# Patient Record
Sex: MALE | Race: WHITE | Employment: UNEMPLOYED | URBAN - NONMETROPOLITAN AREA
[De-identification: names, ages, dates, MRNs, and addresses within clinical notes are randomized per-mention and may not be internally consistent; named-entity substitution may affect disease eponyms.]

---

## 2017-02-03 ENCOUNTER — OFFICE VISIT (OUTPATIENT)
Dept: FAMILY MEDICINE CLINIC | Age: 42
End: 2017-02-03

## 2017-02-03 VITALS
RESPIRATION RATE: 16 BRPM | HEIGHT: 70 IN | SYSTOLIC BLOOD PRESSURE: 128 MMHG | WEIGHT: 257 LBS | BODY MASS INDEX: 36.79 KG/M2 | DIASTOLIC BLOOD PRESSURE: 74 MMHG | HEART RATE: 88 BPM

## 2017-02-03 DIAGNOSIS — F10.10 ALCOHOL ABUSE: ICD-10-CM

## 2017-02-03 DIAGNOSIS — I10 HTN (HYPERTENSION), BENIGN: ICD-10-CM

## 2017-02-03 DIAGNOSIS — E11.9 TYPE 2 DIABETES MELLITUS WITHOUT COMPLICATION, WITHOUT LONG-TERM CURRENT USE OF INSULIN (HCC): ICD-10-CM

## 2017-02-03 DIAGNOSIS — E78.5 HYPERLIPIDEMIA, UNSPECIFIED HYPERLIPIDEMIA TYPE: Primary | ICD-10-CM

## 2017-02-03 PROCEDURE — 99214 OFFICE O/P EST MOD 30 MIN: CPT | Performed by: NURSE PRACTITIONER

## 2017-02-03 RX ORDER — VENLAFAXINE HYDROCHLORIDE 150 MG/1
150 CAPSULE, EXTENDED RELEASE ORAL DAILY
Qty: 30 CAPSULE | Refills: 5 | Status: SHIPPED | OUTPATIENT
Start: 2017-02-03 | End: 2017-08-23 | Stop reason: SDUPTHER

## 2017-02-03 RX ORDER — LISINOPRIL AND HYDROCHLOROTHIAZIDE 25; 20 MG/1; MG/1
1 TABLET ORAL DAILY
Qty: 90 TABLET | Refills: 3 | Status: SHIPPED | OUTPATIENT
Start: 2017-02-03 | End: 2017-08-23 | Stop reason: SDUPTHER

## 2017-02-03 RX ORDER — DISULFIRAM 500 MG/1
TABLET ORAL
Qty: 30 TABLET | Refills: 1 | Status: SHIPPED | OUTPATIENT
Start: 2017-02-03 | End: 2017-08-23 | Stop reason: SDUPTHER

## 2017-02-03 RX ORDER — SIMVASTATIN 10 MG
10 TABLET ORAL EVERY EVENING
Qty: 90 TABLET | Refills: 3 | Status: SHIPPED | OUTPATIENT
Start: 2017-02-03 | End: 2017-08-23 | Stop reason: SDUPTHER

## 2017-02-03 ASSESSMENT — PATIENT HEALTH QUESTIONNAIRE - PHQ9
1. LITTLE INTEREST OR PLEASURE IN DOING THINGS: 0
SUM OF ALL RESPONSES TO PHQ9 QUESTIONS 1 & 2: 0
2. FEELING DOWN, DEPRESSED OR HOPELESS: 0
SUM OF ALL RESPONSES TO PHQ QUESTIONS 1-9: 0

## 2017-02-03 ASSESSMENT — ENCOUNTER SYMPTOMS
EYES NEGATIVE: 1
GASTROINTESTINAL NEGATIVE: 1
RESPIRATORY NEGATIVE: 1

## 2017-03-24 ENCOUNTER — OFFICE VISIT (OUTPATIENT)
Dept: FAMILY MEDICINE CLINIC | Age: 42
End: 2017-03-24

## 2017-03-24 VITALS
DIASTOLIC BLOOD PRESSURE: 84 MMHG | HEART RATE: 76 BPM | HEIGHT: 70 IN | SYSTOLIC BLOOD PRESSURE: 116 MMHG | WEIGHT: 248 LBS | BODY MASS INDEX: 35.5 KG/M2 | RESPIRATION RATE: 16 BRPM

## 2017-03-24 DIAGNOSIS — I10 ESSENTIAL HYPERTENSION: Primary | ICD-10-CM

## 2017-03-24 PROCEDURE — 99212 OFFICE O/P EST SF 10 MIN: CPT | Performed by: NURSE PRACTITIONER

## 2017-03-24 RX ORDER — PROPRANOLOL HYDROCHLORIDE 20 MG/1
20 TABLET ORAL 2 TIMES DAILY
Qty: 60 TABLET | Refills: 5 | Status: SHIPPED | OUTPATIENT
Start: 2017-03-24 | End: 2017-08-23 | Stop reason: SDUPTHER

## 2017-03-24 ASSESSMENT — ENCOUNTER SYMPTOMS
EYES NEGATIVE: 1
RESPIRATORY NEGATIVE: 1
GASTROINTESTINAL NEGATIVE: 1

## 2017-08-23 ENCOUNTER — OFFICE VISIT (OUTPATIENT)
Dept: FAMILY MEDICINE CLINIC | Age: 42
End: 2017-08-23
Payer: MEDICAID

## 2017-08-23 VITALS
RESPIRATION RATE: 17 BRPM | SYSTOLIC BLOOD PRESSURE: 124 MMHG | DIASTOLIC BLOOD PRESSURE: 64 MMHG | BODY MASS INDEX: 37.05 KG/M2 | WEIGHT: 258.8 LBS | HEIGHT: 70 IN | HEART RATE: 86 BPM

## 2017-08-23 DIAGNOSIS — F10.10 ALCOHOL ABUSE: ICD-10-CM

## 2017-08-23 DIAGNOSIS — E11.9 TYPE 2 DIABETES MELLITUS WITHOUT COMPLICATION, WITHOUT LONG-TERM CURRENT USE OF INSULIN (HCC): ICD-10-CM

## 2017-08-23 DIAGNOSIS — E11.9 TYPE 2 DIABETES MELLITUS WITHOUT COMPLICATION, UNSPECIFIED LONG TERM INSULIN USE STATUS: ICD-10-CM

## 2017-08-23 DIAGNOSIS — I10 ESSENTIAL HYPERTENSION: ICD-10-CM

## 2017-08-23 DIAGNOSIS — Z12.5 SCREENING FOR PROSTATE CANCER: Primary | ICD-10-CM

## 2017-08-23 PROCEDURE — 99214 OFFICE O/P EST MOD 30 MIN: CPT | Performed by: NURSE PRACTITIONER

## 2017-08-23 RX ORDER — DISULFIRAM 500 MG/1
TABLET ORAL
Qty: 30 TABLET | Refills: 5 | Status: SHIPPED | OUTPATIENT
Start: 2017-08-23 | End: 2017-10-26 | Stop reason: ALTCHOICE

## 2017-08-23 RX ORDER — SIMVASTATIN 10 MG
10 TABLET ORAL EVERY EVENING
Qty: 90 TABLET | Refills: 3 | Status: ON HOLD | OUTPATIENT
Start: 2017-08-23 | End: 2018-05-15 | Stop reason: HOSPADM

## 2017-08-23 RX ORDER — PROPRANOLOL HYDROCHLORIDE 20 MG/1
20 TABLET ORAL 2 TIMES DAILY
Qty: 60 TABLET | Refills: 5 | Status: ON HOLD | OUTPATIENT
Start: 2017-08-23 | End: 2018-05-10 | Stop reason: ALTCHOICE

## 2017-08-23 RX ORDER — VENLAFAXINE HYDROCHLORIDE 150 MG/1
150 CAPSULE, EXTENDED RELEASE ORAL DAILY
Qty: 30 CAPSULE | Refills: 5 | Status: SHIPPED | OUTPATIENT
Start: 2017-08-23 | End: 2018-05-06 | Stop reason: ALTCHOICE

## 2017-08-23 RX ORDER — LISINOPRIL AND HYDROCHLOROTHIAZIDE 25; 20 MG/1; MG/1
1 TABLET ORAL DAILY
Qty: 90 TABLET | Refills: 5 | Status: SHIPPED | OUTPATIENT
Start: 2017-08-23 | End: 2018-10-04 | Stop reason: SDUPTHER

## 2017-08-23 ASSESSMENT — ENCOUNTER SYMPTOMS
GASTROINTESTINAL NEGATIVE: 1
RESPIRATORY NEGATIVE: 1
EYES NEGATIVE: 1

## 2017-08-28 ENCOUNTER — HOSPITAL ENCOUNTER (OUTPATIENT)
Age: 42
Discharge: HOME | End: 2017-08-28
Payer: MEDICAID

## 2017-08-28 ENCOUNTER — TELEPHONE (OUTPATIENT)
Dept: FAMILY MEDICINE CLINIC | Age: 42
End: 2017-08-28

## 2017-08-28 DIAGNOSIS — E11.9 TYPE 2 DIABETES MELLITUS WITHOUT COMPLICATION, UNSPECIFIED LONG TERM INSULIN USE STATUS: ICD-10-CM

## 2017-08-28 DIAGNOSIS — E11.9 TYPE 2 DIABETES MELLITUS WITHOUT COMPLICATION, WITHOUT LONG-TERM CURRENT USE OF INSULIN (HCC): Primary | ICD-10-CM

## 2017-08-28 DIAGNOSIS — Z12.5 SCREENING FOR PROSTATE CANCER: ICD-10-CM

## 2017-08-28 LAB
ALBUMIN SERPL-MCNC: 4.2 G/DL (ref 3.5–5.1)
ALP BLD-CCNC: 94 U/L (ref 38–126)
ALT SERPL-CCNC: 79 U/L (ref 11–66)
ANION GAP SERPL CALCULATED.3IONS-SCNC: 11 MEQ/L (ref 8–16)
AST SERPL-CCNC: 38 U/L (ref 5–40)
AVERAGE GLUCOSE: 114 MG/DL (ref 70–126)
BASOPHILS # BLD: 0.3 %
BASOPHILS ABSOLUTE: 0 THOU/MM3 (ref 0–0.1)
BILIRUB SERPL-MCNC: 0.3 MG/DL (ref 0.3–1.2)
BUN BLDV-MCNC: 19 MG/DL (ref 7–22)
CALCIUM SERPL-MCNC: 9.3 MG/DL (ref 8.5–10.5)
CHLORIDE BLD-SCNC: 102 MEQ/L (ref 98–111)
CHOLESTEROL, TOTAL: 213 MG/DL (ref 100–199)
CO2: 22 MEQ/L (ref 23–33)
CREAT SERPL-MCNC: 0.7 MG/DL (ref 0.4–1.2)
CREATININE, URINE: 219.5 MG/DL
EOSINOPHIL # BLD: 2.1 %
EOSINOPHILS ABSOLUTE: 0.2 THOU/MM3 (ref 0–0.4)
GFR SERPL CREATININE-BSD FRML MDRD: > 90 ML/MIN/1.73M2
GLUCOSE BLD-MCNC: 171 MG/DL (ref 70–108)
HBA1C MFR BLD: 5.8 % (ref 4.4–6.4)
HCT VFR BLD CALC: 46.5 % (ref 42–52)
HDLC SERPL-MCNC: 37 MG/DL
HEMOGLOBIN: 16.2 GM/DL (ref 14–18)
LDL CHOLESTEROL CALCULATED: 116 MG/DL
LYMPHOCYTES # BLD: 17.4 %
LYMPHOCYTES ABSOLUTE: 1.3 THOU/MM3 (ref 1–4.8)
MCH RBC QN AUTO: 33.9 PG (ref 27–31)
MCHC RBC AUTO-ENTMCNC: 34.8 GM/DL (ref 33–37)
MCV RBC AUTO: 97.4 FL (ref 80–94)
MICROALBUMIN UR-MCNC: 37.94 MG/DL
MICROALBUMIN/CREAT UR-RTO: 173 MG/G (ref 0–30)
MONOCYTES # BLD: 7.4 %
MONOCYTES ABSOLUTE: 0.6 THOU/MM3 (ref 0.4–1.3)
NUCLEATED RED BLOOD CELLS: 0 /100 WBC
PDW BLD-RTO: 13.2 % (ref 11.5–14.5)
PLATELET # BLD: 222 THOU/MM3 (ref 130–400)
PMV BLD AUTO: 8.7 MCM (ref 7.4–10.4)
POTASSIUM SERPL-SCNC: 4.6 MEQ/L (ref 3.5–5.2)
PROSTATE SPECIFIC ANTIGEN: 0.25 NG/ML (ref 0–1)
RBC # BLD: 4.78 MILL/MM3 (ref 4.7–6.1)
RBC # BLD: NORMAL 10*6/UL
SEG NEUTROPHILS: 72.8 %
SEGMENTED NEUTROPHILS ABSOLUTE COUNT: 5.5 THOU/MM3 (ref 1.8–7.7)
SODIUM BLD-SCNC: 135 MEQ/L (ref 135–145)
TOTAL PROTEIN: 7.4 G/DL (ref 6.1–8)
TRIGL SERPL-MCNC: 302 MG/DL (ref 0–199)
WBC # BLD: 7.5 THOU/MM3 (ref 4.8–10.8)

## 2017-08-28 PROCEDURE — 36415 COLL VENOUS BLD VENIPUNCTURE: CPT

## 2017-08-28 PROCEDURE — 83036 HEMOGLOBIN GLYCOSYLATED A1C: CPT

## 2017-08-28 PROCEDURE — 80061 LIPID PANEL: CPT

## 2017-08-28 PROCEDURE — 85025 COMPLETE CBC W/AUTO DIFF WBC: CPT

## 2017-08-28 PROCEDURE — 80053 COMPREHEN METABOLIC PANEL: CPT

## 2017-08-28 PROCEDURE — 82043 UR ALBUMIN QUANTITATIVE: CPT

## 2017-08-28 PROCEDURE — G0103 PSA SCREENING: HCPCS

## 2017-08-28 RX ORDER — BLOOD-GLUCOSE METER
KIT MISCELLANEOUS
Qty: 1 KIT | Refills: 5 | Status: SHIPPED | OUTPATIENT
Start: 2017-08-28 | End: 2018-10-04 | Stop reason: SDUPTHER

## 2017-10-14 ENCOUNTER — HOSPITAL ENCOUNTER (EMERGENCY)
Age: 42
Discharge: HOME | End: 2017-10-14
Attending: FAMILY MEDICINE
Payer: MEDICAID

## 2017-10-14 ENCOUNTER — APPOINTMENT (OUTPATIENT)
Dept: GENERAL RADIOLOGY | Age: 42
End: 2017-10-14
Payer: MEDICAID

## 2017-10-14 ENCOUNTER — APPOINTMENT (OUTPATIENT)
Dept: CT IMAGING | Age: 42
End: 2017-10-14
Payer: MEDICAID

## 2017-10-14 VITALS
TEMPERATURE: 99.3 F | WEIGHT: 254.6 LBS | DIASTOLIC BLOOD PRESSURE: 74 MMHG | RESPIRATION RATE: 16 BRPM | SYSTOLIC BLOOD PRESSURE: 129 MMHG | OXYGEN SATURATION: 94 % | HEART RATE: 82 BPM | BODY MASS INDEX: 35.64 KG/M2 | HEIGHT: 71 IN

## 2017-10-14 DIAGNOSIS — R10.84 GENERALIZED ABDOMINAL PAIN: ICD-10-CM

## 2017-10-14 DIAGNOSIS — J18.9 PNEUMONITIS: Primary | ICD-10-CM

## 2017-10-14 LAB
ALBUMIN SERPL-MCNC: 2.9 G/DL (ref 3.5–5.1)
ALP BLD-CCNC: 254 U/L (ref 38–126)
ALT SERPL-CCNC: 164 U/L (ref 11–66)
AMORPHOUS: ABNORMAL
ANION GAP SERPL CALCULATED.3IONS-SCNC: 18 MEQ/L (ref 8–16)
ANISOCYTOSIS: NORMAL
AST SERPL-CCNC: 264 U/L (ref 5–40)
BACTERIA: ABNORMAL
BASOPHILS # BLD: 0.5 %
BILIRUB SERPL-MCNC: 7.6 MG/DL (ref 0.3–1.2)
BILIRUBIN URINE: ABNORMAL
BLOOD, URINE: NEGATIVE
BUN BLDV-MCNC: 12 MG/DL (ref 7–22)
CALCIUM SERPL-MCNC: 8.4 MG/DL (ref 8.5–10.5)
CASTS UA: ABNORMAL /LPF
CHARACTER, URINE: CLEAR
CHLORIDE BLD-SCNC: 92 MEQ/L (ref 98–111)
CO2: 20 MEQ/L (ref 23–33)
COLOR: ABNORMAL
CREAT SERPL-MCNC: 0.4 MG/DL (ref 0.4–1.2)
CRYSTALS, UA: ABNORMAL
EOSINOPHIL # BLD: 0.4 %
EPITHELIAL CELLS, UA: ABNORMAL /HPF
FLU A ANTIGEN: NEGATIVE
FLU B ANTIGEN: NEGATIVE
GFR SERPL CREATININE-BSD FRML MDRD: > 90 ML/MIN/1.73M2
GLUCOSE BLD-MCNC: 146 MG/DL (ref 70–108)
GLUCOSE, URINE: NEGATIVE MG/DL
HCT VFR BLD CALC: 46.7 % (ref 42–52)
HEMOGLOBIN: 15.5 GM/DL (ref 14–18)
ICTOTEST: POSITIVE
KETONES, URINE: NEGATIVE
LEUKOCYTE ESTERASE, URINE: NEGATIVE
LIPASE: 45.8 U/L (ref 5.6–51.3)
LYMPHOCYTES # BLD: 16 %
MACROCYTES: NORMAL
MCH RBC QN AUTO: 33.7 PG (ref 27–31)
MCHC RBC AUTO-ENTMCNC: 33.2 GM/DL (ref 33–37)
MCV RBC AUTO: 101.5 FL (ref 80–94)
MONOCYTES # BLD: 8.8 %
MUCUS: ABNORMAL
NITRITE, URINE: NEGATIVE
NUCLEATED RED BLOOD CELLS: 0 /100 WBC
PDW BLD-RTO: 12.1 % (ref 11.5–14.5)
PH UA: 7.5 (ref 5–9)
PLATELET # BLD: 206 THOU/MM3 (ref 130–400)
PMV BLD AUTO: 8.7 MCM (ref 7.4–10.4)
POTASSIUM SERPL-SCNC: 4 MEQ/L (ref 3.5–5.2)
PROTEIN UA: 30 MG/DL
RBC # BLD: 4.6 MILL/MM3 (ref 4.7–6.1)
RBC # BLD: NORMAL 10*6/UL
RBC UA: ABNORMAL /HPF
REFLEX TO URINE C & S: ABNORMAL
SEG NEUTROPHILS: 74.3 %
SODIUM BLD-SCNC: 130 MEQ/L (ref 135–145)
SPECIFIC GRAVITY UA: 1.02 (ref 1–1.03)
TOTAL PROTEIN: 6 G/DL (ref 6.1–8)
TROPONIN I: 0.06 NG/ML
UROBILINOGEN, URINE: 1 EU/DL (ref 0–1)
WBC # BLD: 7.3 THOU/MM3 (ref 4.8–10.8)
WBC UA: ABNORMAL /HPF

## 2017-10-14 PROCEDURE — 99284 EMERGENCY DEPT VISIT MOD MDM: CPT

## 2017-10-14 PROCEDURE — 83690 ASSAY OF LIPASE: CPT

## 2017-10-14 PROCEDURE — 80053 COMPREHEN METABOLIC PANEL: CPT

## 2017-10-14 PROCEDURE — 96375 TX/PRO/DX INJ NEW DRUG ADDON: CPT

## 2017-10-14 PROCEDURE — 81001 URINALYSIS AUTO W/SCOPE: CPT

## 2017-10-14 PROCEDURE — 74177 CT ABD & PELVIS W/CONTRAST: CPT

## 2017-10-14 PROCEDURE — 87804 INFLUENZA ASSAY W/OPTIC: CPT

## 2017-10-14 PROCEDURE — 6360000002 HC RX W HCPCS: Performed by: FAMILY MEDICINE

## 2017-10-14 PROCEDURE — 96374 THER/PROPH/DIAG INJ IV PUSH: CPT

## 2017-10-14 PROCEDURE — 36415 COLL VENOUS BLD VENIPUNCTURE: CPT

## 2017-10-14 PROCEDURE — 6370000000 HC RX 637 (ALT 250 FOR IP): Performed by: FAMILY MEDICINE

## 2017-10-14 PROCEDURE — 71020 XR CHEST STANDARD TWO VW: CPT

## 2017-10-14 PROCEDURE — 84484 ASSAY OF TROPONIN QUANT: CPT

## 2017-10-14 PROCEDURE — 85025 COMPLETE CBC W/AUTO DIFF WBC: CPT

## 2017-10-14 PROCEDURE — 6360000004 HC RX CONTRAST MEDICATION: Performed by: FAMILY MEDICINE

## 2017-10-14 RX ORDER — METHYLPREDNISOLONE SODIUM SUCCINATE 125 MG/2ML
125 INJECTION, POWDER, LYOPHILIZED, FOR SOLUTION INTRAMUSCULAR; INTRAVENOUS ONCE
Status: COMPLETED | OUTPATIENT
Start: 2017-10-14 | End: 2017-10-14

## 2017-10-14 RX ORDER — PREDNISONE 10 MG/1
40 TABLET ORAL DAILY
Qty: 20 TABLET | Refills: 0 | Status: SHIPPED | OUTPATIENT
Start: 2017-10-14 | End: 2017-10-19

## 2017-10-14 RX ORDER — MORPHINE SULFATE 4 MG/ML
4 INJECTION, SOLUTION INTRAMUSCULAR; INTRAVENOUS ONCE
Status: COMPLETED | OUTPATIENT
Start: 2017-10-14 | End: 2017-10-14

## 2017-10-14 RX ORDER — CLINDAMYCIN HYDROCHLORIDE 150 MG/1
150 CAPSULE ORAL 3 TIMES DAILY
Qty: 30 CAPSULE | Refills: 0 | Status: SHIPPED | OUTPATIENT
Start: 2017-10-14 | End: 2017-10-24

## 2017-10-14 RX ORDER — IPRATROPIUM BROMIDE AND ALBUTEROL SULFATE 2.5; .5 MG/3ML; MG/3ML
1 SOLUTION RESPIRATORY (INHALATION) ONCE
Status: COMPLETED | OUTPATIENT
Start: 2017-10-14 | End: 2017-10-14

## 2017-10-14 RX ORDER — AZITHROMYCIN 250 MG/1
TABLET, FILM COATED ORAL
Qty: 1 PACKET | Refills: 0 | Status: SHIPPED | OUTPATIENT
Start: 2017-10-14 | End: 2017-10-24

## 2017-10-14 RX ORDER — ALBUTEROL SULFATE 90 UG/1
2 AEROSOL, METERED RESPIRATORY (INHALATION) ONCE
Status: COMPLETED | OUTPATIENT
Start: 2017-10-14 | End: 2017-10-14

## 2017-10-14 RX ORDER — ONDANSETRON 2 MG/ML
4 INJECTION INTRAMUSCULAR; INTRAVENOUS ONCE
Status: COMPLETED | OUTPATIENT
Start: 2017-10-14 | End: 2017-10-14

## 2017-10-14 RX ADMIN — METHYLPREDNISOLONE SODIUM SUCCINATE 125 MG: 125 INJECTION, POWDER, FOR SOLUTION INTRAMUSCULAR; INTRAVENOUS at 10:11

## 2017-10-14 RX ADMIN — ONDANSETRON 4 MG: 2 INJECTION INTRAMUSCULAR; INTRAVENOUS at 10:11

## 2017-10-14 RX ADMIN — IPRATROPIUM BROMIDE AND ALBUTEROL SULFATE 1 AMPULE: .5; 3 SOLUTION RESPIRATORY (INHALATION) at 10:13

## 2017-10-14 RX ADMIN — MORPHINE SULFATE 4 MG: 4 INJECTION INTRAVENOUS at 10:12

## 2017-10-14 RX ADMIN — IOPAMIDOL 100 ML: 755 INJECTION, SOLUTION INTRAVENOUS at 12:09

## 2017-10-14 RX ADMIN — ALBUTEROL SULFATE 2 PUFF: 90 AEROSOL, METERED RESPIRATORY (INHALATION) at 13:34

## 2017-10-14 ASSESSMENT — ENCOUNTER SYMPTOMS
EYE REDNESS: 0
BACK PAIN: 0
COUGH: 1
SHORTNESS OF BREATH: 1
SORE THROAT: 0
EYE PAIN: 0
WHEEZING: 1
RHINORRHEA: 0
VOMITING: 1
EYE DISCHARGE: 0
ABDOMINAL PAIN: 1
NAUSEA: 1
DIARRHEA: 1

## 2017-10-14 ASSESSMENT — PAIN DESCRIPTION - FREQUENCY: FREQUENCY: INTERMITTENT

## 2017-10-14 ASSESSMENT — PAIN SCALES - GENERAL
PAINLEVEL_OUTOF10: 7
PAINLEVEL_OUTOF10: 0
PAINLEVEL_OUTOF10: 0
PAINLEVEL_OUTOF10: 7

## 2017-10-14 ASSESSMENT — PAIN DESCRIPTION - PAIN TYPE: TYPE: ACUTE PAIN

## 2017-10-14 ASSESSMENT — PAIN DESCRIPTION - LOCATION: LOCATION: ABDOMEN

## 2017-10-14 NOTE — ED PROVIDER NOTES
[facility]  eMERGENCY dEPARTMENT eNCOUnter          CHIEF COMPLAINT       Chief Complaint   Patient presents with    Abdominal Pain     \"bloated\" dark orange urine, \"I'm an alcoholic and been drinking alot\"       Nurses Notes reviewed and I agree except as noted in the HPI. HISTORY OF PRESENT ILLNESS    Tiff Velez is a 40 y.o. male who presents Chief complaint of abdominal pain that started yesterday. Also complains of bloating, vomiting diarrhea. Additionally complains of productive cough, wheezing. He states that he has been smoking more then usual.     REVIEW OF SYSTEMS     Review of Systems   Constitutional: Negative for chills, fatigue and fever. HENT: Negative for ear pain, rhinorrhea and sore throat. Eyes: Negative for pain, discharge, redness and visual disturbance. Respiratory: Positive for cough, shortness of breath and wheezing. Cardiovascular: Negative for chest pain, palpitations and leg swelling. Gastrointestinal: Positive for abdominal pain, diarrhea, nausea and vomiting. Genitourinary: Negative for difficulty urinating and dysuria. Musculoskeletal: Negative for arthralgias, back pain and myalgias. Skin: Negative for rash. Allergic/Immunologic: Negative for environmental allergies. Neurological: Negative for dizziness, seizures, syncope and headaches. Hematological: Negative for adenopathy. Psychiatric/Behavioral: Negative for suicidal ideas. The patient is not nervous/anxious. PAST MEDICAL HISTORY    has a past medical history of COPD (chronic obstructive pulmonary disease) ([facility] Depression; Diabetes mellitus ([facility] ETOH abuse; Hyperlipidemia; Hypertension; Liver disease; Seizures ([facility] [facility] and Thyroid disease. SURGICAL HISTORY      has no past surgical history on file.     CURRENT MEDICATIONS       Previous Medications    DISULFIRAM (ANTABUSE) 500 MG TABS    1 tab daily    FAMOTIDINE (PEPCID) 40 MG TABLET    Take 1 tablet by mouth 2 times daily as needed    GLUCOSE BLOOD VI TEST STRIPS (ASCENSIA AUTODISC VI;ONE TOUCH ULTRA TEST VI) STRIP    Test as needed. Dispense One Touch verio Test Strips. Dx: Type 2 diabetes (250.00)    GLUCOSE BLOOD VI TEST STRIPS (EXACTECH TEST) STRIP    Glucometer with test strips plus lancets. #100. Check BS daily. 250.00    GLUCOSE MONITORING KIT (FREESTYLE) MONITORING KIT    Glucometer with test strips and lancets. Check BS once daily  #100 strips and lancets    LISINOPRIL-HYDROCHLOROTHIAZIDE (PRINZIDE;ZESTORETIC) 20-25 MG PER TABLET    Take 1 tablet by mouth daily    METFORMIN (GLUCOPHAGE) 1000 MG TABLET    Take 1 tablet by mouth 2 times daily (with meals)    PROPRANOLOL (INDERAL) 20 MG TABLET    Take 1 tablet by mouth 2 times daily    SIMVASTATIN (ZOCOR) 10 MG TABLET    Take 1 tablet by mouth every evening    VENLAFAXINE (EFFEXOR XR) 150 MG EXTENDED RELEASE CAPSULE    Take 1 capsule by mouth daily       ALLERGIES     has No Known Allergies. FAMILY HISTORY     indicated that the status of his mother is unknown. He indicated that the status of his father is unknown. He indicated that the status of his brother is unknown.    family history includes Cancer in his father; Diabetes in his mother; High Blood Pressure in his brother and father. SOCIAL HISTORY      reports that he has been smoking Cigarettes. He has a 30.00 pack-year smoking history. He has never used smokeless tobacco. He reports that he drinks about 60.0 oz of alcohol per week . He reports that he does not use drugs. PHYSICAL EXAM     INITIAL VITALS:  height is 5' 11\" (1.803 m) and weight is 254 lb 9.6 oz (115.5 kg). His oral temperature is 99.3 °F (37.4 °C). His blood pressure is 114/68 and his pulse is 82. His respiration is 16 and oxygen saturation is 95%. Physical Exam   Constitutional: He is oriented to person, place, and time. He appears well-developed and well-nourished. HENT:   Head: Normocephalic and atraumatic.    Right Ear: External ear normal.   Left Ear: External ear normal.   Eyes: Conjunctivae are normal. Right eye exhibits no discharge. Left eye exhibits no discharge. No scleral icterus. Neck: Normal range of motion. No tracheal deviation present. Pulmonary/Chest: Effort normal. No stridor. No respiratory distress. He has wheezes. Musculoskeletal: Normal range of motion. Neurological: He is alert and oriented to person, place, and time. Coordination normal.   Skin: Skin is warm and dry. He is not diaphoretic. Psychiatric: He has a normal mood and affect. His behavior is normal.   Nursing note and vitals reviewed. DIFFERENTIAL DIAGNOSIS:   Bronchitis, pneumonia    DIAGNOSTIC RESULTS           RADIOLOGY: non-plain film images(s) such as CT, Ultrasound and MRI are read by the radiologist.  The patient had a Xr Chest Standard (2 Vw)    Result Date: 10/14/2017  PROCEDURE: XR CHEST STANDARD TWO VW CLINICAL INFORMATION: Cough TECHNIQUE: PA and lateral chest radiographs. COMPARISON: [facility] chest radiograph 7/9/2014 FINDINGS: Cardiomediastinal silhouette is within normal limits. Lungs are clear. Soft tissues and osseous structures are unremarkable. Normal chest radiographs. **This report has been created using voice recognition software. It may contain minor errors which are inherent in voice recognition technology. ** Final report electronically signed by Dr. Tanner Parrish on 10/14/2017 11:08 AM    Ct Abdomen Pelvis W Iv Contrast Additional Contrast? None    Result Date: 10/14/2017  PROCEDURE: CT ABDOMEN PELVIS W IV CONTRAST CLINICAL INFORMATION: Abdominal pain TECHNIQUE: CT of the abdomen and pelvis was performed following demonstration of 100 mL Isovue-370 intravenous contrast only. Axial images as well as coronal and sagittal reconstructions were obtained.  All CT scans at this facility use dose modulation, iterative reconstruction, and/or weight-based dosing when appropriate to reduce radiation dose to as low as MCH 33.7 (*)     All other components within normal limits   URINE RT REFLEX TO CULTURE - Abnormal; Notable for the following:     Bilirubin Urine MODERATE (*)     Protein, UA 30 (*)     Color, UA DARK YELLOW (*)     All other components within normal limits   COMPREHENSIVE METABOLIC PANEL - Abnormal; Notable for the following:     Glucose 146 (*)     Sodium 130 (*)     Chloride 92 (*)     CO2 20 (*)     Calcium 8.4 (*)     Alkaline Phosphatase 254 (*)     Total Protein 6.0 (*)     Alb 2.9 (*)     Total Bilirubin 7.6 (*)     All other components within normal limits   ICTOTEST, URINE - Abnormal; Notable for the following:     Ictotest POSITIVE (*)     All other components within normal limits   ANION GAP - Abnormal; Notable for the following: Anion Gap 18.0 (*)     All other components within normal limits   RAPID INFLUENZA A/B ANTIGENS   TROPONIN   LIPASE   DIFFERENTIAL   GLOMERULAR FILTRATION RATE, ESTIMATED       EMERGENCY DEPARTMENT COURSE:   Vitals:    Vitals:    10/14/17 0923 10/14/17 1032 10/14/17 1049   BP: (!) 142/84 109/87 114/68   Pulse: 101 82    Resp: 18 18 16   Temp: 99.3 °F (37.4 °C)     TempSrc: Oral     SpO2: 95% 95%    Weight: 254 lb 9.6 oz (115.5 kg)     Height: 5' 11\" (1.803 m)       Patient seen and evaluated for abdominal pain and coughing. CT suggest possible cholecystitis. However patient does not have any right upper quadrant or epigastric tenderness to palpation. He does not have a white count. Resting comfortably. I did discuss this case with general surgery on-call. They advise outpatient follow-up. Additional finding of pneumonitis. We will cover with azithromycin, clindamycin, prednisone and albuterol inhaler. We will also order out patient ultrasound of the gallbladder and the patient will follow up as an outpatient with general surgery.     CRITICAL CARE: There was a high probability of clinically significant/life threatening deterioration in this patient's condition which required my urgent intervention. Total critical care time was none minutes. This excludes any time for separately reportable procedures. CONSULTS:  Dr Lois Oliveira:  None    FINAL IMPRESSION      1. Pneumonitis    2.  Generalized abdominal pain          DISPOSITION/PLAN   Discharge    PATIENT REFERRED TO:  Nancy El MD  [address]  [facility]  [phone]    Schedule an appointment as soon as possible for a visit in 1 day        DISCHARGE MEDICATIONS:  New Prescriptions    AZITHROMYCIN (ZITHROMAX) 250 MG TABLET    Take 2 tablets today and one tablet for the next 4 days    CLINDAMYCIN (CLEOCIN) 150 MG CAPSULE    Take 1 capsule by mouth 3 times daily for 10 days    PREDNISONE (DELTASONE) 10 MG TABLET    Take 4 tablets by mouth daily for 5 days       (Please note that portions of this note were completed with a voice recognition program.  Efforts were made to edit the dictations but occasionally words are mis-transcribed.)    MD Akash Tellez MD  10/14/17 5798

## 2017-10-16 ENCOUNTER — HOSPITAL ENCOUNTER (OUTPATIENT)
Dept: ULTRASOUND IMAGING | Age: 42
Discharge: HOME | End: 2017-10-16
Payer: MEDICAID

## 2017-10-16 DIAGNOSIS — R10.84 GENERALIZED ABDOMINAL PAIN: ICD-10-CM

## 2017-10-16 PROCEDURE — 76705 ECHO EXAM OF ABDOMEN: CPT

## 2017-10-26 ENCOUNTER — OFFICE VISIT (OUTPATIENT)
Dept: SURGERY | Age: 42
End: 2017-10-26
Payer: MEDICAID

## 2017-10-26 VITALS
HEART RATE: 88 BPM | TEMPERATURE: 97 F | OXYGEN SATURATION: 95 % | SYSTOLIC BLOOD PRESSURE: 130 MMHG | WEIGHT: 258 LBS | HEIGHT: 71 IN | RESPIRATION RATE: 16 BRPM | DIASTOLIC BLOOD PRESSURE: 80 MMHG | BODY MASS INDEX: 36.12 KG/M2

## 2017-10-26 DIAGNOSIS — K81.0 ACUTE CHOLECYSTITIS: Primary | ICD-10-CM

## 2017-10-26 PROCEDURE — G8417 CALC BMI ABV UP PARAM F/U: HCPCS | Performed by: SURGERY

## 2017-10-26 PROCEDURE — G8484 FLU IMMUNIZE NO ADMIN: HCPCS | Performed by: SURGERY

## 2017-10-26 PROCEDURE — G8427 DOCREV CUR MEDS BY ELIG CLIN: HCPCS | Performed by: SURGERY

## 2017-10-26 PROCEDURE — 4004F PT TOBACCO SCREEN RCVD TLK: CPT | Performed by: SURGERY

## 2017-10-26 PROCEDURE — 99203 OFFICE O/P NEW LOW 30 MIN: CPT | Performed by: SURGERY

## 2017-10-26 RX ORDER — CLINDAMYCIN HYDROCHLORIDE 150 MG/1
150 CAPSULE ORAL 3 TIMES DAILY
COMMUNITY
End: 2018-01-09 | Stop reason: ALTCHOICE

## 2017-10-26 ASSESSMENT — ENCOUNTER SYMPTOMS
APNEA: 0
VOMITING: 0
SORE THROAT: 0
FACIAL SWELLING: 0
SHORTNESS OF BREATH: 0
EYE REDNESS: 0
VOICE CHANGE: 0
RECTAL PAIN: 0
DIARRHEA: 0
STRIDOR: 0
PHOTOPHOBIA: 0
SINUS PRESSURE: 0
COUGH: 0
WHEEZING: 0
EYE DISCHARGE: 0
ANAL BLEEDING: 0
BACK PAIN: 0
RHINORRHEA: 0
CHOKING: 0
EYE ITCHING: 0
COLOR CHANGE: 0
ABDOMINAL PAIN: 0
CONSTIPATION: 0
EYE PAIN: 0
CHEST TIGHTNESS: 0
TROUBLE SWALLOWING: 0
BLOOD IN STOOL: 0
ABDOMINAL DISTENTION: 0
NAUSEA: 0

## 2017-10-26 NOTE — PROGRESS NOTES
Musculoskeletal: Negative for arthralgias, back pain, gait problem, joint swelling, myalgias, neck pain and neck stiffness. Skin: Negative for color change, pallor, rash and wound. Neurological: Negative for dizziness, tremors, seizures, syncope, facial asymmetry, speech difficulty, weakness, light-headedness, numbness and headaches. Hematological: Negative for adenopathy. Does not bruise/bleed easily. Psychiatric/Behavioral: Negative for agitation, behavioral problems, confusion, decreased concentration, dysphoric mood, hallucinations, self-injury, sleep disturbance and suicidal ideas. The patient is not nervous/anxious and is not hyperactive. Blood pressure 130/80, pulse 88, temperature 97 °F (36.1 °C), temperature source Tympanic, resp. rate 16, height 5' 11\" (1.803 m), weight 258 lb (117 kg), SpO2 95 %. Body mass index is 35.98 kg/m². Past Medical History:   Diagnosis Date    [facility]) ([facility]     Depression     Diabetes mellitus ([facility]     ETOH abuse     Hyperlipidemia     Hypertension     Liver disease     Seizures ([facility]     Thyroid disease        Social History     Social History    Marital status: Single     Spouse name: N/A    Number of children: N/A    Years of education: N/A     Occupational History     [employer]     Social History Main Topics    Smoking status: Current Every Day Smoker     Packs/day: 0.25     Years: 15.00     Types: Cigarettes    Smokeless tobacco: Current User      Comment: vape    Alcohol use 60.0 oz/week     100 Cans of beer per week      Comment: 10-15    24 oz beer/day: QUIT 8/23/16    Drug use: No    Sexual activity: Not on file     Other Topics Concern    Not on file     Social History Narrative    No narrative on file       History reviewed. No pertinent surgical history.     No Known Allergies      Current Outpatient Prescriptions:     clindamycin (CLEOCIN) 150 MG capsule, Take 150 mg by mouth 3 times daily, Disp: , Rfl:     glucose monitoring kit (FREESTYLE) monitoring kit, Glucometer with test strips and lancets. Check BS once daily  #100 strips and lancets, Disp: 1 kit, Rfl: 5    glucose blood VI test strips (ASCENSIA AUTODISC VI;ONE TOUCH ULTRA TEST VI) strip, Test as needed. Dispense One Touch verio Test Strips. Dx: Type 2 diabetes (250.00), Disp: 60 strip, Rfl: 11    propranolol (INDERAL) 20 MG tablet, Take 1 tablet by mouth 2 times daily, Disp: 60 tablet, Rfl: 5    metFORMIN (GLUCOPHAGE) 1000 MG tablet, Take 1 tablet by mouth 2 times daily (with meals), Disp: 60 tablet, Rfl: 5    venlafaxine (EFFEXOR XR) 150 MG extended release capsule, Take 1 capsule by mouth daily, Disp: 30 capsule, Rfl: 5    simvastatin (ZOCOR) 10 MG tablet, Take 1 tablet by mouth every evening, Disp: 90 tablet, Rfl: 3    lisinopril-hydrochlorothiazide (PRINZIDE;ZESTORETIC) 20-25 MG per tablet, Take 1 tablet by mouth daily, Disp: 90 tablet, Rfl: 5    glucose blood VI test strips (EXACTECH TEST) strip, Glucometer with test strips plus lancets. #100. Check BS daily. 250.00, Disp: 100 each, Rfl: 3    Family History   Problem Relation Age of Onset    High Blood Pressure Father     Cancer Father     High Blood Pressure Brother     Diabetes Mother        Objective:   Physical Exam   Constitutional: He is oriented to person, place, and time. He appears well-developed and well-nourished. HENT:   Head: Normocephalic and atraumatic. Eyes: Conjunctivae and EOM are normal. Pupils are equal, round, and reactive to light. Left eye exhibits no discharge. No scleral icterus. Neck: No tracheal deviation present. No thyromegaly present. Cardiovascular: Normal rate, regular rhythm and normal heart sounds. Exam reveals no gallop and no friction rub. No murmur heard. Pulmonary/Chest: Effort normal and breath sounds normal. No respiratory distress. He has no wheezes. He has no rales. He exhibits no tenderness.

## 2018-01-09 ENCOUNTER — OFFICE VISIT (OUTPATIENT)
Dept: FAMILY MEDICINE CLINIC | Age: 43
End: 2018-01-09
Payer: MEDICAID

## 2018-01-09 VITALS
RESPIRATION RATE: 16 BRPM | BODY MASS INDEX: 34.89 KG/M2 | HEIGHT: 71 IN | DIASTOLIC BLOOD PRESSURE: 72 MMHG | HEART RATE: 76 BPM | SYSTOLIC BLOOD PRESSURE: 108 MMHG | WEIGHT: 249.2 LBS

## 2018-01-09 DIAGNOSIS — R71.8 POIKILOCYTOSIS: ICD-10-CM

## 2018-01-09 DIAGNOSIS — K70.30 ALCOHOLIC CIRRHOSIS OF LIVER WITHOUT ASCITES (HCC): Primary | ICD-10-CM

## 2018-01-09 PROCEDURE — G8427 DOCREV CUR MEDS BY ELIG CLIN: HCPCS | Performed by: NURSE PRACTITIONER

## 2018-01-09 PROCEDURE — G8417 CALC BMI ABV UP PARAM F/U: HCPCS | Performed by: NURSE PRACTITIONER

## 2018-01-09 PROCEDURE — 4004F PT TOBACCO SCREEN RCVD TLK: CPT | Performed by: NURSE PRACTITIONER

## 2018-01-09 PROCEDURE — G8484 FLU IMMUNIZE NO ADMIN: HCPCS | Performed by: NURSE PRACTITIONER

## 2018-01-09 PROCEDURE — 99214 OFFICE O/P EST MOD 30 MIN: CPT | Performed by: NURSE PRACTITIONER

## 2018-01-09 RX ORDER — HYDROXYZINE PAMOATE 25 MG/1
25-50 CAPSULE ORAL 3 TIMES DAILY PRN
Qty: 50 CAPSULE | Refills: 1 | Status: SHIPPED | OUTPATIENT
Start: 2018-01-09 | End: 2018-01-23

## 2018-01-09 ASSESSMENT — ENCOUNTER SYMPTOMS
RESPIRATORY NEGATIVE: 1
GASTROINTESTINAL NEGATIVE: 1
EYES NEGATIVE: 1

## 2018-01-09 NOTE — PROGRESS NOTES
Subjective:      Patient ID: Willie Saenz is a 40 y.o. male. HPI   Chief Complaint   Patient presents with    Check-Up     Patient's medications, allergies, past medical, surgical, social and family histories were reviewed and updated as appropriate. Patient Active Problem List   Diagnosis    Hyperlipemia    HTN (hypertension), benign    Type II or unspecified type diabetes mellitus without mention of complication, not stated as uncontrolled    Alcohol withdrawal ([facility]    Alcoholic hepatitis    COPD (chronic obstructive pulmonary disease) ([facility]    Passive-dependent personality disorder    Alcohol abuse    Type 2 diabetes mellitus without complication, without long-term current use of insulin ([facility]     No Known Allergies  Health Maintenance   Topic Date Due    Diabetic retinal exam  04/10/1986    HIV screen  04/10/1991    DTaP/Tdap/Td vaccine (1 - Tdap) 04/10/1995    Pneumococcal med risk (1 of 1 - PPSV23) 04/10/1995    Flu vaccine (1) 06/08/2018 (Originally 9/1/2017)    Diabetic foot exam  08/23/2018    A1C test (Diabetic or Prediabetic)  08/28/2018    Diabetic microalbuminuria test  08/28/2018    Lipid screen  08/28/2018    Potassium monitoring  10/14/2018    Creatinine monitoring  10/14/2018     Current Outpatient Prescriptions   Medication Sig Dispense Refill    hydrOXYzine (VISTARIL) 25 MG capsule Take 1-2 capsules by mouth 3 times daily as needed for Anxiety 50 capsule 1    glucose monitoring kit (FREESTYLE) monitoring kit Glucometer with test strips and lancets. Check BS once daily  #100 strips and lancets 1 kit 5    glucose blood VI test strips (ASCENSIA AUTODISC VI;ONE TOUCH ULTRA TEST VI) strip Test as needed. Dispense One Touch verio Test Strips.   Dx: Type 2 diabetes (250.00) 60 strip 11    propranolol (INDERAL) 20 MG tablet Take 1 tablet by mouth 2 times daily 60 tablet 5    venlafaxine (EFFEXOR XR) 150 MG extended release capsule Take 1 capsule by mouth daily 30 He has no cervical adenopathy. Neurological: He is alert and oriented to person, place, and time. He has normal reflexes. Skin: Skin is warm. Psychiatric: He has a normal mood and affect. Assessment:      1. Alcoholic cirrhosis of liver without ascites (HCC)  Hepatic Function Panel   2.  Poikilocytosis  NM BONE SCAN WHOLE BODY           Plan:      See orders  Long discussion regarding need to quit drinking  Will get bone scan  Encourage rehab

## 2018-01-10 ENCOUNTER — HOSPITAL ENCOUNTER (OUTPATIENT)
Age: 43
Discharge: HOME | End: 2018-01-10
Payer: MEDICAID

## 2018-01-10 DIAGNOSIS — K70.30 ALCOHOLIC CIRRHOSIS OF LIVER WITHOUT ASCITES (HCC): ICD-10-CM

## 2018-01-10 LAB
ALBUMIN SERPL-MCNC: 4 G/DL (ref 3.5–5.1)
ALP BLD-CCNC: 179 U/L (ref 38–126)
ALT SERPL-CCNC: 105 U/L (ref 11–66)
AST SERPL-CCNC: 78 U/L (ref 5–40)
BILIRUB SERPL-MCNC: 2 MG/DL (ref 0.3–1.2)
BILIRUBIN DIRECT: 0.9 MG/DL (ref 0–0.3)
TOTAL PROTEIN: 6.8 G/DL (ref 6.1–8)

## 2018-01-10 PROCEDURE — 80076 HEPATIC FUNCTION PANEL: CPT

## 2018-01-10 PROCEDURE — 36415 COLL VENOUS BLD VENIPUNCTURE: CPT

## 2018-01-17 ENCOUNTER — HOSPITAL ENCOUNTER (OUTPATIENT)
Dept: NUCLEAR MEDICINE | Age: 43
Discharge: HOME | End: 2018-01-17
Payer: MEDICAID

## 2018-01-17 DIAGNOSIS — R71.8 POIKILOCYTOSIS: ICD-10-CM

## 2018-01-17 PROCEDURE — A9503 TC99M MEDRONATE: HCPCS | Performed by: NURSE PRACTITIONER

## 2018-01-17 PROCEDURE — 78306 BONE IMAGING WHOLE BODY: CPT

## 2018-01-17 PROCEDURE — 3430000000 HC RX DIAGNOSTIC RADIOPHARMACEUTICAL: Performed by: NURSE PRACTITIONER

## 2018-01-17 RX ORDER — TC 99M MEDRONATE 20 MG/10ML
26.5 INJECTION, POWDER, LYOPHILIZED, FOR SOLUTION INTRAVENOUS
Status: COMPLETED | OUTPATIENT
Start: 2018-01-17 | End: 2018-01-17

## 2018-01-17 RX ADMIN — Medication 26.5 MILLICURIE: at 08:50

## 2018-05-06 ENCOUNTER — HOSPITAL ENCOUNTER (INPATIENT)
Age: 43
LOS: 9 days | Discharge: HOME | DRG: 280 | End: 2018-05-15
Attending: EMERGENCY MEDICINE | Admitting: HOSPITALIST
Payer: MEDICAID

## 2018-05-06 ENCOUNTER — APPOINTMENT (OUTPATIENT)
Dept: GENERAL RADIOLOGY | Age: 43
DRG: 280 | End: 2018-05-06
Payer: MEDICAID

## 2018-05-06 DIAGNOSIS — F10.10 ALCOHOL ABUSE: ICD-10-CM

## 2018-05-06 DIAGNOSIS — E86.0 DEHYDRATION: ICD-10-CM

## 2018-05-06 DIAGNOSIS — K72.00 ACUTE LIVER FAILURE WITHOUT HEPATIC COMA: ICD-10-CM

## 2018-05-06 DIAGNOSIS — E80.6 HYPERBILIRUBINEMIA: Primary | ICD-10-CM

## 2018-05-06 PROBLEM — K70.40 ALCOHOLIC LIVER FAILURE (HCC): Status: ACTIVE | Noted: 2018-05-06

## 2018-05-06 PROBLEM — R74.01 TRANSAMINITIS: Status: ACTIVE | Noted: 2018-05-06

## 2018-05-06 LAB
ALBUMIN SERPL-MCNC: 3.1 G/DL (ref 3.5–5.1)
ALP BLD-CCNC: 259 U/L (ref 38–126)
ALT SERPL-CCNC: 247 U/L (ref 11–66)
AMORPHOUS: ABNORMAL
ANION GAP SERPL CALCULATED.3IONS-SCNC: 14 MEQ/L (ref 8–16)
AST SERPL-CCNC: 278 U/L (ref 5–40)
BACTERIA: ABNORMAL
BASOPHILS # BLD: 0 %
BASOPHILS ABSOLUTE: 0 THOU/MM3 (ref 0–0.1)
BILIRUB SERPL-MCNC: 7.7 MG/DL (ref 0.3–1.2)
BILIRUBIN URINE: ABNORMAL
BLOOD, URINE: NEGATIVE
BUN BLDV-MCNC: 13 MG/DL (ref 7–22)
CALCIUM SERPL-MCNC: 7.9 MG/DL (ref 8.5–10.5)
CASTS UA: ABNORMAL /LPF
CHARACTER, URINE: CLEAR
CHLORIDE BLD-SCNC: 93 MEQ/L (ref 98–111)
CO2: 22 MEQ/L (ref 23–33)
COLOR: YELLOW
CREAT SERPL-MCNC: 0.5 MG/DL (ref 0.4–1.2)
CRYSTALS, UA: ABNORMAL
DIFFERENTIAL, MANUAL: NORMAL
EKG ATRIAL RATE: 120 BPM
EKG P AXIS: 52 DEGREES
EKG P-R INTERVAL: 116 MS
EKG Q-T INTERVAL: 322 MS
EKG QRS DURATION: 90 MS
EKG QTC CALCULATION (BAZETT): 455 MS
EKG R AXIS: 33 DEGREES
EKG T AXIS: 43 DEGREES
EKG VENTRICULAR RATE: 120 BPM
EOSINOPHIL # BLD: 0 %
EOSINOPHILS ABSOLUTE: 0 THOU/MM3 (ref 0–0.4)
EPITHELIAL CELLS, UA: ABNORMAL /HPF
GFR SERPL CREATININE-BSD FRML MDRD: > 90 ML/MIN/1.73M2
GLUCOSE BLD-MCNC: 173 MG/DL (ref 70–108)
GLUCOSE BLD-MCNC: 187 MG/DL (ref 70–108)
GLUCOSE, URINE: NEGATIVE MG/DL
HAV IGM SER IA-ACNC: NEGATIVE
HCT VFR BLD CALC: 44.2 % (ref 42–52)
HEMOGLOBIN: 15.3 GM/DL (ref 14–18)
HEPATITIS B CORE IGM ANTIBODY: NEGATIVE
HEPATITIS B SURFACE ANTIGEN: NEGATIVE
HEPATITIS C ANTIBODY: NEGATIVE
ICTOTEST: POSITIVE
KETONES, URINE: NEGATIVE
LACTATE: 2.6 MMOL/L (ref 0.9–1.7)
LEUKOCYTE ESTERASE, URINE: NEGATIVE
LIPASE: 63.5 U/L (ref 5.6–51.3)
LYMPHOCYTES # BLD: 18 %
LYMPHOCYTES ABSOLUTE: 1.4 THOU/MM3 (ref 1–4.8)
MCH RBC QN AUTO: 32.9 PG (ref 27–31)
MCHC RBC AUTO-ENTMCNC: 34.7 GM/DL (ref 33–37)
MCV RBC AUTO: 95 FL (ref 80–94)
MONOCYTES # BLD: 6 %
MONOCYTES ABSOLUTE: 0.5 THOU/MM3 (ref 0.4–1.3)
MUCUS: ABNORMAL
NITRITE, URINE: NEGATIVE
NUCLEATED RED BLOOD CELLS: 0 /100 WBC
PDW BLD-RTO: 14.4 % (ref 11.5–14.5)
PH UA: 7 (ref 5–9)
PLATELET # BLD: 124 THOU/MM3 (ref 130–400)
PLATELET ESTIMATE: ADEQUATE
PMV BLD AUTO: 9.1 FL (ref 7.4–10.4)
POTASSIUM SERPL-SCNC: 3.8 MEQ/L (ref 3.5–5.2)
PROTEIN UA: 30 MG/DL
RBC # BLD: 4.65 MILL/MM3 (ref 4.7–6.1)
RBC UA: ABNORMAL /HPF
REFLEX TO URINE C & S: ABNORMAL
SEG NEUTROPHILS: 76 %
SEGMENTED NEUTROPHILS ABSOLUTE COUNT: 5.9 THOU/MM3 (ref 1.8–7.7)
SODIUM BLD-SCNC: 129 MEQ/L (ref 135–145)
SPECIFIC GRAVITY UA: 1.01 (ref 1–1.03)
TOTAL PROTEIN: 6.1 G/DL (ref 6.1–8)
TROPONIN I: < 0.017 NG/ML (ref 0.02–0.05)
UROBILINOGEN, URINE: 1 EU/DL (ref 0–1)
WBC # BLD: 7.8 THOU/MM3 (ref 4.8–10.8)
WBC UA: ABNORMAL /HPF

## 2018-05-06 PROCEDURE — 96360 HYDRATION IV INFUSION INIT: CPT

## 2018-05-06 PROCEDURE — 2500000003 HC RX 250 WO HCPCS: Performed by: HOSPITALIST

## 2018-05-06 PROCEDURE — 82948 REAGENT STRIP/BLOOD GLUCOSE: CPT

## 2018-05-06 PROCEDURE — 6370000000 HC RX 637 (ALT 250 FOR IP): Performed by: HOSPITALIST

## 2018-05-06 PROCEDURE — 84484 ASSAY OF TROPONIN QUANT: CPT

## 2018-05-06 PROCEDURE — 81001 URINALYSIS AUTO W/SCOPE: CPT

## 2018-05-06 PROCEDURE — 80074 ACUTE HEPATITIS PANEL: CPT

## 2018-05-06 PROCEDURE — 71046 X-RAY EXAM CHEST 2 VIEWS: CPT

## 2018-05-06 PROCEDURE — 1200000000 HC SEMI PRIVATE

## 2018-05-06 PROCEDURE — 6360000002 HC RX W HCPCS: Performed by: HOSPITALIST

## 2018-05-06 PROCEDURE — 85025 COMPLETE CBC W/AUTO DIFF WBC: CPT

## 2018-05-06 PROCEDURE — 83605 ASSAY OF LACTIC ACID: CPT

## 2018-05-06 PROCEDURE — 99285 EMERGENCY DEPT VISIT HI MDM: CPT

## 2018-05-06 PROCEDURE — 99223 1ST HOSP IP/OBS HIGH 75: CPT | Performed by: HOSPITALIST

## 2018-05-06 PROCEDURE — 2580000003 HC RX 258: Performed by: HOSPITALIST

## 2018-05-06 PROCEDURE — 93005 ELECTROCARDIOGRAM TRACING: CPT | Performed by: EMERGENCY MEDICINE

## 2018-05-06 PROCEDURE — 80053 COMPREHEN METABOLIC PANEL: CPT

## 2018-05-06 PROCEDURE — 83690 ASSAY OF LIPASE: CPT

## 2018-05-06 PROCEDURE — 36415 COLL VENOUS BLD VENIPUNCTURE: CPT

## 2018-05-06 PROCEDURE — 2580000003 HC RX 258: Performed by: EMERGENCY MEDICINE

## 2018-05-06 RX ORDER — LISINOPRIL 20 MG/1
20 TABLET ORAL DAILY
Status: DISCONTINUED | OUTPATIENT
Start: 2018-05-06 | End: 2018-05-15 | Stop reason: HOSPADM

## 2018-05-06 RX ORDER — SODIUM CHLORIDE 9 MG/ML
INJECTION, SOLUTION INTRAVENOUS CONTINUOUS
Status: DISCONTINUED | OUTPATIENT
Start: 2018-05-06 | End: 2018-05-06 | Stop reason: SDUPTHER

## 2018-05-06 RX ORDER — 0.9 % SODIUM CHLORIDE 0.9 %
1000 INTRAVENOUS SOLUTION INTRAVENOUS ONCE
Status: COMPLETED | OUTPATIENT
Start: 2018-05-06 | End: 2018-05-06

## 2018-05-06 RX ORDER — LORAZEPAM 2 MG/ML
2 INJECTION INTRAMUSCULAR
Status: DISCONTINUED | OUTPATIENT
Start: 2018-05-06 | End: 2018-05-15 | Stop reason: HOSPADM

## 2018-05-06 RX ORDER — HYDROCHLOROTHIAZIDE 25 MG/1
25 TABLET ORAL DAILY
Status: DISCONTINUED | OUTPATIENT
Start: 2018-05-06 | End: 2018-05-15 | Stop reason: HOSPADM

## 2018-05-06 RX ORDER — DOCUSATE SODIUM 100 MG/1
100 CAPSULE, LIQUID FILLED ORAL 2 TIMES DAILY
Status: DISCONTINUED | OUTPATIENT
Start: 2018-05-06 | End: 2018-05-15 | Stop reason: HOSPADM

## 2018-05-06 RX ORDER — LORAZEPAM 2 MG/ML
1 INJECTION INTRAMUSCULAR
Status: DISCONTINUED | OUTPATIENT
Start: 2018-05-06 | End: 2018-05-15 | Stop reason: HOSPADM

## 2018-05-06 RX ORDER — SODIUM CHLORIDE 0.9 % (FLUSH) 0.9 %
10 SYRINGE (ML) INJECTION PRN
Status: DISCONTINUED | OUTPATIENT
Start: 2018-05-06 | End: 2018-05-06 | Stop reason: SDUPTHER

## 2018-05-06 RX ORDER — LORAZEPAM 2 MG/ML
3 INJECTION INTRAMUSCULAR
Status: DISCONTINUED | OUTPATIENT
Start: 2018-05-06 | End: 2018-05-15 | Stop reason: HOSPADM

## 2018-05-06 RX ORDER — SODIUM CHLORIDE 9 MG/ML
INJECTION, SOLUTION INTRAVENOUS CONTINUOUS
Status: DISCONTINUED | OUTPATIENT
Start: 2018-05-06 | End: 2018-05-06

## 2018-05-06 RX ORDER — SODIUM CHLORIDE 9 MG/ML
INJECTION, SOLUTION INTRAVENOUS CONTINUOUS
Status: DISCONTINUED | OUTPATIENT
Start: 2018-05-07 | End: 2018-05-08

## 2018-05-06 RX ORDER — LORAZEPAM 1 MG/1
1 TABLET ORAL
Status: DISCONTINUED | OUTPATIENT
Start: 2018-05-06 | End: 2018-05-15 | Stop reason: HOSPADM

## 2018-05-06 RX ORDER — SODIUM CHLORIDE 0.9 % (FLUSH) 0.9 %
10 SYRINGE (ML) INJECTION PRN
Status: DISCONTINUED | OUTPATIENT
Start: 2018-05-06 | End: 2018-05-15 | Stop reason: HOSPADM

## 2018-05-06 RX ORDER — NICOTINE 21 MG/24HR
1 PATCH, TRANSDERMAL 24 HOURS TRANSDERMAL DAILY
Status: DISCONTINUED | OUTPATIENT
Start: 2018-05-07 | End: 2018-05-08

## 2018-05-06 RX ORDER — PROPRANOLOL HYDROCHLORIDE 20 MG/1
20 TABLET ORAL 2 TIMES DAILY
Status: DISCONTINUED | OUTPATIENT
Start: 2018-05-06 | End: 2018-05-15 | Stop reason: HOSPADM

## 2018-05-06 RX ORDER — SODIUM CHLORIDE 0.9 % (FLUSH) 0.9 %
10 SYRINGE (ML) INJECTION EVERY 12 HOURS SCHEDULED
Status: DISCONTINUED | OUTPATIENT
Start: 2018-05-06 | End: 2018-05-06 | Stop reason: SDUPTHER

## 2018-05-06 RX ORDER — ONDANSETRON 2 MG/ML
4 INJECTION INTRAMUSCULAR; INTRAVENOUS EVERY 6 HOURS PRN
Status: DISCONTINUED | OUTPATIENT
Start: 2018-05-06 | End: 2018-05-08 | Stop reason: SDUPTHER

## 2018-05-06 RX ORDER — DIPHENHYDRAMINE HCL 25 MG
50 TABLET ORAL NIGHTLY PRN
COMMUNITY
End: 2018-06-20 | Stop reason: ALTCHOICE

## 2018-05-06 RX ORDER — LISINOPRIL AND HYDROCHLOROTHIAZIDE 25; 20 MG/1; MG/1
1 TABLET ORAL DAILY
Status: DISCONTINUED | OUTPATIENT
Start: 2018-05-06 | End: 2018-05-06 | Stop reason: SDUPTHER

## 2018-05-06 RX ORDER — DIPHENHYDRAMINE HCL 25 MG
25 TABLET ORAL NIGHTLY PRN
Status: DISCONTINUED | OUTPATIENT
Start: 2018-05-06 | End: 2018-05-15 | Stop reason: HOSPADM

## 2018-05-06 RX ORDER — LORAZEPAM 2 MG/ML
4 INJECTION INTRAMUSCULAR
Status: DISCONTINUED | OUTPATIENT
Start: 2018-05-06 | End: 2018-05-15 | Stop reason: HOSPADM

## 2018-05-06 RX ORDER — SODIUM CHLORIDE 0.9 % (FLUSH) 0.9 %
10 SYRINGE (ML) INJECTION EVERY 12 HOURS SCHEDULED
Status: DISCONTINUED | OUTPATIENT
Start: 2018-05-06 | End: 2018-05-15 | Stop reason: HOSPADM

## 2018-05-06 RX ORDER — LORAZEPAM 2 MG/1
4 TABLET ORAL
Status: DISCONTINUED | OUTPATIENT
Start: 2018-05-06 | End: 2018-05-15 | Stop reason: HOSPADM

## 2018-05-06 RX ORDER — LORAZEPAM 2 MG/1
2 TABLET ORAL
Status: DISCONTINUED | OUTPATIENT
Start: 2018-05-06 | End: 2018-05-15 | Stop reason: HOSPADM

## 2018-05-06 RX ORDER — HYDROXYZINE PAMOATE 25 MG/1
25 CAPSULE ORAL NIGHTLY PRN
COMMUNITY
End: 2018-11-03 | Stop reason: ALTCHOICE

## 2018-05-06 RX ADMIN — Medication 10 ML: at 21:49

## 2018-05-06 RX ADMIN — FOLIC ACID: 5 INJECTION, SOLUTION INTRAMUSCULAR; INTRAVENOUS; SUBCUTANEOUS at 21:49

## 2018-05-06 RX ADMIN — SODIUM CHLORIDE 1000 ML: 9 INJECTION, SOLUTION INTRAVENOUS at 15:24

## 2018-05-06 RX ADMIN — DIPHENHYDRAMINE HCL 25 MG: 25 TABLET ORAL at 22:09

## 2018-05-06 RX ADMIN — PROPRANOLOL HYDROCHLORIDE 20 MG: 20 TABLET ORAL at 21:48

## 2018-05-06 RX ADMIN — HYDROCHLOROTHIAZIDE 25 MG: 25 TABLET ORAL at 21:48

## 2018-05-06 RX ADMIN — ENOXAPARIN SODIUM 40 MG: 40 INJECTION SUBCUTANEOUS at 22:08

## 2018-05-06 RX ADMIN — LORAZEPAM 1 MG: 1 TABLET ORAL at 22:09

## 2018-05-06 RX ADMIN — LISINOPRIL 20 MG: 20 TABLET ORAL at 21:48

## 2018-05-06 ASSESSMENT — PAIN SCALES - GENERAL
PAINLEVEL_OUTOF10: 0
PAINLEVEL_OUTOF10: 0

## 2018-05-07 ENCOUNTER — APPOINTMENT (OUTPATIENT)
Dept: ULTRASOUND IMAGING | Age: 43
DRG: 280 | End: 2018-05-07
Payer: MEDICAID

## 2018-05-07 ENCOUNTER — APPOINTMENT (OUTPATIENT)
Dept: MRI IMAGING | Age: 43
DRG: 280 | End: 2018-05-07
Payer: MEDICAID

## 2018-05-07 LAB
ACETAMINOPHEN LEVEL: < 5 UG/ML (ref 0–20)
ADENOVIRUS F 40 41 PCR: NOT DETECTED
ALBUMIN SERPL-MCNC: 3.1 G/DL (ref 3.5–5.1)
ALP BLD-CCNC: 236 U/L (ref 38–126)
ALT SERPL-CCNC: 231 U/L (ref 11–66)
AMPHETAMINE+METHAMPHETAMINE URINE SCREEN: NEGATIVE
AMPHETAMINE+METHAMPHETAMINE URINE SCREEN: NEGATIVE
AMYLASE: 41 U/L (ref 20–104)
ANION GAP SERPL CALCULATED.3IONS-SCNC: 15 MEQ/L (ref 8–16)
AST SERPL-CCNC: 276 U/L (ref 5–40)
ASTROVIRUS PCR: NOT DETECTED
BARBITURATE QUANTITATIVE URINE: NEGATIVE
BARBITURATE QUANTITATIVE URINE: NEGATIVE
BENZODIAZEPINE QUANTITATIVE URINE: NEGATIVE
BENZODIAZEPINE QUANTITATIVE URINE: NEGATIVE
BILIRUB SERPL-MCNC: 10.9 MG/DL (ref 0.3–1.2)
BUN BLDV-MCNC: 13 MG/DL (ref 7–22)
CALCIUM SERPL-MCNC: 7.7 MG/DL (ref 8.5–10.5)
CAMPYLOBACTER PCR: NOT DETECTED
CANNABINOID QUANTITATIVE URINE: NEGATIVE
CANNABINOID QUANTITATIVE URINE: NEGATIVE
CHLORIDE BLD-SCNC: 95 MEQ/L (ref 98–111)
CLOSTRIDIUM DIFFICILE, PCR: NOT DETECTED
CO2: 20 MEQ/L (ref 23–33)
COCAINE METABOLITE QUANTITATIVE URINE: NEGATIVE
COCAINE METABOLITE QUANTITATIVE URINE: NEGATIVE
CREAT SERPL-MCNC: 0.4 MG/DL (ref 0.4–1.2)
CRYPTOSPORIDIUM PCR: NOT DETECTED
CYCLOSPORA CAYETANENSIS PCR: NOT DETECTED
E COLI 0157 PCR: NORMAL
E COLI ENTEROAGGREGATIVE PCR: NOT DETECTED
E COLI ENTEROPATHOGENIC PCR: NOT DETECTED
E COLI ENTEROTOXIGENIC PCR: NOT DETECTED
E COLI SHIGA LIKE TOXIN PCR: NOT DETECTED
E COLI SHIGELLA/ENTEROINVASIVE PCR: NOT DETECTED
E HISTOLYTICA GI FILM ARRAY: NOT DETECTED
FERRITIN: ABNORMAL NG/ML (ref 22–322)
GFR SERPL CREATININE-BSD FRML MDRD: > 90 ML/MIN/1.73M2
GIARDIA LAMBLIA PCR: NOT DETECTED
GLUCOSE BLD-MCNC: 107 MG/DL (ref 70–108)
GLUCOSE BLD-MCNC: 109 MG/DL (ref 70–108)
GLUCOSE BLD-MCNC: 115 MG/DL (ref 70–108)
GLUCOSE BLD-MCNC: 123 MG/DL (ref 70–108)
GLUCOSE BLD-MCNC: 88 MG/DL (ref 70–108)
HCT VFR BLD CALC: 41.7 % (ref 42–52)
HEMOGLOBIN: 14.4 GM/DL (ref 14–18)
IGA: 348 MG/DL (ref 70–400)
INR BLD: 1.13 (ref 0.85–1.13)
IRON: 145 UG/DL (ref 65–195)
LIPASE: 55.8 U/L (ref 5.6–51.3)
MAGNESIUM: 1.6 MG/DL (ref 1.6–2.4)
MCH RBC QN AUTO: 32.7 PG (ref 27–31)
MCHC RBC AUTO-ENTMCNC: 34.6 GM/DL (ref 33–37)
MCV RBC AUTO: 94.4 FL (ref 80–94)
NOROVIRUS GI GII PCR: NOT DETECTED
OPIATES, URINE: NEGATIVE
OPIATES, URINE: NEGATIVE
ORIGINAL SAMPLE NUMBER: NORMAL
OXYCODONE: NEGATIVE
OXYCODONE: NEGATIVE
PDW BLD-RTO: 13.8 % (ref 11.5–14.5)
PHENCYCLIDINE QUANTITATIVE URINE: NEGATIVE
PHENCYCLIDINE QUANTITATIVE URINE: NEGATIVE
PLATELET # BLD: 127 THOU/MM3 (ref 130–400)
PLESIOMONAS SHIGELLOIDES PCR: NOT DETECTED
PMV BLD AUTO: 9.2 FL (ref 7.4–10.4)
POTASSIUM REFLEX MAGNESIUM: 3.8 MEQ/L (ref 3.5–5.2)
RBC # BLD: 4.42 MILL/MM3 (ref 4.7–6.1)
REFERENCE LOCATION: NORMAL
REFERENCE RANGE: NORMAL
ROTAVIRUS A PCR: NOT DETECTED
SALMONELLA PCR: NOT DETECTED
SAPOVIRUS PCR: NOT DETECTED
SODIUM BLD-SCNC: 130 MEQ/L (ref 135–145)
TEST RESULTS WITH UNITS: NORMAL
TEST(S) BEING PERFORMED: NORMAL
TOTAL PROTEIN: 5.7 G/DL (ref 6.1–8)
VIBRIO CHOLERAE PCR: NOT DETECTED
VIBRIO PCR: NOT DETECTED
WBC # BLD: 8.1 THOU/MM3 (ref 4.8–10.8)
YERSINIA ENTEROCOLITICA PCR: NOT DETECTED

## 2018-05-07 PROCEDURE — 6360000004 HC RX CONTRAST MEDICATION: Performed by: NURSE PRACTITIONER

## 2018-05-07 PROCEDURE — 82390 ASSAY OF CERULOPLASMIN: CPT

## 2018-05-07 PROCEDURE — 82150 ASSAY OF AMYLASE: CPT

## 2018-05-07 PROCEDURE — 76705 ECHO EXAM OF ABDOMEN: CPT

## 2018-05-07 PROCEDURE — 80053 COMPREHEN METABOLIC PANEL: CPT

## 2018-05-07 PROCEDURE — 87507 IADNA-DNA/RNA PROBE TQ 12-25: CPT

## 2018-05-07 PROCEDURE — 1200000000 HC SEMI PRIVATE

## 2018-05-07 PROCEDURE — 83540 ASSAY OF IRON: CPT

## 2018-05-07 PROCEDURE — 83690 ASSAY OF LIPASE: CPT

## 2018-05-07 PROCEDURE — 80307 DRUG TEST PRSMV CHEM ANLYZR: CPT

## 2018-05-07 PROCEDURE — 85027 COMPLETE CBC AUTOMATED: CPT

## 2018-05-07 PROCEDURE — 86038 ANTINUCLEAR ANTIBODIES: CPT

## 2018-05-07 PROCEDURE — G0480 DRUG TEST DEF 1-7 CLASSES: HCPCS

## 2018-05-07 PROCEDURE — 83735 ASSAY OF MAGNESIUM: CPT

## 2018-05-07 PROCEDURE — 85610 PROTHROMBIN TIME: CPT

## 2018-05-07 PROCEDURE — 82948 REAGENT STRIP/BLOOD GLUCOSE: CPT

## 2018-05-07 PROCEDURE — 36415 COLL VENOUS BLD VENIPUNCTURE: CPT

## 2018-05-07 PROCEDURE — 82728 ASSAY OF FERRITIN: CPT

## 2018-05-07 PROCEDURE — 82784 ASSAY IGA/IGD/IGG/IGM EACH: CPT

## 2018-05-07 PROCEDURE — 6370000000 HC RX 637 (ALT 250 FOR IP): Performed by: INTERNAL MEDICINE

## 2018-05-07 PROCEDURE — 6360000002 HC RX W HCPCS: Performed by: HOSPITALIST

## 2018-05-07 PROCEDURE — 99232 SBSQ HOSP IP/OBS MODERATE 35: CPT | Performed by: INTERNAL MEDICINE

## 2018-05-07 PROCEDURE — A9579 GAD-BASE MR CONTRAST NOS,1ML: HCPCS | Performed by: NURSE PRACTITIONER

## 2018-05-07 PROCEDURE — 82103 ALPHA-1-ANTITRYPSIN TOTAL: CPT

## 2018-05-07 PROCEDURE — 2580000003 HC RX 258: Performed by: HOSPITALIST

## 2018-05-07 PROCEDURE — 6370000000 HC RX 637 (ALT 250 FOR IP): Performed by: HOSPITALIST

## 2018-05-07 PROCEDURE — 74183 MRI ABD W/O CNTR FLWD CNTR: CPT

## 2018-05-07 PROCEDURE — 83516 IMMUNOASSAY NONANTIBODY: CPT

## 2018-05-07 RX ORDER — FOLIC ACID 1 MG/1
1 TABLET ORAL DAILY
Status: DISCONTINUED | OUTPATIENT
Start: 2018-05-07 | End: 2018-05-15 | Stop reason: HOSPADM

## 2018-05-07 RX ORDER — THIAMINE MONONITRATE (VIT B1) 100 MG
100 TABLET ORAL DAILY
Status: DISCONTINUED | OUTPATIENT
Start: 2018-05-07 | End: 2018-05-08

## 2018-05-07 RX ADMIN — DIPHENHYDRAMINE HCL 25 MG: 25 TABLET ORAL at 22:43

## 2018-05-07 RX ADMIN — SODIUM CHLORIDE: 9 INJECTION, SOLUTION INTRAVENOUS at 08:40

## 2018-05-07 RX ADMIN — METFORMIN HYDROCHLORIDE 1000 MG: 500 TABLET ORAL at 08:47

## 2018-05-07 RX ADMIN — Medication 100 MG: at 11:30

## 2018-05-07 RX ADMIN — HYDROCHLOROTHIAZIDE 25 MG: 25 TABLET ORAL at 08:47

## 2018-05-07 RX ADMIN — ENOXAPARIN SODIUM 40 MG: 40 INJECTION SUBCUTANEOUS at 22:43

## 2018-05-07 RX ADMIN — SODIUM CHLORIDE: 9 INJECTION, SOLUTION INTRAVENOUS at 18:40

## 2018-05-07 RX ADMIN — PROPRANOLOL HYDROCHLORIDE 20 MG: 20 TABLET ORAL at 08:47

## 2018-05-07 RX ADMIN — LISINOPRIL 20 MG: 20 TABLET ORAL at 08:47

## 2018-05-07 RX ADMIN — Medication 10 ML: at 22:17

## 2018-05-07 RX ADMIN — LORAZEPAM 2 MG: 2 TABLET ORAL at 22:43

## 2018-05-07 RX ADMIN — FOLIC ACID 1 MG: 1 TABLET ORAL at 11:30

## 2018-05-07 RX ADMIN — PROPRANOLOL HYDROCHLORIDE 20 MG: 20 TABLET ORAL at 22:17

## 2018-05-07 RX ADMIN — LORAZEPAM 2 MG: 2 TABLET ORAL at 18:06

## 2018-05-07 RX ADMIN — GADOTERIDOL 20 ML: 279.3 INJECTION, SOLUTION INTRAVENOUS at 21:10

## 2018-05-07 ASSESSMENT — PAIN SCALES - GENERAL
PAINLEVEL_OUTOF10: 0

## 2018-05-08 ENCOUNTER — APPOINTMENT (OUTPATIENT)
Dept: GENERAL RADIOLOGY | Age: 43
DRG: 280 | End: 2018-05-08
Payer: MEDICAID

## 2018-05-08 LAB
ALBUMIN SERPL-MCNC: 3 G/DL (ref 3.5–5.1)
ALLEN TEST: POSITIVE
ALP BLD-CCNC: 238 U/L (ref 38–126)
ALPHA-1 ANTITRYPSIN: 155 MG/DL (ref 90–200)
ALT SERPL-CCNC: 208 U/L (ref 11–66)
ANION GAP SERPL CALCULATED.3IONS-SCNC: 17 MEQ/L (ref 8–16)
AST SERPL-CCNC: 250 U/L (ref 5–40)
BASE EXCESS (CALCULATED): -4.3 MMOL/L (ref -2.5–2.5)
BILIRUB SERPL-MCNC: 16.6 MG/DL (ref 0.3–1.2)
BILIRUBIN DIRECT: > 10 MG/DL (ref 0–0.3)
BUN BLDV-MCNC: 12 MG/DL (ref 7–22)
CALCIUM SERPL-MCNC: 8.3 MG/DL (ref 8.5–10.5)
CERULOPLASMIN: 26 MG/DL (ref 17–54)
CHLORIDE BLD-SCNC: 99 MEQ/L (ref 98–111)
CHOLESTEROL, TOTAL: 401 MG/DL (ref 100–199)
CO2: 18 MEQ/L (ref 23–33)
COLLECTED BY:: ABNORMAL
CREAT SERPL-MCNC: < 0.2 MG/DL (ref 0.4–1.2)
DEVICE: ABNORMAL
GFR SERPL CREATININE-BSD FRML MDRD: > 90 ML/MIN/1.73M2
GLUCOSE BLD-MCNC: 122 MG/DL (ref 70–108)
GLUCOSE BLD-MCNC: 163 MG/DL (ref 70–108)
GLUCOSE, WHOLE BLOOD: 168 MG/DL (ref 70–108)
HCO3: 22 MMOL/L (ref 23–28)
HCT VFR BLD CALC: 40 % (ref 42–52)
HDLC SERPL-MCNC: 9 MG/DL
HEMOGLOBIN: 13.9 GM/DL (ref 14–18)
IFIO2: 4
INR BLD: 1.08 (ref 0.85–1.13)
LDL CHOLESTEROL CALCULATED: ABNORMAL MG/DL
LIPASE: 32.4 U/L (ref 5.6–51.3)
MAGNESIUM: 1.6 MG/DL (ref 1.6–2.4)
MCH RBC QN AUTO: 33.2 PG (ref 27–31)
MCHC RBC AUTO-ENTMCNC: 34.8 GM/DL (ref 33–37)
MCV RBC AUTO: 95.4 FL (ref 80–94)
MRSA SCREEN RT-PCR: NEGATIVE
O2 SATURATION: 97 %
ORIGINAL SAMPLE NUMBER: NORMAL
PCO2: 44 MMHG (ref 35–45)
PDW BLD-RTO: 13.7 % (ref 11.5–14.5)
PH BLOOD GAS: 7.31 (ref 7.35–7.45)
PLATELET # BLD: 123 THOU/MM3 (ref 130–400)
PMV BLD AUTO: 10.1 FL (ref 7.4–10.4)
PO2: 103 MMHG (ref 71–104)
POTASSIUM SERPL-SCNC: 3.7 MEQ/L (ref 3.5–5.2)
RBC # BLD: 4.2 MILL/MM3 (ref 4.7–6.1)
REFERENCE LOCATION: NORMAL
REFERENCE RANGE: NORMAL
SODIUM BLD-SCNC: 134 MEQ/L (ref 135–145)
SOURCE, BLOOD GAS: ABNORMAL
TEST RESULTS WITH UNITS: NORMAL
TEST(S) BEING PERFORMED: NORMAL
TOTAL PROTEIN: 5.9 G/DL (ref 6.1–8)
TRIGL SERPL-MCNC: 793 MG/DL (ref 0–199)
WBC # BLD: 6.9 THOU/MM3 (ref 4.8–10.8)

## 2018-05-08 PROCEDURE — 99291 CRITICAL CARE FIRST HOUR: CPT | Performed by: INTERNAL MEDICINE

## 2018-05-08 PROCEDURE — 2580000003 HC RX 258: Performed by: INTERNAL MEDICINE

## 2018-05-08 PROCEDURE — 75984 XRAY CONTROL CATHETER CHANGE: CPT

## 2018-05-08 PROCEDURE — 82947 ASSAY GLUCOSE BLOOD QUANT: CPT

## 2018-05-08 PROCEDURE — 6360000002 HC RX W HCPCS: Performed by: INTERNAL MEDICINE

## 2018-05-08 PROCEDURE — 36600 WITHDRAWAL OF ARTERIAL BLOOD: CPT

## 2018-05-08 PROCEDURE — 87641 MR-STAPH DNA AMP PROBE: CPT

## 2018-05-08 PROCEDURE — 82248 BILIRUBIN DIRECT: CPT

## 2018-05-08 PROCEDURE — 2000000000 HC ICU R&B

## 2018-05-08 PROCEDURE — 2500000003 HC RX 250 WO HCPCS: Performed by: INTERNAL MEDICINE

## 2018-05-08 PROCEDURE — 99233 SBSQ HOSP IP/OBS HIGH 50: CPT | Performed by: INTERNAL MEDICINE

## 2018-05-08 PROCEDURE — 36415 COLL VENOUS BLD VENIPUNCTURE: CPT

## 2018-05-08 PROCEDURE — C9113 INJ PANTOPRAZOLE SODIUM, VIA: HCPCS | Performed by: NURSE PRACTITIONER

## 2018-05-08 PROCEDURE — 82803 BLOOD GASES ANY COMBINATION: CPT

## 2018-05-08 PROCEDURE — 83735 ASSAY OF MAGNESIUM: CPT

## 2018-05-08 PROCEDURE — 6360000002 HC RX W HCPCS: Performed by: HOSPITALIST

## 2018-05-08 PROCEDURE — 6370000000 HC RX 637 (ALT 250 FOR IP): Performed by: HOSPITALIST

## 2018-05-08 PROCEDURE — 83690 ASSAY OF LIPASE: CPT

## 2018-05-08 PROCEDURE — 6370000000 HC RX 637 (ALT 250 FOR IP): Performed by: INTERNAL MEDICINE

## 2018-05-08 PROCEDURE — 87081 CULTURE SCREEN ONLY: CPT

## 2018-05-08 PROCEDURE — 80053 COMPREHEN METABOLIC PANEL: CPT

## 2018-05-08 PROCEDURE — 6360000002 HC RX W HCPCS: Performed by: NURSE PRACTITIONER

## 2018-05-08 PROCEDURE — 2720000010 HC SURG SUPPLY STERILE

## 2018-05-08 PROCEDURE — 80061 LIPID PANEL: CPT

## 2018-05-08 PROCEDURE — 85027 COMPLETE CBC AUTOMATED: CPT

## 2018-05-08 PROCEDURE — 85610 PROTHROMBIN TIME: CPT

## 2018-05-08 PROCEDURE — 82948 REAGENT STRIP/BLOOD GLUCOSE: CPT

## 2018-05-08 RX ORDER — METOCLOPRAMIDE HYDROCHLORIDE 5 MG/ML
10 INJECTION INTRAMUSCULAR; INTRAVENOUS EVERY 6 HOURS
Status: COMPLETED | OUTPATIENT
Start: 2018-05-08 | End: 2018-05-08

## 2018-05-08 RX ORDER — LORAZEPAM 2 MG/ML
4 INJECTION INTRAMUSCULAR ONCE
Status: COMPLETED | OUTPATIENT
Start: 2018-05-08 | End: 2018-05-08

## 2018-05-08 RX ORDER — DEXMEDETOMIDINE HYDROCHLORIDE 4 UG/ML
0.2 INJECTION, SOLUTION INTRAVENOUS CONTINUOUS
Status: DISCONTINUED | OUTPATIENT
Start: 2018-05-08 | End: 2018-05-08 | Stop reason: SDUPTHER

## 2018-05-08 RX ORDER — LORAZEPAM 2 MG/ML
2 INJECTION INTRAMUSCULAR ONCE
Status: COMPLETED | OUTPATIENT
Start: 2018-05-08 | End: 2018-05-08

## 2018-05-08 RX ORDER — PANTOPRAZOLE SODIUM 40 MG/1
40 TABLET, DELAYED RELEASE ORAL
Status: DISCONTINUED | OUTPATIENT
Start: 2018-05-08 | End: 2018-05-08

## 2018-05-08 RX ORDER — PANTOPRAZOLE SODIUM 40 MG/10ML
40 INJECTION, POWDER, LYOPHILIZED, FOR SOLUTION INTRAVENOUS DAILY
Status: DISCONTINUED | OUTPATIENT
Start: 2018-05-08 | End: 2018-05-12 | Stop reason: ALTCHOICE

## 2018-05-08 RX ORDER — LORAZEPAM 2 MG/ML
2 INJECTION INTRAMUSCULAR ONCE
Status: DISCONTINUED | OUTPATIENT
Start: 2018-05-08 | End: 2018-05-08 | Stop reason: SDUPTHER

## 2018-05-08 RX ORDER — ZIPRASIDONE MESYLATE 20 MG/ML
10 INJECTION, POWDER, LYOPHILIZED, FOR SOLUTION INTRAMUSCULAR ONCE
Status: DISCONTINUED | OUTPATIENT
Start: 2018-05-08 | End: 2018-05-08 | Stop reason: DRUGHIGH

## 2018-05-08 RX ORDER — SODIUM CHLORIDE 0.9 % (FLUSH) 0.9 %
10 SYRINGE (ML) INJECTION PRN
Status: DISCONTINUED | OUTPATIENT
Start: 2018-05-08 | End: 2018-05-15 | Stop reason: HOSPADM

## 2018-05-08 RX ORDER — ZIPRASIDONE MESYLATE 20 MG/ML
10 INJECTION, POWDER, LYOPHILIZED, FOR SOLUTION INTRAMUSCULAR ONCE
Status: COMPLETED | OUTPATIENT
Start: 2018-05-08 | End: 2018-05-08

## 2018-05-08 RX ORDER — SODIUM CHLORIDE 0.9 % (FLUSH) 0.9 %
10 SYRINGE (ML) INJECTION EVERY 12 HOURS SCHEDULED
Status: DISCONTINUED | OUTPATIENT
Start: 2018-05-08 | End: 2018-05-15 | Stop reason: HOSPADM

## 2018-05-08 RX ORDER — ZIPRASIDONE MESYLATE 20 MG/ML
20 INJECTION, POWDER, LYOPHILIZED, FOR SOLUTION INTRAMUSCULAR ONCE
Status: COMPLETED | OUTPATIENT
Start: 2018-05-08 | End: 2018-05-08

## 2018-05-08 RX ORDER — POTASSIUM CHLORIDE 7.45 MG/ML
10 INJECTION INTRAVENOUS PRN
Status: DISCONTINUED | OUTPATIENT
Start: 2018-05-08 | End: 2018-05-15 | Stop reason: HOSPADM

## 2018-05-08 RX ORDER — CHLORDIAZEPOXIDE HYDROCHLORIDE 25 MG/1
25 CAPSULE, GELATIN COATED ORAL 4 TIMES DAILY
Status: DISCONTINUED | OUTPATIENT
Start: 2018-05-08 | End: 2018-05-09

## 2018-05-08 RX ORDER — ONDANSETRON 2 MG/ML
4 INJECTION INTRAMUSCULAR; INTRAVENOUS EVERY 6 HOURS PRN
Status: DISCONTINUED | OUTPATIENT
Start: 2018-05-08 | End: 2018-05-15 | Stop reason: HOSPADM

## 2018-05-08 RX ORDER — POLYETHYLENE GLYCOL 3350, SODIUM CHLORIDE, SODIUM BICARBONATE, POTASSIUM CHLORIDE 420; 11.2; 5.72; 1.48 G/4L; G/4L; G/4L; G/4L
4000 POWDER, FOR SOLUTION ORAL ONCE
Status: COMPLETED | OUTPATIENT
Start: 2018-05-08 | End: 2018-05-08

## 2018-05-08 RX ADMIN — Medication 10 ML: at 17:55

## 2018-05-08 RX ADMIN — CHLORDIAZEPOXIDE HYDROCHLORIDE 25 MG: 25 CAPSULE ORAL at 20:43

## 2018-05-08 RX ADMIN — POLYETHYLENE GLYCOL 3350, SODIUM CHLORIDE, SODIUM BICARBONATE AND POTASSIUM CHLORIDE 4000 ML: KIT ORAL at 18:07

## 2018-05-08 RX ADMIN — PANTOPRAZOLE SODIUM 40 MG: 40 INJECTION, POWDER, FOR SOLUTION INTRAVENOUS at 13:55

## 2018-05-08 RX ADMIN — LORAZEPAM 2 MG: 2 INJECTION INTRAMUSCULAR; INTRAVENOUS at 08:00

## 2018-05-08 RX ADMIN — LORAZEPAM 1 MG: 2 INJECTION INTRAMUSCULAR; INTRAVENOUS at 19:59

## 2018-05-08 RX ADMIN — WATER 1.2 ML: 1 INJECTION INTRAMUSCULAR; INTRAVENOUS; SUBCUTANEOUS at 08:27

## 2018-05-08 RX ADMIN — CHLORDIAZEPOXIDE HYDROCHLORIDE 25 MG: 25 CAPSULE ORAL at 18:05

## 2018-05-08 RX ADMIN — LORAZEPAM 4 MG: 2 INJECTION INTRAMUSCULAR; INTRAVENOUS at 05:11

## 2018-05-08 RX ADMIN — METOCLOPRAMIDE 10 MG: 5 INJECTION, SOLUTION INTRAMUSCULAR; INTRAVENOUS at 23:14

## 2018-05-08 RX ADMIN — LORAZEPAM 4 MG: 2 INJECTION INTRAMUSCULAR; INTRAVENOUS at 05:58

## 2018-05-08 RX ADMIN — LORAZEPAM 2 MG: 2 INJECTION INTRAMUSCULAR; INTRAVENOUS at 07:45

## 2018-05-08 RX ADMIN — MICONAZOLE NITRATE: 2 POWDER TOPICAL at 12:10

## 2018-05-08 RX ADMIN — LORAZEPAM 4 MG: 2 INJECTION INTRAMUSCULAR; INTRAVENOUS at 08:03

## 2018-05-08 RX ADMIN — CHLORDIAZEPOXIDE HYDROCHLORIDE 25 MG: 25 CAPSULE ORAL at 12:12

## 2018-05-08 RX ADMIN — WATER 1.2 ML: 1 INJECTION INTRAMUSCULAR; INTRAVENOUS; SUBCUTANEOUS at 08:15

## 2018-05-08 RX ADMIN — DEXMEDETOMIDINE HYDROCHLORIDE 116 MCG: 100 INJECTION, SOLUTION INTRAVENOUS at 08:25

## 2018-05-08 RX ADMIN — LORAZEPAM 4 MG: 2 TABLET ORAL at 03:31

## 2018-05-08 RX ADMIN — METOCLOPRAMIDE 10 MG: 5 INJECTION, SOLUTION INTRAMUSCULAR; INTRAVENOUS at 17:54

## 2018-05-08 RX ADMIN — MICONAZOLE NITRATE: 2 POWDER TOPICAL at 20:44

## 2018-05-08 RX ADMIN — ZIPRASIDONE MESYLATE 10 MG: 20 INJECTION, POWDER, LYOPHILIZED, FOR SOLUTION INTRAMUSCULAR at 08:26

## 2018-05-08 RX ADMIN — FOLIC ACID: 5 INJECTION, SOLUTION INTRAMUSCULAR; INTRAVENOUS; SUBCUTANEOUS at 11:55

## 2018-05-08 RX ADMIN — LORAZEPAM 2 MG: 2 INJECTION INTRAMUSCULAR; INTRAVENOUS at 23:13

## 2018-05-08 RX ADMIN — LISINOPRIL 20 MG: 20 TABLET ORAL at 15:42

## 2018-05-08 RX ADMIN — ZIPRASIDONE MESYLATE 20 MG: 20 INJECTION, POWDER, LYOPHILIZED, FOR SOLUTION INTRAMUSCULAR at 08:03

## 2018-05-08 RX ADMIN — ZIPRASIDONE MESYLATE 10 MG: 20 INJECTION, POWDER, LYOPHILIZED, FOR SOLUTION INTRAMUSCULAR at 07:26

## 2018-05-08 RX ADMIN — DEXMEDETOMIDINE HYDROCHLORIDE 0.2 MCG/KG/HR: 100 INJECTION, SOLUTION INTRAVENOUS at 08:39

## 2018-05-08 RX ADMIN — LORAZEPAM 2 MG: 2 INJECTION INTRAMUSCULAR; INTRAVENOUS at 21:11

## 2018-05-08 RX ADMIN — PROPRANOLOL HYDROCHLORIDE 20 MG: 20 TABLET ORAL at 20:44

## 2018-05-08 RX ADMIN — LORAZEPAM 4 MG: 2 INJECTION INTRAMUSCULAR; INTRAVENOUS at 08:10

## 2018-05-08 RX ADMIN — WATER 1.2 ML: 1 INJECTION INTRAMUSCULAR; INTRAVENOUS; SUBCUTANEOUS at 07:26

## 2018-05-08 RX ADMIN — ENOXAPARIN SODIUM 40 MG: 40 INJECTION SUBCUTANEOUS at 20:43

## 2018-05-08 ASSESSMENT — PAIN SCALES - WONG BAKER
WONGBAKER_NUMERICALRESPONSE: 0

## 2018-05-08 ASSESSMENT — PAIN SCALES - GENERAL
PAINLEVEL_OUTOF10: 0

## 2018-05-09 ENCOUNTER — APPOINTMENT (OUTPATIENT)
Dept: GENERAL RADIOLOGY | Age: 43
DRG: 280 | End: 2018-05-09
Payer: MEDICAID

## 2018-05-09 LAB
ALBUMIN SERPL-MCNC: 2.5 G/DL (ref 3.5–5.1)
ALP BLD-CCNC: 149 U/L (ref 38–126)
ALT SERPL-CCNC: 154 U/L (ref 11–66)
AMMONIA: 50 UMOL/L (ref 11–60)
ANA SCREEN: NORMAL
ANION GAP SERPL CALCULATED.3IONS-SCNC: 13 MEQ/L (ref 8–16)
AST SERPL-CCNC: 177 U/L (ref 5–40)
BILIRUB SERPL-MCNC: 11.4 MG/DL (ref 0.3–1.2)
BILIRUBIN DIRECT: 9 MG/DL (ref 0–0.3)
BUN BLDV-MCNC: 10 MG/DL (ref 7–22)
CALCIUM SERPL-MCNC: 7.8 MG/DL (ref 8.5–10.5)
CHLORIDE BLD-SCNC: 105 MEQ/L (ref 98–111)
CO2: 20 MEQ/L (ref 23–33)
CREAT SERPL-MCNC: 0.4 MG/DL (ref 0.4–1.2)
F-ACTIN AB IGG: 7 UNITS (ref 0–19)
GFR SERPL CREATININE-BSD FRML MDRD: > 90 ML/MIN/1.73M2
GLUCOSE BLD-MCNC: 103 MG/DL (ref 70–108)
HCT VFR BLD CALC: 34.5 % (ref 42–52)
HEMOGLOBIN: 11.9 GM/DL (ref 14–18)
INR BLD: 1.04 (ref 0.85–1.13)
MAGNESIUM: 1.7 MG/DL (ref 1.6–2.4)
MCH RBC QN AUTO: 32.8 PG (ref 27–31)
MCHC RBC AUTO-ENTMCNC: 34.5 GM/DL (ref 33–37)
MCV RBC AUTO: 95.3 FL (ref 80–94)
MITOCHONDRIAL ANTIBODY: 1.5 UNITS (ref 0–20)
PDW BLD-RTO: 14.1 % (ref 11.5–14.5)
PLATELET # BLD: 105 THOU/MM3 (ref 130–400)
PMV BLD AUTO: 9.2 FL (ref 7.4–10.4)
POTASSIUM SERPL-SCNC: 3.5 MEQ/L (ref 3.5–5.2)
RBC # BLD: 3.62 MILL/MM3 (ref 4.7–6.1)
SODIUM BLD-SCNC: 138 MEQ/L (ref 135–145)
TOTAL PROTEIN: 4.9 G/DL (ref 6.1–8)
WBC # BLD: 5.2 THOU/MM3 (ref 4.8–10.8)

## 2018-05-09 PROCEDURE — 82140 ASSAY OF AMMONIA: CPT

## 2018-05-09 PROCEDURE — 2580000003 HC RX 258: Performed by: INTERNAL MEDICINE

## 2018-05-09 PROCEDURE — 83735 ASSAY OF MAGNESIUM: CPT

## 2018-05-09 PROCEDURE — 2500000003 HC RX 250 WO HCPCS: Performed by: INTERNAL MEDICINE

## 2018-05-09 PROCEDURE — 6370000000 HC RX 637 (ALT 250 FOR IP): Performed by: INTERNAL MEDICINE

## 2018-05-09 PROCEDURE — 75984 XRAY CONTROL CATHETER CHANGE: CPT

## 2018-05-09 PROCEDURE — 80053 COMPREHEN METABOLIC PANEL: CPT

## 2018-05-09 PROCEDURE — 82248 BILIRUBIN DIRECT: CPT

## 2018-05-09 PROCEDURE — 6360000002 HC RX W HCPCS: Performed by: HOSPITALIST

## 2018-05-09 PROCEDURE — 6360000002 HC RX W HCPCS: Performed by: NURSE PRACTITIONER

## 2018-05-09 PROCEDURE — 36415 COLL VENOUS BLD VENIPUNCTURE: CPT

## 2018-05-09 PROCEDURE — 85027 COMPLETE CBC AUTOMATED: CPT

## 2018-05-09 PROCEDURE — 99291 CRITICAL CARE FIRST HOUR: CPT | Performed by: INTERNAL MEDICINE

## 2018-05-09 PROCEDURE — 6370000000 HC RX 637 (ALT 250 FOR IP): Performed by: HOSPITALIST

## 2018-05-09 PROCEDURE — 2000000000 HC ICU R&B

## 2018-05-09 PROCEDURE — C9113 INJ PANTOPRAZOLE SODIUM, VIA: HCPCS | Performed by: NURSE PRACTITIONER

## 2018-05-09 PROCEDURE — 6370000000 HC RX 637 (ALT 250 FOR IP): Performed by: NURSE PRACTITIONER

## 2018-05-09 PROCEDURE — 85610 PROTHROMBIN TIME: CPT

## 2018-05-09 RX ORDER — CHLORDIAZEPOXIDE HYDROCHLORIDE 25 MG/1
50 CAPSULE, GELATIN COATED ORAL EVERY 8 HOURS
Status: DISCONTINUED | OUTPATIENT
Start: 2018-05-09 | End: 2018-05-15 | Stop reason: HOSPADM

## 2018-05-09 RX ORDER — CHLORDIAZEPOXIDE HYDROCHLORIDE 25 MG/1
50 CAPSULE, GELATIN COATED ORAL EVERY 6 HOURS SCHEDULED
Status: DISCONTINUED | OUTPATIENT
Start: 2018-05-09 | End: 2018-05-09

## 2018-05-09 RX ORDER — THIAMINE HYDROCHLORIDE 100 MG/ML
100 INJECTION, SOLUTION INTRAMUSCULAR; INTRAVENOUS DAILY
Status: DISCONTINUED | OUTPATIENT
Start: 2018-05-09 | End: 2018-05-09

## 2018-05-09 RX ORDER — PEDIATRIC MULTIPLE VITAMIN LIQ
5 LIQUID ORAL DAILY
Status: DISCONTINUED | OUTPATIENT
Start: 2018-05-09 | End: 2018-05-09

## 2018-05-09 RX ORDER — LACTULOSE 10 G/15ML
30 SOLUTION ORAL 3 TIMES DAILY
Status: DISCONTINUED | OUTPATIENT
Start: 2018-05-09 | End: 2018-05-15 | Stop reason: HOSPADM

## 2018-05-09 RX ORDER — NICOTINE 21 MG/24HR
1 PATCH, TRANSDERMAL 24 HOURS TRANSDERMAL NIGHTLY
Status: DISCONTINUED | OUTPATIENT
Start: 2018-05-10 | End: 2018-05-15 | Stop reason: HOSPADM

## 2018-05-09 RX ORDER — POTASSIUM CHLORIDE 20MEQ/15ML
40 LIQUID (ML) ORAL ONCE
Status: COMPLETED | OUTPATIENT
Start: 2018-05-09 | End: 2018-05-09

## 2018-05-09 RX ADMIN — PROPRANOLOL HYDROCHLORIDE 20 MG: 20 TABLET ORAL at 20:30

## 2018-05-09 RX ADMIN — DEXMEDETOMIDINE HYDROCHLORIDE 0.4 MCG/KG/HR: 100 INJECTION, SOLUTION INTRAVENOUS at 16:34

## 2018-05-09 RX ADMIN — DEXMEDETOMIDINE HYDROCHLORIDE 0.2 MCG/KG/HR: 100 INJECTION, SOLUTION INTRAVENOUS at 02:01

## 2018-05-09 RX ADMIN — LACTULOSE 30 G: 20 SOLUTION ORAL at 20:30

## 2018-05-09 RX ADMIN — MICONAZOLE NITRATE: 2 POWDER TOPICAL at 20:30

## 2018-05-09 RX ADMIN — PANTOPRAZOLE SODIUM 40 MG: 40 INJECTION, POWDER, FOR SOLUTION INTRAVENOUS at 13:02

## 2018-05-09 RX ADMIN — LACTULOSE 30 G: 20 SOLUTION ORAL at 16:38

## 2018-05-09 RX ADMIN — HYDROCHLOROTHIAZIDE 25 MG: 25 TABLET ORAL at 08:38

## 2018-05-09 RX ADMIN — LORAZEPAM 4 MG: 2 INJECTION INTRAMUSCULAR; INTRAVENOUS at 22:07

## 2018-05-09 RX ADMIN — LORAZEPAM 4 MG: 2 INJECTION INTRAMUSCULAR; INTRAVENOUS at 18:51

## 2018-05-09 RX ADMIN — CHLORDIAZEPOXIDE HYDROCHLORIDE 50 MG: 25 CAPSULE ORAL at 08:33

## 2018-05-09 RX ADMIN — LISINOPRIL 20 MG: 20 TABLET ORAL at 13:08

## 2018-05-09 RX ADMIN — LORAZEPAM 4 MG: 2 INJECTION INTRAMUSCULAR; INTRAVENOUS at 23:45

## 2018-05-09 RX ADMIN — DIPHENHYDRAMINE HCL 25 MG: 25 TABLET ORAL at 00:44

## 2018-05-09 RX ADMIN — LORAZEPAM 4 MG: 2 INJECTION INTRAMUSCULAR; INTRAVENOUS at 01:20

## 2018-05-09 RX ADMIN — FOLIC ACID 1 MG: 1 TABLET ORAL at 08:38

## 2018-05-09 RX ADMIN — LORAZEPAM 4 MG: 2 INJECTION INTRAMUSCULAR; INTRAVENOUS at 00:19

## 2018-05-09 RX ADMIN — MICONAZOLE NITRATE: 2 POWDER TOPICAL at 08:41

## 2018-05-09 RX ADMIN — Medication 10 ML: at 13:07

## 2018-05-09 RX ADMIN — DEXMEDETOMIDINE HYDROCHLORIDE 0.3 MCG/KG/HR: 100 INJECTION, SOLUTION INTRAVENOUS at 04:37

## 2018-05-09 RX ADMIN — CHLORDIAZEPOXIDE HYDROCHLORIDE 50 MG: 25 CAPSULE ORAL at 23:37

## 2018-05-09 RX ADMIN — Medication 30 ML: at 09:21

## 2018-05-09 RX ADMIN — DIPHENHYDRAMINE HCL 25 MG: 25 TABLET ORAL at 20:30

## 2018-05-09 RX ADMIN — CHLORDIAZEPOXIDE HYDROCHLORIDE 50 MG: 25 CAPSULE ORAL at 16:44

## 2018-05-09 RX ADMIN — POTASSIUM CHLORIDE 40 MEQ: 40 SOLUTION ORAL at 09:25

## 2018-05-09 RX ADMIN — PROPRANOLOL HYDROCHLORIDE 20 MG: 20 TABLET ORAL at 11:40

## 2018-05-09 RX ADMIN — ENOXAPARIN SODIUM 40 MG: 40 INJECTION SUBCUTANEOUS at 20:29

## 2018-05-09 ASSESSMENT — PAIN SCALES - GENERAL
PAINLEVEL_OUTOF10: 0

## 2018-05-10 LAB
ALBUMIN SERPL-MCNC: 2.6 G/DL (ref 3.5–5.1)
ALP BLD-CCNC: 144 U/L (ref 38–126)
ALT SERPL-CCNC: 144 U/L (ref 11–66)
AMMONIA: 39 UMOL/L (ref 11–60)
AST SERPL-CCNC: 152 U/L (ref 5–40)
BILIRUB SERPL-MCNC: 11.4 MG/DL (ref 0.3–1.2)
BILIRUBIN DIRECT: 9 MG/DL (ref 0–0.3)
CREAT SERPL-MCNC: 0.3 MG/DL (ref 0.4–1.2)
GFR SERPL CREATININE-BSD FRML MDRD: > 90 ML/MIN/1.73M2
HCT VFR BLD CALC: 36.1 % (ref 42–52)
HEMOGLOBIN: 12.6 GM/DL (ref 14–18)
INR BLD: 1.02 (ref 0.85–1.13)
MCH RBC QN AUTO: 33.1 PG (ref 27–31)
MCHC RBC AUTO-ENTMCNC: 35 GM/DL (ref 33–37)
MCV RBC AUTO: 94.7 FL (ref 80–94)
MRSA SCREEN: NORMAL
PDW BLD-RTO: 15.1 % (ref 11.5–14.5)
PLATELET # BLD: 122 THOU/MM3 (ref 130–400)
PMV BLD AUTO: 8.5 FL (ref 7.4–10.4)
POTASSIUM SERPL-SCNC: 3.2 MEQ/L (ref 3.5–5.2)
RBC # BLD: 3.81 MILL/MM3 (ref 4.7–6.1)
TOTAL PROTEIN: 5.3 G/DL (ref 6.1–8)
VRE CULTURE: NORMAL
WBC # BLD: 6.5 THOU/MM3 (ref 4.8–10.8)

## 2018-05-10 PROCEDURE — 6370000000 HC RX 637 (ALT 250 FOR IP): Performed by: NURSE PRACTITIONER

## 2018-05-10 PROCEDURE — 99291 CRITICAL CARE FIRST HOUR: CPT | Performed by: INTERNAL MEDICINE

## 2018-05-10 PROCEDURE — 80076 HEPATIC FUNCTION PANEL: CPT

## 2018-05-10 PROCEDURE — 6360000002 HC RX W HCPCS: Performed by: HOSPITALIST

## 2018-05-10 PROCEDURE — 2580000003 HC RX 258: Performed by: INTERNAL MEDICINE

## 2018-05-10 PROCEDURE — 82140 ASSAY OF AMMONIA: CPT

## 2018-05-10 PROCEDURE — 2000000000 HC ICU R&B

## 2018-05-10 PROCEDURE — 6370000000 HC RX 637 (ALT 250 FOR IP): Performed by: INTERNAL MEDICINE

## 2018-05-10 PROCEDURE — 6370000000 HC RX 637 (ALT 250 FOR IP): Performed by: HOSPITALIST

## 2018-05-10 PROCEDURE — 82565 ASSAY OF CREATININE: CPT

## 2018-05-10 PROCEDURE — C9113 INJ PANTOPRAZOLE SODIUM, VIA: HCPCS | Performed by: NURSE PRACTITIONER

## 2018-05-10 PROCEDURE — 2500000003 HC RX 250 WO HCPCS: Performed by: INTERNAL MEDICINE

## 2018-05-10 PROCEDURE — 6360000002 HC RX W HCPCS: Performed by: NURSE PRACTITIONER

## 2018-05-10 PROCEDURE — 85027 COMPLETE CBC AUTOMATED: CPT

## 2018-05-10 PROCEDURE — 36415 COLL VENOUS BLD VENIPUNCTURE: CPT

## 2018-05-10 PROCEDURE — 84132 ASSAY OF SERUM POTASSIUM: CPT

## 2018-05-10 PROCEDURE — 85610 PROTHROMBIN TIME: CPT

## 2018-05-10 PROCEDURE — 6370000000 HC RX 637 (ALT 250 FOR IP)

## 2018-05-10 RX ORDER — VENLAFAXINE 50 MG/1
50 TABLET ORAL 3 TIMES DAILY
COMMUNITY
End: 2018-10-04 | Stop reason: SDUPTHER

## 2018-05-10 RX ORDER — THIAMINE MONONITRATE (VIT B1) 100 MG
100 TABLET ORAL DAILY
COMMUNITY
End: 2018-06-20

## 2018-05-10 RX ORDER — DISULFIRAM 500 MG/1
500 TABLET ORAL DAILY
Status: ON HOLD | COMMUNITY
End: 2018-12-14 | Stop reason: ALTCHOICE

## 2018-05-10 RX ORDER — FOLIC ACID 1 MG/1
1 TABLET ORAL DAILY
COMMUNITY
End: 2018-06-20

## 2018-05-10 RX ORDER — M-VIT,TX,IRON,MINS/CALC/FOLIC 27MG-0.4MG
1 TABLET ORAL DAILY
COMMUNITY
End: 2018-11-13 | Stop reason: ALTCHOICE

## 2018-05-10 RX ORDER — VENLAFAXINE 50 MG/1
50 TABLET ORAL 3 TIMES DAILY
Status: DISCONTINUED | OUTPATIENT
Start: 2018-05-10 | End: 2018-05-15 | Stop reason: HOSPADM

## 2018-05-10 RX ORDER — THIAMINE MONONITRATE (VIT B1) 100 MG
200 TABLET ORAL 3 TIMES DAILY
Status: DISCONTINUED | OUTPATIENT
Start: 2018-05-10 | End: 2018-05-15 | Stop reason: HOSPADM

## 2018-05-10 RX ORDER — OLANZAPINE 5 MG/1
5 TABLET, ORALLY DISINTEGRATING ORAL NIGHTLY
Status: DISCONTINUED | OUTPATIENT
Start: 2018-05-10 | End: 2018-05-15 | Stop reason: HOSPADM

## 2018-05-10 RX ORDER — POTASSIUM CHLORIDE 750 MG/1
40 TABLET, FILM COATED, EXTENDED RELEASE ORAL ONCE
Status: DISCONTINUED | OUTPATIENT
Start: 2018-05-10 | End: 2018-05-10 | Stop reason: SDUPTHER

## 2018-05-10 RX ORDER — POTASSIUM CHLORIDE 20MEQ/15ML
40 LIQUID (ML) ORAL ONCE
Status: COMPLETED | OUTPATIENT
Start: 2018-05-10 | End: 2018-05-10

## 2018-05-10 RX ADMIN — CHLORDIAZEPOXIDE HYDROCHLORIDE 50 MG: 25 CAPSULE ORAL at 08:00

## 2018-05-10 RX ADMIN — DEXMEDETOMIDINE HYDROCHLORIDE 0.2 MCG/KG/HR: 100 INJECTION, SOLUTION INTRAVENOUS at 01:14

## 2018-05-10 RX ADMIN — Medication 10 ML: at 08:24

## 2018-05-10 RX ADMIN — PANTOPRAZOLE SODIUM 40 MG: 40 INJECTION, POWDER, FOR SOLUTION INTRAVENOUS at 08:24

## 2018-05-10 RX ADMIN — LACTULOSE 30 G: 20 SOLUTION ORAL at 21:09

## 2018-05-10 RX ADMIN — VENLAFAXINE 50 MG: 50 TABLET ORAL at 21:09

## 2018-05-10 RX ADMIN — LORAZEPAM 4 MG: 2 INJECTION INTRAMUSCULAR; INTRAVENOUS at 03:02

## 2018-05-10 RX ADMIN — DOCUSATE SODIUM 100 MG: 100 CAPSULE ORAL at 21:09

## 2018-05-10 RX ADMIN — PROPRANOLOL HYDROCHLORIDE 20 MG: 20 TABLET ORAL at 08:23

## 2018-05-10 RX ADMIN — LISINOPRIL 20 MG: 20 TABLET ORAL at 08:24

## 2018-05-10 RX ADMIN — VENLAFAXINE 50 MG: 50 TABLET ORAL at 17:02

## 2018-05-10 RX ADMIN — HYDROCHLOROTHIAZIDE 25 MG: 25 TABLET ORAL at 08:24

## 2018-05-10 RX ADMIN — MICONAZOLE NITRATE: 2 POWDER TOPICAL at 21:15

## 2018-05-10 RX ADMIN — Medication 30 ML: at 08:31

## 2018-05-10 RX ADMIN — LACTULOSE 30 G: 20 SOLUTION ORAL at 08:21

## 2018-05-10 RX ADMIN — MICONAZOLE NITRATE: 2 POWDER TOPICAL at 08:25

## 2018-05-10 RX ADMIN — Medication 200 MG: at 21:09

## 2018-05-10 RX ADMIN — POTASSIUM CHLORIDE 40 MEQ: 40 SOLUTION ORAL at 13:54

## 2018-05-10 RX ADMIN — DEXMEDETOMIDINE HYDROCHLORIDE 0.2 MCG/KG/HR: 100 INJECTION, SOLUTION INTRAVENOUS at 20:00

## 2018-05-10 RX ADMIN — Medication 200 MG: at 17:02

## 2018-05-10 RX ADMIN — FOLIC ACID 1 MG: 1 TABLET ORAL at 08:24

## 2018-05-10 RX ADMIN — OLANZAPINE 5 MG: 5 TABLET, ORALLY DISINTEGRATING ORAL at 21:09

## 2018-05-10 RX ADMIN — ENOXAPARIN SODIUM 40 MG: 40 INJECTION SUBCUTANEOUS at 21:09

## 2018-05-10 RX ADMIN — CHLORDIAZEPOXIDE HYDROCHLORIDE 50 MG: 25 CAPSULE ORAL at 15:58

## 2018-05-10 RX ADMIN — LACTULOSE 30 G: 20 SOLUTION ORAL at 13:54

## 2018-05-10 RX ADMIN — PROPRANOLOL HYDROCHLORIDE 20 MG: 20 TABLET ORAL at 21:09

## 2018-05-11 LAB
FOLATE: > 20 NG/ML (ref 4.8–24.2)
INR BLD: 1.04 (ref 0.85–1.13)
MAGNESIUM: 1.7 MG/DL (ref 1.6–2.4)
POTASSIUM SERPL-SCNC: 3.3 MEQ/L (ref 3.5–5.2)
VITAMIN B-12: > 2000 PG/ML (ref 211–911)

## 2018-05-11 PROCEDURE — 6370000000 HC RX 637 (ALT 250 FOR IP): Performed by: HOSPITALIST

## 2018-05-11 PROCEDURE — 99233 SBSQ HOSP IP/OBS HIGH 50: CPT | Performed by: INTERNAL MEDICINE

## 2018-05-11 PROCEDURE — 6370000000 HC RX 637 (ALT 250 FOR IP): Performed by: INTERNAL MEDICINE

## 2018-05-11 PROCEDURE — C9113 INJ PANTOPRAZOLE SODIUM, VIA: HCPCS | Performed by: NURSE PRACTITIONER

## 2018-05-11 PROCEDURE — 2580000003 HC RX 258: Performed by: HOSPITALIST

## 2018-05-11 PROCEDURE — 1200000000 HC SEMI PRIVATE

## 2018-05-11 PROCEDURE — 6360000002 HC RX W HCPCS: Performed by: HOSPITALIST

## 2018-05-11 PROCEDURE — 2580000003 HC RX 258: Performed by: INTERNAL MEDICINE

## 2018-05-11 PROCEDURE — 6370000000 HC RX 637 (ALT 250 FOR IP): Performed by: NURSE PRACTITIONER

## 2018-05-11 PROCEDURE — 36415 COLL VENOUS BLD VENIPUNCTURE: CPT

## 2018-05-11 PROCEDURE — 85610 PROTHROMBIN TIME: CPT

## 2018-05-11 PROCEDURE — 82746 ASSAY OF FOLIC ACID SERUM: CPT

## 2018-05-11 PROCEDURE — 6360000002 HC RX W HCPCS: Performed by: NURSE PRACTITIONER

## 2018-05-11 PROCEDURE — 82607 VITAMIN B-12: CPT

## 2018-05-11 PROCEDURE — 84132 ASSAY OF SERUM POTASSIUM: CPT

## 2018-05-11 PROCEDURE — 83735 ASSAY OF MAGNESIUM: CPT

## 2018-05-11 RX ORDER — M-VIT,TX,IRON,MINS/CALC/FOLIC 27MG-0.4MG
1 TABLET ORAL DAILY
Status: DISCONTINUED | OUTPATIENT
Start: 2018-05-11 | End: 2018-05-15 | Stop reason: HOSPADM

## 2018-05-11 RX ORDER — POTASSIUM CHLORIDE 20 MEQ/1
40 TABLET, EXTENDED RELEASE ORAL ONCE
Status: COMPLETED | OUTPATIENT
Start: 2018-05-11 | End: 2018-05-11

## 2018-05-11 RX ADMIN — VENLAFAXINE 50 MG: 50 TABLET ORAL at 13:29

## 2018-05-11 RX ADMIN — PROPRANOLOL HYDROCHLORIDE 20 MG: 20 TABLET ORAL at 07:39

## 2018-05-11 RX ADMIN — PROPRANOLOL HYDROCHLORIDE 20 MG: 20 TABLET ORAL at 22:16

## 2018-05-11 RX ADMIN — Medication 10 ML: at 07:39

## 2018-05-11 RX ADMIN — POTASSIUM CHLORIDE 40 MEQ: 20 TABLET, EXTENDED RELEASE ORAL at 11:24

## 2018-05-11 RX ADMIN — Medication 10 ML: at 20:57

## 2018-05-11 RX ADMIN — Medication 200 MG: at 22:16

## 2018-05-11 RX ADMIN — HYDROCHLOROTHIAZIDE 25 MG: 25 TABLET ORAL at 07:39

## 2018-05-11 RX ADMIN — MICONAZOLE NITRATE: 2 POWDER TOPICAL at 07:40

## 2018-05-11 RX ADMIN — CHLORDIAZEPOXIDE HYDROCHLORIDE 50 MG: 25 CAPSULE ORAL at 20:20

## 2018-05-11 RX ADMIN — LACTULOSE 30 G: 20 SOLUTION ORAL at 07:46

## 2018-05-11 RX ADMIN — VENLAFAXINE 50 MG: 50 TABLET ORAL at 07:39

## 2018-05-11 RX ADMIN — Medication 200 MG: at 07:39

## 2018-05-11 RX ADMIN — LACTULOSE 30 G: 20 SOLUTION ORAL at 13:29

## 2018-05-11 RX ADMIN — FOLIC ACID 1 MG: 1 TABLET ORAL at 07:39

## 2018-05-11 RX ADMIN — ENOXAPARIN SODIUM 40 MG: 40 INJECTION SUBCUTANEOUS at 20:20

## 2018-05-11 RX ADMIN — LACTULOSE 30 G: 20 SOLUTION ORAL at 20:20

## 2018-05-11 RX ADMIN — LISINOPRIL 20 MG: 20 TABLET ORAL at 07:39

## 2018-05-11 RX ADMIN — MULTIPLE VITAMINS W/ MINERALS TAB 1 TABLET: TAB at 11:22

## 2018-05-11 RX ADMIN — Medication 10 ML: at 07:40

## 2018-05-11 RX ADMIN — OLANZAPINE 5 MG: 5 TABLET, ORALLY DISINTEGRATING ORAL at 22:16

## 2018-05-11 RX ADMIN — Medication 200 MG: at 13:29

## 2018-05-11 RX ADMIN — DOCUSATE SODIUM 100 MG: 100 CAPSULE ORAL at 07:46

## 2018-05-11 RX ADMIN — VENLAFAXINE 50 MG: 50 TABLET ORAL at 22:16

## 2018-05-11 RX ADMIN — CHLORDIAZEPOXIDE HYDROCHLORIDE 50 MG: 25 CAPSULE ORAL at 03:04

## 2018-05-11 RX ADMIN — Medication 10 ML: at 20:20

## 2018-05-11 RX ADMIN — MICONAZOLE NITRATE: 2 POWDER TOPICAL at 20:20

## 2018-05-11 RX ADMIN — CHLORDIAZEPOXIDE HYDROCHLORIDE 50 MG: 25 CAPSULE ORAL at 11:21

## 2018-05-11 RX ADMIN — PANTOPRAZOLE SODIUM 40 MG: 40 INJECTION, POWDER, FOR SOLUTION INTRAVENOUS at 07:39

## 2018-05-11 ASSESSMENT — PAIN SCALES - GENERAL
PAINLEVEL_OUTOF10: 0

## 2018-05-11 ASSESSMENT — LIFESTYLE VARIABLES: HISTORY_ALCOHOL_USE: YES

## 2018-05-12 ENCOUNTER — APPOINTMENT (OUTPATIENT)
Dept: CT IMAGING | Age: 43
DRG: 280 | End: 2018-05-12
Payer: MEDICAID

## 2018-05-12 LAB
ALBUMIN SERPL-MCNC: 3 G/DL (ref 3.5–5.1)
ALP BLD-CCNC: 169 U/L (ref 38–126)
ALT SERPL-CCNC: 156 U/L (ref 11–66)
AMMONIA: 37 UMOL/L (ref 11–60)
ANION GAP SERPL CALCULATED.3IONS-SCNC: 12 MEQ/L (ref 8–16)
AST SERPL-CCNC: 141 U/L (ref 5–40)
BILIRUB SERPL-MCNC: 7.6 MG/DL (ref 0.3–1.2)
BILIRUBIN DIRECT: 5.6 MG/DL (ref 0–0.3)
BUN BLDV-MCNC: 9 MG/DL (ref 7–22)
CALCIUM SERPL-MCNC: 8.7 MG/DL (ref 8.5–10.5)
CHLORIDE BLD-SCNC: 99 MEQ/L (ref 98–111)
CO2: 24 MEQ/L (ref 23–33)
CREAT SERPL-MCNC: 0.4 MG/DL (ref 0.4–1.2)
GFR SERPL CREATININE-BSD FRML MDRD: > 90 ML/MIN/1.73M2
GLUCOSE BLD-MCNC: 112 MG/DL (ref 70–108)
HCT VFR BLD CALC: 38.4 % (ref 42–52)
HEMOGLOBIN: 13.2 GM/DL (ref 14–18)
MAGNESIUM: 1.7 MG/DL (ref 1.6–2.4)
MCH RBC QN AUTO: 33 PG (ref 27–31)
MCHC RBC AUTO-ENTMCNC: 34.2 GM/DL (ref 33–37)
MCV RBC AUTO: 96.3 FL (ref 80–94)
PDW BLD-RTO: 14.9 % (ref 11.5–14.5)
PLATELET # BLD: 190 THOU/MM3 (ref 130–400)
PMV BLD AUTO: 8.6 FL (ref 7.4–10.4)
POTASSIUM SERPL-SCNC: 3.3 MEQ/L (ref 3.5–5.2)
RBC # BLD: 3.99 MILL/MM3 (ref 4.7–6.1)
SODIUM BLD-SCNC: 135 MEQ/L (ref 135–145)
TOTAL PROTEIN: 6.2 G/DL (ref 6.1–8)
WBC # BLD: 5.2 THOU/MM3 (ref 4.8–10.8)

## 2018-05-12 PROCEDURE — 6370000000 HC RX 637 (ALT 250 FOR IP): Performed by: INTERNAL MEDICINE

## 2018-05-12 PROCEDURE — 36415 COLL VENOUS BLD VENIPUNCTURE: CPT

## 2018-05-12 PROCEDURE — 6370000000 HC RX 637 (ALT 250 FOR IP): Performed by: NURSE PRACTITIONER

## 2018-05-12 PROCEDURE — 2580000003 HC RX 258: Performed by: INTERNAL MEDICINE

## 2018-05-12 PROCEDURE — 83735 ASSAY OF MAGNESIUM: CPT

## 2018-05-12 PROCEDURE — 80053 COMPREHEN METABOLIC PANEL: CPT

## 2018-05-12 PROCEDURE — 6360000002 HC RX W HCPCS: Performed by: NURSE PRACTITIONER

## 2018-05-12 PROCEDURE — C9113 INJ PANTOPRAZOLE SODIUM, VIA: HCPCS | Performed by: NURSE PRACTITIONER

## 2018-05-12 PROCEDURE — 82140 ASSAY OF AMMONIA: CPT

## 2018-05-12 PROCEDURE — 2580000003 HC RX 258: Performed by: HOSPITALIST

## 2018-05-12 PROCEDURE — 85027 COMPLETE CBC AUTOMATED: CPT

## 2018-05-12 PROCEDURE — 99222 1ST HOSP IP/OBS MODERATE 55: CPT | Performed by: OPHTHALMOLOGY

## 2018-05-12 PROCEDURE — 1200000000 HC SEMI PRIVATE

## 2018-05-12 PROCEDURE — 6370000000 HC RX 637 (ALT 250 FOR IP): Performed by: OPHTHALMOLOGY

## 2018-05-12 PROCEDURE — 70450 CT HEAD/BRAIN W/O DYE: CPT

## 2018-05-12 PROCEDURE — 6370000000 HC RX 637 (ALT 250 FOR IP): Performed by: HOSPITALIST

## 2018-05-12 PROCEDURE — 6360000002 HC RX W HCPCS: Performed by: HOSPITALIST

## 2018-05-12 PROCEDURE — 82248 BILIRUBIN DIRECT: CPT

## 2018-05-12 RX ORDER — POTASSIUM CHLORIDE 750 MG/1
40 TABLET, FILM COATED, EXTENDED RELEASE ORAL ONCE
Status: COMPLETED | OUTPATIENT
Start: 2018-05-12 | End: 2018-05-12

## 2018-05-12 RX ORDER — PANTOPRAZOLE SODIUM 40 MG/1
40 TABLET, DELAYED RELEASE ORAL
Status: DISCONTINUED | OUTPATIENT
Start: 2018-05-13 | End: 2018-05-15 | Stop reason: HOSPADM

## 2018-05-12 RX ADMIN — MICONAZOLE NITRATE: 2 POWDER TOPICAL at 07:31

## 2018-05-12 RX ADMIN — CHLORDIAZEPOXIDE HYDROCHLORIDE 50 MG: 25 CAPSULE ORAL at 12:47

## 2018-05-12 RX ADMIN — LACTULOSE 30 G: 20 SOLUTION ORAL at 20:49

## 2018-05-12 RX ADMIN — CHLORDIAZEPOXIDE HYDROCHLORIDE 50 MG: 25 CAPSULE ORAL at 03:07

## 2018-05-12 RX ADMIN — ENOXAPARIN SODIUM 40 MG: 40 INJECTION SUBCUTANEOUS at 20:49

## 2018-05-12 RX ADMIN — CHLORDIAZEPOXIDE HYDROCHLORIDE 50 MG: 25 CAPSULE ORAL at 20:49

## 2018-05-12 RX ADMIN — Medication 200 MG: at 07:29

## 2018-05-12 RX ADMIN — FOLIC ACID 1 MG: 1 TABLET ORAL at 07:29

## 2018-05-12 RX ADMIN — OLANZAPINE 5 MG: 5 TABLET, ORALLY DISINTEGRATING ORAL at 20:49

## 2018-05-12 RX ADMIN — DIPHENHYDRAMINE HCL 25 MG: 25 TABLET ORAL at 20:49

## 2018-05-12 RX ADMIN — VENLAFAXINE 50 MG: 50 TABLET ORAL at 20:49

## 2018-05-12 RX ADMIN — LISINOPRIL 20 MG: 20 TABLET ORAL at 07:29

## 2018-05-12 RX ADMIN — POTASSIUM CHLORIDE 40 MEQ: 750 TABLET, FILM COATED, EXTENDED RELEASE ORAL at 15:06

## 2018-05-12 RX ADMIN — VENLAFAXINE 50 MG: 50 TABLET ORAL at 15:06

## 2018-05-12 RX ADMIN — VENLAFAXINE 50 MG: 50 TABLET ORAL at 07:29

## 2018-05-12 RX ADMIN — LACTULOSE 30 G: 20 SOLUTION ORAL at 15:07

## 2018-05-12 RX ADMIN — Medication 10 ML: at 20:57

## 2018-05-12 RX ADMIN — DOCUSATE SODIUM 100 MG: 100 CAPSULE ORAL at 07:29

## 2018-05-12 RX ADMIN — PANTOPRAZOLE SODIUM 40 MG: 40 INJECTION, POWDER, FOR SOLUTION INTRAVENOUS at 07:29

## 2018-05-12 RX ADMIN — LORAZEPAM 2 MG: 2 TABLET ORAL at 23:16

## 2018-05-12 RX ADMIN — Medication 10 ML: at 07:34

## 2018-05-12 RX ADMIN — LORAZEPAM 1 MG: 1 TABLET ORAL at 20:49

## 2018-05-12 RX ADMIN — Medication 200 MG: at 20:49

## 2018-05-12 RX ADMIN — PROPRANOLOL HYDROCHLORIDE 20 MG: 20 TABLET ORAL at 07:29

## 2018-05-12 RX ADMIN — PROPRANOLOL HYDROCHLORIDE 20 MG: 20 TABLET ORAL at 20:49

## 2018-05-12 RX ADMIN — LACTULOSE 30 G: 20 SOLUTION ORAL at 07:29

## 2018-05-12 RX ADMIN — HYDROCHLOROTHIAZIDE 25 MG: 25 TABLET ORAL at 07:29

## 2018-05-12 RX ADMIN — MICONAZOLE NITRATE: 2 POWDER TOPICAL at 20:48

## 2018-05-12 RX ADMIN — MULTIPLE VITAMINS W/ MINERALS TAB 1 TABLET: TAB at 07:29

## 2018-05-12 RX ADMIN — Medication 200 MG: at 15:06

## 2018-05-12 ASSESSMENT — PAIN DESCRIPTION - LOCATION: LOCATION: PENIS

## 2018-05-12 ASSESSMENT — PAIN SCALES - GENERAL
PAINLEVEL_OUTOF10: 0
PAINLEVEL_OUTOF10: 5

## 2018-05-12 ASSESSMENT — PAIN DESCRIPTION - FREQUENCY: FREQUENCY: INTERMITTENT

## 2018-05-13 PROBLEM — R41.0 DELIRIOUS: Status: ACTIVE | Noted: 2018-05-13

## 2018-05-13 PROBLEM — R10.11 RUQ PAIN: Status: ACTIVE | Noted: 2018-05-13

## 2018-05-13 LAB
ALBUMIN SERPL-MCNC: 2.9 G/DL (ref 3.5–5.1)
ALP BLD-CCNC: 183 U/L (ref 38–126)
ALT SERPL-CCNC: 177 U/L (ref 11–66)
ANION GAP SERPL CALCULATED.3IONS-SCNC: 14 MEQ/L (ref 8–16)
AST SERPL-CCNC: 149 U/L (ref 5–40)
BILIRUB SERPL-MCNC: 6 MG/DL (ref 0.3–1.2)
BUN BLDV-MCNC: 7 MG/DL (ref 7–22)
CALCIUM SERPL-MCNC: 8.7 MG/DL (ref 8.5–10.5)
CHLORIDE BLD-SCNC: 100 MEQ/L (ref 98–111)
CO2: 23 MEQ/L (ref 23–33)
CREAT SERPL-MCNC: 0.5 MG/DL (ref 0.4–1.2)
GFR SERPL CREATININE-BSD FRML MDRD: > 90 ML/MIN/1.73M2
GLUCOSE BLD-MCNC: 120 MG/DL (ref 70–108)
POTASSIUM SERPL-SCNC: 3.2 MEQ/L (ref 3.5–5.2)
SODIUM BLD-SCNC: 137 MEQ/L (ref 135–145)
TOTAL PROTEIN: 6.2 G/DL (ref 6.1–8)
TSH SERPL DL<=0.05 MIU/L-ACNC: 3.52 UIU/ML (ref 0.4–4.2)

## 2018-05-13 PROCEDURE — 6370000000 HC RX 637 (ALT 250 FOR IP): Performed by: INTERNAL MEDICINE

## 2018-05-13 PROCEDURE — 2580000003 HC RX 258: Performed by: HOSPITALIST

## 2018-05-13 PROCEDURE — 6370000000 HC RX 637 (ALT 250 FOR IP): Performed by: NURSE PRACTITIONER

## 2018-05-13 PROCEDURE — 84443 ASSAY THYROID STIM HORMONE: CPT

## 2018-05-13 PROCEDURE — 6370000000 HC RX 637 (ALT 250 FOR IP): Performed by: HOSPITALIST

## 2018-05-13 PROCEDURE — 1200000000 HC SEMI PRIVATE

## 2018-05-13 PROCEDURE — 2580000003 HC RX 258: Performed by: INTERNAL MEDICINE

## 2018-05-13 PROCEDURE — 99232 SBSQ HOSP IP/OBS MODERATE 35: CPT | Performed by: OPHTHALMOLOGY

## 2018-05-13 PROCEDURE — 80053 COMPREHEN METABOLIC PANEL: CPT

## 2018-05-13 PROCEDURE — 36415 COLL VENOUS BLD VENIPUNCTURE: CPT

## 2018-05-13 PROCEDURE — 6360000002 HC RX W HCPCS: Performed by: HOSPITALIST

## 2018-05-13 RX ORDER — POTASSIUM CHLORIDE 750 MG/1
40 TABLET, FILM COATED, EXTENDED RELEASE ORAL ONCE
Status: COMPLETED | OUTPATIENT
Start: 2018-05-13 | End: 2018-05-13

## 2018-05-13 RX ADMIN — CHLORDIAZEPOXIDE HYDROCHLORIDE 50 MG: 25 CAPSULE ORAL at 22:45

## 2018-05-13 RX ADMIN — LORAZEPAM 2 MG: 2 TABLET ORAL at 06:32

## 2018-05-13 RX ADMIN — RIFAXIMIN 550 MG: 550 TABLET ORAL at 22:41

## 2018-05-13 RX ADMIN — RIFAXIMIN 550 MG: 550 TABLET ORAL at 14:22

## 2018-05-13 RX ADMIN — Medication 200 MG: at 07:38

## 2018-05-13 RX ADMIN — MICONAZOLE NITRATE: 2 POWDER TOPICAL at 22:42

## 2018-05-13 RX ADMIN — VENLAFAXINE 50 MG: 50 TABLET ORAL at 14:22

## 2018-05-13 RX ADMIN — PANTOPRAZOLE SODIUM 40 MG: 40 TABLET, DELAYED RELEASE ORAL at 06:32

## 2018-05-13 RX ADMIN — CHLORDIAZEPOXIDE HYDROCHLORIDE 50 MG: 25 CAPSULE ORAL at 06:32

## 2018-05-13 RX ADMIN — VENLAFAXINE 50 MG: 50 TABLET ORAL at 07:39

## 2018-05-13 RX ADMIN — POTASSIUM CHLORIDE 40 MEQ: 750 TABLET, FILM COATED, EXTENDED RELEASE ORAL at 10:28

## 2018-05-13 RX ADMIN — CHLORDIAZEPOXIDE HYDROCHLORIDE 50 MG: 25 CAPSULE ORAL at 14:22

## 2018-05-13 RX ADMIN — Medication 10 ML: at 07:38

## 2018-05-13 RX ADMIN — MICONAZOLE NITRATE: 2 POWDER TOPICAL at 07:39

## 2018-05-13 RX ADMIN — LACTULOSE 30 G: 20 SOLUTION ORAL at 14:22

## 2018-05-13 RX ADMIN — VENLAFAXINE 50 MG: 50 TABLET ORAL at 22:41

## 2018-05-13 RX ADMIN — OLANZAPINE 5 MG: 5 TABLET, ORALLY DISINTEGRATING ORAL at 22:41

## 2018-05-13 RX ADMIN — MULTIPLE VITAMINS W/ MINERALS TAB 1 TABLET: TAB at 07:38

## 2018-05-13 RX ADMIN — Medication 10 ML: at 22:47

## 2018-05-13 RX ADMIN — LORAZEPAM 1 MG: 1 TABLET ORAL at 14:30

## 2018-05-13 RX ADMIN — Medication 10 ML: at 22:41

## 2018-05-13 RX ADMIN — LACTULOSE 30 G: 20 SOLUTION ORAL at 22:53

## 2018-05-13 RX ADMIN — Medication 200 MG: at 14:22

## 2018-05-13 RX ADMIN — FOLIC ACID 1 MG: 1 TABLET ORAL at 07:38

## 2018-05-13 RX ADMIN — Medication 200 MG: at 22:41

## 2018-05-13 RX ADMIN — ENOXAPARIN SODIUM 40 MG: 40 INJECTION SUBCUTANEOUS at 22:46

## 2018-05-13 RX ADMIN — LACTULOSE 30 G: 20 SOLUTION ORAL at 07:38

## 2018-05-14 ENCOUNTER — APPOINTMENT (OUTPATIENT)
Dept: NUCLEAR MEDICINE | Age: 43
DRG: 280 | End: 2018-05-14
Payer: MEDICAID

## 2018-05-14 PROBLEM — K70.10 ALCOHOLIC HEPATITIS WITHOUT ASCITES: Status: ACTIVE | Noted: 2018-05-14

## 2018-05-14 PROBLEM — K70.10 ALCOHOLIC STEATOHEPATITIS: Status: ACTIVE | Noted: 2018-05-14

## 2018-05-14 PROBLEM — E83.39 HYPOPHOSPHATEMIA: Status: ACTIVE | Noted: 2018-05-14

## 2018-05-14 LAB
ALBUMIN SERPL-MCNC: 3.4 G/DL (ref 3.5–5.1)
ALP BLD-CCNC: 207 U/L (ref 38–126)
ALT SERPL-CCNC: 209 U/L (ref 11–66)
AMMONIA: 33 UMOL/L (ref 11–60)
ANION GAP SERPL CALCULATED.3IONS-SCNC: 14 MEQ/L (ref 8–16)
ANISOCYTOSIS: ABNORMAL
APTT: 37.9 SECONDS (ref 22–38)
AST SERPL-CCNC: 165 U/L (ref 5–40)
BASOPHILS # BLD: 0 %
BASOPHILS ABSOLUTE: 0 THOU/MM3 (ref 0–0.1)
BILIRUB SERPL-MCNC: 5.7 MG/DL (ref 0.3–1.2)
BILIRUBIN DIRECT: 3.7 MG/DL (ref 0–0.3)
BUN BLDV-MCNC: 11 MG/DL (ref 7–22)
CALCIUM SERPL-MCNC: 9 MG/DL (ref 8.5–10.5)
CHLORIDE BLD-SCNC: 99 MEQ/L (ref 98–111)
CO2: 24 MEQ/L (ref 23–33)
CREAT SERPL-MCNC: 0.6 MG/DL (ref 0.4–1.2)
DIFFERENTIAL, MANUAL: NORMAL
EOSINOPHIL # BLD: 0 %
EOSINOPHILS ABSOLUTE: 0 THOU/MM3 (ref 0–0.4)
GFR SERPL CREATININE-BSD FRML MDRD: > 90 ML/MIN/1.73M2
GLUCOSE BLD-MCNC: 139 MG/DL (ref 70–108)
HCT VFR BLD CALC: 39.4 % (ref 42–52)
HEMOGLOBIN: 13.7 GM/DL (ref 14–18)
INR BLD: 0.97 (ref 0.85–1.13)
LYMPHOCYTES # BLD: 29 %
LYMPHOCYTES ABSOLUTE: 1.3 THOU/MM3 (ref 1–4.8)
MAGNESIUM: 1.9 MG/DL (ref 1.6–2.4)
MCH RBC QN AUTO: 33.6 PG (ref 27–31)
MCHC RBC AUTO-ENTMCNC: 34.8 GM/DL (ref 33–37)
MCV RBC AUTO: 96.7 FL (ref 80–94)
MONOCYTES # BLD: 5 %
MONOCYTES ABSOLUTE: 0.2 THOU/MM3 (ref 0.4–1.3)
NUCLEATED RED BLOOD CELLS: 0 /100 WBC
PDW BLD-RTO: 17 % (ref 11.5–14.5)
PHOSPHORUS: 2.3 MG/DL (ref 2.4–4.7)
PLATELET # BLD: 258 THOU/MM3 (ref 130–400)
PLATELET ESTIMATE: ADEQUATE
PMV BLD AUTO: 8.4 FL (ref 7.4–10.4)
POTASSIUM SERPL-SCNC: 4.3 MEQ/L (ref 3.5–5.2)
RBC # BLD: 4.07 MILL/MM3 (ref 4.7–6.1)
SEG NEUTROPHILS: 66 %
SEGMENTED NEUTROPHILS ABSOLUTE COUNT: 3 THOU/MM3 (ref 1.8–7.7)
SODIUM BLD-SCNC: 137 MEQ/L (ref 135–145)
TOTAL PROTEIN: 7.1 G/DL (ref 6.1–8)
WBC # BLD: 4.5 THOU/MM3 (ref 4.8–10.8)

## 2018-05-14 PROCEDURE — 84155 ASSAY OF PROTEIN SERUM: CPT

## 2018-05-14 PROCEDURE — 85025 COMPLETE CBC W/AUTO DIFF WBC: CPT

## 2018-05-14 PROCEDURE — 36415 COLL VENOUS BLD VENIPUNCTURE: CPT

## 2018-05-14 PROCEDURE — 6370000000 HC RX 637 (ALT 250 FOR IP): Performed by: INTERNAL MEDICINE

## 2018-05-14 PROCEDURE — 78227 HEPATOBIL SYST IMAGE W/DRUG: CPT

## 2018-05-14 PROCEDURE — 85730 THROMBOPLASTIN TIME PARTIAL: CPT

## 2018-05-14 PROCEDURE — 84075 ASSAY ALKALINE PHOSPHATASE: CPT

## 2018-05-14 PROCEDURE — 97161 PT EVAL LOW COMPLEX 20 MIN: CPT

## 2018-05-14 PROCEDURE — 80069 RENAL FUNCTION PANEL: CPT

## 2018-05-14 PROCEDURE — G8979 MOBILITY GOAL STATUS: HCPCS

## 2018-05-14 PROCEDURE — 83735 ASSAY OF MAGNESIUM: CPT

## 2018-05-14 PROCEDURE — 2580000003 HC RX 258: Performed by: INTERNAL MEDICINE

## 2018-05-14 PROCEDURE — 82140 ASSAY OF AMMONIA: CPT

## 2018-05-14 PROCEDURE — 6360000004 HC RX CONTRAST MEDICATION: Performed by: NURSE PRACTITIONER

## 2018-05-14 PROCEDURE — 2580000003 HC RX 258: Performed by: NURSE PRACTITIONER

## 2018-05-14 PROCEDURE — G8978 MOBILITY CURRENT STATUS: HCPCS

## 2018-05-14 PROCEDURE — 3430000000 HC RX DIAGNOSTIC RADIOPHARMACEUTICAL: Performed by: NURSE PRACTITIONER

## 2018-05-14 PROCEDURE — G8989 SELF CARE D/C STATUS: HCPCS

## 2018-05-14 PROCEDURE — 1200000000 HC SEMI PRIVATE

## 2018-05-14 PROCEDURE — G8987 SELF CARE CURRENT STATUS: HCPCS

## 2018-05-14 PROCEDURE — G8988 SELF CARE GOAL STATUS: HCPCS

## 2018-05-14 PROCEDURE — 84450 TRANSFERASE (AST) (SGOT): CPT

## 2018-05-14 PROCEDURE — 6370000000 HC RX 637 (ALT 250 FOR IP): Performed by: NURSE PRACTITIONER

## 2018-05-14 PROCEDURE — 82247 BILIRUBIN TOTAL: CPT

## 2018-05-14 PROCEDURE — 6370000000 HC RX 637 (ALT 250 FOR IP): Performed by: HOSPITALIST

## 2018-05-14 PROCEDURE — 97530 THERAPEUTIC ACTIVITIES: CPT

## 2018-05-14 PROCEDURE — 82248 BILIRUBIN DIRECT: CPT

## 2018-05-14 PROCEDURE — A9537 TC99M MEBROFENIN: HCPCS | Performed by: NURSE PRACTITIONER

## 2018-05-14 PROCEDURE — 97166 OT EVAL MOD COMPLEX 45 MIN: CPT

## 2018-05-14 PROCEDURE — 85610 PROTHROMBIN TIME: CPT

## 2018-05-14 PROCEDURE — G8980 MOBILITY D/C STATUS: HCPCS

## 2018-05-14 PROCEDURE — 6360000002 HC RX W HCPCS: Performed by: HOSPITALIST

## 2018-05-14 PROCEDURE — 99232 SBSQ HOSP IP/OBS MODERATE 35: CPT | Performed by: INTERNAL MEDICINE

## 2018-05-14 PROCEDURE — 84460 ALANINE AMINO (ALT) (SGPT): CPT

## 2018-05-14 RX ADMIN — RIFAXIMIN 550 MG: 550 TABLET ORAL at 10:39

## 2018-05-14 RX ADMIN — CHLORDIAZEPOXIDE HYDROCHLORIDE 50 MG: 25 CAPSULE ORAL at 22:45

## 2018-05-14 RX ADMIN — MICONAZOLE NITRATE: 2 POWDER TOPICAL at 10:39

## 2018-05-14 RX ADMIN — Medication 10 ML: at 20:31

## 2018-05-14 RX ADMIN — MULTIPLE VITAMINS W/ MINERALS TAB 1 TABLET: TAB at 10:39

## 2018-05-14 RX ADMIN — MEBROFENIN 9.8 MILLICURIE: 45 INJECTION, POWDER, LYOPHILIZED, FOR SOLUTION INTRAVENOUS at 07:00

## 2018-05-14 RX ADMIN — PROPRANOLOL HYDROCHLORIDE 20 MG: 20 TABLET ORAL at 20:30

## 2018-05-14 RX ADMIN — HYDROCHLOROTHIAZIDE 25 MG: 25 TABLET ORAL at 10:39

## 2018-05-14 RX ADMIN — Medication 200 MG: at 20:44

## 2018-05-14 RX ADMIN — VENLAFAXINE 50 MG: 50 TABLET ORAL at 20:41

## 2018-05-14 RX ADMIN — LISINOPRIL 20 MG: 20 TABLET ORAL at 10:39

## 2018-05-14 RX ADMIN — Medication 200 MG: at 16:22

## 2018-05-14 RX ADMIN — RIFAXIMIN 550 MG: 550 TABLET ORAL at 20:30

## 2018-05-14 RX ADMIN — CHLORDIAZEPOXIDE HYDROCHLORIDE 50 MG: 25 CAPSULE ORAL at 10:56

## 2018-05-14 RX ADMIN — Medication 200 MG: at 10:38

## 2018-05-14 RX ADMIN — OLANZAPINE 5 MG: 5 TABLET, ORALLY DISINTEGRATING ORAL at 22:45

## 2018-05-14 RX ADMIN — ENOXAPARIN SODIUM 40 MG: 40 INJECTION SUBCUTANEOUS at 20:30

## 2018-05-14 RX ADMIN — SODIUM CHLORIDE 2.2 MCG: 9 INJECTION, SOLUTION INTRAVENOUS at 08:34

## 2018-05-14 RX ADMIN — PANTOPRAZOLE SODIUM 40 MG: 40 TABLET, DELAYED RELEASE ORAL at 10:56

## 2018-05-14 RX ADMIN — CHLORDIAZEPOXIDE HYDROCHLORIDE 50 MG: 25 CAPSULE ORAL at 16:22

## 2018-05-14 RX ADMIN — FOLIC ACID 1 MG: 1 TABLET ORAL at 10:39

## 2018-05-14 RX ADMIN — PROPRANOLOL HYDROCHLORIDE 20 MG: 20 TABLET ORAL at 10:39

## 2018-05-14 RX ADMIN — VENLAFAXINE 50 MG: 50 TABLET ORAL at 10:38

## 2018-05-14 RX ADMIN — LACTULOSE 30 G: 20 SOLUTION ORAL at 10:56

## 2018-05-14 RX ADMIN — VENLAFAXINE 50 MG: 50 TABLET ORAL at 16:22

## 2018-05-14 RX ADMIN — MICONAZOLE NITRATE: 2 POWDER TOPICAL at 20:40

## 2018-05-14 ASSESSMENT — PAIN SCALES - GENERAL: PAINLEVEL_OUTOF10: 0

## 2018-05-14 ASSESSMENT — PATIENT HEALTH QUESTIONNAIRE - PHQ9: SUM OF ALL RESPONSES TO PHQ QUESTIONS 1-9: 7

## 2018-05-14 ASSESSMENT — LIFESTYLE VARIABLES: HISTORY_ALCOHOL_USE: YES

## 2018-05-15 VITALS
DIASTOLIC BLOOD PRESSURE: 61 MMHG | WEIGHT: 242 LBS | OXYGEN SATURATION: 94 % | BODY MASS INDEX: 33.88 KG/M2 | RESPIRATION RATE: 18 BRPM | TEMPERATURE: 97.5 F | HEIGHT: 71 IN | SYSTOLIC BLOOD PRESSURE: 112 MMHG | HEART RATE: 77 BPM

## 2018-05-15 PROCEDURE — 6370000000 HC RX 637 (ALT 250 FOR IP): Performed by: NURSE PRACTITIONER

## 2018-05-15 PROCEDURE — 2580000003 HC RX 258: Performed by: HOSPITALIST

## 2018-05-15 PROCEDURE — 2580000003 HC RX 258: Performed by: INTERNAL MEDICINE

## 2018-05-15 PROCEDURE — 6370000000 HC RX 637 (ALT 250 FOR IP): Performed by: HOSPITALIST

## 2018-05-15 PROCEDURE — 6370000000 HC RX 637 (ALT 250 FOR IP): Performed by: INTERNAL MEDICINE

## 2018-05-15 PROCEDURE — 99239 HOSP IP/OBS DSCHRG MGMT >30: CPT | Performed by: INTERNAL MEDICINE

## 2018-05-15 RX ORDER — OLANZAPINE 5 MG/1
5 TABLET ORAL NIGHTLY
Qty: 30 TABLET | Refills: 3 | Status: SHIPPED | OUTPATIENT
Start: 2018-05-15 | End: 2018-10-04 | Stop reason: SDUPTHER

## 2018-05-15 RX ORDER — NICOTINE 21 MG/24HR
1 PATCH, TRANSDERMAL 24 HOURS TRANSDERMAL NIGHTLY
Qty: 30 PATCH | Refills: 3 | Status: SHIPPED | OUTPATIENT
Start: 2018-05-15 | End: 2018-06-20

## 2018-05-15 RX ORDER — OLANZAPINE 5 MG/1
5 TABLET, ORALLY DISINTEGRATING ORAL NIGHTLY
Qty: 30 TABLET | Refills: 3 | Status: SHIPPED | OUTPATIENT
Start: 2018-05-15 | End: 2018-05-15 | Stop reason: HOSPADM

## 2018-05-15 RX ORDER — CHLORDIAZEPOXIDE HYDROCHLORIDE 10 MG/1
10 CAPSULE, GELATIN COATED ORAL 3 TIMES DAILY PRN
Qty: 10 CAPSULE | Refills: 0 | Status: SHIPPED | OUTPATIENT
Start: 2018-05-15 | End: 2018-05-22

## 2018-05-15 RX ORDER — NICOTINE 21 MG/24HR
1 PATCH, TRANSDERMAL 24 HOURS TRANSDERMAL NIGHTLY
Qty: 30 PATCH | Refills: 3 | Status: SHIPPED | OUTPATIENT
Start: 2018-05-15 | End: 2018-05-15

## 2018-05-15 RX ADMIN — RIFAXIMIN 550 MG: 550 TABLET ORAL at 08:36

## 2018-05-15 RX ADMIN — DOCUSATE SODIUM 100 MG: 100 CAPSULE ORAL at 08:36

## 2018-05-15 RX ADMIN — VENLAFAXINE 50 MG: 50 TABLET ORAL at 15:01

## 2018-05-15 RX ADMIN — Medication 10 ML: at 08:38

## 2018-05-15 RX ADMIN — MICONAZOLE NITRATE: 2 POWDER TOPICAL at 08:38

## 2018-05-15 RX ADMIN — PANTOPRAZOLE SODIUM 40 MG: 40 TABLET, DELAYED RELEASE ORAL at 05:53

## 2018-05-15 RX ADMIN — Medication 10 ML: at 08:37

## 2018-05-15 RX ADMIN — VENLAFAXINE 50 MG: 50 TABLET ORAL at 08:36

## 2018-05-15 RX ADMIN — LISINOPRIL 20 MG: 20 TABLET ORAL at 08:36

## 2018-05-15 RX ADMIN — Medication 200 MG: at 13:36

## 2018-05-15 RX ADMIN — HYDROCHLOROTHIAZIDE 25 MG: 25 TABLET ORAL at 08:36

## 2018-05-15 RX ADMIN — PROPRANOLOL HYDROCHLORIDE 20 MG: 20 TABLET ORAL at 08:36

## 2018-05-15 RX ADMIN — FOLIC ACID 1 MG: 1 TABLET ORAL at 08:36

## 2018-05-15 RX ADMIN — MULTIPLE VITAMINS W/ MINERALS TAB 1 TABLET: TAB at 08:36

## 2018-05-15 RX ADMIN — Medication 200 MG: at 08:36

## 2018-05-15 RX ADMIN — CHLORDIAZEPOXIDE HYDROCHLORIDE 50 MG: 25 CAPSULE ORAL at 05:53

## 2018-05-15 RX ADMIN — CHLORDIAZEPOXIDE HYDROCHLORIDE 50 MG: 25 CAPSULE ORAL at 13:36

## 2018-05-16 ENCOUNTER — CARE COORDINATION (OUTPATIENT)
Dept: CASE MANAGEMENT | Age: 43
End: 2018-05-16

## 2018-05-16 DIAGNOSIS — K70.10 ALCOHOLIC HEPATITIS WITHOUT ASCITES: Primary | ICD-10-CM

## 2018-05-16 PROCEDURE — 1111F DSCHRG MED/CURRENT MED MERGE: CPT | Performed by: NURSE PRACTITIONER

## 2018-05-16 RX ORDER — ALBUTEROL SULFATE 90 UG/1
2 AEROSOL, METERED RESPIRATORY (INHALATION) EVERY 6 HOURS PRN
COMMUNITY
End: 2018-10-04 | Stop reason: SDUPTHER

## 2018-05-16 RX ORDER — PROPRANOLOL HYDROCHLORIDE 20 MG/1
20 TABLET ORAL 2 TIMES DAILY
COMMUNITY
End: 2018-05-22

## 2018-05-21 ENCOUNTER — CARE COORDINATION (OUTPATIENT)
Dept: CASE MANAGEMENT | Age: 43
End: 2018-05-21

## 2018-05-22 ENCOUNTER — TELEPHONE (OUTPATIENT)
Dept: FAMILY MEDICINE CLINIC | Age: 43
End: 2018-05-22

## 2018-05-22 ENCOUNTER — OFFICE VISIT (OUTPATIENT)
Dept: FAMILY MEDICINE CLINIC | Age: 43
End: 2018-05-22
Payer: MEDICAID

## 2018-05-22 VITALS
HEART RATE: 102 BPM | DIASTOLIC BLOOD PRESSURE: 78 MMHG | OXYGEN SATURATION: 98 % | BODY MASS INDEX: 35.53 KG/M2 | RESPIRATION RATE: 18 BRPM | WEIGHT: 253.8 LBS | TEMPERATURE: 96.6 F | HEIGHT: 71 IN | SYSTOLIC BLOOD PRESSURE: 132 MMHG

## 2018-05-22 DIAGNOSIS — K70.40 ALCOHOLIC LIVER FAILURE (HCC): ICD-10-CM

## 2018-05-22 DIAGNOSIS — J41.0 SIMPLE CHRONIC BRONCHITIS (HCC): Chronic | ICD-10-CM

## 2018-05-22 DIAGNOSIS — F10.931 ALCOHOL WITHDRAWAL SYNDROME, WITH DELIRIUM (HCC): ICD-10-CM

## 2018-05-22 DIAGNOSIS — F60.7: Chronic | ICD-10-CM

## 2018-05-22 DIAGNOSIS — E11.9 TYPE 2 DIABETES MELLITUS WITHOUT COMPLICATION, WITHOUT LONG-TERM CURRENT USE OF INSULIN (HCC): ICD-10-CM

## 2018-05-22 DIAGNOSIS — E80.6 HYPERBILIRUBINEMIA: ICD-10-CM

## 2018-05-22 DIAGNOSIS — K70.30 ALCOHOLIC CIRRHOSIS OF LIVER WITHOUT ASCITES (HCC): ICD-10-CM

## 2018-05-22 DIAGNOSIS — K70.10 ALCOHOLIC HEPATITIS WITHOUT ASCITES: Primary | Chronic | ICD-10-CM

## 2018-05-22 LAB
ALBUMIN SERPL-MCNC: 4 G/DL (ref 3.5–5.1)
ALP BLD-CCNC: 282 U/L (ref 38–126)
ALT SERPL-CCNC: 169 U/L (ref 11–66)
AST SERPL-CCNC: 104 U/L (ref 5–40)
BILIRUB SERPL-MCNC: 2.8 MG/DL (ref 0.3–1.2)
BILIRUBIN DIRECT: 1.5 MG/DL (ref 0–0.3)
TOTAL PROTEIN: 7.4 G/DL (ref 6.1–8)

## 2018-05-22 PROCEDURE — 99496 TRANSJ CARE MGMT HIGH F2F 7D: CPT | Performed by: NURSE PRACTITIONER

## 2018-05-22 PROCEDURE — 1111F DSCHRG MED/CURRENT MED MERGE: CPT | Performed by: NURSE PRACTITIONER

## 2018-05-22 PROCEDURE — 36415 COLL VENOUS BLD VENIPUNCTURE: CPT | Performed by: NURSE PRACTITIONER

## 2018-05-22 RX ORDER — PROPRANOLOL HYDROCHLORIDE 20 MG/1
20 TABLET ORAL 2 TIMES DAILY
Qty: 90 TABLET | Refills: 3 | Status: SHIPPED | OUTPATIENT
Start: 2018-05-22 | End: 2018-10-04 | Stop reason: SDUPTHER

## 2018-05-22 ASSESSMENT — PATIENT HEALTH QUESTIONNAIRE - PHQ9
SUM OF ALL RESPONSES TO PHQ9 QUESTIONS 1 & 2: 0
SUM OF ALL RESPONSES TO PHQ QUESTIONS 1-9: 0
2. FEELING DOWN, DEPRESSED OR HOPELESS: 0
1. LITTLE INTEREST OR PLEASURE IN DOING THINGS: 0

## 2018-05-22 ASSESSMENT — ENCOUNTER SYMPTOMS
RESPIRATORY NEGATIVE: 1
GASTROINTESTINAL NEGATIVE: 1
EYES NEGATIVE: 1

## 2018-05-24 ENCOUNTER — CARE COORDINATION (OUTPATIENT)
Dept: CASE MANAGEMENT | Age: 43
End: 2018-05-24

## 2018-05-29 ENCOUNTER — TELEPHONE (OUTPATIENT)
Dept: FAMILY MEDICINE CLINIC | Age: 43
End: 2018-05-29

## 2018-05-31 ENCOUNTER — CARE COORDINATION (OUTPATIENT)
Dept: CASE MANAGEMENT | Age: 43
End: 2018-05-31

## 2018-06-01 ENCOUNTER — CARE COORDINATION (OUTPATIENT)
Dept: CASE MANAGEMENT | Age: 43
End: 2018-06-01

## 2018-06-20 ENCOUNTER — OFFICE VISIT (OUTPATIENT)
Dept: FAMILY MEDICINE CLINIC | Age: 43
End: 2018-06-20
Payer: MEDICAID

## 2018-06-20 VITALS
BODY MASS INDEX: 35.19 KG/M2 | RESPIRATION RATE: 16 BRPM | SYSTOLIC BLOOD PRESSURE: 134 MMHG | DIASTOLIC BLOOD PRESSURE: 86 MMHG | WEIGHT: 251.4 LBS | TEMPERATURE: 97.8 F | HEART RATE: 88 BPM | HEIGHT: 71 IN

## 2018-06-20 DIAGNOSIS — K70.10 ALCOHOLIC HEPATITIS WITHOUT ASCITES: Chronic | ICD-10-CM

## 2018-06-20 DIAGNOSIS — F31.9 BIPOLAR 1 DISORDER (HCC): ICD-10-CM

## 2018-06-20 DIAGNOSIS — I10 HTN (HYPERTENSION), BENIGN: ICD-10-CM

## 2018-06-20 DIAGNOSIS — G31.2 ALCOHOLIC ENCEPHALOPATHY (HCC): Primary | ICD-10-CM

## 2018-06-20 DIAGNOSIS — E11.9 TYPE 2 DIABETES MELLITUS WITHOUT COMPLICATION, WITHOUT LONG-TERM CURRENT USE OF INSULIN (HCC): ICD-10-CM

## 2018-06-20 PROCEDURE — 99213 OFFICE O/P EST LOW 20 MIN: CPT | Performed by: NURSE PRACTITIONER

## 2018-06-20 PROCEDURE — 2022F DILAT RTA XM EVC RTNOPTHY: CPT | Performed by: NURSE PRACTITIONER

## 2018-06-20 PROCEDURE — G8427 DOCREV CUR MEDS BY ELIG CLIN: HCPCS | Performed by: NURSE PRACTITIONER

## 2018-06-20 PROCEDURE — 4004F PT TOBACCO SCREEN RCVD TLK: CPT | Performed by: NURSE PRACTITIONER

## 2018-06-20 PROCEDURE — G8417 CALC BMI ABV UP PARAM F/U: HCPCS | Performed by: NURSE PRACTITIONER

## 2018-06-20 PROCEDURE — 3046F HEMOGLOBIN A1C LEVEL >9.0%: CPT | Performed by: NURSE PRACTITIONER

## 2018-06-20 RX ORDER — LACTULOSE 10 G/15ML
20 SOLUTION ORAL 2 TIMES DAILY
Status: ON HOLD | COMMUNITY
End: 2018-11-03

## 2018-06-20 ASSESSMENT — ENCOUNTER SYMPTOMS
EYES NEGATIVE: 1
RESPIRATORY NEGATIVE: 1
GASTROINTESTINAL NEGATIVE: 1

## 2018-06-29 ENCOUNTER — OFFICE VISIT (OUTPATIENT)
Dept: FAMILY MEDICINE CLINIC | Age: 43
End: 2018-06-29
Payer: MEDICAID

## 2018-06-29 VITALS
SYSTOLIC BLOOD PRESSURE: 128 MMHG | HEART RATE: 84 BPM | TEMPERATURE: 98.5 F | BODY MASS INDEX: 35.15 KG/M2 | WEIGHT: 252 LBS | RESPIRATION RATE: 8 BRPM | DIASTOLIC BLOOD PRESSURE: 88 MMHG

## 2018-06-29 DIAGNOSIS — J20.9 ACUTE BRONCHITIS, UNSPECIFIED ORGANISM: Primary | ICD-10-CM

## 2018-06-29 PROCEDURE — G8427 DOCREV CUR MEDS BY ELIG CLIN: HCPCS | Performed by: NURSE PRACTITIONER

## 2018-06-29 PROCEDURE — 4004F PT TOBACCO SCREEN RCVD TLK: CPT | Performed by: NURSE PRACTITIONER

## 2018-06-29 PROCEDURE — 99213 OFFICE O/P EST LOW 20 MIN: CPT | Performed by: NURSE PRACTITIONER

## 2018-06-29 PROCEDURE — G8417 CALC BMI ABV UP PARAM F/U: HCPCS | Performed by: NURSE PRACTITIONER

## 2018-06-29 RX ORDER — BENZONATATE 200 MG/1
200 CAPSULE ORAL 3 TIMES DAILY PRN
Qty: 30 CAPSULE | Refills: 0 | Status: SHIPPED | OUTPATIENT
Start: 2018-06-29 | End: 2018-07-06

## 2018-06-29 RX ORDER — AMOXICILLIN 500 MG/1
500 CAPSULE ORAL 3 TIMES DAILY
Qty: 30 CAPSULE | Refills: 0 | Status: SHIPPED | OUTPATIENT
Start: 2018-06-29 | End: 2018-07-09

## 2018-09-17 ENCOUNTER — HOSPITAL ENCOUNTER (OUTPATIENT)
Age: 43
Discharge: HOME | End: 2018-09-17
Payer: MEDICAID

## 2018-09-17 LAB
ALBUMIN SERPL-MCNC: 4.4 G/DL (ref 3.5–5.1)
ALP BLD-CCNC: 91 U/L (ref 38–126)
ALT SERPL-CCNC: 90 U/L (ref 11–66)
ANION GAP SERPL CALCULATED.3IONS-SCNC: 14 MEQ/L (ref 8–16)
AST SERPL-CCNC: 47 U/L (ref 5–40)
BILIRUB SERPL-MCNC: 0.5 MG/DL (ref 0.3–1.2)
BILIRUBIN DIRECT: < 0.2 MG/DL (ref 0–0.3)
BUN BLDV-MCNC: 11 MG/DL (ref 7–22)
CALCIUM SERPL-MCNC: 10 MG/DL (ref 8.5–10.5)
CHLORIDE BLD-SCNC: 103 MEQ/L (ref 98–111)
CO2: 23 MEQ/L (ref 23–33)
CREAT SERPL-MCNC: 0.7 MG/DL (ref 0.4–1.2)
ERYTHROCYTE [DISTWIDTH] IN BLOOD BY AUTOMATED COUNT: 12.4 % (ref 11.5–14.5)
ERYTHROCYTE [DISTWIDTH] IN BLOOD BY AUTOMATED COUNT: 40.6 FL (ref 35–45)
GFR SERPL CREATININE-BSD FRML MDRD: > 90 ML/MIN/1.73M2
GLUCOSE BLD-MCNC: 144 MG/DL (ref 70–108)
HCT VFR BLD CALC: 48.8 % (ref 42–52)
HEMOGLOBIN: 17.3 GM/DL (ref 14–18)
INR BLD: 1.12 (ref 0.85–1.13)
MCH RBC QN AUTO: 32.3 PG (ref 26–33)
MCHC RBC AUTO-ENTMCNC: 35.5 GM/DL (ref 32.2–35.5)
MCV RBC AUTO: 91.2 FL (ref 80–94)
PLATELET # BLD: 226 THOU/MM3 (ref 130–400)
PMV BLD AUTO: 10.2 FL (ref 9.4–12.4)
POTASSIUM SERPL-SCNC: 4.3 MEQ/L (ref 3.5–5.2)
RBC # BLD: 5.35 MILL/MM3 (ref 4.7–6.1)
SODIUM BLD-SCNC: 140 MEQ/L (ref 135–145)
TOTAL PROTEIN: 7.7 G/DL (ref 6.1–8)
WBC # BLD: 6.5 THOU/MM3 (ref 4.8–10.8)

## 2018-09-17 PROCEDURE — 82248 BILIRUBIN DIRECT: CPT

## 2018-09-17 PROCEDURE — 80053 COMPREHEN METABOLIC PANEL: CPT

## 2018-09-17 PROCEDURE — 85610 PROTHROMBIN TIME: CPT

## 2018-09-17 PROCEDURE — 85027 COMPLETE CBC AUTOMATED: CPT

## 2018-09-17 PROCEDURE — 36415 COLL VENOUS BLD VENIPUNCTURE: CPT

## 2018-09-18 ENCOUNTER — HOSPITAL ENCOUNTER (OUTPATIENT)
Age: 43
Discharge: HOME | End: 2018-09-18
Payer: MEDICAID

## 2018-09-18 LAB
FECAL LEUKOCYTES: NORMAL
HEMOCCULT STL QL: POSITIVE
ROTAVIRUS ANTIGEN: NEGATIVE

## 2018-09-18 PROCEDURE — 83520 IMMUNOASSAY QUANT NOS NONAB: CPT

## 2018-09-18 PROCEDURE — 87493 C DIFF AMPLIFIED PROBE: CPT

## 2018-09-18 PROCEDURE — 87329 GIARDIA AG IA: CPT

## 2018-09-18 PROCEDURE — 87328 CRYPTOSPORIDIUM AG IA: CPT

## 2018-09-18 PROCEDURE — 82272 OCCULT BLD FECES 1-3 TESTS: CPT

## 2018-09-18 PROCEDURE — 87177 OVA AND PARASITES SMEARS: CPT

## 2018-09-18 PROCEDURE — 89055 LEUKOCYTE ASSESSMENT FECAL: CPT

## 2018-09-18 PROCEDURE — 87427 SHIGA-LIKE TOXIN AG IA: CPT

## 2018-09-18 PROCEDURE — 83993 ASSAY FOR CALPROTECTIN FECAL: CPT

## 2018-09-18 PROCEDURE — 87899 AGENT NOS ASSAY W/OPTIC: CPT

## 2018-09-18 PROCEDURE — 87045 FECES CULTURE AEROBIC BACT: CPT

## 2018-09-18 PROCEDURE — 87425 ROTAVIRUS AG IA: CPT

## 2018-09-19 LAB
CLOSTRIDIUM DIFFICILE, PCR: POSITIVE
GIARDIA ANTIGEN STOOL: NEGATIVE

## 2018-09-20 LAB
CULTURE, STOOL: NORMAL
OVA AND PARASITES: NORMAL

## 2018-09-21 LAB
CALPROTECTIN: < 16 UG/G
CAMPYLOBACTER CULTURE: NEGATIVE
CRYPTOCOCCUS AG SOURCE: NEGATIVE

## 2018-09-22 LAB — PANCREATIC ELASTASE, FECAL: > 500 UG/G

## 2018-09-27 ENCOUNTER — HOSPITAL ENCOUNTER (OUTPATIENT)
Age: 43
Discharge: HOME | End: 2018-09-27
Payer: MEDICAID

## 2018-09-27 ENCOUNTER — HOSPITAL ENCOUNTER (OUTPATIENT)
Dept: GENERAL RADIOLOGY | Age: 43
Discharge: HOME | End: 2018-09-27
Payer: MEDICAID

## 2018-09-27 DIAGNOSIS — R10.32 ABDOMINAL PAIN, LEFT LOWER QUADRANT: ICD-10-CM

## 2018-09-27 DIAGNOSIS — R19.7 DIARRHEA OF PRESUMED INFECTIOUS ORIGIN: ICD-10-CM

## 2018-09-27 LAB
ALBUMIN SERPL-MCNC: 4.2 G/DL (ref 3.5–5.1)
ALP BLD-CCNC: 88 U/L (ref 38–126)
ALT SERPL-CCNC: 65 U/L (ref 11–66)
AMMONIA: 50 UMOL/L (ref 11–60)
AST SERPL-CCNC: 40 U/L (ref 5–40)
BILIRUB SERPL-MCNC: 0.6 MG/DL (ref 0.3–1.2)
BILIRUBIN DIRECT: < 0.2 MG/DL (ref 0–0.3)
INR BLD: 1.02 (ref 0.85–1.13)
TOTAL PROTEIN: 7.2 G/DL (ref 6.1–8)

## 2018-09-27 PROCEDURE — 80076 HEPATIC FUNCTION PANEL: CPT

## 2018-09-27 PROCEDURE — 36415 COLL VENOUS BLD VENIPUNCTURE: CPT

## 2018-09-27 PROCEDURE — 85610 PROTHROMBIN TIME: CPT

## 2018-09-27 PROCEDURE — 82140 ASSAY OF AMMONIA: CPT

## 2018-09-27 PROCEDURE — 74018 RADEX ABDOMEN 1 VIEW: CPT

## 2018-10-04 ENCOUNTER — OFFICE VISIT (OUTPATIENT)
Dept: FAMILY MEDICINE CLINIC | Age: 43
End: 2018-10-04
Payer: MEDICAID

## 2018-10-04 VITALS
HEART RATE: 89 BPM | RESPIRATION RATE: 18 BRPM | SYSTOLIC BLOOD PRESSURE: 130 MMHG | WEIGHT: 254.4 LBS | BODY MASS INDEX: 35.61 KG/M2 | DIASTOLIC BLOOD PRESSURE: 80 MMHG | TEMPERATURE: 96.9 F | HEIGHT: 71 IN | OXYGEN SATURATION: 97 %

## 2018-10-04 DIAGNOSIS — J41.0 SIMPLE CHRONIC BRONCHITIS (HCC): Primary | Chronic | ICD-10-CM

## 2018-10-04 DIAGNOSIS — E11.9 TYPE 2 DIABETES MELLITUS WITHOUT COMPLICATION, WITHOUT LONG-TERM CURRENT USE OF INSULIN (HCC): ICD-10-CM

## 2018-10-04 DIAGNOSIS — E78.5 HYPERLIPIDEMIA, UNSPECIFIED HYPERLIPIDEMIA TYPE: ICD-10-CM

## 2018-10-04 DIAGNOSIS — F31.9 BIPOLAR 1 DISORDER (HCC): ICD-10-CM

## 2018-10-04 LAB
AVERAGE GLUCOSE: 138 MG/DL (ref 70–126)
CHOLESTEROL, TOTAL: 275 MG/DL (ref 100–199)
HBA1C MFR BLD: 6.6 % (ref 4.4–6.4)
HDLC SERPL-MCNC: 36 MG/DL
LDL CHOLESTEROL CALCULATED: ABNORMAL MG/DL
MICROALB/CREAT RATIO POC: ABNORMAL MG/G
MICROALBUMIN/CREAT UR-RTO: 80 MG/L
POC CREATININE: 300 MG/DL
TRIGL SERPL-MCNC: 511 MG/DL (ref 0–199)

## 2018-10-04 PROCEDURE — G8417 CALC BMI ABV UP PARAM F/U: HCPCS | Performed by: NURSE PRACTITIONER

## 2018-10-04 PROCEDURE — 4004F PT TOBACCO SCREEN RCVD TLK: CPT | Performed by: NURSE PRACTITIONER

## 2018-10-04 PROCEDURE — 99214 OFFICE O/P EST MOD 30 MIN: CPT | Performed by: NURSE PRACTITIONER

## 2018-10-04 PROCEDURE — 3023F SPIROM DOC REV: CPT | Performed by: NURSE PRACTITIONER

## 2018-10-04 PROCEDURE — G8926 SPIRO NO PERF OR DOC: HCPCS | Performed by: NURSE PRACTITIONER

## 2018-10-04 PROCEDURE — G8427 DOCREV CUR MEDS BY ELIG CLIN: HCPCS | Performed by: NURSE PRACTITIONER

## 2018-10-04 PROCEDURE — 3046F HEMOGLOBIN A1C LEVEL >9.0%: CPT | Performed by: NURSE PRACTITIONER

## 2018-10-04 PROCEDURE — 2022F DILAT RTA XM EVC RTNOPTHY: CPT | Performed by: NURSE PRACTITIONER

## 2018-10-04 PROCEDURE — G8484 FLU IMMUNIZE NO ADMIN: HCPCS | Performed by: NURSE PRACTITIONER

## 2018-10-04 RX ORDER — BLOOD-GLUCOSE METER
KIT MISCELLANEOUS
Qty: 1 KIT | Refills: 3 | Status: SHIPPED | OUTPATIENT
Start: 2018-10-04 | End: 2018-11-13 | Stop reason: SDUPTHER

## 2018-10-04 RX ORDER — LISINOPRIL AND HYDROCHLOROTHIAZIDE 25; 20 MG/1; MG/1
1 TABLET ORAL DAILY
Qty: 90 TABLET | Refills: 5 | Status: ON HOLD | OUTPATIENT
Start: 2018-10-04 | End: 2019-05-02 | Stop reason: SDUPTHER

## 2018-10-04 RX ORDER — OLANZAPINE 5 MG/1
5 TABLET ORAL NIGHTLY
Qty: 30 TABLET | Refills: 3 | Status: SHIPPED | OUTPATIENT
Start: 2018-10-04 | End: 2019-03-05

## 2018-10-04 RX ORDER — PROPRANOLOL HYDROCHLORIDE 20 MG/1
20 TABLET ORAL 2 TIMES DAILY
Qty: 90 TABLET | Refills: 3 | Status: ON HOLD | OUTPATIENT
Start: 2018-10-04 | End: 2018-11-29 | Stop reason: HOSPADM

## 2018-10-04 RX ORDER — ALBUTEROL SULFATE 90 UG/1
2 AEROSOL, METERED RESPIRATORY (INHALATION) EVERY 6 HOURS PRN
Qty: 1 INHALER | Refills: 5 | Status: ON HOLD | OUTPATIENT
Start: 2018-10-04 | End: 2018-12-14

## 2018-10-04 RX ORDER — VENLAFAXINE 50 MG/1
50 TABLET ORAL 3 TIMES DAILY
Qty: 90 TABLET | Refills: 5 | Status: SHIPPED | OUTPATIENT
Start: 2018-10-04 | End: 2019-01-16

## 2018-10-04 RX ORDER — BLOOD-GLUCOSE METER
KIT MISCELLANEOUS
Qty: 1 KIT | Refills: 5 | Status: SHIPPED | OUTPATIENT
Start: 2018-10-04

## 2018-10-04 ASSESSMENT — ENCOUNTER SYMPTOMS
RESPIRATORY NEGATIVE: 1
EYES NEGATIVE: 1
GASTROINTESTINAL NEGATIVE: 1

## 2018-10-04 NOTE — PROGRESS NOTES
Venipuncture obtained for left arm. Patient tolerated well with no concerns at this time.
Take 1 tablet by mouth nightly 30 tablet 3    metFORMIN (GLUCOPHAGE) 1000 MG tablet Take 1 tablet by mouth 2 times daily (with meals) 60 tablet 5    lisinopril-hydrochlorothiazide (PRINZIDE;ZESTORETIC) 20-25 MG per tablet Take 1 tablet by mouth daily 90 tablet 5    glucose monitoring kit (FREESTYLE) monitoring kit Glucometer with test strips and lancets. Check BS once daily  #100 strips and lancets 1 kit 5    blood glucose test strips (ASCENSIA AUTODISC VI;ONE TOUCH ULTRA TEST VI) strip Test as needed. Dispense One Touch verio Test Strips. Dx: Type 2 diabetes (250.00) 60 strip 11    venlafaxine (EFFEXOR) 50 MG tablet Take 1 tablet by mouth 3 times daily 90 tablet 5    albuterol sulfate  (90 Base) MCG/ACT inhaler Inhale 2 puffs into the lungs every 6 hours as needed for Wheezing 1 Inhaler 5    glucose monitoring kit (FREESTYLE) monitoring kit Glucometer with test strips and lancets, check bs daily. #100 1 kit 3    lactulose (CHRONULAC) 10 GM/15ML solution Take 20 g by mouth 2 times daily      Cholecalciferol (VITAMIN D3) 5000 units TABS Take 5,000 Units by mouth once a week      Disulfiram 500 MG TABS Take 500 mg by mouth daily      Multiple Vitamins-Minerals (THERAPEUTIC MULTIVITAMIN-MINERALS) tablet Take 1 tablet by mouth daily      hydrOXYzine (VISTARIL) 25 MG capsule Take 25 mg by mouth nightly as needed for Itching      glucose blood VI test strips (EXACTECH TEST) strip Glucometer with test strips plus lancets. #100. Check BS daily. 250.00 100 each 3     No current facility-administered medications for this visit.       No Known Allergies  Health Maintenance   Topic Date Due    Diabetic retinal exam  04/10/1986    HIV screen  04/10/1991    DTaP/Tdap/Td vaccine (1 - Tdap) 04/10/1995    Pneumococcal med risk (1 of 1 - PPSV23) 04/10/1995    Diabetic foot exam  08/23/2018    A1C test (Diabetic or Prediabetic)  08/28/2018    Flu vaccine (1) 10/04/2019 (Originally 9/1/2018)    Lipid

## 2018-10-08 RX ORDER — ICOSAPENT ETHYL 1000 MG/1
2 CAPSULE ORAL 2 TIMES DAILY
Qty: 120 CAPSULE | Refills: 3 | Status: SHIPPED | OUTPATIENT
Start: 2018-10-08 | End: 2018-10-09

## 2018-10-09 ENCOUNTER — TELEPHONE (OUTPATIENT)
Dept: FAMILY MEDICINE CLINIC | Age: 43
End: 2018-10-09

## 2018-10-09 RX ORDER — OMEGA-3-ACID ETHYL ESTERS 1 G/1
2 CAPSULE, LIQUID FILLED ORAL 2 TIMES DAILY
Qty: 120 CAPSULE | Refills: 11 | Status: SHIPPED | OUTPATIENT
Start: 2018-10-09 | End: 2018-11-13

## 2018-10-10 ENCOUNTER — TELEPHONE (OUTPATIENT)
Dept: FAMILY MEDICINE CLINIC | Age: 43
End: 2018-10-10

## 2018-10-10 NOTE — TELEPHONE ENCOUNTER
The reason they state are not accurate  His trigs are above 500 and has been educated on diet and exercise regimen

## 2018-11-03 ENCOUNTER — HOSPITAL ENCOUNTER (INPATIENT)
Age: 43
LOS: 3 days | Discharge: HOME | DRG: 282 | End: 2018-11-06
Attending: EMERGENCY MEDICINE | Admitting: INTERNAL MEDICINE
Payer: MEDICAID

## 2018-11-03 ENCOUNTER — APPOINTMENT (OUTPATIENT)
Dept: GENERAL RADIOLOGY | Age: 43
DRG: 282 | End: 2018-11-03
Payer: MEDICAID

## 2018-11-03 DIAGNOSIS — F10.929 ACUTE ALCOHOLIC INTOXICATION WITH COMPLICATION (HCC): ICD-10-CM

## 2018-11-03 DIAGNOSIS — K29.20 ACUTE ALCOHOLIC GASTRITIS WITHOUT HEMORRHAGE: Primary | ICD-10-CM

## 2018-11-03 DIAGNOSIS — K70.10 ALCOHOLIC HEPATITIS WITHOUT ASCITES: ICD-10-CM

## 2018-11-03 DIAGNOSIS — F10.20 ALCOHOLISM (HCC): ICD-10-CM

## 2018-11-03 LAB
ALBUMIN SERPL-MCNC: 4 G/DL (ref 3.5–5.1)
ALP BLD-CCNC: 195 U/L (ref 38–126)
ALT SERPL-CCNC: 140 U/L (ref 11–66)
ANALYZED BY:: NORMAL
ANALYZED BY:: NORMAL
ANION GAP SERPL CALCULATED.3IONS-SCNC: 20 MEQ/L (ref 8–16)
AST SERPL-CCNC: 171 U/L (ref 5–40)
AVERAGE GLUCOSE: 123 MG/DL (ref 70–126)
BILIRUB SERPL-MCNC: 1.5 MG/DL (ref 0.3–1.2)
BUN BLDV-MCNC: 17 MG/DL (ref 7–22)
CALCIUM SERPL-MCNC: 8.9 MG/DL (ref 8.5–10.5)
CHLORIDE BLD-SCNC: 96 MEQ/L (ref 98–111)
CO2: 20 MEQ/L (ref 23–33)
CREAT SERPL-MCNC: 0.6 MG/DL (ref 0.4–1.2)
DATE OF COLLECTION: NORMAL
DATE OF COLLECTION: NORMAL
DIFFERENTIAL, MANUAL: NORMAL
DRAWN BY: NORMAL
DRAWN BY: NORMAL
EKG ATRIAL RATE: 117 BPM
EKG P AXIS: 19 DEGREES
EKG P-R INTERVAL: 94 MS
EKG Q-T INTERVAL: 332 MS
EKG QRS DURATION: 88 MS
EKG QTC CALCULATION (BAZETT): 463 MS
EKG R AXIS: 18 DEGREES
EKG T AXIS: 40 DEGREES
EKG VENTRICULAR RATE: 117 BPM
ETHYL ALCOHOL: 0.36 % (GM/DL)
ETHYL ALCOHOL: 0.36 % (GM/DL)
GFR SERPL CREATININE-BSD FRML MDRD: > 90 ML/MIN/1.73M2
GLUCOSE BLD-MCNC: 143 MG/DL (ref 70–108)
GLUCOSE BLD-MCNC: 154 MG/DL (ref 74–106)
GLUCOSE BLD-MCNC: 91 MG/DL (ref 70–108)
HBA1C MFR BLD: 6.1 % (ref 4.4–6.4)
HCT VFR BLD CALC: 47.8 % (ref 42–52)
HEMOGLOBIN: 16.5 GM/DL (ref 14–18)
LIPASE: 74.4 U/L (ref 5.6–51.3)
LYMPHOCYTES # BLD: 34 % (ref 15–47)
Lab: 1447
Lab: 1447
Lab: NORMAL
Lab: NORMAL
MAGNESIUM: 2 MG/DL (ref 1.6–2.4)
MCH RBC QN AUTO: 33.8 PG (ref 27–31)
MCHC RBC AUTO-ENTMCNC: 34.5 GM/DL (ref 33–37)
MCV RBC AUTO: 97.8 FL (ref 80–94)
MONOCYTES: 12 % (ref 0–12)
PDW BLD-RTO: 14.6 % (ref 11.5–14.5)
PLATELET # BLD: 238 THOU/MM3 (ref 130–400)
PLATELET ESTIMATE: ABNORMAL
PMV BLD AUTO: 6.5 FL (ref 7.4–10.4)
POTASSIUM SERPL-SCNC: 4 MEQ/L (ref 3.5–5.2)
RBC # BLD: 4.89 MILL/MM3 (ref 4.7–6.1)
SEGS: 54 % (ref 43–75)
SODIUM BLD-SCNC: 136 MEQ/L (ref 135–145)
TOTAL PROTEIN: 7.8 G/DL (ref 6.1–8)
WBC # BLD: 8.4 THOU/MM3 (ref 4.8–10.8)

## 2018-11-03 PROCEDURE — 94760 N-INVAS EAR/PLS OXIMETRY 1: CPT

## 2018-11-03 PROCEDURE — C9113 INJ PANTOPRAZOLE SODIUM, VIA: HCPCS | Performed by: EMERGENCY MEDICINE

## 2018-11-03 PROCEDURE — 82948 REAGENT STRIP/BLOOD GLUCOSE: CPT

## 2018-11-03 PROCEDURE — 99285 EMERGENCY DEPT VISIT HI MDM: CPT

## 2018-11-03 PROCEDURE — 85025 COMPLETE CBC W/AUTO DIFF WBC: CPT

## 2018-11-03 PROCEDURE — 80053 COMPREHEN METABOLIC PANEL: CPT

## 2018-11-03 PROCEDURE — 2709999900 HC NON-CHARGEABLE SUPPLY

## 2018-11-03 PROCEDURE — 36415 COLL VENOUS BLD VENIPUNCTURE: CPT

## 2018-11-03 PROCEDURE — 96375 TX/PRO/DX INJ NEW DRUG ADDON: CPT

## 2018-11-03 PROCEDURE — 93005 ELECTROCARDIOGRAM TRACING: CPT | Performed by: EMERGENCY MEDICINE

## 2018-11-03 PROCEDURE — 6370000000 HC RX 637 (ALT 250 FOR IP): Performed by: INTERNAL MEDICINE

## 2018-11-03 PROCEDURE — 2580000003 HC RX 258: Performed by: INTERNAL MEDICINE

## 2018-11-03 PROCEDURE — 83036 HEMOGLOBIN GLYCOSYLATED A1C: CPT

## 2018-11-03 PROCEDURE — 71046 X-RAY EXAM CHEST 2 VIEWS: CPT

## 2018-11-03 PROCEDURE — G0480 DRUG TEST DEF 1-7 CLASSES: HCPCS

## 2018-11-03 PROCEDURE — 83735 ASSAY OF MAGNESIUM: CPT

## 2018-11-03 PROCEDURE — 6360000002 HC RX W HCPCS: Performed by: EMERGENCY MEDICINE

## 2018-11-03 PROCEDURE — 96374 THER/PROPH/DIAG INJ IV PUSH: CPT

## 2018-11-03 PROCEDURE — 83690 ASSAY OF LIPASE: CPT

## 2018-11-03 PROCEDURE — 2580000003 HC RX 258: Performed by: EMERGENCY MEDICINE

## 2018-11-03 PROCEDURE — 99222 1ST HOSP IP/OBS MODERATE 55: CPT | Performed by: INTERNAL MEDICINE

## 2018-11-03 PROCEDURE — 1200000003 HC TELEMETRY R&B

## 2018-11-03 RX ORDER — THIAMINE MONONITRATE (VIT B1) 100 MG
100 TABLET ORAL DAILY
Status: DISCONTINUED | OUTPATIENT
Start: 2018-11-03 | End: 2018-11-06 | Stop reason: HOSPADM

## 2018-11-03 RX ORDER — SODIUM CHLORIDE 9 MG/ML
INJECTION, SOLUTION INTRAVENOUS CONTINUOUS
Status: DISCONTINUED | OUTPATIENT
Start: 2018-11-03 | End: 2018-11-06 | Stop reason: HOSPADM

## 2018-11-03 RX ORDER — LORAZEPAM 2 MG/1
2 TABLET ORAL
Status: DISCONTINUED | OUTPATIENT
Start: 2018-11-03 | End: 2018-11-05

## 2018-11-03 RX ORDER — ONDANSETRON 2 MG/ML
4 INJECTION INTRAMUSCULAR; INTRAVENOUS EVERY 6 HOURS PRN
Status: DISCONTINUED | OUTPATIENT
Start: 2018-11-03 | End: 2018-11-06 | Stop reason: HOSPADM

## 2018-11-03 RX ORDER — LISINOPRIL 20 MG/1
20 TABLET ORAL DAILY
Status: DISCONTINUED | OUTPATIENT
Start: 2018-11-04 | End: 2018-11-06 | Stop reason: HOSPADM

## 2018-11-03 RX ORDER — LORAZEPAM 2 MG/ML
2 INJECTION INTRAMUSCULAR
Status: DISCONTINUED | OUTPATIENT
Start: 2018-11-03 | End: 2018-11-05

## 2018-11-03 RX ORDER — NICOTINE POLACRILEX 4 MG
15 LOZENGE BUCCAL PRN
Status: DISCONTINUED | OUTPATIENT
Start: 2018-11-03 | End: 2018-11-06 | Stop reason: HOSPADM

## 2018-11-03 RX ORDER — 0.9 % SODIUM CHLORIDE 0.9 %
1000 INTRAVENOUS SOLUTION INTRAVENOUS ONCE
Status: COMPLETED | OUTPATIENT
Start: 2018-11-03 | End: 2018-11-03

## 2018-11-03 RX ORDER — DEXTROSE MONOHYDRATE 25 G/50ML
12.5 INJECTION, SOLUTION INTRAVENOUS PRN
Status: DISCONTINUED | OUTPATIENT
Start: 2018-11-03 | End: 2018-11-06 | Stop reason: HOSPADM

## 2018-11-03 RX ORDER — ONDANSETRON 2 MG/ML
4 INJECTION INTRAMUSCULAR; INTRAVENOUS ONCE
Status: COMPLETED | OUTPATIENT
Start: 2018-11-03 | End: 2018-11-03

## 2018-11-03 RX ORDER — SODIUM CHLORIDE 0.9 % (FLUSH) 0.9 %
10 SYRINGE (ML) INJECTION PRN
Status: DISCONTINUED | OUTPATIENT
Start: 2018-11-03 | End: 2018-11-06 | Stop reason: HOSPADM

## 2018-11-03 RX ORDER — PANTOPRAZOLE SODIUM 40 MG/10ML
40 INJECTION, POWDER, LYOPHILIZED, FOR SOLUTION INTRAVENOUS DAILY
Status: DISCONTINUED | OUTPATIENT
Start: 2018-11-03 | End: 2018-11-06 | Stop reason: HOSPADM

## 2018-11-03 RX ORDER — POTASSIUM CHLORIDE 20MEQ/15ML
40 LIQUID (ML) ORAL PRN
Status: DISCONTINUED | OUTPATIENT
Start: 2018-11-03 | End: 2018-11-06 | Stop reason: HOSPADM

## 2018-11-03 RX ORDER — LORAZEPAM 2 MG/ML
4 INJECTION INTRAMUSCULAR
Status: DISCONTINUED | OUTPATIENT
Start: 2018-11-03 | End: 2018-11-05

## 2018-11-03 RX ORDER — THIAMINE HYDROCHLORIDE 100 MG/ML
100 INJECTION, SOLUTION INTRAMUSCULAR; INTRAVENOUS ONCE
Status: COMPLETED | OUTPATIENT
Start: 2018-11-03 | End: 2018-11-03

## 2018-11-03 RX ORDER — LORAZEPAM 2 MG/ML
1 INJECTION INTRAMUSCULAR ONCE
Status: COMPLETED | OUTPATIENT
Start: 2018-11-03 | End: 2018-11-03

## 2018-11-03 RX ORDER — HYDROCHLOROTHIAZIDE 25 MG/1
25 TABLET ORAL DAILY
Status: DISCONTINUED | OUTPATIENT
Start: 2018-11-04 | End: 2018-11-06 | Stop reason: HOSPADM

## 2018-11-03 RX ORDER — POTASSIUM CHLORIDE 7.45 MG/ML
10 INJECTION INTRAVENOUS PRN
Status: DISCONTINUED | OUTPATIENT
Start: 2018-11-03 | End: 2018-11-06 | Stop reason: HOSPADM

## 2018-11-03 RX ORDER — LORAZEPAM 1 MG/1
1 TABLET ORAL
Status: DISCONTINUED | OUTPATIENT
Start: 2018-11-03 | End: 2018-11-05

## 2018-11-03 RX ORDER — LORAZEPAM 2 MG/1
4 TABLET ORAL
Status: DISCONTINUED | OUTPATIENT
Start: 2018-11-03 | End: 2018-11-05

## 2018-11-03 RX ORDER — LISINOPRIL AND HYDROCHLOROTHIAZIDE 25; 20 MG/1; MG/1
1 TABLET ORAL DAILY
Status: DISCONTINUED | OUTPATIENT
Start: 2018-11-03 | End: 2018-11-03 | Stop reason: SDUPTHER

## 2018-11-03 RX ORDER — DEXTROSE MONOHYDRATE 50 MG/ML
100 INJECTION, SOLUTION INTRAVENOUS PRN
Status: DISCONTINUED | OUTPATIENT
Start: 2018-11-03 | End: 2018-11-06 | Stop reason: HOSPADM

## 2018-11-03 RX ORDER — SODIUM CHLORIDE 0.9 % (FLUSH) 0.9 %
10 SYRINGE (ML) INJECTION EVERY 12 HOURS SCHEDULED
Status: DISCONTINUED | OUTPATIENT
Start: 2018-11-03 | End: 2018-11-03 | Stop reason: SDUPTHER

## 2018-11-03 RX ORDER — SODIUM CHLORIDE 0.9 % (FLUSH) 0.9 %
10 SYRINGE (ML) INJECTION EVERY 12 HOURS SCHEDULED
Status: DISCONTINUED | OUTPATIENT
Start: 2018-11-03 | End: 2018-11-06 | Stop reason: HOSPADM

## 2018-11-03 RX ORDER — LORAZEPAM 2 MG/ML
3 INJECTION INTRAMUSCULAR
Status: DISCONTINUED | OUTPATIENT
Start: 2018-11-03 | End: 2018-11-05

## 2018-11-03 RX ORDER — POTASSIUM CHLORIDE 20 MEQ/1
40 TABLET, EXTENDED RELEASE ORAL PRN
Status: DISCONTINUED | OUTPATIENT
Start: 2018-11-03 | End: 2018-11-06 | Stop reason: HOSPADM

## 2018-11-03 RX ORDER — NICOTINE 21 MG/24HR
1 PATCH, TRANSDERMAL 24 HOURS TRANSDERMAL DAILY
Status: DISCONTINUED | OUTPATIENT
Start: 2018-11-03 | End: 2018-11-06 | Stop reason: HOSPADM

## 2018-11-03 RX ORDER — LORAZEPAM 2 MG/ML
1 INJECTION INTRAMUSCULAR
Status: DISCONTINUED | OUTPATIENT
Start: 2018-11-03 | End: 2018-11-05

## 2018-11-03 RX ORDER — SODIUM CHLORIDE 0.9 % (FLUSH) 0.9 %
10 SYRINGE (ML) INJECTION PRN
Status: DISCONTINUED | OUTPATIENT
Start: 2018-11-03 | End: 2018-11-03 | Stop reason: SDUPTHER

## 2018-11-03 RX ORDER — MULTIVITAMIN WITH FOLIC ACID 400 MCG
1 TABLET ORAL DAILY
Status: DISCONTINUED | OUTPATIENT
Start: 2018-11-03 | End: 2018-11-06 | Stop reason: HOSPADM

## 2018-11-03 RX ORDER — FOLIC ACID 1 MG/1
1 TABLET ORAL DAILY
Status: DISCONTINUED | OUTPATIENT
Start: 2018-11-03 | End: 2018-11-06 | Stop reason: HOSPADM

## 2018-11-03 RX ADMIN — ONDANSETRON 4 MG: 2 INJECTION INTRAMUSCULAR; INTRAVENOUS at 14:49

## 2018-11-03 RX ADMIN — PANTOPRAZOLE SODIUM 40 MG: 40 INJECTION, POWDER, FOR SOLUTION INTRAVENOUS at 14:50

## 2018-11-03 RX ADMIN — SODIUM CHLORIDE: 9 INJECTION, SOLUTION INTRAVENOUS at 23:17

## 2018-11-03 RX ADMIN — SODIUM CHLORIDE 1000 ML: 9 INJECTION, SOLUTION INTRAVENOUS at 14:48

## 2018-11-03 RX ADMIN — Medication 100 MG: at 23:17

## 2018-11-03 RX ADMIN — LORAZEPAM 1 MG: 2 INJECTION INTRAMUSCULAR; INTRAVENOUS at 16:54

## 2018-11-03 RX ADMIN — THIAMINE HYDROCHLORIDE 100 MG: 100 INJECTION, SOLUTION INTRAMUSCULAR; INTRAVENOUS at 16:16

## 2018-11-03 RX ADMIN — FOLIC ACID 1 MG: 1 TABLET ORAL at 23:17

## 2018-11-03 RX ADMIN — THERA TABS 1 TABLET: TAB at 23:17

## 2018-11-03 ASSESSMENT — ENCOUNTER SYMPTOMS
COUGH: 0
WHEEZING: 0
SORE THROAT: 0
ABDOMINAL PAIN: 1
NAUSEA: 1
SHORTNESS OF BREATH: 0
VOMITING: 1

## 2018-11-03 ASSESSMENT — PAIN SCALES - GENERAL
PAINLEVEL_OUTOF10: 10
PAINLEVEL_OUTOF10: 5
PAINLEVEL_OUTOF10: 0

## 2018-11-03 ASSESSMENT — PAIN DESCRIPTION - PAIN TYPE
TYPE: ACUTE PAIN
TYPE: ACUTE PAIN

## 2018-11-03 ASSESSMENT — PAIN DESCRIPTION - LOCATION
LOCATION: HEAD
LOCATION: ABDOMEN

## 2018-11-03 ASSESSMENT — PAIN DESCRIPTION - DESCRIPTORS: DESCRIPTORS: HEADACHE

## 2018-11-03 NOTE — ED PROVIDER NOTES
file.    CURRENT MEDICATIONS    Previous Medications    ALBUTEROL SULFATE  (90 BASE) MCG/ACT INHALER    Inhale 2 puffs into the lungs every 6 hours as needed for Wheezing    BLOOD GLUCOSE TEST STRIPS (ASCENSIA AUTODISC VI;ONE TOUCH ULTRA TEST VI) STRIP    Test as needed. Dispense One Touch verio Test Strips. Dx: Type 2 diabetes (250.00)    CHOLECALCIFEROL (VITAMIN D3) 5000 UNITS TABS    Take 5,000 Units by mouth once a week    DISULFIRAM 500 MG TABS    Take 500 mg by mouth daily    GLUCOSE BLOOD VI TEST STRIPS (EXACTECH TEST) STRIP    Glucometer with test strips plus lancets. #100. Check BS daily. 250.00    GLUCOSE MONITORING KIT (FREESTYLE) MONITORING KIT    Glucometer with test strips and lancets. Check BS once daily  #100 strips and lancets    GLUCOSE MONITORING KIT (FREESTYLE) MONITORING KIT    Glucometer with test strips and lancets, check bs daily. #100    LACTULOSE (CHRONULAC) 10 GM/15ML SOLUTION    Take 20 g by mouth 2 times daily    LISINOPRIL-HYDROCHLOROTHIAZIDE (PRINZIDE;ZESTORETIC) 20-25 MG PER TABLET    Take 1 tablet by mouth daily    METFORMIN (GLUCOPHAGE) 1000 MG TABLET    Take 1 tablet by mouth 2 times daily (with meals)    MULTIPLE VITAMINS-MINERALS (THERAPEUTIC MULTIVITAMIN-MINERALS) TABLET    Take 1 tablet by mouth daily    OLANZAPINE (ZYPREXA) 5 MG TABLET    Take 1 tablet by mouth nightly    OMEGA-3 ACID ETHYL ESTERS (LOVAZA) 1 G CAPSULE    Take 2 capsules by mouth 2 times daily    PROPRANOLOL (INDERAL) 20 MG TABLET    Take 1 tablet by mouth 2 times daily    VENLAFAXINE (EFFEXOR) 50 MG TABLET    Take 1 tablet by mouth 3 times daily       ALLERGIES    has No Known Allergies. FAMILY HISTORY    indicated that his mother is alive. He indicated that his father is . He indicated that his brother is alive. family history includes Cancer in his father; Diabetes in his mother; Heart Disease in his mother; High Blood Pressure in his brother and father.     SOCIAL HISTORY

## 2018-11-03 NOTE — H&P
drinks about 60.0 oz of alcohol per week . Family History:          Family History   Problem Relation Age of Onset    High Blood Pressure Father     Cancer Father         Lung Cancer    High Blood Pressure Brother     Diabetes Mother     Heart Disease Mother         Stent Placement       Diet: npo       REVIEW OF SYSTEMS:   Pertinent positives as noted in the HPI. All other systems reviewed and negative. PHYSICAL EXAM:    BP (!) 167/95   Pulse 118   Temp 99.4 °F (37.4 °C) (Oral)   Resp 16   Ht 5' 11\" (1.803 m)   Wt 252 lb (114.3 kg)   SpO2 96%   BMI 35.15 kg/m²     General appearance:  No apparent distress, appears stated age and cooperative. HEENT:  Normal cephalic, atraumatic without obvious deformity. Pupils equal, round, and reactive to light. Extra ocular muscles intact. Conjunctivae/corneas clear. Neck: Supple, with full range of motion. no jugular venous distention. Trachea midline. no carotid bruits  Respiratory:  Normal respiratory effort. Clear to auscultation, bilaterally without Rales/Wheezes/Rhonchi. Breath sounds equal bilaterally  Cardiovascular:  Regular rate and rhythm with normal S1/S2 without murmurs, rubs or gallops. PMI non displaced  Abdomen: Soft, non-tender, non-distended with normal bowel sounds. No guarding, rebound. Musculoskeletal:  No clubbing, cyanosis or edema bilaterally. Full range of motion without deformity. Skin: Skin color, texture, turgor normal.  No rashes or lesions, or suspicious lesions. Neurologic:  Neurovascularly intact without any focal sensory/motor deficits.  Cranial nerves: II-XII intact, grossly non-focal.  Psychiatric:  Alert and oriented, thought content appropriate, normal insight  Capillary Refill: Brisk,< 2 seconds   Peripheral Pulses: +2 palpable, equal bilaterally upper and lower extremities  Lymphatics: no lymphadenopathy      Labs:     Recent Labs      11/03/18   1447   WBC  8.4   HGB  16.5   HCT  47.8   PLT  238     Recent Labs

## 2018-11-03 NOTE — ED NOTES
Dr Pretty mSith talking with Dr Gustavo Lee and admission process started.      Stefanie Fernandez RN  11/03/18 0879

## 2018-11-04 LAB
ALBUMIN SERPL-MCNC: 3.5 G/DL (ref 3.5–5.1)
ALP BLD-CCNC: 158 U/L (ref 38–126)
ALT SERPL-CCNC: 102 U/L (ref 11–66)
AMMONIA: NORMAL UMOL/L (ref 11–60)
AMPHETAMINE+METHAMPHETAMINE URINE SCREEN: NEGATIVE
ANION GAP SERPL CALCULATED.3IONS-SCNC: 15 MEQ/L (ref 8–16)
AST SERPL-CCNC: 113 U/L (ref 5–40)
AVERAGE GLUCOSE: 126 MG/DL (ref 70–126)
BARBITURATE QUANTITATIVE URINE: NEGATIVE
BASOPHILS # BLD: 0.6 %
BASOPHILS ABSOLUTE: 0 THOU/MM3 (ref 0–0.1)
BENZODIAZEPINE QUANTITATIVE URINE: NEGATIVE
BILIRUB SERPL-MCNC: 1.8 MG/DL (ref 0.3–1.2)
BUN BLDV-MCNC: 14 MG/DL (ref 7–22)
CALCIUM IONIZED: 1.02 MMOL/L (ref 1.12–1.32)
CALCIUM IONIZED: 1.1 MMOL/L (ref 1.12–1.32)
CALCIUM SERPL-MCNC: 7.9 MG/DL (ref 8.5–10.5)
CANNABINOID QUANTITATIVE URINE: NEGATIVE
CHLORIDE BLD-SCNC: 97 MEQ/L (ref 98–111)
CO2: 20 MEQ/L (ref 23–33)
COCAINE METABOLITE QUANTITATIVE URINE: NEGATIVE
CREAT SERPL-MCNC: 0.6 MG/DL (ref 0.4–1.2)
EOSINOPHIL # BLD: 0 %
EOSINOPHILS ABSOLUTE: 0 THOU/MM3 (ref 0–0.4)
ERYTHROCYTE [DISTWIDTH] IN BLOOD BY AUTOMATED COUNT: 14.4 % (ref 11.5–14.5)
ERYTHROCYTE [DISTWIDTH] IN BLOOD BY AUTOMATED COUNT: 48.4 FL (ref 35–45)
GFR SERPL CREATININE-BSD FRML MDRD: > 90 ML/MIN/1.73M2
GLUCOSE BLD-MCNC: 101 MG/DL (ref 70–108)
GLUCOSE BLD-MCNC: 101 MG/DL (ref 70–108)
GLUCOSE BLD-MCNC: 122 MG/DL (ref 70–108)
GLUCOSE BLD-MCNC: 84 MG/DL (ref 70–108)
GLUCOSE BLD-MCNC: 88 MG/DL (ref 70–108)
GLUCOSE BLD-MCNC: 92 MG/DL (ref 70–108)
GLUCOSE BLD-MCNC: 92 MG/DL (ref 70–108)
HBA1C MFR BLD: 6.2 % (ref 4.4–6.4)
HCT VFR BLD CALC: 40.2 % (ref 42–52)
HEMOGLOBIN: 14.4 GM/DL (ref 14–18)
IMMATURE GRANS (ABS): 0.01 THOU/MM3 (ref 0–0.07)
IMMATURE GRANULOCYTES: 0.2 %
LIPASE: 60.1 U/L (ref 5.6–51.3)
LYMPHOCYTES # BLD: 27.9 %
LYMPHOCYTES ABSOLUTE: 1.4 THOU/MM3 (ref 1–4.8)
MCH RBC QN AUTO: 33.3 PG (ref 26–33)
MCHC RBC AUTO-ENTMCNC: 35.8 GM/DL (ref 32.2–35.5)
MCV RBC AUTO: 93.1 FL (ref 80–94)
MONOCYTES # BLD: 8.2 %
MONOCYTES ABSOLUTE: 0.4 THOU/MM3 (ref 0.4–1.3)
NUCLEATED RED BLOOD CELLS: 0 /100 WBC
OPIATES, URINE: NEGATIVE
ORIGINAL SAMPLE NUMBER: NORMAL
OXYCODONE: NEGATIVE
PHENCYCLIDINE QUANTITATIVE URINE: NEGATIVE
PHOSPHORUS: 3 MG/DL (ref 2.4–4.7)
PLATELET # BLD: 161 THOU/MM3 (ref 130–400)
PMV BLD AUTO: 9 FL (ref 9.4–12.4)
POTASSIUM REFLEX MAGNESIUM: 4 MEQ/L (ref 3.5–5.2)
RBC # BLD: 4.32 MILL/MM3 (ref 4.7–6.1)
REFERENCE LOCATION: NORMAL
REFERENCE RANGE: NORMAL
SEG NEUTROPHILS: 63.1 %
SEGMENTED NEUTROPHILS ABSOLUTE COUNT: 3.2 THOU/MM3 (ref 1.8–7.7)
SODIUM BLD-SCNC: 132 MEQ/L (ref 135–145)
TEST RESULTS WITH UNITS: NORMAL
TEST(S) BEING PERFORMED: NORMAL
TOTAL PROTEIN: 6.5 G/DL (ref 6.1–8)
WBC # BLD: 5.1 THOU/MM3 (ref 4.8–10.8)

## 2018-11-04 PROCEDURE — 2580000003 HC RX 258

## 2018-11-04 PROCEDURE — 6370000000 HC RX 637 (ALT 250 FOR IP): Performed by: HOSPITALIST

## 2018-11-04 PROCEDURE — 6360000002 HC RX W HCPCS: Performed by: HOSPITALIST

## 2018-11-04 PROCEDURE — 83036 HEMOGLOBIN GLYCOSYLATED A1C: CPT

## 2018-11-04 PROCEDURE — 1200000003 HC TELEMETRY R&B

## 2018-11-04 PROCEDURE — 36415 COLL VENOUS BLD VENIPUNCTURE: CPT

## 2018-11-04 PROCEDURE — 84100 ASSAY OF PHOSPHORUS: CPT

## 2018-11-04 PROCEDURE — 2580000003 HC RX 258: Performed by: INTERNAL MEDICINE

## 2018-11-04 PROCEDURE — 99232 SBSQ HOSP IP/OBS MODERATE 35: CPT | Performed by: HOSPITALIST

## 2018-11-04 PROCEDURE — 93010 ELECTROCARDIOGRAM REPORT: CPT | Performed by: INTERNAL MEDICINE

## 2018-11-04 PROCEDURE — 2500000003 HC RX 250 WO HCPCS: Performed by: INTERNAL MEDICINE

## 2018-11-04 PROCEDURE — S0028 INJECTION, FAMOTIDINE, 20 MG: HCPCS | Performed by: INTERNAL MEDICINE

## 2018-11-04 PROCEDURE — 85025 COMPLETE CBC W/AUTO DIFF WBC: CPT

## 2018-11-04 PROCEDURE — 2580000003 HC RX 258: Performed by: HOSPITALIST

## 2018-11-04 PROCEDURE — 80053 COMPREHEN METABOLIC PANEL: CPT

## 2018-11-04 PROCEDURE — 2709999900 HC NON-CHARGEABLE SUPPLY

## 2018-11-04 PROCEDURE — 6360000002 HC RX W HCPCS

## 2018-11-04 PROCEDURE — 82948 REAGENT STRIP/BLOOD GLUCOSE: CPT

## 2018-11-04 PROCEDURE — C9113 INJ PANTOPRAZOLE SODIUM, VIA: HCPCS | Performed by: EMERGENCY MEDICINE

## 2018-11-04 PROCEDURE — 6370000000 HC RX 637 (ALT 250 FOR IP): Performed by: INTERNAL MEDICINE

## 2018-11-04 PROCEDURE — 82140 ASSAY OF AMMONIA: CPT

## 2018-11-04 PROCEDURE — 83690 ASSAY OF LIPASE: CPT

## 2018-11-04 PROCEDURE — 80307 DRUG TEST PRSMV CHEM ANLYZR: CPT

## 2018-11-04 PROCEDURE — 6360000002 HC RX W HCPCS: Performed by: EMERGENCY MEDICINE

## 2018-11-04 PROCEDURE — 82330 ASSAY OF CALCIUM: CPT

## 2018-11-04 RX ORDER — CHLORDIAZEPOXIDE HYDROCHLORIDE 25 MG/1
25 CAPSULE, GELATIN COATED ORAL 3 TIMES DAILY
Status: COMPLETED | OUTPATIENT
Start: 2018-11-04 | End: 2018-11-05

## 2018-11-04 RX ADMIN — LISINOPRIL 20 MG: 20 TABLET ORAL at 11:48

## 2018-11-04 RX ADMIN — SODIUM CHLORIDE: 9 INJECTION, SOLUTION INTRAVENOUS at 21:33

## 2018-11-04 RX ADMIN — HYDROCHLOROTHIAZIDE 25 MG: 25 TABLET ORAL at 11:47

## 2018-11-04 RX ADMIN — CHLORDIAZEPOXIDE HYDROCHLORIDE 25 MG: 25 CAPSULE ORAL at 21:33

## 2018-11-04 RX ADMIN — CHLORDIAZEPOXIDE HYDROCHLORIDE 25 MG: 25 CAPSULE ORAL at 15:43

## 2018-11-04 RX ADMIN — CALCIUM GLUCONATE 2 G: 98 INJECTION, SOLUTION INTRAVENOUS at 10:25

## 2018-11-04 RX ADMIN — PANTOPRAZOLE SODIUM 40 MG: 40 INJECTION, POWDER, FOR SOLUTION INTRAVENOUS at 11:48

## 2018-11-04 RX ADMIN — SODIUM CHLORIDE: 9 INJECTION, SOLUTION INTRAVENOUS at 05:20

## 2018-11-04 RX ADMIN — FAMOTIDINE 20 MG: 10 INJECTION, SOLUTION INTRAVENOUS at 00:01

## 2018-11-04 RX ADMIN — THERA TABS 1 TABLET: TAB at 11:48

## 2018-11-04 RX ADMIN — CHLORDIAZEPOXIDE HYDROCHLORIDE 25 MG: 25 CAPSULE ORAL at 10:25

## 2018-11-04 RX ADMIN — SODIUM CHLORIDE: 9 INJECTION, SOLUTION INTRAVENOUS at 13:22

## 2018-11-04 RX ADMIN — Medication 100 MG: at 11:48

## 2018-11-04 RX ADMIN — FOLIC ACID 1 MG: 1 TABLET ORAL at 11:48

## 2018-11-04 RX ADMIN — FAMOTIDINE 20 MG: 10 INJECTION, SOLUTION INTRAVENOUS at 21:33

## 2018-11-04 RX ADMIN — CALCIUM GLUCONATE 2 G: 98 INJECTION, SOLUTION INTRAVENOUS at 23:05

## 2018-11-04 RX ADMIN — FAMOTIDINE 20 MG: 10 INJECTION, SOLUTION INTRAVENOUS at 10:25

## 2018-11-04 ASSESSMENT — PAIN SCALES - GENERAL
PAINLEVEL_OUTOF10: 0
PAINLEVEL_OUTOF10: 5
PAINLEVEL_OUTOF10: 5

## 2018-11-04 ASSESSMENT — PAIN DESCRIPTION - PAIN TYPE
TYPE: ACUTE PAIN
TYPE: ACUTE PAIN

## 2018-11-04 ASSESSMENT — PAIN DESCRIPTION - ONSET: ONSET: ON-GOING

## 2018-11-04 ASSESSMENT — PAIN DESCRIPTION - LOCATION
LOCATION: HEAD
LOCATION: BACK

## 2018-11-04 ASSESSMENT — PAIN DESCRIPTION - FREQUENCY: FREQUENCY: CONTINUOUS

## 2018-11-04 ASSESSMENT — PAIN DESCRIPTION - DESCRIPTORS: DESCRIPTORS: HEADACHE

## 2018-11-04 NOTE — PROGRESS NOTES
Progress Note    Patient:  Lesley Rosado    Unit/Bed:8B-29/029-A  YOB: 1975  MRN: [id number]   Acct: [de-identified]   Admit date: 11/3/2018      Principal Problem:    Alcoholic gastritis without bleeding  Active Problems:    Hyperlipemia    HTN (hypertension), benign    Alcoholic hepatitis    Type 2 diabetes mellitus without complication, without long-term current use of insulin (HCC)    Hyperbilirubinemia    Alcoholic hepatitis without ascites  Resolved Problems:    * No resolved hospital problems. *      Assessment and Plan:  1. Acute alcoholic hepatitis:  LFTs trending downwards, continue with supportive care. Ammonia level pending  2. Acute alcoholic pancreatitis:  IV fluids, okay to start clear liquid diet, check lipase in AM  3. ETOH abuse:  Relapsed 2 weeks ago, TRUPTIWA,  Start on Librium TID  4. DM 2: last a1c 6.1  5. Thrombocytopenia:  Alcohol related, hold Lovenox  6. VT prophylaxis:  SCD      Patient Seen, Chart, Consults notes, Labs, Radiology studies reviewed. Subjective: Day 1 of stay with acute alcoholic gastritis/pancreatitis  Patient seen and examined at bedside, states his abdominal pain is better. Complains of back pain and tremors. No acute overnight events    All other ROS negative except noted in HPI    Past, Family, Social History unchanged from admission.     Diet:  DIET CLEAR LIQUID;    Medications:  Scheduled Meds:   calcium replacement protocol   Other RX Placeholder    calcium gluconate IVPB  2 g Intravenous Once    chlordiazePOXIDE  25 mg Oral TID    pantoprazole  40 mg Intravenous Daily    sodium chloride flush  10 mL Intravenous 2 times per day    enoxaparin  40 mg Subcutaneous Daily    thiamine  100 mg Oral Daily    folic acid  1 mg Oral Daily    multivitamin  1 tablet Oral Daily    famotidine (PEPCID) injection  20 mg Intravenous BID    insulin lispro  0-6 Units Subcutaneous Q4H    nicotine  1 patch Transdermal Daily    05/08/2018    HCO3 22 05/08/2018    O2SAT 97 05/08/2018           Radiology reports as per the Radiologist  Radiology: Xr Chest Standard (2 Vw)    Result Date: 11/3/2018  PROCEDURE: XR CHEST (2 VW) CLINICAL INFORMATION: Vomiting, alcohol abuse TECHNIQUE: PA and lateral chest radiographs. COMPARISON: PA and lateral chest radiographs 5/6/2018 FINDINGS: Cardiomediastinal silhouette is within normal limits. Lungs are clear. Degenerative changes in the thoracic spine are stable. Soft tissues are unremarkable. No acute intrathoracic process. **This report has been created using voice recognition software. It may contain minor errors which are inherent in voice recognition technology. ** Final report electronically signed by Dr. Solis Ndiaye on 11/3/2018 4:29 PM        Physical Exam:  Vitals: /77   Pulse 97   Temp 98.6 °F (37 °C) (Oral)   Resp 18   Ht 5' 11\" (1.803 m)   Wt 252 lb 6.4 oz (114.5 kg)   SpO2 94%   BMI 35.20 kg/m²   24 hour intake/output:  Intake/Output Summary (Last 24 hours) at 11/04/18 0908  Last data filed at 11/04/18 0518   Gross per 24 hour   Intake           1480.8 ml   Output             1550 ml   Net            -69.2 ml     Last 3 weights: Wt Readings from Last 3 Encounters:   11/04/18 252 lb 6.4 oz (114.5 kg)   10/04/18 254 lb 6.4 oz (115.4 kg)   06/29/18 252 lb (114.3 kg)       General appearance - alert, awake appears to be in no acute distress.   tremor  Chest - Bilateral air entry, no wheezes, crackles or rhonchi  Cardiovascular - S1S2 RRR, no murmurs or gallops  Abdomen - Soft non tender non distended, normoactive bowel sounds   Extremities: No peripheral edema    DVT prophylaxis: [] Lovenox                                 [x] SCDs                                 [] SQ Heparin                                 [] Encourage ambulation           [] Already on Anticoagulation               Electronically signed by Trinh Boggs MD on 11/4/2018 at 9:08 AM    Rounding

## 2018-11-04 NOTE — PLAN OF CARE
Problem: Pain:  Goal: Pain level will decrease  Pain level will decrease   Outcome: Ongoing  Patient has intermittent back pain that he believes is from laying in the bed so much that he isn't used to  Goal: Control of acute pain  Control of acute pain   Outcome: Completed Date Met: 11/04/18  Patient has intermittent back pain that he believes is from laying in the bed so much that he isn't used to  Goal: Control of chronic pain  Control of chronic pain   Outcome: Completed Date Met: 11/04/18  Patient has intermittent back pain that he believes is from laying in the bed so much that he isn't used to    Problem: Discharge Planning:  Goal: Discharged to appropriate level of care  Discharged to appropriate level of care   Outcome: Ongoing  Plans to discharge home with family support    Problem: Fluid Volume - Deficit:  Goal: Absence of fluid volume deficit signs and symptoms  Absence of fluid volume deficit signs and symptoms   Outcome: Ongoing  Encourage oral intake within diet.  IV fluids per orders    Problem: Nutrition Deficit:  Goal: Ability to achieve adequate nutritional intake will improve  Ability to achieve adequate nutritional intake will improve   Outcome: Ongoing

## 2018-11-05 LAB
ALBUMIN SERPL-MCNC: 3.1 G/DL (ref 3.5–5.1)
ALP BLD-CCNC: 135 U/L (ref 38–126)
ALT SERPL-CCNC: 106 U/L (ref 11–66)
ANION GAP SERPL CALCULATED.3IONS-SCNC: 12 MEQ/L (ref 8–16)
AST SERPL-CCNC: 142 U/L (ref 5–40)
BILIRUB SERPL-MCNC: 2.8 MG/DL (ref 0.3–1.2)
BUN BLDV-MCNC: 8 MG/DL (ref 7–22)
CALCIUM IONIZED: 1.06 MMOL/L (ref 1.12–1.32)
CALCIUM SERPL-MCNC: 8.6 MG/DL (ref 8.5–10.5)
CHLORIDE BLD-SCNC: 100 MEQ/L (ref 98–111)
CO2: 22 MEQ/L (ref 23–33)
CREAT SERPL-MCNC: 0.5 MG/DL (ref 0.4–1.2)
GFR SERPL CREATININE-BSD FRML MDRD: > 90 ML/MIN/1.73M2
GLUCOSE BLD-MCNC: 112 MG/DL (ref 70–108)
GLUCOSE BLD-MCNC: 120 MG/DL (ref 70–108)
GLUCOSE BLD-MCNC: 122 MG/DL (ref 70–108)
GLUCOSE BLD-MCNC: 125 MG/DL (ref 70–108)
GLUCOSE BLD-MCNC: 134 MG/DL (ref 70–108)
GLUCOSE BLD-MCNC: 97 MG/DL (ref 70–108)
LIPASE: 83.5 U/L (ref 5.6–51.3)
POTASSIUM REFLEX MAGNESIUM: 3.7 MEQ/L (ref 3.5–5.2)
SODIUM BLD-SCNC: 134 MEQ/L (ref 135–145)
TOTAL PROTEIN: 6.3 G/DL (ref 6.1–8)

## 2018-11-05 PROCEDURE — 1200000003 HC TELEMETRY R&B

## 2018-11-05 PROCEDURE — 36415 COLL VENOUS BLD VENIPUNCTURE: CPT

## 2018-11-05 PROCEDURE — 2580000003 HC RX 258: Performed by: INTERNAL MEDICINE

## 2018-11-05 PROCEDURE — 6360000002 HC RX W HCPCS: Performed by: EMERGENCY MEDICINE

## 2018-11-05 PROCEDURE — 82330 ASSAY OF CALCIUM: CPT

## 2018-11-05 PROCEDURE — 6370000000 HC RX 637 (ALT 250 FOR IP): Performed by: HOSPITALIST

## 2018-11-05 PROCEDURE — 83690 ASSAY OF LIPASE: CPT

## 2018-11-05 PROCEDURE — 6370000000 HC RX 637 (ALT 250 FOR IP): Performed by: INTERNAL MEDICINE

## 2018-11-05 PROCEDURE — C9113 INJ PANTOPRAZOLE SODIUM, VIA: HCPCS | Performed by: EMERGENCY MEDICINE

## 2018-11-05 PROCEDURE — 80053 COMPREHEN METABOLIC PANEL: CPT

## 2018-11-05 PROCEDURE — 82948 REAGENT STRIP/BLOOD GLUCOSE: CPT

## 2018-11-05 PROCEDURE — 99232 SBSQ HOSP IP/OBS MODERATE 35: CPT | Performed by: HOSPITALIST

## 2018-11-05 PROCEDURE — 2580000003 HC RX 258: Performed by: HOSPITALIST

## 2018-11-05 RX ORDER — CHLORDIAZEPOXIDE HYDROCHLORIDE 25 MG/1
25 CAPSULE, GELATIN COATED ORAL 2 TIMES DAILY
Status: DISCONTINUED | OUTPATIENT
Start: 2018-11-05 | End: 2018-11-06 | Stop reason: HOSPADM

## 2018-11-05 RX ORDER — CHLORDIAZEPOXIDE HYDROCHLORIDE 25 MG/1
25 CAPSULE, GELATIN COATED ORAL 2 TIMES DAILY PRN
Status: DISCONTINUED | OUTPATIENT
Start: 2018-11-05 | End: 2018-11-06 | Stop reason: HOSPADM

## 2018-11-05 RX ORDER — FAMOTIDINE 20 MG/1
20 TABLET, FILM COATED ORAL 2 TIMES DAILY
Status: DISCONTINUED | OUTPATIENT
Start: 2018-11-05 | End: 2018-11-06 | Stop reason: HOSPADM

## 2018-11-05 RX ADMIN — CHLORDIAZEPOXIDE HYDROCHLORIDE 25 MG: 25 CAPSULE ORAL at 21:15

## 2018-11-05 RX ADMIN — FOLIC ACID 1 MG: 1 TABLET ORAL at 08:47

## 2018-11-05 RX ADMIN — SODIUM CHLORIDE: 9 INJECTION, SOLUTION INTRAVENOUS at 21:09

## 2018-11-05 RX ADMIN — Medication 10 ML: at 08:49

## 2018-11-05 RX ADMIN — Medication 100 MG: at 08:47

## 2018-11-05 RX ADMIN — CHLORDIAZEPOXIDE HYDROCHLORIDE 25 MG: 25 CAPSULE ORAL at 11:32

## 2018-11-05 RX ADMIN — SODIUM CHLORIDE: 9 INJECTION, SOLUTION INTRAVENOUS at 14:12

## 2018-11-05 RX ADMIN — FAMOTIDINE 20 MG: 20 TABLET ORAL at 21:56

## 2018-11-05 RX ADMIN — THERA TABS 1 TABLET: TAB at 08:47

## 2018-11-05 RX ADMIN — SODIUM CHLORIDE: 9 INJECTION, SOLUTION INTRAVENOUS at 05:37

## 2018-11-05 RX ADMIN — PANTOPRAZOLE SODIUM 40 MG: 40 INJECTION, POWDER, FOR SOLUTION INTRAVENOUS at 08:48

## 2018-11-05 RX ADMIN — HYDROCHLOROTHIAZIDE 25 MG: 25 TABLET ORAL at 08:47

## 2018-11-05 ASSESSMENT — PAIN SCALES - GENERAL
PAINLEVEL_OUTOF10: 0
PAINLEVEL_OUTOF10: 0

## 2018-11-05 NOTE — CARE COORDINATION
11/5/18, 12:56 PM      James Cerda       Admitted from: ER 11/3/2018/ [facility] day: 2   Location: -29/029-A Reason for admit: Alcoholic gastritis without bleeding [K29.20] Status: Inpt  Admit order signed?: yes  PMH:  has a past medical history of COPD (chronic obstructive pulmonary disease) ([facility] Depression; Diabetes mellitus ([facility] ETOH abuse; Hyperlipidemia; Hypertension; Liver disease; Seizures ([facility] and Thyroid disease. Procedure: No  Pertinent abnormal Imaging:No  Medications:  Scheduled Meds:   chlordiazePOXIDE  25 mg Oral BID    calcium replacement protocol   Other RX Placeholder    pantoprazole  40 mg Intravenous Daily    sodium chloride flush  10 mL Intravenous 2 times per day    thiamine  100 mg Oral Daily    folic acid  1 mg Oral Daily    multivitamin  1 tablet Oral Daily    famotidine (PEPCID) injection  20 mg Intravenous BID    insulin lispro  0-6 Units Subcutaneous Q4H    nicotine  1 patch Transdermal Daily    lisinopril  20 mg Oral Daily    Or    hydrochlorothiazide  25 mg Oral Daily     Continuous Infusions:   sodium chloride 175 mL/hr at 11/05/18 1136    dextrose        Pertinent Info/Orders/Treatment Plan:  VS. I&O. Telemetry. IVF and Pepcid. Monitor and treat blood glucose. Follow labs. Diet: DIET CLEAR LIQUID;   Smoking status:  reports that he has been smoking E-Cigarettes. He has a 27.00 pack-year smoking history.  He has quit using smokeless tobacco.   PCP: NILAM Parson CNP  Readmission: No  Readmission Risk Score: 17%    Discharge Planning  Current Residence:  Private Residence  Living Arrangements:  Parent   Support Systems:  Parent  Current Services PTA:     Potential Assistance Needed:  N/A  Potential Assistance Purchasing Medications:  No  Does patient want to participate in local refill/ meds to beds program?  No  Type of Home Care Services:  None  Patient expects to be discharged to:  home  Expected Discharge date:  11/05/18  Follow Up

## 2018-11-06 ENCOUNTER — TELEPHONE (OUTPATIENT)
Dept: FAMILY MEDICINE CLINIC | Age: 43
End: 2018-11-06

## 2018-11-06 VITALS
SYSTOLIC BLOOD PRESSURE: 135 MMHG | DIASTOLIC BLOOD PRESSURE: 81 MMHG | TEMPERATURE: 98 F | HEIGHT: 71 IN | BODY MASS INDEX: 35.36 KG/M2 | HEART RATE: 89 BPM | WEIGHT: 252.6 LBS | OXYGEN SATURATION: 96 % | RESPIRATION RATE: 16 BRPM

## 2018-11-06 LAB
ALBUMIN SERPL-MCNC: 3.2 G/DL (ref 3.5–5.1)
ALP BLD-CCNC: 120 U/L (ref 38–126)
ALT SERPL-CCNC: 118 U/L (ref 11–66)
ANION GAP SERPL CALCULATED.3IONS-SCNC: 12 MEQ/L (ref 8–16)
AST SERPL-CCNC: 133 U/L (ref 5–40)
BILIRUB SERPL-MCNC: 1.9 MG/DL (ref 0.3–1.2)
BUN BLDV-MCNC: 5 MG/DL (ref 7–22)
CALCIUM SERPL-MCNC: 8.4 MG/DL (ref 8.5–10.5)
CHLORIDE BLD-SCNC: 105 MEQ/L (ref 98–111)
CO2: 23 MEQ/L (ref 23–33)
CREAT SERPL-MCNC: 0.6 MG/DL (ref 0.4–1.2)
GFR SERPL CREATININE-BSD FRML MDRD: > 90 ML/MIN/1.73M2
GLUCOSE BLD-MCNC: 128 MG/DL (ref 70–108)
GLUCOSE BLD-MCNC: 130 MG/DL (ref 70–108)
LIPASE: 76.4 U/L (ref 5.6–51.3)
POTASSIUM REFLEX MAGNESIUM: 3.7 MEQ/L (ref 3.5–5.2)
SODIUM BLD-SCNC: 140 MEQ/L (ref 135–145)
TOTAL PROTEIN: 5.9 G/DL (ref 6.1–8)

## 2018-11-06 PROCEDURE — 6370000000 HC RX 637 (ALT 250 FOR IP): Performed by: INTERNAL MEDICINE

## 2018-11-06 PROCEDURE — 82948 REAGENT STRIP/BLOOD GLUCOSE: CPT

## 2018-11-06 PROCEDURE — C9113 INJ PANTOPRAZOLE SODIUM, VIA: HCPCS | Performed by: EMERGENCY MEDICINE

## 2018-11-06 PROCEDURE — 80053 COMPREHEN METABOLIC PANEL: CPT

## 2018-11-06 PROCEDURE — 2580000003 HC RX 258: Performed by: HOSPITALIST

## 2018-11-06 PROCEDURE — 83690 ASSAY OF LIPASE: CPT

## 2018-11-06 PROCEDURE — 2580000003 HC RX 258: Performed by: INTERNAL MEDICINE

## 2018-11-06 PROCEDURE — 6360000002 HC RX W HCPCS: Performed by: EMERGENCY MEDICINE

## 2018-11-06 PROCEDURE — 6370000000 HC RX 637 (ALT 250 FOR IP): Performed by: HOSPITALIST

## 2018-11-06 PROCEDURE — 99239 HOSP IP/OBS DSCHRG MGMT >30: CPT | Performed by: HOSPITALIST

## 2018-11-06 PROCEDURE — 36415 COLL VENOUS BLD VENIPUNCTURE: CPT

## 2018-11-06 PROCEDURE — 2709999900 HC NON-CHARGEABLE SUPPLY

## 2018-11-06 RX ORDER — CHLORDIAZEPOXIDE HYDROCHLORIDE 25 MG/1
CAPSULE, GELATIN COATED ORAL
Qty: 3 CAPSULE | Refills: 3 | Status: SHIPPED | OUTPATIENT
Start: 2018-11-06 | End: 2018-11-07

## 2018-11-06 RX ADMIN — Medication 10 ML: at 09:00

## 2018-11-06 RX ADMIN — Medication 100 MG: at 08:56

## 2018-11-06 RX ADMIN — SODIUM CHLORIDE: 9 INJECTION, SOLUTION INTRAVENOUS at 09:07

## 2018-11-06 RX ADMIN — CHLORDIAZEPOXIDE HYDROCHLORIDE 25 MG: 25 CAPSULE ORAL at 08:59

## 2018-11-06 RX ADMIN — HYDROCHLOROTHIAZIDE 25 MG: 25 TABLET ORAL at 08:57

## 2018-11-06 RX ADMIN — THERA TABS 1 TABLET: TAB at 08:57

## 2018-11-06 RX ADMIN — PANTOPRAZOLE SODIUM 40 MG: 40 INJECTION, POWDER, FOR SOLUTION INTRAVENOUS at 08:57

## 2018-11-06 RX ADMIN — FAMOTIDINE 20 MG: 20 TABLET ORAL at 08:59

## 2018-11-06 RX ADMIN — FOLIC ACID 1 MG: 1 TABLET ORAL at 08:57

## 2018-11-06 RX ADMIN — SODIUM CHLORIDE: 9 INJECTION, SOLUTION INTRAVENOUS at 03:18

## 2018-11-06 ASSESSMENT — PAIN SCALES - GENERAL: PAINLEVEL_OUTOF10: 0

## 2018-11-06 NOTE — DISCHARGE SUMMARY
Discharge Summary    Patient:  Matthew Crandall  YOB: 1975    MRN: [id number]   Acct: [de-identified]    Primary Care Physician: NILAM Vasquez CNP    Admit date:  11/3/2018    Discharge date:   11/6/2018       Discharge Diagnoses:   Alcoholic gastritis without bleeding  Principal Problem:    Alcoholic gastritis without bleeding  Active Problems:    Hyperlipemia    HTN (hypertension), benign    Alcoholic hepatitis    Type 2 diabetes mellitus without complication, without long-term current use of insulin (HCC)    Hyperbilirubinemia    Alcoholic hepatitis without ascites  Resolved Problems:    * No resolved hospital problems. *      Hospital Course:  44year old male with history of ETOH abuse admitted with acute alcoholic hepatitis and pancreatitis. Patient had been sober for almost 3 months and went on a 2 week ETOH binge. He was treated with IV fluids, NPO and Librium. Patients pain improved and tolerated diet advancements. He is on a quick Librium taper to help with ETOH withdrawal symptoms. I met with patient and his mother and counseled him extensively on the hazards of ETOH abuse and the effect on his liver. Patient has a plan to follow up with AA. He has been to inpatient rehab programs in the past.    Consultants:  Patient Care Team:  NILAM Vasquez CNP as PCP - General (Nurse Practitioner)    Discharge Medications:     Medication List      START taking these medications    chlordiazePOXIDE 25 MG capsule  Commonly known as:  LIBRIUM  1 tab bid for one day then 1 tab daily for one day. CONTINUE taking these medications    albuterol sulfate  (90 Base) MCG/ACT inhaler  Inhale 2 puffs into the lungs every 6 hours as needed for Wheezing     * blood glucose test strips strip  Commonly known as:  EXACTECH TEST  Glucometer with test strips plus lancets. #100. Check BS daily.  250.00     * blood glucose test strips strip  Commonly known as:  Jyotsna Castaneda metabolic panel   Result Value Ref Range    Glucose 154 (H) 74 - 106 mg/dl   Comprehensive Metabolic Panel   Result Value Ref Range    Glucose 143 (H) 70 - 108 mg/dL    CREATININE 0.6 0.4 - 1.2 mg/dL    BUN 17 7 - 22 mg/dL    Sodium 136 135 - 145 meq/L    Potassium 4.0 3.5 - 5.2 meq/L    Chloride 96 (L) 98 - 111 meq/L    CO2 20 (L) 23 - 33 meq/L    Calcium 8.9 8.5 - 10.5 mg/dL     (H) 5 - 40 U/L    Alkaline Phosphatase 195 (H) 38 - 126 U/L    Total Protein 7.8 6.1 - 8.0 g/dL    Alb 4.0 3.5 - 5.1 g/dL    Total Bilirubin 1.5 (H) 0.3 - 1.2 mg/dL     (H) 11 - 66 U/L   Lipase   Result Value Ref Range    Lipase 74.4 (H) 5.6 - 51.3 U/L   Magnesium   Result Value Ref Range    Magnesium 2.0 1.6 - 2.4 mg/dL   Manual Differential   Result Value Ref Range    Differential, manual see below    Ethanol   Result Value Ref Range    Ethyl Alcohol 0.36 0.00 % (gm/dl)    Methodology XPAND     ANALYZED BY: [email or web address]     Drawn By [email or web address]     Time Collected 1447     Date Of Collection 11/03/18    Anion Gap   Result Value Ref Range    Anion Gap 20.0 (H) 8.0 - 16.0 meq/L   Glomerular Filtration Rate, Estimated   Result Value Ref Range    Est, Glom Filt Rate >90 ml/min/1.73m2   Hemoglobin A1c   Result Value Ref Range    Hemoglobin A1C 6.1 4.4 - 6.4 %    AVERAGE GLUCOSE 123 70 - 126 mg/dL   Comprehensive Metabolic Panel w/ Reflex to MG   Result Value Ref Range    Glucose 88 70 - 108 mg/dL    CREATININE 0.6 0.4 - 1.2 mg/dL    BUN 14 7 - 22 mg/dL    Sodium 132 (L) 135 - 145 meq/L    Potassium reflex Magnesium 4.0 3.5 - 5.2 meq/L    Chloride 97 (L) 98 - 111 meq/L    CO2 20 (L) 23 - 33 meq/L    Calcium 7.9 (L) 8.5 - 10.5 mg/dL     (H) 5 - 40 U/L    Alkaline Phosphatase 158 (H) 38 - 126 U/L    Total Protein 6.5 6.1 - 8.0 g/dL    Alb 3.5 3.5 - 5.1 g/dL    Total Bilirubin 1.8 (H) 0.3 - 1.2 mg/dL     (H) 11 - 66 U/L   Calcium, Ionized   Result Value Ref Range    Calcium, Ion 1.02 (L) 1.12 - 1.32 mmol/L   CBC auto differential

## 2018-11-13 ENCOUNTER — OFFICE VISIT (OUTPATIENT)
Dept: FAMILY MEDICINE CLINIC | Age: 43
End: 2018-11-13
Payer: MEDICAID

## 2018-11-13 VITALS
HEART RATE: 72 BPM | TEMPERATURE: 97.6 F | SYSTOLIC BLOOD PRESSURE: 126 MMHG | HEIGHT: 71 IN | DIASTOLIC BLOOD PRESSURE: 76 MMHG | RESPIRATION RATE: 16 BRPM | BODY MASS INDEX: 35.28 KG/M2 | WEIGHT: 252 LBS

## 2018-11-13 DIAGNOSIS — F31.9 BIPOLAR 1 DISORDER (HCC): Primary | ICD-10-CM

## 2018-11-13 DIAGNOSIS — E11.9 TYPE 2 DIABETES MELLITUS WITHOUT COMPLICATION, WITHOUT LONG-TERM CURRENT USE OF INSULIN (HCC): ICD-10-CM

## 2018-11-13 DIAGNOSIS — K29.20 ALCOHOLIC GASTRITIS WITHOUT BLEEDING, UNSPECIFIED CHRONICITY: ICD-10-CM

## 2018-11-13 DIAGNOSIS — K70.10 ALCOHOLIC HEPATITIS WITHOUT ASCITES: Chronic | ICD-10-CM

## 2018-11-13 DIAGNOSIS — F10.931 ALCOHOL WITHDRAWAL SYNDROME, WITH DELIRIUM (HCC): ICD-10-CM

## 2018-11-13 DIAGNOSIS — K70.40 ALCOHOLIC LIVER FAILURE (HCC): ICD-10-CM

## 2018-11-13 LAB
ALBUMIN SERPL-MCNC: 4.1 G/DL (ref 3.5–5.1)
ALP BLD-CCNC: 123 U/L (ref 38–126)
ALT SERPL-CCNC: 88 U/L (ref 11–66)
ANION GAP SERPL CALCULATED.3IONS-SCNC: 10 MEQ/L (ref 8–16)
AST SERPL-CCNC: 45 U/L (ref 5–40)
BASOPHILS # BLD: 1.1 %
BASOPHILS ABSOLUTE: 0.1 THOU/MM3 (ref 0–0.1)
BILIRUB SERPL-MCNC: 0.6 MG/DL (ref 0.3–1.2)
BILIRUBIN DIRECT: < 0.2 MG/DL (ref 0–0.3)
BUN BLDV-MCNC: 16 MG/DL (ref 7–22)
CALCIUM SERPL-MCNC: 9.5 MG/DL (ref 8.5–10.5)
CHLORIDE BLD-SCNC: 99 MEQ/L (ref 98–111)
CO2: 25 MEQ/L (ref 23–33)
CREAT SERPL-MCNC: 0.8 MG/DL (ref 0.4–1.2)
EOSINOPHIL # BLD: 0 %
EOSINOPHILS ABSOLUTE: 0 THOU/MM3 (ref 0–0.4)
ERYTHROCYTE [DISTWIDTH] IN BLOOD BY AUTOMATED COUNT: 13.9 % (ref 11.5–14.5)
ERYTHROCYTE [DISTWIDTH] IN BLOOD BY AUTOMATED COUNT: 52.8 FL (ref 35–45)
GFR SERPL CREATININE-BSD FRML MDRD: > 90 ML/MIN/1.73M2
GLUCOSE BLD-MCNC: 217 MG/DL (ref 70–108)
HCT VFR BLD CALC: 47.4 % (ref 42–52)
HEMOGLOBIN: 15.7 GM/DL (ref 14–18)
IMMATURE GRANS (ABS): 0.01 THOU/MM3 (ref 0–0.07)
IMMATURE GRANULOCYTES: 0.2 %
INR BLD: 1.56 (ref 0.85–1.13)
LYMPHOCYTES # BLD: 23.5 %
LYMPHOCYTES ABSOLUTE: 1.1 THOU/MM3 (ref 1–4.8)
MCH RBC QN AUTO: 33.8 PG (ref 26–33)
MCHC RBC AUTO-ENTMCNC: 33.1 GM/DL (ref 32.2–35.5)
MCV RBC AUTO: 102.2 FL (ref 80–94)
MONOCYTES # BLD: 12.2 %
MONOCYTES ABSOLUTE: 0.6 THOU/MM3 (ref 0.4–1.3)
NUCLEATED RED BLOOD CELLS: 0 /100 WBC
PLATELET # BLD: 249 THOU/MM3 (ref 130–400)
PMV BLD AUTO: 10.2 FL (ref 9.4–12.4)
POTASSIUM SERPL-SCNC: 4 MEQ/L (ref 3.5–5.2)
RBC # BLD: 4.64 MILL/MM3 (ref 4.7–6.1)
SEG NEUTROPHILS: 63 %
SEGMENTED NEUTROPHILS ABSOLUTE COUNT: 2.9 THOU/MM3 (ref 1.8–7.7)
SODIUM BLD-SCNC: 134 MEQ/L (ref 135–145)
TOTAL PROTEIN: 7.4 G/DL (ref 6.1–8)
WBC # BLD: 4.6 THOU/MM3 (ref 4.8–10.8)

## 2018-11-13 PROCEDURE — 1111F DSCHRG MED/CURRENT MED MERGE: CPT | Performed by: NURSE PRACTITIONER

## 2018-11-13 PROCEDURE — 99495 TRANSJ CARE MGMT MOD F2F 14D: CPT | Performed by: NURSE PRACTITIONER

## 2018-11-13 PROCEDURE — 36415 COLL VENOUS BLD VENIPUNCTURE: CPT | Performed by: NURSE PRACTITIONER

## 2018-11-13 RX ORDER — CHLORAL HYDRATE 500 MG
CAPSULE ORAL
Status: ON HOLD | COMMUNITY
End: 2018-11-29 | Stop reason: HOSPADM

## 2018-11-13 ASSESSMENT — ENCOUNTER SYMPTOMS
GASTROINTESTINAL NEGATIVE: 1
RESPIRATORY NEGATIVE: 1
EYES NEGATIVE: 1

## 2018-11-15 LAB — AFP-TUMOR MARKER: 6.2 UG/L

## 2018-11-25 ENCOUNTER — HOSPITAL ENCOUNTER (INPATIENT)
Age: 43
LOS: 3 days | Discharge: HOME | End: 2018-11-29
Attending: FAMILY MEDICINE | Admitting: HOSPITALIST
Payer: MEDICAID

## 2018-11-25 DIAGNOSIS — F10.939 ALCOHOL WITHDRAWAL SYNDROME WITH COMPLICATION (HCC): ICD-10-CM

## 2018-11-25 DIAGNOSIS — F10.20 ALCOHOL DEPENDENCE WITH PHYSIOLOGICAL DEPENDENCE, CONTINUOUS (HCC): Primary | ICD-10-CM

## 2018-11-25 LAB
ALBUMIN SERPL-MCNC: 3.8 G/DL (ref 3.5–5.1)
ALP BLD-CCNC: 193 U/L (ref 38–126)
ALT SERPL-CCNC: 191 U/L (ref 11–66)
AMMONIA: NORMAL UMOL/L (ref 11–60)
AMPHETAMINE+METHAMPHETAMINE URINE SCREEN: NEGATIVE
ANION GAP SERPL CALCULATED.3IONS-SCNC: 16 MEQ/L (ref 8–16)
AST SERPL-CCNC: 182 U/L (ref 5–40)
BACTERIA: ABNORMAL /HPF
BARBITURATE QUANTITATIVE URINE: NEGATIVE
BASOPHILS # BLD: 0.7 %
BASOPHILS ABSOLUTE: 0.1 THOU/MM3 (ref 0–0.1)
BENZODIAZEPINE QUANTITATIVE URINE: NEGATIVE
BILIRUB SERPL-MCNC: 2 MG/DL (ref 0.3–1.2)
BILIRUBIN URINE: NEGATIVE
BLOOD, URINE: ABNORMAL
BUN BLDV-MCNC: 14 MG/DL (ref 7–22)
CALCIUM SERPL-MCNC: 8.6 MG/DL (ref 8.5–10.5)
CANNABINOID QUANTITATIVE URINE: NEGATIVE
CASTS 2: ABNORMAL /LPF
CASTS UA: ABNORMAL /LPF
CHARACTER, URINE: CLEAR
CHLORIDE BLD-SCNC: 96 MEQ/L (ref 98–111)
CO2: 20 MEQ/L (ref 23–33)
COCAINE METABOLITE QUANTITATIVE URINE: NEGATIVE
COLOR: YELLOW
CREAT SERPL-MCNC: 0.4 MG/DL (ref 0.4–1.2)
CRYSTALS, UA: ABNORMAL
EKG ATRIAL RATE: 116 BPM
EKG P AXIS: 51 DEGREES
EKG P-R INTERVAL: 118 MS
EKG Q-T INTERVAL: 330 MS
EKG QRS DURATION: 90 MS
EKG QTC CALCULATION (BAZETT): 458 MS
EKG R AXIS: 39 DEGREES
EKG T AXIS: 58 DEGREES
EKG VENTRICULAR RATE: 116 BPM
EOSINOPHIL # BLD: 0 %
EOSINOPHILS ABSOLUTE: 0 THOU/MM3 (ref 0–0.4)
EPITHELIAL CELLS, UA: ABNORMAL /HPF
ERYTHROCYTE [DISTWIDTH] IN BLOOD BY AUTOMATED COUNT: 13.6 % (ref 11.5–14.5)
ERYTHROCYTE [DISTWIDTH] IN BLOOD BY AUTOMATED COUNT: 46.4 FL (ref 35–45)
ETHYL ALCOHOL, SERUM: 0.35 %
FOLATE: 19.4 NG/ML (ref 4.8–24.2)
GFR SERPL CREATININE-BSD FRML MDRD: > 90 ML/MIN/1.73M2
GLUCOSE BLD-MCNC: 185 MG/DL (ref 70–108)
GLUCOSE URINE: NEGATIVE MG/DL
HCT VFR BLD CALC: 49.9 % (ref 42–52)
HEMOGLOBIN: 18.1 GM/DL (ref 14–18)
IMMATURE GRANS (ABS): 0.02 THOU/MM3 (ref 0–0.07)
IMMATURE GRANULOCYTES: 0.2 %
KETONES, URINE: NEGATIVE
LACTIC ACID: 2 MMOL/L (ref 0.5–2.2)
LEUKOCYTE ESTERASE, URINE: NEGATIVE
LIPASE: 53.2 U/L (ref 5.6–51.3)
LYMPHOCYTES # BLD: 25 %
LYMPHOCYTES ABSOLUTE: 2.6 THOU/MM3 (ref 1–4.8)
MCH RBC QN AUTO: 33.6 PG (ref 26–33)
MCHC RBC AUTO-ENTMCNC: 36.3 GM/DL (ref 32.2–35.5)
MCV RBC AUTO: 92.8 FL (ref 80–94)
MISCELLANEOUS 2: ABNORMAL
MONOCYTES # BLD: 4.9 %
MONOCYTES ABSOLUTE: 0.5 THOU/MM3 (ref 0.4–1.3)
NITRITE, URINE: NEGATIVE
NUCLEATED RED BLOOD CELLS: 0 /100 WBC
OPIATES, URINE: NEGATIVE
ORIGINAL SAMPLE NUMBER: NORMAL
OSMOLALITY CALCULATION: 269.8 MOSMOL/KG (ref 275–300)
OXYCODONE: NEGATIVE
PH UA: 5.5
PHENCYCLIDINE QUANTITATIVE URINE: NEGATIVE
PLATELET # BLD: 285 THOU/MM3 (ref 130–400)
PMV BLD AUTO: 9.1 FL (ref 9.4–12.4)
POTASSIUM REFLEX MAGNESIUM: 4 MEQ/L (ref 3.5–5.2)
PROTEIN UA: 100
RBC # BLD: 5.38 MILL/MM3 (ref 4.7–6.1)
RBC URINE: ABNORMAL /HPF
REFERENCE LOCATION: NORMAL
REFERENCE RANGE: NORMAL
RENAL EPITHELIAL, UA: ABNORMAL
SEG NEUTROPHILS: 69.2 %
SEGMENTED NEUTROPHILS ABSOLUTE COUNT: 7.1 THOU/MM3 (ref 1.8–7.7)
SODIUM BLD-SCNC: 132 MEQ/L (ref 135–145)
SPECIFIC GRAVITY, URINE: 1.01 (ref 1–1.03)
TEST RESULTS WITH UNITS: NORMAL
TEST(S) BEING PERFORMED: NORMAL
TOTAL PROTEIN: 7 G/DL (ref 6.1–8)
TROPONIN T: < 0.01 NG/ML
TSH SERPL DL<=0.05 MIU/L-ACNC: 1.72 UIU/ML (ref 0.4–4.2)
UROBILINOGEN, URINE: 0.2 EU/DL
VITAMIN B-12: 1770 PG/ML (ref 211–911)
WBC # BLD: 10.3 THOU/MM3 (ref 4.8–10.8)
WBC UA: ABNORMAL /HPF
YEAST: ABNORMAL

## 2018-11-25 PROCEDURE — 99285 EMERGENCY DEPT VISIT HI MDM: CPT

## 2018-11-25 PROCEDURE — 81001 URINALYSIS AUTO W/SCOPE: CPT

## 2018-11-25 PROCEDURE — 80307 DRUG TEST PRSMV CHEM ANLYZR: CPT

## 2018-11-25 PROCEDURE — 80053 COMPREHEN METABOLIC PANEL: CPT

## 2018-11-25 PROCEDURE — 6360000002 HC RX W HCPCS: Performed by: EMERGENCY MEDICINE

## 2018-11-25 PROCEDURE — 96374 THER/PROPH/DIAG INJ IV PUSH: CPT

## 2018-11-25 PROCEDURE — 84484 ASSAY OF TROPONIN QUANT: CPT

## 2018-11-25 PROCEDURE — 82140 ASSAY OF AMMONIA: CPT

## 2018-11-25 PROCEDURE — 82746 ASSAY OF FOLIC ACID SERUM: CPT

## 2018-11-25 PROCEDURE — 2580000003 HC RX 258: Performed by: FAMILY MEDICINE

## 2018-11-25 PROCEDURE — 36415 COLL VENOUS BLD VENIPUNCTURE: CPT

## 2018-11-25 PROCEDURE — 84443 ASSAY THYROID STIM HORMONE: CPT

## 2018-11-25 PROCEDURE — 83605 ASSAY OF LACTIC ACID: CPT

## 2018-11-25 PROCEDURE — 93005 ELECTROCARDIOGRAM TRACING: CPT | Performed by: FAMILY MEDICINE

## 2018-11-25 PROCEDURE — 85025 COMPLETE CBC W/AUTO DIFF WBC: CPT

## 2018-11-25 PROCEDURE — 82607 VITAMIN B-12: CPT

## 2018-11-25 PROCEDURE — G0480 DRUG TEST DEF 1-7 CLASSES: HCPCS

## 2018-11-25 PROCEDURE — 83690 ASSAY OF LIPASE: CPT

## 2018-11-25 PROCEDURE — 2500000003 HC RX 250 WO HCPCS: Performed by: FAMILY MEDICINE

## 2018-11-25 PROCEDURE — 6360000002 HC RX W HCPCS: Performed by: FAMILY MEDICINE

## 2018-11-25 RX ORDER — LORAZEPAM 2 MG/ML
1 INJECTION INTRAMUSCULAR ONCE
Status: COMPLETED | OUTPATIENT
Start: 2018-11-25 | End: 2018-11-25

## 2018-11-25 RX ADMIN — LORAZEPAM 1 MG: 2 INJECTION INTRAMUSCULAR; INTRAVENOUS at 22:14

## 2018-11-25 RX ADMIN — FOLIC ACID: 5 INJECTION, SOLUTION INTRAMUSCULAR; INTRAVENOUS; SUBCUTANEOUS at 20:27

## 2018-11-26 PROBLEM — F10.939 ALCOHOL WITHDRAWAL SYNDROME WITH COMPLICATION (HCC): Status: ACTIVE | Noted: 2018-11-26

## 2018-11-26 LAB
BARBITURATE QUANTITATIVE URINE: NEGATIVE
ETHYL ALCOHOL, SERUM: 0.24 %
GLUCOSE BLD-MCNC: 100 MG/DL (ref 70–108)
GLUCOSE BLD-MCNC: 93 MG/DL (ref 70–108)
GLUCOSE BLD-MCNC: 96 MG/DL (ref 70–108)
MAGNESIUM: 1.8 MG/DL (ref 1.6–2.4)

## 2018-11-26 PROCEDURE — 6370000000 HC RX 637 (ALT 250 FOR IP): Performed by: HOSPITALIST

## 2018-11-26 PROCEDURE — 6360000002 HC RX W HCPCS: Performed by: INTERNAL MEDICINE

## 2018-11-26 PROCEDURE — 6360000002 HC RX W HCPCS: Performed by: HOSPITALIST

## 2018-11-26 PROCEDURE — 2580000003 HC RX 258: Performed by: INTERNAL MEDICINE

## 2018-11-26 PROCEDURE — 99233 SBSQ HOSP IP/OBS HIGH 50: CPT | Performed by: INTERNAL MEDICINE

## 2018-11-26 PROCEDURE — 6370000000 HC RX 637 (ALT 250 FOR IP): Performed by: INTERNAL MEDICINE

## 2018-11-26 PROCEDURE — G0480 DRUG TEST DEF 1-7 CLASSES: HCPCS

## 2018-11-26 PROCEDURE — 94640 AIRWAY INHALATION TREATMENT: CPT

## 2018-11-26 PROCEDURE — 80307 DRUG TEST PRSMV CHEM ANLYZR: CPT

## 2018-11-26 PROCEDURE — 93010 ELECTROCARDIOGRAM REPORT: CPT | Performed by: INTERNAL MEDICINE

## 2018-11-26 PROCEDURE — 83735 ASSAY OF MAGNESIUM: CPT

## 2018-11-26 PROCEDURE — 2580000003 HC RX 258: Performed by: HOSPITALIST

## 2018-11-26 PROCEDURE — 99223 1ST HOSP IP/OBS HIGH 75: CPT | Performed by: HOSPITALIST

## 2018-11-26 PROCEDURE — 2709999900 HC NON-CHARGEABLE SUPPLY

## 2018-11-26 PROCEDURE — 2140000000 HC CCU INTERMEDIATE R&B

## 2018-11-26 PROCEDURE — 36415 COLL VENOUS BLD VENIPUNCTURE: CPT

## 2018-11-26 PROCEDURE — 82948 REAGENT STRIP/BLOOD GLUCOSE: CPT

## 2018-11-26 RX ORDER — LORAZEPAM 2 MG/ML
3 INJECTION INTRAMUSCULAR
Status: DISCONTINUED | OUTPATIENT
Start: 2018-11-26 | End: 2018-11-29 | Stop reason: HOSPADM

## 2018-11-26 RX ORDER — ONDANSETRON 2 MG/ML
4 INJECTION INTRAMUSCULAR; INTRAVENOUS EVERY 6 HOURS PRN
Status: DISCONTINUED | OUTPATIENT
Start: 2018-11-26 | End: 2018-11-29 | Stop reason: HOSPADM

## 2018-11-26 RX ORDER — LORAZEPAM 2 MG/ML
1 INJECTION INTRAMUSCULAR
Status: DISCONTINUED | OUTPATIENT
Start: 2018-11-26 | End: 2018-11-29 | Stop reason: HOSPADM

## 2018-11-26 RX ORDER — PHYTONADIONE 10 MG/ML
10 INJECTION, EMULSION INTRAMUSCULAR; INTRAVENOUS; SUBCUTANEOUS ONCE
Status: DISCONTINUED | OUTPATIENT
Start: 2018-11-26 | End: 2018-11-26

## 2018-11-26 RX ORDER — ALBUTEROL SULFATE 90 UG/1
2 AEROSOL, METERED RESPIRATORY (INHALATION) EVERY 6 HOURS PRN
Status: DISCONTINUED | OUTPATIENT
Start: 2018-11-26 | End: 2018-11-29 | Stop reason: HOSPADM

## 2018-11-26 RX ORDER — LORAZEPAM 2 MG/1
4 TABLET ORAL
Status: DISCONTINUED | OUTPATIENT
Start: 2018-11-26 | End: 2018-11-29 | Stop reason: HOSPADM

## 2018-11-26 RX ORDER — LORAZEPAM 2 MG/ML
4 INJECTION INTRAMUSCULAR
Status: DISCONTINUED | OUTPATIENT
Start: 2018-11-26 | End: 2018-11-29 | Stop reason: HOSPADM

## 2018-11-26 RX ORDER — PROPRANOLOL HYDROCHLORIDE 20 MG/1
20 TABLET ORAL 2 TIMES DAILY
Status: DISCONTINUED | OUTPATIENT
Start: 2018-11-26 | End: 2018-11-26

## 2018-11-26 RX ORDER — LORAZEPAM 2 MG/ML
2 INJECTION INTRAMUSCULAR
Status: DISCONTINUED | OUTPATIENT
Start: 2018-11-26 | End: 2018-11-29 | Stop reason: HOSPADM

## 2018-11-26 RX ORDER — SODIUM CHLORIDE 0.9 % (FLUSH) 0.9 %
10 SYRINGE (ML) INJECTION PRN
Status: DISCONTINUED | OUTPATIENT
Start: 2018-11-26 | End: 2018-11-29 | Stop reason: HOSPADM

## 2018-11-26 RX ORDER — LORAZEPAM 1 MG/1
1 TABLET ORAL
Status: DISCONTINUED | OUTPATIENT
Start: 2018-11-26 | End: 2018-11-29 | Stop reason: HOSPADM

## 2018-11-26 RX ORDER — ONDANSETRON 2 MG/ML
4 INJECTION INTRAMUSCULAR; INTRAVENOUS EVERY 6 HOURS PRN
Status: CANCELLED | OUTPATIENT
Start: 2018-11-26

## 2018-11-26 RX ORDER — FOLIC ACID 1 MG/1
1 TABLET ORAL DAILY
Status: DISCONTINUED | OUTPATIENT
Start: 2018-11-26 | End: 2018-11-29 | Stop reason: HOSPADM

## 2018-11-26 RX ORDER — SODIUM CHLORIDE 0.9 % (FLUSH) 0.9 %
10 SYRINGE (ML) INJECTION EVERY 12 HOURS SCHEDULED
Status: DISCONTINUED | OUTPATIENT
Start: 2018-11-26 | End: 2018-11-29 | Stop reason: HOSPADM

## 2018-11-26 RX ORDER — OLANZAPINE 5 MG/1
5 TABLET ORAL NIGHTLY
Status: DISCONTINUED | OUTPATIENT
Start: 2018-11-26 | End: 2018-11-29 | Stop reason: HOSPADM

## 2018-11-26 RX ORDER — PANTOPRAZOLE SODIUM 40 MG/1
40 TABLET, DELAYED RELEASE ORAL
Status: DISCONTINUED | OUTPATIENT
Start: 2018-11-26 | End: 2018-11-29 | Stop reason: HOSPADM

## 2018-11-26 RX ORDER — SODIUM CHLORIDE 9 MG/ML
INJECTION, SOLUTION INTRAVENOUS CONTINUOUS
Status: DISCONTINUED | OUTPATIENT
Start: 2018-11-26 | End: 2018-11-27

## 2018-11-26 RX ORDER — NICOTINE 21 MG/24HR
1 PATCH, TRANSDERMAL 24 HOURS TRANSDERMAL DAILY
Status: DISCONTINUED | OUTPATIENT
Start: 2018-11-26 | End: 2018-11-29 | Stop reason: HOSPADM

## 2018-11-26 RX ORDER — THIAMINE MONONITRATE (VIT B1) 100 MG
100 TABLET ORAL DAILY
Status: DISCONTINUED | OUTPATIENT
Start: 2018-11-26 | End: 2018-11-28

## 2018-11-26 RX ORDER — LORAZEPAM 2 MG/1
2 TABLET ORAL
Status: DISCONTINUED | OUTPATIENT
Start: 2018-11-26 | End: 2018-11-29 | Stop reason: HOSPADM

## 2018-11-26 RX ORDER — VENLAFAXINE 50 MG/1
50 TABLET ORAL 3 TIMES DAILY
Status: DISCONTINUED | OUTPATIENT
Start: 2018-11-26 | End: 2018-11-29 | Stop reason: HOSPADM

## 2018-11-26 RX ORDER — LISINOPRIL 20 MG/1
20 TABLET ORAL DAILY
Status: DISCONTINUED | OUTPATIENT
Start: 2018-11-26 | End: 2018-11-29 | Stop reason: HOSPADM

## 2018-11-26 RX ADMIN — LORAZEPAM 2 MG: 2 TABLET ORAL at 17:39

## 2018-11-26 RX ADMIN — Medication 100 MG: at 08:51

## 2018-11-26 RX ADMIN — LORAZEPAM 2 MG: 2 TABLET ORAL at 02:05

## 2018-11-26 RX ADMIN — Medication 2 PUFF: at 16:15

## 2018-11-26 RX ADMIN — METOPROLOL TARTRATE 25 MG: 25 TABLET ORAL at 21:24

## 2018-11-26 RX ADMIN — SODIUM CHLORIDE: 9 INJECTION, SOLUTION INTRAVENOUS at 22:04

## 2018-11-26 RX ADMIN — VENLAFAXINE 50 MG: 50 TABLET ORAL at 15:19

## 2018-11-26 RX ADMIN — LISINOPRIL 20 MG: 20 TABLET ORAL at 11:52

## 2018-11-26 RX ADMIN — VENLAFAXINE 50 MG: 50 TABLET ORAL at 21:24

## 2018-11-26 RX ADMIN — PANTOPRAZOLE SODIUM 40 MG: 40 TABLET, DELAYED RELEASE ORAL at 08:51

## 2018-11-26 RX ADMIN — LORAZEPAM 1 MG: 1 TABLET ORAL at 03:05

## 2018-11-26 RX ADMIN — FOLIC ACID 1 MG: 1 TABLET ORAL at 08:51

## 2018-11-26 RX ADMIN — METOPROLOL TARTRATE 25 MG: 25 TABLET ORAL at 08:51

## 2018-11-26 RX ADMIN — LORAZEPAM 1 MG: 2 INJECTION INTRAMUSCULAR; INTRAVENOUS at 21:33

## 2018-11-26 RX ADMIN — OLANZAPINE 5 MG: 5 TABLET, FILM COATED ORAL at 21:24

## 2018-11-26 RX ADMIN — ENOXAPARIN SODIUM 40 MG: 40 INJECTION SUBCUTANEOUS at 08:51

## 2018-11-26 RX ADMIN — LORAZEPAM 1 MG: 2 INJECTION INTRAMUSCULAR; INTRAVENOUS at 10:11

## 2018-11-26 RX ADMIN — Medication 10 ML: at 21:29

## 2018-11-26 RX ADMIN — VENLAFAXINE 50 MG: 50 TABLET ORAL at 08:52

## 2018-11-26 RX ADMIN — SODIUM CHLORIDE: 9 INJECTION, SOLUTION INTRAVENOUS at 02:14

## 2018-11-26 RX ADMIN — PHYTONADIONE 10 MG: 10 INJECTION, EMULSION INTRAMUSCULAR; INTRAVENOUS; SUBCUTANEOUS at 08:53

## 2018-11-26 RX ADMIN — LORAZEPAM 1 MG: 1 TABLET ORAL at 15:18

## 2018-11-26 RX ADMIN — ONDANSETRON 4 MG: 2 INJECTION INTRAMUSCULAR; INTRAVENOUS at 10:02

## 2018-11-26 ASSESSMENT — PAIN SCALES - GENERAL
PAINLEVEL_OUTOF10: 0

## 2018-11-26 ASSESSMENT — ENCOUNTER SYMPTOMS
NAUSEA: 0
TROUBLE SWALLOWING: 0
SHORTNESS OF BREATH: 0
CHEST TIGHTNESS: 0
VOMITING: 0
VOICE CHANGE: 0
ABDOMINAL PAIN: 0

## 2018-11-26 ASSESSMENT — PATIENT HEALTH QUESTIONNAIRE - PHQ9: SUM OF ALL RESPONSES TO PHQ QUESTIONS 1-9: 9

## 2018-11-26 ASSESSMENT — LIFESTYLE VARIABLES: HISTORY_ALCOHOL_USE: YES

## 2018-11-26 NOTE — PLAN OF CARE
Problem: Falls - Risk of:  Goal: Will remain free from falls  Will remain free from falls   Outcome: Met This Shift  Patient up to the bathroom with standby assist.  Gait steady, states he has slight dizziness when up. Goal: Absence of physical injury  Absence of physical injury   Outcome: Met This Shift      Problem: Risk for Impaired Skin Integrity  Goal: Tissue integrity - skin and mucous membranes  Structural intactness and normal physiological function of skin and  mucous membranes. Outcome: Met This Shift  Patient without any open areas. Comments: Patient participating in plan of care and goal setting.

## 2018-11-26 NOTE — ED PROVIDER NOTES
Osmolality   Result Value Ref Range    Osmolality Calc 269.8 (L) 275.0 - 300 mOsmol/kg   Anion Gap   Result Value Ref Range    Anion Gap 16.0 8.0 - 16.0 meq/L   Reference lab sample   Result Value Ref Range    REFERENCE LOCATION see below     TEST(S) BEING PERFORMED AMMONIA     TEST RESULTS WITH UNITS 58 umol/L     Reference Range 11-35 umol/L     ORIGINAL SAMPLE NUMBER [id number]    EKG 12 Lead   Result Value Ref Range    Ventricular Rate 116 BPM    Atrial Rate 116 BPM    P-R Interval 118 ms    QRS Duration 90 ms    Q-T Interval 330 ms    QTc Calculation (Bazett) 458 ms    P Axis 51 degrees    R Axis 39 degrees    T Axis 58 degrees     Orders Placed This Encounter   Medications    sodium chloride 0.9 % 5,853 mL with folic acid 1 mg, adult multi-vitamin with vitamin k 10 mL, thiamine 100 mg    LORazepam (ATIVAN) injection 1 mg       MEDICAL DECISION MAKING:  This patient is a 44 y.o. Male with acute alcohol intoxication and being observed for withdrawal.  He was scheduled to have a repeat alcohol level drawn early this morning around 6 AM.  The patient continued to have increased tachycardia hypertension and agitation and required medication. She was scale went from 11-15 and I recommended consideration for admission. I contacted the hospitalist service and agree with assessment management and admission plans. FINAL IMPRESSION:  1. Alcohol dependence with physiological dependence, continuous ([address]    2.  Alcohol withdrawal syndrome with complication [facility])              Grazyna Ornelas MD  11/26/18 3783

## 2018-11-26 NOTE — ED PROVIDER NOTES
[address] [facility]       Chief Complaint   Patient presents with    Alcohol Problem       Nurses Notes reviewed and I agree except as noted in the HPI. HISTORY OF PRESENT ILLNESS    Radha Mora a 44 y.o. male who presents  To the ER requesting alcohol detoxification . He was admitted here three weeks ago and discharged. He relapsed immediately . He has been to several detox units before . He does estimate to be three of four times now and he relapses every time . His family is by the bed side and they state that he gets out , feels better , gets a job and within two or three paychecks he relapses again . Any time he gets money , he relapses again. Today he is here requesting detox . He has liver cirrhosis and alcoholic gastritis and in the past he has had high ammonia level but he has no other alcohol related complicaltions as far as he can tell. He has no other issues today . He has chronic low back pain but that is not an issue today. He has no /GI/NICOLAS/NEURO/CHEST/DERM symptoms at this time. Clinically he looks well in no acute distress . The last drink was 45 min prior to coming to the ER. He denies hallucinations//delutions/suicidial or homicidal ideations. He has no evidence to suggest wernikes encephalopathy at this time. REVIEW OF SYSTEMS     Review of Systems   Constitutional: Negative for chills and fever. HENT: Negative. Negative for congestion, trouble swallowing and voice change. Respiratory: Negative for chest tightness and shortness of breath. Cardiovascular: Negative. Negative for chest pain and leg swelling. Gastrointestinal: Negative for abdominal pain, nausea and vomiting. Musculoskeletal: Negative for neck pain and neck stiffness. Skin: Negative. Negative for rash and wound. All other systems reviewed and are negative.       PASTMEDICAL HISTORY   [unfilled]    SURGICAL HISTORY      has a past [facility] history that [facility] [place]  [phone]            DISCHARGE MEDICATIONS:  Current Discharge Medication List          (Please note that portions of this note were completed with avoice recognition program.  Efforts were made to edit the dictations but occasionally words are mis-transcribed.)    MD Symone Hannah MD  11/25/18 2011       Symone Girard MD  11/26/18 6983

## 2018-11-26 NOTE — ED NOTES
Pt calm, no longer diaphoretic.   Pt states the ativan did help     Blaire Donato, RN  11/25/18 9516

## 2018-11-26 NOTE — H&P
98.6 °F (37 °C) (Oral)   Resp 18   Ht 5' 11\" (1.803 m)   Wt 250 lb (113.4 kg)   SpO2 93%   BMI 34.87 kg/m²     General appearance:  Obese, no apparent distress, appears stated age and cooperative. HEENT:  Face flushed. Normal cephalic, atraumatic without obvious deformity. Pupils equal, round, and reactive to light. Extra ocular muscles intact. Conjunctivae/corneas clear. Dry oral mucosa  Neck: Supple, with full range of motion. No jugular venous distention. Trachea midline. Respiratory:  Normal respiratory effort. Clear to auscultation, bilaterally without Rales/Wheezes/Rhonchi. Cardiovascular:  Regular rate and rhythm with normal S1/S2 without murmurs, rubs or gallops. Abdomen: Soft, non-tender, non-distended with normal bowel sounds. Musculoskeletal:  No clubbing, cyanosis or edema bilaterally. Full range of motion without deformity. Skin: Skin color, texture, turgor normal.  No rashes or lesions. Neurologic:  Neurovascularly intact without any focal sensory/motor deficits. Cranial nerves: II-XII intact, grossly non-focal.  Psychiatric:  Alert and oriented, thought content appropriate, normal insight  Capillary Refill: Brisk,< 3 seconds   Peripheral Pulses: +2 palpable, equal bilaterally       Labs:     Recent Labs      11/25/18 1946   WBC  10.3   HGB  18.1*   HCT  49.9   PLT  285     Recent Labs      11/25/18 1946   NA  132*   K  4.0   CL  96*   CO2  20*   BUN  14   CREATININE  0.4   CALCIUM  8.6     Recent Labs      11/25/18 1946   AST  182*   ALT  191*   BILITOT  2.0*   ALKPHOS  193*     No results for input(s): INR in the last 72 hours. No results for input(s): [facility] in the last 72 hours.     Urinalysis:      Lab Results   Component Value Date    NITRU NEGATIVE 11/25/2018    WBCUA NONE SEEN 11/25/2018    BACTERIA NONE 11/25/2018    RBCUA NONE SEEN 11/25/2018    BLOODU SMALL 11/25/2018    SPECGRAV 1.010 05/06/2018    GLUCOSEU NEGATIVE 11/25/2018       Intake & Output:  No

## 2018-11-27 LAB
ALBUMIN SERPL-MCNC: 3.3 G/DL (ref 3.5–5.1)
ALP BLD-CCNC: 149 U/L (ref 38–126)
ALT SERPL-CCNC: 150 U/L (ref 11–66)
ANION GAP SERPL CALCULATED.3IONS-SCNC: 15 MEQ/L (ref 8–16)
AST SERPL-CCNC: 160 U/L (ref 5–40)
BASOPHILS # BLD: 0.8 %
BASOPHILS ABSOLUTE: 0 THOU/MM3 (ref 0–0.1)
BILIRUB SERPL-MCNC: 4.5 MG/DL (ref 0.3–1.2)
BUN BLDV-MCNC: 13 MG/DL (ref 7–22)
CALCIUM SERPL-MCNC: 8.3 MG/DL (ref 8.5–10.5)
CHLORIDE BLD-SCNC: 97 MEQ/L (ref 98–111)
CO2: 20 MEQ/L (ref 23–33)
CREAT SERPL-MCNC: 0.5 MG/DL (ref 0.4–1.2)
EOSINOPHIL # BLD: 0 %
EOSINOPHILS ABSOLUTE: 0 THOU/MM3 (ref 0–0.4)
ERYTHROCYTE [DISTWIDTH] IN BLOOD BY AUTOMATED COUNT: 13.6 % (ref 11.5–14.5)
ERYTHROCYTE [DISTWIDTH] IN BLOOD BY AUTOMATED COUNT: 49.1 FL (ref 35–45)
GFR SERPL CREATININE-BSD FRML MDRD: > 90 ML/MIN/1.73M2
GLUCOSE BLD-MCNC: 103 MG/DL (ref 70–108)
GLUCOSE BLD-MCNC: 110 MG/DL (ref 70–108)
GLUCOSE BLD-MCNC: 115 MG/DL (ref 70–108)
HCT VFR BLD CALC: 45 % (ref 42–52)
HEMOGLOBIN: 15.5 GM/DL (ref 14–18)
IMMATURE GRANS (ABS): 0.01 THOU/MM3 (ref 0–0.07)
IMMATURE GRANULOCYTES: 0.2 %
INR BLD: 0.98 (ref 0.85–1.13)
LYMPHOCYTES # BLD: 28.3 %
LYMPHOCYTES ABSOLUTE: 1.3 THOU/MM3 (ref 1–4.8)
MCH RBC QN AUTO: 33.4 PG (ref 26–33)
MCHC RBC AUTO-ENTMCNC: 34.4 GM/DL (ref 32.2–35.5)
MCV RBC AUTO: 97 FL (ref 80–94)
MONOCYTES # BLD: 9.3 %
MONOCYTES ABSOLUTE: 0.4 THOU/MM3 (ref 0.4–1.3)
NUCLEATED RED BLOOD CELLS: 0 /100 WBC
PLATELET # BLD: 170 THOU/MM3 (ref 130–400)
PMV BLD AUTO: 9.4 FL (ref 9.4–12.4)
POTASSIUM SERPL-SCNC: 3.7 MEQ/L (ref 3.5–5.2)
RBC # BLD: 4.64 MILL/MM3 (ref 4.7–6.1)
SEG NEUTROPHILS: 61.4 %
SEGMENTED NEUTROPHILS ABSOLUTE COUNT: 2.9 THOU/MM3 (ref 1.8–7.7)
SODIUM BLD-SCNC: 132 MEQ/L (ref 135–145)
TOTAL PROTEIN: 5.9 G/DL (ref 6.1–8)
WBC # BLD: 4.7 THOU/MM3 (ref 4.8–10.8)

## 2018-11-27 PROCEDURE — 2580000003 HC RX 258: Performed by: HOSPITALIST

## 2018-11-27 PROCEDURE — 6370000000 HC RX 637 (ALT 250 FOR IP): Performed by: INTERNAL MEDICINE

## 2018-11-27 PROCEDURE — 99232 SBSQ HOSP IP/OBS MODERATE 35: CPT | Performed by: INTERNAL MEDICINE

## 2018-11-27 PROCEDURE — 85610 PROTHROMBIN TIME: CPT

## 2018-11-27 PROCEDURE — 6360000002 HC RX W HCPCS: Performed by: HOSPITALIST

## 2018-11-27 PROCEDURE — 36415 COLL VENOUS BLD VENIPUNCTURE: CPT

## 2018-11-27 PROCEDURE — 82948 REAGENT STRIP/BLOOD GLUCOSE: CPT

## 2018-11-27 PROCEDURE — 85025 COMPLETE CBC W/AUTO DIFF WBC: CPT

## 2018-11-27 PROCEDURE — 6370000000 HC RX 637 (ALT 250 FOR IP): Performed by: HOSPITALIST

## 2018-11-27 PROCEDURE — 2140000000 HC CCU INTERMEDIATE R&B

## 2018-11-27 PROCEDURE — 80053 COMPREHEN METABOLIC PANEL: CPT

## 2018-11-27 RX ADMIN — Medication 100 MG: at 08:33

## 2018-11-27 RX ADMIN — PANTOPRAZOLE SODIUM 40 MG: 40 TABLET, DELAYED RELEASE ORAL at 05:15

## 2018-11-27 RX ADMIN — Medication 10 ML: at 21:51

## 2018-11-27 RX ADMIN — OLANZAPINE 5 MG: 5 TABLET, FILM COATED ORAL at 21:51

## 2018-11-27 RX ADMIN — ENOXAPARIN SODIUM 40 MG: 40 INJECTION SUBCUTANEOUS at 08:33

## 2018-11-27 RX ADMIN — VENLAFAXINE 50 MG: 50 TABLET ORAL at 15:43

## 2018-11-27 RX ADMIN — VENLAFAXINE 50 MG: 50 TABLET ORAL at 21:51

## 2018-11-27 RX ADMIN — FOLIC ACID 1 MG: 1 TABLET ORAL at 08:33

## 2018-11-27 RX ADMIN — METOPROLOL TARTRATE 25 MG: 25 TABLET ORAL at 21:51

## 2018-11-27 RX ADMIN — VENLAFAXINE 50 MG: 50 TABLET ORAL at 08:33

## 2018-11-27 RX ADMIN — LISINOPRIL 20 MG: 20 TABLET ORAL at 08:33

## 2018-11-27 RX ADMIN — METOPROLOL TARTRATE 25 MG: 25 TABLET ORAL at 08:33

## 2018-11-27 ASSESSMENT — PAIN SCALES - GENERAL
PAINLEVEL_OUTOF10: 0
PAINLEVEL_OUTOF10: 0

## 2018-11-27 NOTE — PROGRESS NOTES
SpO2 94%   BMI 34.21 kg/m²     General appearance: No apparent distress, appears stated age and cooperative. HEENT: Pupils equal, round, and reactive to light. Conjunctivae/corneas clear. Neck: Supple, with full range of motion. No jugular venous distention. Trachea midline. Respiratory:  Wheezes bilat  Cardiovascular:  tachycardic. Abdomen: Soft, protuberant, mild ruq pain  Musculoskeletal: passive and active ROM x 4 extremities. Skin: Skin color, texture, turgor normal.  No rashes or lesions. Neurologic:  Neurovascularly intact without any focal sensory/motor deficits. Cranial nerves: II-XII intact, grossly non-focal.  Psychiatric: Alert and oriented, thought content appropriate, normal insight  Capillary Refill: Brisk,< 3 seconds   Peripheral Pulses: +2 palpable, equal bilaterally       Labs:   Recent Labs      11/25/18 1946 11/27/18   0343   WBC  10.3  4.7*   HGB  18.1*  15.5   HCT  49.9  45.0   PLT  285  170     Recent Labs      11/25/18 1946 11/27/18   0343   NA  132*  132*   K  4.0  3.7   CL  96*  97*   CO2  20*  20*   BUN  14  13   CREATININE  0.4  0.5   CALCIUM  8.6  8.3*     Recent Labs      11/25/18 1946 11/27/18   0343   AST  182*  160*   ALT  191*  150*   BILITOT  2.0*  4.5*   ALKPHOS  193*  149*     Recent Labs      11/27/18   0343   INR  0.98     No results for input(s): Iftikhar Mae in the last 72 hours.     Microbiology:      Urinalysis:      Lab Results   Component Value Date    NITRU NEGATIVE 11/25/2018    WBCUA NONE SEEN 11/25/2018    BACTERIA NONE 11/25/2018    RBCUA NONE SEEN 11/25/2018    BLOODU SMALL 11/25/2018    SPECGRAV 1.010 05/06/2018    GLUCOSEU NEGATIVE 11/25/2018       Radiology:  No orders to display       DVT prophylaxis: [] Lovenox                                 [x] SCDs                                 [] SQ Heparin                                 [] Encourage ambulation           [] Already on Anticoagulation     Code Status: Full Code        Tele:   [x]

## 2018-11-28 LAB
GLUCOSE BLD-MCNC: 97 MG/DL (ref 70–108)
MAGNESIUM: 1.9 MG/DL (ref 1.6–2.4)
POTASSIUM SERPL-SCNC: 4.2 MEQ/L (ref 3.5–5.2)

## 2018-11-28 PROCEDURE — 6370000000 HC RX 637 (ALT 250 FOR IP): Performed by: HOSPITALIST

## 2018-11-28 PROCEDURE — 6370000000 HC RX 637 (ALT 250 FOR IP): Performed by: INTERNAL MEDICINE

## 2018-11-28 PROCEDURE — 83735 ASSAY OF MAGNESIUM: CPT

## 2018-11-28 PROCEDURE — 6360000002 HC RX W HCPCS: Performed by: HOSPITALIST

## 2018-11-28 PROCEDURE — 2580000003 HC RX 258: Performed by: HOSPITALIST

## 2018-11-28 PROCEDURE — 84132 ASSAY OF SERUM POTASSIUM: CPT

## 2018-11-28 PROCEDURE — 36415 COLL VENOUS BLD VENIPUNCTURE: CPT

## 2018-11-28 PROCEDURE — 1200000000 HC SEMI PRIVATE

## 2018-11-28 PROCEDURE — 82948 REAGENT STRIP/BLOOD GLUCOSE: CPT

## 2018-11-28 PROCEDURE — 99232 SBSQ HOSP IP/OBS MODERATE 35: CPT | Performed by: INTERNAL MEDICINE

## 2018-11-28 RX ORDER — LANOLIN ALCOHOL/MO/W.PET/CERES
3 CREAM (GRAM) TOPICAL NIGHTLY PRN
Status: DISCONTINUED | OUTPATIENT
Start: 2018-11-29 | End: 2018-11-28

## 2018-11-28 RX ORDER — KETOROLAC TROMETHAMINE 30 MG/ML
30 INJECTION, SOLUTION INTRAMUSCULAR; INTRAVENOUS ONCE
Status: DISCONTINUED | OUTPATIENT
Start: 2018-11-28 | End: 2018-11-28

## 2018-11-28 RX ORDER — THIAMINE MONONITRATE (VIT B1) 100 MG
200 TABLET ORAL DAILY
Status: DISCONTINUED | OUTPATIENT
Start: 2018-11-28 | End: 2018-11-29 | Stop reason: HOSPADM

## 2018-11-28 RX ADMIN — FOLIC ACID 1 MG: 1 TABLET ORAL at 09:59

## 2018-11-28 RX ADMIN — METOPROLOL TARTRATE 25 MG: 25 TABLET ORAL at 21:16

## 2018-11-28 RX ADMIN — METOPROLOL TARTRATE 25 MG: 25 TABLET ORAL at 09:59

## 2018-11-28 RX ADMIN — LORAZEPAM 2 MG: 2 TABLET ORAL at 11:51

## 2018-11-28 RX ADMIN — Medication 10 ML: at 21:16

## 2018-11-28 RX ADMIN — LORAZEPAM 2 MG: 2 TABLET ORAL at 17:11

## 2018-11-28 RX ADMIN — LISINOPRIL 20 MG: 20 TABLET ORAL at 09:59

## 2018-11-28 RX ADMIN — Medication 200 MG: at 10:06

## 2018-11-28 RX ADMIN — PANTOPRAZOLE SODIUM 40 MG: 40 TABLET, DELAYED RELEASE ORAL at 06:00

## 2018-11-28 RX ADMIN — VENLAFAXINE 50 MG: 50 TABLET ORAL at 09:59

## 2018-11-28 RX ADMIN — LORAZEPAM 1 MG: 1 TABLET ORAL at 13:40

## 2018-11-28 RX ADMIN — Medication 10 ML: at 10:06

## 2018-11-28 RX ADMIN — VENLAFAXINE 50 MG: 50 TABLET ORAL at 15:16

## 2018-11-28 RX ADMIN — LORAZEPAM 3 MG: 2 TABLET ORAL at 21:20

## 2018-11-28 RX ADMIN — VENLAFAXINE 50 MG: 50 TABLET ORAL at 21:15

## 2018-11-28 RX ADMIN — OLANZAPINE 5 MG: 5 TABLET, FILM COATED ORAL at 21:16

## 2018-11-28 RX ADMIN — ENOXAPARIN SODIUM 40 MG: 40 INJECTION SUBCUTANEOUS at 10:06

## 2018-11-28 ASSESSMENT — PAIN SCALES - GENERAL
PAINLEVEL_OUTOF10: 0

## 2018-11-28 NOTE — PROGRESS NOTES
Pulse 102   Temp 98.1 °F (36.7 °C) (Oral)   Resp 18   Ht 5' 11\" (1.803 m)   Wt 245 lb 4.8 oz (111.3 kg)   SpO2 93%   BMI 34.21 kg/m²     General appearance: No apparent distress, appears stated age and cooperative. HEENT: Pupils equal, round, and reactive to light. Conjunctivae/corneas clear. Neck: Supple, with full range of motion. No jugular venous distention. Trachea midline. Respiratory:  Wheezes bilat  Cardiovascular:  tachycardic. Abdomen: Soft, protuberant, mild ruq pain  Musculoskeletal: passive and active ROM x 4 extremities. Skin: Skin color, texture, turgor normal.  No rashes or lesions. Neurologic:  Neurovascularly intact without any focal sensory/motor deficits. Cranial nerves: II-XII intact, grossly non-focal.  Psychiatric: Alert and oriented, thought content appropriate, normal insight  Capillary Refill: Brisk,< 3 seconds   Peripheral Pulses: +2 palpable, equal bilaterally       Labs:   Recent Labs      11/25/18 1946 11/27/18   0343   WBC  10.3  4.7*   HGB  18.1*  15.5   HCT  49.9  45.0   PLT  285  170     Recent Labs      11/25/18 1946 11/27/18   0343   NA  132*  132*   K  4.0  3.7   CL  96*  97*   CO2  20*  20*   BUN  14  13   CREATININE  0.4  0.5   CALCIUM  8.6  8.3*     Recent Labs      11/25/18 1946 11/27/18   0343   AST  182*  160*   ALT  191*  150*   BILITOT  2.0*  4.5*   ALKPHOS  193*  149*     Recent Labs      11/27/18   0343   INR  0.98     No results for input(s): Simeon Kelly in the last 72 hours.     Microbiology:      Urinalysis:      Lab Results   Component Value Date    NITRU NEGATIVE 11/25/2018    WBCUA NONE SEEN 11/25/2018    BACTERIA NONE 11/25/2018    RBCUA NONE SEEN 11/25/2018    BLOODU SMALL 11/25/2018    SPECGRAV 1.010 05/06/2018    GLUCOSEU NEGATIVE 11/25/2018       Radiology:  No orders to display       DVT prophylaxis: [] Lovenox                                 [x] SCDs                                 [] SQ Heparin [] Encourage ambulation           [] Already on Anticoagulation     Code Status: Full Code        Tele:   [x] yes             [] no    Active Hospital Problems    Diagnosis Date Noted    Alcohol withdrawal syndrome with complication ([facility] [T46.967] 11/26/2018    Alcohol dependence with physiological dependence, continuous ([facility] [K79.98]     Alcoholic steatohepatitis [Q71.43] 05/14/2018    RUQ pain [R10.11] 05/13/2018    Type 2 diabetes mellitus without complication, without long-term current use of insulin ([facility] [E11.9] 08/28/2017    Alcohol abuse [F10.10] 07/09/2015    COPD (chronic obstructive pulmonary disease) ([facility] [J44.9] 03/19/2014    HTN (hypertension), benign [I10] 11/04/2011       Electronically signed by Jet Collins MD on 11/28/2018 at 6:28 AM

## 2018-11-29 VITALS
HEIGHT: 71 IN | SYSTOLIC BLOOD PRESSURE: 118 MMHG | OXYGEN SATURATION: 94 % | RESPIRATION RATE: 16 BRPM | WEIGHT: 241.3 LBS | BODY MASS INDEX: 33.78 KG/M2 | TEMPERATURE: 96.5 F | HEART RATE: 98 BPM | DIASTOLIC BLOOD PRESSURE: 55 MMHG

## 2018-11-29 LAB — GLUCOSE BLD-MCNC: 92 MG/DL (ref 70–108)

## 2018-11-29 PROCEDURE — 82948 REAGENT STRIP/BLOOD GLUCOSE: CPT

## 2018-11-29 PROCEDURE — 6370000000 HC RX 637 (ALT 250 FOR IP): Performed by: HOSPITALIST

## 2018-11-29 PROCEDURE — 6370000000 HC RX 637 (ALT 250 FOR IP): Performed by: INTERNAL MEDICINE

## 2018-11-29 PROCEDURE — 99238 HOSP IP/OBS DSCHRG MGMT 30/<: CPT | Performed by: INTERNAL MEDICINE

## 2018-11-29 PROCEDURE — 6360000002 HC RX W HCPCS: Performed by: HOSPITALIST

## 2018-11-29 RX ORDER — LANOLIN ALCOHOL/MO/W.PET/CERES
200 CREAM (GRAM) TOPICAL DAILY
Qty: 30 TABLET | Refills: 3 | COMMUNITY
Start: 2018-11-29 | End: 2019-12-30

## 2018-11-29 RX ORDER — FOLIC ACID 1 MG/1
1 TABLET ORAL DAILY
Qty: 30 TABLET | Refills: 3 | Status: SHIPPED | OUTPATIENT
Start: 2018-11-29 | End: 2019-08-08 | Stop reason: SDUPTHER

## 2018-11-29 RX ORDER — NICOTINE 21 MG/24HR
1 PATCH, TRANSDERMAL 24 HOURS TRANSDERMAL DAILY
Qty: 30 PATCH | Refills: 3 | COMMUNITY
Start: 2018-11-29 | End: 2019-03-02 | Stop reason: ALTCHOICE

## 2018-11-29 RX ADMIN — VENLAFAXINE 50 MG: 50 TABLET ORAL at 09:36

## 2018-11-29 RX ADMIN — PANTOPRAZOLE SODIUM 40 MG: 40 TABLET, DELAYED RELEASE ORAL at 05:00

## 2018-11-29 RX ADMIN — FOLIC ACID 1 MG: 1 TABLET ORAL at 09:36

## 2018-11-29 RX ADMIN — Medication 200 MG: at 09:36

## 2018-11-29 RX ADMIN — METOPROLOL TARTRATE 25 MG: 25 TABLET ORAL at 09:36

## 2018-11-29 RX ADMIN — ENOXAPARIN SODIUM 40 MG: 40 INJECTION SUBCUTANEOUS at 09:35

## 2018-11-29 RX ADMIN — LORAZEPAM 3 MG: 2 TABLET ORAL at 00:01

## 2018-11-29 RX ADMIN — LISINOPRIL 20 MG: 20 TABLET ORAL at 09:36

## 2018-11-29 ASSESSMENT — PAIN SCALES - GENERAL
PAINLEVEL_OUTOF10: 0
PAINLEVEL_OUTOF10: 0

## 2018-11-29 NOTE — DISCHARGE SUMMARY
TempSrc: Oral Oral Oral Oral   SpO2: 94% 95% 92% 94%   Weight:  241 lb 4.8 oz (109.5 kg)     Height:         Weight: Weight: 241 lb 4.8 oz (109.5 kg)     24 hour intake/output:  Intake/Output Summary (Last 24 hours) at 11/29/18 1225  Last data filed at 11/29/18 0447   Gross per 24 hour   Intake             2250 ml   Output                1 ml   Net             2249 ml         General appearance:  No apparent distress, appears stated age and cooperative. HEENT:  Normal cephalic, atraumatic without obvious deformity. Pupils equal, round, and reactive to light. Extra ocular muscles intact. Conjunctivae/corneas clear. Neck: Supple, with full range of motion. No jugular venous distention. Trachea midline. Respiratory:  Occasional wheezes  Cardiovascular:  tachycardic  Abdomen: Soft, non-tender, non-distended with normal bowel sounds. Musculoskeletal:  No clubbing, cyanosis or edema bilaterally. Full range of motion without deformity. Skin: Skin color, texture, turgor normal.  No rashes or lesions. Neurologic:  Neurovascularly intact without any focal sensory/motor deficits. Cranial nerves: II-XII intact, grossly non-focal.  Psychiatric:  Alert and oriented, thought content appropriate, normal insight  Capillary Refill: Brisk,< 3 seconds   Peripheral Pulses: +2 palpable, equal bilaterally       Labs:  For convenience and continuity at follow-up the following most recent labs are provided:      CBC:    Lab Results   Component Value Date    WBC 4.7 11/27/2018    HGB 15.5 11/27/2018    HCT 45.0 11/27/2018     11/27/2018       Renal:  Lab Results   Component Value Date     11/27/2018    K 4.2 11/28/2018    K 4.0 11/25/2018    CL 97 11/27/2018    CO2 20 11/27/2018    BUN 13 11/27/2018    CREATININE 0.5 11/27/2018    CALCIUM 8.3 11/27/2018    PHOS 3.0 11/04/2018         Significant Diagnostic Studies    Radiology:   No orders to display          Consults:     IP CONSULT TO SOCIAL WORK  IP CONSULT TO

## 2018-11-29 NOTE — CARE COORDINATION
11/26/18, 10:39 AM    DISCHARGE BARRIERS  SW consulted to help find a rehab for alcoholic. Addictions services has been consulted to help with this. Assessment for SW deferred at this time for that reason.
11/29/18, 8:35 AM    Spoke with Beatriz Maxwell; states he plans to return home with his mother, denies needs, and declines HH. Discharge plan discussed by  and . Discharge plan reviewed with patient/ family. Patient/ family verbalize understanding of discharge plan and are in agreement with plan. Understanding was demonstrated using the teach back method.
participate in local refill/ meds to beds program?  No  Type of Home Care Services:  None  Patient expects to be discharged to:  home with mother  Expected Discharge date:  11/28/18  Follow Up Appointment: Best Day/ Time: Monday AM    Discharge Plan: Pt from home with his mother. Pt denies discharge needs from case management. We will follow.  Evaluation: Addiction Services to see.

## 2018-11-29 NOTE — PROGRESS NOTES
CLINICAL PHARMACY NOTE: MEDS TO [address] Select Patient?: No  Total # of Prescriptions Filled: 2   The following medications were delivered to the patient:  · Folic acid 1mg  · Metoprolol tartrate 25mg  Total # of Interventions Completed: 2  Time Spent (min): 30    Additional Documentation:

## 2018-11-30 ENCOUNTER — TELEPHONE (OUTPATIENT)
Dept: FAMILY MEDICINE CLINIC | Age: 43
End: 2018-11-30

## 2018-12-06 ENCOUNTER — OFFICE VISIT (OUTPATIENT)
Dept: FAMILY MEDICINE CLINIC | Age: 43
End: 2018-12-06
Payer: MEDICAID

## 2018-12-06 VITALS
HEIGHT: 71 IN | DIASTOLIC BLOOD PRESSURE: 68 MMHG | HEART RATE: 112 BPM | WEIGHT: 241.4 LBS | SYSTOLIC BLOOD PRESSURE: 134 MMHG | BODY MASS INDEX: 33.8 KG/M2 | OXYGEN SATURATION: 97 % | RESPIRATION RATE: 14 BRPM

## 2018-12-06 DIAGNOSIS — F31.9 BIPOLAR 1 DISORDER (HCC): ICD-10-CM

## 2018-12-06 DIAGNOSIS — K70.40 ALCOHOLIC LIVER FAILURE (HCC): Primary | ICD-10-CM

## 2018-12-06 PROCEDURE — 99214 OFFICE O/P EST MOD 30 MIN: CPT | Performed by: NURSE PRACTITIONER

## 2018-12-06 PROCEDURE — 1111F DSCHRG MED/CURRENT MED MERGE: CPT | Performed by: NURSE PRACTITIONER

## 2018-12-06 RX ORDER — CHLORDIAZEPOXIDE HYDROCHLORIDE 25 MG/1
25 CAPSULE, GELATIN COATED ORAL 3 TIMES DAILY PRN
Qty: 18 CAPSULE | Refills: 2 | Status: SHIPPED | OUTPATIENT
Start: 2018-12-06 | End: 2018-12-12

## 2018-12-06 NOTE — PROGRESS NOTES
nightly 30 tablet 3    lisinopril-hydrochlorothiazide (PRINZIDE;ZESTORETIC) 20-25 MG per tablet Take 1 tablet by mouth daily 90 tablet 5    glucose monitoring kit (FREESTYLE) monitoring kit Glucometer with test strips and lancets. Check BS once daily  #100 strips and lancets 1 kit 5    blood glucose test strips (ASCENSIA AUTODISC VI;ONE TOUCH ULTRA TEST VI) strip Test as needed. Dispense One Touch verio Test Strips. Dx: Type 2 diabetes (250.00) 60 strip 11    venlafaxine (EFFEXOR) 50 MG tablet Take 1 tablet by mouth 3 times daily 90 tablet 5    albuterol sulfate  (90 Base) MCG/ACT inhaler Inhale 2 puffs into the lungs every 6 hours as needed for Wheezing 1 Inhaler 5    Cholecalciferol (VITAMIN D3) 5000 units TABS Take 5,000 Units by mouth once a week      Disulfiram 500 MG TABS Take 500 mg by mouth daily      glucose blood VI test strips (EXACTECH TEST) strip Glucometer with test strips plus lancets. #100. Check BS daily. 250.00 100 each 3        Medications patient taking as of now reconciled against medications ordered at time of hospital discharge: Yes    Chief Complaint   Patient presents with   [address] from [facility] 11-29-18, Alcohol detox        History of Present illness - Follow up of Hospital diagnosis(es):    Diagnosis Orders   1. Alcoholic liver failure (HCC)  Hepatic Function Panel    chlordiazePOXIDE (LIBRIUM) 25 MG capsule    Hepatic Function Panel    OH DISCHARGE MEDS RECONCILED W/ CURRENT OUTPATIENT MED LIST   2. Bipolar 1 disorder ([facility] [address]  OH DISCHARGE MEDS RECONCILED W/ CURRENT OUTPATIENT MED LIST         Inpatient course: Discharge summary reviewed- see chart. Interval history/Current status: pt doing poorly  Reports drank 24 beers yesterday. Looking at going back to rehab, not attending AA    A comprehensive review of systems was negative except for what was noted in the HPI.     Vitals:    12/06/18 1257   BP: 134/68   Site: Left Upper Arm   Position: Sitting

## 2018-12-10 ENCOUNTER — HOSPITAL ENCOUNTER (EMERGENCY)
Age: 43
Discharge: HOME | End: 2018-12-10
Attending: FAMILY MEDICINE
Payer: MEDICAID

## 2018-12-10 VITALS
RESPIRATION RATE: 18 BRPM | TEMPERATURE: 98.6 F | DIASTOLIC BLOOD PRESSURE: 70 MMHG | HEART RATE: 110 BPM | SYSTOLIC BLOOD PRESSURE: 144 MMHG | OXYGEN SATURATION: 97 %

## 2018-12-10 DIAGNOSIS — F10.920 ACUTE ALCOHOLIC INTOXICATION WITHOUT COMPLICATION (HCC): ICD-10-CM

## 2018-12-10 DIAGNOSIS — F10.20 ALCOHOLISM (HCC): ICD-10-CM

## 2018-12-10 DIAGNOSIS — K70.30 ALCOHOLIC CIRRHOSIS OF LIVER WITHOUT ASCITES (HCC): Primary | ICD-10-CM

## 2018-12-10 LAB
ALBUMIN SERPL-MCNC: 3.1 GM/DL (ref 3.4–5)
ALP BLD-CCNC: 243 U/L (ref 46–116)
ALT SERPL-CCNC: 455 U/L (ref 11–66)
ALT SERPL-CCNC: 554 U/L (ref 14–63)
AMORPHOUS: ABNORMAL
AMPHETAMINE+METHAMPHETAMIE: NEGATIVE
AMYLASE: 51 U/L (ref 25–115)
ANALYZED BY:: NORMAL
ANION GAP: 20 MEQ/L (ref 8–16)
AST SERPL-CCNC: 503 U/L (ref 15–37)
AST SERPL-CCNC: 530 U/L (ref 5–40)
BACTERIA: ABNORMAL
BARBITURATES: NEGATIVE
BENZODIAZEPINES: NEGATIVE
BILIRUB SERPL-MCNC: 3.1 MG/DL (ref 0.2–1)
BILIRUBIN DIRECT: 1.4 MG/DL (ref 0–0.2)
BILIRUBIN URINE: NEGATIVE
BLOOD, URINE: NEGATIVE
BUN BLDV-MCNC: 10 MG/DL (ref 7–18)
CANNABINOIDS: NEGATIVE
CASTS UA: ABNORMAL /LPF
CHARACTER, URINE: CLEAR
CHLORIDE BLD-SCNC: 97 MEQ/L (ref 98–107)
CO2: 17 MEQ/L (ref 21–32)
COCAINE METABOLITE: NEGATIVE
COLOR: YELLOW
CREAT SERPL-MCNC: 0.8 MG/DL (ref 0.6–1.3)
CRYSTALS, UA: ABNORMAL
DATE OF COLLECTION: NORMAL
DRAWN BY: NORMAL
EOSINOPHILS RELATIVE PERCENT: 4 % (ref 0–4)
EPITHELIAL CELLS, UA: ABNORMAL /HPF
ETHYL ALCOHOL: 0.34 % (GM/DL)
GFR, ESTIMATED: > 90 ML/MIN/1.73M2
GLUCOSE BLD-MCNC: 237 MG/DL (ref 74–106)
GLUCOSE, URINE: 100 MG/DL
HCT VFR BLD CALC: 53.4 % (ref 42–52)
HEMOGLOBIN: 18.3 GM/DL (ref 14–18)
KETONES, URINE: NEGATIVE
LEUKOCYTE ESTERASE, URINE: NEGATIVE
LIPASE: 346 U/L (ref 73–393)
LYMPHOCYTES # BLD: 28 % (ref 15–47)
Lab: 1510
Lab: NORMAL
MCH RBC QN AUTO: 34.6 PG (ref 27–31)
MCHC RBC AUTO-ENTMCNC: 34.3 GM/DL (ref 33–37)
MCV RBC AUTO: 100.8 FL (ref 80–94)
MONOCYTES: 6 % (ref 0–12)
MUCUS: ABNORMAL
NITRITE, URINE: NEGATIVE
OPIATES: NEGATIVE
PDW BLD-RTO: 13.7 % (ref 11.5–14.5)
PH UA: 6 (ref 5–9)
PHENCYCLIDINE: NEGATIVE
PLATELET # BLD: 318 THOU/MM3 (ref 130–400)
PLATELET ESTIMATE: ABNORMAL
PMV BLD AUTO: 6.8 FL (ref 7.4–10.4)
POC CALCIUM: 8.4 MG/DL (ref 8.5–10.1)
POTASSIUM SERPL-SCNC: 4.3 MEQ/L (ref 3.5–5.1)
PROTEIN UA: 30 MG/DL
RBC # BLD: 5.3 MILL/MM3 (ref 4.7–6.1)
RBC UA: ABNORMAL /HPF
REFLEX TO URINE C & S: ABNORMAL
SCAN OF BLOOD SMEAR: NORMAL
SEGS: 62 % (ref 43–75)
SODIUM BLD-SCNC: 134 MEQ/L (ref 136–145)
SPECIFIC GRAVITY UA: < 1.005 (ref 1–1.03)
TOTAL PROTEIN: 7.4 GM/DL (ref 6.4–8.2)
UROBILINOGEN, URINE: 0.2 EU/DL (ref 0–1)
WBC # BLD: 8.4 THOU/MM3 (ref 4.8–10.8)
WBC UA: ABNORMAL /HPF

## 2018-12-10 PROCEDURE — 85025 COMPLETE CBC W/AUTO DIFF WBC: CPT

## 2018-12-10 PROCEDURE — 2709999900 HC NON-CHARGEABLE SUPPLY

## 2018-12-10 PROCEDURE — 82040 ASSAY OF SERUM ALBUMIN: CPT

## 2018-12-10 PROCEDURE — 2580000003 HC RX 258: Performed by: FAMILY MEDICINE

## 2018-12-10 PROCEDURE — 83690 ASSAY OF LIPASE: CPT

## 2018-12-10 PROCEDURE — G0480 DRUG TEST DEF 1-7 CLASSES: HCPCS

## 2018-12-10 PROCEDURE — 82150 ASSAY OF AMYLASE: CPT

## 2018-12-10 PROCEDURE — 82247 BILIRUBIN TOTAL: CPT

## 2018-12-10 PROCEDURE — 96374 THER/PROPH/DIAG INJ IV PUSH: CPT

## 2018-12-10 PROCEDURE — 82248 BILIRUBIN DIRECT: CPT

## 2018-12-10 PROCEDURE — 84460 ALANINE AMINO (ALT) (SGPT): CPT

## 2018-12-10 PROCEDURE — 81001 URINALYSIS AUTO W/SCOPE: CPT

## 2018-12-10 PROCEDURE — 80048 BASIC METABOLIC PNL TOTAL CA: CPT

## 2018-12-10 PROCEDURE — 84155 ASSAY OF PROTEIN SERUM: CPT

## 2018-12-10 PROCEDURE — 84450 TRANSFERASE (AST) (SGOT): CPT

## 2018-12-10 PROCEDURE — 99284 EMERGENCY DEPT VISIT MOD MDM: CPT

## 2018-12-10 PROCEDURE — 36415 COLL VENOUS BLD VENIPUNCTURE: CPT

## 2018-12-10 PROCEDURE — 80305 DRUG TEST PRSMV DIR OPT OBS: CPT

## 2018-12-10 PROCEDURE — 6360000002 HC RX W HCPCS: Performed by: FAMILY MEDICINE

## 2018-12-10 PROCEDURE — 84075 ASSAY ALKALINE PHOSPHATASE: CPT

## 2018-12-10 RX ORDER — 0.9 % SODIUM CHLORIDE 0.9 %
1000 INTRAVENOUS SOLUTION INTRAVENOUS ONCE
Status: COMPLETED | OUTPATIENT
Start: 2018-12-10 | End: 2018-12-10

## 2018-12-10 RX ORDER — THIAMINE HYDROCHLORIDE 100 MG/ML
100 INJECTION, SOLUTION INTRAMUSCULAR; INTRAVENOUS ONCE
Status: DISCONTINUED | OUTPATIENT
Start: 2018-12-10 | End: 2018-12-10

## 2018-12-10 RX ORDER — THIAMINE HYDROCHLORIDE 100 MG/ML
100 INJECTION, SOLUTION INTRAMUSCULAR; INTRAVENOUS ONCE
Status: COMPLETED | OUTPATIENT
Start: 2018-12-10 | End: 2018-12-10

## 2018-12-10 RX ADMIN — THIAMINE HYDROCHLORIDE 100 MG: 100 INJECTION, SOLUTION INTRAMUSCULAR; INTRAVENOUS at 15:15

## 2018-12-10 RX ADMIN — SODIUM CHLORIDE 1000 ML: 9 INJECTION, SOLUTION INTRAVENOUS at 15:15

## 2018-12-10 NOTE — ED NOTES
Resting quietly, denies complaints. Family at bedside.      [facility], [place], RN  12/10/18 [id number]

## 2018-12-10 NOTE — ED NOTES
Voided a large amount of urine. Watching television at this time. Denies complaintas.      Miguelangel Brannon RN  12/10/18 4359

## 2018-12-11 NOTE — ED PROVIDER NOTES
3    lisinopril-hydrochlorothiazide (PRINZIDE;ZESTORETIC) 20-25 MG per tablet Take 1 tablet by mouth daily 90 tablet 5    glucose monitoring kit (FREESTYLE) monitoring kit Glucometer with test strips and lancets. Check BS once daily  #100 strips and lancets 1 kit 5    blood glucose test strips (ASCENSIA AUTODISC VI;ONE TOUCH ULTRA TEST VI) strip Test as needed. Dispense One Touch verio Test Strips. Dx: Type 2 diabetes (250.00) 60 strip 11    albuterol sulfate  (90 Base) MCG/ACT inhaler Inhale 2 puffs into the lungs every 6 hours as needed for Wheezing 1 Inhaler 5    Cholecalciferol (VITAMIN D3) 5000 units TABS Take 5,000 Units by mouth once a week      Disulfiram 500 MG TABS Take 500 mg by mouth daily      glucose blood VI test strips (EXACTECH TEST) strip Glucometer with test strips plus lancets. #100. Check BS daily.  250.00 100 each 3       ALLERGIES    No Known Allergies    FAMILY HISTORY    Family History   Problem Relation Age of Onset    High Blood Pressure Father     Cancer Father         Lung Cancer    Alcohol Abuse Father     High Blood Pressure Brother     Diabetes Mother     Heart Disease Mother         Stent Placement    Arthritis Mother     Depression Mother     High Blood Pressure Mother     High Cholesterol Mother     Alcohol Abuse Paternal Uncle        SOCIAL HISTORY    Social History     Social History    Marital status: Single     Spouse name: N/A    Number of children: N/A    Years of education: N/A     Occupational History     [employer]     Social History Main Topics    Smoking status: Current Every Day Smoker     Packs/day: 2.00     Years: 27.00     Types: E-Cigarettes    Smokeless tobacco: Former User      Comment: vape    Alcohol use Yes      Comment: been drinking 10days straight atleast, last beer was 30mins pta    Drug use: No    Sexual activity: Not Asked     Other Topics Concern    None     Social History Narrative    None PHYSICAL EXAM    VITAL SIGNS: BP (!) 144/70   Pulse 110   Temp 98.6 °F (37 °C) (Oral)   Resp 18   SpO2 97%   Constitutional:  Well developed, well nourished, no acute distress, non-toxic appearance   Eyes:   conjunctiva normal   HENT:  Normal ears normal nose normal  Respiratory:  Clear auscultation  Cardiovascular:  Normal rate, normal rhythm, no murmurs, no gallops, no rubs   ABD  soft nontender nondistended no rebound or guarding  Musculoskeletal:  No edema   Integument:  Well hydrated, no rash     RADIOLOGY/PROCEDURES    No results found. ED COURSE & MEDICAL DECISION MAKING    Pertinent Labs & Imaging studies reviewed. (See chart for details)  his liver panel is up. Alcohol 0.34 which is much more than 6 beers he stated he does drink electrolytes. He is somewhat anemic. Had an extensive conversation with the patient and his mother about his will to stop drinking and he never voiced a desire to stop drinking. Mother said that he had a Y a few weeks ago in [place] that's pending court case is spoke to  again [employer] [facility] 1 the phone number to place an mother E the second one at [facility] in [place]. Nahid patient and/or mother needs to call and get an appointment for detox.   And and possible inpatient treatment for his alcoholism when he does so desires  This case with the patient and his mother about his continued drinking will cause increased cirrhosis and liver failure and they voiced understanding  FINAL IMPRESSION    Acute alcohol intoxication  Cirrhosis  Alcoholism        Plan  The numbers to mother in [facility] to detox and alcohol rehab and given he is to stop drinking continues medicine follow-up one to 2 days return here sooner for any type of problems change pain or concern     Melina Lacey MD  12/10/18 2653

## 2018-12-14 ENCOUNTER — HOSPITAL ENCOUNTER (INPATIENT)
Age: 43
LOS: 2 days | Discharge: HOME | DRG: 280 | End: 2018-12-16
Attending: INTERNAL MEDICINE | Admitting: INTERNAL MEDICINE
Payer: MEDICAID

## 2018-12-14 ENCOUNTER — APPOINTMENT (OUTPATIENT)
Dept: ULTRASOUND IMAGING | Age: 43
DRG: 280 | End: 2018-12-14
Payer: MEDICAID

## 2018-12-14 ENCOUNTER — APPOINTMENT (OUTPATIENT)
Dept: CT IMAGING | Age: 43
DRG: 280 | End: 2018-12-14
Payer: MEDICAID

## 2018-12-14 DIAGNOSIS — E87.1 HYPONATREMIA: Primary | ICD-10-CM

## 2018-12-14 DIAGNOSIS — K70.10 ACUTE ALCOHOLIC HEPATITIS: ICD-10-CM

## 2018-12-14 DIAGNOSIS — R11.2 NON-INTRACTABLE VOMITING WITH NAUSEA, UNSPECIFIED VOMITING TYPE: ICD-10-CM

## 2018-12-14 PROBLEM — K74.60 DECOMPENSATED LIVER DISEASE: Status: ACTIVE | Noted: 2018-12-14

## 2018-12-14 PROBLEM — K74.69 DECOMPENSATED LIVER DISEASE (HCC): Status: ACTIVE | Noted: 2018-12-14

## 2018-12-14 PROBLEM — K70.30 ALCOHOLIC CIRRHOSIS (HCC): Status: ACTIVE | Noted: 2018-12-14

## 2018-12-14 PROBLEM — K72.90 DECOMPENSATED LIVER DISEASE: Status: ACTIVE | Noted: 2018-12-14

## 2018-12-14 LAB
ALBUMIN SERPL-MCNC: 3.4 G/DL (ref 3.5–5.1)
ALP BLD-CCNC: 199 U/L (ref 38–126)
ALT SERPL-CCNC: 370 U/L (ref 11–66)
AMMONIA: NORMAL UMOL/L (ref 11–60)
AMPHETAMINE+METHAMPHETAMINE URINE SCREEN: NEGATIVE
AMYLASE: 67 U/L (ref 20–104)
ANION GAP SERPL CALCULATED.3IONS-SCNC: 16 MEQ/L (ref 8–16)
AST SERPL-CCNC: 372 U/L (ref 5–40)
BACTERIA: ABNORMAL /HPF
BARBITURATE QUANTITATIVE URINE: NEGATIVE
BASOPHILS # BLD: 0.4 %
BASOPHILS ABSOLUTE: 0 THOU/MM3 (ref 0–0.1)
BENZODIAZEPINE QUANTITATIVE URINE: POSITIVE
BILIRUB SERPL-MCNC: 9.1 MG/DL (ref 0.3–1.2)
BILIRUBIN URINE: ABNORMAL
BLOOD, URINE: NEGATIVE
BUN BLDV-MCNC: 8 MG/DL (ref 7–22)
CALCIUM SERPL-MCNC: 8.6 MG/DL (ref 8.5–10.5)
CANNABINOID QUANTITATIVE URINE: NEGATIVE
CASTS 2: ABNORMAL /LPF
CASTS UA: ABNORMAL /LPF
CHARACTER, URINE: ABNORMAL
CHLORIDE BLD-SCNC: 91 MEQ/L (ref 98–111)
CO2: 22 MEQ/L (ref 23–33)
COCAINE METABOLITE QUANTITATIVE URINE: NEGATIVE
COLOR: ABNORMAL
CREAT SERPL-MCNC: 0.4 MG/DL (ref 0.4–1.2)
CRYSTALS, UA: ABNORMAL
EOSINOPHIL # BLD: 0 %
EOSINOPHILS ABSOLUTE: 0 THOU/MM3 (ref 0–0.4)
EPITHELIAL CELLS, UA: ABNORMAL /HPF
ERYTHROCYTE [DISTWIDTH] IN BLOOD BY AUTOMATED COUNT: 14.1 % (ref 11.5–14.5)
ERYTHROCYTE [DISTWIDTH] IN BLOOD BY AUTOMATED COUNT: 49.1 FL (ref 35–45)
ETHYL ALCOHOL, SERUM: < 0.01 %
GFR SERPL CREATININE-BSD FRML MDRD: > 90 ML/MIN/1.73M2
GLUCOSE BLD-MCNC: 100 MG/DL (ref 70–108)
GLUCOSE BLD-MCNC: 118 MG/DL (ref 70–108)
GLUCOSE BLD-MCNC: 86 MG/DL (ref 70–108)
GLUCOSE URINE: NEGATIVE MG/DL
HCT VFR BLD CALC: 44.6 % (ref 42–52)
HEMOGLOBIN: 16.1 GM/DL (ref 14–18)
ICTOTEST: POSITIVE
IMMATURE GRANS (ABS): 0.02 THOU/MM3 (ref 0–0.07)
IMMATURE GRANULOCYTES: 0.4 %
INR BLD: 1.04 (ref 0.85–1.13)
KETONES, URINE: 15
LACTIC ACID: 0.9 MMOL/L (ref 0.5–2.2)
LEUKOCYTE ESTERASE, URINE: ABNORMAL
LIPASE: 48.7 U/L (ref 5.6–51.3)
LYMPHOCYTES # BLD: 12 %
LYMPHOCYTES ABSOLUTE: 0.6 THOU/MM3 (ref 1–4.8)
MCH RBC QN AUTO: 34.2 PG (ref 26–33)
MCHC RBC AUTO-ENTMCNC: 36.1 GM/DL (ref 32.2–35.5)
MCV RBC AUTO: 94.7 FL (ref 80–94)
MISCELLANEOUS 2: ABNORMAL
MONOCYTES # BLD: 5.7 %
MONOCYTES ABSOLUTE: 0.3 THOU/MM3 (ref 0.4–1.3)
NITRITE, URINE: NEGATIVE
NUCLEATED RED BLOOD CELLS: 0 /100 WBC
OPIATES, URINE: NEGATIVE
ORIGINAL SAMPLE NUMBER: NORMAL
OSMOLALITY CALCULATION: 258.4 MOSMOL/KG (ref 275–300)
OXYCODONE: NEGATIVE
PH UA: 6
PHENCYCLIDINE QUANTITATIVE URINE: NEGATIVE
PLATELET # BLD: 123 THOU/MM3 (ref 130–400)
PMV BLD AUTO: 10.2 FL (ref 9.4–12.4)
POTASSIUM SERPL-SCNC: 3.9 MEQ/L (ref 3.5–5.2)
PROTEIN UA: 30
RBC # BLD: 4.71 MILL/MM3 (ref 4.7–6.1)
RBC URINE: ABNORMAL /HPF
REFERENCE LOCATION: NORMAL
REFERENCE RANGE: NORMAL
RENAL EPITHELIAL, UA: ABNORMAL
SEG NEUTROPHILS: 81.5 %
SEGMENTED NEUTROPHILS ABSOLUTE COUNT: 4.3 THOU/MM3 (ref 1.8–7.7)
SODIUM BLD-SCNC: 129 MEQ/L (ref 135–145)
SPECIFIC GRAVITY, URINE: 1.01 (ref 1–1.03)
TEST RESULTS WITH UNITS: 91
TEST(S) BEING PERFORMED: NORMAL
TOTAL PROTEIN: 6.2 G/DL (ref 6.1–8)
UROBILINOGEN, URINE: 1 EU/DL
WBC # BLD: 5.3 THOU/MM3 (ref 4.8–10.8)
WBC UA: ABNORMAL /HPF
YEAST: ABNORMAL

## 2018-12-14 PROCEDURE — 83690 ASSAY OF LIPASE: CPT

## 2018-12-14 PROCEDURE — 85610 PROTHROMBIN TIME: CPT

## 2018-12-14 PROCEDURE — 2580000003 HC RX 258: Performed by: INTERNAL MEDICINE

## 2018-12-14 PROCEDURE — 2580000003 HC RX 258: Performed by: PHYSICIAN ASSISTANT

## 2018-12-14 PROCEDURE — 2709999900 HC NON-CHARGEABLE SUPPLY

## 2018-12-14 PROCEDURE — 99223 1ST HOSP IP/OBS HIGH 75: CPT | Performed by: INTERNAL MEDICINE

## 2018-12-14 PROCEDURE — 85025 COMPLETE CBC W/AUTO DIFF WBC: CPT

## 2018-12-14 PROCEDURE — 82140 ASSAY OF AMMONIA: CPT

## 2018-12-14 PROCEDURE — 99285 EMERGENCY DEPT VISIT HI MDM: CPT

## 2018-12-14 PROCEDURE — 81001 URINALYSIS AUTO W/SCOPE: CPT

## 2018-12-14 PROCEDURE — 94760 N-INVAS EAR/PLS OXIMETRY 1: CPT

## 2018-12-14 PROCEDURE — 70450 CT HEAD/BRAIN W/O DYE: CPT

## 2018-12-14 PROCEDURE — 80307 DRUG TEST PRSMV CHEM ANLYZR: CPT

## 2018-12-14 PROCEDURE — 96375 TX/PRO/DX INJ NEW DRUG ADDON: CPT

## 2018-12-14 PROCEDURE — 6360000002 HC RX W HCPCS: Performed by: INTERNAL MEDICINE

## 2018-12-14 PROCEDURE — 82948 REAGENT STRIP/BLOOD GLUCOSE: CPT

## 2018-12-14 PROCEDURE — 96374 THER/PROPH/DIAG INJ IV PUSH: CPT

## 2018-12-14 PROCEDURE — S0028 INJECTION, FAMOTIDINE, 20 MG: HCPCS | Performed by: PHYSICIAN ASSISTANT

## 2018-12-14 PROCEDURE — 6360000002 HC RX W HCPCS: Performed by: PHYSICIAN ASSISTANT

## 2018-12-14 PROCEDURE — 80053 COMPREHEN METABOLIC PANEL: CPT

## 2018-12-14 PROCEDURE — 6370000000 HC RX 637 (ALT 250 FOR IP): Performed by: INTERNAL MEDICINE

## 2018-12-14 PROCEDURE — 2500000003 HC RX 250 WO HCPCS: Performed by: PHYSICIAN ASSISTANT

## 2018-12-14 PROCEDURE — 36415 COLL VENOUS BLD VENIPUNCTURE: CPT

## 2018-12-14 PROCEDURE — 82150 ASSAY OF AMYLASE: CPT

## 2018-12-14 PROCEDURE — G0480 DRUG TEST DEF 1-7 CLASSES: HCPCS

## 2018-12-14 PROCEDURE — 1200000003 HC TELEMETRY R&B

## 2018-12-14 PROCEDURE — 83605 ASSAY OF LACTIC ACID: CPT

## 2018-12-14 PROCEDURE — 76705 ECHO EXAM OF ABDOMEN: CPT

## 2018-12-14 RX ORDER — NICOTINE 21 MG/24HR
1 PATCH, TRANSDERMAL 24 HOURS TRANSDERMAL DAILY
Status: DISCONTINUED | OUTPATIENT
Start: 2018-12-14 | End: 2018-12-14 | Stop reason: SDUPTHER

## 2018-12-14 RX ORDER — DOCUSATE SODIUM 100 MG/1
100 CAPSULE, LIQUID FILLED ORAL 2 TIMES DAILY
Status: DISCONTINUED | OUTPATIENT
Start: 2018-12-14 | End: 2018-12-16 | Stop reason: HOSPADM

## 2018-12-14 RX ORDER — LORAZEPAM 1 MG/1
1 TABLET ORAL
Status: DISCONTINUED | OUTPATIENT
Start: 2018-12-14 | End: 2018-12-16 | Stop reason: HOSPADM

## 2018-12-14 RX ORDER — SODIUM CHLORIDE 0.9 % (FLUSH) 0.9 %
10 SYRINGE (ML) INJECTION PRN
Status: DISCONTINUED | OUTPATIENT
Start: 2018-12-14 | End: 2018-12-14 | Stop reason: SDUPTHER

## 2018-12-14 RX ORDER — SODIUM CHLORIDE 9 MG/ML
INJECTION, SOLUTION INTRAVENOUS CONTINUOUS
Status: DISCONTINUED | OUTPATIENT
Start: 2018-12-14 | End: 2018-12-16 | Stop reason: HOSPADM

## 2018-12-14 RX ORDER — LORAZEPAM 2 MG/ML
4 INJECTION INTRAMUSCULAR
Status: DISCONTINUED | OUTPATIENT
Start: 2018-12-14 | End: 2018-12-16 | Stop reason: HOSPADM

## 2018-12-14 RX ORDER — VENLAFAXINE 50 MG/1
50 TABLET ORAL 3 TIMES DAILY
Status: DISCONTINUED | OUTPATIENT
Start: 2018-12-14 | End: 2018-12-16 | Stop reason: HOSPADM

## 2018-12-14 RX ORDER — LORAZEPAM 2 MG/ML
2 INJECTION INTRAMUSCULAR
Status: DISCONTINUED | OUTPATIENT
Start: 2018-12-14 | End: 2018-12-16 | Stop reason: HOSPADM

## 2018-12-14 RX ORDER — LORAZEPAM 2 MG/ML
3 INJECTION INTRAMUSCULAR
Status: DISCONTINUED | OUTPATIENT
Start: 2018-12-14 | End: 2018-12-16 | Stop reason: HOSPADM

## 2018-12-14 RX ORDER — PROPRANOLOL HYDROCHLORIDE 20 MG/1
20 TABLET ORAL 2 TIMES DAILY
Status: ON HOLD | COMMUNITY
End: 2018-12-14 | Stop reason: ALTCHOICE

## 2018-12-14 RX ORDER — 0.9 % SODIUM CHLORIDE 0.9 %
1000 INTRAVENOUS SOLUTION INTRAVENOUS ONCE
Status: COMPLETED | OUTPATIENT
Start: 2018-12-14 | End: 2018-12-14

## 2018-12-14 RX ORDER — SODIUM CHLORIDE 0.9 % (FLUSH) 0.9 %
10 SYRINGE (ML) INJECTION EVERY 12 HOURS SCHEDULED
Status: DISCONTINUED | OUTPATIENT
Start: 2018-12-14 | End: 2018-12-14 | Stop reason: SDUPTHER

## 2018-12-14 RX ORDER — LORAZEPAM 1 MG/1
3 TABLET ORAL
Status: DISCONTINUED | OUTPATIENT
Start: 2018-12-14 | End: 2018-12-16 | Stop reason: HOSPADM

## 2018-12-14 RX ORDER — FOLIC ACID 1 MG/1
1 TABLET ORAL DAILY
Status: DISCONTINUED | OUTPATIENT
Start: 2018-12-18 | End: 2018-12-16 | Stop reason: HOSPADM

## 2018-12-14 RX ORDER — SODIUM CHLORIDE 0.9 % (FLUSH) 0.9 %
10 SYRINGE (ML) INJECTION EVERY 12 HOURS SCHEDULED
Status: DISCONTINUED | OUTPATIENT
Start: 2018-12-14 | End: 2018-12-16 | Stop reason: HOSPADM

## 2018-12-14 RX ORDER — SODIUM CHLORIDE 0.9 % (FLUSH) 0.9 %
10 SYRINGE (ML) INJECTION PRN
Status: DISCONTINUED | OUTPATIENT
Start: 2018-12-14 | End: 2018-12-16 | Stop reason: HOSPADM

## 2018-12-14 RX ORDER — NICOTINE 21 MG/24HR
1 PATCH, TRANSDERMAL 24 HOURS TRANSDERMAL DAILY
Status: DISCONTINUED | OUTPATIENT
Start: 2018-12-14 | End: 2018-12-16 | Stop reason: HOSPADM

## 2018-12-14 RX ORDER — ALBUTEROL SULFATE 90 UG/1
2 AEROSOL, METERED RESPIRATORY (INHALATION) EVERY 6 HOURS PRN
COMMUNITY
End: 2019-08-24 | Stop reason: SDUPTHER

## 2018-12-14 RX ORDER — THIAMINE MONONITRATE (VIT B1) 100 MG
200 TABLET ORAL DAILY
Status: DISCONTINUED | OUTPATIENT
Start: 2018-12-18 | End: 2018-12-16 | Stop reason: HOSPADM

## 2018-12-14 RX ORDER — OLANZAPINE 5 MG/1
5 TABLET ORAL NIGHTLY
Status: DISCONTINUED | OUTPATIENT
Start: 2018-12-14 | End: 2018-12-16 | Stop reason: HOSPADM

## 2018-12-14 RX ORDER — LORAZEPAM 1 MG/1
4 TABLET ORAL
Status: DISCONTINUED | OUTPATIENT
Start: 2018-12-14 | End: 2018-12-16 | Stop reason: HOSPADM

## 2018-12-14 RX ORDER — LORAZEPAM 2 MG/ML
1 INJECTION INTRAMUSCULAR
Status: DISCONTINUED | OUTPATIENT
Start: 2018-12-14 | End: 2018-12-16 | Stop reason: HOSPADM

## 2018-12-14 RX ORDER — ALBUTEROL SULFATE 90 UG/1
2 AEROSOL, METERED RESPIRATORY (INHALATION) EVERY 6 HOURS PRN
Status: DISCONTINUED | OUTPATIENT
Start: 2018-12-14 | End: 2018-12-16 | Stop reason: HOSPADM

## 2018-12-14 RX ORDER — ONDANSETRON 2 MG/ML
4 INJECTION INTRAMUSCULAR; INTRAVENOUS ONCE
Status: COMPLETED | OUTPATIENT
Start: 2018-12-14 | End: 2018-12-14

## 2018-12-14 RX ORDER — LORAZEPAM 1 MG/1
2 TABLET ORAL
Status: DISCONTINUED | OUTPATIENT
Start: 2018-12-14 | End: 2018-12-16 | Stop reason: HOSPADM

## 2018-12-14 RX ORDER — ONDANSETRON 2 MG/ML
4 INJECTION INTRAMUSCULAR; INTRAVENOUS EVERY 6 HOURS PRN
Status: DISCONTINUED | OUTPATIENT
Start: 2018-12-14 | End: 2018-12-16 | Stop reason: HOSPADM

## 2018-12-14 RX ADMIN — VENLAFAXINE 50 MG: 50 TABLET ORAL at 18:12

## 2018-12-14 RX ADMIN — SODIUM CHLORIDE 1000 ML: 9 INJECTION, SOLUTION INTRAVENOUS at 10:25

## 2018-12-14 RX ADMIN — FOLIC ACID: 5 INJECTION, SOLUTION INTRAMUSCULAR; INTRAVENOUS; SUBCUTANEOUS at 12:55

## 2018-12-14 RX ADMIN — LORAZEPAM 2 MG: 1 TABLET ORAL at 15:03

## 2018-12-14 RX ADMIN — FAMOTIDINE 20 MG: 10 INJECTION, SOLUTION INTRAVENOUS at 10:25

## 2018-12-14 RX ADMIN — ONDANSETRON 4 MG: 2 INJECTION INTRAMUSCULAR; INTRAVENOUS at 10:25

## 2018-12-14 RX ADMIN — METOPROLOL TARTRATE 25 MG: 25 TABLET, FILM COATED ORAL at 21:20

## 2018-12-14 RX ADMIN — OLANZAPINE 5 MG: 5 TABLET, FILM COATED ORAL at 21:21

## 2018-12-14 RX ADMIN — ENOXAPARIN SODIUM 40 MG: 40 INJECTION SUBCUTANEOUS at 18:16

## 2018-12-14 RX ADMIN — SODIUM CHLORIDE: 9 INJECTION, SOLUTION INTRAVENOUS at 21:24

## 2018-12-14 RX ADMIN — PROCHLORPERAZINE EDISYLATE 10 MG: 5 INJECTION INTRAMUSCULAR; INTRAVENOUS at 12:55

## 2018-12-14 RX ADMIN — LORAZEPAM 1 MG: 1 TABLET ORAL at 20:03

## 2018-12-14 RX ADMIN — VENLAFAXINE 50 MG: 50 TABLET ORAL at 21:21

## 2018-12-14 ASSESSMENT — ENCOUNTER SYMPTOMS
BACK PAIN: 0
COUGH: 0
NAUSEA: 1
RHINORRHEA: 0
SORE THROAT: 0
EYE REDNESS: 0
SHORTNESS OF BREATH: 0
VOMITING: 1
DIARRHEA: 0
WHEEZING: 0
COLOR CHANGE: 1
ABDOMINAL PAIN: 0
EYE DISCHARGE: 0

## 2018-12-14 ASSESSMENT — PAIN SCALES - GENERAL
PAINLEVEL_OUTOF10: 0
PAINLEVEL_OUTOF10: 6
PAINLEVEL_OUTOF10: 0

## 2018-12-14 ASSESSMENT — PAIN DESCRIPTION - PAIN TYPE: TYPE: ACUTE PAIN

## 2018-12-14 ASSESSMENT — PAIN DESCRIPTION - DESCRIPTORS: DESCRIPTORS: ACHING

## 2018-12-14 ASSESSMENT — PAIN DESCRIPTION - LOCATION: LOCATION: HEAD

## 2018-12-14 NOTE — H&P
Jet Collins MD   OLANZapine (ZYPREXA) 5 MG tablet Take 1 tablet by mouth nightly 10/4/18   NILAM Busby CNP   lisinopril-hydrochlorothiazide (PRINZIDE;ZESTORETIC) 20-25 MG per tablet Take 1 tablet by mouth daily 10/4/18   NILAM Busby CNP   glucose monitoring kit (FREESTYLE) monitoring kit Glucometer with test strips and lancets. Check BS once daily  #100 strips and lancets 10/4/18   NILAM Busby CNP   blood glucose test strips (ASCENSIA AUTODISC VI;ONE TOUCH ULTRA TEST VI) strip Test as needed. Dispense One Touch verio Test Strips. Dx: Type 2 diabetes (250.00) 10/4/18   NILAM Busby CNP   venlafaxine (EFFEXOR) 50 MG tablet Take 1 tablet by mouth 3 times daily 10/4/18   NILAM Busby CNP   albuterol sulfate  (90 Base) MCG/ACT inhaler Inhale 2 puffs into the lungs every 6 hours as needed for Wheezing 10/4/18   NILAM Busby CNP   Cholecalciferol (VITAMIN D3) 5000 units TABS Take 5,000 Units by mouth once a week    Historical Provider, MD   Disulfiram 500 MG TABS Take 500 mg by mouth daily    Historical Provider, MD   glucose blood VI test strips (EXACTECH TEST) strip Glucometer with test strips plus lancets. #100. Check BS daily. 250.00 4/7/14   NILAM Busby CNP       Allergies:  Patient has no known allergies. Social History:      The patient currently lives at home    TOBACCO:   reports that he has been smoking E-Cigarettes. He has a 54.00 pack-year smoking history. He has quit using smokeless tobacco.  ETOH:   reports that he drinks about 84.0 oz of alcohol per week .       Family History:          Family History   Problem Relation Age of Onset    High Blood Pressure Father     Cancer Father         Lung Cancer    Alcohol Abuse Father     High Blood Pressure Brother     Diabetes Mother     Heart Disease Mother         Stent Placement    Arthritis Mother     Depression Mother     High Blood Pressure Mother     High

## 2018-12-14 NOTE — ED PROVIDER NOTES
[address] [facility]       Chief Complaint   Patient presents with    Alcohol Problem    Nausea       Nurses Notes reviewed and I agree except as noted in the HPI. HISTORY OF PRESENT ILLNESS    Sadaf Christensen is a 44 y.o. male who has history of liver disease presents to the emergency department for the evaluation of an alcohol problem. Patient reports that he has abused alcohol for about five years intermittently. Patient was admitted twice in November due to this alcohol abuse. He states that he recently went on a 2 week \"landaverde\" where he drank 15-20 beers a day. Patient reports that he stopped drinking yesterday and last night began experiencing nausea, headache, and emesis. Patient states that he also feels anxious and that he \"cannot sit still\". He states that his urine is also very dark in color. He rates his pain at 6/10 in severity and describes it as a constant aching. Patient denies chest pain, abdominal pain, hematemesis, diarrhea, constipation, urinary symptoms, or fever. Patient reports that he fell two nights ago but denies hitting his head. He fell onto his buttocks. Patient denies hallucinations, seizures, or suicidal ideation. He states that he has a bed in [facility] but that he has not heard whether it is available for him yet. Patient states that he has been drinking an abundance of water the last day and a half. There are no further complaints at this time. REVIEW OF SYSTEMS     Review of Systems   Constitutional: Negative for appetite change, chills, fatigue and fever. HENT: Negative for congestion, ear pain, rhinorrhea and sore throat. Eyes: Negative for discharge, redness and visual disturbance. Respiratory: Negative for cough, shortness of breath and wheezing. Cardiovascular: Negative for chest pain, palpitations and leg swelling. Gastrointestinal: Positive for nausea and vomiting. Negative for abdominal pain and diarrhea. smoking E-Cigarettes. He has a 54.00 pack-year smoking history. He has quit using smokeless tobacco. He reports that he drinks about 84.0 oz of alcohol per week . He reports that he does not use drugs. PHYSICAL EXAM     INITIAL VITALS:  height is 5' 11\" (1.803 m) and weight is 240 lb 9.6 oz (109.1 kg). His oral temperature is 98.7 °F (37.1 °C). His blood pressure is 149/77 (abnormal) and his pulse is 97. His respiration is 16 and oxygen saturation is 98%. Physical Exam   Constitutional: He is oriented to person, place, and time. Vital signs are normal. He appears well-developed and well-nourished. He is cooperative. Non-toxic appearance. No distress. HENT:   Head: Normocephalic and atraumatic. Right Ear: Hearing, tympanic membrane and ear canal normal. No hemotympanum. Left Ear: Hearing, tympanic membrane and ear canal normal. No hemotympanum. Nose: Nose normal. No rhinorrhea. No epistaxis. Mouth/Throat: Uvula is midline, oropharynx is clear and moist and mucous membranes are normal. Mucous membranes are not dry. No posterior oropharyngeal edema. Eyes: Pupils are equal, round, and reactive to light. Conjunctivae, EOM and lids are normal. Scleral icterus is present. Neck: Normal range of motion. Neck supple. No spinous process tenderness and no muscular tenderness present. No tracheal deviation present. No Brudzinski's sign and no Kernig's sign noted. 6 point range of motion tested and noted full, with no rigidity or meningismus. Cardiovascular: Normal rate, regular rhythm and normal heart sounds. Pulmonary/Chest: Effort normal and breath sounds normal. No respiratory distress. He has no decreased breath sounds. He has no wheezes. Abdominal: Soft. He exhibits no distension. There is no tenderness. There is no rigidity and no guarding. Musculoskeletal: Normal range of motion. Extremities well perfused; Good strength appreciated in all muscle groups.    Lymphadenopathy:     He has no

## 2018-12-14 NOTE — ED TRIAGE NOTES
PT in bed. PT stated that he is an alcoholic. PT stated that he had been sober for about a week. PT stated that he went on a 2 week binge drinking. PT stated that he did not drink yesterday and started having nausea. Pt stated that he does have rehab set up. PT in no distress.

## 2018-12-15 LAB
ALBUMIN SERPL-MCNC: 3 G/DL (ref 3.5–5.1)
ALP BLD-CCNC: 169 U/L (ref 38–126)
ALT SERPL-CCNC: 260 U/L (ref 11–66)
ANION GAP SERPL CALCULATED.3IONS-SCNC: 15 MEQ/L (ref 8–16)
AST SERPL-CCNC: 213 U/L (ref 5–40)
BASOPHILS # BLD: 0.7 %
BASOPHILS ABSOLUTE: 0 THOU/MM3 (ref 0–0.1)
BILIRUB SERPL-MCNC: 5 MG/DL (ref 0.3–1.2)
BUN BLDV-MCNC: 7 MG/DL (ref 7–22)
CALCIUM SERPL-MCNC: 8.3 MG/DL (ref 8.5–10.5)
CHLORIDE BLD-SCNC: 102 MEQ/L (ref 98–111)
CO2: 21 MEQ/L (ref 23–33)
CREAT SERPL-MCNC: 0.6 MG/DL (ref 0.4–1.2)
EOSINOPHIL # BLD: 0 %
EOSINOPHILS ABSOLUTE: 0 THOU/MM3 (ref 0–0.4)
ERYTHROCYTE [DISTWIDTH] IN BLOOD BY AUTOMATED COUNT: 14.6 % (ref 11.5–14.5)
ERYTHROCYTE [DISTWIDTH] IN BLOOD BY AUTOMATED COUNT: 52.5 FL (ref 35–45)
GFR SERPL CREATININE-BSD FRML MDRD: > 90 ML/MIN/1.73M2
GLUCOSE BLD-MCNC: 103 MG/DL (ref 70–108)
GLUCOSE BLD-MCNC: 106 MG/DL (ref 70–108)
GLUCOSE BLD-MCNC: 121 MG/DL (ref 70–108)
GLUCOSE BLD-MCNC: 136 MG/DL (ref 70–108)
GLUCOSE BLD-MCNC: 99 MG/DL (ref 70–108)
HCT VFR BLD CALC: 42 % (ref 42–52)
HEMOGLOBIN: 14.6 GM/DL (ref 14–18)
IMMATURE GRANS (ABS): 0.01 THOU/MM3 (ref 0–0.07)
IMMATURE GRANULOCYTES: 0.3 %
LYMPHOCYTES # BLD: 30 %
LYMPHOCYTES ABSOLUTE: 0.9 THOU/MM3 (ref 1–4.8)
MAGNESIUM: 1.8 MG/DL (ref 1.6–2.4)
MCH RBC QN AUTO: 34.1 PG (ref 26–33)
MCHC RBC AUTO-ENTMCNC: 34.8 GM/DL (ref 32.2–35.5)
MCV RBC AUTO: 98.1 FL (ref 80–94)
MONOCYTES # BLD: 6.9 %
MONOCYTES ABSOLUTE: 0.2 THOU/MM3 (ref 0.4–1.3)
NUCLEATED RED BLOOD CELLS: 0 /100 WBC
PLATELET # BLD: 80 THOU/MM3 (ref 130–400)
PMV BLD AUTO: 10.3 FL (ref 9.4–12.4)
POTASSIUM REFLEX MAGNESIUM: 3.7 MEQ/L (ref 3.5–5.2)
RBC # BLD: 4.28 MILL/MM3 (ref 4.7–6.1)
SEG NEUTROPHILS: 62.1 %
SEGMENTED NEUTROPHILS ABSOLUTE COUNT: 1.9 THOU/MM3 (ref 1.8–7.7)
SODIUM BLD-SCNC: 138 MEQ/L (ref 135–145)
TOTAL PROTEIN: 5.8 G/DL (ref 6.1–8)
WBC # BLD: 3 THOU/MM3 (ref 4.8–10.8)

## 2018-12-15 PROCEDURE — 83735 ASSAY OF MAGNESIUM: CPT

## 2018-12-15 PROCEDURE — 1200000000 HC SEMI PRIVATE

## 2018-12-15 PROCEDURE — 80053 COMPREHEN METABOLIC PANEL: CPT

## 2018-12-15 PROCEDURE — 99232 SBSQ HOSP IP/OBS MODERATE 35: CPT | Performed by: INTERNAL MEDICINE

## 2018-12-15 PROCEDURE — 6360000002 HC RX W HCPCS: Performed by: INTERNAL MEDICINE

## 2018-12-15 PROCEDURE — 6370000000 HC RX 637 (ALT 250 FOR IP): Performed by: INTERNAL MEDICINE

## 2018-12-15 PROCEDURE — 36415 COLL VENOUS BLD VENIPUNCTURE: CPT

## 2018-12-15 PROCEDURE — 2500000003 HC RX 250 WO HCPCS: Performed by: INTERNAL MEDICINE

## 2018-12-15 PROCEDURE — 85025 COMPLETE CBC W/AUTO DIFF WBC: CPT

## 2018-12-15 PROCEDURE — 2709999900 HC NON-CHARGEABLE SUPPLY

## 2018-12-15 PROCEDURE — 82948 REAGENT STRIP/BLOOD GLUCOSE: CPT

## 2018-12-15 PROCEDURE — 2580000003 HC RX 258: Performed by: INTERNAL MEDICINE

## 2018-12-15 RX ADMIN — LORAZEPAM 1 MG: 1 TABLET ORAL at 09:47

## 2018-12-15 RX ADMIN — VENLAFAXINE 50 MG: 50 TABLET ORAL at 09:47

## 2018-12-15 RX ADMIN — ENOXAPARIN SODIUM 40 MG: 40 INJECTION SUBCUTANEOUS at 17:43

## 2018-12-15 RX ADMIN — LORAZEPAM 1 MG: 1 TABLET ORAL at 17:43

## 2018-12-15 RX ADMIN — VENLAFAXINE 50 MG: 50 TABLET ORAL at 13:44

## 2018-12-15 RX ADMIN — METOPROLOL TARTRATE 25 MG: 25 TABLET, FILM COATED ORAL at 22:04

## 2018-12-15 RX ADMIN — VENLAFAXINE 50 MG: 50 TABLET ORAL at 22:04

## 2018-12-15 RX ADMIN — SODIUM CHLORIDE: 9 INJECTION, SOLUTION INTRAVENOUS at 09:51

## 2018-12-15 RX ADMIN — FOLIC ACID: 5 INJECTION, SOLUTION INTRAMUSCULAR; INTRAVENOUS; SUBCUTANEOUS at 13:40

## 2018-12-15 RX ADMIN — METOPROLOL TARTRATE 25 MG: 25 TABLET, FILM COATED ORAL at 09:47

## 2018-12-15 RX ADMIN — OLANZAPINE 5 MG: 5 TABLET, FILM COATED ORAL at 22:04

## 2018-12-15 RX ADMIN — LORAZEPAM 1 MG: 1 TABLET ORAL at 06:15

## 2018-12-15 ASSESSMENT — PAIN SCALES - GENERAL
PAINLEVEL_OUTOF10: 0

## 2018-12-15 NOTE — CONSULTS
Topics Concern    None     Social History Narrative    None       FAMILY HISTORY    family history includes Alcohol Abuse in his father and paternal uncle; Arthritis in his mother; Cancer in his father; Depression in his mother; Diabetes in his mother; Heart Disease in his mother; High Blood Pressure in his brother, father, and mother; High Cholesterol in his mother. REVIEW OF SYSTEMS:    GENERAL:  No fever, chills or weight loss. EYES:  No  blurred vision, double vision, glaucoma, pain on exposure to light. CARDIOVASCULAR:  No chest pain or palpitations. RESPIRATORY:  No dyspnea or cough. GI:  See history. MUSCULOSKELETAL:  No new painful or swollen joints or myalgias. :   No dysuria or hematuria. SKIN:  No rashes + jaundice. NEUROLOGIC:   No headaches or  seizures,  numbness or tingling of arms, or legs. PSYCH:  No anxiety or depression. ENDOCRINE:   No polyuria or polydipsia. BLOOD:  No anemia, bleeding disorder, blood or blood product transfusion. PHYSICAL EXAMINATION:     height is 5' 11\" (1.803 m) and weight is 240 lb 9.6 oz (109.1 kg). His oral temperature is 98.1 °F (36.7 °C). His blood pressure is 122/56 (abnormal) and his pulse is 81. His respiration is 18 and oxygen saturation is 95%. GEN: Well nourished, well developed. LUNGS:  Clear to auscultation bilaterally. Chest rises equally on inspiration. CARDIOVASCULAR:  Regular rate and rhythm without murmurs, rubs or gallops. ABDOMEN:  Soft, nontender and nondistended with normal bowel sounds. HEAD:  Normal cephalic/atraumatic. Extraoccular motions intact bilaterally. +scleral icterus  NECK:  No lymphadenopathy or bruits. UPPER EXTREMITIES:  No cyanosis, clubbing, or edema. DERM:  No rash + jaundice. LOWER EXTREMITIES:  No cyanosis, clubbing, or edema. NEURO:  Alert and oriented x4. Patient moves all extremities and has gross sensation in all extremities.     REVIEW OF DIAGNOSTIC TESTING AND LABS:      RADIOLOGY:  12/14/18 Liver U/S   1. Heterogeneous liver. Patent and directional hepatic vessels. 2. Recanalized umbilical vein. Correlation with LFTs is advised. 3. Splenomegaly. 4. Probable small amount of gallbladder sludge. 12/14/18 CT of head without contrast   Normal CT appearance of the brain. LABS:  Serum alcohol < 0.01  Tox screen +barbituates  Negative for hepatitis B and C in May 2018    CBC:   Recent Labs      12/14/18   1019   WBC  5.3   HGB  16.1   PLT  123*     BMP:    Recent Labs      12/14/18   1019   NA  129*   K  3.9   CL  91*   CO2  22*   BUN  8   CREATININE  0.4   GLUCOSE  118*     Hepatic:   Recent Labs      12/14/18   1019   ALKPHOS  199*   ALT  370*   AST  372*   PROT  6.2   BILITOT  9.1*   LABALBU  3.4*     Amylase and Lipase:  Recent Labs      12/14/18   1019   LIPASE  48.7   AMYLASE  67     Lactic Acid:   Recent Labs      12/14/18   1019   LACTA  0.9     Calcium:  Recent Labs      12/14/18   1019   CALCIUM  8.6     Ionized Calcium:No results for input(s): IONCA in the last 72 hours. Magnesium:No results for input(s): MG in the last 72 hours. Phosphorus:No results for input(s): PHOS in the last 72 hours. Troponin: No results for input(s): CKTOTAL, CKMB, TROPONINI in the last 72 hours.   PT/INR:     Recent Labs      12/14/18   1251   INR  1.04           Prepared by ALISTAIR Maguire, MS   Patient seen and examined by Vianca Drake MD  Note reviewed, updated and signed by MD Vianca Rodrigues MD, [facility]

## 2018-12-16 ENCOUNTER — TELEPHONE (OUTPATIENT)
Dept: FAMILY MEDICINE CLINIC | Age: 43
End: 2018-12-16

## 2018-12-16 VITALS
DIASTOLIC BLOOD PRESSURE: 81 MMHG | RESPIRATION RATE: 18 BRPM | HEIGHT: 71 IN | HEART RATE: 84 BPM | TEMPERATURE: 98.1 F | OXYGEN SATURATION: 91 % | WEIGHT: 248 LBS | BODY MASS INDEX: 34.72 KG/M2 | SYSTOLIC BLOOD PRESSURE: 129 MMHG

## 2018-12-16 LAB
ANION GAP SERPL CALCULATED.3IONS-SCNC: 12 MEQ/L (ref 8–16)
BUN BLDV-MCNC: 5 MG/DL (ref 7–22)
CALCIUM SERPL-MCNC: 8.4 MG/DL (ref 8.5–10.5)
CHLORIDE BLD-SCNC: 105 MEQ/L (ref 98–111)
CO2: 21 MEQ/L (ref 23–33)
CREAT SERPL-MCNC: 0.7 MG/DL (ref 0.4–1.2)
ERYTHROCYTE [DISTWIDTH] IN BLOOD BY AUTOMATED COUNT: 14.7 % (ref 11.5–14.5)
ERYTHROCYTE [DISTWIDTH] IN BLOOD BY AUTOMATED COUNT: 55.3 FL (ref 35–45)
GFR SERPL CREATININE-BSD FRML MDRD: > 90 ML/MIN/1.73M2
GLUCOSE BLD-MCNC: 129 MG/DL (ref 70–108)
GLUCOSE BLD-MCNC: 129 MG/DL (ref 70–108)
HCT VFR BLD CALC: 39.9 % (ref 42–52)
HEMOGLOBIN: 13.4 GM/DL (ref 14–18)
MCH RBC QN AUTO: 34.1 PG (ref 26–33)
MCHC RBC AUTO-ENTMCNC: 33.6 GM/DL (ref 32.2–35.5)
MCV RBC AUTO: 101.5 FL (ref 80–94)
PLATELET # BLD: 72 THOU/MM3 (ref 130–400)
PMV BLD AUTO: 10.6 FL (ref 9.4–12.4)
POTASSIUM SERPL-SCNC: 3.5 MEQ/L (ref 3.5–5.2)
RBC # BLD: 3.93 MILL/MM3 (ref 4.7–6.1)
SODIUM BLD-SCNC: 138 MEQ/L (ref 135–145)
WBC # BLD: 3.2 THOU/MM3 (ref 4.8–10.8)

## 2018-12-16 PROCEDURE — 99239 HOSP IP/OBS DSCHRG MGMT >30: CPT | Performed by: INTERNAL MEDICINE

## 2018-12-16 PROCEDURE — 6370000000 HC RX 637 (ALT 250 FOR IP): Performed by: INTERNAL MEDICINE

## 2018-12-16 PROCEDURE — 85027 COMPLETE CBC AUTOMATED: CPT

## 2018-12-16 PROCEDURE — 36415 COLL VENOUS BLD VENIPUNCTURE: CPT

## 2018-12-16 PROCEDURE — 82948 REAGENT STRIP/BLOOD GLUCOSE: CPT

## 2018-12-16 PROCEDURE — 80048 BASIC METABOLIC PNL TOTAL CA: CPT

## 2018-12-16 RX ADMIN — VENLAFAXINE 50 MG: 50 TABLET ORAL at 08:24

## 2018-12-16 RX ADMIN — METOPROLOL TARTRATE 25 MG: 25 TABLET, FILM COATED ORAL at 08:24

## 2018-12-16 ASSESSMENT — PAIN SCALES - GENERAL: PAINLEVEL_OUTOF10: 0

## 2018-12-16 NOTE — PROGRESS NOTES
ascites    Alcoholic steatohepatitis    Hypophosphatemia    Bipolar 1 disorder (HCC)    Alcoholic gastritis without bleeding    Alcohol withdrawal syndrome with complication (HCC)    Alcohol dependence with physiological dependence, continuous (HCC)    Decompensated liver disease (HCC)    Alcoholic cirrhosis ([facility] [address]       PLAN       1. At this time, the discrimant function score was 11, so, would not start Steroids for alcohol hepatitis. 2.  Pt. Was discharged earlier today. Patient was NOT seen in rounds today. Dr. Gonzalez Gave was not contacted that patient was being discharged. REVIEW OF DIAGNOSTIC TESTING AND LABS:      Significant Diagnostic Studies:     RADIOLOGY:      12/14/18 Liver U/S   1. Heterogeneous liver. Patent and directional hepatic vessels. 2. Recanalized umbilical vein. Correlation with LFTs is advised. 3. Splenomegaly. 4. Probable small amount of gallbladder sludge.     12/14/18 CT of head without contrast   Normal CT appearance of the brain.     LABS:      CBC:   Recent Labs      12/14/18   1019  12/15/18   0649  12/16/18   0643   WBC  5.3  3.0*  3.2*   HGB  16.1  14.6  13.4*   PLT  123*  80*  72*       BMP:    Recent Labs      12/14/18   1019  12/15/18   0649  12/16/18   0643   NA  129*  138  138   K  3.9  3.7  3.5   CL  91*  102  105   CO2  22*  21*  21*   BUN  8  7  5*   CREATININE  0.4  0.6  0.7   GLUCOSE  118*  99  129*       Hepatic Function Panel:    Recent Labs      12/14/18   1019  12/15/18   0649   ALKPHOS  199*  169*   ALT  370*  260*   AST  372*  213*   PROT  6.2  5.8*   BILITOT  9.1*  5.0*   LABALBU  3.4*  3.0*     Amylase and Lipase:  Recent Labs      12/14/18   1019   LACTA  0.9   AMYLASE  67     INR:   Recent Labs      12/14/18   1251   INR  1.04     Lactic Acid:   Recent Labs      12/14/18   1019   LACTA  0.9     Calcium:  Recent Labs      12/16/18   0643   CALCIUM  8.4*     Magnesium:  Recent Labs      12/15/18   0649   MG  1.8     Phosphorus:No results for input(s):

## 2018-12-16 NOTE — DISCHARGE SUMMARY
Bilirubin 9.1 (H) 0.3 - 1.2 mg/dL     (H) 11 - 66 U/L   Lipase   Result Value Ref Range    Lipase 48.7 5.6 - 51.3 U/L   Lactic acid, plasma   Result Value Ref Range    Lactic Acid 0.9 0.5 - 2.2 mmol/L   Ammonia   Result Value Ref Range    Ammonia see below 11 - 60 umol/L   Ethanol   Result Value Ref Range    ETHYL ALCOHOL, SERUM < 0.01 0.00 %   Urine Drug Screen   Result Value Ref Range    AMPHETAMINE+METHAMPHETAMINE URINE SCREEN Negative NEGATIVE    Barbiturate Quant, Ur Negative NEGATIVE    Benzodiazepine Quant, Ur POSITIVE NEGATIVE    Cannabinoid Quant, Ur Negative NEGATIVE    Cocaine Metab Quant, Ur Negative NEGATIVE    Opiates, Urine Negative NEGATIVE    Oxycodone Negative NEGATIVE    PCP Quant, Ur Negative NEGATIVE   Amylase   Result Value Ref Range    Amylase 67 20 - 104 U/L   Urine with Reflexed Micro   Result Value Ref Range    Glucose, Ur NEGATIVE NEGATIVE mg/dl    Bilirubin Urine MODERATE (A) NEGATIVE    Ketones, Urine 15 (A) NEGATIVE    Specific Gravity, Urine 1.010 1.002 - 1.03    Blood, Urine NEGATIVE NEGATIVE    pH, UA 6.0 5.0 - 9.0    Protein, UA 30 (A) NEGATIVE    Urobilinogen, Urine 1.0 0.0 - 1.0 eu/dl    Nitrite, Urine NEGATIVE NEGATIVE    Leukocyte Esterase, Urine TRACE (A) NEGATIVE    Color, UA BJ (A) STRAW-YELL    Character, Urine SL CLOUDY CLEAR-SL C    RBC, UA 0-2 0-2/hpf /hpf    WBC, UA 2-4 0-4/hpf /hpf    Epi Cells 0-2 3-5/hpf /hpf    Bacteria, UA NONE FEW/NONE S /hpf    Casts UA NONE SEEN NONE SEEN /lpf    Crystals NONE SEEN NONE SEEN    Renal Epithelial, Urine NONE SEEN NONE SEEN    Yeast, UA NONE SEEN NONE SEEN    CASTS 2 NONE SEEN NONE SEEN /lpf    MISCELLANEOUS 2 NONE SEEN    Bile Acids, Total   Result Value Ref Range    Ictotest POSITIVE (A) NEGATIVE   Anion Gap   Result Value Ref Range    Anion Gap 16.0 8.0 - 16.0 meq/L   Osmolality   Result Value Ref Range    Osmolality Calc 258.4 (L) 275.0 - 300 mOsmol/kg   Glomerular Filtration Rate, Estimated   Result Value Ref Range Result Value Ref Range    WBC 3.2 (L) 4.8 - 10.8 thou/mm3    RBC 3.93 (L) 4.70 - 6.10 mill/mm3    Hemoglobin 13.4 (L) 14.0 - 18.0 gm/dl    Hematocrit 39.9 (L) 42.0 - 52.0 %    .5 (H) 80.0 - 94.0 fL    MCH 34.1 (H) 26.0 - 33.0 pg    MCHC 33.6 32.2 - 35.5 gm/dl    RDW-CV 14.7 (H) 11.5 - 14.5 %    RDW-SD 55.3 (H) 35.0 - 45.0 fL    Platelets 72 (L) 564 - 400 thou/mm3    MPV 10.6 9.4 - 12.4 fL   Basic Metabolic Panel   Result Value Ref Range    Sodium 138 135 - 145 meq/L    Potassium 3.5 3.5 - 5.2 meq/L    Chloride 105 98 - 111 meq/L    CO2 21 (L) 23 - 33 meq/L    Glucose 129 (H) 70 - 108 mg/dL    BUN 5 (L) 7 - 22 mg/dL    CREATININE 0.7 0.4 - 1.2 mg/dL    Calcium 8.4 (L) 8.5 - 10.5 mg/dL   Anion Gap   Result Value Ref Range    Anion Gap 12.0 8.0 - 16.0 meq/L   Glomerular Filtration Rate, Estimated   Result Value Ref Range    Est, Glom Filt Rate >90 ml/min/1.73m2   POCT Glucose   Result Value Ref Range    POC Glucose 86 70 - 108 mg/dl   POCT Glucose   Result Value Ref Range    POC Glucose 100 70 - 108 mg/dl   POCT Glucose   Result Value Ref Range    POC Glucose 106 70 - 108 mg/dl   POCT Glucose   Result Value Ref Range    POC Glucose 121 (H) 70 - 108 mg/dl   POCT Glucose   Result Value Ref Range    POC Glucose 103 70 - 108 mg/dl   POCT Glucose   Result Value Ref Range    POC Glucose 136 (H) 70 - 108 mg/dl   POCT Glucose   Result Value Ref Range    POC Glucose 129 (H) 70 - 108 mg/dl       Diet:  DIET GENERAL;     Activity:  Activity as tolerated (Patient may move about with assist as indicated or with supervision.)    Follow-up:  in 1 weeks with NILAM Monique CNP,       Disposition: home    Condition: Stable      Time Spent: 50 minutes    Electronically signed by Rayo Best MD on 12/16/2018 at 11:07 AM    Discharging Hospitalist

## 2018-12-17 ENCOUNTER — NURSE ONLY (OUTPATIENT)
Dept: FAMILY MEDICINE CLINIC | Age: 43
End: 2018-12-17
Payer: MEDICAID

## 2018-12-17 DIAGNOSIS — K70.40 ALCOHOLIC LIVER FAILURE (HCC): ICD-10-CM

## 2018-12-17 LAB
ALBUMIN SERPL-MCNC: 3.5 G/DL (ref 3.5–5.1)
ALP BLD-CCNC: 214 U/L (ref 38–126)
ALT SERPL-CCNC: 195 U/L (ref 11–66)
AST SERPL-CCNC: 121 U/L (ref 5–40)
BILIRUB SERPL-MCNC: 3.3 MG/DL (ref 0.3–1.2)
BILIRUBIN DIRECT: 2 MG/DL (ref 0–0.3)
TOTAL PROTEIN: 6.4 G/DL (ref 6.1–8)

## 2018-12-17 PROCEDURE — 36415 COLL VENOUS BLD VENIPUNCTURE: CPT | Performed by: NURSE PRACTITIONER

## 2018-12-17 NOTE — TELEPHONE ENCOUNTER
[facility] Transitions Initial Follow Up Call    Call within 2 business days of discharge: Yes     Patient: Clementina Keith Patient : 1975 MRN: [id number]    [unfilled]    RARS: Readmission Risk Score: 29     Spoke with: Pashto     Discharge department/facility: home    Non-face-to-face services provided:  Scheduled appointment with PCP-yes marija Leon  Obtained and reviewed discharge summary and/or continuity of care documents  Reviewed and followed up on pending diagnostic tests and treatments-yes  Education of patient/family/caregiver/guardian to support self-management-yes  Assessment and support for treatment adherence and medication management-yes   Is the patient in any pain- no     Have the medications prescribed at discharge been filled? No new meds  Does the patient understand what the medications are for?  Yes all currents meds  Has the patient experienced any new symptoms or have previous symptoms worsened? no  Is the patient experiencing any pain? no If yes, is the pain well controlled? n/a  Does the patient understand all the discharge instructions and how to care for self at home? yes  Does the patient have any questions/concerns?no        Follow Up  Future Appointments  Date Time Provider Amanda Jerome   2018 11:00 AM SCHEDULE, Delbert Bridges [facility]   2018 10:15 AM NILAM Patel CNP [facility]   2019 1:15 PM NILAM Patel CNP [facility]   2019 10:15 AM NILAM Patel CNP [facility] LPN

## 2018-12-19 ENCOUNTER — OFFICE VISIT (OUTPATIENT)
Dept: FAMILY MEDICINE CLINIC | Age: 43
End: 2018-12-19
Payer: MEDICAID

## 2018-12-19 VITALS
HEIGHT: 71 IN | WEIGHT: 247 LBS | SYSTOLIC BLOOD PRESSURE: 128 MMHG | DIASTOLIC BLOOD PRESSURE: 84 MMHG | RESPIRATION RATE: 16 BRPM | HEART RATE: 88 BPM | TEMPERATURE: 97.9 F | BODY MASS INDEX: 34.58 KG/M2

## 2018-12-19 DIAGNOSIS — K70.40 ALCOHOLIC LIVER FAILURE (HCC): ICD-10-CM

## 2018-12-19 DIAGNOSIS — E11.9 TYPE 2 DIABETES MELLITUS WITHOUT COMPLICATION, WITHOUT LONG-TERM CURRENT USE OF INSULIN (HCC): ICD-10-CM

## 2018-12-19 DIAGNOSIS — K74.69 DECOMPENSATED LIVER DISEASE (HCC): ICD-10-CM

## 2018-12-19 DIAGNOSIS — F10.20 ALCOHOL DEPENDENCE WITH PHYSIOLOGICAL DEPENDENCE, CONTINUOUS (HCC): ICD-10-CM

## 2018-12-19 DIAGNOSIS — F10.931 ALCOHOL WITHDRAWAL SYNDROME, WITH DELIRIUM (HCC): Primary | ICD-10-CM

## 2018-12-19 PROCEDURE — G8484 FLU IMMUNIZE NO ADMIN: HCPCS | Performed by: NURSE PRACTITIONER

## 2018-12-19 PROCEDURE — 4004F PT TOBACCO SCREEN RCVD TLK: CPT | Performed by: NURSE PRACTITIONER

## 2018-12-19 PROCEDURE — 3044F HG A1C LEVEL LT 7.0%: CPT | Performed by: NURSE PRACTITIONER

## 2018-12-19 PROCEDURE — 1111F DSCHRG MED/CURRENT MED MERGE: CPT | Performed by: NURSE PRACTITIONER

## 2018-12-19 PROCEDURE — 99214 OFFICE O/P EST MOD 30 MIN: CPT | Performed by: NURSE PRACTITIONER

## 2018-12-19 PROCEDURE — 2022F DILAT RTA XM EVC RTNOPTHY: CPT | Performed by: NURSE PRACTITIONER

## 2018-12-19 PROCEDURE — G8427 DOCREV CUR MEDS BY ELIG CLIN: HCPCS | Performed by: NURSE PRACTITIONER

## 2018-12-19 PROCEDURE — G8417 CALC BMI ABV UP PARAM F/U: HCPCS | Performed by: NURSE PRACTITIONER

## 2018-12-19 NOTE — PROGRESS NOTES
Post-Discharge Transitional Care Management Services or Hospital Follow Up      Ermias Marcus   YOB: 1975    Date of Office Visit:  12/19/2018  Date of Hospital Admission: 12/14/18  Date of Hospital Discharge: 12/16/18  Risk of hospital readmission (high >=14%.  Medium >=10%) :Readmission Risk Score: 29    Care management risk score Rising risk (score 2-5) and Complex Care (Scores >=6): 6     Non face to face  following discharge, date last encounter closed (first attempt may have been earlier): 12/17/2018 10:57 AM    Call initiated 2 business days of discharge: Yes    Patient Active Problem List   Diagnosis    Hyperlipemia    HTN (hypertension), benign    Type II or unspecified type diabetes mellitus without mention of complication, not stated as uncontrolled    Alcohol withdrawal ([address]    Alcoholic hepatitis    COPD (chronic obstructive pulmonary disease) ([address]    Passive-dependent personality disorder ([address]    Alcohol abuse    Type 2 diabetes mellitus without complication, without long-term current use of insulin (HCC)    Alcoholic liver failure (HCC)    Transaminitis    Hyperbilirubinemia    RUQ pain    Delirious    Alcoholic hepatitis without ascites    Alcoholic steatohepatitis    Hypophosphatemia    Bipolar 1 disorder (HCC)    Alcoholic gastritis without bleeding    Alcohol withdrawal syndrome with complication ([address]    Alcohol dependence with physiological dependence, continuous ([address]    Decompensated liver disease ([address]    Alcoholic cirrhosis ([address]       No Known Allergies    Medications listed as [facility] the [facility] Medication Instructions JOSE:    Printed on:12/19/18 0704   Medication Information                      albuterol sulfate  (90 Base) MCG/ACT inhaler  Inhale 2 puffs into the lungs every 6 hours as needed for Wheezing             blood glucose test strips (ASCENSIA AUTODISC VI;ONE TOUCH ULTRA TEST VI) strip  Test as needed. Dispense One Touch verio Test Strips. Dx: Type 2 diabetes (250.00)             ChlordiazePOXIDE HCl (LIBRIUM PO)  Take 25 mg by mouth 3 times daily              Cholecalciferol (VITAMIN D3) 5000 units TABS  Take 5,000 Units by mouth once a week             folic acid (FOLVITE) 1 MG tablet  Take 1 tablet by mouth daily             glucose blood VI test strips (EXACTECH TEST) strip  Glucometer with test strips plus lancets. #100. Check BS daily. 250.00             glucose monitoring kit (FREESTYLE) monitoring kit  Glucometer with test strips and lancets.   Check BS once daily  #100 strips and lancets             lisinopril-hydrochlorothiazide (PRINZIDE;ZESTORETIC) 20-25 MG per tablet  Take 1 tablet by mouth daily             metoprolol tartrate (LOPRESSOR) 25 MG tablet  Take 1 tablet by mouth 2 times daily             nicotine (NICODERM CQ) 21 MG/24HR  Place 1 patch onto the skin daily             OLANZapine (ZYPREXA) 5 MG tablet  Take 1 tablet by mouth nightly             venlafaxine (EFFEXOR) 50 MG tablet  Take 1 tablet by mouth 3 times daily             vitamin B-1 100 MG tablet  Take 2 tablets by mouth daily                   Medications marked \"taking\" at this time  Outpatient Prescriptions Marked as Taking for the 12/19/18 encounter (Office Visit) with Rayna Blocker, NILAM - CNP   Medication Sig Dispense Refill    ChlordiazePOXIDE HCl (LIBRIUM PO) Take 25 mg by mouth 3 times daily       albuterol sulfate  (90 Base) MCG/ACT inhaler Inhale 2 puffs into the lungs every 6 hours as needed for Wheezing      metoprolol tartrate (LOPRESSOR) 25 MG tablet Take 1 tablet by mouth 2 times daily 60 tablet 3    folic acid (FOLVITE) 1 MG tablet Take 1 tablet by mouth daily 30 tablet 3    vitamin B-1 100 MG tablet Take 2 tablets by mouth daily 30 tablet 3    OLANZapine (ZYPREXA) 5 MG tablet Take 1 tablet by mouth nightly 30 tablet 3    lisinopril-hydrochlorothiazide HPI.    Vitals:    12/19/18 1020   BP: 128/84   Site: Left Upper Arm   Position: Sitting   Cuff Size: Medium Adult   Pulse: 88   Resp: 16   Temp: 97.9 °F (36.6 °C)   TempSrc: Temporal   Weight: 247 lb (112 kg)   Height: 5' 10.98\" (1.803 m)     Body mass index is 34.47 kg/m². Wt Readings from Last 3 Encounters:   12/19/18 247 lb (112 kg)   12/16/18 248 lb (112.5 kg)   12/06/18 241 lb 6.5 oz (109.5 kg)     BP Readings from Last 3 Encounters:   12/19/18 128/84   12/16/18 129/81   12/10/18 (!) 144/70        Physical Exam:  General Appearance: in no acute distress  Skin: jaundiced  ENT: tympanic membrane, external ear and ear canal normal bilaterally, oropharynx clear and moist with normal mucous membranes  Neck: neck supple and non tender without mass, no thyromegaly or thyroid nodules, no cervical lymphadenopathy   Pulmonary/Chest: clear to auscultation bilaterally- no wheezes, rales or rhonchi, normal air movement, no respiratory distress  Cardiovascular: normal rate, normal S1 and S2, no gallops, intact distal pulses and no carotid bruits  Abdomen: hepatomegaly present  Extremities: no cyanosis and no clubbing    Assessment/Plan:  1. Alcohol withdrawal syndrome, with delirium ([address]  Advised to attend AA daily  - [place] DISCHARGE MEDS RECONCILED W/ CURRENT OUTPATIENT MED LIST    2. Type 2 diabetes mellitus without complication, without long-term current use of insulin (HCC)  Diet and exercise  - [place] DISCHARGE MEDS RECONCILED W/ CURRENT OUTPATIENT MED LIST    3. Alcoholic liver failure (HCC)  abstinence  - [place] DISCHARGE MEDS RECONCILED W/ CURRENT OUTPATIENT MED LIST    4. Alcohol dependence with physiological dependence, continuous ([address]  Attend AA pt is going to attend rehab  - [facility] MED LIST    5.  Decompensated liver disease ([facility] [address]  Monitoring reviewed with pt danger of drinking  - [place] DISCHARGE MEDS RECONCILED W/ CURRENT OUTPATIENT MED LIST        Medical Decision Making:

## 2019-01-16 ENCOUNTER — OFFICE VISIT (OUTPATIENT)
Dept: FAMILY MEDICINE CLINIC | Age: 44
End: 2019-01-16
Payer: MEDICAID

## 2019-01-16 VITALS
RESPIRATION RATE: 10 BRPM | SYSTOLIC BLOOD PRESSURE: 134 MMHG | WEIGHT: 247 LBS | BODY MASS INDEX: 34.47 KG/M2 | DIASTOLIC BLOOD PRESSURE: 88 MMHG | HEART RATE: 88 BPM

## 2019-01-16 DIAGNOSIS — K70.30 ALCOHOLIC CIRRHOSIS OF LIVER WITHOUT ASCITES (HCC): ICD-10-CM

## 2019-01-16 DIAGNOSIS — F10.931 ALCOHOL WITHDRAWAL SYNDROME, WITH DELIRIUM (HCC): ICD-10-CM

## 2019-01-16 DIAGNOSIS — F60.7: ICD-10-CM

## 2019-01-16 DIAGNOSIS — J41.0 SIMPLE CHRONIC BRONCHITIS (HCC): ICD-10-CM

## 2019-01-16 DIAGNOSIS — F10.20 ALCOHOL DEPENDENCE WITH PHYSIOLOGICAL DEPENDENCE, CONTINUOUS (HCC): ICD-10-CM

## 2019-01-16 DIAGNOSIS — K70.40 ALCOHOLIC LIVER FAILURE (HCC): ICD-10-CM

## 2019-01-16 DIAGNOSIS — E11.9 TYPE 2 DIABETES MELLITUS WITHOUT COMPLICATION, WITHOUT LONG-TERM CURRENT USE OF INSULIN (HCC): ICD-10-CM

## 2019-01-16 DIAGNOSIS — Z01.89 ROUTINE LAB DRAW: ICD-10-CM

## 2019-01-16 DIAGNOSIS — G31.2 ALCOHOLIC ENCEPHALOPATHY (HCC): ICD-10-CM

## 2019-01-16 DIAGNOSIS — F31.9 BIPOLAR 1 DISORDER (HCC): Primary | ICD-10-CM

## 2019-01-16 DIAGNOSIS — K74.69 DECOMPENSATED LIVER DISEASE (HCC): ICD-10-CM

## 2019-01-16 LAB
ALBUMIN SERPL-MCNC: 4.7 G/DL (ref 3.5–5.1)
ALP BLD-CCNC: 119 U/L (ref 38–126)
ALT SERPL-CCNC: 78 U/L (ref 11–66)
AST SERPL-CCNC: 44 U/L (ref 5–40)
BILIRUB SERPL-MCNC: 0.6 MG/DL (ref 0.3–1.2)
BILIRUBIN DIRECT: < 0.2 MG/DL (ref 0–0.3)
ERYTHROCYTE [DISTWIDTH] IN BLOOD BY AUTOMATED COUNT: 13.4 % (ref 11.5–14.5)
ERYTHROCYTE [DISTWIDTH] IN BLOOD BY AUTOMATED COUNT: 47.4 FL (ref 35–45)
HCT VFR BLD CALC: 50.7 % (ref 42–52)
HEMOGLOBIN: 17.5 GM/DL (ref 14–18)
INR BLD: 1.02 (ref 0.85–1.13)
MCH RBC QN AUTO: 33.3 PG (ref 26–33)
MCHC RBC AUTO-ENTMCNC: 34.5 GM/DL (ref 32.2–35.5)
MCV RBC AUTO: 96.4 FL (ref 80–94)
PLATELET # BLD: 203 THOU/MM3 (ref 130–400)
PMV BLD AUTO: 10.5 FL (ref 9.4–12.4)
RBC # BLD: 5.26 MILL/MM3 (ref 4.7–6.1)
TOTAL PROTEIN: 7.6 G/DL (ref 6.1–8)
WBC # BLD: 8.6 THOU/MM3 (ref 4.8–10.8)

## 2019-01-16 PROCEDURE — 2022F DILAT RTA XM EVC RTNOPTHY: CPT | Performed by: NURSE PRACTITIONER

## 2019-01-16 PROCEDURE — 3046F HEMOGLOBIN A1C LEVEL >9.0%: CPT | Performed by: NURSE PRACTITIONER

## 2019-01-16 PROCEDURE — 99214 OFFICE O/P EST MOD 30 MIN: CPT | Performed by: NURSE PRACTITIONER

## 2019-01-16 PROCEDURE — 3023F SPIROM DOC REV: CPT | Performed by: NURSE PRACTITIONER

## 2019-01-16 PROCEDURE — G8417 CALC BMI ABV UP PARAM F/U: HCPCS | Performed by: NURSE PRACTITIONER

## 2019-01-16 PROCEDURE — 36415 COLL VENOUS BLD VENIPUNCTURE: CPT | Performed by: NURSE PRACTITIONER

## 2019-01-16 PROCEDURE — 4004F PT TOBACCO SCREEN RCVD TLK: CPT | Performed by: NURSE PRACTITIONER

## 2019-01-16 PROCEDURE — G8484 FLU IMMUNIZE NO ADMIN: HCPCS | Performed by: NURSE PRACTITIONER

## 2019-01-16 PROCEDURE — G8926 SPIRO NO PERF OR DOC: HCPCS | Performed by: NURSE PRACTITIONER

## 2019-01-16 PROCEDURE — G8427 DOCREV CUR MEDS BY ELIG CLIN: HCPCS | Performed by: NURSE PRACTITIONER

## 2019-01-16 RX ORDER — MELATONIN 3 MG
0.3 LOZENGE ORAL NIGHTLY PRN
COMMUNITY
End: 2020-06-11 | Stop reason: ALTCHOICE

## 2019-01-16 RX ORDER — VENLAFAXINE HYDROCHLORIDE 150 MG/1
150 CAPSULE, EXTENDED RELEASE ORAL DAILY
Qty: 30 CAPSULE | Refills: 5 | Status: SHIPPED | OUTPATIENT
Start: 2019-01-16 | End: 2019-08-08 | Stop reason: SDUPTHER

## 2019-01-16 ASSESSMENT — ENCOUNTER SYMPTOMS
GASTROINTESTINAL NEGATIVE: 1
RESPIRATORY NEGATIVE: 1
EYES NEGATIVE: 1

## 2019-02-13 ENCOUNTER — HOSPITAL ENCOUNTER (INPATIENT)
Age: 44
LOS: 3 days | Discharge: HOME | End: 2019-02-16
Attending: EMERGENCY MEDICINE | Admitting: INTERNAL MEDICINE
Payer: MEDICAID

## 2019-02-13 ENCOUNTER — APPOINTMENT (OUTPATIENT)
Dept: GENERAL RADIOLOGY | Age: 44
End: 2019-02-13
Payer: MEDICAID

## 2019-02-13 DIAGNOSIS — K29.20 ACUTE ALCOHOLIC GASTRITIS WITHOUT HEMORRHAGE: ICD-10-CM

## 2019-02-13 DIAGNOSIS — F10.929 ACUTE ALCOHOLIC INTOXICATION WITH COMPLICATION (HCC): Primary | ICD-10-CM

## 2019-02-13 DIAGNOSIS — K70.10 ALCOHOLIC HEPATITIS, UNSPECIFIED WHETHER ASCITES PRESENT: ICD-10-CM

## 2019-02-13 DIAGNOSIS — F10.931 ALCOHOL WITHDRAWAL SYNDROME, WITH DELIRIUM (HCC): ICD-10-CM

## 2019-02-13 PROBLEM — F10.229 ALCOHOL INTOXICATION IN ALCOHOLISM WITH BLOOD LEVEL OVER 0.3 WITH COMPLICATION (HCC): Status: ACTIVE | Noted: 2019-02-13

## 2019-02-13 LAB
ALBUMIN SERPL-MCNC: 3.5 GM/DL (ref 3.4–5)
ALP BLD-CCNC: 189 U/L (ref 46–116)
ALT SERPL-CCNC: 351 U/L (ref 14–63)
AMORPHOUS: ABNORMAL
AMPHETAMINE+METHAMPHETAMIE: NEGATIVE
ANALYZED BY:: NORMAL
ANION GAP: 16 MEQ/L (ref 8–16)
AST SERPL-CCNC: 236 U/L (ref 15–37)
BACTERIA: ABNORMAL
BARBITURATES: NEGATIVE
BASOPHILS # BLD: 2.8 % (ref 0–3)
BENZODIAZEPINES: NEGATIVE
BILIRUB SERPL-MCNC: 3.9 MG/DL (ref 0.2–1)
BILIRUBIN URINE: NEGATIVE
BLOOD, URINE: ABNORMAL
BUN BLDV-MCNC: 16 MG/DL (ref 7–18)
CANNABINOIDS: NEGATIVE
CASTS UA: ABNORMAL /LPF
CHARACTER, URINE: CLEAR
CHLORIDE BLD-SCNC: 92 MEQ/L (ref 98–107)
CO2: 21 MEQ/L (ref 21–32)
COCAINE METABOLITE: NEGATIVE
COLOR: YELLOW
CREAT SERPL-MCNC: 0.8 MG/DL (ref 0.6–1.3)
CRYSTALS, UA: ABNORMAL
DATE OF COLLECTION: NORMAL
DRAWN BY: NORMAL
EKG ATRIAL RATE: 119 BPM
EKG P AXIS: 44 DEGREES
EKG P-R INTERVAL: 118 MS
EKG Q-T INTERVAL: 334 MS
EKG QRS DURATION: 92 MS
EKG QTC CALCULATION (BAZETT): 469 MS
EKG R AXIS: 27 DEGREES
EKG T AXIS: 16 DEGREES
EKG VENTRICULAR RATE: 119 BPM
EOSINOPHILS RELATIVE PERCENT: 1.1 % (ref 0–4)
EPITHELIAL CELLS, UA: ABNORMAL /HPF
ETHYL ALCOHOL: 0.37 % (GM/DL)
GFR, ESTIMATED: > 90 ML/MIN/1.73M2
GLUCOSE BLD-MCNC: 215 MG/DL (ref 70–108)
GLUCOSE BLD-MCNC: 220 MG/DL (ref 70–108)
GLUCOSE BLD-MCNC: 223 MG/DL (ref 74–106)
GLUCOSE, URINE: NEGATIVE MG/DL
HCT VFR BLD CALC: 55.7 % (ref 42–52)
HEMOGLOBIN: 18.9 GM/DL (ref 14–18)
INR BLD: 1.13 (ref 0.85–1.13)
KETONES, URINE: NEGATIVE
LEUKOCYTE ESTERASE, URINE: NEGATIVE
LIPASE: 266 U/L (ref 73–393)
LYMPHOCYTES # BLD: 20.1 % (ref 15–47)
Lab: 1212
Lab: NORMAL
MAGNESIUM: 1.9 MG/DL (ref 1.8–2.4)
MCH RBC QN AUTO: 35.1 PG (ref 27–31)
MCHC RBC AUTO-ENTMCNC: 34 GM/DL (ref 33–37)
MCV RBC AUTO: 103.3 FL (ref 80–94)
MONOCYTES: 11.5 % (ref 0–12)
MUCUS: ABNORMAL
NITRITE, URINE: NEGATIVE
OPIATES: NEGATIVE
PDW BLD-RTO: 12.6 % (ref 11.5–14.5)
PH UA: 6 (ref 5–9)
PHENCYCLIDINE: NEGATIVE
PLATELET # BLD: 278 THOU/MM3 (ref 130–400)
PMV BLD AUTO: 6.8 FL (ref 7.4–10.4)
POC CALCIUM: 8.5 MG/DL (ref 8.5–10.1)
POTASSIUM SERPL-SCNC: 3.8 MEQ/L (ref 3.5–5.1)
PROTEIN UA: 100 MG/DL
RBC # BLD: 5.39 MILL/MM3 (ref 4.7–6.1)
RBC UA: ABNORMAL /HPF
REFLEX TO URINE C & S: ABNORMAL
SEGS: 64.5 % (ref 43–75)
SODIUM BLD-SCNC: 129 MEQ/L (ref 136–145)
SPECIFIC GRAVITY UA: < 1.005 (ref 1–1.03)
TOTAL PROTEIN: 7.6 GM/DL (ref 6.4–8.2)
TROPONIN I: < 0.017 NG/ML (ref 0.02–0.05)
UROBILINOGEN, URINE: 0.2 EU/DL (ref 0–1)
WBC # BLD: 9.8 THOU/MM3 (ref 4.8–10.8)
WBC UA: ABNORMAL /HPF

## 2019-02-13 PROCEDURE — 1200000003 HC TELEMETRY R&B

## 2019-02-13 PROCEDURE — 6360000002 HC RX W HCPCS: Performed by: INTERNAL MEDICINE

## 2019-02-13 PROCEDURE — 2709999900 HC NON-CHARGEABLE SUPPLY

## 2019-02-13 PROCEDURE — 99223 1ST HOSP IP/OBS HIGH 75: CPT | Performed by: INTERNAL MEDICINE

## 2019-02-13 PROCEDURE — 81001 URINALYSIS AUTO W/SCOPE: CPT

## 2019-02-13 PROCEDURE — 96374 THER/PROPH/DIAG INJ IV PUSH: CPT

## 2019-02-13 PROCEDURE — G0480 DRUG TEST DEF 1-7 CLASSES: HCPCS

## 2019-02-13 PROCEDURE — 82948 REAGENT STRIP/BLOOD GLUCOSE: CPT

## 2019-02-13 PROCEDURE — 96375 TX/PRO/DX INJ NEW DRUG ADDON: CPT

## 2019-02-13 PROCEDURE — 83735 ASSAY OF MAGNESIUM: CPT

## 2019-02-13 PROCEDURE — 84484 ASSAY OF TROPONIN QUANT: CPT

## 2019-02-13 PROCEDURE — 80305 DRUG TEST PRSMV DIR OPT OBS: CPT

## 2019-02-13 PROCEDURE — 80053 COMPREHEN METABOLIC PANEL: CPT

## 2019-02-13 PROCEDURE — 2580000003 HC RX 258: Performed by: INTERNAL MEDICINE

## 2019-02-13 PROCEDURE — 83690 ASSAY OF LIPASE: CPT

## 2019-02-13 PROCEDURE — C9113 INJ PANTOPRAZOLE SODIUM, VIA: HCPCS | Performed by: EMERGENCY MEDICINE

## 2019-02-13 PROCEDURE — 85025 COMPLETE CBC W/AUTO DIFF WBC: CPT

## 2019-02-13 PROCEDURE — 99285 EMERGENCY DEPT VISIT HI MDM: CPT

## 2019-02-13 PROCEDURE — 36415 COLL VENOUS BLD VENIPUNCTURE: CPT

## 2019-02-13 PROCEDURE — 6370000000 HC RX 637 (ALT 250 FOR IP): Performed by: INTERNAL MEDICINE

## 2019-02-13 PROCEDURE — 85610 PROTHROMBIN TIME: CPT

## 2019-02-13 PROCEDURE — 71046 X-RAY EXAM CHEST 2 VIEWS: CPT

## 2019-02-13 PROCEDURE — 93005 ELECTROCARDIOGRAM TRACING: CPT | Performed by: EMERGENCY MEDICINE

## 2019-02-13 PROCEDURE — 6360000002 HC RX W HCPCS: Performed by: EMERGENCY MEDICINE

## 2019-02-13 PROCEDURE — 2580000003 HC RX 258: Performed by: EMERGENCY MEDICINE

## 2019-02-13 RX ORDER — ONDANSETRON 2 MG/ML
4 INJECTION INTRAMUSCULAR; INTRAVENOUS EVERY 6 HOURS PRN
Status: DISCONTINUED | OUTPATIENT
Start: 2019-02-13 | End: 2019-02-16 | Stop reason: HOSPADM

## 2019-02-13 RX ORDER — THIAMINE HYDROCHLORIDE 100 MG/ML
100 INJECTION, SOLUTION INTRAMUSCULAR; INTRAVENOUS ONCE
Status: COMPLETED | OUTPATIENT
Start: 2019-02-13 | End: 2019-02-13

## 2019-02-13 RX ORDER — LISINOPRIL 20 MG/1
20 TABLET ORAL DAILY
Status: DISCONTINUED | OUTPATIENT
Start: 2019-02-13 | End: 2019-02-16 | Stop reason: HOSPADM

## 2019-02-13 RX ORDER — LORAZEPAM 2 MG/ML
1 INJECTION INTRAMUSCULAR ONCE
Status: COMPLETED | OUTPATIENT
Start: 2019-02-13 | End: 2019-02-13

## 2019-02-13 RX ORDER — LISINOPRIL AND HYDROCHLOROTHIAZIDE 25; 20 MG/1; MG/1
1 TABLET ORAL DAILY
Status: DISCONTINUED | OUTPATIENT
Start: 2019-02-13 | End: 2019-02-13 | Stop reason: CLARIF

## 2019-02-13 RX ORDER — ONDANSETRON 2 MG/ML
4 INJECTION INTRAMUSCULAR; INTRAVENOUS ONCE
Status: COMPLETED | OUTPATIENT
Start: 2019-02-13 | End: 2019-02-13

## 2019-02-13 RX ORDER — LORAZEPAM 2 MG/ML
4 INJECTION INTRAMUSCULAR
Status: DISCONTINUED | OUTPATIENT
Start: 2019-02-13 | End: 2019-02-16 | Stop reason: HOSPADM

## 2019-02-13 RX ORDER — MELATONIN 3 MG
0.3 LOZENGE ORAL NIGHTLY PRN
Status: DISCONTINUED | OUTPATIENT
Start: 2019-02-13 | End: 2019-02-13

## 2019-02-13 RX ORDER — PANTOPRAZOLE SODIUM 40 MG/10ML
40 INJECTION, POWDER, LYOPHILIZED, FOR SOLUTION INTRAVENOUS DAILY
Status: DISCONTINUED | OUTPATIENT
Start: 2019-02-13 | End: 2019-02-13

## 2019-02-13 RX ORDER — POTASSIUM CHLORIDE 7.45 MG/ML
10 INJECTION INTRAVENOUS PRN
Status: DISCONTINUED | OUTPATIENT
Start: 2019-02-13 | End: 2019-02-16 | Stop reason: HOSPADM

## 2019-02-13 RX ORDER — SODIUM CHLORIDE 0.9 % (FLUSH) 0.9 %
10 SYRINGE (ML) INJECTION PRN
Status: DISCONTINUED | OUTPATIENT
Start: 2019-02-13 | End: 2019-02-16 | Stop reason: HOSPADM

## 2019-02-13 RX ORDER — LORAZEPAM 2 MG/1
4 TABLET ORAL
Status: DISCONTINUED | OUTPATIENT
Start: 2019-02-13 | End: 2019-02-16 | Stop reason: HOSPADM

## 2019-02-13 RX ORDER — HYDROCHLOROTHIAZIDE 25 MG/1
25 TABLET ORAL DAILY
Status: DISCONTINUED | OUTPATIENT
Start: 2019-02-13 | End: 2019-02-16 | Stop reason: HOSPADM

## 2019-02-13 RX ORDER — LORAZEPAM 2 MG/1
2 TABLET ORAL
Status: DISCONTINUED | OUTPATIENT
Start: 2019-02-13 | End: 2019-02-16 | Stop reason: HOSPADM

## 2019-02-13 RX ORDER — SODIUM CHLORIDE 9 MG/ML
INJECTION, SOLUTION INTRAVENOUS CONTINUOUS
Status: ACTIVE | OUTPATIENT
Start: 2019-02-13 | End: 2019-02-14

## 2019-02-13 RX ORDER — ALBUTEROL SULFATE 90 UG/1
2 AEROSOL, METERED RESPIRATORY (INHALATION) EVERY 6 HOURS PRN
Status: DISCONTINUED | OUTPATIENT
Start: 2019-02-13 | End: 2019-02-16 | Stop reason: HOSPADM

## 2019-02-13 RX ORDER — SODIUM CHLORIDE 0.9 % (FLUSH) 0.9 %
10 SYRINGE (ML) INJECTION EVERY 12 HOURS SCHEDULED
Status: DISCONTINUED | OUTPATIENT
Start: 2019-02-13 | End: 2019-02-16 | Stop reason: HOSPADM

## 2019-02-13 RX ORDER — FOLIC ACID 1 MG/1
1 TABLET ORAL DAILY
Status: DISCONTINUED | OUTPATIENT
Start: 2019-02-13 | End: 2019-02-16 | Stop reason: HOSPADM

## 2019-02-13 RX ORDER — NICOTINE 21 MG/24HR
1 PATCH, TRANSDERMAL 24 HOURS TRANSDERMAL EVERY 24 HOURS
Status: DISCONTINUED | OUTPATIENT
Start: 2019-02-13 | End: 2019-02-16 | Stop reason: HOSPADM

## 2019-02-13 RX ORDER — SODIUM CHLORIDE 9 MG/ML
INJECTION, SOLUTION INTRAVENOUS CONTINUOUS
Status: DISCONTINUED | OUTPATIENT
Start: 2019-02-13 | End: 2019-02-13

## 2019-02-13 RX ORDER — LORAZEPAM 1 MG/1
1 TABLET ORAL
Status: DISCONTINUED | OUTPATIENT
Start: 2019-02-13 | End: 2019-02-16 | Stop reason: HOSPADM

## 2019-02-13 RX ORDER — MULTIVITAMIN WITH FOLIC ACID 400 MCG
1 TABLET ORAL DAILY
Status: DISCONTINUED | OUTPATIENT
Start: 2019-02-13 | End: 2019-02-16 | Stop reason: HOSPADM

## 2019-02-13 RX ORDER — LORAZEPAM 2 MG/ML
3 INJECTION INTRAMUSCULAR
Status: DISCONTINUED | OUTPATIENT
Start: 2019-02-13 | End: 2019-02-16 | Stop reason: HOSPADM

## 2019-02-13 RX ORDER — CHLORDIAZEPOXIDE HYDROCHLORIDE 25 MG/1
25 CAPSULE, GELATIN COATED ORAL 3 TIMES DAILY
Status: DISCONTINUED | OUTPATIENT
Start: 2019-02-13 | End: 2019-02-15

## 2019-02-13 RX ORDER — VENLAFAXINE HYDROCHLORIDE 150 MG/1
150 CAPSULE, EXTENDED RELEASE ORAL DAILY
Status: DISCONTINUED | OUTPATIENT
Start: 2019-02-13 | End: 2019-02-16 | Stop reason: HOSPADM

## 2019-02-13 RX ORDER — LORAZEPAM 2 MG/ML
1 INJECTION INTRAMUSCULAR
Status: DISCONTINUED | OUTPATIENT
Start: 2019-02-13 | End: 2019-02-16 | Stop reason: HOSPADM

## 2019-02-13 RX ORDER — LORAZEPAM 2 MG/ML
2 INJECTION INTRAMUSCULAR
Status: DISCONTINUED | OUTPATIENT
Start: 2019-02-13 | End: 2019-02-16 | Stop reason: HOSPADM

## 2019-02-13 RX ORDER — THIAMINE MONONITRATE (VIT B1) 100 MG
100 TABLET ORAL DAILY
Status: DISCONTINUED | OUTPATIENT
Start: 2019-02-13 | End: 2019-02-16 | Stop reason: HOSPADM

## 2019-02-13 RX ORDER — OLANZAPINE 5 MG/1
5 TABLET ORAL NIGHTLY
Status: DISCONTINUED | OUTPATIENT
Start: 2019-02-13 | End: 2019-02-16 | Stop reason: HOSPADM

## 2019-02-13 RX ORDER — FAMOTIDINE 20 MG/1
20 TABLET, FILM COATED ORAL 2 TIMES DAILY
Status: DISCONTINUED | OUTPATIENT
Start: 2019-02-13 | End: 2019-02-16 | Stop reason: HOSPADM

## 2019-02-13 RX ADMIN — METOPROLOL TARTRATE 25 MG: 25 TABLET, FILM COATED ORAL at 19:45

## 2019-02-13 RX ADMIN — LISINOPRIL 20 MG: 20 TABLET ORAL at 17:29

## 2019-02-13 RX ADMIN — OLANZAPINE 5 MG: 5 TABLET, FILM COATED ORAL at 19:46

## 2019-02-13 RX ADMIN — THIAMINE HYDROCHLORIDE 100 MG: 100 INJECTION, SOLUTION INTRAMUSCULAR; INTRAVENOUS at 12:24

## 2019-02-13 RX ADMIN — ONDANSETRON 4 MG: 2 INJECTION INTRAMUSCULAR; INTRAVENOUS at 12:29

## 2019-02-13 RX ADMIN — Medication 100 MG: at 17:28

## 2019-02-13 RX ADMIN — CHLORDIAZEPOXIDE HYDROCHLORIDE 25 MG: 25 CAPSULE ORAL at 21:44

## 2019-02-13 RX ADMIN — LORAZEPAM 1 MG: 2 INJECTION, SOLUTION INTRAMUSCULAR; INTRAVENOUS at 14:17

## 2019-02-13 RX ADMIN — THERA TABS 1 TABLET: TAB at 17:28

## 2019-02-13 RX ADMIN — LORAZEPAM 3 MG: 2 INJECTION INTRAMUSCULAR; INTRAVENOUS at 19:46

## 2019-02-13 RX ADMIN — HYDROCHLOROTHIAZIDE 25 MG: 25 TABLET ORAL at 17:28

## 2019-02-13 RX ADMIN — FOLIC ACID 1 MG: 1 TABLET ORAL at 17:29

## 2019-02-13 RX ADMIN — VENLAFAXINE HYDROCHLORIDE 150 MG: 150 CAPSULE, EXTENDED RELEASE ORAL at 17:28

## 2019-02-13 RX ADMIN — CHLORDIAZEPOXIDE HYDROCHLORIDE 25 MG: 25 CAPSULE ORAL at 17:29

## 2019-02-13 RX ADMIN — PANTOPRAZOLE SODIUM 40 MG: 40 INJECTION, POWDER, FOR SOLUTION INTRAVENOUS at 13:18

## 2019-02-13 RX ADMIN — FAMOTIDINE 20 MG: 20 TABLET, FILM COATED ORAL at 19:45

## 2019-02-13 RX ADMIN — SODIUM CHLORIDE: 9 INJECTION, SOLUTION INTRAVENOUS at 16:16

## 2019-02-13 RX ADMIN — SODIUM CHLORIDE: 9 INJECTION, SOLUTION INTRAVENOUS at 12:20

## 2019-02-13 RX ADMIN — ENOXAPARIN SODIUM 40 MG: 40 INJECTION SUBCUTANEOUS at 17:29

## 2019-02-13 ASSESSMENT — ENCOUNTER SYMPTOMS
VOMITING: 1
WHEEZING: 0
COUGH: 0
HEARTBURN: 0
EYES NEGATIVE: 1
SHORTNESS OF BREATH: 0
ABDOMINAL PAIN: 1
DIARRHEA: 0
NAUSEA: 1
CONSTIPATION: 0

## 2019-02-13 ASSESSMENT — PAIN SCALES - GENERAL
PAINLEVEL_OUTOF10: 0
PAINLEVEL_OUTOF10: 0

## 2019-02-14 LAB
ALBUMIN SERPL-MCNC: 3.4 G/DL (ref 3.5–5.1)
ALP BLD-CCNC: 144 U/L (ref 38–126)
ALT SERPL-CCNC: 213 U/L (ref 11–66)
ANION GAP SERPL CALCULATED.3IONS-SCNC: 17 MEQ/L (ref 8–16)
AST SERPL-CCNC: 182 U/L (ref 5–40)
BILIRUB SERPL-MCNC: 3 MG/DL (ref 0.3–1.2)
BUN BLDV-MCNC: 16 MG/DL (ref 7–22)
CALCIUM SERPL-MCNC: 8 MG/DL (ref 8.5–10.5)
CHLORIDE BLD-SCNC: 95 MEQ/L (ref 98–111)
CO2: 19 MEQ/L (ref 23–33)
CREAT SERPL-MCNC: 0.5 MG/DL (ref 0.4–1.2)
GFR SERPL CREATININE-BSD FRML MDRD: > 90 ML/MIN/1.73M2
GLUCOSE BLD-MCNC: 144 MG/DL (ref 70–108)
MAGNESIUM: 1.8 MG/DL (ref 1.6–2.4)
POTASSIUM SERPL-SCNC: 4.3 MEQ/L (ref 3.5–5.2)
SODIUM BLD-SCNC: 131 MEQ/L (ref 135–145)
TOTAL PROTEIN: 6.2 G/DL (ref 6.1–8)

## 2019-02-14 PROCEDURE — 83735 ASSAY OF MAGNESIUM: CPT

## 2019-02-14 PROCEDURE — 99232 SBSQ HOSP IP/OBS MODERATE 35: CPT | Performed by: INTERNAL MEDICINE

## 2019-02-14 PROCEDURE — 36415 COLL VENOUS BLD VENIPUNCTURE: CPT

## 2019-02-14 PROCEDURE — 6370000000 HC RX 637 (ALT 250 FOR IP): Performed by: INTERNAL MEDICINE

## 2019-02-14 PROCEDURE — 6360000002 HC RX W HCPCS: Performed by: INTERNAL MEDICINE

## 2019-02-14 PROCEDURE — 1200000003 HC TELEMETRY R&B

## 2019-02-14 PROCEDURE — 80053 COMPREHEN METABOLIC PANEL: CPT

## 2019-02-14 PROCEDURE — 93010 ELECTROCARDIOGRAM REPORT: CPT | Performed by: INTERNAL MEDICINE

## 2019-02-14 RX ADMIN — CHLORDIAZEPOXIDE HYDROCHLORIDE 25 MG: 25 CAPSULE ORAL at 14:19

## 2019-02-14 RX ADMIN — LORAZEPAM 2 MG: 2 TABLET ORAL at 17:19

## 2019-02-14 RX ADMIN — HYDROCHLOROTHIAZIDE 25 MG: 25 TABLET ORAL at 08:32

## 2019-02-14 RX ADMIN — LORAZEPAM 1 MG: 1 TABLET ORAL at 12:27

## 2019-02-14 RX ADMIN — LORAZEPAM 2 MG: 2 TABLET ORAL at 18:31

## 2019-02-14 RX ADMIN — LORAZEPAM 2 MG: 2 TABLET ORAL at 14:17

## 2019-02-14 RX ADMIN — LORAZEPAM 1 MG: 1 TABLET ORAL at 08:32

## 2019-02-14 RX ADMIN — CHLORDIAZEPOXIDE HYDROCHLORIDE 25 MG: 25 CAPSULE ORAL at 08:32

## 2019-02-14 RX ADMIN — Medication 100 MG: at 08:32

## 2019-02-14 RX ADMIN — LORAZEPAM 3 MG: 2 INJECTION INTRAMUSCULAR; INTRAVENOUS at 06:07

## 2019-02-14 RX ADMIN — FAMOTIDINE 20 MG: 20 TABLET, FILM COATED ORAL at 08:32

## 2019-02-14 RX ADMIN — METOPROLOL TARTRATE 25 MG: 25 TABLET, FILM COATED ORAL at 21:03

## 2019-02-14 RX ADMIN — ENOXAPARIN SODIUM 40 MG: 40 INJECTION SUBCUTANEOUS at 17:38

## 2019-02-14 RX ADMIN — FAMOTIDINE 20 MG: 20 TABLET, FILM COATED ORAL at 21:03

## 2019-02-14 RX ADMIN — THERA TABS 1 TABLET: TAB at 08:32

## 2019-02-14 RX ADMIN — LORAZEPAM 1 MG: 1 TABLET ORAL at 09:32

## 2019-02-14 RX ADMIN — LORAZEPAM 2 MG: 2 INJECTION INTRAMUSCULAR; INTRAVENOUS at 00:07

## 2019-02-14 RX ADMIN — OLANZAPINE 5 MG: 5 TABLET, FILM COATED ORAL at 21:03

## 2019-02-14 RX ADMIN — FOLIC ACID 1 MG: 1 TABLET ORAL at 08:32

## 2019-02-14 RX ADMIN — LORAZEPAM 2 MG: 2 INJECTION INTRAMUSCULAR; INTRAVENOUS at 04:24

## 2019-02-14 RX ADMIN — LISINOPRIL 20 MG: 20 TABLET ORAL at 08:32

## 2019-02-14 RX ADMIN — CHLORDIAZEPOXIDE HYDROCHLORIDE 25 MG: 25 CAPSULE ORAL at 21:03

## 2019-02-14 RX ADMIN — LORAZEPAM 1 MG: 1 TABLET ORAL at 15:28

## 2019-02-14 RX ADMIN — VENLAFAXINE HYDROCHLORIDE 150 MG: 150 CAPSULE, EXTENDED RELEASE ORAL at 08:32

## 2019-02-14 RX ADMIN — LORAZEPAM 3 MG: 2 TABLET ORAL at 21:06

## 2019-02-14 RX ADMIN — METOPROLOL TARTRATE 25 MG: 25 TABLET, FILM COATED ORAL at 08:32

## 2019-02-14 ASSESSMENT — PAIN SCALES - GENERAL
PAINLEVEL_OUTOF10: 0
PAINLEVEL_OUTOF10: 0
PAINLEVEL_OUTOF10: 5

## 2019-02-15 PROCEDURE — 1200000003 HC TELEMETRY R&B

## 2019-02-15 PROCEDURE — 6370000000 HC RX 637 (ALT 250 FOR IP): Performed by: PHYSICIAN ASSISTANT

## 2019-02-15 PROCEDURE — 6360000002 HC RX W HCPCS: Performed by: INTERNAL MEDICINE

## 2019-02-15 PROCEDURE — 6370000000 HC RX 637 (ALT 250 FOR IP): Performed by: INTERNAL MEDICINE

## 2019-02-15 PROCEDURE — 99232 SBSQ HOSP IP/OBS MODERATE 35: CPT | Performed by: INTERNAL MEDICINE

## 2019-02-15 RX ORDER — DIAZEPAM 5 MG/1
5 TABLET ORAL EVERY 6 HOURS
Status: DISCONTINUED | OUTPATIENT
Start: 2019-02-15 | End: 2019-02-16 | Stop reason: HOSPADM

## 2019-02-15 RX ORDER — DIAZEPAM 5 MG/ML
5 INJECTION, SOLUTION INTRAMUSCULAR; INTRAVENOUS EVERY 6 HOURS PRN
Status: DISCONTINUED | OUTPATIENT
Start: 2019-02-15 | End: 2019-02-15

## 2019-02-15 RX ADMIN — OLANZAPINE 5 MG: 5 TABLET, FILM COATED ORAL at 20:37

## 2019-02-15 RX ADMIN — FAMOTIDINE 20 MG: 20 TABLET, FILM COATED ORAL at 20:37

## 2019-02-15 RX ADMIN — METOPROLOL TARTRATE 25 MG: 25 TABLET, FILM COATED ORAL at 20:37

## 2019-02-15 RX ADMIN — LISINOPRIL 20 MG: 20 TABLET ORAL at 08:37

## 2019-02-15 RX ADMIN — LORAZEPAM 1 MG: 1 TABLET ORAL at 08:37

## 2019-02-15 RX ADMIN — LORAZEPAM 1 MG: 1 TABLET ORAL at 11:19

## 2019-02-15 RX ADMIN — LORAZEPAM 1 MG: 1 TABLET ORAL at 16:42

## 2019-02-15 RX ADMIN — DIAZEPAM 5 MG: 5 TABLET ORAL at 14:25

## 2019-02-15 RX ADMIN — FAMOTIDINE 20 MG: 20 TABLET, FILM COATED ORAL at 08:38

## 2019-02-15 RX ADMIN — METOPROLOL TARTRATE 25 MG: 25 TABLET, FILM COATED ORAL at 08:37

## 2019-02-15 RX ADMIN — VENLAFAXINE HYDROCHLORIDE 150 MG: 150 CAPSULE, EXTENDED RELEASE ORAL at 08:38

## 2019-02-15 RX ADMIN — Medication 100 MG: at 08:38

## 2019-02-15 RX ADMIN — LORAZEPAM 3 MG: 2 INJECTION INTRAMUSCULAR; INTRAVENOUS at 00:07

## 2019-02-15 RX ADMIN — DIAZEPAM 5 MG: 5 TABLET ORAL at 20:37

## 2019-02-15 RX ADMIN — DIAZEPAM 5 MG: 5 TABLET ORAL at 03:40

## 2019-02-15 RX ADMIN — LORAZEPAM 4 MG: 2 INJECTION INTRAMUSCULAR; INTRAVENOUS at 02:24

## 2019-02-15 RX ADMIN — DIAZEPAM 5 MG: 5 TABLET ORAL at 08:37

## 2019-02-15 RX ADMIN — FOLIC ACID 1 MG: 1 TABLET ORAL at 08:38

## 2019-02-15 RX ADMIN — THERA TABS 1 TABLET: TAB at 08:38

## 2019-02-15 RX ADMIN — HYDROCHLOROTHIAZIDE 25 MG: 25 TABLET ORAL at 08:37

## 2019-02-15 RX ADMIN — ENOXAPARIN SODIUM 40 MG: 40 INJECTION SUBCUTANEOUS at 16:42

## 2019-02-15 RX ADMIN — LORAZEPAM 4 MG: 2 INJECTION INTRAMUSCULAR; INTRAVENOUS at 04:25

## 2019-02-15 ASSESSMENT — PAIN SCALES - GENERAL
PAINLEVEL_OUTOF10: 0
PAINLEVEL_OUTOF10: 0

## 2019-02-16 VITALS
OXYGEN SATURATION: 91 % | HEART RATE: 94 BPM | WEIGHT: 249.3 LBS | HEIGHT: 71 IN | BODY MASS INDEX: 34.9 KG/M2 | DIASTOLIC BLOOD PRESSURE: 85 MMHG | SYSTOLIC BLOOD PRESSURE: 128 MMHG | RESPIRATION RATE: 16 BRPM | TEMPERATURE: 97.9 F

## 2019-02-16 PROCEDURE — 6370000000 HC RX 637 (ALT 250 FOR IP): Performed by: INTERNAL MEDICINE

## 2019-02-16 PROCEDURE — 2580000003 HC RX 258: Performed by: INTERNAL MEDICINE

## 2019-02-16 PROCEDURE — 99239 HOSP IP/OBS DSCHRG MGMT >30: CPT | Performed by: INTERNAL MEDICINE

## 2019-02-16 RX ORDER — DIAZEPAM 5 MG/1
5 TABLET ORAL EVERY 12 HOURS PRN
Qty: 6 TABLET | Refills: 0 | Status: SHIPPED | OUTPATIENT
Start: 2019-02-16 | End: 2019-10-14 | Stop reason: SDUPTHER

## 2019-02-16 RX ORDER — MULTIVITAMIN WITH FOLIC ACID 400 MCG
1 TABLET ORAL DAILY
Refills: 0 | Status: ON HOLD | COMMUNITY
Start: 2019-02-17 | End: 2019-03-04 | Stop reason: HOSPADM

## 2019-02-16 RX ADMIN — Medication 10 ML: at 07:56

## 2019-02-16 RX ADMIN — HYDROCHLOROTHIAZIDE 25 MG: 25 TABLET ORAL at 07:56

## 2019-02-16 RX ADMIN — VENLAFAXINE HYDROCHLORIDE 150 MG: 150 CAPSULE, EXTENDED RELEASE ORAL at 07:56

## 2019-02-16 RX ADMIN — THERA TABS 1 TABLET: TAB at 07:56

## 2019-02-16 RX ADMIN — Medication 100 MG: at 07:56

## 2019-02-16 RX ADMIN — METOPROLOL TARTRATE 25 MG: 25 TABLET, FILM COATED ORAL at 07:56

## 2019-02-16 RX ADMIN — FAMOTIDINE 20 MG: 20 TABLET, FILM COATED ORAL at 07:56

## 2019-02-16 RX ADMIN — FOLIC ACID 1 MG: 1 TABLET ORAL at 07:56

## 2019-02-16 RX ADMIN — LISINOPRIL 20 MG: 20 TABLET ORAL at 07:56

## 2019-02-16 ASSESSMENT — PAIN SCALES - GENERAL: PAINLEVEL_OUTOF10: 0

## 2019-02-16 ASSESSMENT — LIFESTYLE VARIABLES: HISTORY_ALCOHOL_USE: YES

## 2019-02-16 ASSESSMENT — PATIENT HEALTH QUESTIONNAIRE - PHQ9: SUM OF ALL RESPONSES TO PHQ QUESTIONS 1-9: 4

## 2019-02-18 ENCOUNTER — TELEPHONE (OUTPATIENT)
Dept: FAMILY MEDICINE CLINIC | Age: 44
End: 2019-02-18

## 2019-02-24 ENCOUNTER — HOSPITAL ENCOUNTER (EMERGENCY)
Age: 44
Discharge: HOME | End: 2019-02-24
Attending: EMERGENCY MEDICINE
Payer: MEDICAID

## 2019-02-24 ENCOUNTER — APPOINTMENT (OUTPATIENT)
Dept: GENERAL RADIOLOGY | Age: 44
End: 2019-02-24
Payer: MEDICAID

## 2019-02-24 VITALS
WEIGHT: 240 LBS | RESPIRATION RATE: 16 BRPM | BODY MASS INDEX: 33.6 KG/M2 | DIASTOLIC BLOOD PRESSURE: 86 MMHG | HEIGHT: 71 IN | TEMPERATURE: 98.7 F | HEART RATE: 129 BPM | OXYGEN SATURATION: 94 % | SYSTOLIC BLOOD PRESSURE: 156 MMHG

## 2019-02-24 DIAGNOSIS — F10.920 ACUTE ALCOHOLIC INTOXICATION WITHOUT COMPLICATION (HCC): Primary | ICD-10-CM

## 2019-02-24 DIAGNOSIS — F10.20 ALCOHOLISM (HCC): ICD-10-CM

## 2019-02-24 LAB
ALBUMIN SERPL-MCNC: 2.9 GM/DL (ref 3.4–5)
ALP BLD-CCNC: 249 U/L (ref 46–116)
ALT SERPL-CCNC: 22 U/L (ref 14–63)
ANION GAP: 12 MEQ/L (ref 8–16)
AST SERPL-CCNC: 133 U/L (ref 15–37)
BILIRUB SERPL-MCNC: 2.7 MG/DL (ref 0.2–1)
BUN BLDV-MCNC: 16 MG/DL (ref 7–18)
CHLORIDE BLD-SCNC: 94 MEQ/L (ref 98–107)
CO2: 25 MEQ/L (ref 21–32)
CREAT SERPL-MCNC: 1.3 MG/DL (ref 0.6–1.3)
DIFFERENTIAL, MANUAL: NORMAL
EKG ATRIAL RATE: 120 BPM
EKG P AXIS: 47 DEGREES
EKG P-R INTERVAL: 122 MS
EKG Q-T INTERVAL: 310 MS
EKG QRS DURATION: 90 MS
EKG QTC CALCULATION (BAZETT): 438 MS
EKG R AXIS: 34 DEGREES
EKG T AXIS: 49 DEGREES
EKG VENTRICULAR RATE: 120 BPM
GFR, ESTIMATED: 64 ML/MIN/1.73M2
GLUCOSE BLD-MCNC: 211 MG/DL (ref 74–106)
HCT VFR BLD CALC: 50.5 % (ref 42–52)
HEMOGLOBIN: 17.6 GM/DL (ref 14–18)
LIPASE: 384 U/L (ref 73–393)
LYMPHOCYTES # BLD: 23 % (ref 15–47)
MACROCYTES: ABNORMAL
MCH RBC QN AUTO: 35.6 PG (ref 27–31)
MCHC RBC AUTO-ENTMCNC: 34.9 GM/DL (ref 33–37)
MCV RBC AUTO: 102 FL (ref 80–94)
MONOCYTES: 10 % (ref 0–12)
PDW BLD-RTO: 12.1 % (ref 11.5–14.5)
PLATELET # BLD: 237 THOU/MM3 (ref 130–400)
PLATELET ESTIMATE: ABNORMAL
PMV BLD AUTO: 7.3 FL (ref 7.4–10.4)
POC CALCIUM: 7.9 MG/DL (ref 8.5–10.1)
POTASSIUM SERPL-SCNC: 4 MEQ/L (ref 3.5–5.1)
RBC # BLD: 4.95 MILL/MM3 (ref 4.7–6.1)
RBC # BLD: ABNORMAL 10*6/UL
SEGS: 67 % (ref 43–75)
SODIUM BLD-SCNC: 131 MEQ/L (ref 136–145)
TOTAL PROTEIN: 7 GM/DL (ref 6.4–8.2)
WBC # BLD: 8 THOU/MM3 (ref 4.8–10.8)

## 2019-02-24 PROCEDURE — 99284 EMERGENCY DEPT VISIT MOD MDM: CPT

## 2019-02-24 PROCEDURE — 71046 X-RAY EXAM CHEST 2 VIEWS: CPT

## 2019-02-24 PROCEDURE — 2709999900 HC NON-CHARGEABLE SUPPLY

## 2019-02-24 PROCEDURE — 83690 ASSAY OF LIPASE: CPT

## 2019-02-24 PROCEDURE — 93005 ELECTROCARDIOGRAM TRACING: CPT | Performed by: EMERGENCY MEDICINE

## 2019-02-24 PROCEDURE — 36415 COLL VENOUS BLD VENIPUNCTURE: CPT

## 2019-02-24 PROCEDURE — 85025 COMPLETE CBC W/AUTO DIFF WBC: CPT

## 2019-02-24 PROCEDURE — 80053 COMPREHEN METABOLIC PANEL: CPT

## 2019-02-24 PROCEDURE — 6360000002 HC RX W HCPCS: Performed by: EMERGENCY MEDICINE

## 2019-02-24 PROCEDURE — 2580000003 HC RX 258: Performed by: EMERGENCY MEDICINE

## 2019-02-24 PROCEDURE — 96365 THER/PROPH/DIAG IV INF INIT: CPT

## 2019-02-24 RX ORDER — THIAMINE HYDROCHLORIDE 100 MG/ML
100 INJECTION, SOLUTION INTRAMUSCULAR; INTRAVENOUS ONCE
Status: DISCONTINUED | OUTPATIENT
Start: 2019-02-24 | End: 2019-02-24

## 2019-02-24 RX ORDER — 0.9 % SODIUM CHLORIDE 0.9 %
1000 INTRAVENOUS SOLUTION INTRAVENOUS ONCE
Status: COMPLETED | OUTPATIENT
Start: 2019-02-24 | End: 2019-02-24

## 2019-02-24 RX ADMIN — SODIUM CHLORIDE 1000 ML: 9 INJECTION, SOLUTION INTRAVENOUS at 20:13

## 2019-02-24 RX ADMIN — THIAMINE HYDROCHLORIDE 100 MG: 100 INJECTION, SOLUTION INTRAMUSCULAR; INTRAVENOUS at 20:12

## 2019-02-24 ASSESSMENT — ENCOUNTER SYMPTOMS
EYE PAIN: 0
DIARRHEA: 0
WHEEZING: 0
ABDOMINAL PAIN: 1
EYE DISCHARGE: 0
COUGH: 1
SHORTNESS OF BREATH: 0
BLOOD IN STOOL: 0
SORE THROAT: 0
VOMITING: 1

## 2019-02-24 ASSESSMENT — PAIN DESCRIPTION - LOCATION
LOCATION_2: ABDOMEN
LOCATION: HEAD

## 2019-02-24 ASSESSMENT — PAIN DESCRIPTION - DURATION: DURATION_2: INTERMITTENT

## 2019-02-24 ASSESSMENT — PAIN DESCRIPTION - DESCRIPTORS
DESCRIPTORS_2: STABBING
DESCRIPTORS: SQUEEZING

## 2019-02-24 ASSESSMENT — PAIN DESCRIPTION - INTENSITY: RATING_2: 8

## 2019-02-24 ASSESSMENT — PAIN SCALES - GENERAL: PAINLEVEL_OUTOF10: 8

## 2019-02-24 ASSESSMENT — PAIN - FUNCTIONAL ASSESSMENT: PAIN_FUNCTIONAL_ASSESSMENT: PREVENTS OR INTERFERES SOME ACTIVE ACTIVITIES AND ADLS

## 2019-02-24 ASSESSMENT — PAIN DESCRIPTION - PAIN TYPE: TYPE: ACUTE PAIN

## 2019-02-25 ENCOUNTER — TELEPHONE (OUTPATIENT)
Dept: FAMILY MEDICINE CLINIC | Age: 44
End: 2019-02-25

## 2019-02-25 PROCEDURE — 93010 ELECTROCARDIOGRAM REPORT: CPT | Performed by: INTERNAL MEDICINE

## 2019-02-26 ENCOUNTER — TELEPHONE (OUTPATIENT)
Dept: FAMILY MEDICINE CLINIC | Age: 44
End: 2019-02-26

## 2019-02-26 ENCOUNTER — OFFICE VISIT (OUTPATIENT)
Dept: FAMILY MEDICINE CLINIC | Age: 44
End: 2019-02-26
Payer: MEDICAID

## 2019-02-26 VITALS
OXYGEN SATURATION: 98 % | RESPIRATION RATE: 16 BRPM | WEIGHT: 249 LBS | TEMPERATURE: 96.7 F | DIASTOLIC BLOOD PRESSURE: 100 MMHG | HEART RATE: 128 BPM | HEIGHT: 71 IN | BODY MASS INDEX: 34.86 KG/M2 | SYSTOLIC BLOOD PRESSURE: 120 MMHG

## 2019-02-26 DIAGNOSIS — K70.40 ALCOHOLIC LIVER FAILURE (HCC): ICD-10-CM

## 2019-02-26 DIAGNOSIS — F31.9 BIPOLAR 1 DISORDER (HCC): Primary | ICD-10-CM

## 2019-02-26 DIAGNOSIS — F10.20 ALCOHOL DEPENDENCE WITH PHYSIOLOGICAL DEPENDENCE, CONTINUOUS (HCC): ICD-10-CM

## 2019-02-26 PROCEDURE — 4004F PT TOBACCO SCREEN RCVD TLK: CPT | Performed by: NURSE PRACTITIONER

## 2019-02-26 PROCEDURE — G8417 CALC BMI ABV UP PARAM F/U: HCPCS | Performed by: NURSE PRACTITIONER

## 2019-02-26 PROCEDURE — G8484 FLU IMMUNIZE NO ADMIN: HCPCS | Performed by: NURSE PRACTITIONER

## 2019-02-26 PROCEDURE — 99214 OFFICE O/P EST MOD 30 MIN: CPT | Performed by: NURSE PRACTITIONER

## 2019-02-26 PROCEDURE — G8427 DOCREV CUR MEDS BY ELIG CLIN: HCPCS | Performed by: NURSE PRACTITIONER

## 2019-02-26 ASSESSMENT — ENCOUNTER SYMPTOMS
EYES NEGATIVE: 1
RESPIRATORY NEGATIVE: 1
GASTROINTESTINAL NEGATIVE: 1

## 2019-02-27 ENCOUNTER — HOSPITAL ENCOUNTER (EMERGENCY)
Age: 44
Discharge: LEFT BEFORE DISCHARGE | End: 2019-02-27
Attending: FAMILY MEDICINE
Payer: MEDICAID

## 2019-02-27 VITALS
HEIGHT: 69 IN | RESPIRATION RATE: 16 BRPM | BODY MASS INDEX: 36.88 KG/M2 | OXYGEN SATURATION: 98 % | TEMPERATURE: 96.4 F | WEIGHT: 249 LBS | HEART RATE: 118 BPM | DIASTOLIC BLOOD PRESSURE: 73 MMHG | SYSTOLIC BLOOD PRESSURE: 134 MMHG

## 2019-02-27 ASSESSMENT — PAIN DESCRIPTION - LOCATION: LOCATION: ABDOMEN

## 2019-02-27 ASSESSMENT — PAIN SCALES - GENERAL: PAINLEVEL_OUTOF10: 6

## 2019-02-28 ENCOUNTER — TELEPHONE (OUTPATIENT)
Dept: FAMILY MEDICINE CLINIC | Age: 44
End: 2019-02-28

## 2019-03-02 ENCOUNTER — HOSPITAL ENCOUNTER (INPATIENT)
Age: 44
LOS: 2 days | Discharge: HOME | End: 2019-03-04
Attending: FAMILY MEDICINE | Admitting: INTERNAL MEDICINE
Payer: MEDICAID

## 2019-03-02 DIAGNOSIS — F10.929 ACUTE ALCOHOLIC INTOXICATION WITH COMPLICATION (HCC): Primary | ICD-10-CM

## 2019-03-02 DIAGNOSIS — F10.939 ALCOHOL WITHDRAWAL SYNDROME WITH COMPLICATION (HCC): ICD-10-CM

## 2019-03-02 DIAGNOSIS — R10.13 ABDOMINAL PAIN, EPIGASTRIC: ICD-10-CM

## 2019-03-02 LAB
ALBUMIN SERPL-MCNC: 3.5 G/DL (ref 3.5–5.1)
ALP BLD-CCNC: 258 U/L (ref 38–126)
ALT SERPL-CCNC: 139 U/L (ref 11–66)
AMORPHOUS: ABNORMAL
ANION GAP SERPL CALCULATED.3IONS-SCNC: 18 MEQ/L (ref 8–16)
AST SERPL-CCNC: 205 U/L (ref 5–40)
BACTERIA: ABNORMAL
BILIRUB SERPL-MCNC: 4.9 MG/DL (ref 0.3–1.2)
BILIRUBIN URINE: ABNORMAL
BLOOD, URINE: NEGATIVE
BUN BLDV-MCNC: 13 MG/DL (ref 7–22)
CALCIUM SERPL-MCNC: 8 MG/DL (ref 8.5–10.5)
CASTS UA: ABNORMAL /LPF
CHARACTER, URINE: CLEAR
CHLORIDE BLD-SCNC: 82 MEQ/L (ref 98–111)
CO2: 18 MEQ/L (ref 23–33)
COLOR: YELLOW
CREAT SERPL-MCNC: 0.4 MG/DL (ref 0.4–1.2)
CRYSTALS, UA: ABNORMAL
EPITHELIAL CELLS, UA: ABNORMAL /HPF
GFR SERPL CREATININE-BSD FRML MDRD: > 90 ML/MIN/1.73M2
GLUCOSE BLD-MCNC: 102 MG/DL (ref 70–108)
GLUCOSE, URINE: NEGATIVE MG/DL
HCT VFR BLD CALC: 48.8 % (ref 42–52)
HEMOGLOBIN: 17 GM/DL (ref 14–18)
ICTOTEST: POSITIVE
KETONES, URINE: 15
LEUKOCYTE ESTERASE, URINE: NEGATIVE
LIPASE: 61.2 U/L (ref 5.6–51.3)
LYMPHOCYTES # BLD: 15 % (ref 15–47)
MCH RBC QN AUTO: 34.8 PG (ref 27–31)
MCHC RBC AUTO-ENTMCNC: 34.8 GM/DL (ref 33–37)
MCV RBC AUTO: 100 FL (ref 80–94)
MONOCYTES: 5 % (ref 0–12)
MUCUS: ABNORMAL
NITRITE, URINE: NEGATIVE
PDW BLD-RTO: 12 % (ref 11.5–14.5)
PH UA: 6 (ref 5–9)
PLATELET # BLD: 301 THOU/MM3 (ref 130–400)
PLATELET ESTIMATE: ABNORMAL
PMV BLD AUTO: 7.6 FL (ref 7.4–10.4)
POTASSIUM SERPL-SCNC: 4.5 MEQ/L (ref 3.5–5.2)
PROTEIN UA: 30 MG/DL
RBC # BLD: 4.88 MILL/MM3 (ref 4.7–6.1)
RBC UA: ABNORMAL /HPF
REFLEX TO URINE C & S: ABNORMAL
SEGS: 80 % (ref 43–75)
SODIUM BLD-SCNC: 118 MEQ/L (ref 135–145)
SODIUM BLD-SCNC: 120 MEQ/L (ref 135–145)
SPECIFIC GRAVITY UA: 1.01 (ref 1–1.03)
TOTAL PROTEIN: 6.6 G/DL (ref 6.1–8)
UROBILINOGEN, URINE: 1 EU/DL (ref 0–1)
WBC # BLD: 8.3 THOU/MM3 (ref 4.8–10.8)
WBC UA: ABNORMAL /HPF

## 2019-03-02 PROCEDURE — 81001 URINALYSIS AUTO W/SCOPE: CPT

## 2019-03-02 PROCEDURE — 96374 THER/PROPH/DIAG INJ IV PUSH: CPT

## 2019-03-02 PROCEDURE — 2709999900 HC NON-CHARGEABLE SUPPLY

## 2019-03-02 PROCEDURE — 96375 TX/PRO/DX INJ NEW DRUG ADDON: CPT

## 2019-03-02 PROCEDURE — 6370000000 HC RX 637 (ALT 250 FOR IP): Performed by: INTERNAL MEDICINE

## 2019-03-02 PROCEDURE — 99222 1ST HOSP IP/OBS MODERATE 55: CPT | Performed by: INTERNAL MEDICINE

## 2019-03-02 PROCEDURE — 93005 ELECTROCARDIOGRAM TRACING: CPT | Performed by: FAMILY MEDICINE

## 2019-03-02 PROCEDURE — 99284 EMERGENCY DEPT VISIT MOD MDM: CPT

## 2019-03-02 PROCEDURE — 2580000003 HC RX 258: Performed by: FAMILY MEDICINE

## 2019-03-02 PROCEDURE — 94640 AIRWAY INHALATION TREATMENT: CPT

## 2019-03-02 PROCEDURE — 80053 COMPREHEN METABOLIC PANEL: CPT

## 2019-03-02 PROCEDURE — 83690 ASSAY OF LIPASE: CPT

## 2019-03-02 PROCEDURE — 6360000002 HC RX W HCPCS: Performed by: FAMILY MEDICINE

## 2019-03-02 PROCEDURE — 6360000002 HC RX W HCPCS: Performed by: INTERNAL MEDICINE

## 2019-03-02 PROCEDURE — 85025 COMPLETE CBC W/AUTO DIFF WBC: CPT

## 2019-03-02 PROCEDURE — 36415 COLL VENOUS BLD VENIPUNCTURE: CPT

## 2019-03-02 PROCEDURE — 84295 ASSAY OF SERUM SODIUM: CPT

## 2019-03-02 PROCEDURE — 1200000000 HC SEMI PRIVATE

## 2019-03-02 RX ORDER — FOLIC ACID 1 MG/1
1 TABLET ORAL DAILY
Status: DISCONTINUED | OUTPATIENT
Start: 2019-03-03 | End: 2019-03-04 | Stop reason: HOSPADM

## 2019-03-02 RX ORDER — LORAZEPAM 2 MG/1
4 TABLET ORAL
Status: DISCONTINUED | OUTPATIENT
Start: 2019-03-02 | End: 2019-03-04 | Stop reason: HOSPADM

## 2019-03-02 RX ORDER — VENLAFAXINE HYDROCHLORIDE 150 MG/1
150 CAPSULE, EXTENDED RELEASE ORAL DAILY
Status: DISCONTINUED | OUTPATIENT
Start: 2019-03-03 | End: 2019-03-04 | Stop reason: HOSPADM

## 2019-03-02 RX ORDER — NICOTINE 21 MG/24HR
1 PATCH, TRANSDERMAL 24 HOURS TRANSDERMAL DAILY
Status: DISCONTINUED | OUTPATIENT
Start: 2019-03-02 | End: 2019-03-04 | Stop reason: HOSPADM

## 2019-03-02 RX ORDER — MULTIVITAMIN WITH FOLIC ACID 400 MCG
1 TABLET ORAL DAILY
Status: DISCONTINUED | OUTPATIENT
Start: 2019-03-02 | End: 2019-03-04 | Stop reason: HOSPADM

## 2019-03-02 RX ORDER — LORAZEPAM 2 MG/1
2 TABLET ORAL
Status: DISCONTINUED | OUTPATIENT
Start: 2019-03-02 | End: 2019-03-04 | Stop reason: HOSPADM

## 2019-03-02 RX ORDER — ALBUTEROL SULFATE 90 UG/1
2 AEROSOL, METERED RESPIRATORY (INHALATION) EVERY 6 HOURS PRN
Status: DISCONTINUED | OUTPATIENT
Start: 2019-03-02 | End: 2019-03-04 | Stop reason: HOSPADM

## 2019-03-02 RX ORDER — IPRATROPIUM BROMIDE AND ALBUTEROL SULFATE 2.5; .5 MG/3ML; MG/3ML
1 SOLUTION RESPIRATORY (INHALATION)
Status: DISCONTINUED | OUTPATIENT
Start: 2019-03-02 | End: 2019-03-04 | Stop reason: HOSPADM

## 2019-03-02 RX ORDER — LISINOPRIL 20 MG/1
20 TABLET ORAL DAILY
Status: DISCONTINUED | OUTPATIENT
Start: 2019-03-03 | End: 2019-03-04 | Stop reason: HOSPADM

## 2019-03-02 RX ORDER — LORAZEPAM 1 MG/1
1 TABLET ORAL
Status: DISCONTINUED | OUTPATIENT
Start: 2019-03-02 | End: 2019-03-04 | Stop reason: HOSPADM

## 2019-03-02 RX ORDER — 0.9 % SODIUM CHLORIDE 0.9 %
1000 INTRAVENOUS SOLUTION INTRAVENOUS ONCE
Status: COMPLETED | OUTPATIENT
Start: 2019-03-02 | End: 2019-03-02

## 2019-03-02 RX ORDER — OLANZAPINE 5 MG/1
5 TABLET ORAL NIGHTLY
Status: DISCONTINUED | OUTPATIENT
Start: 2019-03-02 | End: 2019-03-04 | Stop reason: HOSPADM

## 2019-03-02 RX ORDER — ONDANSETRON 2 MG/ML
4 INJECTION INTRAMUSCULAR; INTRAVENOUS ONCE
Status: COMPLETED | OUTPATIENT
Start: 2019-03-02 | End: 2019-03-02

## 2019-03-02 RX ORDER — SODIUM CHLORIDE 0.9 % (FLUSH) 0.9 %
10 SYRINGE (ML) INJECTION EVERY 12 HOURS SCHEDULED
Status: DISCONTINUED | OUTPATIENT
Start: 2019-03-02 | End: 2019-03-04 | Stop reason: HOSPADM

## 2019-03-02 RX ORDER — SODIUM CHLORIDE 450 MG/100ML
INJECTION, SOLUTION INTRAVENOUS CONTINUOUS
Status: DISCONTINUED | OUTPATIENT
Start: 2019-03-02 | End: 2019-03-02 | Stop reason: SDUPTHER

## 2019-03-02 RX ORDER — MORPHINE SULFATE 4 MG/ML
4 INJECTION, SOLUTION INTRAMUSCULAR; INTRAVENOUS ONCE
Status: COMPLETED | OUTPATIENT
Start: 2019-03-02 | End: 2019-03-02

## 2019-03-02 RX ORDER — LORAZEPAM 2 MG/ML
3 INJECTION INTRAMUSCULAR
Status: DISCONTINUED | OUTPATIENT
Start: 2019-03-02 | End: 2019-03-04 | Stop reason: HOSPADM

## 2019-03-02 RX ORDER — LORAZEPAM 2 MG/ML
2 INJECTION INTRAMUSCULAR
Status: DISCONTINUED | OUTPATIENT
Start: 2019-03-02 | End: 2019-03-04 | Stop reason: HOSPADM

## 2019-03-02 RX ORDER — FAMOTIDINE 20 MG/1
20 TABLET, FILM COATED ORAL 2 TIMES DAILY
Status: DISCONTINUED | OUTPATIENT
Start: 2019-03-02 | End: 2019-03-04 | Stop reason: HOSPADM

## 2019-03-02 RX ORDER — ONDANSETRON 2 MG/ML
4 INJECTION INTRAMUSCULAR; INTRAVENOUS EVERY 6 HOURS PRN
Status: DISCONTINUED | OUTPATIENT
Start: 2019-03-02 | End: 2019-03-04 | Stop reason: HOSPADM

## 2019-03-02 RX ORDER — LISINOPRIL AND HYDROCHLOROTHIAZIDE 25; 20 MG/1; MG/1
1 TABLET ORAL DAILY
Status: DISCONTINUED | OUTPATIENT
Start: 2019-03-02 | End: 2019-03-02 | Stop reason: SDUPTHER

## 2019-03-02 RX ORDER — HYDROCHLOROTHIAZIDE 25 MG/1
25 TABLET ORAL DAILY
Status: DISCONTINUED | OUTPATIENT
Start: 2019-03-03 | End: 2019-03-04 | Stop reason: HOSPADM

## 2019-03-02 RX ORDER — LORAZEPAM 2 MG/ML
4 INJECTION INTRAMUSCULAR
Status: DISCONTINUED | OUTPATIENT
Start: 2019-03-02 | End: 2019-03-04 | Stop reason: HOSPADM

## 2019-03-02 RX ORDER — LORAZEPAM 2 MG/ML
1 INJECTION INTRAMUSCULAR
Status: DISCONTINUED | OUTPATIENT
Start: 2019-03-02 | End: 2019-03-04 | Stop reason: HOSPADM

## 2019-03-02 RX ORDER — THIAMINE MONONITRATE (VIT B1) 100 MG
200 TABLET ORAL DAILY
Status: DISCONTINUED | OUTPATIENT
Start: 2019-03-02 | End: 2019-03-04 | Stop reason: HOSPADM

## 2019-03-02 RX ORDER — POTASSIUM CHLORIDE 7.45 MG/ML
10 INJECTION INTRAVENOUS PRN
Status: DISCONTINUED | OUTPATIENT
Start: 2019-03-02 | End: 2019-03-04 | Stop reason: HOSPADM

## 2019-03-02 RX ORDER — SODIUM CHLORIDE 9 MG/ML
INJECTION, SOLUTION INTRAVENOUS CONTINUOUS
Status: DISCONTINUED | OUTPATIENT
Start: 2019-03-02 | End: 2019-03-04

## 2019-03-02 RX ORDER — SODIUM CHLORIDE 0.9 % (FLUSH) 0.9 %
10 SYRINGE (ML) INJECTION PRN
Status: DISCONTINUED | OUTPATIENT
Start: 2019-03-02 | End: 2019-03-04 | Stop reason: HOSPADM

## 2019-03-02 RX ADMIN — THERA TABS 1 TABLET: TAB at 16:52

## 2019-03-02 RX ADMIN — OLANZAPINE 5 MG: 5 TABLET, FILM COATED ORAL at 20:36

## 2019-03-02 RX ADMIN — SODIUM CHLORIDE 1000 ML: 9 INJECTION, SOLUTION INTRAVENOUS at 12:02

## 2019-03-02 RX ADMIN — Medication 200 MG: at 16:52

## 2019-03-02 RX ADMIN — LORAZEPAM 1 MG: 1 TABLET ORAL at 16:53

## 2019-03-02 RX ADMIN — LORAZEPAM 2 MG: 2 TABLET ORAL at 15:43

## 2019-03-02 RX ADMIN — ONDANSETRON 4 MG: 2 INJECTION INTRAMUSCULAR; INTRAVENOUS at 12:58

## 2019-03-02 RX ADMIN — FAMOTIDINE 20 MG: 20 TABLET ORAL at 20:36

## 2019-03-02 RX ADMIN — METOPROLOL TARTRATE 25 MG: 25 TABLET ORAL at 20:36

## 2019-03-02 RX ADMIN — SODIUM CHLORIDE: 9 INJECTION, SOLUTION INTRAVENOUS at 22:34

## 2019-03-02 RX ADMIN — SODIUM CHLORIDE: 9 INJECTION, SOLUTION INTRAVENOUS at 13:12

## 2019-03-02 RX ADMIN — LORAZEPAM 1 MG: 1 TABLET ORAL at 20:46

## 2019-03-02 RX ADMIN — IPRATROPIUM BROMIDE AND ALBUTEROL SULFATE 1 AMPULE: .5; 3 SOLUTION RESPIRATORY (INHALATION) at 17:51

## 2019-03-02 RX ADMIN — ENOXAPARIN SODIUM 40 MG: 40 INJECTION SUBCUTANEOUS at 16:51

## 2019-03-02 RX ADMIN — MORPHINE SULFATE 4 MG: 4 INJECTION, SOLUTION INTRAMUSCULAR; INTRAVENOUS at 12:58

## 2019-03-02 RX ADMIN — Medication 1 MG: at 20:37

## 2019-03-02 ASSESSMENT — PAIN DESCRIPTION - PAIN TYPE
TYPE: ACUTE PAIN

## 2019-03-02 ASSESSMENT — PAIN DESCRIPTION - ORIENTATION
ORIENTATION: OTHER (COMMENT)
ORIENTATION: OTHER (COMMENT)

## 2019-03-02 ASSESSMENT — ENCOUNTER SYMPTOMS
RHINORRHEA: 0
EYE PAIN: 0
SORE THROAT: 0
VOMITING: 0
SHORTNESS OF BREATH: 0
EYE REDNESS: 0
BACK PAIN: 0
CONSTIPATION: 0
COUGH: 0
STRIDOR: 0
DIARRHEA: 1
NAUSEA: 0
CHEST TIGHTNESS: 0
SINUS PRESSURE: 0
EYE DISCHARGE: 0
ABDOMINAL PAIN: 1
PHOTOPHOBIA: 0
WHEEZING: 0
ABDOMINAL DISTENTION: 0

## 2019-03-02 ASSESSMENT — PAIN DESCRIPTION - LOCATION
LOCATION: ABDOMEN

## 2019-03-02 ASSESSMENT — PAIN DESCRIPTION - FREQUENCY
FREQUENCY: CONTINUOUS
FREQUENCY: INTERMITTENT
FREQUENCY: CONTINUOUS

## 2019-03-02 ASSESSMENT — PAIN SCALES - GENERAL
PAINLEVEL_OUTOF10: 8
PAINLEVEL_OUTOF10: 9
PAINLEVEL_OUTOF10: 3

## 2019-03-02 ASSESSMENT — PAIN DESCRIPTION - DESCRIPTORS
DESCRIPTORS: CRAMPING;STABBING
DESCRIPTORS: STABBING
DESCRIPTORS: CRAMPING

## 2019-03-02 ASSESSMENT — PAIN DESCRIPTION - ONSET: ONSET: ON-GOING

## 2019-03-03 LAB
ANION GAP SERPL CALCULATED.3IONS-SCNC: 13 MEQ/L (ref 8–16)
BUN BLDV-MCNC: 12 MG/DL (ref 7–22)
CALCIUM SERPL-MCNC: 7.6 MG/DL (ref 8.5–10.5)
CHLORIDE BLD-SCNC: 94 MEQ/L (ref 98–111)
CO2: 18 MEQ/L (ref 23–33)
CREAT SERPL-MCNC: 0.5 MG/DL (ref 0.4–1.2)
EKG ATRIAL RATE: 91 BPM
EKG P AXIS: 60 DEGREES
EKG P-R INTERVAL: 126 MS
EKG Q-T INTERVAL: 362 MS
EKG QRS DURATION: 96 MS
EKG QTC CALCULATION (BAZETT): 445 MS
EKG R AXIS: 33 DEGREES
EKG T AXIS: 49 DEGREES
EKG VENTRICULAR RATE: 91 BPM
GFR SERPL CREATININE-BSD FRML MDRD: > 90 ML/MIN/1.73M2
GLUCOSE BLD-MCNC: 74 MG/DL (ref 70–108)
MAGNESIUM: 1.7 MG/DL (ref 1.6–2.4)
POTASSIUM REFLEX MAGNESIUM: 3.9 MEQ/L (ref 3.5–5.2)
SODIUM BLD-SCNC: 121 MEQ/L (ref 135–145)
SODIUM BLD-SCNC: 123 MEQ/L (ref 135–145)
SODIUM BLD-SCNC: 125 MEQ/L (ref 135–145)
SODIUM BLD-SCNC: 125 MEQ/L (ref 135–145)
SODIUM BLD-SCNC: 126 MEQ/L (ref 135–145)

## 2019-03-03 PROCEDURE — 36415 COLL VENOUS BLD VENIPUNCTURE: CPT

## 2019-03-03 PROCEDURE — 6360000002 HC RX W HCPCS: Performed by: INTERNAL MEDICINE

## 2019-03-03 PROCEDURE — 93010 ELECTROCARDIOGRAM REPORT: CPT | Performed by: NUCLEAR MEDICINE

## 2019-03-03 PROCEDURE — 99233 SBSQ HOSP IP/OBS HIGH 50: CPT | Performed by: HOSPITALIST

## 2019-03-03 PROCEDURE — 6370000000 HC RX 637 (ALT 250 FOR IP): Performed by: INTERNAL MEDICINE

## 2019-03-03 PROCEDURE — 2580000003 HC RX 258: Performed by: FAMILY MEDICINE

## 2019-03-03 PROCEDURE — 84295 ASSAY OF SERUM SODIUM: CPT

## 2019-03-03 PROCEDURE — 83735 ASSAY OF MAGNESIUM: CPT

## 2019-03-03 PROCEDURE — 80048 BASIC METABOLIC PNL TOTAL CA: CPT

## 2019-03-03 PROCEDURE — 1200000000 HC SEMI PRIVATE

## 2019-03-03 PROCEDURE — 94640 AIRWAY INHALATION TREATMENT: CPT

## 2019-03-03 PROCEDURE — 2580000003 HC RX 258: Performed by: HOSPITALIST

## 2019-03-03 RX ADMIN — FAMOTIDINE 20 MG: 20 TABLET ORAL at 07:46

## 2019-03-03 RX ADMIN — SODIUM CHLORIDE: 9 INJECTION, SOLUTION INTRAVENOUS at 02:33

## 2019-03-03 RX ADMIN — HYDROCHLOROTHIAZIDE 25 MG: 25 TABLET ORAL at 07:47

## 2019-03-03 RX ADMIN — VENLAFAXINE HYDROCHLORIDE 150 MG: 150 CAPSULE, EXTENDED RELEASE ORAL at 07:47

## 2019-03-03 RX ADMIN — FOLIC ACID 1 MG: 1 TABLET ORAL at 07:47

## 2019-03-03 RX ADMIN — IPRATROPIUM BROMIDE AND ALBUTEROL SULFATE 1 AMPULE: .5; 3 SOLUTION RESPIRATORY (INHALATION) at 18:59

## 2019-03-03 RX ADMIN — Medication 200 MG: at 07:47

## 2019-03-03 RX ADMIN — IPRATROPIUM BROMIDE AND ALBUTEROL SULFATE 1 AMPULE: .5; 3 SOLUTION RESPIRATORY (INHALATION) at 08:03

## 2019-03-03 RX ADMIN — Medication 1 MG: at 19:56

## 2019-03-03 RX ADMIN — SODIUM CHLORIDE: 9 INJECTION, SOLUTION INTRAVENOUS at 19:56

## 2019-03-03 RX ADMIN — THERA TABS 1 TABLET: TAB at 07:47

## 2019-03-03 RX ADMIN — SODIUM CHLORIDE: 9 INJECTION, SOLUTION INTRAVENOUS at 06:35

## 2019-03-03 RX ADMIN — LORAZEPAM 1 MG: 1 TABLET ORAL at 07:47

## 2019-03-03 RX ADMIN — FAMOTIDINE 20 MG: 20 TABLET ORAL at 19:56

## 2019-03-03 RX ADMIN — LISINOPRIL 20 MG: 20 TABLET ORAL at 07:47

## 2019-03-03 RX ADMIN — IPRATROPIUM BROMIDE AND ALBUTEROL SULFATE 1 AMPULE: .5; 3 SOLUTION RESPIRATORY (INHALATION) at 13:59

## 2019-03-03 RX ADMIN — METOPROLOL TARTRATE 25 MG: 25 TABLET ORAL at 19:56

## 2019-03-03 RX ADMIN — ENOXAPARIN SODIUM 40 MG: 40 INJECTION SUBCUTANEOUS at 17:01

## 2019-03-03 RX ADMIN — SODIUM CHLORIDE: 9 INJECTION, SOLUTION INTRAVENOUS at 10:47

## 2019-03-03 RX ADMIN — OLANZAPINE 5 MG: 5 TABLET, FILM COATED ORAL at 19:56

## 2019-03-03 RX ADMIN — VITAMIN D, TAB 1000IU (100/BT) 5000 UNITS: 25 TAB at 07:47

## 2019-03-03 RX ADMIN — METOPROLOL TARTRATE 25 MG: 25 TABLET ORAL at 07:47

## 2019-03-03 ASSESSMENT — PAIN DESCRIPTION - ORIENTATION: ORIENTATION: MID

## 2019-03-03 ASSESSMENT — PAIN DESCRIPTION - PAIN TYPE: TYPE: ACUTE PAIN

## 2019-03-03 ASSESSMENT — PAIN SCALES - GENERAL: PAINLEVEL_OUTOF10: 1

## 2019-03-03 ASSESSMENT — PAIN DESCRIPTION - LOCATION: LOCATION: ABDOMEN

## 2019-03-04 VITALS
HEART RATE: 91 BPM | BODY MASS INDEX: 34.96 KG/M2 | TEMPERATURE: 98.1 F | HEIGHT: 71 IN | OXYGEN SATURATION: 95 % | RESPIRATION RATE: 16 BRPM | DIASTOLIC BLOOD PRESSURE: 61 MMHG | WEIGHT: 249.7 LBS | SYSTOLIC BLOOD PRESSURE: 133 MMHG

## 2019-03-04 LAB
SODIUM BLD-SCNC: 134 MEQ/L (ref 135–145)
SODIUM BLD-SCNC: 137 MEQ/L (ref 135–145)

## 2019-03-04 PROCEDURE — 84295 ASSAY OF SERUM SODIUM: CPT

## 2019-03-04 PROCEDURE — 2580000003 HC RX 258: Performed by: HOSPITALIST

## 2019-03-04 PROCEDURE — 6370000000 HC RX 637 (ALT 250 FOR IP): Performed by: INTERNAL MEDICINE

## 2019-03-04 PROCEDURE — 36415 COLL VENOUS BLD VENIPUNCTURE: CPT

## 2019-03-04 PROCEDURE — 94640 AIRWAY INHALATION TREATMENT: CPT

## 2019-03-04 PROCEDURE — 99233 SBSQ HOSP IP/OBS HIGH 50: CPT | Performed by: HOSPITALIST

## 2019-03-04 RX ORDER — FAMOTIDINE 20 MG/1
20 TABLET, FILM COATED ORAL 2 TIMES DAILY
Qty: 60 TABLET | Refills: 1 | Status: ON HOLD | OUTPATIENT
Start: 2019-03-04 | End: 2019-05-02

## 2019-03-04 RX ORDER — MULTIVITAMIN WITH FOLIC ACID 400 MCG
1 TABLET ORAL DAILY
Qty: 30 TABLET | Refills: 3 | Status: ON HOLD | OUTPATIENT
Start: 2019-03-05 | End: 2019-05-02 | Stop reason: HOSPADM

## 2019-03-04 RX ORDER — LORAZEPAM 0.5 MG/1
0.5 TABLET ORAL EVERY 12 HOURS PRN
Qty: 4 TABLET | Refills: 0 | Status: SHIPPED | OUTPATIENT
Start: 2019-03-04 | End: 2019-03-05 | Stop reason: SDUPTHER

## 2019-03-04 RX ORDER — NICOTINE 21 MG/24HR
1 PATCH, TRANSDERMAL 24 HOURS TRANSDERMAL DAILY
Qty: 30 PATCH | Refills: 3 | Status: SHIPPED | OUTPATIENT
Start: 2019-03-05 | End: 2019-03-12 | Stop reason: ALTCHOICE

## 2019-03-04 RX ADMIN — FOLIC ACID 1 MG: 1 TABLET ORAL at 09:10

## 2019-03-04 RX ADMIN — IPRATROPIUM BROMIDE AND ALBUTEROL SULFATE 1 AMPULE: .5; 3 SOLUTION RESPIRATORY (INHALATION) at 09:40

## 2019-03-04 RX ADMIN — THERA TABS 1 TABLET: TAB at 09:10

## 2019-03-04 RX ADMIN — FAMOTIDINE 20 MG: 20 TABLET ORAL at 09:10

## 2019-03-04 RX ADMIN — METOPROLOL TARTRATE 25 MG: 25 TABLET ORAL at 09:10

## 2019-03-04 RX ADMIN — Medication 200 MG: at 09:10

## 2019-03-04 RX ADMIN — LISINOPRIL 20 MG: 20 TABLET ORAL at 09:10

## 2019-03-04 RX ADMIN — HYDROCHLOROTHIAZIDE 25 MG: 25 TABLET ORAL at 09:10

## 2019-03-04 RX ADMIN — IPRATROPIUM BROMIDE AND ALBUTEROL SULFATE 1 AMPULE: .5; 3 SOLUTION RESPIRATORY (INHALATION) at 13:17

## 2019-03-04 RX ADMIN — SODIUM CHLORIDE: 9 INJECTION, SOLUTION INTRAVENOUS at 03:22

## 2019-03-04 RX ADMIN — VENLAFAXINE HYDROCHLORIDE 150 MG: 150 CAPSULE, EXTENDED RELEASE ORAL at 09:10

## 2019-03-05 ENCOUNTER — TELEPHONE (OUTPATIENT)
Dept: FAMILY MEDICINE CLINIC | Age: 44
End: 2019-03-05

## 2019-03-05 ENCOUNTER — OFFICE VISIT (OUTPATIENT)
Dept: FAMILY MEDICINE CLINIC | Age: 44
End: 2019-03-05
Payer: MEDICAID

## 2019-03-05 VITALS
TEMPERATURE: 98.7 F | DIASTOLIC BLOOD PRESSURE: 86 MMHG | RESPIRATION RATE: 16 BRPM | WEIGHT: 246 LBS | HEART RATE: 90 BPM | BODY MASS INDEX: 34.31 KG/M2 | SYSTOLIC BLOOD PRESSURE: 138 MMHG

## 2019-03-05 DIAGNOSIS — F10.939 ALCOHOL WITHDRAWAL SYNDROME WITH COMPLICATION (HCC): ICD-10-CM

## 2019-03-05 DIAGNOSIS — F10.20 ALCOHOL DEPENDENCE WITH PHYSIOLOGICAL DEPENDENCE, CONTINUOUS (HCC): Primary | ICD-10-CM

## 2019-03-05 DIAGNOSIS — F60.7: ICD-10-CM

## 2019-03-05 DIAGNOSIS — F31.9 BIPOLAR 1 DISORDER (HCC): ICD-10-CM

## 2019-03-05 PROCEDURE — G8427 DOCREV CUR MEDS BY ELIG CLIN: HCPCS | Performed by: NURSE PRACTITIONER

## 2019-03-05 PROCEDURE — G8484 FLU IMMUNIZE NO ADMIN: HCPCS | Performed by: NURSE PRACTITIONER

## 2019-03-05 PROCEDURE — 4004F PT TOBACCO SCREEN RCVD TLK: CPT | Performed by: NURSE PRACTITIONER

## 2019-03-05 PROCEDURE — G8417 CALC BMI ABV UP PARAM F/U: HCPCS | Performed by: NURSE PRACTITIONER

## 2019-03-05 PROCEDURE — 1111F DSCHRG MED/CURRENT MED MERGE: CPT | Performed by: NURSE PRACTITIONER

## 2019-03-05 PROCEDURE — 99214 OFFICE O/P EST MOD 30 MIN: CPT | Performed by: NURSE PRACTITIONER

## 2019-03-05 RX ORDER — LORAZEPAM 0.5 MG/1
0.5 TABLET ORAL EVERY 12 HOURS PRN
Qty: 14 TABLET | Refills: 0 | Status: SHIPPED | OUTPATIENT
Start: 2019-03-05 | End: 2019-03-12

## 2019-03-05 RX ORDER — OLANZAPINE 10 MG/1
10 TABLET ORAL NIGHTLY
Qty: 30 TABLET | Refills: 5 | Status: SHIPPED | OUTPATIENT
Start: 2019-03-05 | End: 2019-08-08

## 2019-03-05 ASSESSMENT — ENCOUNTER SYMPTOMS
RESPIRATORY NEGATIVE: 1
GASTROINTESTINAL NEGATIVE: 1
EYES NEGATIVE: 1

## 2019-03-12 ENCOUNTER — OFFICE VISIT (OUTPATIENT)
Dept: FAMILY MEDICINE CLINIC | Age: 44
End: 2019-03-12
Payer: MEDICAID

## 2019-03-12 VITALS
BODY MASS INDEX: 34.44 KG/M2 | DIASTOLIC BLOOD PRESSURE: 82 MMHG | RESPIRATION RATE: 12 BRPM | HEIGHT: 71 IN | WEIGHT: 246.03 LBS | SYSTOLIC BLOOD PRESSURE: 132 MMHG | HEART RATE: 72 BPM

## 2019-03-12 DIAGNOSIS — K70.10 ACUTE ALCOHOLIC HEPATITIS: ICD-10-CM

## 2019-03-12 DIAGNOSIS — K70.40 ALCOHOLIC LIVER FAILURE (HCC): ICD-10-CM

## 2019-03-12 DIAGNOSIS — F31.9 BIPOLAR 1 DISORDER (HCC): Primary | ICD-10-CM

## 2019-03-12 DIAGNOSIS — F10.939 ALCOHOL WITHDRAWAL SYNDROME WITH COMPLICATION (HCC): ICD-10-CM

## 2019-03-12 PROCEDURE — 4004F PT TOBACCO SCREEN RCVD TLK: CPT | Performed by: NURSE PRACTITIONER

## 2019-03-12 PROCEDURE — 1111F DSCHRG MED/CURRENT MED MERGE: CPT | Performed by: NURSE PRACTITIONER

## 2019-03-12 PROCEDURE — 99214 OFFICE O/P EST MOD 30 MIN: CPT | Performed by: NURSE PRACTITIONER

## 2019-03-12 PROCEDURE — G8427 DOCREV CUR MEDS BY ELIG CLIN: HCPCS | Performed by: NURSE PRACTITIONER

## 2019-03-12 PROCEDURE — G8417 CALC BMI ABV UP PARAM F/U: HCPCS | Performed by: NURSE PRACTITIONER

## 2019-03-12 PROCEDURE — G8484 FLU IMMUNIZE NO ADMIN: HCPCS | Performed by: NURSE PRACTITIONER

## 2019-03-12 ASSESSMENT — ENCOUNTER SYMPTOMS
RESPIRATORY NEGATIVE: 1
GASTROINTESTINAL NEGATIVE: 1
EYES NEGATIVE: 1

## 2019-04-05 ENCOUNTER — HOSPITAL ENCOUNTER (EMERGENCY)
Age: 44
Discharge: HOME | End: 2019-04-05
Attending: EMERGENCY MEDICINE
Payer: MEDICAID

## 2019-04-05 VITALS
WEIGHT: 260 LBS | DIASTOLIC BLOOD PRESSURE: 77 MMHG | HEART RATE: 98 BPM | BODY MASS INDEX: 36.28 KG/M2 | TEMPERATURE: 98.1 F | OXYGEN SATURATION: 96 % | RESPIRATION RATE: 16 BRPM | SYSTOLIC BLOOD PRESSURE: 118 MMHG

## 2019-04-05 DIAGNOSIS — F10.920 ACUTE ALCOHOLIC INTOXICATION WITHOUT COMPLICATION (HCC): Primary | ICD-10-CM

## 2019-04-05 LAB
ALBUMIN SERPL-MCNC: 3.5 GM/DL (ref 3.4–5)
ALP BLD-CCNC: 163 U/L (ref 46–116)
ALT SERPL-CCNC: 87 U/L (ref 14–63)
ANALYZED BY:: NORMAL
ANION GAP: 12 MEQ/L (ref 8–16)
AST SERPL-CCNC: 58 U/L (ref 15–37)
BILIRUB SERPL-MCNC: 0.6 MG/DL (ref 0.2–1)
BUN BLDV-MCNC: 13 MG/DL (ref 7–18)
CHLORIDE BLD-SCNC: 96 MEQ/L (ref 98–107)
CO2: 26 MEQ/L (ref 21–32)
CREAT SERPL-MCNC: 0.7 MG/DL (ref 0.6–1.3)
DATE OF COLLECTION: NORMAL
DRAWN BY: NORMAL
ETHYL ALCOHOL: 0.22 % (GM/DL)
GFR, ESTIMATED: > 90 ML/MIN/1.73M2
GLUCOSE BLD-MCNC: 187 MG/DL (ref 74–106)
HCT VFR BLD CALC: 47.2 % (ref 42–52)
HEMOGLOBIN: 16.2 GM/DL (ref 14–18)
Lab: 1300
Lab: NORMAL
MCH RBC QN AUTO: 34.5 PG (ref 27–31)
MCHC RBC AUTO-ENTMCNC: 34.3 GM/DL (ref 33–37)
MCV RBC AUTO: 100.4 FL (ref 80–94)
PDW BLD-RTO: 13.6 % (ref 11.5–14.5)
PLATELET # BLD: 223 THOU/MM3 (ref 130–400)
PMV BLD AUTO: 7.3 FL (ref 7.4–10.4)
POC CALCIUM: 8.9 MG/DL (ref 8.5–10.1)
POTASSIUM SERPL-SCNC: 3.5 MEQ/L (ref 3.5–5.1)
RBC # BLD: 4.7 MILL/MM3 (ref 4.7–6.1)
SODIUM BLD-SCNC: 134 MEQ/L (ref 136–145)
TOTAL PROTEIN: 7.6 GM/DL (ref 6.4–8.2)
WBC # BLD: 11.2 THOU/MM3 (ref 4.8–10.8)

## 2019-04-05 PROCEDURE — G0480 DRUG TEST DEF 1-7 CLASSES: HCPCS

## 2019-04-05 PROCEDURE — 80053 COMPREHEN METABOLIC PANEL: CPT

## 2019-04-05 PROCEDURE — 2709999900 HC NON-CHARGEABLE SUPPLY

## 2019-04-05 PROCEDURE — 85027 COMPLETE CBC AUTOMATED: CPT

## 2019-04-05 PROCEDURE — 96374 THER/PROPH/DIAG INJ IV PUSH: CPT

## 2019-04-05 PROCEDURE — 6360000002 HC RX W HCPCS: Performed by: EMERGENCY MEDICINE

## 2019-04-05 PROCEDURE — 99284 EMERGENCY DEPT VISIT MOD MDM: CPT

## 2019-04-05 PROCEDURE — 6370000000 HC RX 637 (ALT 250 FOR IP): Performed by: EMERGENCY MEDICINE

## 2019-04-05 PROCEDURE — 2580000003 HC RX 258: Performed by: EMERGENCY MEDICINE

## 2019-04-05 PROCEDURE — 36415 COLL VENOUS BLD VENIPUNCTURE: CPT

## 2019-04-05 RX ORDER — ONDANSETRON 4 MG/1
4 TABLET, ORALLY DISINTEGRATING ORAL ONCE
Status: COMPLETED | OUTPATIENT
Start: 2019-04-05 | End: 2019-04-05

## 2019-04-05 RX ORDER — CHLORDIAZEPOXIDE HYDROCHLORIDE 25 MG/1
25 CAPSULE, GELATIN COATED ORAL 3 TIMES DAILY PRN
Qty: 30 CAPSULE | Refills: 0 | Status: ON HOLD | OUTPATIENT
Start: 2019-04-05 | End: 2019-05-08 | Stop reason: HOSPADM

## 2019-04-05 RX ORDER — 0.9 % SODIUM CHLORIDE 0.9 %
1000 INTRAVENOUS SOLUTION INTRAVENOUS ONCE
Status: COMPLETED | OUTPATIENT
Start: 2019-04-05 | End: 2019-04-05

## 2019-04-05 RX ORDER — THIAMINE HYDROCHLORIDE 100 MG/ML
100 INJECTION, SOLUTION INTRAMUSCULAR; INTRAVENOUS ONCE
Status: COMPLETED | OUTPATIENT
Start: 2019-04-05 | End: 2019-04-05

## 2019-04-05 RX ADMIN — ONDANSETRON 4 MG: 4 TABLET, ORALLY DISINTEGRATING ORAL at 12:57

## 2019-04-05 RX ADMIN — SODIUM CHLORIDE 1000 ML: 9 INJECTION, SOLUTION INTRAVENOUS at 12:57

## 2019-04-05 RX ADMIN — THIAMINE HYDROCHLORIDE 100 MG: 100 INJECTION, SOLUTION INTRAMUSCULAR; INTRAVENOUS at 13:06

## 2019-04-05 ASSESSMENT — ENCOUNTER SYMPTOMS
ABDOMINAL PAIN: 0
EYE DISCHARGE: 0
VOMITING: 1
SORE THROAT: 0
DIARRHEA: 0
BLOOD IN STOOL: 0
SHORTNESS OF BREATH: 0
EYE PAIN: 0
WHEEZING: 0

## 2019-04-05 NOTE — ED NOTES
Discharge teaching and instructions for condition explained to patient. AVS reviewed. Printed prescriptions given to patient. Patient voiced understanding regarding prescriptions, follow up appointments and care of self at home. Pt discharged to home in stable condition per self with mother.        Karey Go RN  04/05/19 0422

## 2019-04-05 NOTE — ED PROVIDER NOTES
Jhonatan Brock  [address]  Phone: [phone]    eMERGENCY dEPARTMENT eNCOUnter           [facility]       Chief Complaint   Patient presents with    Alcohol Problem       Nurses Notes reviewed and I agree except as noted in the HPI. HISTORY OF PRESENT ILLNESS    Starr Vick is a 44 y.o. male who presented via private vehicle with the chief complaint mentioned above. He is chronic alcoholic, who said that has been drinking for the last 8 days. He had one drink today. He vomited once yesterday. He said that his emesis was clear. He denies abdominal pain. He denies fever or chills. He denies trauma. .  He said that he is awaiting to be accepted in an outpatient rehab. Nereida Smith He stated that he had only one beer today. REVIEW OF SYSTEMS     Review of Systems   Constitutional: Negative for chills and fever. HENT: Negative for sore throat. Eyes: Negative for pain and discharge. Respiratory: Negative for shortness of breath and wheezing. Cardiovascular: Negative for chest pain and palpitations. Gastrointestinal: Positive for vomiting. Negative for abdominal pain, blood in stool and diarrhea. Genitourinary: Negative for dysuria and hematuria. Musculoskeletal: Negative for neck pain and neck stiffness. Neurological: Negative for seizures, syncope and headaches. Hematological: Negative for adenopathy. Psychiatric/Behavioral: Negative for agitation, confusion and hallucinations. PAST MEDICAL HISTORY    has a past medical history of COPD (chronic obstructive pulmonary disease) ([facility] Depression, Diabetes mellitus ([facility] ETOH abuse, Hyperlipidemia, Hypertension, Liver disease, and Seizures ([facility] SURGICAL HISTORY      has a past surgical history that includes Endoscopy, colon, diagnostic.     CURRENT MEDICATIONS       Previous Medications    ALBUTEROL SULFATE  (90 BASE) MCG/ACT INHALER    Inhale 2 puffs into the lungs every 6 hours as needed for Wheezing    BLOOD GLUCOSE TEST STRIPS (ASCENSIA AUTODISC VI;ONE TOUCH ULTRA TEST VI) STRIP    Test as needed. Dispense One Touch verio Test Strips. Dx: Type 2 diabetes (250.00)    CHOLECALCIFEROL (VITAMIN D3) 5000 UNITS TABS    Take 5,000 Units by mouth once a week    FAMOTIDINE (PEPCID) 20 MG TABLET    Take 1 tablet by mouth 2 times daily    FOLIC ACID (FOLVITE) 1 MG TABLET    Take 1 tablet by mouth daily    GLUCOSE MONITORING KIT (FREESTYLE) MONITORING KIT    Glucometer with test strips and lancets. Check BS once daily  #100 strips and lancets    LACTULOSE PO    Take by mouth    LISINOPRIL-HYDROCHLOROTHIAZIDE (PRINZIDE;ZESTORETIC) 20-25 MG PER TABLET    Take 1 tablet by mouth daily    MELATONIN 1 MG/4ML LIQD SL LIQUID    Place 0.3 mg under the tongue nightly as needed for Sleep    METOPROLOL TARTRATE (LOPRESSOR) 25 MG TABLET    Take 1 tablet by mouth 2 times daily    MULTIPLE VITAMIN (MULTIVITAMIN) TABLET    Take 1 tablet by mouth daily    OLANZAPINE (ZYPREXA) 10 MG TABLET    Take 1 tablet by mouth nightly    VENLAFAXINE (EFFEXOR XR) 150 MG EXTENDED RELEASE CAPSULE    Take 1 capsule by mouth daily    VITAMIN B-1 100 MG TABLET    Take 2 tablets by mouth daily       ALLERGIES     has No Known Allergies. FAMILY HISTORY     indicated that his mother is alive. He indicated that his father is . He indicated that his brother is alive. He indicated that the status of his paternal uncle is unknown.   family history includes Alcohol Abuse in his father and paternal uncle; Arthritis in his mother; Cancer in his father; Depression in his mother; Diabetes in his mother; Heart Disease in his mother; High Blood Pressure in his brother, father, and mother; High Cholesterol in his mother. SOCIAL HISTORY      reports that he has been smoking e-cigarettes and cigarettes. He has a 27.00 pack-year smoking history.  He has quit using smokeless tobacco. He reports that he drinks about 84.0 oz of alcohol per week. He reports that he does not use drugs. PHYSICAL EXAM     INITIAL VITALS:  weight is 260 lb (117.9 kg). His temperature is 98.1 °F (36.7 °C). His blood pressure is 115/56 (abnormal) and his pulse is 94. His respiration is 17 and oxygen saturation is 94%. Physical Exam   Constitutional: He appears well-developed and well-nourished. He is active. No distress. He does not seem intoxicated. HENT:   Head: Normocephalic and atraumatic. Mouth/Throat: Oropharynx is clear and moist. No oropharyngeal exudate. Eyes: Pupils are equal, round, and reactive to light. Conjunctivae and EOM are normal. No scleral icterus. Neck: Normal range of motion. Neck supple. No thyromegaly present. Cardiovascular: Normal rate, regular rhythm and normal heart sounds. No murmur heard. Pulmonary/Chest: Effort normal and breath sounds normal. No stridor. No respiratory distress. Normal work  of  breathing   Abdominal: Soft. Bowel sounds are normal. He exhibits no distension and no mass. There is no tenderness. There is no rebound and no guarding. Musculoskeletal: He exhibits no edema or tenderness. Right shoulder: He exhibits no swelling and no deformity. Lymphadenopathy:     He has no cervical adenopathy. Neurological: He is alert. He has normal strength. No cranial nerve deficit. Coordination normal.   He seems calm, there is no tremors   Skin: Skin is warm and dry. No rash noted. No cyanosis. Nails show no clubbing. Psychiatric: He has a normal mood and affect.    He seems calm           DIAGNOSTIC RESULTS       LABS:   Labs Reviewed   CBC - Abnormal; Notable for the following components:       Result Value    WBC 11.2 (*)     .4 (*)     MCH 34.5 (*)     MPV 7.3 (*)     All other components within normal limits   COMPREHENSIVE METABOLIC PANEL - Abnormal; Notable for the following components:    Glucose 187 (*)     Sodium 134 (*)     Chloride 96 (*)     AST 58 (*)     Alkaline Phosphatase 163 (*)     ALT 87 (*)     All other components within normal limits   ETHANOL   GLOMERULAR FILTRATION RATE, ESTIMATED   ANION GAP   Laboratory studies showed mild hyperglycemia and elevated alcohol level. EMERGENCY DEPARTMENT COURSE:   Vitals:    Vitals:    04/05/19 1216 04/05/19 1419   BP: 127/89 (!) 115/56   Pulse: 103 94   Resp: 16 17   Temp: 98.1 °F (36.7 °C)    SpO2: 95% 94%   Weight: 260 lb (117.9 kg)      He received normal saline IV and thiamine. He tolerated oral fluids  Reevaluation at 2:50 PM:  He is awake, alert and calm, no tremors or evidence of DT. FINAL IMPRESSION      1. Acute alcoholic intoxication without complication [facility])          DISPOSITION/PLAN   He was discharged home to his mother, he was given discharge instructions and was advised to return if worse or new symptoms. PATIENT REFERRED TO:  Marlin Arthur, APRN - CNP  [address]  [address] [place] [address]  [phone]    In 2 days        DISCHARGE MEDICATIONS:  New Prescriptions    CHLORDIAZEPOXIDE (LIBRIUM) 25 MG CAPSULE    Take 1 capsule by mouth 3 times daily as needed for Anxiety for up to 30 days.        (Please note that portions of this note were completed with a voice recognition program.  Efforts were made to edit the dictations but occasionally words are mis-transcribed.)    MD Nelson Tovar MD  04/05/19 8240

## 2019-04-05 NOTE — ED NOTES
Patient presents to the ED via private auto alone with complaints of alcohol problem. Patient voices that he was clean for a couple of weeks but has been drinking again for the past eight days. Patient voices he will wake up in the morning nauseated and shaky and once he drinks it stops. Patient voices that he already drank eight 16oz beers today. Last beer about twenty minutes prior to arrival.  Patient voices he hopes to get medically detoxed at the hospital since he is on a wait list to get into a rehab facility. Patient voices he has been medically detoxed several times already. Patient showing no withdrawal symptoms at this time. Patient informed that if he is discharged to home he is not able to drive home due to drinking and that he must find a ride.      Ambrocio Engel RN  04/05/19 7718

## 2019-04-08 ENCOUNTER — TELEPHONE (OUTPATIENT)
Dept: FAMILY MEDICINE CLINIC | Age: 44
End: 2019-04-08

## 2019-04-08 NOTE — TELEPHONE ENCOUNTER
[facility]) ED Follow up Call    Reason for ED visit: Acute alcoholic intoxication without complications       Hi Georgie England , this is Hillery Sandifer  from Dr. Sarita Denton's office, just calling to see how you are doing after your recent ED visit. Did you receive discharge instructions? Yes  Do you understand the discharge instructions? Yes  Did the ED give you any new prescriptions? Yes  Were you able to fill your prescriptions? Yes      Do you have one of our red, yellow and green  Zone sheets that help you to determine when you should go to the ED? Not Applicable      Do you need or want to make a follow up appt with your PCP? Yes    Do you have any further needs in the home i.e. Equipment?   No        FU appts/Provider:    Future Appointments   Date Time Provider Amanda Jerome   4/18/2019  1:00 PM NILAM Guadalupe - [facility] - [address] [facility]

## 2019-04-16 ENCOUNTER — OFFICE VISIT (OUTPATIENT)
Dept: FAMILY MEDICINE CLINIC | Age: 44
End: 2019-04-16
Payer: MEDICAID

## 2019-04-16 VITALS
OXYGEN SATURATION: 96 % | HEIGHT: 71 IN | HEART RATE: 92 BPM | RESPIRATION RATE: 18 BRPM | TEMPERATURE: 97.5 F | DIASTOLIC BLOOD PRESSURE: 80 MMHG | WEIGHT: 256.6 LBS | SYSTOLIC BLOOD PRESSURE: 110 MMHG | BODY MASS INDEX: 35.92 KG/M2

## 2019-04-16 DIAGNOSIS — F31.9 BIPOLAR 1 DISORDER (HCC): Primary | ICD-10-CM

## 2019-04-16 DIAGNOSIS — F10.20 ALCOHOL DEPENDENCE WITH PHYSIOLOGICAL DEPENDENCE, CONTINUOUS (HCC): ICD-10-CM

## 2019-04-16 DIAGNOSIS — E11.9 TYPE 2 DIABETES MELLITUS WITHOUT COMPLICATION, WITHOUT LONG-TERM CURRENT USE OF INSULIN (HCC): ICD-10-CM

## 2019-04-16 PROCEDURE — 99214 OFFICE O/P EST MOD 30 MIN: CPT | Performed by: NURSE PRACTITIONER

## 2019-04-16 PROCEDURE — G8427 DOCREV CUR MEDS BY ELIG CLIN: HCPCS | Performed by: NURSE PRACTITIONER

## 2019-04-16 PROCEDURE — 3046F HEMOGLOBIN A1C LEVEL >9.0%: CPT | Performed by: NURSE PRACTITIONER

## 2019-04-16 PROCEDURE — 2022F DILAT RTA XM EVC RTNOPTHY: CPT | Performed by: NURSE PRACTITIONER

## 2019-04-16 PROCEDURE — G8417 CALC BMI ABV UP PARAM F/U: HCPCS | Performed by: NURSE PRACTITIONER

## 2019-04-16 PROCEDURE — 4004F PT TOBACCO SCREEN RCVD TLK: CPT | Performed by: NURSE PRACTITIONER

## 2019-04-16 RX ORDER — MULTIVIT/IRON SULF/FOLIC ACID 15MG-0.4MG
TABLET ORAL DAILY
COMMUNITY
End: 2019-10-14

## 2019-04-16 RX ORDER — CHLORAL HYDRATE 500 MG
CAPSULE ORAL
COMMUNITY
End: 2019-12-30

## 2019-04-16 ASSESSMENT — ENCOUNTER SYMPTOMS
GASTROINTESTINAL NEGATIVE: 1
EYES NEGATIVE: 1
RESPIRATORY NEGATIVE: 1

## 2019-04-16 NOTE — PROGRESS NOTES
Patrick Venegas is a 38 y.o. male whopresents today for :  Chief Complaint   Patient presents with    3 Month Follow-Up     Bipolar/ DMII       HPI:     HPI  Pt here for fu. Reports zyprexa is helping mood.   He did have a relapse since last seen but sober now for 2 weeks       Patient Active Problem List   Diagnosis    Hyperlipemia    HTN (hypertension), benign    Type II or unspecified type diabetes mellitus without mention of complication, not stated as uncontrolled    Alcohol withdrawal ([address]    Alcoholic hepatitis    COPD (chronic obstructive pulmonary disease) ([address]    Passive-dependent personality disorder ([address]    Alcohol abuse    Type 2 diabetes mellitus without complication, without long-term current use of insulin (HCC)    Alcoholic liver failure (HCC)    Transaminitis    Hyperbilirubinemia    RUQ pain    Delirious    Alcoholic hepatitis without ascites    Alcoholic steatohepatitis    Hypophosphatemia    Bipolar 1 disorder (HCC)    Alcoholic gastritis without bleeding    Alcohol withdrawal syndrome with complication (HCC)    Alcohol dependence with physiological dependence, continuous (HCC)    Decompensated liver disease (HCC)    Alcoholic cirrhosis ([address]    Alcohol intoxication in alcoholism with blood level over 0.3 with complication ([address]    Acute alcoholic intoxication with complication ([address]    Acute alcoholic hepatitis        Past Medical History:   Diagnosis Date    COPD (chronic obstructive pulmonary disease) ([address]     Depression     Diabetes mellitus ([address]     ETOH abuse     Hyperlipidemia     Hypertension     Liver disease     Seizures ([address]     etoh wdl      Past Surgical History:   Procedure Laterality Date    ENDOSCOPY, COLON, DIAGNOSTIC       Family History   Problem Relation Age of Onset    High Blood Pressure Father     Cancer Father         Lung Cancer    Alcohol Abuse Father     High Blood Pressure Brother     Diabetes Mother     Heart Disease tablet 3    famotidine (PEPCID) 20 MG tablet Take 1 tablet by mouth 2 times daily 60 tablet 1    melatonin 1 MG/4ML LIQD SL liquid Place 0.3 mg under the tongue nightly as needed for Sleep      albuterol sulfate  (90 Base) MCG/ACT inhaler Inhale 2 puffs into the lungs every 6 hours as needed for Wheezing       No current facility-administered medications for this visit. No Known Allergies  Health Maintenance   Topic Date Due    Hepatitis A vaccine (1 of 2 - Risk 2-dose series) 04/10/1977    Pneumococcal 0-64 years Vaccine (1 of 1 - PPSV23) 04/10/1982    Diabetic retinal exam  04/10/1986    HIV screen  04/10/1991    Hepatitis B Vaccine (1 of 3 - Risk 3-dose series) 04/10/1995    DTaP/Tdap/Td vaccine (1 - Tdap) 04/10/1995    Diabetic foot exam  08/23/2018    Flu vaccine (Season Ended) 10/04/2019 (Originally 9/1/2019)    Diabetic microalbuminuria test  10/04/2019    Lipid screen  10/04/2019    A1C test (Diabetic or Prediabetic)  11/04/2019    Potassium monitoring  04/05/2020    Creatinine monitoring  04/05/2020       Subjective:     Review of Systems   Constitutional: Negative. HENT: Negative. Eyes: Negative. Respiratory: Negative. Cardiovascular: Negative. Gastrointestinal: Negative. Musculoskeletal: Negative. Skin: Negative. Neurological: Negative. Objective:     Vitals:    04/16/19 1355   BP: 110/80   Site: Left Upper Arm   Position: Sitting   Cuff Size: Large Adult   Pulse: 92   Resp: 18   Temp: 97.5 °F (36.4 °C)   TempSrc: Temporal   SpO2: 96%   Weight: 256 lb 9.6 oz (116.4 kg)   Height: 5' 10.98\" (1.803 m)       Physical Exam   Constitutional: He is oriented to person, place, and time. He appears well-developed and well-nourished. HENT:   Head: Normocephalic.    Right Ear: Tympanic membrane and external ear normal.   Left Ear: Tympanic membrane and external ear normal.   Nose: Nose normal.   Mouth/Throat: Oropharynx is clear and moist.   Neck: Normal range of motion. Neck supple. Cardiovascular: Normal rate, regular rhythm, normal heart sounds and intact distal pulses. Exam reveals no gallop and no friction rub. No murmur heard. Pulmonary/Chest: Effort normal and breath sounds normal. He has no wheezes. He has no rales. Abdominal: Soft. Bowel sounds are normal. There is no tenderness. There is no guarding. Musculoskeletal: Normal range of motion. Lymphadenopathy:     He has no cervical adenopathy. Neurological: He is alert and oriented to person, place, and time. He has normal reflexes. Skin: Skin is warm. Psychiatric: He has a normal mood and affect. Assessment:      Diagnosis Orders   1. Bipolar 1 disorder ([facility]     2. Alcohol dependence with physiological dependence, continuous ([facility]     3. Type 2 diabetes mellitus without complication, without long-term current use of insulin ([facility]         Plan:      Return in about 3 months (around 7/16/2019). No orders of the defined types were placed in this encounter. No orders of the defined types were placed in this encounter. Cont meds  Recheck in 3months  Monitor bs    Patient given educational materials - seepatient instructions. Discussed use, benefit, and side effects of prescribed medications. All patient questions answered. Pt voiced understanding. Patient agreed withtreatment plan. Follow up as directed.      Electronically signed by NILAM Marie CNP on 4/16/2019 at 5:17 PM

## 2019-04-24 ENCOUNTER — HOSPITAL ENCOUNTER (EMERGENCY)
Age: 44
Discharge: HOME | End: 2019-04-24
Attending: EMERGENCY MEDICINE
Payer: MEDICAID

## 2019-04-24 VITALS
DIASTOLIC BLOOD PRESSURE: 97 MMHG | BODY MASS INDEX: 35 KG/M2 | HEART RATE: 115 BPM | SYSTOLIC BLOOD PRESSURE: 144 MMHG | OXYGEN SATURATION: 95 % | TEMPERATURE: 98.9 F | HEIGHT: 71 IN | RESPIRATION RATE: 20 BRPM | WEIGHT: 250 LBS

## 2019-04-24 DIAGNOSIS — R19.7 VOMITING AND DIARRHEA: Primary | ICD-10-CM

## 2019-04-24 DIAGNOSIS — F10.920 ACUTE ALCOHOLIC INTOXICATION WITHOUT COMPLICATION (HCC): ICD-10-CM

## 2019-04-24 DIAGNOSIS — R11.10 VOMITING AND DIARRHEA: Primary | ICD-10-CM

## 2019-04-24 DIAGNOSIS — F10.10 ALCOHOL ABUSE: ICD-10-CM

## 2019-04-24 LAB
ALBUMIN SERPL-MCNC: 3.2 GM/DL (ref 3.4–5)
ALP BLD-CCNC: 204 U/L (ref 46–116)
ALT SERPL-CCNC: 69 U/L (ref 11–66)
AMORPHOUS: ABNORMAL
AMPHETAMINE+METHAMPHETAMIE: NEGATIVE
ANALYZED BY:: NORMAL
ANION GAP: 11 MEQ/L (ref 8–16)
AST SERPL-CCNC: 74 U/L (ref 5–40)
BACTERIA: ABNORMAL
BARBITURATES: NEGATIVE
BASOPHILS # BLD: 2.2 % (ref 0–3)
BENZODIAZEPINES: NEGATIVE
BILIRUB SERPL-MCNC: 0.6 MG/DL (ref 0.2–1)
BILIRUBIN URINE: NEGATIVE
BLOOD, URINE: NEGATIVE
BUN BLDV-MCNC: 19 MG/DL (ref 7–18)
CANNABINOIDS: NEGATIVE
CASTS UA: ABNORMAL /LPF
CHARACTER, URINE: CLEAR
CHLORIDE BLD-SCNC: 102 MEQ/L (ref 98–107)
CO2: 26 MEQ/L (ref 21–32)
COCAINE METABOLITE: NEGATIVE
COLOR: YELLOW
CREAT SERPL-MCNC: 0.7 MG/DL (ref 0.6–1.3)
CRYSTALS, UA: ABNORMAL
DATE OF COLLECTION: NORMAL
DRAWN BY: NORMAL
EOSINOPHILS RELATIVE PERCENT: 1.5 % (ref 0–4)
EPITHELIAL CELLS, UA: ABNORMAL /HPF
ETHYL ALCOHOL: 0.3 % (GM/DL)
GFR, ESTIMATED: > 90 ML/MIN/1.73M2
GLUCOSE BLD-MCNC: 232 MG/DL (ref 74–106)
GLUCOSE, URINE: NEGATIVE MG/DL
HCT VFR BLD CALC: 46.9 % (ref 42–52)
HEMOGLOBIN: 16.8 GM/DL (ref 14–18)
KETONES, URINE: NEGATIVE
LEUKOCYTE ESTERASE, URINE: NEGATIVE
LIPASE: 301 U/L (ref 73–393)
LYMPHOCYTES # BLD: 14.9 % (ref 15–47)
Lab: 1820
Lab: NORMAL
MAGNESIUM: 1.9 MG/DL (ref 1.8–2.4)
MCH RBC QN AUTO: 36.1 PG (ref 27–31)
MCHC RBC AUTO-ENTMCNC: 35.8 GM/DL (ref 33–37)
MCV RBC AUTO: 100.7 FL (ref 80–94)
MONOCYTES: 15.3 % (ref 0–12)
MUCUS: ABNORMAL
NITRITE, URINE: NEGATIVE
OPIATES: NEGATIVE
PDW BLD-RTO: 12.9 % (ref 11.5–14.5)
PH UA: 5.5 (ref 5–9)
PHENCYCLIDINE: NEGATIVE
PLATELET # BLD: 269 THOU/MM3 (ref 130–400)
PMV BLD AUTO: 6.5 FL (ref 7.4–10.4)
POC CALCIUM: 8.3 MG/DL (ref 8.5–10.1)
POTASSIUM SERPL-SCNC: 4.5 MEQ/L (ref 3.5–5.1)
PROTEIN UA: ABNORMAL MG/DL
RBC # BLD: 4.65 MILL/MM3 (ref 4.7–6.1)
RBC UA: ABNORMAL /HPF
REFLEX TO URINE C & S: ABNORMAL
SEGS: 66.1 % (ref 43–75)
SODIUM BLD-SCNC: 139 MEQ/L (ref 136–145)
SPECIFIC GRAVITY UA: < 1.005 (ref 1–1.03)
TOTAL PROTEIN: 7.3 GM/DL (ref 6.4–8.2)
UROBILINOGEN, URINE: 0.2 EU/DL (ref 0–1)
WBC # BLD: 8.9 THOU/MM3 (ref 4.8–10.8)
WBC UA: ABNORMAL /HPF

## 2019-04-24 PROCEDURE — 82947 ASSAY GLUCOSE BLOOD QUANT: CPT

## 2019-04-24 PROCEDURE — 83690 ASSAY OF LIPASE: CPT

## 2019-04-24 PROCEDURE — 81001 URINALYSIS AUTO W/SCOPE: CPT

## 2019-04-24 PROCEDURE — 84075 ASSAY ALKALINE PHOSPHATASE: CPT

## 2019-04-24 PROCEDURE — 84520 ASSAY OF UREA NITROGEN: CPT

## 2019-04-24 PROCEDURE — 6360000002 HC RX W HCPCS: Performed by: EMERGENCY MEDICINE

## 2019-04-24 PROCEDURE — 82374 ASSAY BLOOD CARBON DIOXIDE: CPT

## 2019-04-24 PROCEDURE — 82310 ASSAY OF CALCIUM: CPT

## 2019-04-24 PROCEDURE — 2709999900 HC NON-CHARGEABLE SUPPLY

## 2019-04-24 PROCEDURE — 84132 ASSAY OF SERUM POTASSIUM: CPT

## 2019-04-24 PROCEDURE — 96375 TX/PRO/DX INJ NEW DRUG ADDON: CPT

## 2019-04-24 PROCEDURE — 80305 DRUG TEST PRSMV DIR OPT OBS: CPT

## 2019-04-24 PROCEDURE — 82247 BILIRUBIN TOTAL: CPT

## 2019-04-24 PROCEDURE — 82040 ASSAY OF SERUM ALBUMIN: CPT

## 2019-04-24 PROCEDURE — 2580000003 HC RX 258: Performed by: EMERGENCY MEDICINE

## 2019-04-24 PROCEDURE — 85025 COMPLETE CBC W/AUTO DIFF WBC: CPT

## 2019-04-24 PROCEDURE — 99284 EMERGENCY DEPT VISIT MOD MDM: CPT

## 2019-04-24 PROCEDURE — 83735 ASSAY OF MAGNESIUM: CPT

## 2019-04-24 PROCEDURE — 82565 ASSAY OF CREATININE: CPT

## 2019-04-24 PROCEDURE — 36415 COLL VENOUS BLD VENIPUNCTURE: CPT

## 2019-04-24 PROCEDURE — 84295 ASSAY OF SERUM SODIUM: CPT

## 2019-04-24 PROCEDURE — 84155 ASSAY OF PROTEIN SERUM: CPT

## 2019-04-24 PROCEDURE — 96374 THER/PROPH/DIAG INJ IV PUSH: CPT

## 2019-04-24 PROCEDURE — G0480 DRUG TEST DEF 1-7 CLASSES: HCPCS

## 2019-04-24 PROCEDURE — 82435 ASSAY OF BLOOD CHLORIDE: CPT

## 2019-04-24 RX ORDER — ONDANSETRON 2 MG/ML
4 INJECTION INTRAMUSCULAR; INTRAVENOUS ONCE
Status: COMPLETED | OUTPATIENT
Start: 2019-04-24 | End: 2019-04-24

## 2019-04-24 RX ORDER — 0.9 % SODIUM CHLORIDE 0.9 %
1000 INTRAVENOUS SOLUTION INTRAVENOUS ONCE
Status: COMPLETED | OUTPATIENT
Start: 2019-04-24 | End: 2019-04-24

## 2019-04-24 RX ORDER — THIAMINE HYDROCHLORIDE 100 MG/ML
100 INJECTION, SOLUTION INTRAMUSCULAR; INTRAVENOUS ONCE
Status: COMPLETED | OUTPATIENT
Start: 2019-04-24 | End: 2019-04-24

## 2019-04-24 RX ADMIN — SODIUM CHLORIDE 1000 ML: 9 INJECTION, SOLUTION INTRAVENOUS at 18:17

## 2019-04-24 RX ADMIN — ONDANSETRON 4 MG: 2 INJECTION INTRAMUSCULAR; INTRAVENOUS at 18:17

## 2019-04-24 RX ADMIN — THIAMINE HYDROCHLORIDE 100 MG: 100 INJECTION, SOLUTION INTRAMUSCULAR; INTRAVENOUS at 18:17

## 2019-04-24 ASSESSMENT — ENCOUNTER SYMPTOMS
NAUSEA: 1
RESPIRATORY NEGATIVE: 1
EYES NEGATIVE: 1
VOMITING: 1

## 2019-04-24 NOTE — ED TRIAGE NOTES
Presents today complaining of \"replapsing with alcohol. \"  States he drank about 12 16oz cans of beer today. States he's feeling shaky. Mother at bedside states \"he threw up all over the front yard. \"  Denies taking any of his prescriptions medications in the last week. Last drink was just prior to arrival.  Patient is wondering if he can do detox.

## 2019-04-24 NOTE — FLOWSHEET NOTE
Pt pink warm and dry, breathing with ease. Pt remains alert and oriented. AVS reviewed. Teach back method used. Denies questions or concerns. Pt discharged in stable condition.

## 2019-04-24 NOTE — FLOWSHEET NOTE
Bedside shift report received from [facility]. IV started labs drawn urine collected and sent to lab. Pt remains alert. Denies needs at this time.

## 2019-04-24 NOTE — ED PROVIDER NOTES
[address]  eMERGENCY dEPARTMENT eNCOUnter      Pt Name: Alfred Woodson  MRN: [id number]  [place] 1975  Date of evaluation: 4/24/19      CHIEF COMPLAINT       Chief Complaint   Patient presents with    Alcohol Intoxication       Nurses Notes reviewed and I agree except as noted in the HPI. HISTORY OF PRESENT ILLNESS    Alfred Woodson is a 38 y.o. male who presents by private car accompanied by his mother with a history of a drinking binge for the past 5-6 days. He's been drinking 12 to 16-16 ounce beers daily. Denies hard liquor or any other drug use. He's been drunk most of the time according to his mother. He's felt shaky had a few bouts of vomiting and states he does vomit when he drinks excessively. It's unclear as to why he is here although it isn't there is some indication he was hoping to be admitted to once again try alcohol rehab and detox. He is failed this multiple times in the past and has not been following up with any outpatient counseling from last hospitalization. He is also not been taking his prescribed medications including lactulose as he states he gives him diarrhea. He has known cirrhosis and chronic alcohol abuse as losses 's license live with his mother who is here with him at this time. There's been no reports of any loss of consciousness seizure activity falls or trauma incontinence or other acute changes in his behavior or status. REVIEW OF SYSTEMS     Review of Systems   Constitutional: Positive for appetite change and fatigue. Negative for activity change, chills, diaphoresis, fever and unexpected weight change. HENT: Negative. Eyes: Negative. Respiratory: Negative. Cardiovascular: Negative. Gastrointestinal: Positive for nausea and vomiting. Genitourinary: Negative. Musculoskeletal: Negative. Skin: Negative. Neurological: Negative.     Psychiatric/Behavioral: The patient is nervous/anxious. All other systems reviewed and are negative. PAST MEDICAL HISTORY    has a past medical history of COPD (chronic obstructive pulmonary disease) ([facility] Depression, Diabetes mellitus ([facility] ETOH abuse, Hyperlipidemia, Hypertension, Liver disease, and Seizures ([facility] SURGICAL HISTORY      has a past surgical history that includes Endoscopy, colon, diagnostic. CURRENT MEDICATIONS       Previous Medications    ALBUTEROL SULFATE  (90 BASE) MCG/ACT INHALER    Inhale 2 puffs into the lungs every 6 hours as needed for Wheezing    BLOOD GLUCOSE TEST STRIPS (ASCENSIA AUTODISC VI;ONE TOUCH ULTRA TEST VI) STRIP    Test as needed. Dispense One Touch verio Test Strips. Dx: Type 2 diabetes (250.00)    CHLORDIAZEPOXIDE (LIBRIUM) 25 MG CAPSULE    Take 1 capsule by mouth 3 times daily as needed for Anxiety for up to 30 days. CHOLECALCIFEROL (VITAMIN D3) 5000 UNITS TABS    Take 5,000 Units by mouth once a week    FAMOTIDINE (PEPCID) 20 MG TABLET    Take 1 tablet by mouth 2 times daily    FOLIC ACID (FOLVITE) 1 MG TABLET    Take 1 tablet by mouth daily    GLUCOSE MONITORING KIT (FREESTYLE) MONITORING KIT    Glucometer with test strips and lancets.   Check BS once daily  #100 strips and lancets    LACTULOSE PO    Take by mouth    LISINOPRIL-HYDROCHLOROTHIAZIDE (PRINZIDE;ZESTORETIC) 20-25 MG PER TABLET    Take 1 tablet by mouth daily    MELATONIN 1 MG/4ML LIQD SL LIQUID    Place 0.3 mg under the tongue nightly as needed for Sleep    METOPROLOL TARTRATE (LOPRESSOR) 25 MG TABLET    Take 1 tablet by mouth 2 times daily    MULTIPLE VITAMIN (MULTIVITAMIN) TABLET    Take 1 tablet by mouth daily    MULTIPLE VITAMINS-IRON (TAB-A-REBEKAH/IRON) TABS    Take by mouth daily    OLANZAPINE (ZYPREXA) 10 MG TABLET    Take 1 tablet by mouth nightly    OMEGA-3 1000 MG CAPS    Take by mouth    VENLAFAXINE (EFFEXOR XR) 150 MG EXTENDED RELEASE CAPSULE    Take 1 capsule by mouth daily    VITAMIN B-1 100 MG TABLET Take 2 tablets by mouth daily       ALLERGIES     has No Known Allergies. FAMILY HISTORY     indicated that his mother is alive. He indicated that his father is . He indicated that his brother is alive. He indicated that the status of his paternal uncle is unknown.   family history includes Alcohol Abuse in his father and paternal uncle; Arthritis in his mother; Cancer in his father; Depression in his mother; Diabetes in his mother; Heart Disease in his mother; High Blood Pressure in his brother, father, and mother; High Cholesterol in his mother. SOCIAL HISTORY      reports that he has been smoking e-cigarettes and cigarettes. He has a 27.00 pack-year smoking history. He has quit using smokeless tobacco. He reports that he drinks about 84.0 oz of alcohol per week. He reports that he does not use drugs. PHYSICAL EXAM     INITIAL VITALS:  height is 5' 11\" (1.803 m) and weight is 250 lb (113.4 kg). His oral temperature is 98.9 °F (37.2 °C). His blood pressure is 144/97 (abnormal) and his pulse is 115. His respiration is 20 and oxygen saturation is 95%. Physical Exam   Constitutional: He is oriented to person, place, and time. He appears well-developed and well-nourished. Patient's generally disheveled and has some dried emesis on his clothing but he is awake alert attentive and appropriate mother is at bedside   HENT:   Head: Normocephalic and atraumatic. Right Ear: External ear normal.   Left Ear: External ear normal.   Nose: Nose normal.   Mouth/Throat: Oropharynx is clear and moist. No oropharyngeal exudate. Eyes: Pupils are equal, round, and reactive to light. EOM are normal.   Neck: Normal range of motion. Neck supple. No JVD present. Cardiovascular: Normal rate, regular rhythm and normal heart sounds. Exam reveals no gallop and no friction rub. No murmur heard. Pulmonary/Chest: Effort normal and breath sounds normal. No respiratory distress. He has no wheezes. Abdominal: Soft. There is no tenderness. There is no rebound and no guarding. Musculoskeletal: He exhibits no edema or tenderness. Lymphadenopathy:     He has no cervical adenopathy. Neurological: He is alert and oriented to person, place, and time. Coordination normal.   GCS of 15. I see no tremor. Speech is steady and regular and appropriate. Mental status appears clear and he is oriented to his surroundings. Some slight lateral gaze nystagmus is noted   Skin: Skin is warm and dry. No rash (on exposed body surfaces) noted. He is not diaphoretic. Psychiatric: He has a normal mood and affect. His behavior is normal.   Nursing note and vitals reviewed.         DIFFERENTIAL DIAGNOSIS:   Active and chronic alcohol abuse with current alcohol intoxication  No evidence of active alcohol withdrawal or delirium tremens at this time  History of alcohol-induced cirrhosis-noncompliance with medications  Chronic alcohol abuse with failure to both inpatient and outpatient rehab attempts in the past  Stable home environment living with mother not active  no high risk activities at this time    DIAGNOSTIC RESULTS         LABS:   Labs Reviewed   COMPREHENSIVE METABOLIC PANEL - Abnormal; Notable for the following components:       Result Value    Glucose 232 (*)     BUN 19 (*)     POC CALCIUM 8.3 (*)     Alkaline Phosphatase 204 (*)     Alb 3.2 (*)     All other components within normal limits   CBC WITH AUTO DIFFERENTIAL - Abnormal; Notable for the following components:    RBC 4.65 (*)     .7 (*)     MCH 36.1 (*)     MPV 6.5 (*)     Lymphocytes 14.9 (*)     Monocytes 15.3 (*)     All other components within normal limits   URINE RT REFLEX TO CULTURE - Abnormal; Notable for the following components:    Protein, UA TRACE (*)     All other components within normal limits   LIPASE   MAGNESIUM   ETHANOL   RAPID DRUG SCREEN, URINE   GLOMERULAR FILTRATION RATE, ESTIMATED   ANION GAP   AMMONIA   AST   ALT       EMERGENCY DEPARTMENT COURSE:   Vitals:    Vitals:    04/24/19 1720 04/24/19 1851   BP: (!) 163/95 (!) 144/97   Pulse: 128 115   Resp: 20 20   Temp: 98.9 °F (37.2 °C)    TempSrc: Oral    SpO2: 95% 95%   Weight: 250 lb (113.4 kg)    Height: 5' 11\" (1.803 m)          CRITICAL CARE:       CONSULTS:  None      PROCEDURES:  None    FINAL IMPRESSION      1. Vomiting and diarrhea    2. Alcohol abuse    3. Acute alcoholic intoxication without complication [facility])          DISPOSITION/PLAN       PATIENT REFERRED TO:  Dana Peters, APRN - CNP  [address], [address]  [phone]    Call   For evaluation      DISCHARGE MEDICATIONS:  New Prescriptions    No medications on file       (Please note that portions of this note were completed with a voice recognition program.  Efforts were made to edit the dictations but occasionally words are mis-transcribed.)    Linward Halsted, MD     6:05PM:   Treated and evaluated by earlier provider. I assume care of the patient. Patient rechecked and updated on lab/xray status, progress and results. .  Patient was reassessed and condition was improved, unchanged after tx. No further needs at this time. Patient resents with nausea vomiting. He is also has diarrhea. He states the diarrhea is due to his alcohol use. Denies high fever chills or other problems at this time. Abdominal exam is benign. 6:53 PM  Lab tests are reassuring at this time. Electrolytes look well. Discussed with patient and family alcohol rehab potential.  He is already on Librium at this time. He should start that tomorrow especially if his alcohol level is severely elevated today as he does appear intoxicated. He should discuss with his primary care doctor tomorrow further Librium treatment and her alcohol treatment.      7:21 PM  Ammonia is pending due to grossly lipemic although he has no altered mental status at this time his speech is other wise accurate.   His alcohol abuse 0.3 which is not uncommon for him. Counseled him in regards to close follow-up. He is prescribed Librium and he should start in the morning.    He shows no signs of withdrawal        Jn Dao MD  04/24/19 [id number]

## 2019-04-25 LAB
ORIGINAL SAMPLE NUMBER: NORMAL
REFERENCE LOCATION: NORMAL
REFERENCE RANGE: NORMAL
TEST RESULTS WITH UNITS: NORMAL
TEST(S) BEING PERFORMED: NORMAL

## 2019-04-26 ENCOUNTER — HOSPITAL ENCOUNTER (INPATIENT)
Age: 44
LOS: 6 days | Discharge: HOME | End: 2019-05-02
Attending: EMERGENCY MEDICINE | Admitting: INTERNAL MEDICINE
Payer: MEDICAID

## 2019-04-26 DIAGNOSIS — K70.10 ALCOHOLIC HEPATITIS WITHOUT ASCITES: ICD-10-CM

## 2019-04-26 DIAGNOSIS — K70.10 ALCOHOLIC HEPATITIS, UNSPECIFIED WHETHER ASCITES PRESENT: ICD-10-CM

## 2019-04-26 DIAGNOSIS — E11.9 TYPE 2 DIABETES MELLITUS WITHOUT COMPLICATION, WITHOUT LONG-TERM CURRENT USE OF INSULIN (HCC): ICD-10-CM

## 2019-04-26 DIAGNOSIS — R17 ELEVATED BILIRUBIN: ICD-10-CM

## 2019-04-26 DIAGNOSIS — Z78.9 ALCOHOL INGESTION: Primary | ICD-10-CM

## 2019-04-26 DIAGNOSIS — E80.6 HYPERBILIRUBINEMIA: ICD-10-CM

## 2019-04-26 PROBLEM — F10.931 ALCOHOL WITHDRAWAL DELIRIUM (HCC): Status: ACTIVE | Noted: 2019-04-26

## 2019-04-26 LAB
ALBUMIN SERPL-MCNC: 3.6 GM/DL (ref 3.4–5)
ALP BLD-CCNC: 215 U/L (ref 46–116)
ALT SERPL-CCNC: 314 U/L (ref 14–63)
ANALYZED BY:: NORMAL
ANION GAP: 11 MEQ/L (ref 8–16)
AST SERPL-CCNC: 295 U/L (ref 15–37)
BASOPHILS # BLD: 1 % (ref 0–3)
BILIRUB SERPL-MCNC: 1.6 MG/DL (ref 0.2–1)
BUN BLDV-MCNC: 13 MG/DL (ref 7–18)
CHLORIDE BLD-SCNC: 97 MEQ/L (ref 98–107)
CO2: 27 MEQ/L (ref 21–32)
CREAT SERPL-MCNC: 0.9 MG/DL (ref 0.6–1.3)
DATE OF COLLECTION: NORMAL
DIFFERENTIAL, MANUAL: NORMAL
DRAWN BY: NORMAL
EKG ATRIAL RATE: 130 BPM
EKG P AXIS: 40 DEGREES
EKG P-R INTERVAL: 116 MS
EKG Q-T INTERVAL: 298 MS
EKG QRS DURATION: 86 MS
EKG QTC CALCULATION (BAZETT): 438 MS
EKG R AXIS: 27 DEGREES
EKG T AXIS: 33 DEGREES
EKG VENTRICULAR RATE: 130 BPM
EOSINOPHILS RELATIVE PERCENT: 1 % (ref 0–4)
ETHYL ALCOHOL: 0.36 % (GM/DL)
GFR, ESTIMATED: > 90 ML/MIN/1.73M2
GLUCOSE BLD-MCNC: 289 MG/DL (ref 74–106)
HCT VFR BLD CALC: 54 % (ref 42–52)
HEMOGLOBIN: 18.5 GM/DL (ref 14–18)
LYMPHOCYTES # BLD: 17 % (ref 15–47)
Lab: 1920
Lab: NORMAL
MACROCYTES: ABNORMAL
MCH RBC QN AUTO: 34.7 PG (ref 27–31)
MCHC RBC AUTO-ENTMCNC: 34.2 GM/DL (ref 33–37)
MCV RBC AUTO: 101.4 FL (ref 80–94)
MONOCYTES: 8 % (ref 0–12)
PDW BLD-RTO: 13.1 % (ref 11.5–14.5)
PLATELET # BLD: 287 THOU/MM3 (ref 130–400)
PLATELET ESTIMATE: ABNORMAL
PMV BLD AUTO: 6.7 FL (ref 7.4–10.4)
POC CALCIUM: 8.9 MG/DL (ref 8.5–10.1)
POTASSIUM SERPL-SCNC: 4 MEQ/L (ref 3.5–5.1)
RBC # BLD: 5.32 MILL/MM3 (ref 4.7–6.1)
SEGS: 73 % (ref 43–75)
SODIUM BLD-SCNC: 135 MEQ/L (ref 136–145)
TOTAL PROTEIN: 7.7 GM/DL (ref 6.4–8.2)
WBC # BLD: 11.7 THOU/MM3 (ref 4.8–10.8)

## 2019-04-26 PROCEDURE — 99223 1ST HOSP IP/OBS HIGH 75: CPT | Performed by: PHYSICIAN ASSISTANT

## 2019-04-26 PROCEDURE — G0480 DRUG TEST DEF 1-7 CLASSES: HCPCS

## 2019-04-26 PROCEDURE — 2709999900 HC NON-CHARGEABLE SUPPLY

## 2019-04-26 PROCEDURE — 6360000002 HC RX W HCPCS: Performed by: PHYSICIAN ASSISTANT

## 2019-04-26 PROCEDURE — 80053 COMPREHEN METABOLIC PANEL: CPT

## 2019-04-26 PROCEDURE — 2580000003 HC RX 258: Performed by: EMERGENCY MEDICINE

## 2019-04-26 PROCEDURE — 85025 COMPLETE CBC W/AUTO DIFF WBC: CPT

## 2019-04-26 PROCEDURE — 36415 COLL VENOUS BLD VENIPUNCTURE: CPT

## 2019-04-26 PROCEDURE — 6370000000 HC RX 637 (ALT 250 FOR IP): Performed by: PHYSICIAN ASSISTANT

## 2019-04-26 PROCEDURE — 99285 EMERGENCY DEPT VISIT HI MDM: CPT

## 2019-04-26 PROCEDURE — 93005 ELECTROCARDIOGRAM TRACING: CPT | Performed by: EMERGENCY MEDICINE

## 2019-04-26 PROCEDURE — 6370000000 HC RX 637 (ALT 250 FOR IP): Performed by: EMERGENCY MEDICINE

## 2019-04-26 PROCEDURE — 1200000003 HC TELEMETRY R&B

## 2019-04-26 RX ORDER — LORAZEPAM 2 MG/ML
3 INJECTION INTRAMUSCULAR
Status: DISCONTINUED | OUTPATIENT
Start: 2019-04-26 | End: 2019-05-02 | Stop reason: HOSPADM

## 2019-04-26 RX ORDER — LACTULOSE 10 G/15ML
10 SOLUTION ORAL 2 TIMES DAILY
Status: DISCONTINUED | OUTPATIENT
Start: 2019-04-27 | End: 2019-04-28

## 2019-04-26 RX ORDER — ACETAMINOPHEN 325 MG/1
650 TABLET ORAL EVERY 4 HOURS PRN
Status: DISCONTINUED | OUTPATIENT
Start: 2019-04-26 | End: 2019-05-02 | Stop reason: HOSPADM

## 2019-04-26 RX ORDER — SODIUM CHLORIDE 0.9 % (FLUSH) 0.9 %
10 SYRINGE (ML) INJECTION EVERY 12 HOURS SCHEDULED
Status: DISCONTINUED | OUTPATIENT
Start: 2019-04-26 | End: 2019-05-02 | Stop reason: HOSPADM

## 2019-04-26 RX ORDER — SODIUM CHLORIDE 9 MG/ML
INJECTION, SOLUTION INTRAVENOUS CONTINUOUS
Status: DISCONTINUED | OUTPATIENT
Start: 2019-04-26 | End: 2019-05-02

## 2019-04-26 RX ORDER — LORAZEPAM 2 MG/1
2 TABLET ORAL
Status: DISCONTINUED | OUTPATIENT
Start: 2019-04-26 | End: 2019-05-02 | Stop reason: HOSPADM

## 2019-04-26 RX ORDER — LORAZEPAM 2 MG/1
4 TABLET ORAL
Status: DISCONTINUED | OUTPATIENT
Start: 2019-04-26 | End: 2019-05-02 | Stop reason: HOSPADM

## 2019-04-26 RX ORDER — HYDROCHLOROTHIAZIDE 25 MG/1
25 TABLET ORAL DAILY
Status: DISCONTINUED | OUTPATIENT
Start: 2019-04-27 | End: 2019-04-28

## 2019-04-26 RX ORDER — ONDANSETRON 2 MG/ML
4 INJECTION INTRAMUSCULAR; INTRAVENOUS EVERY 6 HOURS PRN
Status: DISCONTINUED | OUTPATIENT
Start: 2019-04-26 | End: 2019-05-02 | Stop reason: HOSPADM

## 2019-04-26 RX ORDER — LISINOPRIL 20 MG/1
20 TABLET ORAL DAILY
Status: DISCONTINUED | OUTPATIENT
Start: 2019-04-27 | End: 2019-05-02 | Stop reason: HOSPADM

## 2019-04-26 RX ORDER — SODIUM CHLORIDE 0.9 % (FLUSH) 0.9 %
10 SYRINGE (ML) INJECTION PRN
Status: DISCONTINUED | OUTPATIENT
Start: 2019-04-26 | End: 2019-05-02 | Stop reason: HOSPADM

## 2019-04-26 RX ORDER — LORAZEPAM 2 MG/ML
2 INJECTION INTRAMUSCULAR
Status: DISCONTINUED | OUTPATIENT
Start: 2019-04-26 | End: 2019-05-02 | Stop reason: HOSPADM

## 2019-04-26 RX ORDER — CLONIDINE HYDROCHLORIDE 0.1 MG/1
0.1 TABLET ORAL ONCE
Status: COMPLETED | OUTPATIENT
Start: 2019-04-26 | End: 2019-04-26

## 2019-04-26 RX ORDER — OLANZAPINE 10 MG/1
10 TABLET ORAL NIGHTLY
Status: DISCONTINUED | OUTPATIENT
Start: 2019-04-27 | End: 2019-04-28

## 2019-04-26 RX ORDER — LORAZEPAM 2 MG/ML
4 INJECTION INTRAMUSCULAR
Status: DISCONTINUED | OUTPATIENT
Start: 2019-04-26 | End: 2019-05-02 | Stop reason: HOSPADM

## 2019-04-26 RX ORDER — 0.9 % SODIUM CHLORIDE 0.9 %
500 INTRAVENOUS SOLUTION INTRAVENOUS ONCE
Status: COMPLETED | OUTPATIENT
Start: 2019-04-26 | End: 2019-04-26

## 2019-04-26 RX ORDER — NICOTINE 21 MG/24HR
1 PATCH, TRANSDERMAL 24 HOURS TRANSDERMAL DAILY
Status: DISCONTINUED | OUTPATIENT
Start: 2019-04-27 | End: 2019-05-02 | Stop reason: HOSPADM

## 2019-04-26 RX ORDER — VENLAFAXINE HYDROCHLORIDE 150 MG/1
150 CAPSULE, EXTENDED RELEASE ORAL DAILY
Status: DISCONTINUED | OUTPATIENT
Start: 2019-04-27 | End: 2019-05-02 | Stop reason: HOSPADM

## 2019-04-26 RX ORDER — FAMOTIDINE 20 MG/1
20 TABLET, FILM COATED ORAL 2 TIMES DAILY
Status: DISCONTINUED | OUTPATIENT
Start: 2019-04-27 | End: 2019-04-28

## 2019-04-26 RX ORDER — LISINOPRIL AND HYDROCHLOROTHIAZIDE 25; 20 MG/1; MG/1
1 TABLET ORAL DAILY
Status: DISCONTINUED | OUTPATIENT
Start: 2019-04-27 | End: 2019-04-26 | Stop reason: CLARIF

## 2019-04-26 RX ORDER — LORAZEPAM 2 MG/ML
1 INJECTION INTRAMUSCULAR
Status: DISCONTINUED | OUTPATIENT
Start: 2019-04-26 | End: 2019-05-02 | Stop reason: HOSPADM

## 2019-04-26 RX ORDER — LORAZEPAM 1 MG/1
1 TABLET ORAL
Status: DISCONTINUED | OUTPATIENT
Start: 2019-04-26 | End: 2019-05-02 | Stop reason: HOSPADM

## 2019-04-26 RX ORDER — ALBUTEROL SULFATE 90 UG/1
2 AEROSOL, METERED RESPIRATORY (INHALATION) EVERY 6 HOURS PRN
Status: DISCONTINUED | OUTPATIENT
Start: 2019-04-26 | End: 2019-04-27

## 2019-04-26 RX ORDER — FOLIC ACID 1 MG/1
1 TABLET ORAL DAILY
Status: DISCONTINUED | OUTPATIENT
Start: 2019-04-30 | End: 2019-04-28

## 2019-04-26 RX ADMIN — CLONIDINE HYDROCHLORIDE 0.1 MG: 0.1 TABLET ORAL at 19:54

## 2019-04-26 RX ADMIN — SODIUM CHLORIDE: 9 INJECTION, SOLUTION INTRAVENOUS at 19:44

## 2019-04-26 RX ADMIN — SODIUM CHLORIDE 500 ML: 9 INJECTION, SOLUTION INTRAVENOUS at 19:14

## 2019-04-26 RX ADMIN — FAMOTIDINE 20 MG: 20 TABLET ORAL at 23:56

## 2019-04-26 RX ADMIN — LORAZEPAM 2 MG: 2 INJECTION INTRAMUSCULAR; INTRAVENOUS at 23:50

## 2019-04-26 ASSESSMENT — PAIN SCALES - GENERAL
PAINLEVEL_OUTOF10: 0
PAINLEVEL_OUTOF10: 0

## 2019-04-26 NOTE — ED NOTES
Resting on cart with eyes open watching television. Respirations easy, regular and non-labored; no distress noted. Skin pink, warm and dry; mucous membranes moist. Alert and appropriate for age. Mother in room. Denies needs.      Susannah Pretty RN  04/26/19 0665

## 2019-04-26 NOTE — ED NOTES
Pt states, \"I need help. \" Pt states he was sober for a couple weeks and then started drinking a week ago. Pt complains of drinking 12 beers today but mom feels like he drank much more. Pt alert, resp even and unlabored, skin pale, warm and dry. Pt states, \"I am going to freak out, I need help. \"      Davis Samson RN  04/26/19 [id number]

## 2019-04-26 NOTE — ED PROVIDER NOTES
5    albuterol sulfate  (90 Base) MCG/ACT inhaler Inhale 2 puffs into the lungs every 6 hours as needed for Wheezing      metoprolol tartrate (LOPRESSOR) 25 MG tablet Take 1 tablet by mouth 2 times daily 60 tablet 3    folic acid (FOLVITE) 1 MG tablet Take 1 tablet by mouth daily 30 tablet 3    vitamin B-1 100 MG tablet Take 2 tablets by mouth daily 30 tablet 3    lisinopril-hydrochlorothiazide (PRINZIDE;ZESTORETIC) 20-25 MG per tablet Take 1 tablet by mouth daily 90 tablet 5    glucose monitoring kit (FREESTYLE) monitoring kit Glucometer with test strips and lancets. Check BS once daily  #100 strips and lancets 1 kit 5    blood glucose test strips (ASCENSIA AUTODISC VI;ONE TOUCH ULTRA TEST VI) strip Test as needed. Dispense One Touch verio Test Strips.   Dx: Type 2 diabetes (250.00) 60 strip 11    Cholecalciferol (VITAMIN D3) 5000 units TABS Take 5,000 Units by mouth once a week         ALLERGIES    No Known Allergies    FAMILY HISTORY    Family History   Problem Relation Age of Onset    High Blood Pressure Father     Cancer Father         Lung Cancer    Alcohol Abuse Father     High Blood Pressure Brother     Diabetes Mother     Heart Disease Mother         Stent Placement    Arthritis Mother     Depression Mother     High Blood Pressure Mother     High Cholesterol Mother     Alcohol Abuse Paternal Uncle        SOCIAL HISTORY    Social History     Socioeconomic History    Marital status: Single     Spouse name: None    Number of children: 0    Years of education: None    Highest education level: None   Occupational History     Employer: [employer]   Social Needs    Financial resource strain: None    Food insecurity:     Worry: None     Inability: None    Transportation needs:     Medical: None     Non-medical: None   Tobacco Use    Smoking status: Current Every Day Smoker     Packs/day: 1.00     Years: 27.00     Pack years: 27.00     Types: E-Cigarettes, Cigarettes  Smokeless tobacco: Former User    Tobacco comment: vape   Substance and Sexual Activity    Alcohol use: Yes     Alcohol/week: 84.0 oz     Types: 140 Cans of beer per week     Comment: drinks a 30 pack a day    Drug use: No    Sexual activity: None   Lifestyle    Physical activity:     Days per week: None     Minutes per session: None    Stress: None   Relationships    Social connections:     Talks on phone: None     Gets together: None     Attends Orthodox service: None     Active member of club or organization: None     Attends meetings of clubs or organizations: None     Relationship status: None    Intimate partner violence:     Fear of current or ex partner: None     Emotionally abused: None     Physically abused: None     Forced sexual activity: None   Other Topics Concern    None   Social History Narrative    None       REVIEW OF SYSTEMS    For acute alcohol ingestion. Denies suicidal homicidal ideation of present although family states he's had vague comments and past when he is been intoxicated. All systems negative except as marked. PHYSICAL EXAM    VITAL SIGNS: BP (!) 180/104   Pulse 130   Temp 98.7 °F (37.1 °C) (Oral)   Resp 20   Ht 5' 11\" (1.803 m)   Wt 250 lb (113.4 kg)   SpO2 96%   BMI 34.87 kg/m²    Constitutional: Slurred speech airways intact appears intoxicated. HENT:  Normocephalic, Atraumatic, Bilateral external ears normal, Oropharynx moist, No oral exudates, Nose normal.  Cervical Spine: Normal range of motion,  No stridor. No tenderness, Supple,  Eyes:  No discharge or  Swelling,Conjunctiva normal, PERRL, EOMI,  Respiratory: No respiratory distress, Normal breath sounds,  No wheezing, No chest tenderness. Cardiovascular:  Normal heart rate, Normal rhythm, No murmurs, No rubs, No gallops. GI:  No reproducible pain, Bowel sounds normal, Soft, No masses, No pulsatile masses.  No tenderness  Musculoskeletal:  Intact distal pulses, No edema, No tenderness, No cyanosis, No clubbing. Good range of motion in all major joints. No tenderness to palpation or major deformities noted. Back:No tenderness. Integument:  Warm, Dry, No erythema, No rash (on exposed areas)   Lymphatic:  No lymphadenopathy noted. Neurologic:  Alert but intoxicated, moving all fours Normal motor function, Normal sensory function, No focal deficits noted. Slurred speech but intelligible words  Psychiatric:  Not Suicidal or homicidal             RADIOLOGY    No orders to display       PROCEDURES    none      CONSULTS:  Dr. Melanie Robledo at 8 PM.  I spoke with Dr. Melanie Robledo again and she is happy to accept the patient and would prefer stepdown unit. I have placed an order    CRITICAL CARE:  None    SCREENINGS  BP (!) 180/104   Pulse 130   Temp 98.7 °F (37.1 °C) (Oral)   Resp 20   Ht 5' 11\" (1.803 m)   Wt 250 lb (113.4 kg)   SpO2 96%   BMI 34.87 kg/m²        Screening For Hypertension and Follow-up (#317)   previously diagnosed with hypertension and not applicable for screen      Screening For Tobacco Use and Cessation Intervention (#226):   reports that he has been smoking e-cigarettes and cigarettes. He has a 27.00 pack-year smoking history. He has quit using smokeless tobacco.  Tobacco cessation encouraged with brief counseling. Written home care instructions for smoking cessation provided. ED COURSE & MEDICAL DECISION MAKING    Pertinent Labs & Imaging studies reviewed. (See chart for details)  Patient presents with acute alcohol intoxication. Clinical exam is otherwise benign with the exception of his alcoholism. He has no reproducible abdominal pain or clinical findings at this time. He is hypertensive although has been been drinking most of this week. Treated evaluated several days ago with alcohol level of 300 and rather in the story and coherent with such. He is more intoxicated today. Denies other alcohol ingestions at this time.   Labs are performed    REASSESSMENT  7:46 PM  Patient rechecked and updated on lab/xray status, progress and results. .  Patient was reassessed and condition was unchanged after tx. No further needs at this time. He is getting IV fluids at this time. Hemoglobin is elevated likely consistent with some hydration. Sodium is stable. Bilirubin and liver functions have increased. Likely has a component of acute alcoholic hepatitis. He's had recent other labs which were unremarkable earlier this week. Family's concern for safety at home with his alcohol abuse. He can come depressed and suicidal at times although not acting on such. He is not actively suicidal at this time. 8:10 PM  I spoke to the hospitalist who will be happy to admit however the process typically has b. a.c. Evaluation. I spoke with the KARLIE and they will video conference in 4 further alcohol assessment and plan. 8:30 PM  BC is evaluated by phone consultation. They have no other further interventions at this time. They recommend further alcohol treatment as deemed necessary by myself. Spoke with the charge nurse to the emergency department. They're varifying placement area. Recommend MedSurg at this time. No airway compromise or active seizures to suggest ICU admission. FINAL IMPRESSION    1. Alcohol ingestion    2. Alcoholic hepatitis, unspecified whether ascites present    3.  Elevated bilirubin         PATIENT REFERRED TO:  Admitted to hospitalist  Dr. Louisa Aleman [facility] AN AFFILIATE OF [facility] consult via iPad    DISCHARGE MEDICATIONS:  New Prescriptions    No medications on file          Maksim James MD  04/26/19 223 [facility] Maria Esther Hancock MD  04/26/19 8309

## 2019-04-27 LAB
AMMONIA: 47 UMOL/L (ref 11–60)
AMYLASE: 50 U/L (ref 20–104)
ANION GAP SERPL CALCULATED.3IONS-SCNC: 14 MEQ/L (ref 8–16)
AVERAGE GLUCOSE: 141 MG/DL (ref 70–126)
BASOPHILS # BLD: 1 %
BASOPHILS ABSOLUTE: 0.1 THOU/MM3 (ref 0–0.1)
BUN BLDV-MCNC: 14 MG/DL (ref 7–22)
CALCIUM IONIZED: 0.91 MMOL/L (ref 1.12–1.32)
CALCIUM SERPL-MCNC: 8.1 MG/DL (ref 8.5–10.5)
CHLORIDE BLD-SCNC: 102 MEQ/L (ref 98–111)
CO2: 21 MEQ/L (ref 23–33)
CREAT SERPL-MCNC: 0.6 MG/DL (ref 0.4–1.2)
EOSINOPHIL # BLD: 0 %
EOSINOPHILS ABSOLUTE: 0 THOU/MM3 (ref 0–0.4)
ERYTHROCYTE [DISTWIDTH] IN BLOOD BY AUTOMATED COUNT: 14.1 % (ref 11.5–14.5)
ERYTHROCYTE [DISTWIDTH] IN BLOOD BY AUTOMATED COUNT: 49.6 FL (ref 35–45)
GFR SERPL CREATININE-BSD FRML MDRD: > 90 ML/MIN/1.73M2
GLUCOSE BLD-MCNC: 127 MG/DL (ref 70–108)
GLUCOSE BLD-MCNC: 149 MG/DL (ref 70–108)
GLUCOSE BLD-MCNC: 152 MG/DL (ref 70–108)
GLUCOSE BLD-MCNC: 176 MG/DL (ref 70–108)
GLUCOSE BLD-MCNC: 199 MG/DL (ref 70–108)
HBA1C MFR BLD: 6.7 % (ref 4.4–6.4)
HCT VFR BLD CALC: 46.8 % (ref 42–52)
HEMOGLOBIN: 16.5 GM/DL (ref 14–18)
IMMATURE GRANS (ABS): 0.01 THOU/MM3 (ref 0–0.07)
IMMATURE GRANULOCYTES: 0.1 %
LIPASE: 56.2 U/L (ref 5.6–51.3)
LYMPHOCYTES # BLD: 23 %
LYMPHOCYTES ABSOLUTE: 1.7 THOU/MM3 (ref 1–4.8)
MAGNESIUM: 1.6 MG/DL (ref 1.6–2.4)
MCH RBC QN AUTO: 34 PG (ref 26–33)
MCHC RBC AUTO-ENTMCNC: 35.3 GM/DL (ref 32.2–35.5)
MCV RBC AUTO: 96.3 FL (ref 80–94)
MONOCYTES # BLD: 7.8 %
MONOCYTES ABSOLUTE: 0.6 THOU/MM3 (ref 0.4–1.3)
NUCLEATED RED BLOOD CELLS: 0 /100 WBC
PHOSPHORUS: 3.3 MG/DL (ref 2.4–4.7)
PLATELET # BLD: 223 THOU/MM3 (ref 130–400)
PMV BLD AUTO: 9 FL (ref 9.4–12.4)
POTASSIUM REFLEX MAGNESIUM: 4.1 MEQ/L (ref 3.5–5.2)
RBC # BLD: 4.86 MILL/MM3 (ref 4.7–6.1)
SEG NEUTROPHILS: 68.1 %
SEGMENTED NEUTROPHILS ABSOLUTE COUNT: 5 THOU/MM3 (ref 1.8–7.7)
SODIUM BLD-SCNC: 137 MEQ/L (ref 135–145)
TSH SERPL DL<=0.05 MIU/L-ACNC: 0.74 UIU/ML (ref 0.4–4.2)
VITAMIN B-12: 1396 PG/ML (ref 211–911)
WBC # BLD: 7.3 THOU/MM3 (ref 4.8–10.8)

## 2019-04-27 PROCEDURE — 90792 PSYCH DIAG EVAL W/MED SRVCS: CPT | Performed by: PSYCHIATRY & NEUROLOGY

## 2019-04-27 PROCEDURE — 36415 COLL VENOUS BLD VENIPUNCTURE: CPT

## 2019-04-27 PROCEDURE — 80048 BASIC METABOLIC PNL TOTAL CA: CPT

## 2019-04-27 PROCEDURE — 2580000003 HC RX 258: Performed by: PHYSICIAN ASSISTANT

## 2019-04-27 PROCEDURE — 83735 ASSAY OF MAGNESIUM: CPT

## 2019-04-27 PROCEDURE — 6370000000 HC RX 637 (ALT 250 FOR IP): Performed by: FAMILY MEDICINE

## 2019-04-27 PROCEDURE — 82330 ASSAY OF CALCIUM: CPT

## 2019-04-27 PROCEDURE — 83690 ASSAY OF LIPASE: CPT

## 2019-04-27 PROCEDURE — 84100 ASSAY OF PHOSPHORUS: CPT

## 2019-04-27 PROCEDURE — 6370000000 HC RX 637 (ALT 250 FOR IP): Performed by: PHYSICIAN ASSISTANT

## 2019-04-27 PROCEDURE — 99232 SBSQ HOSP IP/OBS MODERATE 35: CPT | Performed by: FAMILY MEDICINE

## 2019-04-27 PROCEDURE — 84443 ASSAY THYROID STIM HORMONE: CPT

## 2019-04-27 PROCEDURE — 82607 VITAMIN B-12: CPT

## 2019-04-27 PROCEDURE — 2500000003 HC RX 250 WO HCPCS: Performed by: PHYSICIAN ASSISTANT

## 2019-04-27 PROCEDURE — 82140 ASSAY OF AMMONIA: CPT

## 2019-04-27 PROCEDURE — 6360000002 HC RX W HCPCS: Performed by: FAMILY MEDICINE

## 2019-04-27 PROCEDURE — 85025 COMPLETE CBC W/AUTO DIFF WBC: CPT

## 2019-04-27 PROCEDURE — 82150 ASSAY OF AMYLASE: CPT

## 2019-04-27 PROCEDURE — 93010 ELECTROCARDIOGRAM REPORT: CPT | Performed by: INTERNAL MEDICINE

## 2019-04-27 PROCEDURE — 2580000003 HC RX 258: Performed by: FAMILY MEDICINE

## 2019-04-27 PROCEDURE — 82948 REAGENT STRIP/BLOOD GLUCOSE: CPT

## 2019-04-27 PROCEDURE — 83036 HEMOGLOBIN GLYCOSYLATED A1C: CPT

## 2019-04-27 PROCEDURE — 6360000002 HC RX W HCPCS: Performed by: PHYSICIAN ASSISTANT

## 2019-04-27 PROCEDURE — 1200000003 HC TELEMETRY R&B

## 2019-04-27 PROCEDURE — 94640 AIRWAY INHALATION TREATMENT: CPT

## 2019-04-27 PROCEDURE — 2709999900 HC NON-CHARGEABLE SUPPLY

## 2019-04-27 PROCEDURE — 2580000003 HC RX 258: Performed by: EMERGENCY MEDICINE

## 2019-04-27 RX ORDER — MAGNESIUM SULFATE IN WATER 40 MG/ML
2 INJECTION, SOLUTION INTRAVENOUS ONCE
Status: COMPLETED | OUTPATIENT
Start: 2019-04-27 | End: 2019-04-27

## 2019-04-27 RX ORDER — METHYLPREDNISOLONE SODIUM SUCCINATE 125 MG/2ML
125 INJECTION, POWDER, LYOPHILIZED, FOR SOLUTION INTRAMUSCULAR; INTRAVENOUS ONCE
Status: COMPLETED | OUTPATIENT
Start: 2019-04-27 | End: 2019-04-27

## 2019-04-27 RX ORDER — NICOTINE POLACRILEX 4 MG
15 LOZENGE BUCCAL PRN
Status: DISCONTINUED | OUTPATIENT
Start: 2019-04-27 | End: 2019-05-02 | Stop reason: HOSPADM

## 2019-04-27 RX ORDER — DEXTROSE MONOHYDRATE 50 MG/ML
100 INJECTION, SOLUTION INTRAVENOUS PRN
Status: DISCONTINUED | OUTPATIENT
Start: 2019-04-27 | End: 2019-05-02 | Stop reason: HOSPADM

## 2019-04-27 RX ORDER — DEXTROSE MONOHYDRATE 25 G/50ML
12.5 INJECTION, SOLUTION INTRAVENOUS PRN
Status: DISCONTINUED | OUTPATIENT
Start: 2019-04-27 | End: 2019-05-02 | Stop reason: HOSPADM

## 2019-04-27 RX ORDER — IPRATROPIUM BROMIDE AND ALBUTEROL SULFATE 2.5; .5 MG/3ML; MG/3ML
1 SOLUTION RESPIRATORY (INHALATION)
Status: DISCONTINUED | OUTPATIENT
Start: 2019-04-27 | End: 2019-05-01

## 2019-04-27 RX ADMIN — METOPROLOL TARTRATE 25 MG: 25 TABLET ORAL at 21:48

## 2019-04-27 RX ADMIN — IPRATROPIUM BROMIDE AND ALBUTEROL SULFATE 1 AMPULE: .5; 3 SOLUTION RESPIRATORY (INHALATION) at 22:34

## 2019-04-27 RX ADMIN — SODIUM CHLORIDE: 9 INJECTION, SOLUTION INTRAVENOUS at 16:59

## 2019-04-27 RX ADMIN — METOPROLOL TARTRATE 25 MG: 25 TABLET ORAL at 03:23

## 2019-04-27 RX ADMIN — CALCIUM GLUCONATE 2 G: 98 INJECTION, SOLUTION INTRAVENOUS at 16:14

## 2019-04-27 RX ADMIN — Medication 10 ML: at 16:15

## 2019-04-27 RX ADMIN — METHYLPREDNISOLONE SODIUM SUCCINATE 125 MG: 125 INJECTION, POWDER, FOR SOLUTION INTRAMUSCULAR; INTRAVENOUS at 16:14

## 2019-04-27 RX ADMIN — ENOXAPARIN SODIUM 40 MG: 40 INJECTION SUBCUTANEOUS at 08:16

## 2019-04-27 RX ADMIN — LACTULOSE 10 G: 20 SOLUTION ORAL at 20:09

## 2019-04-27 RX ADMIN — SODIUM CHLORIDE: 9 INJECTION, SOLUTION INTRAVENOUS at 00:24

## 2019-04-27 RX ADMIN — OLANZAPINE 10 MG: 10 TABLET, FILM COATED ORAL at 20:09

## 2019-04-27 RX ADMIN — LACTULOSE 10 G: 20 SOLUTION ORAL at 08:20

## 2019-04-27 RX ADMIN — HYDROCHLOROTHIAZIDE 25 MG: 25 TABLET ORAL at 08:17

## 2019-04-27 RX ADMIN — LISINOPRIL 20 MG: 20 TABLET ORAL at 08:17

## 2019-04-27 RX ADMIN — FAMOTIDINE 20 MG: 20 TABLET ORAL at 20:09

## 2019-04-27 RX ADMIN — LORAZEPAM 1 MG: 1 TABLET ORAL at 16:14

## 2019-04-27 RX ADMIN — LORAZEPAM 4 MG: 2 TABLET ORAL at 08:17

## 2019-04-27 RX ADMIN — ACETAMINOPHEN 650 MG: 325 TABLET ORAL at 08:16

## 2019-04-27 RX ADMIN — VENLAFAXINE HYDROCHLORIDE 150 MG: 150 CAPSULE, EXTENDED RELEASE ORAL at 08:17

## 2019-04-27 RX ADMIN — IPRATROPIUM BROMIDE AND ALBUTEROL SULFATE 1 AMPULE: .5; 3 SOLUTION RESPIRATORY (INHALATION) at 16:13

## 2019-04-27 RX ADMIN — LORAZEPAM 2 MG: 2 TABLET ORAL at 23:33

## 2019-04-27 RX ADMIN — MAGNESIUM SULFATE HEPTAHYDRATE 2 G: 40 INJECTION, SOLUTION INTRAVENOUS at 18:25

## 2019-04-27 RX ADMIN — Medication 10 ML: at 08:20

## 2019-04-27 RX ADMIN — INSULIN LISPRO 1 UNITS: 100 INJECTION, SOLUTION INTRAVENOUS; SUBCUTANEOUS at 20:13

## 2019-04-27 RX ADMIN — Medication 10 ML: at 16:16

## 2019-04-27 RX ADMIN — METOPROLOL TARTRATE 25 MG: 25 TABLET ORAL at 08:17

## 2019-04-27 RX ADMIN — THIAMINE HYDROCHLORIDE: 100 INJECTION, SOLUTION INTRAMUSCULAR; INTRAVENOUS at 03:41

## 2019-04-27 RX ADMIN — LORAZEPAM 2 MG: 2 TABLET ORAL at 12:09

## 2019-04-27 RX ADMIN — Medication 10 ML: at 18:25

## 2019-04-27 RX ADMIN — LORAZEPAM 2 MG: 2 TABLET ORAL at 20:09

## 2019-04-27 RX ADMIN — FAMOTIDINE 20 MG: 20 TABLET ORAL at 08:16

## 2019-04-27 ASSESSMENT — PAIN SCALES - GENERAL
PAINLEVEL_OUTOF10: 9
PAINLEVEL_OUTOF10: 5
PAINLEVEL_OUTOF10: 0

## 2019-04-27 ASSESSMENT — PAIN DESCRIPTION - PAIN TYPE
TYPE: ACUTE PAIN
TYPE: ACUTE PAIN

## 2019-04-27 ASSESSMENT — PAIN DESCRIPTION - LOCATION
LOCATION: HEAD
LOCATION: HEAD

## 2019-04-27 ASSESSMENT — PAIN DESCRIPTION - DESCRIPTORS: DESCRIPTORS: HEADACHE

## 2019-04-27 NOTE — ED NOTES
Resting on cart with eyes open watching television. Respirations easy, regular and non-labored; no distress noted. Skin pink, warm and dry; mucous membranes moist. Alert and appropriate for age. Denies needs.      Tiana Ward RN  04/26/19 6645

## 2019-04-27 NOTE — PLAN OF CARE
Problem: Discharge Planning:  Goal: Discharged to appropriate level of care  Description  Discharged to appropriate level of care  Outcome: Ongoing  Note:   No discharge plans currently. Patient from home with mom and plans to return when medically stable. Problem: Fluid Volume - Deficit:  Goal: Absence of fluid volume deficit signs and symptoms  Description  Absence of fluid volume deficit signs and symptoms  Outcome: Ongoing  Note:   Patient getting IV fluids at 125mL/hr. Problem: Nutrition Deficit:  Goal: Ability to achieve adequate nutritional intake will improve  Description  Ability to achieve adequate nutritional intake will improve  Outcome: Ongoing  Note:   Patient on a carb control diet     Problem: Sleep Pattern Disturbance:  Goal: Appears well-rested  Description  Appears well-rested  Outcome: Ongoing  Note:   Patient very anxious. Problem: Pain Control  Goal: Maintain pain level at or below patient's acceptable level (or 5 if patient is unable to determine acceptable level)  Outcome: Ongoing  Note:   Pain Assessment: 0-10  Pain Level: 0   Pain goal:  0   Is pain goal met at this time? Yes     Additional interventions to be implemented: none  Patient has denied having any pain tonight. Problem: Cardiovascular  Goal: No DVT, peripheral vascular complications  Outcome: Ongoing  Note:   No signs or symptoms of DVT. Patient getting Lovenox daily. Goal: Hemodynamic stability  Outcome: Ongoing  Note:   Vitals being monitored every 4 hours and as needed. Vitals being monitored every 4 hours and as needed. Continuous cardiac monitor in place. Heart rhythm is sinus tachy. Problem: Respiratory  Goal: No pulmonary complications  Outcome: Ongoing  Note:   Patient on room air with oxygen saturations above 92%. No SOB noted at rest or with ambulation. Lungs are clear in the upper lobes and diminished in the bases.    Goal: O2 Sat > 90%  Outcome: Ongoing  Note:   Patient on room air with oxygen saturations above 92%. Problem:   Goal: Adequate urinary output  Outcome: Ongoing  Note:   Patient voiding clear, yellow urine. Problem: Skin Integrity/Risk  Goal: No skin breakdown during hospitalization  Outcome: Ongoing  Note:   No new areas of skin breakdown noted tonight. Skin is warm and dry. Patient has blanchable redness to his bilateral heels. Problem: Musculor/Skeletal Functional Status  Goal: Absence of falls  Outcome: Ongoing  Note:   Patient has remained free from falls tonight. Call light and bedside table are within reach. Patient alert and oriented and uses his call light appropriately. Patient ambulates with one assist. Fall precautions in place for his safety. Care plan reviewed with patient. Patient verbalize understanding of the plan of care and contribute to goal setting.

## 2019-04-27 NOTE — ED NOTES
Transported to [facility] via [facility] with paramedic crew. I=750 ml IV; O=425 ml urine. Patient remains on cardiac monitor. #22 to left upper arm patent and intact with 0.9% normal saline infusing to gravity for transfer. Site without redness, swelling or signs of infiltration. Respirations easy, regular and non-labored; no distress noted. Skin pink, warm and dry; mucous membranes moist. Alert and appropriate for age. Patient ambulated to EMS cot.      Letitia Dumas RN  04/26/19 2988

## 2019-04-27 NOTE — ED NOTES
Hospitalist at [facility], Dr. Emigdio Rosado, called per Dr. Terrance Raines.      Ronak Flores RN  04/26/19 2009

## 2019-04-27 NOTE — ED NOTES
[facility] office called for intermediate transport crew to [facility].,     Jovany Mcclellan RN  04/26/19 2640

## 2019-04-27 NOTE — PLAN OF CARE
Problem: Sleep Pattern Disturbance:  Goal: Appears well-rested  Description  Appears well-rested  4/27/2019 1342 by Lori Warren RN  Outcome: Met This Shift     Problem: Pain Control  Goal: Maintain pain level at or below patient's acceptable level (or 5 if patient is unable to determine acceptable level)  4/27/2019 1342 by Lori Warren RN  Outcome: Met This Shift     Problem: Cardiovascular  Goal: No DVT, peripheral vascular complications  9/68/0047 1342 by Lori Warren RN  Outcome: Met This Shift     Problem: Cardiovascular  Goal: Hemodynamic stability  4/27/2019 1342 by Lori Warren RN  Outcome: Met This Shift     Problem: Respiratory  Goal: No pulmonary complications  1/96/3641 1342 by Lori Warren RN  Outcome: Met This Shift     Problem: Respiratory  Goal: O2 Sat > 90%  4/27/2019 1342 by Lori Warren RN  Outcome: Met This Shift     Problem:   Goal: Adequate urinary output  4/27/2019 1342 by Lori Warren RN  Outcome: Met This Shift     Problem: Skin Integrity/Risk  Goal: No skin breakdown during hospitalization  4/27/2019 1342 by Lori Warren RN  Outcome: Met This Shift     Problem: Musculor/Skeletal Functional Status  Goal: Absence of falls  4/27/2019 0038 by Darshan Russell RN  Outcome: Ongoing  Note:   Patient has remained free from falls tonight. Call light and bedside table are within reach. Patient alert and oriented and uses his call light appropriately. Patient ambulates with one assist. Fall precautions in place for his safety.       Problem: Pain:  Goal: Pain level will decrease  Description  Pain level will decrease  Outcome: Met This Shift     Problem: Pain:  Goal: Control of acute pain  Description  Control of acute pain  Outcome: Met This Shift     Problem: Pain:  Goal: Control of chronic pain  Description  Control of chronic pain  Outcome: Met This Shift     Problem: Discharge Planning:  Goal: Discharged to appropriate level of care  Description  Discharged to appropriate level of care  4/27/2019 1342 by Berlin Garza RN  Outcome: Ongoing     Problem: Fluid Volume - Deficit:  Goal: Absence of fluid volume deficit signs and symptoms  Description  Absence of fluid volume deficit signs and symptoms  4/27/2019 1342 by Berlin Garza RN  Outcome: Ongoing     Problem: Nutrition Deficit:  Goal: Ability to achieve adequate nutritional intake will improve  Description  Ability to achieve adequate nutritional intake will improve  4/27/2019 1342 by Berlin Garza RN  Outcome: Ongoing   Care plan reviewed with patient. Patient verbalize understanding of the plan of care and contribute to goal setting.

## 2019-04-27 NOTE — CONSULTS
capsule Take 1 capsule by mouth 3 times daily as needed for Anxiety for up to 30 days. 4/5/19 5/5/19 Yes Omar Carrillo MD   LACTULOSE PO Take by mouth 2 times daily    Yes Historical Provider, MD   OLANZapine (ZYPREXA) 10 MG tablet Take 1 tablet by mouth nightly 3/5/19  Yes NILAM Busby CNP   melatonin 1 MG/4ML LIQD SL liquid Place 0.3 mg under the tongue nightly as needed for Sleep   Yes Historical Provider, MD   venlafaxine (EFFEXOR XR) 150 MG extended release capsule Take 1 capsule by mouth daily 1/16/19  Yes NILAM Busby CNP   metoprolol tartrate (LOPRESSOR) 25 MG tablet Take 1 tablet by mouth 2 times daily 11/29/18  Yes Raymon Shahid MD   folic acid (FOLVITE) 1 MG tablet Take 1 tablet by mouth daily 11/29/18  Yes Raymon Shahid MD   vitamin B-1 100 MG tablet Take 2 tablets by mouth daily 11/29/18  Yes Raymon Shahid MD   lisinopril-hydrochlorothiazide (PRINZIDE;ZESTORETIC) 20-25 MG per tablet Take 1 tablet by mouth daily 10/4/18  Yes NILAM Busby CNP   Cholecalciferol (VITAMIN D3) 5000 units TABS Take 5,000 Units by mouth once a week   Yes Historical Provider, MD   Multiple Vitamin (MULTIVITAMIN) tablet Take 1 tablet by mouth daily 3/5/19 4/4/19  Anette Ivey MD   famotidine (PEPCID) 20 MG tablet Take 1 tablet by mouth 2 times daily 3/4/19 4/3/19  Anette Ivey MD   albuterol sulfate  (90 Base) MCG/ACT inhaler Inhale 2 puffs into the lungs every 6 hours as needed for Wheezing    Historical Provider, MD   glucose monitoring kit (FREESTYLE) monitoring kit Glucometer with test strips and lancets. Check BS once daily  #100 strips and lancets 10/4/18   NILAM Busby CNP   blood glucose test strips (ASCENSIA AUTODISC VI;ONE TOUCH ULTRA TEST VI) strip Test as needed. Dispense One Touch verio Test Strips.   Dx: Type 2 diabetes (250.00) 10/4/18   Edward Hovest, APRN - CNP        Medications:    Current Facility-Administered Medications: insulin lispro (HUMALOG) injection vial 0-6 Units, 0-6 Units, Subcutaneous, TID WC  insulin lispro (HUMALOG) injection vial 0-3 Units, 0-3 Units, Subcutaneous, Nightly  glucose (GLUTOSE) 40 % oral gel 15 g, 15 g, Oral, PRN  dextrose 50 % solution 12.5 g, 12.5 g, Intravenous, PRN  glucagon (rDNA) injection 1 mg, 1 mg, Intramuscular, PRN  dextrose 5 % solution, 100 mL/hr, Intravenous, PRN  calcium replacement protocol, , Other, RX Placeholder  magnesium replacement protocol, , Other, RX Placeholder  ipratropium-albuterol (DUONEB) nebulizer solution 1 ampule, 1 ampule, Inhalation, Q4H WA  calcium gluconate 2 g in dextrose 5 % 100 mL IVPB, 2 g, Intravenous, Once  0.9 % sodium chloride infusion, , Intravenous, Continuous  famotidine (PEPCID) tablet 20 mg, 20 mg, Oral, BID  [START ON 4/71/2119] folic acid (FOLVITE) tablet 1 mg, 1 mg, Oral, Daily  lactulose (CHRONULAC) 10 GM/15ML solution 10 g, 10 g, Oral, BID  metoprolol tartrate (LOPRESSOR) tablet 25 mg, 25 mg, Oral, BID  OLANZapine (ZYPREXA) tablet 10 mg, 10 mg, Oral, Nightly  venlafaxine (EFFEXOR XR) extended release capsule 150 mg, 150 mg, Oral, Daily  sodium chloride flush 0.9 % injection 10 mL, 10 mL, Intravenous, 2 times per day  sodium chloride flush 0.9 % injection 10 mL, 10 mL, Intravenous, PRN  ondansetron (ZOFRAN) injection 4 mg, 4 mg, Intravenous, Q6H PRN  enoxaparin (LOVENOX) injection 40 mg, 40 mg, Subcutaneous, Daily  sodium chloride 0.9 % 9,754 mL with folic acid 1 mg, adult multi-vitamin with vitamin k 10 mL, thiamine 100 mg, , Intravenous, Nightly  acetaminophen (TYLENOL) tablet 650 mg, 650 mg, Oral, Q4H PRN  LORazepam (ATIVAN) tablet 1 mg, 1 mg, Oral, Q1H PRN **OR** LORazepam (ATIVAN) injection 1 mg, 1 mg, Intravenous, Q1H PRN **OR** LORazepam (ATIVAN) tablet 2 mg, 2 mg, Oral, Q1H PRN **OR** LORazepam (ATIVAN) injection 2 mg, 2 mg, Intravenous, Q1H PRN **OR** LORazepam (ATIVAN) tablet 3 mg, 3 mg, Oral, Q1H PRN **OR** LORazepam (ATIVAN) injection 3 mg, 3 mg, Intravenous, Q1H PRN **OR** LORazepam (ATIVAN) tablet 4 mg, 4 mg, Oral, Q1H PRN **OR** LORazepam (ATIVAN) injection 4 mg, 4 mg, Intravenous, Q1H PRN  nicotine (NICODERM CQ) 21 MG/24HR 1 patch, 1 patch, Transdermal, Daily  lisinopril (PRINIVIL;ZESTRIL) tablet 20 mg, 20 mg, Oral, Daily **AND** hydrochlorothiazide (HYDRODIURIL) tablet 25 mg, 25 mg, Oral, Daily     Past Medical History:        Diagnosis Date    [facility]     Depression     [facility]     ETOH abuse     Hyperlipidemia     Hypertension     Liver disease     Seizures ([facility]     etoh wdl       Past Surgical History:        Procedure Laterality Date    ENDOSCOPY, COLON, DIAGNOSTIC         Allergies: Patient has no known allergies. Social History:    Patient was that he was born and raised in [place]. Patient reports that he was working at a [employer] and has lost his job 3 months ago because of his drinking habits. SUBSTANCE USE HISTORY: extensive history of alcohol abuse. Denies any other illicit drug use    Family Medical and Psychiatric History:     Reports extensive alcohol abuse among brothers.  Denies any there psychiatric history in family        Problem Relation Age of Onset    High Blood Pressure Father     Cancer Father         Lung Cancer    Alcohol Abuse Father     High Blood Pressure Brother     Diabetes Mother     Heart Disease Mother         Stent Placement    Arthritis Mother     Depression Mother     High Blood Pressure Mother     High Cholesterol Mother     Alcohol Abuse Paternal Uncle          Physical  BP (!) 145/97   Pulse 103   Temp 98.3 °F (36.8 °C) (Oral)   Resp 20   Ht 5' 11\" (1.803 m)   Wt 251 lb 6.4 oz (114 kg)   SpO2 91%   BMI 35.06 kg/m²         Mental Status Examination:  Level of consciousness:  Within normal limits  Appearance: hospital attire, lying in bed, fair grooming  Behavior/Motor:  no abnormalities noted  Attitude toward examiner:  cooperative,

## 2019-04-27 NOTE — ED NOTES
No changes noted in assessment. Patient appears anxious and unable to sit still. States he is freaking out. Denies needs.      Juani , RN  04/26/19 6002

## 2019-04-27 NOTE — PROGRESS NOTES
Hospitalist Progress Note    Patient:  Ambar Ibarra      Unit/Bed:8B-34/034-A    YOB: 1975    MRN: [id number]       Acct: [de-identified]     PCP: NILAM Ugalde CNP    Date of Admission: 4/26/2019    Chief Complaint: alcohol intoxication requesting alcohol detox    Hospital Course:     Please see H/P for details. In summary, this is a 38 y.o. Male, with PMH COPD, DM2, HTN, HLD, hx of alcohol abuse, was  transferred from [facility] after [facility]FFIL[facility] OF [facility]. The patient presents with alcohol intoxication requesting alcohol detox. Per H/P,  patient states he has done detox before and stayed sober for at least one month but started drinking again, and per H/P, patient used to drink 36 16 oz beer daily for years ( when asked he states he has been drinking since 12 y/o), with last drink 4/26 around 5pm. Patient was admitted under [address] [place] [address] service for alcohol intoxication and for detox. Alcohol level on arrival was 0.30. Subjective:     Patient seen and examined. Pt denies chest pain, palpitations, sob. He does report loose, non-bloody stools. He denies abd pain, N/V. No other symptoms noted.         Medications:  Reviewed    Infusion Medications    dextrose      sodium chloride Stopped (04/27/19 0345)     Scheduled Medications    insulin lispro  0-6 Units Subcutaneous TID WC    insulin lispro  0-3 Units Subcutaneous Nightly    calcium replacement protocol   Other RX Placeholder    magnesium replacement protocol   Other RX Placeholder    methylPREDNISolone  125 mg Intravenous Once    ipratropium-albuterol  1 ampule Inhalation Q4H WA    famotidine  20 mg Oral BID    [START ON 0/72/8737] folic acid  1 mg Oral Daily    lactulose  10 g Oral BID    metoprolol tartrate  25 mg Oral BID    OLANZapine  10 mg Oral Nightly    venlafaxine  150 mg Oral Daily    sodium chloride flush  10 mL Intravenous 2 times per day    enoxaparin  40 mg Subcutaneous Daily    folic acid, thiamine, multi-vitamin with vitamin K infusion   Intravenous Nightly    nicotine  1 patch Transdermal Daily    lisinopril  20 mg Oral Daily    And    hydrochlorothiazide  25 mg Oral Daily     PRN Meds: glucose, dextrose, glucagon (rDNA), dextrose, sodium chloride flush, ondansetron, acetaminophen, LORazepam **OR** LORazepam **OR** LORazepam **OR** LORazepam **OR** LORazepam **OR** LORazepam **OR** LORazepam **OR** LORazepam      Intake/Output Summary (Last 24 hours) at 4/27/2019 1503  Last data filed at 4/27/2019 0601  Gross per 24 hour   Intake 558.22 ml   Output 775 ml   Net -216.78 ml       Diet:  DIET CARB CONTROL; Exam:  /72   Pulse 95   Temp 98.4 °F (36.9 °C) (Oral)   Resp 24   Ht 5' 11\" (1.803 m)   Wt 251 lb 6.4 oz (114 kg)   SpO2 92%   BMI 35.06 kg/m²     General appearance: alert, not in acute distress. HEENT: Pupils equal, round, and reactive to light. Conjunctivae clear. Clear oral mucosa. Neck: Supple, with full range of motion. Respiratory:  Normal respiratory effort. (+) scattered wheezing on both lung fields, no rales, no rhonchi. Cardiovascular: normal rate, regular rhythm with normal S1/S2 without murmurs, rubs or gallops. Abdomen: Soft, non-tender, non-distended with normal bowel sounds. Musculoskeletal: passive and active ROM x 4 extremities. Exam of extremities: no pedal or leg edema noted      Labs:   Recent Labs     04/24/19 1820 04/26/19 1920 04/27/19  0431   WBC 8.9 11.7* 7.3   HGB 16.8 18.5* 16.5   HCT 46.9 54.0* 46.8    287 223     Recent Labs     04/24/19 1820 04/26/19 1920 04/27/19  0431    135* 137   K 4.5 4.0 4.1    97* 102   CO2 26 27 21*   BUN 19* 13 14   CREATININE 0.7 0.9 0.6   CALCIUM  --   --  8.1*   PHOS  --   --  3.3     Recent Labs     04/24/19 1820 04/26/19 1920   AST 74* 295*   ALT 69* 314*   BILITOT 0.6 1.6*   ALKPHOS 204* 215*     No results for input(s): INR in the last 72 hours.   No results for input(s): Sam Regalado in the last 72 hours. Urinalysis:      Lab Results   Component Value Date    NITRU NEGATIVE 04/24/2019    WBCUA NONE 04/24/2019    BACTERIA NONE 04/24/2019    RBCUA NONE 04/24/2019    RBCUA 0-2 12/14/2018    BLOODU NEGATIVE 04/24/2019    SPECGRAV <1.005 04/24/2019    GLUCOSEU NEGATIVE 12/14/2018       Radiology:  No orders to display         Assessment/Plan: This is a 38 y.o. Male      1. Alcohol abuse requesting detox    -cont CIWA, per RN latest CIWA 13 from 22 this morning  -cont folate, MV, thiamine   -psych c/s  -seizure precaution, per record, pt had seizure in the past due to etoh withdrawal, pt denies      2. COPD exacerbation    -denies sx of URI or pna  -on prn albuterol at home per pt  -solumedrol 125 mg IV x 1   -duoneb   -monitor  -counseled smoking cessation    3. HTN, fairly controlled    -cont lisinopril, HCTZ, lopressor for now, will re-assess tomorrow   -VS per protocol    4. Leukocytosis, etiology unclear, possibly reactive, resolved    5. Hypocalcemia    -icalc low  -calcium replacement protocol  -check vit D, PTH    6. Hypomagnesemia    -magnesium replacement protocol  -magnesium level in am     7. Diarrhea, possibly from lactulose vs viral vs bacterial infection    -monitor  -cont IVF to prevent dehydration      8. DM type 2, fairly controlled    -A1C 6.7%  -blood sugars fairly controlled  -cont SSI   -carb control diet   -hypoglycemia protocol    9. Alcoholic cirrhosis    -LFTS looks at baseline  -cont lactulose  -ammonia nl     10.  Hx of depression    -cont lexapro      Anticipated Discharge in : pending         Diet: DIET CARB CONTROL;    DVT prophylaxis: [] Lovenox                                 [] SCDs                                 [] SQ Heparin                                 [] Encourage ambulation           [] Already on Anticoagulation     Disposition:    [] Home       [] TCU       [] Rehab       [] Psych       [] SNF       [] [place]       [x] Other-Plan as above       Code Status: Full Code        Electronically signed by Roberth Alvarez MD on 4/27/2019 at 3:03 PM

## 2019-04-27 NOTE — ED NOTES
Patient placed on cardiac monitor, blood pressure cuff and pulse ox.      Jovany Mcclellan RN  04/26/19 9292

## 2019-04-27 NOTE — H&P
HOSPITALIST ADMISSION H&P      REASON FOR ADMISSION:  Alcohol detox  ESTIMATED LENGTH OF STAY: 3 days    ATTENDING/ADMITTING PHYSICIAN: Jessica Mckeon PA-C  PCP: NILAM Ugalde CNP    HISTORY OF PRESENT ILLNESS:      The patient is a 38 y.o. male patient of NILAM Ugalde CNP who was transferred from [facility] after [facility] OF [facility]. The patient presents with alcohol intoxication requesting alcohol detox. The patient states he has done detox before and stayed sober for at least one month but started drinking again. The patient would like to try an inpatient detox center but he has to go to court for a recent [address]. He hopes the  will let him serve his time in an inpatient detox center instead of longterm. The patient had his last drink today prior to arrival at [facility]. He states he is currently drinking 36 16oz beers daily. See below for additional PMH. Patient ahxc-kjucegeuul-[id number]-available records reviewed, including, but not limited to,       Past Medical History:   Diagnosis Date    COPD (chronic obstructive pulmonary disease) ([facility] [address] [facility]     Depression     Diabetes mellitus ([facility] [address]     ETOH abuse     Hyperlipidemia     Hypertension     Liver disease     Seizures ([facility] [address]     etoh wdl           Past Surgical History:   Procedure Laterality Date    ENDOSCOPY, COLON, DIAGNOSTIC         Medications Prior to Admission:    Medications Prior to Admission: Omega-3 1000 MG CAPS, Take by mouth  Multiple Vitamins-Iron (TAB-A-REBEKAH/IRON) TABS, Take by mouth daily  chlordiazePOXIDE (LIBRIUM) 25 MG capsule, Take 1 capsule by mouth 3 times daily as needed for Anxiety for up to 30 days.   LACTULOSE PO, Take by mouth 2 times daily   OLANZapine (ZYPREXA) 10 MG tablet, Take 1 tablet by mouth nightly  melatonin 1 MG/4ML LIQD SL liquid, Place 0.3 mg under the tongue nightly as needed for Sleep  venlafaxine (EFFEXOR XR) 150 MG extended release capsule, Take 1 capsule by mouth daily  metoprolol tartrate (LOPRESSOR) 25 MG tablet, Take 1 tablet by mouth 2 times daily  folic acid (FOLVITE) 1 MG tablet, Take 1 tablet by mouth daily  vitamin B-1 100 MG tablet, Take 2 tablets by mouth daily  lisinopril-hydrochlorothiazide (PRINZIDE;ZESTORETIC) 20-25 MG per tablet, Take 1 tablet by mouth daily  Cholecalciferol (VITAMIN D3) 5000 units TABS, Take 5,000 Units by mouth once a week  Multiple Vitamin (MULTIVITAMIN) tablet, Take 1 tablet by mouth daily  famotidine (PEPCID) 20 MG tablet, Take 1 tablet by mouth 2 times daily  albuterol sulfate  (90 Base) MCG/ACT inhaler, Inhale 2 puffs into the lungs every 6 hours as needed for Wheezing  glucose monitoring kit (FREESTYLE) monitoring kit, Glucometer with test strips and lancets. Check BS once daily  #100 strips and lancets  blood glucose test strips (ASCENSIA AUTODISC VI;ONE TOUCH ULTRA TEST VI) strip, Test as needed. Dispense One Touch verio Test Strips. Dx: Type 2 diabetes (250.00)    Allergies:    Patient has no known allergies. Social History:    reports that he has been smoking e-cigarettes and cigarettes. He has a 27.00 pack-year smoking history. He has quit using smokeless tobacco. He reports that he drinks about 84.0 oz of alcohol per week. He reports that he does not use drugs. Family History:   family history includes Alcohol Abuse in his father and paternal uncle; Arthritis in his mother; Cancer in his father; Depression in his mother; Diabetes in his mother; Heart Disease in his mother; High Blood Pressure in his brother, father, and mother; High Cholesterol in his mother. REVIEW OF SYSTEMS:  See HPI and problem list; otherwise no other new complaints with respect to HEENT, neck, pulmonary, coronary, GI, , endocrine, musculoskeletal, immune system/connective tissue disease, hematologic, neuropsych, skin, lymphatics, or malignancies.      PHYSICAL EXAM:  Vitals:  BP (!) 151/85   Pulse 128   Temp 97.6 °F (36.4 °C) (Oral) Lipase    Collection Time: 04/24/19  6:20 PM   Result Value Ref Range    Lipase 301.0 73.0 - 393.0 U/L   Magnesium    Collection Time: 04/24/19  6:20 PM   Result Value Ref Range    Magnesium 1.9 1.8 - 2.4 mg/dl   Ethanol    Collection Time: 04/24/19  6:20 PM   Result Value Ref Range    Ethyl Alcohol 0.30 0.00 % (gm/dl)    Methodology XPAND     ANALYZED BY: DMK     Drawn By RN     Time Collected 1820     Date Of Collection 04/24/19    Glomerular Filtration Rate, Estimated    Collection Time: 04/24/19  6:20 PM   Result Value Ref Range    GFR, Estimated > 90 ml/min/1.73m2   ANION GAP    Collection Time: 04/24/19  6:20 PM   Result Value Ref Range    Anion Gap 11.0 8.0 - 16.0 meq/l   AST    Collection Time: 04/24/19  6:20 PM   Result Value Ref Range    AST 74 (H) 5 - 40 U/L   ALT    Collection Time: 04/24/19  6:20 PM   Result Value Ref Range    ALT 69 (H) 11 - 66 U/L   Reference lab sample    Collection Time: 04/24/19  6:20 PM   Result Value Ref Range    REFERENCE LOCATION see below     TEST(S) BEING PERFORMED NH3     TEST RESULTS WITH UNITS 142 umol/L     Reference Range 11-35 umol/L     ORIGINAL SAMPLE NUMBER [id number]    Rapid drug screen, urine    Collection Time: 04/24/19  6:40 PM   Result Value Ref Range    Phencyclidine negative NEGATIVE    Benzodiazepines negative NEGATIVE    Cocaine Metabolite negative NEGATIVE    Amphetamine+Methamphetamine negative NEGATIVE    Cannabinoids negative NEGATIVE    Opiates negative NEGATIVE    Barbiturates negative NEGATIVE   Urinalysis Reflex to Culture    Collection Time: 04/24/19  6:40 PM   Result Value Ref Range    Glucose, Urine NEGATIVE NEGATIVE mg/dl    Bilirubin Urine NEGATIVE     Ketones, Urine NEGATIVE NEGATIVE    Specific Gravity, UA <1.005 1.002 - 1.03    Blood, Urine NEGATIVE NEGATIVE    pH, UA 5.5 5.0 - 9.0    Protein, UA TRACE (A) NEGATIVE mg/dl    Urobilinogen, Urine 0.2 0.0 - 1.0 eu/dl    Nitrite, Urine NEGATIVE NEGATIVE    Leukocyte Esterase, Urine NEGATIVE NEGATIVE    Color, UA YELLOW STRAW-YELL    Character, Urine CLEAR CLEAR-SL C    RBC, UA NONE 0-2/hpf /hpf    WBC, UA NONE 0-4/hpf /hpf    Epi Cells 0-2 3-5/hpf /hpf    Amorphous, UA NONE SEEN none seen    Mucus, UA NONE SEEN none seen    Bacteria, UA NONE few/none s    Casts UA NONE SEEN none seen /lpf    Crystals NONE SEEN none seen    REFLEX TO URINE C & S NOT INDICATED    CBC Auto Differential    Collection Time: 04/26/19  7:20 PM   Result Value Ref Range    WBC 11.7 (H) 4.8 - 10.8 thou/mm3    RBC 5.32 4.70 - 6.10 mill/mm3    Hemoglobin 18.5 (H) 14.0 - 18.0 gm/dl    Hematocrit 54.0 (H) 42.0 - 52.0 %    .4 (H) 80.0 - 94.0 fL    MCH 34.7 (H) 27.0 - 31.0 pg    MCHC 34.2 33.0 - 37.0 gm/dl    RDW 13.1 11.5 - 14.5 %    Platelets 575 998 - 462 thou/mm3    MPV 6.7 (L) 7.4 - 10.4 fL    SEGS 73.0 43.0 - 75.0 %    Lymphocytes 17.0 15.0 - 47.0 %    Monocytes 8.0 0.0 - 12.0 %    Eosinophils % 1.0 0.0 - 4.0 %    Basophils 1.0 0.0 - 3.0 %    Platelet Estimate ADEQ.  Adequate    Macrocytes 1+ Absent   Ethanol    Collection Time: 04/26/19  7:20 PM   Result Value Ref Range    Ethyl Alcohol 0.36 0.00 % (gm/dl)    Methodology XPAND     ANALYZED BY: [facility]     Drawn By RN     Time Collected 1920     Date Of Collection 04/26/19    Comprehensive Metabolic Panel    Collection Time: 04/26/19  7:20 PM   Result Value Ref Range    Glucose 289 (H) 74 - 106 mg/dl    CREATININE 0.9 0.6 - 1.3 mg/dl    BUN 13 7 - 18 mg/dl    Sodium 135 (L) 136 - 145 meq/l    Potassium 4.0 3.5 - 5.1 meq/l    Chloride 97 (L) 98 - 107 meq/l    CO2 27 21 - 32 meq/l    POC CALCIUM 8.9 8.5 - 10.1 mg/dl     (H) 15 - 37 U/L    Alkaline Phosphatase 215 (H) 46 - 116 U/L    Total Protein 7.7 6.4 - 8.2 gm/dl    Alb 3.6 3.4 - 5.0 gm/dl    Total Bilirubin 1.6 (H) 0.2 - 1.0 mg/dl     (H) 14 - 63 U/L   Glomerular Filtration Rate, Estimated    Collection Time: 04/26/19  7:20 PM   Result Value Ref Range    GFR, Estimated > 90 ml/min/1.73m2   ANION GAP dosing      Home medications reviewed  See orders     Note that over 50 minutes was spent in evaluation of the patient, review of the chart and pertinent records, discussion with family/staff, etc    Nara Jalloh PA-C  11:16 PM  4/26/2019

## 2019-04-27 NOTE — ED NOTES
Report called to [facility], [address] [place] [address] on 8B.      Ronak Flores RN  04/26/19 2152

## 2019-04-28 LAB
ALBUMIN SERPL-MCNC: 3.3 G/DL (ref 3.5–5.1)
ALP BLD-CCNC: 163 U/L (ref 38–126)
ALT SERPL-CCNC: 245 U/L (ref 11–66)
ANION GAP SERPL CALCULATED.3IONS-SCNC: 15 MEQ/L (ref 8–16)
AST SERPL-CCNC: 244 U/L (ref 5–40)
BILIRUB SERPL-MCNC: 6.2 MG/DL (ref 0.3–1.2)
BILIRUBIN DIRECT: 4 MG/DL (ref 0–0.3)
BUN BLDV-MCNC: 14 MG/DL (ref 7–22)
CALCIUM IONIZED: 1 MMOL/L (ref 1.12–1.32)
CALCIUM SERPL-MCNC: 8.3 MG/DL (ref 8.5–10.5)
CHLORIDE BLD-SCNC: 95 MEQ/L (ref 98–111)
CO2: 19 MEQ/L (ref 23–33)
CREAT SERPL-MCNC: 0.4 MG/DL (ref 0.4–1.2)
GFR SERPL CREATININE-BSD FRML MDRD: > 90 ML/MIN/1.73M2
GLUCOSE BLD-MCNC: 150 MG/DL (ref 70–108)
GLUCOSE BLD-MCNC: 203 MG/DL (ref 70–108)
GLUCOSE BLD-MCNC: 206 MG/DL (ref 70–108)
GLUCOSE BLD-MCNC: 230 MG/DL (ref 70–108)
GLUCOSE BLD-MCNC: 263 MG/DL (ref 70–108)
MAGNESIUM: 1.8 MG/DL (ref 1.6–2.4)
MRSA SCREEN RT-PCR: NEGATIVE
PHOSPHORUS: 2 MG/DL (ref 2.4–4.7)
POTASSIUM SERPL-SCNC: 4.1 MEQ/L (ref 3.5–5.2)
SODIUM BLD-SCNC: 129 MEQ/L (ref 135–145)
TOTAL PROTEIN: 6.4 G/DL (ref 6.1–8)
VANCOMYCIN RESISTANT ENTEROCOCCUS: NEGATIVE

## 2019-04-28 PROCEDURE — 6360000002 HC RX W HCPCS: Performed by: PHYSICIAN ASSISTANT

## 2019-04-28 PROCEDURE — 99255 IP/OBS CONSLTJ NEW/EST HI 80: CPT | Performed by: NURSE PRACTITIONER

## 2019-04-28 PROCEDURE — 2580000003 HC RX 258: Performed by: EMERGENCY MEDICINE

## 2019-04-28 PROCEDURE — 2580000003 HC RX 258: Performed by: NURSE PRACTITIONER

## 2019-04-28 PROCEDURE — 80053 COMPREHEN METABOLIC PANEL: CPT

## 2019-04-28 PROCEDURE — 2580000003 HC RX 258: Performed by: PHYSICIAN ASSISTANT

## 2019-04-28 PROCEDURE — 6370000000 HC RX 637 (ALT 250 FOR IP): Performed by: FAMILY MEDICINE

## 2019-04-28 PROCEDURE — 87641 MR-STAPH DNA AMP PROBE: CPT

## 2019-04-28 PROCEDURE — 82248 BILIRUBIN DIRECT: CPT

## 2019-04-28 PROCEDURE — 2709999900 HC NON-CHARGEABLE SUPPLY

## 2019-04-28 PROCEDURE — 2000000000 HC ICU R&B

## 2019-04-28 PROCEDURE — 84100 ASSAY OF PHOSPHORUS: CPT

## 2019-04-28 PROCEDURE — 82948 REAGENT STRIP/BLOOD GLUCOSE: CPT

## 2019-04-28 PROCEDURE — 36415 COLL VENOUS BLD VENIPUNCTURE: CPT

## 2019-04-28 PROCEDURE — 87500 VANOMYCIN DNA AMP PROBE: CPT

## 2019-04-28 PROCEDURE — 83735 ASSAY OF MAGNESIUM: CPT

## 2019-04-28 PROCEDURE — 51798 US URINE CAPACITY MEASURE: CPT

## 2019-04-28 PROCEDURE — 2580000003 HC RX 258

## 2019-04-28 PROCEDURE — 2500000003 HC RX 250 WO HCPCS: Performed by: PHYSICIAN ASSISTANT

## 2019-04-28 PROCEDURE — 6370000000 HC RX 637 (ALT 250 FOR IP): Performed by: PHYSICIAN ASSISTANT

## 2019-04-28 PROCEDURE — 2500000003 HC RX 250 WO HCPCS

## 2019-04-28 PROCEDURE — 99232 SBSQ HOSP IP/OBS MODERATE 35: CPT | Performed by: FAMILY MEDICINE

## 2019-04-28 PROCEDURE — 99232 SBSQ HOSP IP/OBS MODERATE 35: CPT | Performed by: INTERNAL MEDICINE

## 2019-04-28 PROCEDURE — 82330 ASSAY OF CALCIUM: CPT

## 2019-04-28 PROCEDURE — 2500000003 HC RX 250 WO HCPCS: Performed by: NURSE PRACTITIONER

## 2019-04-28 PROCEDURE — 87081 CULTURE SCREEN ONLY: CPT

## 2019-04-28 PROCEDURE — 94640 AIRWAY INHALATION TREATMENT: CPT

## 2019-04-28 RX ORDER — 0.9 % SODIUM CHLORIDE 0.9 %
1000 INTRAVENOUS SOLUTION INTRAVENOUS ONCE
Status: COMPLETED | OUTPATIENT
Start: 2019-04-28 | End: 2019-04-28

## 2019-04-28 RX ORDER — HYDRALAZINE HYDROCHLORIDE 20 MG/ML
10 INJECTION INTRAMUSCULAR; INTRAVENOUS EVERY 4 HOURS PRN
Status: DISCONTINUED | OUTPATIENT
Start: 2019-04-28 | End: 2019-05-02 | Stop reason: HOSPADM

## 2019-04-28 RX ADMIN — LORAZEPAM 4 MG: 2 INJECTION INTRAMUSCULAR; INTRAVENOUS at 05:27

## 2019-04-28 RX ADMIN — IPRATROPIUM BROMIDE AND ALBUTEROL SULFATE 1 AMPULE: .5; 3 SOLUTION RESPIRATORY (INHALATION) at 20:34

## 2019-04-28 RX ADMIN — INSULIN LISPRO 2 UNITS: 100 INJECTION, SOLUTION INTRAVENOUS; SUBCUTANEOUS at 12:01

## 2019-04-28 RX ADMIN — PHENOBARBITAL SODIUM 130 MG: 65 INJECTION INTRAMUSCULAR; INTRAVENOUS at 07:39

## 2019-04-28 RX ADMIN — FAMOTIDINE 20 MG: 10 INJECTION, SOLUTION INTRAVENOUS at 20:11

## 2019-04-28 RX ADMIN — THIAMINE HYDROCHLORIDE: 100 INJECTION, SOLUTION INTRAMUSCULAR; INTRAVENOUS at 08:46

## 2019-04-28 RX ADMIN — SODIUM CHLORIDE 500 ML: 9 INJECTION, SOLUTION INTRAVENOUS at 16:55

## 2019-04-28 RX ADMIN — DEXMEDETOMIDINE HYDROCHLORIDE 116 MCG: 100 INJECTION, SOLUTION, CONCENTRATE INTRAVENOUS at 08:34

## 2019-04-28 RX ADMIN — SODIUM PHOSPHATE, MONOBASIC, MONOHYDRATE 17 MMOL: 276; 142 INJECTION, SOLUTION INTRAVENOUS at 10:14

## 2019-04-28 RX ADMIN — LORAZEPAM 2 MG: 2 INJECTION INTRAMUSCULAR; INTRAVENOUS at 01:03

## 2019-04-28 RX ADMIN — LORAZEPAM 3 MG: 2 INJECTION INTRAMUSCULAR; INTRAVENOUS at 02:06

## 2019-04-28 RX ADMIN — PHENOBARBITAL SODIUM 130 MG: 65 INJECTION INTRAMUSCULAR; INTRAVENOUS at 04:39

## 2019-04-28 RX ADMIN — INSULIN LISPRO 2 UNITS: 100 INJECTION, SOLUTION INTRAVENOUS; SUBCUTANEOUS at 16:21

## 2019-04-28 RX ADMIN — HYDROCHLOROTHIAZIDE 25 MG: 25 TABLET ORAL at 11:09

## 2019-04-28 RX ADMIN — FAMOTIDINE 20 MG: 10 INJECTION, SOLUTION INTRAVENOUS at 09:30

## 2019-04-28 RX ADMIN — LORAZEPAM 4 MG: 2 INJECTION INTRAMUSCULAR; INTRAVENOUS at 06:28

## 2019-04-28 RX ADMIN — INSULIN LISPRO 1 UNITS: 100 INJECTION, SOLUTION INTRAVENOUS; SUBCUTANEOUS at 20:11

## 2019-04-28 RX ADMIN — SODIUM CHLORIDE: 9 INJECTION, SOLUTION INTRAVENOUS at 07:41

## 2019-04-28 RX ADMIN — DEXMEDETOMIDINE HYDROCHLORIDE 0.2 MCG/KG/HR: 100 INJECTION, SOLUTION, CONCENTRATE INTRAVENOUS at 08:40

## 2019-04-28 RX ADMIN — METOPROLOL TARTRATE 25 MG: 25 TABLET ORAL at 11:09

## 2019-04-28 RX ADMIN — LISINOPRIL 20 MG: 20 TABLET ORAL at 11:09

## 2019-04-28 RX ADMIN — SODIUM CHLORIDE: 9 INJECTION, SOLUTION INTRAVENOUS at 01:45

## 2019-04-28 RX ADMIN — IPRATROPIUM BROMIDE AND ALBUTEROL SULFATE 1 AMPULE: .5; 3 SOLUTION RESPIRATORY (INHALATION) at 12:49

## 2019-04-28 RX ADMIN — IPRATROPIUM BROMIDE AND ALBUTEROL SULFATE 1 AMPULE: .5; 3 SOLUTION RESPIRATORY (INHALATION) at 16:25

## 2019-04-28 RX ADMIN — DEXMEDETOMIDINE HYDROCHLORIDE 0.2 MCG/KG/HR: 100 INJECTION, SOLUTION, CONCENTRATE INTRAVENOUS at 17:25

## 2019-04-28 RX ADMIN — PHENOBARBITAL SODIUM 260 MG: 65 INJECTION INTRAMUSCULAR; INTRAVENOUS at 03:45

## 2019-04-28 RX ADMIN — LORAZEPAM 3 MG: 2 INJECTION INTRAMUSCULAR; INTRAVENOUS at 04:17

## 2019-04-28 RX ADMIN — VENLAFAXINE HYDROCHLORIDE 150 MG: 150 CAPSULE, EXTENDED RELEASE ORAL at 11:09

## 2019-04-28 ASSESSMENT — PAIN SCALES - GENERAL
PAINLEVEL_OUTOF10: 0

## 2019-04-28 NOTE — PROGRESS NOTES
Pt admitted from 4D to 4D #13, escorted by 2 campus police officers but pt is moderately cooperative. MRSA/VRE swabs forgone for decreased agitation.

## 2019-04-28 NOTE — PROGRESS NOTES
5295- Patient still not following commands or listening to the staff sitter at bedside. Patient is constantly picking at his IV site, trying to pull off his gown and attempting to get out of bed. 5- CIWA completed and is a 21.    0527- Ativan IV 4mg given    0535- Pt is still not following commands or listening to staff memeber's. Patient is trying to climb out of the bed with 3 staff memeber's at bedside. Bruce called Sadie MARIE to update that the patient is still very anxious & agitated. This RN updated that the patient is trying to climb out of the bed with 3 staff member's at bedside. Sadie Fennel PA stated that she was going to speak with Dr. Jonathon Ramirez about the situation. 1570- Patient is still constantly pulling at his IV site and his gown. Patient is constantly trying to climb out of the bed. This RN and sitter attempting to re-orient the patient, but unsuccessful.     Matt MARIE and Dayton Larry NP arrived to patient's bedside to assess the patient for his possibility of needing ICU.     0627- Patient's CIWA is 23.    0628- 4mg IV Ativan given. 1249- Patient still very confused, agitated and anxious. Patient is having visual hallucinations. Pt constantly trying to get out of bed. Sitter remains at bedside.

## 2019-04-28 NOTE — PROGRESS NOTES
Michael Warren RN called [facility] PA to update that the phenobarbital bolus was given and the patient is still trying to climb out of the bed with a sitter at bedside. Patient is still not following commands or listening to the staff members. This RN asked if IV Ativan could be given with the Phenobarbital bolus dose that was just stopped. [facility] PA stated that she spoke with Dr. Ernie Thomas and [facility] PA and Dr. Ernie Thomas gave the okay for this RN to give another dose of IV Ativan per the CIWA scale and to call pharmacy and ask that the next dose of IV phenobarbital be made and sent up for this RN to give.

## 2019-04-28 NOTE — PLAN OF CARE
Problem: Cardiovascular  Goal: Hemodynamic stability  Outcome: Met This Shift  Note:   Tachycardia improved. Problem: Respiratory  Goal: No pulmonary complications  Outcome: Met This Shift     Problem:   Goal: Adequate urinary output  Outcome: Met This Shift     Problem: Pain:  Goal: Control of acute pain  Description  Control of acute pain  Outcome: Met This Shift  Goal: Control of chronic pain  Description  Control of chronic pain  Outcome: Met This Shift  Note:   Denies pain. Problem: Fluid Volume - Deficit:  Goal: Absence of fluid volume deficit signs and symptoms  Description  Absence of fluid volume deficit signs and symptoms  Outcome: Ongoing  Note:   Large amount incontinent urine prior to external catheter being placed. Problem: Nutrition Deficit:  Goal: Ability to achieve adequate nutritional intake will improve  Description  Ability to achieve adequate nutritional intake will improve  Outcome: Ongoing  Note:   Too agitated to eat-impulsive and frequent coughing. Problem: Sleep Pattern Disturbance:  Goal: Appears well-rested  Description  Appears well-rested  Outcome: Ongoing  Note:   Pt currently sedated with IV Precedex. Problem: Pain Control  Goal: Maintain pain level at or below patient's acceptable level (or 5 if patient is unable to determine acceptable level)  Outcome: Ongoing  Flowsheets (Taken 4/28/2019 0318 by Natan Frost RN)  Patient's Stated Pain Goal: No pain  Note:   Denies pain-CPOt 0     Problem: Cardiovascular  Goal: No DVT, peripheral vascular complications  Outcome: Ongoing  Note:   Bilateral SCD's for prophylaxis. Problem: Respiratory  Goal: O2 Sat > 90%  Outcome: Ongoing  Note:   Oxygen at 2 L nasal cannula while sleeping. Problem: Skin Integrity/Risk  Goal: No skin breakdown during hospitalization  Outcome: Ongoing  Note:   Left knee abraision.      Problem: Musculor/Skeletal Functional Status  Goal: Absence of falls  Outcome: Ongoing  Note: Sitter at bedside due to pt frequently trying to get up unassisted. Problem: Impaired respiratory status  Goal: Clear lung sounds  4/28/2019 1259 by Álvaro Ronquillo RCP  Outcome: Ongoing  Note:   Pt continues on aerosols to clear lung sounds/improve aeration. Problem: Discharge Planning:  Goal: Discharged to appropriate level of care  Description  Discharged to appropriate level of care  Outcome: Not Met This Shift  Note:   Transferred to ICU for Precedex infusion.      Problem: Pain:  Goal: Pain level will decrease  Description  Pain level will decrease  Outcome: Completed

## 2019-04-28 NOTE — PROGRESS NOTES
Patient not well controlled on Ativan alone on CIWA scale. Add phenobarbital.  Sitter at bedside.       Zunilda Keenan PA-C

## 2019-04-28 NOTE — PROGRESS NOTES
Hospitalist Progress Note    Patient:  Darci Yañez      Unit/Bed:8B-34/034-A    YOB: 1975    MRN: [id number]       Acct: [de-identified]     PCP: NILAM Gordillo CNP    Date of Admission: 4/26/2019    Chief Complaint: alcohol intoxication requesting alcohol detox    Hospital Course:     Please see H/P for details. In summary, this is a 38 y.o. Male, with PMH COPD, DM2, HTN, HLD, hx of alcohol abuse, was  transferred from [facility] after [facility]. The patient presents with alcohol intoxication requesting alcohol detox. Per H/P,  patient states he has done detox before and stayed sober for at least one month but started drinking again, and per H/P, patient used to drink 36 16 oz beer daily for years ( when asked he states he has been drinking since 12 y/o), with last drink 4/26 around 5pm. [facility] and for detox. Alcohol level on arrival was 0.30. Subjective:     RN called me and reports pt is combative and had 90 mg of ativan total overnight and had 2 doses of phenobarbital so far and still combative. Patient seen and examined. Pt denies chest pain, palpitations, sob.      Medications:  Reviewed    Infusion Medications    dexmedetomidine      dextrose      sodium chloride 125 mL/hr at 04/28/19 0741     Scheduled Medications    dexmedetomidine (PRECEDEX) IV bolus  1 mcg/kg Intravenous Once    phosphorus replacement protocol   Other RX Placeholder    insulin lispro  0-6 Units Subcutaneous TID WC    insulin lispro  0-3 Units Subcutaneous Nightly    calcium replacement protocol   Other RX Placeholder    magnesium replacement protocol   Other RX Placeholder    ipratropium-albuterol  1 ampule Inhalation Q4H WA    famotidine  20 mg Oral BID    [START ON 2/94/2137] folic acid  1 mg Oral Daily    lactulose  10 g Oral BID    metoprolol tartrate  25 mg Oral BID    OLANZapine  10 mg Oral Nightly    venlafaxine  150 mg Oral Daily    sodium chloride flush  10 mL Intravenous 2 times per day    enoxaparin  40 mg Subcutaneous Daily    folic acid, thiamine, multi-vitamin with vitamin K infusion   Intravenous Nightly    nicotine  1 patch Transdermal Daily    lisinopril  20 mg Oral Daily    And    hydrochlorothiazide  25 mg Oral Daily     PRN Meds: PHENobarbital IVPB, hydrALAZINE, glucose, dextrose, glucagon (rDNA), dextrose, sodium chloride flush, ondansetron, acetaminophen, LORazepam **OR** LORazepam **OR** LORazepam **OR** LORazepam **OR** LORazepam **OR** LORazepam **OR** LORazepam **OR** LORazepam      Intake/Output Summary (Last 24 hours) at 4/28/2019 7162  Last data filed at 4/28/2019 0606  Gross per 24 hour   Intake 3822.46 ml   Output 3115 ml   Net 707.46 ml       Diet:  DIET CARB CONTROL; Exam:  BP (!) 150/76   Pulse 102   Temp 98.3 °F (36.8 °C) (Oral)   Resp 18   Ht 5' 11\" (1.803 m)   Wt 255 lb 12.8 oz (116 kg)   SpO2 92%   BMI 35.68 kg/m²     General appearance: alert, looks anxious, agitated, less cooperative, not in acute distress. HEENT: Pupils equal, round, and reactive to light. Conjunctivae clear. Clear oral mucosa. Neck: Supple, with full range of motion. Respiratory:  Normal respiratory effort. No wheezing, no rales, no rhonchi. Cardiovascular: normal rate, regular rhythm with normal S1/S2 without murmurs, rubs or gallops. Musculoskeletal: passive and active ROM x 4 extremities. Exam of extremities: no pedal or leg edema noted    RN and police at bedside during my rounds.        Labs:   Recent Labs     04/26/19 1920 04/27/19  0431   WBC 11.7* 7.3   HGB 18.5* 16.5   HCT 54.0* 46.8    223     Recent Labs     04/26/19 1920 04/27/19  0431 04/28/19  0332   * 137 129*   K 4.0 4.1 4.1   CL 97* 102 95*   CO2 27 21* 19*   BUN 13 14 14   CREATININE 0.9 0.6 0.4   CALCIUM  --  8.1* 8.3*   PHOS  --  3.3 2.0*     Recent Labs     04/26/19  1920 04/28/19  0585 * 244*   * 245*   BILIDIR  --  4.0*   BILITOT 1.6* 6.2*   ALKPHOS 215* 163*     No results for input(s): INR in the last 72 hours. No results for input(s): Navi Christensen in the last 72 hours. Urinalysis:      Lab Results   Component Value Date    NITRU NEGATIVE 04/24/2019    WBCUA NONE 04/24/2019    BACTERIA NONE 04/24/2019    RBCUA NONE 04/24/2019    RBCUA 0-2 12/14/2018    BLOODU NEGATIVE 04/24/2019    SPECGRAV <1.005 04/24/2019    GLUCOSEU NEGATIVE 12/14/2018       Radiology:  No orders to display         Assessment/Plan: This is a 38 y.o. Male      1. Alcohol abuse requesting detox/alcohol withdrawal     -cont CIWA, per RN latest CIWA 13 from 22 this morning  -cont folate, MV, thiamine   -psych c/s  -seizure precaution, per record, pt had seizure in the past due to etoh withdrawal, pt denies    -used ativan 90 mg overnight with 2 doses of phenobarbital, no better, transfer to ICU, precedex started     2. COPD exacerbation, improving     -denies sx of URI or pna  -on prn albuterol at home per pt  -solumedrol 125 mg IV x 1 on 4/27  -cont duoneb   -monitor  -counseled smoking cessation    3. HTN, fairly controlled    -cont lisinopril, HCTZ, lopressor for now, will re-assess tomorrow   -VS per protocol    4. Leukocytosis, etiology unclear, possibly reactive, resolved    5. Hypocalcemia, improving     -icalc low  -calcium replacement protocol      6. Hypomagnesemia, improving     -magnesium replacement protocol  -magnesium level in am     7. Diarrhea, possibly from lactulose vs viral vs bacterial infection    -monitor  -cont IVF to prevent dehydration      8. DM type 2, fairly controlled    -A1C 6.7%  -blood sugars fairly controlled  -cont SSI   -carb control diet   -hypoglycemia protocol    9. Alcoholic cirrhosis    -LFTS looks at baseline  -cont lactulose  -ammonia nl     10. Hx of depression    -cont lexapro    11.  Hypophosphatemia    -phos replacement protocol  -phos level in am Anticipated Discharge in : pending         Diet: DIET CARB CONTROL;    DVT prophylaxis: [] Lovenox                                 [] SCDs                                 [] SQ Heparin                                 [] Encourage ambulation           [] Already on Anticoagulation     Disposition:    [] Home       [] TCU       [] Rehab       [] Psych       [] SNF       [] [facility]       [x] Other-Plan as above. transfer to ICU.  Case d/w Sakina Fay NP      Code Status: Full Code        Electronically signed by Anette Hannon MD on 4/28/2019 at 8:22 AM

## 2019-04-28 NOTE — PLAN OF CARE
Problem: Impaired respiratory status  Goal: Clear lung sounds  Outcome: Ongoing  Note:   Pt continues on aerosols to clear lung sounds/improve aeration.

## 2019-04-28 NOTE — CONSULTS
ICU History and Physical    MRN: [id number]       Acct: [de-identified]     PCP: NILAM Jalloh CNP    Date of Admission: 4/26/2019    Assessment/Plan:    1. Acute Alcohol Intoxication with BAL 0.36 with acute withdrawals/DT's--failed CIWA with Ativan and Phenobarb; very agitated and needing assistance of 3 security guards; (+) hallucinations; plan to transfer to ICU and initiation of bolus/gtt of Precedex and closely monitor vital signs and airway; he has a hx of alcohol withdrawal seizures; on banana bag; ammonia level was 47 on April 28  2. Alcoholic hepatitis--needs complete alcohol cessation; chronic LFT elevation  3. Hyponatremia--monitor; likely 2nd to beer potomania; on HCTZ  4. Hypocalcemia--replace per protocol  5. Macrocytosis without anemia--likely 2nd to chronic alcohol abuse  6. Possible DM-2, uncontrolled--on SSI #1; not on any meds at home  7. Essential HTN, uncontrolled--BB, ACE-I, HCTZ  8. Tobacco abuse--cessation counseling; on Nicoderm patch  9. COPD, possible--no PFT's noted in Epic; Vlaentín Nebs PRN      Disposition:    [x] Home       [] TCU       [] Rehab       [] Psych       [] SNF       [] [facility]       [] Other-    Chief Complaint: alcohol withdrawal    Hospital Course: pt was admitted April 26 for an \"alcohol issue\"; pt is a chronic alcoholic and has been binge drinking; pt has been through withdrawal on multiple occasions; he had a recent MARCO and \"hoping he can go to rehab instead of penitentiary\"; he was on CIWA scale with Ativan and has had 27 mg in past 24 hours; he was given Phenobarb 260 mg IV x 1 and 130 mg IV x 2 doses; pt continues to be combative, agitated, pulling out IV's, needing security to assist with pt; pt is having visual hallucinations; pt is being transferred to ICU for close monitoring and Precedex infusion. It was reported that he drinks 36 (16 oz) beers daily. Pt is mumbling and when I asked him where he was he responded \"[place]\". nicotine  1 patch Transdermal Daily    lisinopril  20 mg Oral Daily    And    hydrochlorothiazide  25 mg Oral Daily     PRN Meds: PHENobarbital IVPB, glucose, dextrose, glucagon (rDNA), dextrose, sodium chloride flush, ondansetron, acetaminophen, LORazepam **OR** LORazepam **OR** LORazepam **OR** LORazepam **OR** LORazepam **OR** LORazepam **OR** LORazepam **OR** LORazepam      Intake/Output Summary (Last 24 hours) at 4/28/2019 0810  Last data filed at 4/28/2019 0606  Gross per 24 hour   Intake 3822.46 ml   Output 3115 ml   Net 707.46 ml       Diet:  DIET CARB CONTROL;    ROS: unable to obtain    Exam:  BP (!) 150/76   Pulse 102   Temp 98.3 °F (36.8 °C) (Oral)   Resp 18   Ht 5' 11\" (1.803 m)   Wt 255 lb 12.8 oz (116 kg)   SpO2 92%   BMI 35.68 kg/m²     General appearance: in distress, appears older than stated age and uncooperative with visual hallucinations. HEENT:  Conjunctivae with jaundice; pupils equal at 4 mm. Neck: Supple, with full range of motion. No jugular venous distention. Trachea midline. Respiratory:  Normal respiratory effort. Clear to auscultation anterior aspect bilaterally   Cardiovascular: Regular rate and rhythm with normal S1/S2 without murmurs, rubs or gallops. Abdomen: Soft, non-tender, non-distended with normal bowel sounds. Musculoskeletal: passive and active ROM x 4 extremities.   Skin: Skin color, texture, turgor normal.    Neurologic:  Hallucinating  Psychiatric: Awake and tells me he is in the [place]  Capillary Refill: Brisk,< 3 seconds   Peripheral Pulses: +2 palpable, equal bilaterally       Labs:   Recent Labs     04/26/19 1920 04/27/19  0431   WBC 11.7* 7.3   HGB 18.5* 16.5   HCT 54.0* 46.8    223     Recent Labs     04/26/19 1920 04/27/19  0431 04/28/19  0332   * 137 129*   K 4.0 4.1 4.1   CL 97* 102 95*   CO2 27 21* 19*   BUN 13 14 14   CREATININE 0.9 0.6 0.4   CALCIUM  --  8.1* 8.3*   PHOS  --  3.3 2.0*     Recent Labs     04/26/19 1920 04/28/19  5483

## 2019-04-28 NOTE — PROGRESS NOTES
0352 Sonya called [facility] PA to come to the floor due to the patient not following commands or listening, pulling out his IV, and having multiple doses of IV ativan and still not calming down. 125 [facility] PA arrived to the floor and assessed the situation with the patient. [facility] PA ordered Phenobarbital IV.

## 2019-04-28 NOTE — PROCEDURES
Bladder scan was completed by COCO Vora at 1640pm. The residual amount of 646 ml was reported to [employer]

## 2019-04-29 LAB
ALBUMIN SERPL-MCNC: 3.2 G/DL (ref 3.5–5.1)
ALP BLD-CCNC: 115 U/L (ref 38–126)
ALT SERPL-CCNC: 173 U/L (ref 11–66)
AMMONIA: 52 UMOL/L (ref 11–60)
ANION GAP SERPL CALCULATED.3IONS-SCNC: 10 MEQ/L (ref 8–16)
AST SERPL-CCNC: 110 U/L (ref 5–40)
BILIRUB SERPL-MCNC: 2.9 MG/DL (ref 0.3–1.2)
BILIRUBIN DIRECT: 1.9 MG/DL (ref 0–0.3)
BUN BLDV-MCNC: 17 MG/DL (ref 7–22)
CALCIUM IONIZED: 1.07 MMOL/L (ref 1.12–1.32)
CALCIUM SERPL-MCNC: 7.9 MG/DL (ref 8.5–10.5)
CHLORIDE BLD-SCNC: 104 MEQ/L (ref 98–111)
CO2: 23 MEQ/L (ref 23–33)
CREAT SERPL-MCNC: 0.7 MG/DL (ref 0.4–1.2)
GFR SERPL CREATININE-BSD FRML MDRD: > 90 ML/MIN/1.73M2
GLUCOSE BLD-MCNC: 133 MG/DL (ref 70–108)
GLUCOSE BLD-MCNC: 135 MG/DL (ref 70–108)
GLUCOSE BLD-MCNC: 143 MG/DL (ref 70–108)
GLUCOSE BLD-MCNC: 189 MG/DL (ref 70–108)
INR BLD: 1.05 (ref 0.85–1.13)
PHOSPHORUS: 2.6 MG/DL (ref 2.4–4.7)
POTASSIUM SERPL-SCNC: 4 MEQ/L (ref 3.5–5.2)
SODIUM BLD-SCNC: 137 MEQ/L (ref 135–145)
TOTAL PROTEIN: 5.8 G/DL (ref 6.1–8)

## 2019-04-29 PROCEDURE — 85610 PROTHROMBIN TIME: CPT

## 2019-04-29 PROCEDURE — 82330 ASSAY OF CALCIUM: CPT

## 2019-04-29 PROCEDURE — 82140 ASSAY OF AMMONIA: CPT

## 2019-04-29 PROCEDURE — 6360000002 HC RX W HCPCS: Performed by: PHYSICIAN ASSISTANT

## 2019-04-29 PROCEDURE — 84100 ASSAY OF PHOSPHORUS: CPT

## 2019-04-29 PROCEDURE — 6360000002 HC RX W HCPCS

## 2019-04-29 PROCEDURE — 6370000000 HC RX 637 (ALT 250 FOR IP): Performed by: PHYSICIAN ASSISTANT

## 2019-04-29 PROCEDURE — 2000000000 HC ICU R&B

## 2019-04-29 PROCEDURE — 6370000000 HC RX 637 (ALT 250 FOR IP): Performed by: NURSE PRACTITIONER

## 2019-04-29 PROCEDURE — 6370000000 HC RX 637 (ALT 250 FOR IP): Performed by: FAMILY MEDICINE

## 2019-04-29 PROCEDURE — 80053 COMPREHEN METABOLIC PANEL: CPT

## 2019-04-29 PROCEDURE — 2500000003 HC RX 250 WO HCPCS: Performed by: NURSE PRACTITIONER

## 2019-04-29 PROCEDURE — 2580000003 HC RX 258

## 2019-04-29 PROCEDURE — 2580000003 HC RX 258: Performed by: NURSE PRACTITIONER

## 2019-04-29 PROCEDURE — 82248 BILIRUBIN DIRECT: CPT

## 2019-04-29 PROCEDURE — 2500000003 HC RX 250 WO HCPCS: Performed by: PHYSICIAN ASSISTANT

## 2019-04-29 PROCEDURE — APPSS180 APP SPLIT SHARED TIME > 60 MINUTES: Performed by: NURSE PRACTITIONER

## 2019-04-29 PROCEDURE — 2580000003 HC RX 258: Performed by: PHYSICIAN ASSISTANT

## 2019-04-29 PROCEDURE — 94640 AIRWAY INHALATION TREATMENT: CPT

## 2019-04-29 PROCEDURE — 82948 REAGENT STRIP/BLOOD GLUCOSE: CPT

## 2019-04-29 PROCEDURE — 36415 COLL VENOUS BLD VENIPUNCTURE: CPT

## 2019-04-29 PROCEDURE — 2709999900 HC NON-CHARGEABLE SUPPLY

## 2019-04-29 RX ORDER — FOLIC ACID 1 MG/1
1 TABLET ORAL DAILY
Status: DISCONTINUED | OUTPATIENT
Start: 2019-04-30 | End: 2019-05-02 | Stop reason: HOSPADM

## 2019-04-29 RX ORDER — THIAMINE MONONITRATE (VIT B1) 100 MG
100 TABLET ORAL DAILY
Status: DISCONTINUED | OUTPATIENT
Start: 2019-04-30 | End: 2019-05-02 | Stop reason: HOSPADM

## 2019-04-29 RX ORDER — M-VIT,TX,IRON,MINS/CALC/FOLIC 27MG-0.4MG
1 TABLET ORAL DAILY
Status: DISCONTINUED | OUTPATIENT
Start: 2019-04-30 | End: 2019-05-02 | Stop reason: HOSPADM

## 2019-04-29 RX ADMIN — FAMOTIDINE 20 MG: 10 INJECTION, SOLUTION INTRAVENOUS at 08:43

## 2019-04-29 RX ADMIN — Medication 10 ML: at 08:43

## 2019-04-29 RX ADMIN — LISINOPRIL 20 MG: 20 TABLET ORAL at 08:43

## 2019-04-29 RX ADMIN — DEXMEDETOMIDINE HYDROCHLORIDE 1 MCG/KG/HR: 100 INJECTION, SOLUTION, CONCENTRATE INTRAVENOUS at 04:13

## 2019-04-29 RX ADMIN — FAMOTIDINE 20 MG: 10 INJECTION, SOLUTION INTRAVENOUS at 21:20

## 2019-04-29 RX ADMIN — INSULIN LISPRO 2 UNITS: 100 INJECTION, SOLUTION INTRAVENOUS; SUBCUTANEOUS at 13:25

## 2019-04-29 RX ADMIN — INSULIN LISPRO 1 UNITS: 100 INJECTION, SOLUTION INTRAVENOUS; SUBCUTANEOUS at 08:44

## 2019-04-29 RX ADMIN — VENLAFAXINE HYDROCHLORIDE 150 MG: 150 CAPSULE, EXTENDED RELEASE ORAL at 08:43

## 2019-04-29 RX ADMIN — METOPROLOL TARTRATE 25 MG: 25 TABLET ORAL at 08:43

## 2019-04-29 RX ADMIN — DEXMEDETOMIDINE HYDROCHLORIDE 0.7 MCG/KG/HR: 100 INJECTION, SOLUTION, CONCENTRATE INTRAVENOUS at 13:14

## 2019-04-29 RX ADMIN — SODIUM CHLORIDE: 9 INJECTION, SOLUTION INTRAVENOUS at 15:37

## 2019-04-29 RX ADMIN — IPRATROPIUM BROMIDE AND ALBUTEROL SULFATE 1 AMPULE: .5; 3 SOLUTION RESPIRATORY (INHALATION) at 12:11

## 2019-04-29 RX ADMIN — THIAMINE HYDROCHLORIDE: 100 INJECTION, SOLUTION INTRAMUSCULAR; INTRAVENOUS at 00:00

## 2019-04-29 RX ADMIN — DEXMEDETOMIDINE HYDROCHLORIDE 0.9 MCG/KG/HR: 100 INJECTION, SOLUTION, CONCENTRATE INTRAVENOUS at 00:11

## 2019-04-29 RX ADMIN — CALCIUM GLUCONATE 2 G: 94 INJECTION, SOLUTION INTRAVENOUS at 14:57

## 2019-04-29 RX ADMIN — IPRATROPIUM BROMIDE AND ALBUTEROL SULFATE 1 AMPULE: .5; 3 SOLUTION RESPIRATORY (INHALATION) at 07:42

## 2019-04-29 RX ADMIN — IPRATROPIUM BROMIDE AND ALBUTEROL SULFATE 1 AMPULE: .5; 3 SOLUTION RESPIRATORY (INHALATION) at 19:55

## 2019-04-29 ASSESSMENT — PAIN SCALES - GENERAL
PAINLEVEL_OUTOF10: 0

## 2019-04-29 NOTE — PLAN OF CARE
Problem: Sleep Pattern Disturbance:  Goal: Appears well-rested  Description  Appears well-rested  Outcome: Ongoing  Note:   Continues to rest without distress. Problem: Pain Control  Goal: Maintain pain level at or below patient's acceptable level (or 5 if patient is unable to determine acceptable level)  Outcome: Ongoing  Note:   He denies pain. Problem: Cardiovascular  Goal: Hemodynamic stability  Outcome: Ongoing  Note:   He remains hemodynamically stable. Problem: Fluid Volume - Deficit:  Goal: Absence of fluid volume deficit signs and symptoms  Description  Absence of fluid volume deficit signs and symptoms  Note:   He tolerates IV fluids well. Able to take PO well. Problem: Nutrition Deficit:  Goal: Ability to achieve adequate nutritional intake will improve  Description  Ability to achieve adequate nutritional intake will improve  Note:   He takes PO without difficulty. Care plan reviewed with patient and family. Patient and family verbalize understanding of the plan of care and contribute to goal setting.

## 2019-04-29 NOTE — FLOWSHEET NOTE
Pt is a 38year old male in bed on the ICU. No family is present at this time. Pt was first eating his lunch, then his dessert. Pt asked his nurse what floor he was on. I offered prayer and he accepted.     Follow up as needed,      04/29/19 7406   Encounter Summary   Services provided to: Patient   Referral/Consult From: Rounding   Place of [address] [place] [address] Visiting Yes  (4/29)   Complexity of Encounter Low   Length of Encounter 15 minutes   Routine   Type Initial   Assessment Approachable  (confused)   Intervention Prayer;Nurtured hope   Outcome Coping

## 2019-04-29 NOTE — PROGRESS NOTES
Critical Care Progress Note    Patient:  Adrian Oliveira      Unit/Bed:4D-13/013-A    YOB: 1975    MRN: [id number]       Acct: [de-identified]     PCP: Priscilla Saint, APRN - CNP    Date of Admission: 4/26/2019    Assessment/Plan:    1. Acute withdrawals/DT's. Failed CIWA with Ativan and Phenobarb. Transfered to ICU 4/28. Precedex initiated. Closely monitor vital signs and airway. Tachycardia greatly improved. Seizure precautions, hx of alcohol withdrawal seizures. S/P banana bag.  2. Acute alcohol withdrawal induced encephalopathy. 3. Chronic ETOH use with acute alcohol intoxication with BAL 0.36. Oral vitamins. 4. Alcoholic hepatitis--needs complete alcohol cessation; chronic LFT elevation. Lactulose. Ammonia level normal.   5. Hyponatremia, resolved. likely 2nd to beer potomania; on HCTZ. Monitor. 6. Hypocalcemia--replace per protocol. 7. Macrocytosis without anemia--likely 2nd to chronic alcohol abuse  8. Type DM-2, A1c 6.7%. BS uncontrolled--on SSI #1, increase to group 2. Carb control meals. 9. Essential HTN, uncontrolled--BB, ACE-I, HCTZ  10. Tobacco abuse--cessation counseling; on Nicoderm patch  11. Possible COPD exacerbation. No PFT's noted in Epic; Duo Nebs PRN. Solumedrol 125 mg IV x 1 given 4/27. 12. Leukocytosis, likely reactive due to steroids. Resolved. 13. Hypomagnesemia, improving with replacement. 14. HX depression. Continue Lexapro. 15. Hypophosphatemia, replacing. Case discussed with Dr. Valeria Holt. Wean Precedex as able. Expected discharge date:  TBD    Disposition:    [x] Home       [] TCU       [] Rehab       [] Psych       [] SNF       [] [facility]       [] Other-    Chief Complaint: Requesting Detox.      Hospital Course:     Admitted April 26 for an \"alcohol issue\"; pt is a chronic alcoholic and has been binge drinking; pt has been through withdrawal on multiple occasions; he had a recent MARCO and \"hoping he can go to rehab instead of long-term\"; he was on CIWA scale with Ativan and has had 27 mg in past 24 hours; he was given Phenobarb 260 mg IV x 1 and 130 mg IV x 2 doses; pt continues to be combative, agitated, pulling out IV's, needing security to assist with pt; pt is having visual hallucinations; pt was transferred to ICU for close monitoring and Precedex infusion 4/28. Subjective (past 24 hours): confused and drowsy. Medications:  Reviewed    Infusion Medications    dexmedetomidine 1 mcg/kg/hr (04/29/19 0418)    dextrose      sodium chloride 125 mL/hr at 04/28/19 9636     Scheduled Medications    [START ON 4/30/2019] therapeutic multivitamin-minerals  1 tablet Oral Daily    [START ON 7/15/2679] folic acid  1 mg Oral Daily    [START ON 4/30/2019] thiamine  100 mg Oral Daily    phosphorus replacement protocol   Other RX Placeholder    famotidine (PEPCID) injection  20 mg Intravenous BID    insulin lispro  0-6 Units Subcutaneous TID WC    insulin lispro  0-3 Units Subcutaneous Nightly    calcium replacement protocol   Other RX Placeholder    magnesium replacement protocol   Other RX Placeholder    ipratropium-albuterol  1 ampule Inhalation Q4H WA    metoprolol tartrate  25 mg Oral BID    venlafaxine  150 mg Oral Daily    sodium chloride flush  10 mL Intravenous 2 times per day    nicotine  1 patch Transdermal Daily    lisinopril  20 mg Oral Daily     PRN Meds: hydrALAZINE, nicotine, glucose, dextrose, glucagon (rDNA), dextrose, sodium chloride flush, ondansetron, acetaminophen, LORazepam **OR** LORazepam **OR** LORazepam **OR** LORazepam **OR** LORazepam **OR** LORazepam **OR** LORazepam **OR** LORazepam      Intake/Output Summary (Last 24 hours) at 4/29/2019 0849  Last data filed at 4/29/2019 0533  Gross per 24 hour   Intake 3930.66 ml   Output 1550 ml   Net 2380.66 ml       Diet:  DIET CARB CONTROL;     Exam:  /84   Pulse 76   Temp 98.2 °F (36.8 °C) (Axillary)   Resp 17   Ht 5' 11\" (1.803 m)   Wt 260 lb 2.3 oz (118 kg)   SpO2 91%   BMI 36.28 kg/m²     General appearance: No apparent distress, appears older than stated age. Drowsy but cooperative. HEENT: Pupils equal, round, and reactive to light. Conjunctivae/corneas clear. Neck: Supple, with full range of motion. No jugular venous distention. Trachea midline. Respiratory:  Normal respiratory effort. Clear to auscultation, bilaterally without Rales/Wheezes/Rhonchi. Cardiovascular: Regular rate and rhythm with normal S1/S2 without murmurs, rubs or gallops. Abdomen: Soft, obese, non-tender, non-distended with normal bowel sounds. Musculoskeletal: passive and active ROM x 4 extremities. Skin: Skin color, texture, turgor normal.  No rashes or lesions. Neurologic:  Neurovascularly intact without any focal sensory/motor deficits. Cranial nerves: II-XII intact, grossly non-focal.  Psychiatric: confused, thought content inappropriate, abnormal insight  Capillary Refill: Brisk,< 3 seconds   Peripheral Pulses: +2 palpable, equal bilaterally       Labs:   Recent Labs     04/26/19 1920 04/27/19 0431   WBC 11.7* 7.3   HGB 18.5* 16.5   HCT 54.0* 46.8    223     Recent Labs     04/27/19  0431 04/28/19 0332 04/29/19  0359    129* 137   K 4.1 4.1 4.0    95* 104   CO2 21* 19* 23   BUN 14 14 17   CREATININE 0.6 0.4 0.7   CALCIUM 8.1* 8.3* 7.9*   PHOS 3.3 2.0* 2.6     Recent Labs     04/26/19 1920 04/28/19 0332 04/29/19  0359   * 244* 110*   * 245* 173*   BILIDIR  --  4.0* 1.9*   BILITOT 1.6* 6.2* 2.9*   ALKPHOS 215* 163* 115     Recent Labs     04/29/19  0359   INR 1.05     No results for input(s): CKTOTAL, TROPONINI in the last 72 hours.     Microbiology:      Urinalysis:      Lab Results   Component Value Date    NITRU NEGATIVE 04/24/2019    WBCUA NONE 04/24/2019    BACTERIA NONE 04/24/2019    RBCUA NONE 04/24/2019    RBCUA 0-2 12/14/2018    BLOODU NEGATIVE 04/24/2019    SPECGRAV <1.005 04/24/2019    GLUCOSEU NEGATIVE 12/14/2018 Radiology:  No orders to display       DVT prophylaxis: [] Lovenox                                 [x] SCDs                                 [] SQ Heparin                                 [] Encourage ambulation           [] Already on Anticoagulation     Code Status: Full Code        Tele:   [x] yes             [] no        Electronically signed by NILAM Gerard CNP on 4/29/2019 at 8:49 AM   Patient seen by me. On Precedex for agitation while withdrawing. No vomiting or SOB. No Chest pain. Telemetry shows sinus rhythm. Follow-up chest x-ray in the morning. Electronically signed by Michael Schultz MD..

## 2019-04-29 NOTE — PLAN OF CARE
Problem: Discharge Planning:  Goal: Discharged to appropriate level of care  Description  Discharged to appropriate level of care  4/28/2019 2137 by Kwame Rondon RN  Outcome: Ongoing  Note:   Pt remains in ICU at this time due to worsening withdrawal symptoms. Problem: Fluid Volume - Deficit:  Goal: Absence of fluid volume deficit signs and symptoms  Description  Absence of fluid volume deficit signs and symptoms  4/28/2019 2137 by Kwame Rondon RN  Outcome: Ongoing  Note:   Monitoring I&O on pt. Pt received fluid bolus during the day due to hypotension. Problem: Nutrition Deficit:  Goal: Ability to achieve adequate nutritional intake will improve  Description  Ability to achieve adequate nutritional intake will improve  4/28/2019 2137 by Kwaem Rondon RN  Outcome: Ongoing  Note:   Pt is on carb control diet. Tolerating well. Problem: Sleep Pattern Disturbance:  Goal: Appears well-rested  Description  Appears well-rested  4/28/2019 2137 by Kwame Rondon RN  Outcome: Ongoing  Note:   Pt really restless and agitated. Utilizing precedex gtt to help maintain comfort while pt detoxing. Problem: Pain Control  Goal: Maintain pain level at or below patient's acceptable level (or 5 if patient is unable to determine acceptable level)  4/28/2019 2137 by Kwame Rondon RN  Outcome: Ongoing  Note:   Pt states no pain this shift. Will continue to monitor. Problem: Cardiovascular  Goal: No DVT, peripheral vascular complications  4/92/6508 2137 by Kwame Rondon RN  Outcome: Ongoing  Note:   Pt has no evidence of DVT this shift. Pt has SCD's ordered. Problem: Cardiovascular  Goal: Hemodynamic stability  4/28/2019 2137 by Kwame Rondon RN  Outcome: Ongoing  Note:   Pt is currently hemodynamically stable. Problem: Respiratory  Goal: No pulmonary complications  2/41/2864 2137 by Kwame Rondon RN  Outcome: Ongoing  Note:   Pt lung sounds clear throughout.   No pulmonary complications noted. Problem: Respiratory  Goal: O2 Sat > 90%  4/28/2019 2137 by Concepción Moreno RN  Outcome: Ongoing  Note:   Pt O2 sats 95% on room air. Problem:   Goal: Adequate urinary output  4/28/2019 2137 by Concepción Moreno RN  Outcome: Ongoing  Note:   Pt has had adequate urinary output. Pt has external catheter. Problem: Skin Integrity/Risk  Goal: No skin breakdown during hospitalization  4/28/2019 2137 by Concepción Moreno RN  Outcome: Ongoing  Note:   No signs of new skin breakdown with each assessment. Skin remains warm, dry, intact. Mucous membranes pink & moist. Patient turned q2h. Problem: Musculor/Skeletal Functional Status  Goal: Absence of falls  4/28/2019 2137 by Concepción Moreno RN  Outcome: Ongoing  Note:   Pt has had no falls this time this shift. Pt is on precedex gtt. Bed alarm on, fall band on, fall sign posted. Problem: Pain:  Goal: Control of acute pain  Description  Control of acute pain  4/28/2019 2137 by Concepción Moreno RN  Outcome: Ongoing  Note:   Pt states no pain this shift. Will continue to monitor. Care plan reviewed with patient. Patient verbalized understanding of the plan of care and contribute to goal setting.

## 2019-04-29 NOTE — CARE COORDINATION
4/29/19, 2:04 PM      [address]       Admitted from: ED 4/26/2019/ [facility] day: 3   Location: [address] [place] [address]-A Reason for admit: Alcohol ingestion [Z78.9]  Alcohol ingestion [Z78.9]  Alcohol withdrawal delirium ([facility] [address] [F10.231] Status: IP  Admit order signed?: yes  PMH:  has a past medical history of COPD (chronic obstructive pulmonary disease) ([facility] [address] [facility] Depression, Diabetes mellitus ([facility] [address] [facility] ETOH abuse, Hyperlipidemia, Hypertension, Liver disease, and Seizures ([facility] [address] [facility] Procedure: none  Pertinent abnormal Imaging: none  Medications:  Scheduled Meds:   [START ON 4/30/2019] therapeutic multivitamin-minerals  1 tablet Oral Daily    [START ON 1/31/7055] folic acid  1 mg Oral Daily    [START ON 4/30/2019] thiamine  100 mg Oral Daily    insulin lispro  0-12 Units Subcutaneous TID WC    insulin lispro  0-6 Units Subcutaneous Nightly    calcium gluconate IVPB  2 g Intravenous Once    phosphorus replacement protocol   Other RX Placeholder    famotidine (PEPCID) injection  20 mg Intravenous BID    calcium replacement protocol   Other RX Placeholder    magnesium replacement protocol   Other RX Placeholder    ipratropium-albuterol  1 ampule Inhalation Q4H WA    metoprolol tartrate  25 mg Oral BID    venlafaxine  150 mg Oral Daily    sodium chloride flush  10 mL Intravenous 2 times per day    nicotine  1 patch Transdermal Daily    lisinopril  20 mg Oral Daily     Continuous Infusions:   dexmedetomidine 0.7 mcg/kg/hr (04/29/19 1314)    dextrose      sodium chloride 125 mL/hr at 04/28/19 2106      Pertinent Info/Orders/Treatment Plan: Presented to [facility] requesting alcohol detox. ETOH was 0.36. Last drink was on 4/26 prior to arrival at [facility]. Drinking 36 16oz beers/day. Reportedly has done detox before, stayed sober for at least one month, but started drinking again. Would like to try an IP detox center, but has to go to court for recent [address]. Admitted to .  Early this am was transferred to ICU d/t increased agitation/worsening DT's. Placed on precedex drip. Psychiatry consulted. Afebrile. On room air. Oriented to person only. Slurred speech. Telemetry, SCDs, external catheter care. IVF, precedex @ 0.7 mcg/kg/hr, pepcid, SSI ACHS, nebs, folic acid, lisinopril, CIWA w/ativan, lopressor, multivitamin, nicotine patch, thiamine, Electrolyte replacement protocols. Received 1L fld bolus and banana bag x1. Alb 3.2, alt 314 - now 173, ast 295 -now 110, bili 2.9, direct bili 1.9, lipase 56.2, HgbA1C 6.7, wbc 11.7 - then 7.3. Diet: DIET CARB CONTROL;   Smoking status:  reports that he has been smoking e-cigarettes and cigarettes. He has a 27.00 pack-year smoking history. He has quit using smokeless tobacco.   PCP: NILAM Lee CNP  Readmission: no  Readmission Risk Score: 51%    Discharge Planning  Current Residence:  Private Residence  Living Arrangements:  Parent   Support Systems:  Parent  Current Services PTA:     Potential Assistance Needed:  N/A  Potential Assistance Purchasing Medications:  No  Does patient want to participate in local refill/ meds to beds program?  Yes  Type of Home Care Services:  None  Patient expects to be discharged to:  home w/ mother  Expected Discharge date:  04/29/19  Follow Up Appointment: Best Day/ Time: Tuesday PM    Discharge Plan: Spoke with Griffin's mother; states he lives with her and did not use any DME or have any HH services PTA. She states he was driving, but then lost his license. He is independent in ADL's when he is sober. He has not worked for several months; keeps getting fired. Plan is to return home at discharge. Unsure of needs at this time. Patient is to be scheduled at Dr. Brionna Cormier office at discharge.       Evaluation: no

## 2019-04-30 ENCOUNTER — APPOINTMENT (OUTPATIENT)
Dept: GENERAL RADIOLOGY | Age: 44
End: 2019-04-30
Payer: MEDICAID

## 2019-04-30 LAB
ANION GAP SERPL CALCULATED.3IONS-SCNC: 12 MEQ/L (ref 8–16)
BASOPHILS # BLD: 0.2 %
BASOPHILS ABSOLUTE: 0 THOU/MM3 (ref 0–0.1)
BUN BLDV-MCNC: 13 MG/DL (ref 7–22)
CALCIUM IONIZED: 1.09 MMOL/L (ref 1.12–1.32)
CALCIUM IONIZED: 1.11 MMOL/L (ref 1.12–1.32)
CALCIUM SERPL-MCNC: 8.5 MG/DL (ref 8.5–10.5)
CHLORIDE BLD-SCNC: 100 MEQ/L (ref 98–111)
CO2: 24 MEQ/L (ref 23–33)
CREAT SERPL-MCNC: 0.7 MG/DL (ref 0.4–1.2)
EOSINOPHIL # BLD: 0 %
EOSINOPHILS ABSOLUTE: 0 THOU/MM3 (ref 0–0.4)
ERYTHROCYTE [DISTWIDTH] IN BLOOD BY AUTOMATED COUNT: 13.2 % (ref 11.5–14.5)
ERYTHROCYTE [DISTWIDTH] IN BLOOD BY AUTOMATED COUNT: 48 FL (ref 35–45)
GFR SERPL CREATININE-BSD FRML MDRD: > 90 ML/MIN/1.73M2
GLUCOSE BLD-MCNC: 109 MG/DL (ref 70–108)
GLUCOSE BLD-MCNC: 109 MG/DL (ref 70–108)
GLUCOSE BLD-MCNC: 136 MG/DL (ref 70–108)
GLUCOSE BLD-MCNC: 94 MG/DL (ref 70–108)
HCT VFR BLD CALC: 42.1 % (ref 42–52)
HEMOGLOBIN: 14.6 GM/DL (ref 14–18)
IMMATURE GRANS (ABS): 0.01 THOU/MM3 (ref 0–0.07)
IMMATURE GRANULOCYTES: 0.2 %
LYMPHOCYTES # BLD: 18.1 %
LYMPHOCYTES ABSOLUTE: 1 THOU/MM3 (ref 1–4.8)
MCH RBC QN AUTO: 33.9 PG (ref 26–33)
MCHC RBC AUTO-ENTMCNC: 34.7 GM/DL (ref 32.2–35.5)
MCV RBC AUTO: 97.7 FL (ref 80–94)
MONOCYTES # BLD: 6.3 %
MONOCYTES ABSOLUTE: 0.3 THOU/MM3 (ref 0.4–1.3)
MRSA SCREEN: NORMAL
NUCLEATED RED BLOOD CELLS: 0 /100 WBC
PLATELET # BLD: 113 THOU/MM3 (ref 130–400)
PMV BLD AUTO: 10.2 FL (ref 9.4–12.4)
POTASSIUM SERPL-SCNC: 3.7 MEQ/L (ref 3.5–5.2)
RBC # BLD: 4.31 MILL/MM3 (ref 4.7–6.1)
SEG NEUTROPHILS: 75.2 %
SEGMENTED NEUTROPHILS ABSOLUTE COUNT: 4.1 THOU/MM3 (ref 1.8–7.7)
SODIUM BLD-SCNC: 136 MEQ/L (ref 135–145)
WBC # BLD: 5.4 THOU/MM3 (ref 4.8–10.8)

## 2019-04-30 PROCEDURE — 2580000003 HC RX 258: Performed by: NURSE PRACTITIONER

## 2019-04-30 PROCEDURE — 6370000000 HC RX 637 (ALT 250 FOR IP): Performed by: NURSE PRACTITIONER

## 2019-04-30 PROCEDURE — 94640 AIRWAY INHALATION TREATMENT: CPT

## 2019-04-30 PROCEDURE — 6370000000 HC RX 637 (ALT 250 FOR IP): Performed by: INTERNAL MEDICINE

## 2019-04-30 PROCEDURE — 71045 X-RAY EXAM CHEST 1 VIEW: CPT

## 2019-04-30 PROCEDURE — 82330 ASSAY OF CALCIUM: CPT

## 2019-04-30 PROCEDURE — 80048 BASIC METABOLIC PNL TOTAL CA: CPT

## 2019-04-30 PROCEDURE — 2709999900 HC NON-CHARGEABLE SUPPLY

## 2019-04-30 PROCEDURE — 94761 N-INVAS EAR/PLS OXIMETRY MLT: CPT

## 2019-04-30 PROCEDURE — 2700000000 HC OXYGEN THERAPY PER DAY

## 2019-04-30 PROCEDURE — 6370000000 HC RX 637 (ALT 250 FOR IP): Performed by: PHYSICIAN ASSISTANT

## 2019-04-30 PROCEDURE — 85025 COMPLETE CBC W/AUTO DIFF WBC: CPT

## 2019-04-30 PROCEDURE — 2580000003 HC RX 258: Performed by: FAMILY MEDICINE

## 2019-04-30 PROCEDURE — 99232 SBSQ HOSP IP/OBS MODERATE 35: CPT | Performed by: INTERNAL MEDICINE

## 2019-04-30 PROCEDURE — 1200000003 HC TELEMETRY R&B

## 2019-04-30 PROCEDURE — 6360000002 HC RX W HCPCS: Performed by: FAMILY MEDICINE

## 2019-04-30 PROCEDURE — APPSS180 APP SPLIT SHARED TIME > 60 MINUTES: Performed by: NURSE PRACTITIONER

## 2019-04-30 PROCEDURE — 82948 REAGENT STRIP/BLOOD GLUCOSE: CPT

## 2019-04-30 PROCEDURE — 6370000000 HC RX 637 (ALT 250 FOR IP): Performed by: FAMILY MEDICINE

## 2019-04-30 PROCEDURE — 36415 COLL VENOUS BLD VENIPUNCTURE: CPT

## 2019-04-30 RX ORDER — FAMOTIDINE 20 MG/1
20 TABLET, FILM COATED ORAL 2 TIMES DAILY
Status: DISCONTINUED | OUTPATIENT
Start: 2019-04-30 | End: 2019-05-02 | Stop reason: HOSPADM

## 2019-04-30 RX ADMIN — CALCIUM GLUCONATE 2 G: 98 INJECTION, SOLUTION INTRAVENOUS at 21:44

## 2019-04-30 RX ADMIN — IPRATROPIUM BROMIDE AND ALBUTEROL SULFATE 1 AMPULE: .5; 3 SOLUTION RESPIRATORY (INHALATION) at 13:55

## 2019-04-30 RX ADMIN — CALCIUM GLUCONATE 2 G: 98 INJECTION, SOLUTION INTRAVENOUS at 09:53

## 2019-04-30 RX ADMIN — FAMOTIDINE 20 MG: 20 TABLET ORAL at 12:59

## 2019-04-30 RX ADMIN — IPRATROPIUM BROMIDE AND ALBUTEROL SULFATE 1 AMPULE: .5; 3 SOLUTION RESPIRATORY (INHALATION) at 19:52

## 2019-04-30 RX ADMIN — MULTIPLE VITAMINS W/ MINERALS TAB 1 TABLET: TAB at 09:02

## 2019-04-30 RX ADMIN — VENLAFAXINE HYDROCHLORIDE 150 MG: 150 CAPSULE, EXTENDED RELEASE ORAL at 09:02

## 2019-04-30 RX ADMIN — LORAZEPAM 2 MG: 2 TABLET ORAL at 22:47

## 2019-04-30 RX ADMIN — FOLIC ACID 1 MG: 1 TABLET ORAL at 09:02

## 2019-04-30 RX ADMIN — SODIUM CHLORIDE: 9 INJECTION, SOLUTION INTRAVENOUS at 16:26

## 2019-04-30 RX ADMIN — FAMOTIDINE 20 MG: 20 TABLET ORAL at 21:44

## 2019-04-30 RX ADMIN — SODIUM CHLORIDE: 9 INJECTION, SOLUTION INTRAVENOUS at 00:00

## 2019-04-30 RX ADMIN — IPRATROPIUM BROMIDE AND ALBUTEROL SULFATE 1 AMPULE: .5; 3 SOLUTION RESPIRATORY (INHALATION) at 08:57

## 2019-04-30 RX ADMIN — METOPROLOL TARTRATE 25 MG: 25 TABLET ORAL at 21:44

## 2019-04-30 RX ADMIN — LORAZEPAM 1 MG: 1 TABLET ORAL at 15:20

## 2019-04-30 RX ADMIN — METOPROLOL TARTRATE 25 MG: 25 TABLET ORAL at 09:02

## 2019-04-30 RX ADMIN — SODIUM CHLORIDE: 9 INJECTION, SOLUTION INTRAVENOUS at 08:28

## 2019-04-30 RX ADMIN — LISINOPRIL 20 MG: 20 TABLET ORAL at 09:02

## 2019-04-30 RX ADMIN — Medication 100 MG: at 09:02

## 2019-04-30 ASSESSMENT — PAIN SCALES - GENERAL
PAINLEVEL_OUTOF10: 0

## 2019-04-30 NOTE — PLAN OF CARE
Problem: Discharge Planning:  Goal: Discharged to appropriate level of care  Description  Discharged to appropriate level of care  Outcome: Ongoing  Note:   Patient remains in ICU on precedex drip      Problem: Fluid Volume - Deficit:  Goal: Absence of fluid volume deficit signs and symptoms  Description  Absence of fluid volume deficit signs and symptoms  4/30/2019 0253 by Devi Mendoza RN  Outcome: Ongoing  Note:   VSS. Urine output adequate. Problem: Sleep Pattern Disturbance:  Goal: Appears well-rested  Description  Appears well-rested  4/30/2019 0253 by Devi Mendoza RN  Outcome: Ongoing  Note:   Patient resting off and on tonight. Problem: Pain Control  Goal: Maintain pain level at or below patient's acceptable level (or 5 if patient is unable to determine acceptable level)  4/30/2019 0253 by Devi Mendoza RN  Outcome: Ongoing  Note:   No pain at this time      Problem: Cardiovascular  Goal: No DVT, peripheral vascular complications  Outcome: Ongoing  Note:   No signs of DVT     Problem: Cardiovascular  Goal: Hemodynamic stability  4/30/2019 0253 by Devi Mendoza RN  Outcome: Ongoing  Note:   VSS. Problem: Respiratory  Goal: O2 Sat > 90%  Outcome: Ongoing  Note:   SPO2 at 90-91% on room air. Placed on 2L N/C. SPO2 94%      Problem:   Goal: Adequate urinary output  Outcome: Ongoing  Note:   Patient had 1500ml at 2200       Problem: Skin Integrity/Risk  Goal: No skin breakdown during hospitalization  Outcome: Ongoing  Note:   No signs of new skin breakdown      Problem: Musculor/Skeletal Functional Status  Goal: Absence of falls  Outcome: Ongoing  Note:   Absence of falls. Problem: Pain:  Goal: Control of acute pain  Description  Control of acute pain  Outcome: Ongoing  Note:   Denies pain      Care plan reviewed with patient.  Will review and discuss care plan with family when available

## 2019-04-30 NOTE — PROGRESS NOTES
Critical Care Progress Note    Patient:  Alfred Sep      Unit/Bed:4D-13/013-A    YOB: 1975    MRN: [id number]       Acct: [de-identified]     PCP: NILAM Shepherd CNP    Date of Admission: 4/26/2019    Assessment/Plan:    1. Acute withdrawals/DT's, greatly improved. Precedex weaned early this AM. Continue Seizure precautions, hx of alcohol withdrawal seizures. S/P banana bag. CIWA scale. 2. Acute alcohol withdrawal induced encephalopathy. Resolved. 3. Chronic ETOH use with acute alcohol intoxication with BAL 0.36. Oral vitamins. 4. Alcoholic hepatitis. Chronic LFT elevation. Lactulose. Ammonia level normal.   5. Hyponatremia, resolved. likely 2nd to beer potomania; on HCTZ. Monitor. 6. Hypocalcemia--replacing per protocol. 7. Macrocytosis without anemia--likely 2nd to chronic alcohol abuse. 8. Type DM-2, A1c 6.7%. BS uncontrolled--on SSI #1, increase to group 2. Carb control meals. 9. Essential HTN, uncontrolled--BB, ACE-I, HCTZ  10. Tobacco abuse--cessation counseling; on Nicoderm patch  11. Possible COPD exacerbation. No PFT's noted in Epic; Duo Nebs PRN. Solumedrol 125 mg IV x 1 given 4/27. 12. Leukocytosis, likely reactive due to steroids. Resolved. 13. Hypomagnesemia, improving with replacement. 14. HX depression. Continue Lexapro. 15. Hypophosphatemia, replacing. Case discussed with Dr. Laurie Hobbs. Precedex is off. Back to baseline mentation. Ok to transfer back to the floor. Report given to Dr. Goldie Young, accepting hospitalist.     Expected discharge date:  TBD    Disposition:    [x] Home       [] TCU       [] Rehab       [] Psych       [] SNF       [] [facility]       [] Other-    Chief Complaint: Requesting Detox.      Hospital Course:     Admitted April 26 for an \"alcohol issue\"; pt is a chronic alcoholic and has been binge drinking; pt has been through withdrawal on multiple occasions; he had a recent MARCO and \"hoping he can go to rehab instead of senior living\"; he was on CIWA scale with Ativan and has had 27 mg in past 24 hours; he was given Phenobarb 260 mg IV x 1 and 130 mg IV x 2 doses; pt continues to be combative, agitated, pulling out IV's, needing security to assist with pt; pt is having visual hallucinations; pt was transferred to ICU for close monitoring and Precedex infusion 4/28. Precedex was weaned this AM. Mentation has returned to baseline. Subjective (past 24 hours): Complaints of discomfort with external martin. Does not recall much of yesterday or the day before. Reports mild anxiety.        Medications:  Reviewed    Infusion Medications    dexmedetomidine Stopped (04/30/19 0400)    dextrose      sodium chloride 125 mL/hr at 04/30/19 0000     Scheduled Medications    calcium gluconate IVPB  2 g Intravenous Once    therapeutic multivitamin-minerals  1 tablet Oral Daily    folic acid  1 mg Oral Daily    thiamine  100 mg Oral Daily    insulin lispro  0-12 Units Subcutaneous TID WC    insulin lispro  0-6 Units Subcutaneous Nightly    phosphorus replacement protocol   Other RX Placeholder    famotidine (PEPCID) injection  20 mg Intravenous BID    calcium replacement protocol   Other RX Placeholder    magnesium replacement protocol   Other RX Placeholder    ipratropium-albuterol  1 ampule Inhalation Q4H WA    metoprolol tartrate  25 mg Oral BID    venlafaxine  150 mg Oral Daily    sodium chloride flush  10 mL Intravenous 2 times per day    nicotine  1 patch Transdermal Daily    lisinopril  20 mg Oral Daily     PRN Meds: hydrALAZINE, nicotine, glucose, dextrose, glucagon (rDNA), dextrose, sodium chloride flush, ondansetron, acetaminophen, LORazepam **OR** LORazepam **OR** LORazepam **OR** LORazepam **OR** LORazepam **OR** LORazepam **OR** LORazepam **OR** LORazepam      Intake/Output Summary (Last 24 hours) at 4/30/2019 0802  Last data filed at 4/30/2019 0500  Gross per 24 hour   Intake 4209 ml   Output 5600 ml   Net -1391 ml Diet:  DIET CARB CONTROL; Exam:  BP (!) 152/90   Pulse 88   Temp 98.1 °F (36.7 °C) (Oral)   Resp 19   Ht 5' 11\" (1.803 m)   Wt 255 lb 11.7 oz (116 kg)   SpO2 93%   BMI 35.67 kg/m²     General appearance: No apparent distress, appears older than stated age. Drowsy but cooperative. HEENT: Pupils equal, round, and reactive to light. Conjunctivae/corneas clear. Neck: Supple, with full range of motion. No jugular venous distention. Trachea midline. Respiratory:  Normal respiratory effort. Clear to auscultation, bilaterally without Rales/Wheezes/Rhonchi. Cardiovascular: Regular rate and rhythm with normal S1/S2 without murmurs, rubs or gallops. Abdomen: Soft, obese, non-tender, non-distended with normal bowel sounds. Musculoskeletal: passive and active ROM x 4 extremities. Skin: Skin color, texture, turgor normal.  No rashes or lesions. Neurologic:  Neurovascularly intact without any focal sensory/motor deficits. Cranial nerves: II-XII intact, grossly non-focal.  Psychiatric: A&O x 3, thought content appropriate, normal insight  Capillary Refill: Brisk,< 3 seconds   Peripheral Pulses: +2 palpable, equal bilaterally       Labs:   Recent Labs     04/30/19  0445   WBC 5.4   HGB 14.6   HCT 42.1   *     Recent Labs     04/28/19  0332 04/29/19  0359 04/30/19  0445   * 137 136   K 4.1 4.0 3.7   CL 95* 104 100   CO2 19* 23 24   BUN 14 17 13   CREATININE 0.4 0.7 0.7   CALCIUM 8.3* 7.9* 8.5   PHOS 2.0* 2.6  --      Recent Labs     04/28/19  0332 04/29/19  0359   * 110*   * 173*   BILIDIR 4.0* 1.9*   BILITOT 6.2* 2.9*   ALKPHOS 163* 115     Recent Labs     04/29/19  0359   INR 1.05     No results for input(s): CKTOTAL, TROPONINI in the last 72 hours.     Microbiology:      Urinalysis:      Lab Results   Component Value Date    NITRU NEGATIVE 04/24/2019    WBCUA NONE 04/24/2019    BACTERIA NONE 04/24/2019    RBCUA NONE 04/24/2019    RBCUA 0-2 12/14/2018    BLOODU NEGATIVE 04/24/2019 SPECGRAV <1.005 04/24/2019    GLUCOSEU NEGATIVE 12/14/2018       Radiology:  XR CHEST PORTABLE   Final Result      No acute pneumonia. Bibasilar atelectasis. **This report has been created using voice recognition software. It may contain minor errors which are inherent in voice recognition technology. **      Final report electronically signed by Dr. Norma Whaley on 4/30/2019 4:25 AM          DVT prophylaxis: [] Lovenox                                 [x] SCDs                                 [] SQ Heparin                                 [] Encourage ambulation           [] Already on Anticoagulation     Code Status: Full Code        Tele:   [x] yes             [] no        Electronically signed by NILAM Irizarry CNP on 4/30/2019 at 8:02 AM   Patient seen by me. No chest pain or fever or chills. [facility] for transfer to medical floor. Electronically signed by Josefina Mckenzie MD.

## 2019-04-30 NOTE — PLAN OF CARE
Problem: Impaired respiratory status  Goal: Clear lung sounds  4/30/2019 1955 by Mansoor Matthews RCP  Outcome: Ongoing     Pt clear/diminished. Continue with Duoneb to achieve clear lung sounds.

## 2019-04-30 NOTE — PLAN OF CARE
Problem: Impaired respiratory status  Goal: Clear lung sounds  Outcome: Met This Shift   Patient lung sounds are considered normal for their current lung condition. No signs of distress noted.  Current treatment regimen appropriate

## 2019-04-30 NOTE — PLAN OF CARE
off bed. Problem: Musculor/Skeletal Functional Status  Goal: Absence of falls  4/30/2019 1556 by Nayla Cox RN  Outcome: Ongoing  Note:   Pt up with assistance, tolerating well. Care plan reviewed with patient. Patient verbalize understanding of the plan of care and contribute to goal setting.

## 2019-05-01 LAB
ALBUMIN SERPL-MCNC: 3.1 G/DL (ref 3.5–5.1)
ALP BLD-CCNC: 140 U/L (ref 38–126)
ALT SERPL-CCNC: 117 U/L (ref 11–66)
ANION GAP SERPL CALCULATED.3IONS-SCNC: 10 MEQ/L (ref 8–16)
AST SERPL-CCNC: 86 U/L (ref 5–40)
BILIRUB SERPL-MCNC: 2.1 MG/DL (ref 0.3–1.2)
BUN BLDV-MCNC: 11 MG/DL (ref 7–22)
CALCIUM SERPL-MCNC: 8.5 MG/DL (ref 8.5–10.5)
CHLORIDE BLD-SCNC: 105 MEQ/L (ref 98–111)
CO2: 22 MEQ/L (ref 23–33)
CREAT SERPL-MCNC: 0.5 MG/DL (ref 0.4–1.2)
ERYTHROCYTE [DISTWIDTH] IN BLOOD BY AUTOMATED COUNT: 13.2 % (ref 11.5–14.5)
ERYTHROCYTE [DISTWIDTH] IN BLOOD BY AUTOMATED COUNT: 49.4 FL (ref 35–45)
GFR SERPL CREATININE-BSD FRML MDRD: > 90 ML/MIN/1.73M2
GLUCOSE BLD-MCNC: 113 MG/DL (ref 70–108)
GLUCOSE BLD-MCNC: 119 MG/DL (ref 70–108)
GLUCOSE BLD-MCNC: 126 MG/DL (ref 70–108)
GLUCOSE BLD-MCNC: 145 MG/DL (ref 70–108)
GLUCOSE BLD-MCNC: 191 MG/DL (ref 70–108)
HCT VFR BLD CALC: 41.1 % (ref 42–52)
HEMOGLOBIN: 14 GM/DL (ref 14–18)
MAGNESIUM: 1.9 MG/DL (ref 1.6–2.4)
MCH RBC QN AUTO: 34.4 PG (ref 26–33)
MCHC RBC AUTO-ENTMCNC: 34.1 GM/DL (ref 32.2–35.5)
MCV RBC AUTO: 101 FL (ref 80–94)
PHOSPHORUS: 3 MG/DL (ref 2.4–4.7)
PLATELET # BLD: 114 THOU/MM3 (ref 130–400)
PMV BLD AUTO: 10.1 FL (ref 9.4–12.4)
POTASSIUM SERPL-SCNC: 4 MEQ/L (ref 3.5–5.2)
RBC # BLD: 4.07 MILL/MM3 (ref 4.7–6.1)
SODIUM BLD-SCNC: 137 MEQ/L (ref 135–145)
TOTAL PROTEIN: 6.2 G/DL (ref 6.1–8)
WBC # BLD: 5.2 THOU/MM3 (ref 4.8–10.8)

## 2019-05-01 PROCEDURE — 6370000000 HC RX 637 (ALT 250 FOR IP): Performed by: FAMILY MEDICINE

## 2019-05-01 PROCEDURE — 85027 COMPLETE CBC AUTOMATED: CPT

## 2019-05-01 PROCEDURE — 2580000003 HC RX 258: Performed by: NURSE PRACTITIONER

## 2019-05-01 PROCEDURE — 1200000003 HC TELEMETRY R&B

## 2019-05-01 PROCEDURE — 6370000000 HC RX 637 (ALT 250 FOR IP): Performed by: INTERNAL MEDICINE

## 2019-05-01 PROCEDURE — 6370000000 HC RX 637 (ALT 250 FOR IP): Performed by: NURSE PRACTITIONER

## 2019-05-01 PROCEDURE — 36415 COLL VENOUS BLD VENIPUNCTURE: CPT

## 2019-05-01 PROCEDURE — 94640 AIRWAY INHALATION TREATMENT: CPT

## 2019-05-01 PROCEDURE — 80053 COMPREHEN METABOLIC PANEL: CPT

## 2019-05-01 PROCEDURE — 94760 N-INVAS EAR/PLS OXIMETRY 1: CPT

## 2019-05-01 PROCEDURE — 82948 REAGENT STRIP/BLOOD GLUCOSE: CPT

## 2019-05-01 PROCEDURE — 99232 SBSQ HOSP IP/OBS MODERATE 35: CPT | Performed by: FAMILY MEDICINE

## 2019-05-01 PROCEDURE — 6370000000 HC RX 637 (ALT 250 FOR IP): Performed by: PHYSICIAN ASSISTANT

## 2019-05-01 PROCEDURE — 84100 ASSAY OF PHOSPHORUS: CPT

## 2019-05-01 PROCEDURE — 83735 ASSAY OF MAGNESIUM: CPT

## 2019-05-01 RX ORDER — METOPROLOL TARTRATE 50 MG/1
50 TABLET, FILM COATED ORAL 2 TIMES DAILY
Status: DISCONTINUED | OUTPATIENT
Start: 2019-05-01 | End: 2019-05-02 | Stop reason: HOSPADM

## 2019-05-01 RX ORDER — HYDROCHLOROTHIAZIDE 25 MG/1
25 TABLET ORAL DAILY
Status: DISCONTINUED | OUTPATIENT
Start: 2019-05-01 | End: 2019-05-02 | Stop reason: HOSPADM

## 2019-05-01 RX ORDER — ALBUTEROL SULFATE 90 UG/1
2 AEROSOL, METERED RESPIRATORY (INHALATION) EVERY 6 HOURS PRN
Status: DISCONTINUED | OUTPATIENT
Start: 2019-05-01 | End: 2019-05-02 | Stop reason: HOSPADM

## 2019-05-01 RX ORDER — SIMETHICONE 80 MG
80 TABLET,CHEWABLE ORAL EVERY 6 HOURS PRN
Status: DISCONTINUED | OUTPATIENT
Start: 2019-05-01 | End: 2019-05-02 | Stop reason: HOSPADM

## 2019-05-01 RX ADMIN — FOLIC ACID 1 MG: 1 TABLET ORAL at 08:42

## 2019-05-01 RX ADMIN — INSULIN LISPRO 2 UNITS: 100 INJECTION, SOLUTION INTRAVENOUS; SUBCUTANEOUS at 13:14

## 2019-05-01 RX ADMIN — IPRATROPIUM BROMIDE AND ALBUTEROL SULFATE 1 AMPULE: .5; 3 SOLUTION RESPIRATORY (INHALATION) at 07:43

## 2019-05-01 RX ADMIN — VENLAFAXINE HYDROCHLORIDE 150 MG: 150 CAPSULE, EXTENDED RELEASE ORAL at 08:42

## 2019-05-01 RX ADMIN — FAMOTIDINE 20 MG: 20 TABLET ORAL at 09:01

## 2019-05-01 RX ADMIN — IPRATROPIUM BROMIDE AND ALBUTEROL SULFATE 1 AMPULE: .5; 3 SOLUTION RESPIRATORY (INHALATION) at 11:12

## 2019-05-01 RX ADMIN — FAMOTIDINE 20 MG: 20 TABLET ORAL at 20:54

## 2019-05-01 RX ADMIN — METOPROLOL TARTRATE 50 MG: 25 TABLET ORAL at 22:38

## 2019-05-01 RX ADMIN — LISINOPRIL 20 MG: 20 TABLET ORAL at 08:42

## 2019-05-01 RX ADMIN — HYDROCHLOROTHIAZIDE 25 MG: 25 TABLET ORAL at 21:55

## 2019-05-01 RX ADMIN — METOPROLOL TARTRATE 50 MG: 25 TABLET ORAL at 08:42

## 2019-05-01 RX ADMIN — SODIUM CHLORIDE: 9 INJECTION, SOLUTION INTRAVENOUS at 01:16

## 2019-05-01 RX ADMIN — Medication 100 MG: at 08:42

## 2019-05-01 RX ADMIN — MULTIPLE VITAMINS W/ MINERALS TAB 1 TABLET: TAB at 08:42

## 2019-05-01 ASSESSMENT — PATIENT HEALTH QUESTIONNAIRE - PHQ9: SUM OF ALL RESPONSES TO PHQ QUESTIONS 1-9: 5

## 2019-05-01 ASSESSMENT — PAIN SCALES - GENERAL
PAINLEVEL_OUTOF10: 0
PAINLEVEL_OUTOF10: 0

## 2019-05-01 NOTE — PLAN OF CARE
Problem: Impaired respiratory status  Goal: Clear lung sounds  5/1/2019 0747 by Herb Loving RCP  Outcome: Ongoing   Continue therapy to improve lung sounds.

## 2019-05-01 NOTE — PLAN OF CARE
Problem: Discharge Planning:  Goal: Discharged to appropriate level of care  Description  Discharged to appropriate level of care  5/1/2019 0145 by Carly Gooden RN  Outcome: Ongoing  Note:   No new discharge plans at this time. Problem: Fluid Volume - Deficit:  Goal: Absence of fluid volume deficit signs and symptoms  Description  Absence of fluid volume deficit signs and symptoms  5/1/2019 0145 by Carly Gooden RN  Outcome: Ongoing  Note:   Patient getting normal saline at 125mL/hr. Adequate urine output. BP 140s-160s. Problem: Nutrition Deficit:  Goal: Ability to achieve adequate nutritional intake will improve  Description  Ability to achieve adequate nutritional intake will improve  5/1/2019 0145 by Carly Gooden RN  Outcome: Ongoing  Note:   Carb control diet     Problem: Sleep Pattern Disturbance:  Goal: Appears well-rested  Description  Appears well-rested  Outcome: Ongoing  Note:   Patient states he is getting some sleep this shift. Continuing to monitor/assess     Problem: Pain Control  Goal: Maintain pain level at or below patient's acceptable level (or 5 if patient is unable to determine acceptable level)  5/1/2019 0145 by Carly Gooden RN  Outcome: Ongoing  Flowsheets (Taken 5/1/2019 0142)  Patient's Stated Pain Goal: No pain  Note:   Pain Assessment: 0-10  Pain Level: 0   Pain goal:  0   Is pain goal met at this time? Yes     Additional interventions to be implemented: position change and rest       Problem: Cardiovascular  Goal: No DVT, peripheral vascular complications  9/4/0867 3501 by Carly Gooden RN  Outcome: Ongoing  Note:   No signs of DVT. SCDs in place most of shift. Problem: Cardiovascular  Goal: Hemodynamic stability  Outcome: Ongoing  Note:   HR stable. BP 140s-160s. Continuing to monitor. Problem: Respiratory  Goal: No pulmonary complications  0/7/0568 3022 by Carly Gooden RN  Outcome: Ongoing  Note:   Lungs clear/diminished.       Problem: Respiratory  Goal: O2 Sat > 90%  Outcome: Ongoing  Note:   O2 above 90% on room air. Problem:   Goal: Adequate urinary output  5/1/2019 0145 by Emily Perea RN  Outcome: Ongoing  Note:   Adequate urine output so far this shift. Continuing to monitor. Normal saline at 125mL/hr. Problem: Skin Integrity/Risk  Goal: No skin breakdown during hospitalization  5/1/2019 0145 by Emily Perea RN  Outcome: Ongoing  Note:   No new evidence of skin breakdown. Turns self and is up at times to bathroom. Problem: Musculor/Skeletal Functional Status  Goal: Absence of falls  5/1/2019 0145 by Emily Perea RN  Outcome: Ongoing  Note:   Patient had no falls this shift. Up with help. Alert and oriented. Admitted for alcohol withdrawal. Call light used appropriately. Hourly rounding in place. Care plan reviewed with patient. Patient verbalize understanding of the plan of care and contribute to goal setting.

## 2019-05-01 NOTE — PROGRESS NOTES
Placeholder    magnesium replacement protocol   Other RX Placeholder    ipratropium-albuterol  1 ampule Inhalation Q4H WA    metoprolol tartrate  25 mg Oral BID    venlafaxine  150 mg Oral Daily    sodium chloride flush  10 mL Intravenous 2 times per day    nicotine  1 patch Transdermal Daily    lisinopril  20 mg Oral Daily     PRN Meds: hydrALAZINE, nicotine, glucose, dextrose, glucagon (rDNA), dextrose, sodium chloride flush, ondansetron, acetaminophen, LORazepam **OR** LORazepam **OR** LORazepam **OR** LORazepam **OR** LORazepam **OR** LORazepam **OR** LORazepam **OR** LORazepam      Intake/Output Summary (Last 24 hours) at 5/1/2019 0725  Last data filed at 5/1/2019 0417  Gross per 24 hour   Intake 5633.81 ml   Output 5500 ml   Net 133.81 ml       Diet:  DIET CARB CONTROL; Exam:  BP (!) 156/104   Pulse 95   Temp 98.4 °F (36.9 °C) (Oral)   Resp 18   Ht 5' 11\" (1.803 m)   Wt 255 lb 11.7 oz (116 kg)   SpO2 95%   BMI 35.67 kg/m²     General appearance: No apparent distress, appears stated age and cooperative. HEENT: Pupils equal, round, and reactive to light. Conjunctivae/corneas clear. Neck: Supple, with full range of motion. No jugular venous distention. Trachea midline. Respiratory:  Normal respiratory effort. Clear to auscultation, bilaterally without Rales/Wheezes/Rhonchi. Cardiovascular: Regular rate and rhythm with normal S1/S2 without murmurs, rubs or gallops. Abdomen: Soft, non-tender, non-distended with normal bowel sounds. Musculoskeletal: passive and active ROM x 4 extremities. Skin: Skin color, texture, turgor normal.  No rashes or lesions. Neurologic:  Neurovascularly intact without any focal sensory/motor deficits.  Cranial nerves: II-XII intact, grossly non-focal.  Psychiatric: Alert and oriented, thought content appropriate, normal insight  Capillary Refill: Brisk,< 3 seconds   Peripheral Pulses: +2 palpable, equal bilaterally       Labs:   Recent Labs     04/30/19  0445   WBC 5. 4   HGB 14.6   HCT 42.1   *     Recent Labs     04/29/19  0359 04/30/19  0445    136   K 4.0 3.7    100   CO2 23 24   BUN 17 13   CREATININE 0.7 0.7   CALCIUM 7.9* 8.5   PHOS 2.6  --      Recent Labs     04/29/19  0359   *   *   BILIDIR 1.9*   BILITOT 2.9*   ALKPHOS 115     Recent Labs     04/29/19  0359   INR 1.05     No results for input(s): CKTOTAL, TROPONINI in the last 72 hours. Urinalysis:      Lab Results   Component Value Date    NITRU NEGATIVE 04/24/2019    WBCUA NONE 04/24/2019    BACTERIA NONE 04/24/2019    RBCUA NONE 04/24/2019    RBCUA 0-2 12/14/2018    BLOODU NEGATIVE 04/24/2019    SPECGRAV <1.005 04/24/2019    GLUCOSEU NEGATIVE 12/14/2018       Radiology:  XR CHEST PORTABLE   Final Result      No acute pneumonia. Bibasilar atelectasis. **This report has been created using voice recognition software. It may contain minor errors which are inherent in voice recognition technology. **      Final report electronically signed by Dr. Nimo Ribera on 4/30/2019 4:25 AM          Diet: DIET CARB CONTROL;    DVT prophylaxis: [] Lovenox                                 [] SCDs                                 [] SQ Heparin                                 [] Encourage ambulation           [] Already on Anticoagulation     Disposition:    [] Home       [] TCU       [] Rehab       [] Psych       [] SNF       [] [facility]       [] Other-    Code Status: Full Code    PT/OT Eval Status:     Assessment/Plan:    Anticipated Discharge in :    [facility] [address] Problems    Diagnosis Date Noted    Alcohol ingestion [Z78.9] 04/26/2019    Alcohol withdrawal delirium ([facility] [address] [F10.231] 04/26/2019     Acute Alcohol withdrawals; Delirium tremens  Improving  Has hx of alcohol withdrawal seizures  was in ICU on Precedex  Continue Seizure precautions  Continue Ativan per CIWA scale  Thiamine, folate, Multivitamins    Alcoholic encephalopathy  Resolved    Alcoholic hepatitis  LFT elevation  Ammonia level normal    Hyponatremia likely beer potomania  Resolved  Monitor    Hypomagnesemia  Replace as needed    Hypocalcemia  replace per protocol    Hypophosphatemia  Replace prn    DM type 2, with hyperglycemia  A1c 6.7%.    BS uncontrolled  Continue SSI # 2  Carb control diet    Essential HTN, uncontrolled  BB, ACE-I, HCTZ, adjust dose if needed    Tobacco abuse  cessation counseling  on Nicoderm patch    Depression  Continue Lexapro      Electronically signed by Gabriella Suh MD on 5/1/2019 at 7:25 AM

## 2019-05-01 NOTE — FLOWSHEET NOTE
Pt was alone at the time of the visit. He said that he got the drinking problem. We talked about decision, prayer and Will Power. He wanted to better his life from alcohol. He was anointed. 05/01/19 1924   Encounter Summary   Services provided to: Patient and family together   Referral/Consult From: [facility]   Continue Visiting Yes  (5/1 )   Complexity of Encounter Low   Length of Encounter 15 minutes   Spiritual/Worship   Type Spiritual support   Assessment Approachable;Calm;Peaceful   Intervention Prayer;Nurtured hope; Active listening; Anointing   Outcome Connection/belonging;Expressed gratitude;Encouraged; Hopeful;Receptive   Sacraments   Sacrament of Sick-Anointing Anointed

## 2019-05-01 NOTE — CONSULTS
Brief Intervention and Referral to Treatment Summary    Patient was provided PHQ-9, AUDIT and DAST Screening:      PHQ-9 Score: 5  AUDIT Score:  29  DAST Score:   N/A    Patients substance use is considered     Low Risk/Healthy  Moderate Risk  Harmful  Dependent   X    Patients depression is considered:     Minimal  Mild     X  Moderate  Moderately Severe  Severe    Brief Education Was Provided    Patient was receptive  X  Patient was not receptive      Brief Intervention Is Provided (Only for AUDIT or DAST)      Patient reports readiness to decrease and/or stop use and a plan was discussed   X  Patient denies readiness to decrease and/or stop use and a plan was not discussed      Recommendations/Referrals for Brief and/or Specialized Treatment Provided to Patient     Patient plans to follow up outpatient and in [place] [address]. He has been given information for Vivitrol through Dr. Rafael Martinsor office as well as inpatient, further outpatient, detox and AA resources. Discussed with patient.

## 2019-05-02 VITALS
BODY MASS INDEX: 35.8 KG/M2 | WEIGHT: 255.73 LBS | RESPIRATION RATE: 16 BRPM | TEMPERATURE: 98.2 F | HEART RATE: 88 BPM | DIASTOLIC BLOOD PRESSURE: 90 MMHG | SYSTOLIC BLOOD PRESSURE: 169 MMHG | OXYGEN SATURATION: 95 % | HEIGHT: 71 IN

## 2019-05-02 LAB
ANION GAP SERPL CALCULATED.3IONS-SCNC: 11 MEQ/L (ref 8–16)
BUN BLDV-MCNC: 11 MG/DL (ref 7–22)
CALCIUM SERPL-MCNC: 8.5 MG/DL (ref 8.5–10.5)
CHLORIDE BLD-SCNC: 106 MEQ/L (ref 98–111)
CO2: 22 MEQ/L (ref 23–33)
CREAT SERPL-MCNC: 0.6 MG/DL (ref 0.4–1.2)
ERYTHROCYTE [DISTWIDTH] IN BLOOD BY AUTOMATED COUNT: 13.1 % (ref 11.5–14.5)
ERYTHROCYTE [DISTWIDTH] IN BLOOD BY AUTOMATED COUNT: 47.9 FL (ref 35–45)
GFR SERPL CREATININE-BSD FRML MDRD: > 90 ML/MIN/1.73M2
GLUCOSE BLD-MCNC: 117 MG/DL (ref 70–108)
GLUCOSE BLD-MCNC: 122 MG/DL (ref 70–108)
HCT VFR BLD CALC: 39.1 % (ref 42–52)
HEMOGLOBIN: 13.5 GM/DL (ref 14–18)
MCH RBC QN AUTO: 34.6 PG (ref 26–33)
MCHC RBC AUTO-ENTMCNC: 34.5 GM/DL (ref 32.2–35.5)
MCV RBC AUTO: 100.3 FL (ref 80–94)
PLATELET # BLD: 107 THOU/MM3 (ref 130–400)
PMV BLD AUTO: 10 FL (ref 9.4–12.4)
POTASSIUM SERPL-SCNC: 3.9 MEQ/L (ref 3.5–5.2)
RBC # BLD: 3.9 MILL/MM3 (ref 4.7–6.1)
SODIUM BLD-SCNC: 139 MEQ/L (ref 135–145)
WBC # BLD: 4.9 THOU/MM3 (ref 4.8–10.8)

## 2019-05-02 PROCEDURE — 80048 BASIC METABOLIC PNL TOTAL CA: CPT

## 2019-05-02 PROCEDURE — 6370000000 HC RX 637 (ALT 250 FOR IP): Performed by: NURSE PRACTITIONER

## 2019-05-02 PROCEDURE — 6370000000 HC RX 637 (ALT 250 FOR IP): Performed by: PHYSICIAN ASSISTANT

## 2019-05-02 PROCEDURE — 6370000000 HC RX 637 (ALT 250 FOR IP): Performed by: INTERNAL MEDICINE

## 2019-05-02 PROCEDURE — 6370000000 HC RX 637 (ALT 250 FOR IP): Performed by: FAMILY MEDICINE

## 2019-05-02 PROCEDURE — 36415 COLL VENOUS BLD VENIPUNCTURE: CPT

## 2019-05-02 PROCEDURE — 2580000003 HC RX 258: Performed by: NURSE PRACTITIONER

## 2019-05-02 PROCEDURE — 85027 COMPLETE CBC AUTOMATED: CPT

## 2019-05-02 PROCEDURE — 82948 REAGENT STRIP/BLOOD GLUCOSE: CPT

## 2019-05-02 PROCEDURE — 99238 HOSP IP/OBS DSCHRG MGMT 30/<: CPT | Performed by: FAMILY MEDICINE

## 2019-05-02 RX ORDER — SIMETHICONE 80 MG
80 TABLET,CHEWABLE ORAL EVERY 6 HOURS PRN
Qty: 30 TABLET | Refills: 0 | Status: SHIPPED | OUTPATIENT
Start: 2019-05-02 | End: 2019-08-08 | Stop reason: SDUPTHER

## 2019-05-02 RX ORDER — METOPROLOL TARTRATE 50 MG/1
50 TABLET, FILM COATED ORAL 2 TIMES DAILY
Qty: 60 TABLET | Refills: 1 | Status: SHIPPED | OUTPATIENT
Start: 2019-05-02 | End: 2019-08-08 | Stop reason: SDUPTHER

## 2019-05-02 RX ORDER — NICOTINE 21 MG/24HR
1 PATCH, TRANSDERMAL 24 HOURS TRANSDERMAL DAILY
Qty: 30 PATCH | Refills: 1 | Status: SHIPPED | OUTPATIENT
Start: 2019-05-02 | End: 2019-08-24

## 2019-05-02 RX ORDER — LISINOPRIL AND HYDROCHLOROTHIAZIDE 25; 20 MG/1; MG/1
1 TABLET ORAL DAILY
Qty: 30 TABLET | Refills: 1 | Status: SHIPPED | OUTPATIENT
Start: 2019-05-02 | End: 2019-08-08 | Stop reason: SDUPTHER

## 2019-05-02 RX ORDER — FAMOTIDINE 20 MG/1
20 TABLET, FILM COATED ORAL NIGHTLY
Qty: 30 TABLET | Refills: 1 | Status: SHIPPED | OUTPATIENT
Start: 2019-05-02 | End: 2019-08-08 | Stop reason: SDUPTHER

## 2019-05-02 RX ADMIN — FOLIC ACID 1 MG: 1 TABLET ORAL at 09:23

## 2019-05-02 RX ADMIN — VENLAFAXINE HYDROCHLORIDE 150 MG: 150 CAPSULE, EXTENDED RELEASE ORAL at 09:23

## 2019-05-02 RX ADMIN — Medication 100 MG: at 09:23

## 2019-05-02 RX ADMIN — FAMOTIDINE 20 MG: 20 TABLET ORAL at 09:23

## 2019-05-02 RX ADMIN — LISINOPRIL 20 MG: 20 TABLET ORAL at 09:23

## 2019-05-02 RX ADMIN — HYDROCHLOROTHIAZIDE 25 MG: 25 TABLET ORAL at 09:23

## 2019-05-02 RX ADMIN — MULTIPLE VITAMINS W/ MINERALS TAB 1 TABLET: TAB at 09:23

## 2019-05-02 RX ADMIN — METOPROLOL TARTRATE 50 MG: 25 TABLET ORAL at 09:23

## 2019-05-02 RX ADMIN — SODIUM CHLORIDE: 9 INJECTION, SOLUTION INTRAVENOUS at 03:19

## 2019-05-02 ASSESSMENT — PAIN SCALES - GENERAL: PAINLEVEL_OUTOF10: 0

## 2019-05-02 NOTE — CARE COORDINATION
5/2/19, 8:43 AM    DISCHARGE BARRIERS      Patient admitted for alcohol detox. Maria Eugenia from [facility] saw patient, gave him information on treatment options. Patient would like the vivitrol shot through Dr Kisha Du office. Patient has a court date for 5/17 for MARCO, he thinks he will be spend 20 days in shelter. Also discusses the need for counseling/goups and AA meetings. He lives in [place] and will be unable to participate in Dr Kisha Du groups/counseling. Strongly encouraged patient to walk in at [facility] in [place] to begin the process for counseling.      Called Dr Kisha Du office, appointment scheduled for 5/6 at 10:30 AM.

## 2019-05-02 NOTE — PROGRESS NOTES
calcium replacement protocol   Other RX Placeholder    magnesium replacement protocol   Other RX Placeholder    venlafaxine  150 mg Oral Daily    sodium chloride flush  10 mL Intravenous 2 times per day    nicotine  1 patch Transdermal Daily    lisinopril  20 mg Oral Daily     PRN Meds: simethicone, albuterol sulfate HFA, hydrALAZINE, nicotine, glucose, dextrose, glucagon (rDNA), dextrose, sodium chloride flush, ondansetron, acetaminophen, LORazepam **OR** LORazepam **OR** LORazepam **OR** LORazepam **OR** LORazepam **OR** LORazepam **OR** LORazepam **OR** LORazepam      Intake/Output Summary (Last 24 hours) at 5/2/2019 0719  Last data filed at 5/2/2019 0546  Gross per 24 hour   Intake 4385 ml   Output 4475 ml   Net -90 ml       Diet:  DIET CARB CONTROL; Exam:  BP (!) 118/57   Pulse 79   Temp 98.3 °F (36.8 °C) (Oral)   Resp 16   Ht 5' 11\" (1.803 m)   Wt 255 lb 11.7 oz (116 kg)   SpO2 93%   BMI 35.67 kg/m²     General appearance: No apparent distress, appears stated age and cooperative. No tremors  HEENT: Pupils equal, round, and reactive to light. Conjunctivae/corneas clear. Neck: Supple, with full range of motion. No jugular venous distention. Trachea midline. Respiratory:  Normal respiratory effort. Clear to auscultation, bilaterally without Rales/Wheezes/Rhonchi. Cardiovascular: Regular rate and rhythm with normal S1/S2 without murmurs, rubs or gallops. Abdomen: Soft, non-tender, non-distended with normal bowel sounds. Musculoskeletal: passive and active ROM x 4 extremities. Skin: Skin color, texture, turgor normal.  No rashes or lesions. Neurologic:  Neurovascularly intact without any focal sensory/motor deficits.  Cranial nerves: II-XII intact, grossly non-focal.  Psychiatric: Alert and oriented, thought content appropriate, normal insight  Capillary Refill: Brisk,< 3 seconds   Peripheral Pulses: +2 palpable, equal bilaterally       Labs:   Recent Labs     04/30/19  0445 05/01/19  1103 05/02/19  0544   WBC 5.4 5.2 4.9   HGB 14.6 14.0 13.5*   HCT 42.1 41.1* 39.1*   * 114* 107*     Recent Labs     04/30/19  0445 05/01/19  1103 05/02/19  0544    137 139   K 3.7 4.0 3.9    105 106   CO2 24 22* 22*   BUN 13 11 11   CREATININE 0.7 0.5 0.6   CALCIUM 8.5 8.5 8.5   PHOS  --  3.0  --      Recent Labs     05/01/19  1103   AST 86*   *   BILITOT 2.1*   ALKPHOS 140*     No results for input(s): INR in the last 72 hours. No results for input(s): Lois Joshua in the last 72 hours. Urinalysis:      Lab Results   Component Value Date    NITRU NEGATIVE 04/24/2019    WBCUA NONE 04/24/2019    BACTERIA NONE 04/24/2019    RBCUA NONE 04/24/2019    RBCUA 0-2 12/14/2018    BLOODU NEGATIVE 04/24/2019    SPECGRAV <1.005 04/24/2019    GLUCOSEU NEGATIVE 12/14/2018       Radiology:  XR CHEST PORTABLE   Final Result      No acute pneumonia. Bibasilar atelectasis. **This report has been created using voice recognition software. It may contain minor errors which are inherent in voice recognition technology. **      Final report electronically signed by Dr. Dayan Huertas on 4/30/2019 4:25 AM          Diet: DIET CARB CONTROL;    DVT prophylaxis: [] Lovenox                                 [] SCDs                                 [] SQ Heparin                                 [] Encourage ambulation           [] Already on Anticoagulation     Disposition:    [] Home       [] TCU       [] Rehab       [] Psych       [] SNF       [] [facility]       [] Other-    Code Status: Full Code    PT/OT Eval Status:     Assessment/Plan:    Anticipated Discharge in :    ROSINA/Sean Sánchez [address] Problems    Diagnosis Date Noted    Alcohol ingestion [Z78.9] 04/26/2019    Alcohol withdrawal delirium ([facility] [F10.231] 04/26/2019     Acute Alcohol withdrawals; Delirium tremens  resolved  Has hx of alcohol withdrawal seizures  was in ICU on Precedex  Continue Seizure precautions  Continue Ativan per MIKAELA scale  Thiamine, folate, Multivitamins    Alcoholic encephalopathy  Resolved    Alcoholic hepatitis  LFT elevation  Ammonia level normal    Hyponatremia likely beer potomania  Resolved  Monitor    Hypomagnesemia  Replace as needed    Hypocalcemia  replace per protocol    Hypophosphatemia  Replace prn    DM type 2, with hyperglycemia  A1c 6.7%.    BS uncontrolled  Continue SSI # 2  Carb control diet    Essential HTN, uncontrolled  BB, ACE-I, HCTZ, adjust dose if needed    Tobacco abuse  cessation counseling  on Nicoderm patch    Depression  Continue Lexapro    Disposition  Likely to be discharged today  Given resources for OP follow up, detox, AA and considers vivitrol tx with Dr. Home Muñoz with primary physician as outpatient once discharged        Electronically signed by Author MD Ian on 5/2/2019 at 7:19 AM

## 2019-05-02 NOTE — PLAN OF CARE
Problem: Discharge Planning:  Goal: Discharged to appropriate level of care  Description  Discharged to appropriate level of care  Outcome: Ongoing  Note:   Patient in as observation at this time. Problem: Fluid Volume - Deficit:  Goal: Absence of fluid volume deficit signs and symptoms  Description  Absence of fluid volume deficit signs and symptoms  Outcome: Ongoing  Note:   Patient continues fluids at 0.9 @ 125ml NS. Drinking adequate amounts of fluid. Problem: Nutrition Deficit:  Goal: Ability to achieve adequate nutritional intake will improve  Description  Ability to achieve adequate nutritional intake will improve  Outcome: Ongoing  Note:   Carb control diet. Appetite good. Problem: Sleep Pattern Disturbance:  Goal: Appears well-rested  Description  Appears well-rested  Outcome: Ongoing  Note:   No complaints of sleep disturbance. Problem: Pain Control  Goal: Maintain pain level at or below patient's acceptable level (or 5 if patient is unable to determine acceptable level)  Outcome: Ongoing  Note:   Denies pain this shift. Problem: Cardiovascular  Goal: No DVT, peripheral vascular complications  Outcome: Ongoing  Note:   SCD's as ordered. Problem: Cardiovascular  Goal: Hemodynamic stability  Outcome: Ongoing     Problem: Respiratory  Goal: No pulmonary complications  Outcome: Ongoing  Note:   Lung sounds clear to diminished. Denies respiratory distress or shortness of breathe. Problem: Respiratory  Goal: O2 Sat > 90%  Outcome: Ongoing     Problem:   Goal: Adequate urinary output  Outcome: Ongoing  Note:   Adequate urine output this shift. Problem: Skin Integrity/Risk  Goal: No skin breakdown during hospitalization  Outcome: Ongoing  Note:   No new skin issues at this time. Problem: Musculor/Skeletal Functional Status  Goal: Absence of falls  Outcome: Ongoing  Note:   All fall precautions in place. No falls this shift. Personal belongings at bedside. Bed alarm on.

## 2019-05-05 ENCOUNTER — HOSPITAL ENCOUNTER (INPATIENT)
Age: 44
LOS: 3 days | Discharge: HOME | End: 2019-05-08
Attending: FAMILY MEDICINE | Admitting: INTERNAL MEDICINE
Payer: MEDICAID

## 2019-05-05 DIAGNOSIS — F10.20 ALCOHOL DEPENDENCE WITH PHYSIOLOGICAL DEPENDENCE, CONTINUOUS (HCC): ICD-10-CM

## 2019-05-05 DIAGNOSIS — F10.939 ALCOHOL WITHDRAWAL SYNDROME WITH COMPLICATION (HCC): Primary | ICD-10-CM

## 2019-05-05 LAB
ALBUMIN SERPL-MCNC: 3.2 GM/DL (ref 3.4–5)
ALP BLD-CCNC: 165 U/L (ref 46–116)
ALT SERPL-CCNC: 117 U/L (ref 14–63)
AMPHETAMINE+METHAMPHETAMIE: NEGATIVE
ANALYZED BY:: NORMAL
ANION GAP: 14 MEQ/L (ref 8–16)
AST SERPL-CCNC: 57 U/L (ref 15–37)
ATYPICAL LYMPHOCYTES: ABNORMAL %
BARBITURATES: NORMAL
BASOPHILS # BLD: 0.9 % (ref 0–3)
BENZODIAZEPINES: NEGATIVE
BILIRUB SERPL-MCNC: 1 MG/DL (ref 0.2–1)
BUN BLDV-MCNC: 16 MG/DL (ref 7–18)
CANNABINOIDS: NEGATIVE
CHLORIDE BLD-SCNC: 96 MEQ/L (ref 98–107)
CO2: 24 MEQ/L (ref 21–32)
COCAINE METABOLITE: NEGATIVE
CREAT SERPL-MCNC: 0.8 MG/DL (ref 0.6–1.3)
DATE OF COLLECTION: NORMAL
DRAWN BY: NORMAL
EOSINOPHILS RELATIVE PERCENT: 0.9 % (ref 0–4)
ETHYL ALCOHOL: 0.29 % (GM/DL)
GFR, ESTIMATED: > 90 ML/MIN/1.73M2
GLUCOSE BLD-MCNC: 222 MG/DL (ref 74–106)
HCT VFR BLD CALC: 45.5 % (ref 42–52)
HEMOGLOBIN: 16.2 GM/DL (ref 14–18)
LYMPHOCYTES # BLD: 22.9 % (ref 15–47)
Lab: 2110
Lab: NORMAL
MACROCYTES: ABNORMAL
MCH RBC QN AUTO: 36.3 PG (ref 27–31)
MCHC RBC AUTO-ENTMCNC: 35.5 GM/DL (ref 33–37)
MCV RBC AUTO: 102.1 FL (ref 80–94)
MONOCYTES: 13.5 % (ref 0–12)
OPIATES: NEGATIVE
PDW BLD-RTO: 11.6 % (ref 11.5–14.5)
PHENCYCLIDINE: NEGATIVE
PLATELET # BLD: 328 THOU/MM3 (ref 130–400)
PLATELET ESTIMATE: ABNORMAL
PMV BLD AUTO: 6.8 FL (ref 7.4–10.4)
POC CALCIUM: 9 MG/DL (ref 8.5–10.1)
POTASSIUM SERPL-SCNC: 3.6 MEQ/L (ref 3.5–5.1)
RBC # BLD: 4.46 MILL/MM3 (ref 4.7–6.1)
RBC # BLD: ABNORMAL 10*6/UL
SCAN OF BLOOD SMEAR: NORMAL
SEGS: 61.8 % (ref 43–75)
SODIUM BLD-SCNC: 134 MEQ/L (ref 136–145)
TOTAL PROTEIN: 7.2 GM/DL (ref 6.4–8.2)
WBC # BLD: 13.8 THOU/MM3 (ref 4.8–10.8)

## 2019-05-05 PROCEDURE — 96375 TX/PRO/DX INJ NEW DRUG ADDON: CPT

## 2019-05-05 PROCEDURE — 2709999900 HC NON-CHARGEABLE SUPPLY

## 2019-05-05 PROCEDURE — G0378 HOSPITAL OBSERVATION PER HR: HCPCS

## 2019-05-05 PROCEDURE — 96374 THER/PROPH/DIAG INJ IV PUSH: CPT

## 2019-05-05 PROCEDURE — 80305 DRUG TEST PRSMV DIR OPT OBS: CPT

## 2019-05-05 PROCEDURE — 36415 COLL VENOUS BLD VENIPUNCTURE: CPT

## 2019-05-05 PROCEDURE — 1200000000 HC SEMI PRIVATE

## 2019-05-05 PROCEDURE — 99284 EMERGENCY DEPT VISIT MOD MDM: CPT

## 2019-05-05 PROCEDURE — G0480 DRUG TEST DEF 1-7 CLASSES: HCPCS

## 2019-05-05 PROCEDURE — 80053 COMPREHEN METABOLIC PANEL: CPT

## 2019-05-05 PROCEDURE — 6360000002 HC RX W HCPCS: Performed by: FAMILY MEDICINE

## 2019-05-05 PROCEDURE — 85025 COMPLETE CBC W/AUTO DIFF WBC: CPT

## 2019-05-05 PROCEDURE — 2580000003 HC RX 258: Performed by: FAMILY MEDICINE

## 2019-05-05 RX ORDER — THIAMINE HYDROCHLORIDE 100 MG/ML
100 INJECTION, SOLUTION INTRAMUSCULAR; INTRAVENOUS ONCE
Status: COMPLETED | OUTPATIENT
Start: 2019-05-05 | End: 2019-05-05

## 2019-05-05 RX ORDER — LORAZEPAM 2 MG/ML
1 INJECTION INTRAMUSCULAR ONCE
Status: COMPLETED | OUTPATIENT
Start: 2019-05-05 | End: 2019-05-05

## 2019-05-05 RX ORDER — 0.9 % SODIUM CHLORIDE 0.9 %
1000 INTRAVENOUS SOLUTION INTRAVENOUS ONCE
Status: COMPLETED | OUTPATIENT
Start: 2019-05-05 | End: 2019-05-06

## 2019-05-05 RX ADMIN — SODIUM CHLORIDE 1000 ML: 9 INJECTION, SOLUTION INTRAVENOUS at 21:08

## 2019-05-05 RX ADMIN — LORAZEPAM 1 MG: 2 INJECTION INTRAMUSCULAR; INTRAVENOUS at 21:07

## 2019-05-05 RX ADMIN — THIAMINE HYDROCHLORIDE 100 MG: 100 INJECTION, SOLUTION INTRAMUSCULAR; INTRAVENOUS at 21:07

## 2019-05-05 ASSESSMENT — PAIN SCALES - GENERAL
PAINLEVEL_OUTOF10: 5
PAINLEVEL_OUTOF10: 0

## 2019-05-05 ASSESSMENT — PAIN DESCRIPTION - LOCATION: LOCATION: ABDOMEN

## 2019-05-05 ASSESSMENT — PAIN DESCRIPTION - PAIN TYPE: TYPE: ACUTE PAIN

## 2019-05-06 ENCOUNTER — TELEPHONE (OUTPATIENT)
Dept: INTERNAL MEDICINE CLINIC | Age: 44
End: 2019-05-06

## 2019-05-06 PROBLEM — F10.930 ALCOHOL WITHDRAWAL, UNCOMPLICATED (HCC): Status: ACTIVE | Noted: 2019-05-06

## 2019-05-06 PROBLEM — F10.20 ALCOHOL USE DISORDER, SEVERE, DEPENDENCE (HCC): Status: ACTIVE | Noted: 2019-05-06

## 2019-05-06 LAB
AMMONIA: 52 UMOL/L (ref 11–60)
ANION GAP SERPL CALCULATED.3IONS-SCNC: 14 MEQ/L (ref 8–16)
BUN BLDV-MCNC: 22 MG/DL (ref 7–22)
CALCIUM SERPL-MCNC: 8.2 MG/DL (ref 8.5–10.5)
CHLORIDE BLD-SCNC: 100 MEQ/L (ref 98–111)
CO2: 18 MEQ/L (ref 23–33)
CREAT SERPL-MCNC: 1 MG/DL (ref 0.4–1.2)
GFR SERPL CREATININE-BSD FRML MDRD: 82 ML/MIN/1.73M2
GLUCOSE BLD-MCNC: 223 MG/DL (ref 70–108)
MAGNESIUM: 1.9 MG/DL (ref 1.6–2.4)
POTASSIUM REFLEX MAGNESIUM: 4.4 MEQ/L (ref 3.5–5.2)
SODIUM BLD-SCNC: 132 MEQ/L (ref 135–145)

## 2019-05-06 PROCEDURE — 80048 BASIC METABOLIC PNL TOTAL CA: CPT

## 2019-05-06 PROCEDURE — 6360000002 HC RX W HCPCS: Performed by: INTERNAL MEDICINE

## 2019-05-06 PROCEDURE — 2580000003 HC RX 258: Performed by: INTERNAL MEDICINE

## 2019-05-06 PROCEDURE — 2709999900 HC NON-CHARGEABLE SUPPLY

## 2019-05-06 PROCEDURE — 99223 1ST HOSP IP/OBS HIGH 75: CPT | Performed by: INTERNAL MEDICINE

## 2019-05-06 PROCEDURE — 36415 COLL VENOUS BLD VENIPUNCTURE: CPT

## 2019-05-06 PROCEDURE — 82140 ASSAY OF AMMONIA: CPT

## 2019-05-06 PROCEDURE — G0378 HOSPITAL OBSERVATION PER HR: HCPCS

## 2019-05-06 PROCEDURE — 99233 SBSQ HOSP IP/OBS HIGH 50: CPT | Performed by: INTERNAL MEDICINE

## 2019-05-06 PROCEDURE — 83735 ASSAY OF MAGNESIUM: CPT

## 2019-05-06 PROCEDURE — 96376 TX/PRO/DX INJ SAME DRUG ADON: CPT

## 2019-05-06 PROCEDURE — 6370000000 HC RX 637 (ALT 250 FOR IP): Performed by: INTERNAL MEDICINE

## 2019-05-06 PROCEDURE — 96372 THER/PROPH/DIAG INJ SC/IM: CPT

## 2019-05-06 PROCEDURE — 1200000003 HC TELEMETRY R&B

## 2019-05-06 RX ORDER — LORAZEPAM 2 MG/ML
3 INJECTION INTRAMUSCULAR
Status: DISCONTINUED | OUTPATIENT
Start: 2019-05-06 | End: 2019-05-08 | Stop reason: HOSPADM

## 2019-05-06 RX ORDER — SIMETHICONE 80 MG
80 TABLET,CHEWABLE ORAL EVERY 6 HOURS PRN
Status: DISCONTINUED | OUTPATIENT
Start: 2019-05-06 | End: 2019-05-08 | Stop reason: HOSPADM

## 2019-05-06 RX ORDER — POTASSIUM CHLORIDE 7.45 MG/ML
10 INJECTION INTRAVENOUS PRN
Status: DISCONTINUED | OUTPATIENT
Start: 2019-05-06 | End: 2019-05-08 | Stop reason: HOSPADM

## 2019-05-06 RX ORDER — HYDROCHLOROTHIAZIDE 25 MG/1
25 TABLET ORAL DAILY
Status: DISCONTINUED | OUTPATIENT
Start: 2019-05-06 | End: 2019-05-08 | Stop reason: HOSPADM

## 2019-05-06 RX ORDER — FOLIC ACID 1 MG/1
1 TABLET ORAL DAILY
Status: DISCONTINUED | OUTPATIENT
Start: 2019-05-06 | End: 2019-05-08 | Stop reason: HOSPADM

## 2019-05-06 RX ORDER — NICOTINE 21 MG/24HR
1 PATCH, TRANSDERMAL 24 HOURS TRANSDERMAL DAILY
Status: DISCONTINUED | OUTPATIENT
Start: 2019-05-06 | End: 2019-05-08 | Stop reason: HOSPADM

## 2019-05-06 RX ORDER — VENLAFAXINE HYDROCHLORIDE 150 MG/1
150 CAPSULE, EXTENDED RELEASE ORAL DAILY
Status: DISCONTINUED | OUTPATIENT
Start: 2019-05-06 | End: 2019-05-08 | Stop reason: HOSPADM

## 2019-05-06 RX ORDER — SODIUM CHLORIDE 0.9 % (FLUSH) 0.9 %
10 SYRINGE (ML) INJECTION EVERY 12 HOURS SCHEDULED
Status: DISCONTINUED | OUTPATIENT
Start: 2019-05-06 | End: 2019-05-08 | Stop reason: HOSPADM

## 2019-05-06 RX ORDER — SODIUM CHLORIDE 0.9 % (FLUSH) 0.9 %
10 SYRINGE (ML) INJECTION PRN
Status: DISCONTINUED | OUTPATIENT
Start: 2019-05-06 | End: 2019-05-08 | Stop reason: HOSPADM

## 2019-05-06 RX ORDER — THIAMINE MONONITRATE (VIT B1) 100 MG
200 TABLET ORAL DAILY
Status: DISCONTINUED | OUTPATIENT
Start: 2019-05-06 | End: 2019-05-08 | Stop reason: HOSPADM

## 2019-05-06 RX ORDER — ONDANSETRON 2 MG/ML
4 INJECTION INTRAMUSCULAR; INTRAVENOUS EVERY 6 HOURS PRN
Status: DISCONTINUED | OUTPATIENT
Start: 2019-05-06 | End: 2019-05-08 | Stop reason: HOSPADM

## 2019-05-06 RX ORDER — METOPROLOL TARTRATE 50 MG/1
50 TABLET, FILM COATED ORAL 2 TIMES DAILY
Status: DISCONTINUED | OUTPATIENT
Start: 2019-05-06 | End: 2019-05-08 | Stop reason: HOSPADM

## 2019-05-06 RX ORDER — FAMOTIDINE 20 MG/1
20 TABLET, FILM COATED ORAL NIGHTLY
Status: DISCONTINUED | OUTPATIENT
Start: 2019-05-06 | End: 2019-05-08 | Stop reason: HOSPADM

## 2019-05-06 RX ORDER — M-VIT,TX,IRON,MINS/CALC/FOLIC 27MG-0.4MG
1 TABLET ORAL DAILY
Status: DISCONTINUED | OUTPATIENT
Start: 2019-05-06 | End: 2019-05-08 | Stop reason: HOSPADM

## 2019-05-06 RX ORDER — SODIUM CHLORIDE 9 MG/ML
INJECTION, SOLUTION INTRAVENOUS CONTINUOUS
Status: ACTIVE | OUTPATIENT
Start: 2019-05-06 | End: 2019-05-06

## 2019-05-06 RX ORDER — LORAZEPAM 2 MG/ML
4 INJECTION INTRAMUSCULAR
Status: DISCONTINUED | OUTPATIENT
Start: 2019-05-06 | End: 2019-05-08 | Stop reason: HOSPADM

## 2019-05-06 RX ORDER — LORAZEPAM 2 MG/1
2 TABLET ORAL
Status: DISCONTINUED | OUTPATIENT
Start: 2019-05-06 | End: 2019-05-08 | Stop reason: HOSPADM

## 2019-05-06 RX ORDER — LACTULOSE 10 G/15ML
20 SOLUTION ORAL 2 TIMES DAILY
Status: DISCONTINUED | OUTPATIENT
Start: 2019-05-06 | End: 2019-05-08 | Stop reason: HOSPADM

## 2019-05-06 RX ORDER — LORAZEPAM 2 MG/ML
1 INJECTION INTRAMUSCULAR
Status: DISCONTINUED | OUTPATIENT
Start: 2019-05-06 | End: 2019-05-08 | Stop reason: HOSPADM

## 2019-05-06 RX ORDER — LORAZEPAM 2 MG/ML
4 INJECTION INTRAMUSCULAR ONCE
Status: COMPLETED | OUTPATIENT
Start: 2019-05-06 | End: 2019-05-06

## 2019-05-06 RX ORDER — LORAZEPAM 1 MG/1
1 TABLET ORAL
Status: DISCONTINUED | OUTPATIENT
Start: 2019-05-06 | End: 2019-05-08 | Stop reason: HOSPADM

## 2019-05-06 RX ORDER — ALBUTEROL SULFATE 90 UG/1
2 AEROSOL, METERED RESPIRATORY (INHALATION) EVERY 6 HOURS PRN
Status: DISCONTINUED | OUTPATIENT
Start: 2019-05-06 | End: 2019-05-08 | Stop reason: HOSPADM

## 2019-05-06 RX ORDER — LORAZEPAM 2 MG/1
4 TABLET ORAL
Status: DISCONTINUED | OUTPATIENT
Start: 2019-05-06 | End: 2019-05-08 | Stop reason: HOSPADM

## 2019-05-06 RX ORDER — LISINOPRIL 20 MG/1
20 TABLET ORAL DAILY
Status: DISCONTINUED | OUTPATIENT
Start: 2019-05-06 | End: 2019-05-08 | Stop reason: HOSPADM

## 2019-05-06 RX ORDER — OLANZAPINE 10 MG/1
10 TABLET ORAL NIGHTLY
Status: DISCONTINUED | OUTPATIENT
Start: 2019-05-06 | End: 2019-05-08 | Stop reason: HOSPADM

## 2019-05-06 RX ORDER — LISINOPRIL AND HYDROCHLOROTHIAZIDE 25; 20 MG/1; MG/1
1 TABLET ORAL DAILY
Status: DISCONTINUED | OUTPATIENT
Start: 2019-05-06 | End: 2019-05-06

## 2019-05-06 RX ORDER — NALTREXONE HYDROCHLORIDE 50 MG/1
50 TABLET, FILM COATED ORAL
Status: DISCONTINUED | OUTPATIENT
Start: 2019-05-07 | End: 2019-05-08 | Stop reason: HOSPADM

## 2019-05-06 RX ORDER — LORAZEPAM 2 MG/ML
2 INJECTION INTRAMUSCULAR
Status: DISCONTINUED | OUTPATIENT
Start: 2019-05-06 | End: 2019-05-08 | Stop reason: HOSPADM

## 2019-05-06 RX ADMIN — LORAZEPAM 1 MG: 1 TABLET ORAL at 13:31

## 2019-05-06 RX ADMIN — Medication 200 MG: at 09:34

## 2019-05-06 RX ADMIN — LISINOPRIL 20 MG: 20 TABLET ORAL at 09:34

## 2019-05-06 RX ADMIN — METOPROLOL TARTRATE 50 MG: 50 TABLET, FILM COATED ORAL at 09:34

## 2019-05-06 RX ADMIN — ENOXAPARIN SODIUM 40 MG: 40 INJECTION SUBCUTANEOUS at 09:34

## 2019-05-06 RX ADMIN — FOLIC ACID 1 MG: 1 TABLET ORAL at 09:34

## 2019-05-06 RX ADMIN — LORAZEPAM 1 MG: 1 TABLET ORAL at 09:34

## 2019-05-06 RX ADMIN — LORAZEPAM 2 MG: 2 TABLET ORAL at 20:18

## 2019-05-06 RX ADMIN — LACTULOSE 20 G: 20 SOLUTION ORAL at 09:34

## 2019-05-06 RX ADMIN — HYDROCHLOROTHIAZIDE 25 MG: 25 TABLET ORAL at 09:34

## 2019-05-06 RX ADMIN — OLANZAPINE 10 MG: 10 TABLET, FILM COATED ORAL at 20:06

## 2019-05-06 RX ADMIN — SODIUM CHLORIDE: 9 INJECTION, SOLUTION INTRAVENOUS at 01:30

## 2019-05-06 RX ADMIN — VENLAFAXINE HYDROCHLORIDE 150 MG: 150 CAPSULE, EXTENDED RELEASE ORAL at 09:34

## 2019-05-06 RX ADMIN — LACTULOSE 20 G: 20 SOLUTION ORAL at 20:18

## 2019-05-06 RX ADMIN — FAMOTIDINE 20 MG: 20 TABLET ORAL at 20:18

## 2019-05-06 RX ADMIN — LORAZEPAM 4 MG: 2 INJECTION INTRAMUSCULAR; INTRAVENOUS at 01:17

## 2019-05-06 RX ADMIN — METOPROLOL TARTRATE 50 MG: 50 TABLET, FILM COATED ORAL at 20:06

## 2019-05-06 RX ADMIN — MULTIPLE VITAMINS W/ MINERALS TAB 1 TABLET: TAB at 09:34

## 2019-05-06 RX ADMIN — LORAZEPAM 1 MG: 1 TABLET ORAL at 17:43

## 2019-05-06 ASSESSMENT — PAIN DESCRIPTION - FREQUENCY
FREQUENCY: INTERMITTENT
FREQUENCY: CONTINUOUS

## 2019-05-06 ASSESSMENT — ENCOUNTER SYMPTOMS
EYE REDNESS: 0
RHINORRHEA: 0
ABDOMINAL PAIN: 0
CHEST TIGHTNESS: 0
SINUS PRESSURE: 0
WHEEZING: 0
DIARRHEA: 0
ABDOMINAL DISTENTION: 0
STRIDOR: 0
SHORTNESS OF BREATH: 0
COUGH: 0
SORE THROAT: 0
NAUSEA: 0
VOMITING: 0
CONSTIPATION: 0
PHOTOPHOBIA: 0
EYE PAIN: 0
EYE DISCHARGE: 0
BACK PAIN: 0

## 2019-05-06 ASSESSMENT — PAIN DESCRIPTION - ORIENTATION
ORIENTATION: MID
ORIENTATION: MID

## 2019-05-06 ASSESSMENT — PAIN DESCRIPTION - DESCRIPTORS
DESCRIPTORS: CRAMPING
DESCRIPTORS: CRAMPING

## 2019-05-06 ASSESSMENT — PAIN DESCRIPTION - ONSET
ONSET: ON-GOING
ONSET: ON-GOING

## 2019-05-06 ASSESSMENT — PAIN - FUNCTIONAL ASSESSMENT: PAIN_FUNCTIONAL_ASSESSMENT: PREVENTS OR INTERFERES SOME ACTIVE ACTIVITIES AND ADLS

## 2019-05-06 ASSESSMENT — PAIN SCALES - GENERAL
PAINLEVEL_OUTOF10: 5
PAINLEVEL_OUTOF10: 0
PAINLEVEL_OUTOF10: 0

## 2019-05-06 ASSESSMENT — PAIN DESCRIPTION - LOCATION
LOCATION: ABDOMEN
LOCATION: ABDOMEN

## 2019-05-06 ASSESSMENT — PAIN DESCRIPTION - PROGRESSION
CLINICAL_PROGRESSION: GRADUALLY WORSENING
CLINICAL_PROGRESSION: GRADUALLY WORSENING

## 2019-05-06 ASSESSMENT — PAIN DESCRIPTION - PAIN TYPE: TYPE: ACUTE PAIN

## 2019-05-06 NOTE — CARE COORDINATION
5/6/19, 9:12 AM      Lorraine Cerda       Admitted from: ER 5/5/2019/ 2018 Hospital day: 0   Location: [address] [place] [address] Reason for admit: Alcohol withdrawal delirium ([facility] [F10.231]  Alcohol withdrawal, uncomplicated ([facility] [Q68.785] Status: Inpt  Admit order signed?: yes  PMH:  has a past medical history of COPD (chronic obstructive pulmonary disease) ([facility] Depression, Diabetes mellitus ([facility] ETOH abuse, Hyperlipidemia, Hypertension, Liver disease, and Seizures ([facility] Procedure: No  Pertinent abnormal Imaging:No  Medications:  Scheduled Meds:   thiamine  200 mg Oral Daily    venlafaxine  150 mg Oral Daily    OLANZapine  10 mg Oral Nightly    nicotine  1 patch Transdermal Daily    therapeutic multivitamin-minerals  1 tablet Oral Daily    metoprolol tartrate  50 mg Oral BID    lactulose  20 g Oral BID    folic acid  1 mg Oral Daily    famotidine  20 mg Oral Nightly    sodium chloride flush  10 mL Intravenous 2 times per day    enoxaparin  40 mg Subcutaneous Daily    lisinopril  20 mg Oral Daily    And    hydrochlorothiazide  25 mg Oral Daily     Continuous Infusions:   sodium chloride 125 mL/hr at 05/06/19 0130      Pertinent Info/Orders/Treatment Plan:  Telemetry. Telesitter. [facility]. [facility]. Seizure and fall precautions. Diet: DIET CLEAR LIQUID; Carb Control: 5 carb choices (75 gms)/meal   Smoking status:  reports that he has been smoking e-cigarettes and cigarettes. He has a 27.00 pack-year smoking history. He has quit using smokeless tobacco.   PCP: NILAM Winter CNP  Readmission: Yes. Patient has been readmitted within 3 days. Patient went to f/u appointment? No  If yes, was it within 7 days? N/A  Patient was able to fill prescriptions? Unknown  Patient is taking medications as prescribed? Unknown  Cause for readmission?  ETOH Detox     Readmission Risk Score: 53%    Discharge Planning  Current Residence:  Private Residence  Living Arrangements:  Parent   Support Systems:  Parent  Current Services PTA:     Potential Assistance Needed:  N/A  Potential Assistance Purchasing Medications:  No  Does patient want to participate in local refill/ meds to beds program?  Yes  Type of Home Care Services:  None  Patient expects to be discharged to:  home with mother  Expected Discharge date:  05/11/19  Follow Up Appointment: Best Day/ Time: Tuesday PM    Discharge Plan: Presented to pt room today with SW. Pt was just discharged last week and it appears he relapsed almost immediately. He lives with his mother who is 66years old. Pt has lost his license and will possibly be going to correction later this month. Pt states he is interested in [address] [place] [address] and the SW has provided him with a list of options and advised him to begin making phone call.       Evaluation: yes

## 2019-05-06 NOTE — PROGRESS NOTES
Patient arrived to 8B 38 in stretcher from [facility] ambulatory care center. IV intact, room orientation given to the patient, education given to patient on hourly rounding.

## 2019-05-06 NOTE — CONSULTS
Addiction consult completed on 5/1/19 by this clinician. Addiction consults remain good for 30 days.

## 2019-05-06 NOTE — PROGRESS NOTES
Hospitalist Progress Note    Patient:  Gaby Appl      Unit/Bed:8B-38/038-A    YOB: 1975    MRN: [id number]       Acct: [de-identified]     PCP: NILAM Rodriguez CNP    Date of Admission: 5/5/2019    Reason for admission: alcohol use disorder    Expected discharge date:  Several days     Disposition: [facility] arrived. Pt with AUD, multiple admissions for same; presents intoxicated and expressing again desire for help. States he is going to get Vivitrol  Hx diabetes mellitus, hbp.     In the Last 24 hours: no severe withdrawal sxs    Medications:  Reviewed    Infusion Medications   Scheduled Medications    thiamine  200 mg Oral Daily    venlafaxine  150 mg Oral Daily    OLANZapine  10 mg Oral Nightly    nicotine  1 patch Transdermal Daily    therapeutic multivitamin-minerals  1 tablet Oral Daily    metoprolol tartrate  50 mg Oral BID    lactulose  20 g Oral BID    folic acid  1 mg Oral Daily    famotidine  20 mg Oral Nightly    sodium chloride flush  10 mL Intravenous 2 times per day    enoxaparin  40 mg Subcutaneous Daily    lisinopril  20 mg Oral Daily    And    hydrochlorothiazide  25 mg Oral Daily     PRN Meds: simethicone, albuterol sulfate HFA, sodium chloride flush, potassium chloride, magnesium sulfate, ondansetron, LORazepam **OR** LORazepam **OR** LORazepam **OR** LORazepam **OR** LORazepam **OR** LORazepam **OR** LORazepam **OR** LORazepam      Intake/Output Summary (Last 24 hours) at 5/6/2019 1038  Last data filed at 5/6/2019 0909  Gross per 24 hour   Intake 548.2 ml   Output 1825 ml   Net -1276.8 ml       Diet:  DIET CLEAR LIQUID;    Exam:  /75   Pulse 105   Temp 98.2 °F (36.8 °C) (Oral)   Resp 18   Ht 5' 11\" (1.803 m)   Wt 259 lb (117.5 kg)   SpO2 95%   BMI 36.12 kg/m²     Physical Examination: General appearance - overweight and sleepy  Mental status - alert, oriented to person, place, and time  Neck - supple, no significant adenopathy, no JVD, or carotid bruits  Chest - clear to auscultation, no wheezes, rales or rhonchi, symmetric air entry  Heart - normal rate, regular rhythm, normal S1, S2, no murmurs, rubs, clicks or gallops  Abdomen - soft, nontender, nondistended, no masses or organomegaly  Neurological - alert, oriented, normal speech, no focal findings or movement disorder noted  Musculoskeletal - no muscular tenderness noted  Extremities - no pedal edema noted  Skin - normal coloration and turgor, no rashes, no suspicious skin lesions noted    Labs:   Recent Labs     05/05/19 2055   WBC 13.8*   HGB 16.2   HCT 45.5        Recent Labs     05/05/19 2055 05/06/19  0558   * 132*   K 3.6 4.4   CL 96* 100   CO2 24 18*   BUN 16 22   CREATININE 0.8 1.0   CALCIUM  --  8.2*     Recent Labs     05/05/19 2055   AST 57*   *   BILITOT 1.0   ALKPHOS 165*     No results for input(s): INR in the last 72 hours. No results for input(s): Tito Gal in the last 72 hours. Microbiology:      Urinalysis:      Lab Results   Component Value Date    NITRU NEGATIVE 04/24/2019    WBCUA NONE 04/24/2019    BACTERIA NONE 04/24/2019    RBCUA NONE 04/24/2019    RBCUA 0-2 12/14/2018    BLOODU NEGATIVE 04/24/2019    SPECGRAV <1.005 04/24/2019    GLUCOSEU NEGATIVE 12/14/2018       Radiology:  Xr Chest Portable    Result Date: 4/30/2019  PROCEDURE: XR CHEST PORTABLE CLINICAL INFORMATION: hypoxia. COMPARISON: Chest x-ray dated 2/24/2019 TECHNIQUE: AP Portable chest xray FINDINGS: Lungs/pleura:  No pneumonia, pulmonary edema, or obvious mass. There is bibasilar atelectasis. No pleural effusion. No pneumothorax. Heart: Heart size is normal. Mediastinum/chuyita: No obvious mass or adenopathy. Skeleton: No significant bone or joint abnormality. Lines/tubes/devices: none     No acute pneumonia. Bibasilar atelectasis. **This report has been created using voice recognition software.  It may contain minor errors which are inherent in voice recognition technology. ** Final report electronically signed by Dr. Gaby Padgett on 4/30/2019 4:25 AM       DVT prophylaxis: Lovenox     Code Status: Full Code    PT/OT Eval Status: na    Tele:  No    Assessment/Plan:    Active Hospital Problems    Diagnosis Date Noted    Alcohol withdrawal, uncomplicated ([address] [Q55.987] 05/06/2019    Essential hypertension [I10]     Alcohol withdrawal delirium ([address] [F10.231] 04/26/2019    Alcohol intoxication in alcoholism with blood level over 0.3 with complication ([address] [C99.558] 45/25/2604    Alcoholic cirrhosis ([address] [Y92.27] 12/14/2018    Alcohol dependence with physiological dependence, continuous ([address] [Q03.18]     Alcoholic hepatitis without ascites [K70.10] 05/14/2018    Type 2 diabetes mellitus without complication, without long-term current use of insulin ([address] [E11.9] 08/28/2017    Alcohol abuse [F10.10] 07/09/2015       1.  Alcohol use disorder- here for detox- ciwa, vitamins, monitor lytes   Addiction services        Electronically signed by Modena Cockayne, MD on 5/6/2019 at 10:38 AM

## 2019-05-06 NOTE — H&P
History & Physical      PCP: NILAM Marie CNP    Date of Admission: 5/5/2019    Date of Service: 5/6/19    Chief Complaint:  Alcohol intoxication    History Of Present Illness:    History obtained from chart review and the patient. 38 y.o. male who presented to [address] [place] [address] well known to me from multiple recent admissions for the same, presents from [facility] with alcohol intoxication, claiming to want detox (this marks the third time in the past two months for the same presentation). When seen patient is asleep, apologizes for being asleep then asks for ativan, citing feeling withdrawal symptoms. He has no other complaints at this time. Past Medical History:          Diagnosis Date    [facility]ronic obstructive [facility] disease) [facility]     Depression     Diabetes mellitus ([facility]     ETOH abuse     Hyperlipidemia     Hypertension     Liver disease     [facility]     etoh wdl       Past Surgical History:          Procedure Laterality Date    ENDOSCOPY, COLON, DIAGNOSTIC         Medications Prior to Admission:      Prior to Admission medications    Medication Sig Start Date End Date Taking?  Authorizing Provider   metoprolol tartrate (LOPRESSOR) 50 MG tablet Take 1 tablet by mouth 2 times daily 5/2/19  Yes Norma Pulido MD   nicotine (NICODERM CQ) 21 MG/24HR Place 1 patch onto the skin daily 5/2/19  Yes Norma Pulido MD   simethicone (MYLICON) 80 MG chewable tablet Take 1 tablet by mouth every 6 hours as needed for Flatulence (abdominal cramping) 5/2/19  Yes Norma Pulido MD   lisinopril-hydrochlorothiazide [facility]) 20-25 MG per tablet Take 1 tablet by mouth daily 5/2/19  Yes Norma Pulido MD   famotidine (PEPCID) 20 MG tablet Take 1 tablet by mouth nightly 5/2/19 6/1/19 Yes Norma Pulido MD   Omega-3 1000 MG CAPS Take by mouth   Yes Historical Provider, MD   Multiple Vitamins-Iron (TAB-A-REBEKAH/IRON) TABS Take by mouth daily   Yes Historical Provider, MD   LACTULOSE PO Take by mouth 2 times daily    Yes Historical Provider, MD   OLANZapine (ZYPREXA) 10 MG tablet Take 1 tablet by mouth nightly 3/5/19  Yes NILAM Busby CNP   melatonin 1 MG/4ML LIQD SL liquid Place 0.3 mg under the tongue nightly as needed for Sleep   Yes Historical Provider, MD   venlafaxine (EFFEXOR XR) 150 MG extended release capsule Take 1 capsule by mouth daily 1/16/19  Yes NILAM Busby CNP   albuterol sulfate  (90 Base) MCG/ACT inhaler Inhale 2 puffs into the lungs every 6 hours as needed for Wheezing   Yes Historical Provider, MD   folic acid (FOLVITE) 1 MG tablet Take 1 tablet by mouth daily 11/29/18  Yes Ella Weaver MD   vitamin B-1 100 MG tablet Take 2 tablets by mouth daily 11/29/18  Yes Ella Weaver MD   glucose monitoring kit (FREESTYLE) monitoring kit Glucometer with test strips and lancets. Check BS once daily  #100 strips and lancets 10/4/18  Yes NILAM Busby CNP   blood glucose test strips (ASCENSIA AUTODISC VI;ONE TOUCH ULTRA TEST VI) strip Test as needed. Dispense One Touch verio Test Strips. Dx: Type 2 diabetes (250.00) 10/4/18  Yes NILAM Busby CNP   Cholecalciferol (VITAMIN D3) 5000 units TABS Take 5,000 Units by mouth once a week   Yes Historical Provider, MD       Allergies:  Patient has no known allergies. Social History:      The patient currently lives at home    TOBACCO:   reports that he has been smoking e-cigarettes and cigarettes. He has a 27.00 pack-year smoking history. He has quit using smokeless tobacco.  ETOH:   reports that he drinks about 84.0 oz of alcohol per week. Family History:      Reviewed in detail.  Positive as follows:        Problem Relation Age of Onset    High Blood Pressure Father     Cancer Father         Lung Cancer    Alcohol Abuse Father     High Blood Pressure Brother     Diabetes Mother     Heart Disease Mother         Stent Placement    Arthritis Mother     Depression Mother     High Blood Pressure Mother     High Cholesterol Mother     Alcohol Abuse Paternal Uncle        REVIEW OF SYSTEMS:   14 point review of system performed, pertinent positives as noted in the HPI, all other systems negative/at baseline. PHYSICAL EXAM:    /70   Pulse 100   Temp 98 °F (36.7 °C) (Oral)   Resp 16   Ht 5' 11\" (1.803 m)   Wt 259 lb (117.5 kg)   SpO2 96%   BMI 36.12 kg/m²       General appearance:  No apparent distress, appears stated age and cooperative. HEENT:  Normal cephalic, atraumatic without obvious deformity. Pupils equal, round, and reactive to light. Extra ocular muscles intact. Conjunctivae/corneas clear. Neck: Supple, with full range of motion. No jugular venous distention. Trachea midline. Respiratory:  Normal respiratory effort. Clear to auscultation, bilaterally without Rales/Wheezes/Rhonchi. Cardiovascular:  Regular rate and rhythm with normal S1/S2 without murmurs, rubs or gallops. Abdomen: Soft, non-tender, non-distended with normal bowel sounds. Musculoskeletal:  No clubbing, cyanosis or edema bilaterally. Full range of motion without deformity. Skin: Skin color, texture, turgor normal.  No rashes or lesions. Neurologic:  Neurovascularly intact without any focal sensory/motor deficits. Cranial nerves: II-XII intact, grossly non-focal.  Psychiatric:  Alert and oriented, thought content appropriate, normal insight  Capillary Refill: Brisk,< 3 seconds   Peripheral Pulses: +2 palpable, equal bilaterally       Labs:     Recent Labs     05/05/19 2055   WBC 13.8*   HGB 16.2   HCT 45.5        Recent Labs     05/05/19 2055   *   K 3.6   CL 96*   CO2 24   BUN 16   CREATININE 0.8     Recent Labs     05/05/19 2055   AST 57*   *   BILITOT 1.0   ALKPHOS 165*     No results for input(s): INR in the last 72 hours. No results for input(s): Dareen Serge in the last 72 hours.     Urinalysis:      Lab Results   Component Value Date NITRU NEGATIVE 04/24/2019    [address] NONE 04/24/2019    BACTERIA NONE 04/24/2019    RBCUA NONE 04/24/2019    RBCUA 0-2 12/14/2018    BLOODU NEGATIVE 04/24/2019    SPECGRAV <1.005 04/24/2019    GLUCOSEU NEGATIVE 12/14/2018       Radiology:   I have reviewed the imaging studies with the following interpretation:  No orders to display       Diet:  DIET CLEAR LIQUID; Carb Control: 5 carb choices (75 gms)/meal    DVT prophylaxis: [x] Lovenox                                 [] SCDs                                 [] SQ Heparin                                 [] Encourage ambulation           [] Already on Anticoagulation    Code Status: Full Code      PT/OT Eval Status: no    Disposition:    [x] Home       [] TCU       [] Rehab       [] Psych       [] SNF       [] [facility]       [] Other-       Assessment and Plan:    Principal Problem:    Alcohol withdrawal, uncomplicated ([address]  Active Problems:    Alcohol abuse    Type 2 diabetes mellitus without complication, without long-term current use of insulin ([address]    Alcoholic hepatitis without ascites    Alcohol dependence with physiological dependence, continuous ([address]    Alcoholic cirrhosis ([address]    Alcohol intoxication in alcoholism with blood level over 0.3 with complication ([address]    Alcohol withdrawal delirium ([address]    Essential hypertension  Resolved Problems:    * No resolved hospital problems. *        · Admit once again for alcohol intox and supposed desire for detox  · Nursing notes patient laughing and joking about his MARCO, additionally my interactions with him have shown him to not express any obvious interest in stopping drinking, which is supported by his near instantaneous alcohol use upon discharge. He was supposed to have an appointment with Dr. Thuan Mcginnis this AM for possible vivitrol injections.   Other documentation cites him wanting to not go to half-way but instead spend time in a rehab center and him expressing the belief that seeking rehab will help his legal chances. · Put telesitter in room to monitor patient for his safety and also to assess if his behavior varies based on observation  · Loading dose with 4mg IV ativan once in light of his previous difficult withdrawals - the primary  seems to be behavioral - he has not had any observed seizure activity. · CIWA with ativan  · Continue with his prescribed vitamins/thiamin/folic acid      Thank you NILAM Busby - SILVINO for the opportunity to be involved in this patient's care.     Electronically signed by David Fonseca DO on 5/6/2019 at 3:36 AM

## 2019-05-06 NOTE — ED PROVIDER NOTES
[facility]  eMERGENCY dEPARTMENT eNCOUnter          CHIEF COMPLAINT       Chief Complaint   Patient presents with    Alcohol Intoxication       Nurses Notes reviewed and I agree except as noted in the HPI. HISTORY OF PRESENT ILLNESS    Ralph Davis is a 38 y.o. male who presents wanting to quit drinking. Patient is an alcoholic recently discharged from [facility] for similar issues. Patient notes started drinking again. Patient desires to quit again. Notes alcohol consumption earlier today. Patient states \"I afraid to go thru DT's again\". REVIEW OF SYSTEMS     Review of Systems   Constitutional: Negative for chills, diaphoresis and fever (Non toxic in appearence). HENT: Negative for congestion, dental problem, ear pain, hearing loss, rhinorrhea, sinus pressure, sore throat and tinnitus. Eyes: Negative for photophobia, pain, discharge, redness and visual disturbance. Respiratory: Negative for cough, chest tightness, shortness of breath, wheezing and stridor. Cardiovascular: Negative for chest pain, palpitations and leg swelling. Gastrointestinal: Negative for abdominal distention, abdominal pain, constipation, diarrhea, nausea and vomiting. Genitourinary: Negative for difficulty urinating, discharge, dysuria, flank pain, frequency, hematuria, testicular pain and urgency. Musculoskeletal: Negative for arthralgias, back pain, gait problem, joint swelling, myalgias, neck pain and neck stiffness. Skin: Negative for pallor, rash and wound. Neurological: Positive for tremors. Negative for dizziness, seizures, syncope, numbness and headaches. Hematological: Negative for adenopathy. Does not bruise/bleed easily. Psychiatric/Behavioral: Negative for agitation, confusion, hallucinations, self-injury and suicidal ideas. The patient is nervous/anxious. All other systems reviewed and are negative.         PAST MEDICAL HISTORY    has a past medical history of COPD (chronic obstructive pulmonary disease) ([facility] [facility] Depression, Diabetes mellitus ([facility] ETOH abuse, Hyperlipidemia, Hypertension, Liver disease, and Seizures ([facility] SURGICAL HISTORY      has a past surgical history that includes Endoscopy, colon, diagnostic. CURRENT MEDICATIONS       Current Discharge Medication List      CONTINUE these medications which have NOT CHANGED    Details   metoprolol tartrate (LOPRESSOR) 50 MG tablet Take 1 tablet by mouth 2 times daily  Qty: 60 tablet, Refills: 1      nicotine (NICODERM CQ) 21 MG/24HR Place 1 patch onto the skin daily  Qty: 30 patch, Refills: 1      simethicone (MYLICON) 80 MG chewable tablet Take 1 tablet by mouth every 6 hours as needed for Flatulence (abdominal cramping)  Qty: 30 tablet, Refills: 0      lisinopril-hydrochlorothiazide (PRINZIDE;ZESTORETIC) 20-25 MG per tablet Take 1 tablet by mouth daily  Qty: 30 tablet, Refills: 1    Associated Diagnoses: Type 2 diabetes mellitus without complication, without long-term current use of insulin ([facility])      famotidine (PEPCID) 20 MG tablet Take 1 tablet by mouth nightly  Qty: 30 tablet, Refills: 1      Omega-3 1000 MG CAPS Take by mouth      Multiple Vitamins-Iron (TAB-A-REBEKAH/IRON) TABS Take by mouth daily      chlordiazePOXIDE (LIBRIUM) 25 MG capsule Take 1 capsule by mouth 3 times daily as needed for Anxiety for up to 30 days.   Qty: 30 capsule, Refills: 0    Associated Diagnoses: Acute alcoholic intoxication without complication ([facility])      LACTULOSE PO Take by mouth 2 times daily       OLANZapine (ZYPREXA) 10 MG tablet Take 1 tablet by mouth nightly  Qty: 30 tablet, Refills: 5    Associated Diagnoses: Bipolar 1 disorder ([facility])      melatonin 1 MG/4ML LIQD SL liquid Place 0.3 mg under the tongue nightly as needed for Sleep      venlafaxine (EFFEXOR XR) 150 MG extended release capsule Take 1 capsule by mouth daily  Qty: 30 capsule, Refills: 5      albuterol sulfate  (90 Base) MCG/ACT inhaler Inhale 2 puffs into the lungs every 6 hours as needed for Wheezing      folic acid (FOLVITE) 1 MG tablet Take 1 tablet by mouth daily  Qty: 30 tablet, Refills: 3      vitamin B-1 100 MG tablet Take 2 tablets by mouth daily  Qty: 30 tablet, Refills: 3      glucose monitoring kit (FREESTYLE) monitoring kit Glucometer with test strips and lancets. Check BS once daily  #100 strips and lancets  Qty: 1 kit, Refills: 5    Associated Diagnoses: Type 2 diabetes mellitus without complication, without long-term current use of insulin ([facility])      blood glucose test strips (ASCENSIA AUTODISC VI;ONE TOUCH ULTRA TEST VI) strip Test as needed. Dispense One Touch verio Test Strips. Dx: Type 2 diabetes (250.00)  Qty: 60 strip, Refills: 11    Associated Diagnoses: Type 2 diabetes mellitus without complication, without long-term current use of insulin ([facility])      Cholecalciferol (VITAMIN D3) 5000 units TABS Take 5,000 Units by mouth once a week             ALLERGIES     has No Known Allergies. FAMILY HISTORY     indicated that his mother is alive. He indicated that his father is . He indicated that his brother is alive. He indicated that the status of his paternal uncle is unknown.   family history includes Alcohol Abuse in his father and paternal uncle; Arthritis in his mother; Cancer in his father; Depression in his mother; Diabetes in his mother; Heart Disease in his mother; High Blood Pressure in his brother, father, and mother; High Cholesterol in his mother. SOCIAL HISTORY      reports that he has been smoking e-cigarettes and cigarettes. He has a 27.00 pack-year smoking history. He has quit using smokeless tobacco. He reports that he drinks about 84.0 oz of alcohol per week. He reports that he does not use drugs. PHYSICAL EXAM     INITIAL VITALS:  height is 5' 11\" (1.803 m) and weight is 259 lb (117.5 kg). His oral temperature is 98 °F (36.7 °C). His blood pressure is 132/7 (abnormal) and his pulse is 100.  His respiration is 16 and oxygen saturation is 96%. Physical Exam   Constitutional: He is oriented to person, place, and time. He appears well-developed and well-nourished. He appears distressed. HENT:   Head: Normocephalic and atraumatic. Right Ear: External ear normal.   Left Ear: External ear normal.   Nose: Nose normal.   Mouth/Throat: Oropharynx is clear and moist. No oropharyngeal exudate. Eyes: Pupils are equal, round, and reactive to light. Conjunctivae and EOM are normal. Right eye exhibits no discharge. Left eye exhibits no discharge. No scleral icterus. Neck: Normal range of motion. Neck supple. No JVD present. No tracheal deviation present. Cardiovascular: Normal rate, regular rhythm, normal heart sounds and intact distal pulses. Exam reveals no gallop and no friction rub. No murmur heard. Pulmonary/Chest: Effort normal and breath sounds normal. No stridor. No respiratory distress. He has no wheezes. He has no rales. He exhibits no tenderness. Abdominal: Soft. Bowel sounds are normal. He exhibits no mass. There is no tenderness. There is no rebound and no guarding. Musculoskeletal: Normal range of motion. He exhibits no edema or tenderness. Lymphadenopathy:     He has no cervical adenopathy. Neurological: He is alert and oriented to person, place, and time. He has normal reflexes. No cranial nerve deficit. Coordination normal.   Skin: Skin is warm and dry. No rash noted. He is not diaphoretic. Psychiatric: His speech is normal and behavior is normal. Judgment and thought content normal. His mood appears anxious. Nursing note and vitals reviewed.       DIFFERENTIAL DIAGNOSIS:   Alcohol intoxication,alcohol withdrawal,    DIAGNOSTIC RESULTS     EKG: All EKG's are interpreted by the Emergency Department Physician who either signs or Co-signs this chart in the absence of a cardiologist.      RADIOLOGY: non-plain film images(s) such as CT, Ultrasound and MRI are read by the radiologist.      LABS:   Labs Reviewed CBC WITH AUTO DIFFERENTIAL - Abnormal; Notable for the following components:       Result Value    WBC 13.8 (*)     RBC 4.46 (*)     .1 (*)     MCH 36.3 (*)     MPV 6.8 (*)     Monocytes 13.5 (*)     All other components within normal limits   COMPREHENSIVE METABOLIC PANEL - Abnormal; Notable for the following components:    Glucose 222 (*)     Sodium 134 (*)     Chloride 96 (*)     AST 57 (*)     Alkaline Phosphatase 165 (*)     Alb 3.2 (*)      (*)     All other components within normal limits   ETHANOL   RAPID DRUG SCREEN, URINE   GLOMERULAR FILTRATION RATE, ESTIMATED   ANION GAP   SCAN OF BLOOD SMEAR       EMERGENCY DEPARTMENT COURSE:   Vitals:    Vitals:    05/05/19 2032 05/05/19 2144 05/05/19 2157 05/05/19 2330   BP: (!) 119/56 135/78 (!) 113/57 (!) 132/7   Pulse: 99 78 96 100   Resp: 18 18 16 16   Temp: 98.7 °F (37.1 °C)   98 °F (36.7 °C)   TempSrc: Oral   Oral   SpO2: 94% 95% 93% 96%   Weight:    259 lb (117.5 kg)   Height:    5' 11\" (1.803 m)     Patient tachycardic. Notes last beer a couple of hours ago. Patient desires to withdraw again. Ativan 1 mg given. Thiamine 100 mg given. Ns 250 cc/hr. ETOH 0.29. Drug screen positive barbiturates. Labs reviewed. White count 13.8. CMP glucose 222, Na 134, AST 57, ,CL 96,albumin 3.2. Dr. Lashanda Gamble notified. Patient admitted for further observation. CRITICAL CARE:       CONSULTS:  Medicine     PROCEDURES:  None    FINAL IMPRESSION      1. Alcohol withdrawal syndrome with complication ([facility] [address]          DISPOSITION/PLAN   Admit.      PATIENT REFERRED TO:  Yocasta Sanford, APRN - CNP  [address]  [address] [place] [address]  [phone]            DISCHARGE MEDICATIONS:  Current Discharge Medication List          (Please note that portions of this note were completed with a voice recognition program.  Efforts were made to edit the dictations but occasionally words are mis-transcribed.)    MD Pawan Christensen MD  05/06/19 0000

## 2019-05-07 LAB
ANION GAP SERPL CALCULATED.3IONS-SCNC: 12 MEQ/L (ref 8–16)
BUN BLDV-MCNC: 15 MG/DL (ref 7–22)
CALCIUM SERPL-MCNC: 8.6 MG/DL (ref 8.5–10.5)
CHLORIDE BLD-SCNC: 103 MEQ/L (ref 98–111)
CO2: 23 MEQ/L (ref 23–33)
CREAT SERPL-MCNC: 0.6 MG/DL (ref 0.4–1.2)
GFR SERPL CREATININE-BSD FRML MDRD: > 90 ML/MIN/1.73M2
GLUCOSE BLD-MCNC: 118 MG/DL (ref 70–108)
MAGNESIUM: 2 MG/DL (ref 1.6–2.4)
POTASSIUM REFLEX MAGNESIUM: 4.2 MEQ/L (ref 3.5–5.2)
SODIUM BLD-SCNC: 138 MEQ/L (ref 135–145)

## 2019-05-07 PROCEDURE — 2709999900 HC NON-CHARGEABLE SUPPLY

## 2019-05-07 PROCEDURE — G0378 HOSPITAL OBSERVATION PER HR: HCPCS

## 2019-05-07 PROCEDURE — 83735 ASSAY OF MAGNESIUM: CPT

## 2019-05-07 PROCEDURE — 36415 COLL VENOUS BLD VENIPUNCTURE: CPT

## 2019-05-07 PROCEDURE — 1200000003 HC TELEMETRY R&B

## 2019-05-07 PROCEDURE — 6370000000 HC RX 637 (ALT 250 FOR IP): Performed by: INTERNAL MEDICINE

## 2019-05-07 PROCEDURE — 6360000002 HC RX W HCPCS: Performed by: INTERNAL MEDICINE

## 2019-05-07 PROCEDURE — 80048 BASIC METABOLIC PNL TOTAL CA: CPT

## 2019-05-07 PROCEDURE — 99232 SBSQ HOSP IP/OBS MODERATE 35: CPT | Performed by: INTERNAL MEDICINE

## 2019-05-07 PROCEDURE — 2580000003 HC RX 258: Performed by: INTERNAL MEDICINE

## 2019-05-07 PROCEDURE — 96372 THER/PROPH/DIAG INJ SC/IM: CPT

## 2019-05-07 RX ADMIN — ENOXAPARIN SODIUM 40 MG: 40 INJECTION SUBCUTANEOUS at 08:19

## 2019-05-07 RX ADMIN — Medication 10 ML: at 20:33

## 2019-05-07 RX ADMIN — LACTULOSE 20 G: 20 SOLUTION ORAL at 08:15

## 2019-05-07 RX ADMIN — MULTIPLE VITAMINS W/ MINERALS TAB 1 TABLET: TAB at 08:15

## 2019-05-07 RX ADMIN — Medication 200 MG: at 08:15

## 2019-05-07 RX ADMIN — LISINOPRIL 20 MG: 20 TABLET ORAL at 08:15

## 2019-05-07 RX ADMIN — HYDROCHLOROTHIAZIDE 25 MG: 25 TABLET ORAL at 08:15

## 2019-05-07 RX ADMIN — LORAZEPAM 1 MG: 1 TABLET ORAL at 06:19

## 2019-05-07 RX ADMIN — FAMOTIDINE 20 MG: 20 TABLET ORAL at 20:34

## 2019-05-07 RX ADMIN — NALTREXONE HYDROCHLORIDE 50 MG: 50 TABLET, FILM COATED ORAL at 08:15

## 2019-05-07 RX ADMIN — METOPROLOL TARTRATE 50 MG: 50 TABLET, FILM COATED ORAL at 08:15

## 2019-05-07 RX ADMIN — LACTULOSE 20 G: 20 SOLUTION ORAL at 20:34

## 2019-05-07 RX ADMIN — FOLIC ACID 1 MG: 1 TABLET ORAL at 08:15

## 2019-05-07 RX ADMIN — METOPROLOL TARTRATE 50 MG: 50 TABLET, FILM COATED ORAL at 20:34

## 2019-05-07 RX ADMIN — VENLAFAXINE HYDROCHLORIDE 150 MG: 150 CAPSULE, EXTENDED RELEASE ORAL at 08:15

## 2019-05-07 RX ADMIN — OLANZAPINE 10 MG: 10 TABLET, FILM COATED ORAL at 20:34

## 2019-05-07 ASSESSMENT — PAIN SCALES - GENERAL: PAINLEVEL_OUTOF10: 0

## 2019-05-07 NOTE — FLOWSHEET NOTE
Pt was alone at the time of the visit. He said that he got back to the hospital due to too much alcohorism' He said that he wanted to stop but its difficult for him. We talked about [employer], making a strong decision and standing by it and seeking help. He was anointed     05/07/19 1817   Encounter Summary   Services provided to: Patient   Referral/Consult From: [facility]   Place of [address] [place] [address] Visiting Yes  (5/7 )   Complexity of Encounter Moderate   Length of Encounter 15 minutes   Spiritual/Latter day   Type Spiritual support   Assessment Approachable;Calm; Hopeful   Intervention Prayer;Nurtured hope; Active listening;Empowerment;Sustaining presence/ Ministry of presence   Outcome Connection/belonging;Expressed gratitude;Encouraged; Hopeful;Receptive   Sacraments   Sacrament of Sick-Anointing Anointed

## 2019-05-07 NOTE — PLAN OF CARE
Problem: Discharge Planning:  Goal: Discharged to appropriate level of care  Description  Discharged to appropriate level of care  Outcome: Ongoing  Note:   Patient plans to go to outpatient treatment      Problem: Fluid Volume - Deficit:  Goal: Absence of fluid volume deficit signs and symptoms  Description  Absence of fluid volume deficit signs and symptoms  Outcome: Ongoing  Note:   Adequate amount of fluids consumed every meal      Problem: Nutrition Deficit:  Goal: Ability to achieve adequate nutritional intake will improve  Description  Ability to achieve adequate nutritional intake will improve  Outcome: Ongoing  Note:   Patients consumes adequate amount of food during each meal      Problem: Sleep Pattern Disturbance:  Goal: Appears well-rested  Description  Appears well-rested  Outcome: Ongoing  Note:   Door closed to promote rest     Problem: Violence - Risk of, Self/Other-Directed:  Goal: Knowledge of developmental care interventions  Description  Absence of violence  Outcome: Ongoing  Note:   Patient cooperative this shift      Problem: DISCHARGE BARRIERS  Goal: Patient's continuum of care needs are met  Outcome: Ongoing  Note:   Social work is seeing patient      Problem: Pain:  Goal: Pain level will decrease  Description  Pain level will decrease  Outcome: Ongoing  Note:   Prn pain medication give, rest and repositioned   Goal: Control of acute pain  Description  Control of acute pain  Outcome: Ongoing  Note:   Duplicate   Goal: Control of chronic pain  Description  Control of chronic pain  Outcome: Ongoing  Note:   Duplicate    Care plan reviewed with patient. Patient verbalizing understanding the plan of care and contributed to goal setting and plan of care.

## 2019-05-07 NOTE — PLAN OF CARE
Problem: Discharge Planning:  Goal: Discharged to appropriate level of care  Description  Discharged to appropriate level of care  Outcome: Ongoing  Note:   Patient plans to return home at discharge. Patient has no new needs at home. Problem: Fluid Volume - Deficit:  Goal: Absence of fluid volume deficit signs and symptoms  Description  Absence of fluid volume deficit signs and symptoms  Outcome: Ongoing  Note:   Patient electrolytes within normal parameters. Electrolytes being replaced per protocol. Problem: Nutrition Deficit:  Goal: Ability to achieve adequate nutritional intake will improve  Description  Ability to achieve adequate nutritional intake will improve  Outcome: Ongoing  Note:   Clear liquids Patient tolerating diet. No nausea noted, accurate I&Os noted. Problem: Violence - Risk of, Self/Other-Directed:  Goal: Knowledge of developmental care interventions  Description  Absence of violence  Outcome: Ongoing  Note:   Patient has no feelings of suicide or shows no sign of self harm at this time. Problem: DISCHARGE BARRIERS  Goal: Patient's continuum of care needs are met  5/7/2019 0119 by Anila Rhodes RN  Outcome: Ongoing  Note:   Patient plans to return home at discharge. Patient has no new needs at home. 5/6/2019 1333 by JUAN CARLOS Jarrell  Outcome: Ongoing     Problem: Pain:  Goal: Pain level will decrease  Description  Pain level will decrease  Outcome: Ongoing  Note:   Patient voices pain 5/10. Patients pain goal is 0/10. PRN pain medications given as ordered. Care plan reviewed with patient. Patient verbalize understanding of the plan of care and contribute to goal setting.

## 2019-05-07 NOTE — PROGRESS NOTES
Hospitalist Progress Note    Patient:  Ralph Davis      Unit/Bed:8B-38/038-A    YOB: 1975    MRN: [id number]       Acct: [de-identified]     PCP: NILAM Del Valle CNP    Date of Admission: 5/5/2019    Reason for admission: alcohol use disorder    Expected discharge date:  5.8    Disposition: Home    Hospital Course:  Pt with AUD, multiple admissions for same; presents intoxicated and expressing again desire for help. States he is going to get Vivitrol  Hx diabetes mellitus, hbp. In the Last 24 hours: no severe withdrawal sxs. Needed occasional doses Ativan.   Started Naltrexone    His A1C is in the diabetic range    Medications:  Reviewed    Infusion Medications   Scheduled Medications    thiamine  200 mg Oral Daily    venlafaxine  150 mg Oral Daily    OLANZapine  10 mg Oral Nightly    nicotine  1 patch Transdermal Daily    therapeutic multivitamin-minerals  1 tablet Oral Daily    metoprolol tartrate  50 mg Oral BID    lactulose  20 g Oral BID    folic acid  1 mg Oral Daily    famotidine  20 mg Oral Nightly    sodium chloride flush  10 mL Intravenous 2 times per day    enoxaparin  40 mg Subcutaneous Daily    lisinopril  20 mg Oral Daily    And    hydrochlorothiazide  25 mg Oral Daily    naltrexone  50 mg Oral Daily with breakfast     PRN Meds: simethicone, albuterol sulfate HFA, sodium chloride flush, potassium chloride, magnesium sulfate, ondansetron, LORazepam **OR** LORazepam **OR** LORazepam **OR** LORazepam **OR** LORazepam **OR** LORazepam **OR** LORazepam **OR** LORazepam      Intake/Output Summary (Last 24 hours) at 5/7/2019 0539  Last data filed at 5/6/2019 2000  Gross per 24 hour   Intake 2721.57 ml   Output 2250 ml   Net 471.57 ml       Diet:  DIET GENERAL;    Exam:  /84   Pulse 96   Temp 98.4 °F (36.9 °C) (Oral)   Resp 16   Ht 5' 11\" (1.803 m)   Wt 259 lb (117.5 kg)   SpO2 96%   BMI 36.12 kg/m²     Physical Examination: General appearance - contain minor errors which are inherent in voice recognition technology. ** Final report electronically signed by Dr. Anurag Carrizales on 4/30/2019 4:25 AM       DVT prophylaxis: Lovenox     Code Status: Full Code    PT/OT Eval Status: na    Tele:  No    Assessment/Plan:    Active Hospital Problems    Diagnosis Date Noted    Alcohol withdrawal, uncomplicated ([address] [B64.310] 05/06/2019    Alcohol use disorder, severe, dependence ([address] [F10.20] 05/06/2019    Essential hypertension [I10]     Alcohol withdrawal delirium ([address] [F10.231] 04/26/2019    Alcohol intoxication in alcoholism with blood level over 0.3 with complication ([address] [K77.719] 64/41/0732    Alcoholic cirrhosis ([address] [I13.94] 12/14/2018    Alcohol dependence with physiological dependence, continuous ([address] [Y07.09]     Alcoholic hepatitis without ascites [K70.10] 05/14/2018    Type 2 diabetes mellitus without complication, without long-term current use of insulin ([address] [E11.9] 08/28/2017    Alcohol abuse [F10.10] 07/09/2015       1. Alcohol use disorder- here for detox- ciwa, vitamins, monitor lytes   Addiction services. Anticipate disch am  2. Elevated A1C. Instruct diet. Not good candidate for metformin due to etoh use.   Will send to pcp to manage        Electronically signed by Nataliia Chang MD on 5/7/2019 at 5:39 AM

## 2019-05-07 NOTE — CONSULTS
Consults   Nutrition Education    Type and Reason for Visit: Initial, Consult, Patient Education(Heart Healthy Carbohyrate Controlled Diet Education)    Patient stated he has not had diet education in the past. Provided patient with Heart Healthy Carbohydrate Controlled diet education today. Reviewed patient's HgA1C of 6.7% from 4/27/19. Pt states he usually consumes x2-3 meals daily- skipping breakfast. Discussed importance of consuming three meals daily, consuming a consistent amount of carbohydrates with each meal, and how many carbohydrates to consume with each meal. Educated patient on how to count carbohydrates and reviewed examples of 15 carbohydrate choices. Provided examples of carbohydrate controlled meals and ways to make it heart healthy. Discussed what protein and fats are heart healthy and educated patient on how to read nutrition fact labels. Answered all questions and concerns at this time. · Verbally reviewed following information with Patient: Completed Heart Healthy Carbohyrate Controlled Diet Education on 5/7/19. · Written educational materials provided. · Contact name and number provided. · Refer to Patient Education activity for more details.     Electronically signed by Jihan Hernandez RD, HALLE on 5/7/19 at 10:21 AM    Contact Number: [phone]

## 2019-05-08 ENCOUNTER — OFFICE VISIT (OUTPATIENT)
Dept: INTERNAL MEDICINE CLINIC | Age: 44
End: 2019-05-08
Payer: MEDICAID

## 2019-05-08 ENCOUNTER — TELEPHONE (OUTPATIENT)
Dept: FAMILY MEDICINE CLINIC | Age: 44
End: 2019-05-08

## 2019-05-08 VITALS
WEIGHT: 259 LBS | SYSTOLIC BLOOD PRESSURE: 159 MMHG | BODY MASS INDEX: 36.26 KG/M2 | OXYGEN SATURATION: 95 % | HEART RATE: 81 BPM | TEMPERATURE: 98.4 F | DIASTOLIC BLOOD PRESSURE: 92 MMHG | RESPIRATION RATE: 16 BRPM | HEIGHT: 71 IN

## 2019-05-08 DIAGNOSIS — F10.20 SEVERE ALCOHOL USE DISORDER (HCC): Primary | ICD-10-CM

## 2019-05-08 LAB
AMPHETAMINE SCREEN, URINE: ABNORMAL
BARBITURATE SCREEN, URINE: ABNORMAL
BENZODIAZEPINE SCREEN, URINE: ABNORMAL
BUPRENORPHINE URINE: ABNORMAL
COCAINE METABOLITE SCREEN URINE: ABNORMAL
GABAPENTIN SCREEN, URINE: ABNORMAL
MDMA URINE: ABNORMAL
METHADONE SCREEN, URINE: ABNORMAL
METHAMPHETAMINE, URINE: ABNORMAL
OPIATE SCREEN URINE: ABNORMAL
OXYCODONE SCREEN URINE: ABNORMAL
PHENCYCLIDINE SCREEN URINE: ABNORMAL
PROPOXYPHENE SCREEN, URINE: ABNORMAL
THC SCREEN, URINE: ABNORMAL
TRICYCLIC ANTIDEPRESSANTS, UR: ABNORMAL

## 2019-05-08 PROCEDURE — 80305 DRUG TEST PRSMV DIR OPT OBS: CPT | Performed by: INTERNAL MEDICINE

## 2019-05-08 PROCEDURE — 4004F PT TOBACCO SCREEN RCVD TLK: CPT | Performed by: INTERNAL MEDICINE

## 2019-05-08 PROCEDURE — 96372 THER/PROPH/DIAG INJ SC/IM: CPT

## 2019-05-08 PROCEDURE — 6370000000 HC RX 637 (ALT 250 FOR IP): Performed by: INTERNAL MEDICINE

## 2019-05-08 PROCEDURE — 6360000002 HC RX W HCPCS: Performed by: INTERNAL MEDICINE

## 2019-05-08 PROCEDURE — 2580000003 HC RX 258: Performed by: INTERNAL MEDICINE

## 2019-05-08 PROCEDURE — G0378 HOSPITAL OBSERVATION PER HR: HCPCS

## 2019-05-08 PROCEDURE — 99215 OFFICE O/P EST HI 40 MIN: CPT | Performed by: INTERNAL MEDICINE

## 2019-05-08 PROCEDURE — G8417 CALC BMI ABV UP PARAM F/U: HCPCS | Performed by: INTERNAL MEDICINE

## 2019-05-08 PROCEDURE — 1111F DSCHRG MED/CURRENT MED MERGE: CPT | Performed by: INTERNAL MEDICINE

## 2019-05-08 PROCEDURE — 99238 HOSP IP/OBS DSCHRG MGMT 30/<: CPT | Performed by: FAMILY MEDICINE

## 2019-05-08 PROCEDURE — G8427 DOCREV CUR MEDS BY ELIG CLIN: HCPCS | Performed by: INTERNAL MEDICINE

## 2019-05-08 RX ORDER — NALTREXONE HYDROCHLORIDE 50 MG/1
50 TABLET, FILM COATED ORAL
Qty: 30 TABLET | Refills: 0 | Status: SHIPPED | OUTPATIENT
Start: 2019-05-09 | End: 2019-08-08

## 2019-05-08 RX ADMIN — Medication 200 MG: at 09:24

## 2019-05-08 RX ADMIN — LISINOPRIL 20 MG: 20 TABLET ORAL at 09:24

## 2019-05-08 RX ADMIN — FOLIC ACID 1 MG: 1 TABLET ORAL at 09:24

## 2019-05-08 RX ADMIN — MULTIPLE VITAMINS W/ MINERALS TAB 1 TABLET: TAB at 09:24

## 2019-05-08 RX ADMIN — ENOXAPARIN SODIUM 40 MG: 40 INJECTION SUBCUTANEOUS at 09:24

## 2019-05-08 RX ADMIN — Medication 10 ML: at 09:24

## 2019-05-08 RX ADMIN — LACTULOSE 20 G: 20 SOLUTION ORAL at 09:24

## 2019-05-08 RX ADMIN — NALTREXONE HYDROCHLORIDE 50 MG: 50 TABLET, FILM COATED ORAL at 10:16

## 2019-05-08 RX ADMIN — HYDROCHLOROTHIAZIDE 25 MG: 25 TABLET ORAL at 09:24

## 2019-05-08 RX ADMIN — METOPROLOL TARTRATE 50 MG: 50 TABLET, FILM COATED ORAL at 09:24

## 2019-05-08 RX ADMIN — VENLAFAXINE HYDROCHLORIDE 150 MG: 150 CAPSULE, EXTENDED RELEASE ORAL at 09:24

## 2019-05-08 ASSESSMENT — PAIN SCALES - GENERAL
PAINLEVEL_OUTOF10: 0
PAINLEVEL_OUTOF10: 0

## 2019-05-08 NOTE — PROGRESS NOTES
Discharge teaching and instructions for diagnosis/procedure of alcohol use disorder completed with patient using teachback method. AVS reviewed. Printed prescriptions given to patient. Patient voiced understanding regarding prescriptions, follow up appointments, and care of self at home. Discharged in a wheelchair to  independent living per family.

## 2019-05-08 NOTE — PROGRESS NOTES
Verbal order per Dr. Chang Morris for urine drug screen. Negative for all drugs. Verified results with Flora Gonzalez. Dr. Chang Morris ordered Naltrexone 25 mg tab now for patient. Verified dose with patient. Patient was given Naltrexone 25 mg tab, in office. No adverse reactions noted. Patient was sent home with no medication and will be seen back in the office on 5/21/2019. PA for Vivitrol.

## 2019-05-08 NOTE — DISCHARGE SUMMARY
Hospital Medicine Discharge Summary      Patient Identification:   Clifford Reyes   : 1975  MRN: [id number]   Account: [de-identified]      Patient's PCP: NILAM Umana CNP    Admit Date: 2019     Discharge Date:  2019    Admitting Physician: Maria Elena Storm DO     Discharge Physician: Jonh Sanchez MD     Discharge Diagnoses: Active Hospital Problems    Diagnosis Date Noted    Alcohol withdrawal, uncomplicated ([address] [R41.318] 2019    Alcohol use disorder, severe, dependence ([address] [F10.20] 2019    Essential hypertension [I10]     Alcohol withdrawal delirium ([address] [F10.231] 2019    Alcohol intoxication in alcoholism with blood level over 0.3 with complication ([address] [P49.105]     Alcoholic cirrhosis ([address] [Y91.05] 2018    Alcohol dependence with physiological dependence, continuous ([address] [C09.05]     Alcoholic hepatitis without ascites [K70.10] 2018    Type 2 diabetes mellitus without complication, without long-term current use of insulin ([address] [E11.9] 2017    Alcohol abuse [F10.10] 2015       The patient was seen and examined on day of discharge and this discharge summary is in conjunction with any daily progress note from day of discharge. Hospital Course:   Clifford Reyes is a 38 y.o. male admitted to [address] [place] [address] on 3640 for alcoholic intoxication. He has multiple recent admissions for the same, presents from [facility] with alcohol intoxication, claiming to want detox (this marks the third time in the past two months for the same presentation).  CIWA started, started on vivitrol PO. Addiction services consult.      He was scheduled to see Dr. Jayla Hsu today at 1:45PM immediately after discharge. Discharged stable and will follow-up with PCP as outpatient. Discussed with the patient and all questioned fully answered.        Exam:     Vitals:  Vitals:    19 2024 19 0429 19 0914 19 1121   BP: (!) 145/91 127/75 (!) 156/86 (!) 159/92   Pulse: 90 71 86 81   Resp: 16 16 12 16   Temp: 98.5 °F (36.9 °C) 97.5 °F (36.4 °C) 98.1 °F (36.7 °C) 98.4 °F (36.9 °C)   TempSrc: Oral Oral Oral Oral   SpO2: 94% 93% 93% 95%   Weight:       Height:         Weight: Weight: 259 lb (117.5 kg)     24 hour intake/output:    Intake/Output Summary (Last 24 hours) at 5/8/2019 1211  Last data filed at 5/8/2019 1016  Gross per 24 hour   Intake 1160 ml   Output 2900 ml   Net -1740 ml     General appearance: No apparent distress, appears stated age and cooperative. HEENT: Pupils equal, round, and reactive to light. Conjunctivae/corneas clear. Neck: Supple, with full range of motion. No jugular venous distention. Trachea midline. Respiratory:  Normal respiratory effort. Clear to auscultation, bilaterally without Rales/Wheezes/Rhonchi. Cardiovascular: Regular rate and rhythm with normal S1/S2 without murmurs, rubs or gallops. Abdomen: Soft, non-tender, non-distended with normal bowel sounds. Musculoskeletal: passive and active ROM x 4 extremities. Skin: Skin color, texture, turgor normal.  No rashes or lesions. Neurologic:  Neurovascularly intact without any focal sensory/motor deficits. Cranial nerves: II-XII intact, grossly non-focal.  Psychiatric: Alert and oriented, thought content appropriate, normal insight  Capillary Refill: Brisk,< 3 seconds   Peripheral Pulses: +2 palpable, equal bilaterally        Labs:  For convenience and continuity at follow-up the following most recent labs are provided:      CBC:    Lab Results   Component Value Date    WBC 13.8 05/05/2019    HGB 16.2 05/05/2019    HCT 45.5 05/05/2019     05/05/2019       Renal:    Lab Results   Component Value Date     05/07/2019    K 4.2 05/07/2019     05/07/2019    CO2 23 05/07/2019    BUN 15 05/07/2019    CREATININE 0.6 05/07/2019    CALCIUM 8.6 05/07/2019    PHOS 3.0 05/01/2019         Significant Diagnostic Studies    Radiology: 2 times daily             Multiple Vitamins-Iron (TAB-A-REBEKAH/IRON) TABS  Take by mouth daily             nicotine (NICODERM CQ) 21 MG/24HR  Place 1 patch onto the skin daily             OLANZapine (ZYPREXA) 10 MG tablet  Take 1 tablet by mouth nightly             Omega-3 1000 MG CAPS  Take by mouth             simethicone (MYLICON) 80 MG chewable tablet  Take 1 tablet by mouth every 6 hours as needed for Flatulence (abdominal cramping)             venlafaxine (EFFEXOR XR) 150 MG extended release capsule  Take 1 capsule by mouth daily             vitamin B-1 100 MG tablet  Take 2 tablets by mouth daily                 Time Spent on discharge is more than 30 minutes in the examination, evaluation, counseling and review of medications and discharge plan. Signed: Thank you NILAM Ambrose CNP for the opportunity to be involved in this patient's care.     Electronically signed by Vladimir Sandhoff, MD on 5/8/2019 at 12:11 PM

## 2019-05-08 NOTE — CARE COORDINATION
5/8/19, 2:55 PM    Discharge plan discussed by  and . Discharge plan reviewed with patient/ family. Patient/ family verbalize understanding of discharge plan and are in agreement with plan. Understanding was demonstrated using the teach back method. Patient discharged to home with mother, follow up with Dr Jayla Hsu and [facility] clinic. Patient strongly encouraged to call [facility] for pre-screening.   Patient is aware he needs to completed the pre-screening over the phone to be put on the waiting list.

## 2019-05-08 NOTE — PROGRESS NOTES
Hospitalist Progress Note    Patient:  Bogdan Jenkins      Unit/Bed:8B-38/038-A    YOB: 1975    MRN: [id number]       Acct: [de-identified]     PCP: NILAM Zamora CNP    Date of Admission: 5/5/2019    Chief Complaint:   Alcohol intoxication    Hospital Course:   38 y.o. male who presented to [facility] well known to me from multiple recent admissions for the same, presents from [facility] with alcohol intoxication, claiming to want detox (this marks the third time in the past two months for the same presentation). He was scheduled to see Dr. Berenice Dominguez today at 1:45PM  Subjective:   Patient is doing well, no signs of any active withrawal. Has not had any ativan since yesterday afternoon. No complaints.        Medications:  Reviewed    Infusion Medications   Scheduled Medications    thiamine  200 mg Oral Daily    venlafaxine  150 mg Oral Daily    OLANZapine  10 mg Oral Nightly    nicotine  1 patch Transdermal Daily    therapeutic multivitamin-minerals  1 tablet Oral Daily    metoprolol tartrate  50 mg Oral BID    lactulose  20 g Oral BID    folic acid  1 mg Oral Daily    famotidine  20 mg Oral Nightly    sodium chloride flush  10 mL Intravenous 2 times per day    enoxaparin  40 mg Subcutaneous Daily    lisinopril  20 mg Oral Daily    And    hydrochlorothiazide  25 mg Oral Daily    naltrexone  50 mg Oral Daily with breakfast     PRN Meds: simethicone, albuterol sulfate HFA, sodium chloride flush, potassium chloride, magnesium sulfate, ondansetron, LORazepam **OR** LORazepam **OR** LORazepam **OR** LORazepam **OR** LORazepam **OR** LORazepam **OR** LORazepam **OR** LORazepam      Intake/Output Summary (Last 24 hours) at 5/8/2019 1200  Last data filed at 5/8/2019 1016  Gross per 24 hour   Intake 1160 ml   Output 2900 ml   Net -1740 ml       Diet:  DIET GENERAL;    Exam:  BP (!) 159/92   Pulse 81   Temp 98.4 °F (36.9 °C) (Oral)   Resp 16   Ht 5' 11\" (1.803 m)   Wt 259 lb (117.5 kg)   SpO2 95%   BMI 36.12 kg/m²     General appearance: No apparent distress, appears stated age and cooperative. HEENT: Pupils equal, round, and reactive to light. Conjunctivae/corneas clear. Neck: Supple, with full range of motion. No jugular venous distention. Trachea midline. Respiratory:  Normal respiratory effort. Clear to auscultation, bilaterally without Rales/Wheezes/Rhonchi. Cardiovascular: Regular rate and rhythm with normal S1/S2 without murmurs, rubs or gallops. Abdomen: Soft, non-tender, non-distended with normal bowel sounds. Musculoskeletal: passive and active ROM x 4 extremities. Skin: Skin color, texture, turgor normal.  No rashes or lesions. Neurologic:  Neurovascularly intact without any focal sensory/motor deficits. Cranial nerves: II-XII intact, grossly non-focal.  Psychiatric: Alert and oriented, thought content appropriate, normal insight  Capillary Refill: Brisk,< 3 seconds   Peripheral Pulses: +2 palpable, equal bilaterally       Labs:   Recent Labs     05/05/19 2055   WBC 13.8*   HGB 16.2   HCT 45.5        Recent Labs     05/05/19 2055 05/06/19  0558 05/07/19  0522   * 132* 138   K 3.6 4.4 4.2   CL 96* 100 103   CO2 24 18* 23   BUN 16 22 15   CREATININE 0.8 1.0 0.6   CALCIUM  --  8.2* 8.6     Recent Labs     05/05/19 2055   AST 57*   *   BILITOT 1.0   ALKPHOS 165*     No results for input(s): INR in the last 72 hours. No results for input(s): Tiago Pheasant in the last 72 hours.     Urinalysis:      Lab Results   Component Value Date    NITRU NEGATIVE 04/24/2019    WBCUA NONE 04/24/2019    BACTERIA NONE 04/24/2019    RBCUA NONE 04/24/2019    RBCUA 0-2 12/14/2018    BLOODU NEGATIVE 04/24/2019    SPECGRAV <1.005 04/24/2019    GLUCOSEU NEGATIVE 12/14/2018       Radiology:  No orders to display       Diet: DIET GENERAL;    DVT prophylaxis: [] Lovenox                                 [] SCDs                                 [] SQ Heparin [] Encourage ambulation           [] Already on Anticoagulation     Disposition:    [] Home       [] TCU       [] Rehab       [] Psych       [] SNF       [] [place]       [] Other-    Code Status: Full Code    PT/OT Eval Status:     Assessment/Plan:    Anticipated Discharge in :    C/Sean [address] Problems    Diagnosis Date Noted    Alcohol withdrawal, uncomplicated ([facility] [H41.408] 05/06/2019    Alcohol use disorder, severe, dependence ([address] [F10.20] 05/06/2019    Essential hypertension [I10]     Alcohol withdrawal delirium ([address] [F10.231] 04/26/2019    Alcohol intoxication in alcoholism with blood level over 0.3 with complication ([address] [Y15.141] 09/50/9881    Alcoholic cirrhosis ([address] [K41.26] 12/14/2018    Alcohol dependence with physiological dependence, continuous ([address] [Z55.97]     Alcoholic hepatitis without ascites [K70.10] 05/14/2018    Type 2 diabetes mellitus without complication, without long-term current use of insulin ([address] [E11.9] 08/28/2017    Alcohol abuse [F10.10] 07/09/2015     Alcohol use disorder  here for detox  Ciwa, vitamins, monitor lytes     Addiction services. F/U at Dr. Drake Enriquez office today after discharge    Type 2 DM. Elevated A1C 6.7%  Instruct diet.   PCP to manage, metformin may not be a good choice with alcohol disorder    Disposition  Likely to be discharged today  Follow-up with primary physician and Dr. Irena Blake as outpatient once discharged        Electronically signed by Renée Diaz MD on 5/8/2019 at 12:00 PM

## 2019-05-08 NOTE — TELEPHONE ENCOUNTER
.Transition of Care visit scheduled.   5/15/2019  Patient is being discharged to Home  Date of discharge 5/8/19  Discharge from facility [facility]  Reason for admission Detox

## 2019-05-08 NOTE — PROGRESS NOTES
[facility] PROFESSIONAL SERVS  PHYSICIANS [facility]. [place] [address] [address], [facility]  Dept: [phone]  Dept Fax: [phone]      No chief complaint on file. Patient presents for evaluation of alcohol abuse. I have never seen the patient before. Patient was recently admitted for alcoholic intoxication. He has had multiple admissions for this. He thinks he drinks about 30 beers daily. He's been drinking that much the last 5 years. He started drinking at age 13.   He says it runs in his family  Past Medical History:   Diagnosis Date    COPD (chronic obstructive pulmonary disease) [facility]     Depression     Diabetes mellitus ([facility]     ETOH abuse     Hyperlipidemia     Hypertension     Liver disease     Seizures (HCC)     etoh wdl       Current Outpatient Medications   Medication Sig Dispense Refill    naltrexone (DEPADE) 50 MG tablet Take 1 tablet by mouth daily (with breakfast) 30 tablet 0    metoprolol tartrate (LOPRESSOR) 50 MG tablet Take 1 tablet by mouth 2 times daily 60 tablet 1    simethicone (MYLICON) 80 MG chewable tablet Take 1 tablet by mouth every 6 hours as needed for Flatulence (abdominal cramping) 30 tablet 0    lisinopril-hydrochlorothiazide (PRINZIDE;ZESTORETIC) 20-25 MG per tablet Take 1 tablet by mouth daily 30 tablet 1    famotidine (PEPCID) 20 MG tablet Take 1 tablet by mouth nightly 30 tablet 1    Omega-3 1000 MG CAPS Take by mouth      Multiple Vitamins-Iron (TAB-A-REBEKAH/IRON) TABS Take by mouth daily      LACTULOSE PO Take by mouth 2 times daily       OLANZapine (ZYPREXA) 10 MG tablet Take 1 tablet by mouth nightly 30 tablet 5    melatonin 1 MG/4ML LIQD SL liquid Place 0.3 mg under the tongue nightly as needed for Sleep      venlafaxine (EFFEXOR XR) 150 MG extended release capsule Take 1 capsule by mouth daily 30 capsule 5    folic acid (FOLVITE) 1 MG tablet Take 1 tablet by mouth daily 30 tablet 3    vitamin B-1 100 MG tablet Take 2 tablets by mouth Gets together: Not on file     Attends [employer] service: Not on file     Active member of club or organization: Not on file     Attends meetings of clubs or organizations: Not on file     Relationship status: Not on file    Intimate partner violence:     Fear of current or ex partner: Not on file     Emotionally abused: Not on file     Physically abused: Not on file     Forced sexual activity: Not on file   Other Topics Concern    Not on file   Social History Narrative    Not on file   lives with mom  No kids  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    Family History   Problem Relation Age of Onset    High Blood Pressure Father     Cancer Father         Lung Cancer    Alcohol Abuse Father     High Blood Pressure Brother     Diabetes Mother     Heart Disease Mother         Stent Placement    Arthritis Mother     Depression Mother     High Blood Pressure Mother     High Cholesterol Mother     Alcohol Abuse Paternal Uncle        Review of Systems - General ROS: negative  Psychological ROS: negative  Hematological and Lymphatic ROS: negative  Respiratory ROS: no cough, shortness of breath, or wheezing  Cardiovascular ROS: no chest pain or dyspnea on exertion  Gastrointestinal ROS: no abdominal pain, change in bowel habits, or black or bloody stools  Genito-Urinary ROS: no dysuria, trouble voiding, or hematuria  Musculoskeletal ROS: negative  Neurological ROS: no TIA or stroke symptoms  Dermatological ROS: negative    There were no vitals taken for this visit.     Physical Examination: General appearance - alert, well appearing, and in no distress  HEENT-head normocephalic, atraumatic  Mental status - alert, oriented to person, place, and time  Neck - supple, no significant adenopathy  Chest - clear to auscultation, no wheezes, rales or rhonchi, symmetric air entry  Heart - normal rate, regular rhythm, normal S1, S2, no murmurs, rubs, clicks or gallops  Abdomen - soft, nontender, nondistended, no masses or organomegaly  Neurological - alert, oriented, normal speech, no focal findings or movement disorder noted  Musculoskeletal - no joint tenderness, deformity or swelling  Extremities - peripheral pulses normal, no pedal edema, no clubbing or cyanosis  Skin - normal coloration and turgor, no rashes, no suspicious skin lesions noted        Diagnosis Orders   1. Severe alcohol use disorder (HCC)  POCT Rapid Drug Screen    naltrexone (VIVITROL) 380 MG injection       Orders Placed This Encounter   Procedures    POCT Rapid Drug Screen     Patient was given 25 mg test dose of by mouth naltrexone. There were no adverse effect. I'm going to put him on 50 mg daily. We'll order Vivitrol 380 mg IM to be given when he comes in  I told him he needs intense counseling.   IOP meetings, etc.  Follow-up 5/21

## 2019-05-09 NOTE — TELEPHONE ENCOUNTER
[address] Transitions Initial Follow Up Call    Call within 2 business days of discharge: Yes     Patient: Shaneka Espinal Patient : 1975   MRN: [id number]  Reason for Admission: alcohol withdrawal syndrome  Discharge Date: 19 RARS: Readmission Risk Score: 48       Spoke with: Left message for patient to call the office back, 2nd attempt spoke with mother      Discharge department/facility: home    Non-face-to-face services provided:  Scheduled appointment with PCP-5-15-19 KAREEN Denton    Follow Up  Future Appointments   Date Time Provider Amanda Jerome   5/15/2019  9:30 AM NILAM Owusu CNP [facility]   2019 11:30 AM Foye Saint, MD [facility]   2019  1:00 PM [facility], LPN     Have the medications prescribed at discharge been filled? yes  Does the patient understand what the medications are for? yes  Has the patient experienced any new symptoms or have previous symptoms worsened? no  Is the patient experiencing any pain? no If yes, is the pain well controlled? no  Does the patient understand all the discharge instructions and how to care for self at home? yes  Does the patient have any questions/concerns? no

## 2019-05-14 ENCOUNTER — TELEPHONE (OUTPATIENT)
Dept: INTERNAL MEDICINE CLINIC | Age: 44
End: 2019-05-14

## 2019-05-16 ENCOUNTER — HOSPITAL ENCOUNTER (EMERGENCY)
Age: 44
Discharge: HOME | End: 2019-05-16
Payer: MEDICAID

## 2019-05-16 ENCOUNTER — TELEPHONE (OUTPATIENT)
Dept: INTERNAL MEDICINE CLINIC | Age: 44
End: 2019-05-16

## 2019-05-16 ENCOUNTER — OFFICE VISIT (OUTPATIENT)
Dept: FAMILY MEDICINE CLINIC | Age: 44
End: 2019-05-16
Payer: MEDICAID

## 2019-05-16 VITALS
SYSTOLIC BLOOD PRESSURE: 124 MMHG | RESPIRATION RATE: 18 BRPM | WEIGHT: 253 LBS | BODY MASS INDEX: 35.29 KG/M2 | TEMPERATURE: 98.5 F | DIASTOLIC BLOOD PRESSURE: 82 MMHG | HEART RATE: 98 BPM

## 2019-05-16 VITALS
DIASTOLIC BLOOD PRESSURE: 70 MMHG | HEART RATE: 99 BPM | HEIGHT: 71 IN | TEMPERATURE: 98.6 F | BODY MASS INDEX: 35.56 KG/M2 | WEIGHT: 254 LBS | OXYGEN SATURATION: 95 % | RESPIRATION RATE: 18 BRPM | SYSTOLIC BLOOD PRESSURE: 159 MMHG

## 2019-05-16 DIAGNOSIS — F10.20 ALCOHOL DEPENDENCE WITH PHYSIOLOGICAL DEPENDENCE, CONTINUOUS (HCC): Primary | ICD-10-CM

## 2019-05-16 DIAGNOSIS — F31.9 BIPOLAR 1 DISORDER (HCC): ICD-10-CM

## 2019-05-16 DIAGNOSIS — E11.9 TYPE 2 DIABETES MELLITUS WITHOUT COMPLICATION, WITHOUT LONG-TERM CURRENT USE OF INSULIN (HCC): ICD-10-CM

## 2019-05-16 DIAGNOSIS — F10.10 ALCOHOL ABUSE: Primary | ICD-10-CM

## 2019-05-16 DIAGNOSIS — R10.13 ABDOMINAL PAIN, EPIGASTRIC: ICD-10-CM

## 2019-05-16 LAB
ACETAMINOPHEN LEVEL: < 5 UG/ML (ref 0–20)
ALBUMIN SERPL-MCNC: 3.4 G/DL (ref 3.5–5.1)
ALP BLD-CCNC: 155 U/L (ref 38–126)
ALT SERPL-CCNC: 97 U/L (ref 11–66)
AMPHETAMINE+METHAMPHETAMINE URINE SCREEN: NEGATIVE
ANION GAP SERPL CALCULATED.3IONS-SCNC: 18 MEQ/L (ref 8–16)
AST SERPL-CCNC: 96 U/L (ref 5–40)
BARBITURATE QUANTITATIVE URINE: NEGATIVE
BASOPHILS # BLD: 0.6 %
BASOPHILS ABSOLUTE: 0.1 THOU/MM3 (ref 0–0.1)
BENZODIAZEPINE QUANTITATIVE URINE: NEGATIVE
BILIRUB SERPL-MCNC: 1.4 MG/DL (ref 0.3–1.2)
BILIRUBIN DIRECT: 0.8 MG/DL (ref 0–0.3)
BUN BLDV-MCNC: 12 MG/DL (ref 7–22)
CALCIUM SERPL-MCNC: 8.7 MG/DL (ref 8.5–10.5)
CANNABINOID QUANTITATIVE URINE: NEGATIVE
CHLORIDE BLD-SCNC: 88 MEQ/L (ref 98–111)
CO2: 21 MEQ/L (ref 23–33)
COCAINE METABOLITE QUANTITATIVE URINE: NEGATIVE
CREAT SERPL-MCNC: 0.5 MG/DL (ref 0.4–1.2)
EOSINOPHIL # BLD: 0 %
EOSINOPHILS ABSOLUTE: 0 THOU/MM3 (ref 0–0.4)
ERYTHROCYTE [DISTWIDTH] IN BLOOD BY AUTOMATED COUNT: 12.3 % (ref 11.5–14.5)
ERYTHROCYTE [DISTWIDTH] IN BLOOD BY AUTOMATED COUNT: 43.1 FL (ref 35–45)
ETHYL ALCOHOL, SERUM: < 0.01 %
ETHYL ALCOHOL, SERUM: < 0.01 %
GFR SERPL CREATININE-BSD FRML MDRD: > 90 ML/MIN/1.73M2
GLUCOSE BLD-MCNC: 81 MG/DL (ref 70–108)
HCT VFR BLD CALC: 40.6 % (ref 42–52)
HEMOGLOBIN: 14.7 GM/DL (ref 14–18)
IMMATURE GRANS (ABS): 0.03 THOU/MM3 (ref 0–0.07)
IMMATURE GRANULOCYTES: 0.3 %
LIPASE: 54.4 U/L (ref 5.6–51.3)
LYMPHOCYTES # BLD: 19 %
LYMPHOCYTES ABSOLUTE: 1.7 THOU/MM3 (ref 1–4.8)
MCH RBC QN AUTO: 34.2 PG (ref 26–33)
MCHC RBC AUTO-ENTMCNC: 36.2 GM/DL (ref 32.2–35.5)
MCV RBC AUTO: 94.4 FL (ref 80–94)
MONOCYTES # BLD: 12 %
MONOCYTES ABSOLUTE: 1.1 THOU/MM3 (ref 0.4–1.3)
NUCLEATED RED BLOOD CELLS: 0 /100 WBC
OPIATES, URINE: NEGATIVE
OSMOLALITY CALCULATION: 254 MOSMOL/KG (ref 275–300)
OXYCODONE: NEGATIVE
PHENCYCLIDINE QUANTITATIVE URINE: NEGATIVE
PLATELET # BLD: 263 THOU/MM3 (ref 130–400)
PMV BLD AUTO: 8.7 FL (ref 9.4–12.4)
POTASSIUM SERPL-SCNC: 3.7 MEQ/L (ref 3.5–5.2)
RBC # BLD: 4.3 MILL/MM3 (ref 4.7–6.1)
SALICYLATE, SERUM: < 0.3 MG/DL (ref 2–10)
SEG NEUTROPHILS: 68.1 %
SEGMENTED NEUTROPHILS ABSOLUTE COUNT: 6.2 THOU/MM3 (ref 1.8–7.7)
SODIUM BLD-SCNC: 127 MEQ/L (ref 135–145)
TOTAL PROTEIN: 6.6 G/DL (ref 6.1–8)
WBC # BLD: 9.1 THOU/MM3 (ref 4.8–10.8)

## 2019-05-16 PROCEDURE — 82248 BILIRUBIN DIRECT: CPT

## 2019-05-16 PROCEDURE — 83690 ASSAY OF LIPASE: CPT

## 2019-05-16 PROCEDURE — 80053 COMPREHEN METABOLIC PANEL: CPT

## 2019-05-16 PROCEDURE — 80307 DRUG TEST PRSMV CHEM ANLYZR: CPT

## 2019-05-16 PROCEDURE — 1111F DSCHRG MED/CURRENT MED MERGE: CPT | Performed by: NURSE PRACTITIONER

## 2019-05-16 PROCEDURE — G0480 DRUG TEST DEF 1-7 CLASSES: HCPCS

## 2019-05-16 PROCEDURE — 85025 COMPLETE CBC W/AUTO DIFF WBC: CPT

## 2019-05-16 PROCEDURE — 36415 COLL VENOUS BLD VENIPUNCTURE: CPT

## 2019-05-16 PROCEDURE — 99284 EMERGENCY DEPT VISIT MOD MDM: CPT

## 2019-05-16 PROCEDURE — 99496 TRANSJ CARE MGMT HIGH F2F 7D: CPT | Performed by: NURSE PRACTITIONER

## 2019-05-16 ASSESSMENT — PAIN SCALES - GENERAL: PAINLEVEL_OUTOF10: 5

## 2019-05-16 ASSESSMENT — PAIN DESCRIPTION - PAIN TYPE: TYPE: ACUTE PAIN

## 2019-05-16 ASSESSMENT — ENCOUNTER SYMPTOMS
RHINORRHEA: 0
NAUSEA: 0
WHEEZING: 0
DIARRHEA: 0
SHORTNESS OF BREATH: 0
VOMITING: 0
COUGH: 0
EYE REDNESS: 0
EYE DISCHARGE: 0
BACK PAIN: 0
SORE THROAT: 0
ABDOMINAL PAIN: 0

## 2019-05-16 ASSESSMENT — VISUAL ACUITY
OD: 20/70
OU: 20/40

## 2019-05-16 ASSESSMENT — PAIN DESCRIPTION - ORIENTATION: ORIENTATION: LEFT

## 2019-05-16 ASSESSMENT — PAIN DESCRIPTION - DESCRIPTORS: DESCRIPTORS: ACHING

## 2019-05-16 ASSESSMENT — PAIN DESCRIPTION - LOCATION: LOCATION: RIB CAGE

## 2019-05-16 NOTE — ED NOTES
Patient ambulatory to visual acuity chart. Patient shaky but stable.        Audrey Lux RN  05/16/19 8221

## 2019-05-16 NOTE — PROGRESS NOTES
Post-Discharge Transitional Care Management Services or Hospital Follow Up      Clifford Reyes   YOB: 1975    Date of Office Visit:  5/16/2019  Date of Hospital Admission: 5/5/19  Date of Hospital Discharge: 5/8/19  Risk of hospital readmission (high >=14%.  Medium >=10%) :Readmission Risk Score: 50      Care management risk score Rising risk (score 2-5) and Complex Care (Scores >=6): 14     Non face to face  following discharge, date last encounter closed (first attempt may have been earlier): 5/10/2019  8:08 AM    Call initiated 2 business days of discharge: Yes    Patient Active Problem List   Diagnosis    Hyperlipemia    HTN (hypertension), benign    Type II or unspecified type diabetes mellitus without mention of complication, not stated as uncontrolled    Alcohol withdrawal ([address]    Alcoholic hepatitis    COPD (chronic obstructive pulmonary disease) ([address]    Passive-dependent personality disorder ([address]    Alcohol abuse    Type 2 diabetes mellitus without complication, without long-term current use of insulin (HCC)    Alcoholic liver failure (HCC)    Transaminitis    Hyperbilirubinemia    RUQ pain    Delirious    Alcoholic hepatitis without ascites    Alcoholic steatohepatitis    Hypophosphatemia    Bipolar 1 disorder (HCC)    Alcoholic gastritis without bleeding    Alcohol withdrawal syndrome with complication ([address]    Alcohol dependence with physiological dependence, continuous ([address]    Decompensated liver disease ([address]    Alcoholic cirrhosis ([address]    Alcohol intoxication in alcoholism with blood level over 0.3 with complication ([address]    Acute alcoholic intoxication with complication ([address]    Acute alcoholic hepatitis    Alcohol ingestion    Alcohol withdrawal delirium ([address]    Essential hypertension    Alcohol withdrawal, uncomplicated ([address]    Alcohol use disorder, severe, dependence ([address]       No Known Allergies    Medications listed as ordered at the time taking as of now reconciled against medications ordered at time of hospital discharge: Yes    Chief Complaint   Patient presents with    Follow-Up from Hospital     given vivitrol in hospital        History of Present illness - Follow up of Hospital diagnosis(es):    Diagnosis Orders   1. Alcohol dependence with physiological dependence, continuous ([facility]  [place] DISCHARGE MEDS RECONCILED W/ CURRENT OUTPATIENT MED LIST   2. Bipolar 1 disorder ([facility]  [place] DISCHARGE MEDS RECONCILED W/ CURRENT OUTPATIENT MED LIST   3. Type 2 diabetes mellitus without complication, without long-term current use of insulin ([facility]  [place] DISCHARGE MEDS RECONCILED W/ CURRENT OUTPATIENT MED LIST         Inpatient course: Discharge summary reviewed- see chart. Interval history/Current status: very poor. Pt started drinking again immediately upon discharge. In fact admits to drinking 2 16oz beers prior to visit. (states walked to office)  He is going to skilled nursing in a week. Reports not taking his zyprexa at night and strongly encourage him to do so. Get viivtrol this week. Pt risk of death or rehospitalization is quite high, perhaps spending some time in skilled nursing will help him sober up    A comprehensive review of systems was negative except for what was noted in the HPI. Vitals:    05/16/19 0943   BP: 124/82   Site: Left Upper Arm   Position: Sitting   Cuff Size: Large Adult   Pulse: 98   Resp: 18   Temp: 98.5 °F (36.9 °C)   TempSrc: Temporal   Weight: 253 lb (114.8 kg)     Body mass index is 35.29 kg/m².    Wt Readings from Last 3 Encounters:   05/16/19 253 lb (114.8 kg)   05/05/19 259 lb (117.5 kg)   04/30/19 255 lb 11.7 oz (116 kg)     BP Readings from Last 3 Encounters:   05/16/19 124/82   05/08/19 (!) 159/92   05/02/19 (!) 169/90        Physical Exam:  General Appearance: disoriented  Skin: warm and dry, no rash or erythema  Pulmonary/Chest: clear to auscultation bilaterally- no wheezes, rales or rhonchi, normal air movement, no respiratory distress  Cardiovascular: normal rate, normal S1 and S2, no gallops, intact distal pulses and no carotid bruits  Neurologic: appeared intoxicated    Assessment/Plan:  1. Alcohol dependence with physiological dependence, continuous ([facility]  No doing well, restart zyprexa, get vivitrol  - [place] DISCHARGE MEDS RECONCILED W/ CURRENT OUTPATIENT MED LIST    2. Bipolar 1 disorder ([facility]  Restart zyprexa  - [place] DISCHARGE MEDS RECONCILED W/ CURRENT OUTPATIENT MED LIST    3.  Type 2 diabetes mellitus without complication, without long-term current use of insulin (HCC)  stable  - [place] DISCHARGE MEDS RECONCILED W/ CURRENT OUTPATIENT MED LIST        Medical Decision Making: high complexity

## 2019-05-16 NOTE — PROGRESS NOTES
Assisted Treatment (MAT)? Denies     History of Vivitrol or Suboxone? Is scheduled for vivitrol with Dr. Sue Lyons this Monday     Are you available to present to for detox today or tomorrow if accepted? ED Provider, Tor Gross PA-C, will contact the hospitalist for possible admission for alcohol detox.

## 2019-05-16 NOTE — ED TRIAGE NOTES
Patient comes to ED room 10 per EMS from his home with a chief complaint of \"smokey vision\". States he was outside and his vision turned \"smokey\" / states onset approx 1400 and continues. Patient admits to drinking x2 16 oz beers today since 0800. States he went to his Doctor this morning for a routine visit but did not have any symptoms at that time. Pt states he was recently admitted for alcohol detox for 3 days. Alert and oriented.   IV line per EMS

## 2019-05-16 NOTE — ED NOTES
Patient asking for admission to hospital for alcohol Detox.   Level C paged     Sophy Navarro RN  05/16/19 9759

## 2019-05-16 NOTE — ED PROVIDER NOTES
[facility]  eMERGENCY dEPARTMENT eNCOUnter          [address] [facility]       Chief Complaint   Patient presents with    Eye Problem     \"smokey vision\"    Rib Pain     left ribs       Nurses Notes reviewed and I agree except as noted in the HPI. HISTORY OF PRESENT ILLNESS    Alfred Woodson is a 38 y.o. male who presents with complaints of blurred vision in bilaterally that began at 0900 today while the patient was getting his mail. He denies injury to the area. Prior to him getting the mail he states that he was dry heaving. Patient does wear glasses daily. On evaluation the patient reports that he is an alcoholic. He had 2 16 oz beers prior to arrival. He is now experiencing abdominal upset. Patient states he was admitted for alcohol detox two weeks ago but was discharged and started drinking again a week ago. He states he is afraid that if he does not drink he will \"freak out and die\". He denies fever, chills, emesis, weakness, eye pain, eye discharge, shortness of breath, or chest pain. No further complaints at initial evaluation. REVIEW OF SYSTEMS     Review of Systems   Constitutional: Negative for appetite change, chills, fatigue and fever. HENT: Negative for congestion, ear pain, rhinorrhea and sore throat. Eyes: Positive for visual disturbance (blurred vision bilaterally). Negative for discharge and redness. Respiratory: Negative for cough, shortness of breath and wheezing. Cardiovascular: Negative for chest pain, palpitations and leg swelling. Gastrointestinal: Negative for abdominal pain, diarrhea, nausea and vomiting. Genitourinary: Negative for decreased urine volume, difficulty urinating, dysuria and hematuria. Musculoskeletal: Negative for arthralgias, back pain, joint swelling and neck pain. Skin: Negative for pallor and rash. Allergic/Immunologic: Negative for environmental allergies.    Neurological: Negative for dizziness, syncope, weakness,  (90 Base) MCG/ACT inhaler Inhale 2 puffs into the lungs every 6 hours as needed for WheezingHistorical Med      folic acid (FOLVITE) 1 MG tablet Take 1 tablet by mouth daily, Disp-30 tablet, R-3Normal      vitamin B-1 100 MG tablet Take 2 tablets by mouth daily, Disp-30 tablet, R-3OTC      glucose monitoring kit (FREESTYLE) monitoring kit Disp-1 kit, R-5, NormalGlucometer with test strips and lancets. Check BS once daily  #100 strips and lancets      blood glucose test strips (ASCENSIA AUTODISC VI;ONE TOUCH ULTRA TEST VI) strip Disp-60 strip, R-11, NormalTest as needed. Dispense One Touch verio Test Strips. Dx: Type 2 diabetes (250.00)      Cholecalciferol (VITAMIN D3) 5000 units TABS Take 5,000 Units by mouth once a weekHistorical Med             ALLERGIES     has No Known Allergies. FAMILY HISTORY     indicated that his mother is alive. He indicated that his father is . He indicated that his brother is alive. He indicated that the status of his paternal uncle is unknown.  family history includes Alcohol Abuse in his father and paternal uncle; Arthritis in his mother; Cancer in his father; Depression in his mother; Diabetes in his mother; Heart Disease in his mother; High Blood Pressure in his brother, father, and mother; High Cholesterol in his mother. SOCIAL HISTORY      reports that he has been smoking e-cigarettes and cigarettes. He has a 27.00 pack-year smoking history. He has quit using smokeless tobacco. He reports that he drinks about 84.0 oz of alcohol per week. He reports that he does not use drugs. PHYSICAL EXAM     INITIAL VITALS:  height is 5' 11\" (1.803 m) and weight is 254 lb (115.2 kg). His oral temperature is 98.6 °F (37 °C). His blood pressure is 159/70 (abnormal) and his pulse is 99. His respiration is 18 and oxygen saturation is 95%. Physical Exam   Constitutional: He is oriented to person, place, and time. He appears well-developed and well-nourished.   Non-toxic appearance. On evaluation the patient smelled of alcohol   HENT:   Head: Normocephalic and atraumatic. Right Ear: Tympanic membrane and external ear normal.   Left Ear: Tympanic membrane and external ear normal.   Nose: Nose normal.   Mouth/Throat: Oropharynx is clear and moist and mucous membranes are normal. No oropharyngeal exudate, posterior oropharyngeal edema or posterior oropharyngeal erythema. Eyes: Pupils are equal, round, and reactive to light. Conjunctivae and EOM are normal. Right eye exhibits normal extraocular motion. Left eye exhibits normal extraocular motion. Neck: Normal range of motion. Neck supple. No JVD present. Cardiovascular: Normal rate, regular rhythm, normal heart sounds, intact distal pulses and normal pulses. Exam reveals no gallop and no friction rub. No murmur heard. Pulmonary/Chest: Effort normal and breath sounds normal. No respiratory distress. He has no decreased breath sounds. He has no wheezes. He has no rhonchi. He has no rales. Abdominal: Soft. Bowel sounds are normal. He exhibits no distension. There is tenderness in the epigastric area. There is no rebound, no guarding and no CVA tenderness. Musculoskeletal: Normal range of motion. He exhibits no edema. Neurological: He is alert and oriented to person, place, and time. He has normal strength. No cranial nerve deficit or sensory deficit. He exhibits normal muscle tone. Coordination normal.   Upper extremity tremors that are mild. Patient is neurologically intact. Skin: Skin is warm and dry. No rash noted. He is not diaphoretic. Nursing note and vitals reviewed.          DIFFERENTIAL DIAGNOSIS:   Differential diagnosis discussed with the patient and available family at the patient's bedsideincluding but not limited to alcohol intoxication, alcohol withdraw     DIAGNOSTIC RESULTS   RADIOLOGY: non-plain film images(s) such as CT, Ultrasound and MRI are read by theradiologist.  @[x] Visualized and interpreted by me  And My attending   [x] Radiologist's Wet Read Report Reviewed   [] Discussed with Radiologist.  [] Geovanna Milelr officially read by the radiologist at a later time. A Wet Read was entered by me.   The patient had a   No orders to display       LABS:   Results for orders placed or performed during the hospital encounter of 05/16/19   CBC auto differential   Result Value Ref Range    WBC 9.1 4.8 - 10.8 thou/mm3    RBC 4.30 (L) 4.70 - 6.10 mill/mm3    Hemoglobin 14.7 14.0 - 18.0 gm/dl    Hematocrit 40.6 (L) 42.0 - 52.0 %    MCV 94.4 (H) 80.0 - 94.0 fL    MCH 34.2 (H) 26.0 - 33.0 pg    MCHC 36.2 (H) 32.2 - 35.5 gm/dl    RDW-CV 12.3 11.5 - 14.5 %    RDW-SD 43.1 35.0 - 45.0 fL    Platelets 934 469 - 490 thou/mm3    MPV 8.7 (L) 9.4 - 12.4 fL    Seg Neutrophils 68.1 %    Lymphocytes 19.0 %    Monocytes 12.0 %    Eosinophils 0.0 %    Basophils 0.6 %    Immature Granulocytes 0.3 %    Segs Absolute 6.2 1.8 - 7.7 thou/mm3    Lymphocytes # 1.7 1.0 - 4.8 thou/mm3    Monocytes # 1.1 0.4 - 1.3 thou/mm3    Eosinophils # 0.0 0.0 - 0.4 thou/mm3    Basophils # 0.1 0.0 - 0.1 thou/mm3    Immature Grans (Abs) 0.03 0.00 - 0.07 thou/mm3    nRBC 0 /100 wbc   Basic Metabolic Panel   Result Value Ref Range    Sodium 127 (L) 135 - 145 meq/L    Potassium 3.7 3.5 - 5.2 meq/L    Chloride 88 (L) 98 - 111 meq/L    CO2 21 (L) 23 - 33 meq/L    Glucose 81 70 - 108 mg/dL    BUN 12 7 - 22 mg/dL    CREATININE 0.5 0.4 - 1.2 mg/dL    Calcium 8.7 8.5 - 10.5 mg/dL   Hepatic function panel   Result Value Ref Range    Alb 3.4 (L) 3.5 - 5.1 g/dL    Total Bilirubin 1.4 (H) 0.3 - 1.2 mg/dL    Bilirubin, Direct 0.8 (H) 0.0 - 0.3 mg/dL    Alkaline Phosphatase 155 (H) 38 - 126 U/L    AST 96 (H) 5 - 40 U/L    ALT 97 (H) 11 - 66 U/L    Total Protein 6.6 6.1 - 8.0 g/dL   Lipase   Result Value Ref Range    Lipase 54.4 (H) 5.6 - 37.2 U/L   Salicylate Level   Result Value Ref Range    Salicylate, Serum < 0.3 (L) 2.0 - 10.0 mg/dL   Acetaminophen level   Result Value Ref Range    Acetaminophen Level < 5.0 0.0 - 20.0 ug/mL   Ethanol   Result Value Ref Range    ETHYL ALCOHOL, SERUM < 0.01 0.00 %   Urine Drug Screen   Result Value Ref Range    AMPHETAMINE+METHAMPHETAMINE URINE SCREEN Negative NEGATIVE    Barbiturate Quant, Ur Negative NEGATIVE    Benzodiazepine Quant, Ur Negative NEGATIVE    Cannabinoid Quant, Ur Negative NEGATIVE    Cocaine Metab Quant, Ur Negative NEGATIVE    Opiates, Urine Negative NEGATIVE    Oxycodone Negative NEGATIVE    PCP Quant, Ur Negative NEGATIVE   Anion Gap   Result Value Ref Range    Anion Gap 18.0 (H) 8.0 - 16.0 meq/L   Glomerular Filtration Rate, Estimated   Result Value Ref Range    Est, Glom Filt Rate >90 ml/min/1.73m2   Osmolality   Result Value Ref Range    Osmolality Calc 254.0 (L) 275.0 - 300 mOsmol/kg   Ethanol   Result Value Ref Range    ETHYL ALCOHOL, SERUM < 0.01 0.00 %       EMERGENCY DEPARTMENT COURSE:   Vitals:    Vitals:    05/16/19 1617 05/16/19 1704 05/16/19 1849 05/16/19 2012   BP:  (!) 146/88 (!) 159/70    Pulse: 108 97 100 99   Resp:  22 18    Temp:       TempSrc:       SpO2:  97% 96% 95%   Weight:       Height:           4:49 PM Patient was seen and evaluated in a timely fashion. Patient was seen history physicalexam was performed. Patient remained stable here in the emergency department. Patient's laboratory values, imaging studies and physical examination findings were all reassuring. Upon re-evaluation, the patient is feelingbetter with a benign repeat examination. Patient was comfortable with the plan of discharge home to followup with the primary care provider in the next 2-3 days. The patient is instructed to return to the emergencydepartment for any worsening of their symptoms. Patient was discharged from the emergency department in good condition with all questions answered. See disposition below    CRITICAL CARE:   None    CONSULTS:  None    PROCEDURES:  None    FINAL IMPRESSION      1. Alcohol abuse    2.  Abdominal

## 2019-05-16 NOTE — ED NOTES
Bed: 010A  Expected date: 5/16/19  Expected time: 3:44 PM  Means of arrival: [facility]  Comments:     Jesse Gentile RN  05/16/19 [id number]

## 2019-05-17 NOTE — ED NOTES
Discharge instructions reviewed with pt. He was instructed to continue his vivitrol as prescribed and to follow up with Dr. Willian Tony in 2 days. He was instructed to return to ED for new or worsening symptoms.       Nely Mclain RN  05/16/19 2015

## 2019-05-20 ENCOUNTER — TELEPHONE (OUTPATIENT)
Dept: FAMILY MEDICINE CLINIC | Age: 44
End: 2019-05-20

## 2019-05-21 ENCOUNTER — OFFICE VISIT (OUTPATIENT)
Dept: INTERNAL MEDICINE CLINIC | Age: 44
End: 2019-05-21
Payer: MEDICAID

## 2019-05-21 VITALS
WEIGHT: 246 LBS | HEIGHT: 71 IN | BODY MASS INDEX: 34.44 KG/M2 | HEART RATE: 101 BPM | SYSTOLIC BLOOD PRESSURE: 145 MMHG | DIASTOLIC BLOOD PRESSURE: 91 MMHG

## 2019-05-21 DIAGNOSIS — F10.20 SEVERE ALCOHOL USE DISORDER (HCC): Primary | ICD-10-CM

## 2019-05-21 LAB
AMPHETAMINE SCREEN, URINE: NORMAL
BARBITURATE SCREEN, URINE: NORMAL
BENZODIAZEPINE SCREEN, URINE: NORMAL
BUPRENORPHINE URINE: NORMAL
COCAINE METABOLITE SCREEN URINE: NORMAL
GABAPENTIN SCREEN, URINE: NORMAL
MDMA URINE: NORMAL
METHADONE SCREEN, URINE: NORMAL
METHAMPHETAMINE, URINE: NORMAL
OPIATE SCREEN URINE: NORMAL
OXYCODONE SCREEN URINE: NORMAL
PHENCYCLIDINE SCREEN URINE: NORMAL
PROPOXYPHENE SCREEN, URINE: NORMAL
THC SCREEN, URINE: NORMAL
TRICYCLIC ANTIDEPRESSANTS, UR: NORMAL

## 2019-05-21 PROCEDURE — 4004F PT TOBACCO SCREEN RCVD TLK: CPT | Performed by: INTERNAL MEDICINE

## 2019-05-21 PROCEDURE — 99213 OFFICE O/P EST LOW 20 MIN: CPT | Performed by: INTERNAL MEDICINE

## 2019-05-21 PROCEDURE — 1111F DSCHRG MED/CURRENT MED MERGE: CPT | Performed by: INTERNAL MEDICINE

## 2019-05-21 PROCEDURE — G8427 DOCREV CUR MEDS BY ELIG CLIN: HCPCS | Performed by: INTERNAL MEDICINE

## 2019-05-21 PROCEDURE — 80305 DRUG TEST PRSMV DIR OPT OBS: CPT | Performed by: INTERNAL MEDICINE

## 2019-05-21 PROCEDURE — G8417 CALC BMI ABV UP PARAM F/U: HCPCS | Performed by: INTERNAL MEDICINE

## 2019-05-21 NOTE — PROGRESS NOTES
[facility] PROFESSIONAL SERVS  [facility] [address] [place] [address] [address], [address]  Dept: [phone]  Dept Fax: [phone]      Chief Complaint   Patient presents with    Drug Problem       Patient presents for evaluation of severe alcohol use disorder. I last saw him 5/8. Patient was drinking 30 beers daily. I put him on by mouth naltrexone. I ordered Vivitrol, it is here today  Patient has a court hearing tomorrow regarding his second DUI.   He may be sentenced to senior care  Past Medical History:   Diagnosis Date    [facility]     Depression     Diabetes mellitus ([facility] [facility]     ETOH abuse     Hyperlipidemia     Hypertension     Liver disease     Seizures (HCC)     etoh wdl       Current Outpatient Medications   Medication Sig Dispense Refill    naltrexone (DEPADE) 50 MG tablet Take 1 tablet by mouth daily (with breakfast) 30 tablet 0    metoprolol tartrate (LOPRESSOR) 50 MG tablet Take 1 tablet by mouth 2 times daily 60 tablet 1    lisinopril-hydrochlorothiazide (PRINZIDE;ZESTORETIC) 20-25 MG per tablet Take 1 tablet by mouth daily 30 tablet 1    famotidine (PEPCID) 20 MG tablet Take 1 tablet by mouth nightly 30 tablet 1    Omega-3 1000 MG CAPS Take by mouth      Multiple Vitamins-Iron (TAB-A-REBEKAH/IRON) TABS Take by mouth daily      LACTULOSE PO Take by mouth 2 times daily       OLANZapine (ZYPREXA) 10 MG tablet Take 1 tablet by mouth nightly 30 tablet 5    melatonin 1 MG/4ML LIQD SL liquid Place 0.3 mg under the tongue nightly as needed for Sleep      venlafaxine (EFFEXOR XR) 150 MG extended release capsule Take 1 capsule by mouth daily 30 capsule 5    albuterol sulfate  (90 Base) MCG/ACT inhaler Inhale 2 puffs into the lungs every 6 hours as needed for Wheezing      folic acid (FOLVITE) 1 MG tablet Take 1 tablet by mouth daily 30 tablet 3    vitamin B-1 100 MG tablet Take 2 tablets by mouth daily 30 tablet 3    Cholecalciferol (VITAMIN D3) 5000 units TABS Take 5,000 Units by mouth once a week      nicotine (NICODERM CQ) 21 MG/24HR Place 1 patch onto the skin daily 30 patch 1    simethicone (MYLICON) 80 MG chewable tablet Take 1 tablet by mouth every 6 hours as needed for Flatulence (abdominal cramping) 30 tablet 0    glucose monitoring kit (FREESTYLE) monitoring kit Glucometer with test strips and lancets. Check BS once daily  #100 strips and lancets 1 kit 5    blood glucose test strips (ASCENSIA AUTODISC VI;ONE TOUCH ULTRA TEST VI) strip Test as needed. Dispense One Touch verio Test Strips. Dx: Type 2 diabetes (250.00) 60 strip 11     No current facility-administered medications for this visit. Review of Systems - . He says he is not having any urges her cravings to drink  Blood pressure (!) 145/91, pulse 101, height 5' 11\" (1.803 m), weight 246 lb (111.6 kg). Physical Examination: General appearance - alert, well appearing, and in no distress  HEENT-head normocephalic, atraumatic  Mental status - alert, oriented to person, place, and time         Diagnosis Orders   1. Severe alcohol use disorder ([facility] [address]  POCT Rapid Drug [facility] - Intensive Outpatient Program       Orders Placed This Encounter   Procedures   [facility] - Intensive Outpatient Program     Referral Priority:   Routine     Referral Type:   Eval and Treat     Referral Reason:   Specialty Services Required     Requested Specialty:   Behavioral Health     Number of Visits Requested:   1    POCT Rapid Drug Screen     Patient was given 380 mg IM naltrexone here in the office. There were no complications. We'll set him up for intensive outpatient treatment. We'll also give him a list of all the meetings.   Follow-up 2 weeks

## 2019-05-21 NOTE — PROGRESS NOTES
Verbal order per Dr. Iris Valle for urine drug screen. Negative for all drugs. Verified results with Abraham Ovalles RN. Dr. Iris Valle ordered Vivitrol 380 IM injection for patient. Verified dose with patient. Patient given Vivitrol 380 mg IM injection into L buttocks. No adverse reactions noted. Patient was sent home with no medication and will be seen back in the office on 6/4/2019. Pt is to be scheduled with IOP. Pt given information on AA meetings.

## 2019-05-24 ENCOUNTER — TELEPHONE (OUTPATIENT)
Dept: PSYCHIATRY | Age: 44
End: 2019-05-24

## 2019-05-24 NOTE — TELEPHONE ENCOUNTER
Called patient regarding IOP referral, woman answered phone and stated that yesterday he went to rehab in [place].

## 2019-08-08 ENCOUNTER — NURSE ONLY (OUTPATIENT)
Dept: LAB | Age: 44
End: 2019-08-08

## 2019-08-08 ENCOUNTER — OFFICE VISIT (OUTPATIENT)
Dept: FAMILY MEDICINE CLINIC | Age: 44
End: 2019-08-08
Payer: MEDICAID

## 2019-08-08 VITALS
HEART RATE: 83 BPM | WEIGHT: 240.8 LBS | RESPIRATION RATE: 20 BRPM | TEMPERATURE: 97.3 F | HEIGHT: 71 IN | SYSTOLIC BLOOD PRESSURE: 128 MMHG | BODY MASS INDEX: 33.71 KG/M2 | OXYGEN SATURATION: 97 % | DIASTOLIC BLOOD PRESSURE: 78 MMHG

## 2019-08-08 DIAGNOSIS — E11.9 TYPE 2 DIABETES MELLITUS WITHOUT COMPLICATION, WITHOUT LONG-TERM CURRENT USE OF INSULIN (HCC): ICD-10-CM

## 2019-08-08 DIAGNOSIS — F31.9 BIPOLAR 1 DISORDER (HCC): ICD-10-CM

## 2019-08-08 DIAGNOSIS — F10.20 ALCOHOL DEPENDENCE WITH PHYSIOLOGICAL DEPENDENCE, CONTINUOUS (HCC): ICD-10-CM

## 2019-08-08 LAB
ALBUMIN SERPL-MCNC: 4.5 G/DL (ref 3.5–5.1)
ALP BLD-CCNC: 104 U/L (ref 38–126)
ALT SERPL-CCNC: 43 U/L (ref 11–66)
ANION GAP SERPL CALCULATED.3IONS-SCNC: 13 MEQ/L (ref 8–16)
AST SERPL-CCNC: 34 U/L (ref 5–40)
AVERAGE GLUCOSE: 111 MG/DL (ref 70–126)
BASOPHILS # BLD: 0.7 %
BASOPHILS ABSOLUTE: 0 THOU/MM3 (ref 0–0.1)
BILIRUB SERPL-MCNC: 0.4 MG/DL (ref 0.3–1.2)
BUN BLDV-MCNC: 14 MG/DL (ref 7–22)
CALCIUM SERPL-MCNC: 10.1 MG/DL (ref 8.5–10.5)
CHLORIDE BLD-SCNC: 104 MEQ/L (ref 98–111)
CHOLESTEROL, TOTAL: 231 MG/DL (ref 100–199)
CO2: 23 MEQ/L (ref 23–33)
CREAT SERPL-MCNC: 0.7 MG/DL (ref 0.4–1.2)
EOSINOPHIL # BLD: 1.9 %
EOSINOPHILS ABSOLUTE: 0.1 THOU/MM3 (ref 0–0.4)
ERYTHROCYTE [DISTWIDTH] IN BLOOD BY AUTOMATED COUNT: 12.6 % (ref 11.5–14.5)
ERYTHROCYTE [DISTWIDTH] IN BLOOD BY AUTOMATED COUNT: 44.9 FL (ref 35–45)
GFR SERPL CREATININE-BSD FRML MDRD: > 90 ML/MIN/1.73M2
GLUCOSE BLD-MCNC: 91 MG/DL (ref 70–108)
HBA1C MFR BLD: 5.7 % (ref 4.4–6.4)
HCT VFR BLD CALC: 48.5 % (ref 42–52)
HDLC SERPL-MCNC: 51 MG/DL
HEMOGLOBIN: 16.2 GM/DL (ref 14–18)
IMMATURE GRANS (ABS): 0.02 THOU/MM3 (ref 0–0.07)
IMMATURE GRANULOCYTES: 0 %
LDL CHOLESTEROL CALCULATED: 146 MG/DL
LYMPHOCYTES # BLD: 22.4 %
LYMPHOCYTES ABSOLUTE: 1.5 THOU/MM3 (ref 1–4.8)
MCH RBC QN AUTO: 32.8 PG (ref 26–33)
MCHC RBC AUTO-ENTMCNC: 33.4 GM/DL (ref 32.2–35.5)
MCV RBC AUTO: 98.2 FL (ref 80–94)
MONOCYTES # BLD: 6.3 %
MONOCYTES ABSOLUTE: 0.4 THOU/MM3 (ref 0.4–1.3)
NUCLEATED RED BLOOD CELLS: 0 /100 WBC
PLATELET # BLD: 267 THOU/MM3 (ref 130–400)
PMV BLD AUTO: 10.5 FL (ref 9.4–12.4)
POTASSIUM SERPL-SCNC: 4.2 MEQ/L (ref 3.5–5.2)
RBC # BLD: 4.94 MILL/MM3 (ref 4.7–6.1)
SEG NEUTROPHILS: 68.4 %
SEGMENTED NEUTROPHILS ABSOLUTE COUNT: 4.7 THOU/MM3 (ref 1.8–7.7)
SODIUM BLD-SCNC: 140 MEQ/L (ref 135–145)
TOTAL PROTEIN: 7.8 G/DL (ref 6.1–8)
TRIGL SERPL-MCNC: 170 MG/DL (ref 0–199)
WBC # BLD: 6.9 THOU/MM3 (ref 4.8–10.8)

## 2019-08-08 PROCEDURE — G8427 DOCREV CUR MEDS BY ELIG CLIN: HCPCS | Performed by: NURSE PRACTITIONER

## 2019-08-08 PROCEDURE — 4004F PT TOBACCO SCREEN RCVD TLK: CPT | Performed by: NURSE PRACTITIONER

## 2019-08-08 PROCEDURE — G8417 CALC BMI ABV UP PARAM F/U: HCPCS | Performed by: NURSE PRACTITIONER

## 2019-08-08 PROCEDURE — 3044F HG A1C LEVEL LT 7.0%: CPT | Performed by: NURSE PRACTITIONER

## 2019-08-08 PROCEDURE — 2022F DILAT RTA XM EVC RTNOPTHY: CPT | Performed by: NURSE PRACTITIONER

## 2019-08-08 PROCEDURE — 99214 OFFICE O/P EST MOD 30 MIN: CPT | Performed by: NURSE PRACTITIONER

## 2019-08-08 RX ORDER — NICOTINE 21 MG/24HR
1 PATCH, TRANSDERMAL 24 HOURS TRANSDERMAL DAILY
Qty: 30 PATCH | Refills: 1 | Status: CANCELLED | OUTPATIENT
Start: 2019-08-08

## 2019-08-08 RX ORDER — METOPROLOL TARTRATE 50 MG/1
50 TABLET, FILM COATED ORAL 2 TIMES DAILY
Qty: 60 TABLET | Refills: 5 | Status: SHIPPED | OUTPATIENT
Start: 2019-08-08 | End: 2020-09-29 | Stop reason: SDUPTHER

## 2019-08-08 RX ORDER — FAMOTIDINE 20 MG/1
20 TABLET, FILM COATED ORAL NIGHTLY
Qty: 30 TABLET | Refills: 1 | Status: SHIPPED | OUTPATIENT
Start: 2019-08-08 | End: 2019-12-30 | Stop reason: SDUPTHER

## 2019-08-08 RX ORDER — OLANZAPINE 10 MG/1
10 TABLET ORAL NIGHTLY
Qty: 30 TABLET | Refills: 5 | Status: CANCELLED | OUTPATIENT
Start: 2019-08-08

## 2019-08-08 RX ORDER — VENLAFAXINE HYDROCHLORIDE 150 MG/1
150 CAPSULE, EXTENDED RELEASE ORAL DAILY
Qty: 30 CAPSULE | Refills: 5 | Status: SHIPPED | OUTPATIENT
Start: 2019-08-08 | End: 2020-06-11

## 2019-08-08 RX ORDER — FOLIC ACID 1 MG/1
1 TABLET ORAL DAILY
Qty: 30 TABLET | Refills: 5 | Status: ON HOLD | OUTPATIENT
Start: 2019-08-08 | End: 2020-02-21 | Stop reason: SDUPTHER

## 2019-08-08 RX ORDER — LISINOPRIL AND HYDROCHLOROTHIAZIDE 25; 20 MG/1; MG/1
1 TABLET ORAL DAILY
Qty: 30 TABLET | Refills: 5 | Status: SHIPPED | OUTPATIENT
Start: 2019-08-08 | End: 2019-12-30 | Stop reason: SDUPTHER

## 2019-08-08 RX ORDER — NALTREXONE HYDROCHLORIDE 50 MG/1
50 TABLET, FILM COATED ORAL
Qty: 30 TABLET | Refills: 0 | Status: CANCELLED | OUTPATIENT
Start: 2019-08-08

## 2019-08-08 RX ORDER — SIMETHICONE 80 MG
80 TABLET,CHEWABLE ORAL EVERY 6 HOURS PRN
Qty: 30 TABLET | Refills: 5 | Status: SHIPPED | OUTPATIENT
Start: 2019-08-08 | End: 2019-09-11

## 2019-08-08 ASSESSMENT — ENCOUNTER SYMPTOMS
RESPIRATORY NEGATIVE: 1
EYES NEGATIVE: 1
GASTROINTESTINAL NEGATIVE: 1

## 2019-08-08 NOTE — PROGRESS NOTES
well-nourished. HENT:   Head: Normocephalic. Right Ear: Tympanic membrane and external ear normal.   Left Ear: Tympanic membrane and external ear normal.   Nose: Nose normal.   Mouth/Throat: Oropharynx is clear and moist.   Neck: Normal range of motion. Neck supple. Cardiovascular: Normal rate, regular rhythm, normal heart sounds and intact distal pulses. Exam reveals no gallop and no friction rub. No murmur heard. Pulmonary/Chest: Effort normal and breath sounds normal. He has no wheezes. He has no rales. Abdominal: Soft. Bowel sounds are normal. There is no tenderness. There is no guarding. Musculoskeletal: Normal range of motion. Lymphadenopathy:     He has no cervical adenopathy. Neurological: He is alert and oriented to person, place, and time. He has normal reflexes. Skin: Skin is warm. Psychiatric: He has a normal mood and affect. Assessment:      Diagnosis Orders   1. Type 2 diabetes mellitus without complication, without long-term current use of insulin (HCC)  lisinopril-hydrochlorothiazide (PRINZIDE;ZESTORETIC) 20-25 MG per tablet    CBC Auto Differential    Comprehensive Metabolic Panel    Hemoglobin A1C    Lipid Panel   2. Bipolar 1 disorder (HCC)  folic acid (FOLVITE) 1 MG tablet    metoprolol tartrate (LOPRESSOR) 50 MG tablet    venlafaxine (EFFEXOR XR) 150 MG extended release capsule    simethicone (MYLICON) 80 MG chewable tablet    famotidine (PEPCID) 20 MG tablet   3. Alcohol dependence with physiological dependence, continuous ([address]         Plan:      Return in about 4 weeks (around 9/5/2019).        Orders Placed This Encounter   Procedures    CBC Auto Differential     Standing Status:   Future     Number of Occurrences:   1     Standing Expiration Date:   8/7/2020    Comprehensive Metabolic Panel     Standing Status:   Future     Number of Occurrences:   1     Standing Expiration Date:   8/7/2020    Hemoglobin A1C     Standing Status:   Future     Number of Occurrences:   1     Standing Expiration Date:   8/7/2020    Lipid Panel     Standing Status:   Future     Number of Occurrences:   1     Standing Expiration Date:   8/7/2020     Order Specific Question:   Is Patient Fasting?/# of Hours     Answer:   12     Orders Placed This Encounter   Medications    folic acid (FOLVITE) 1 MG tablet     Sig: Take 1 tablet by mouth daily     Dispense:  30 tablet     Refill:  5    metoprolol tartrate (LOPRESSOR) 50 MG tablet     Sig: Take 1 tablet by mouth 2 times daily     Dispense:  60 tablet     Refill:  5    lisinopril-hydrochlorothiazide (PRINZIDE;ZESTORETIC) 20-25 MG per tablet     Sig: Take 1 tablet by mouth daily     Dispense:  30 tablet     Refill:  5    venlafaxine (EFFEXOR XR) 150 MG extended release capsule     Sig: Take 1 capsule by mouth daily     Dispense:  30 capsule     Refill:  5    simethicone (MYLICON) 80 MG chewable tablet     Sig: Take 1 tablet by mouth every 6 hours as needed for Flatulence (abdominal cramping)     Dispense:  30 tablet     Refill:  5    famotidine (PEPCID) 20 MG tablet     Sig: Take 1 tablet by mouth nightly     Dispense:  30 tablet     Refill:  1      See orders  Labs today  Fu in 4 weeks    Patient given educational materials - seepatient instructions. Discussed use, benefit, and side effects of prescribed medications. All patient questions answered. Pt voiced understanding. Patient agreed withtreatment plan. Follow up as directed.      Electronically signed by NILAM Mei CNP on 8/8/2019 at 4:39 PM

## 2019-08-20 ENCOUNTER — APPOINTMENT (OUTPATIENT)
Dept: GENERAL RADIOLOGY | Age: 44
End: 2019-08-20
Payer: MEDICAID

## 2019-08-20 ENCOUNTER — HOSPITAL ENCOUNTER (EMERGENCY)
Age: 44
Discharge: HOME | End: 2019-08-20
Attending: FAMILY MEDICINE
Payer: MEDICAID

## 2019-08-20 VITALS
HEART RATE: 120 BPM | BODY MASS INDEX: 33.6 KG/M2 | TEMPERATURE: 98.3 F | RESPIRATION RATE: 20 BRPM | HEIGHT: 71 IN | WEIGHT: 240 LBS | DIASTOLIC BLOOD PRESSURE: 92 MMHG | SYSTOLIC BLOOD PRESSURE: 146 MMHG | OXYGEN SATURATION: 96 %

## 2019-08-20 DIAGNOSIS — F10.920 ACUTE ALCOHOLIC INTOXICATION WITHOUT COMPLICATION (HCC): ICD-10-CM

## 2019-08-20 DIAGNOSIS — R03.0 ELEVATED BLOOD PRESSURE READING: ICD-10-CM

## 2019-08-20 DIAGNOSIS — Q78.8 OSTEOPOIKILOSIS: ICD-10-CM

## 2019-08-20 DIAGNOSIS — S60.221A CONTUSION OF RIGHT HAND INCLUDING FINGERS, INITIAL ENCOUNTER: Primary | ICD-10-CM

## 2019-08-20 DIAGNOSIS — S60.00XA CONTUSION OF RIGHT HAND INCLUDING FINGERS, INITIAL ENCOUNTER: Primary | ICD-10-CM

## 2019-08-20 PROCEDURE — 73130 X-RAY EXAM OF HAND: CPT

## 2019-08-20 PROCEDURE — 73110 X-RAY EXAM OF WRIST: CPT

## 2019-08-20 PROCEDURE — 99283 EMERGENCY DEPT VISIT LOW MDM: CPT

## 2019-08-20 ASSESSMENT — ENCOUNTER SYMPTOMS
SHORTNESS OF BREATH: 0
WHEEZING: 0
ABDOMINAL PAIN: 0
SORE THROAT: 0
DIARRHEA: 0
VOMITING: 0
COUGH: 0
BACK PAIN: 0
NAUSEA: 0

## 2019-08-20 ASSESSMENT — PAIN DESCRIPTION - ORIENTATION: ORIENTATION: RIGHT

## 2019-08-20 ASSESSMENT — PAIN DESCRIPTION - DESCRIPTORS: DESCRIPTORS: ACHING

## 2019-08-20 ASSESSMENT — PAIN DESCRIPTION - LOCATION: LOCATION: HAND

## 2019-08-21 ENCOUNTER — TELEPHONE (OUTPATIENT)
Dept: FAMILY MEDICINE CLINIC | Age: 44
End: 2019-08-21

## 2019-08-21 NOTE — ED NOTES
Discharge teaching and instructions for condition explained to patient. AVS reviewed. Patient voiced understanding regarding follow up appointments and care of self at home. Pt discharged to home in stable condition per self. Patient statse that he walked here and is walking home. Observed atient get into his car and drive away. 's office informed.        Ryan Li RN  08/20/19 [id number]

## 2019-08-21 NOTE — ED PROVIDER NOTES
[address] [facility]  eMERGENCY dEPARTMENT eNCOUnter          CHIEF COMPLAINT       Chief Complaint   Patient presents with    Hand Injury       Nurses Notes reviewed and I agree except as noted in the HPI. HISTORY OF PRESENT ILLNESS    Eleanor Hidalgo is a 38 y.o. male who presents for evaluation of right hand and right wrist pain. Patient states that he developed symptoms yesterday after his hand got hit by a door. He rates his aching pain is moderate to severe in severity. He denies having any numbness, tingling, or loss of sensation. He is able to move the digits of the hand and at the wrist but states that it causes pain. REVIEW OF SYSTEMS     Review of Systems   Constitutional: Negative for activity change, appetite change, chills and fever. HENT: Negative for ear pain and sore throat. Respiratory: Negative for cough, shortness of breath and wheezing. Cardiovascular: Negative for chest pain and leg swelling. Gastrointestinal: Negative for abdominal pain, diarrhea, nausea and vomiting. Genitourinary: Negative for dysuria, flank pain and hematuria. Musculoskeletal: Positive for arthralgias and joint swelling. Negative for back pain, gait problem and neck pain. Skin: Negative for rash and wound. Neurological: Negative for weakness, light-headedness and headaches. Psychiatric/Behavioral: Negative for self-injury. The patient is not nervous/anxious. PAST MEDICAL HISTORY    has a past medical history of COPD (chronic obstructive pulmonary disease) ([address] Depression, Diabetes mellitus ([address] ETOH abuse, Hyperlipidemia, Hypertension, Liver disease, and Seizures ([address] SURGICAL HISTORY      has a past surgical history that includes Endoscopy, colon, diagnostic.     CURRENT MEDICATIONS       Previous Medications    ALBUTEROL SULFATE  (90 BASE) MCG/ACT INHALER    Inhale 2 puffs into the lungs every 6 hours as needed for Wheezing    BLOOD GLUCOSE TEST STRIPS (ASCENSIA AUTODISC VI;ONE TOUCH ULTRA TEST VI) STRIP    Test as needed. Dispense One Touch verio Test Strips. Dx: Type 2 diabetes (250.00)    CHOLECALCIFEROL (VITAMIN D3) 5000 UNITS TABS    Take 5,000 Units by mouth once a week    FAMOTIDINE (PEPCID) 20 MG TABLET    Take 1 tablet by mouth nightly    FOLIC ACID (FOLVITE) 1 MG TABLET    Take 1 tablet by mouth daily    GLUCOSE MONITORING KIT (FREESTYLE) MONITORING KIT    Glucometer with test strips and lancets. Check BS once daily  #100 strips and lancets    LACTULOSE PO    Take by mouth 2 times daily     LISINOPRIL-HYDROCHLOROTHIAZIDE (PRINZIDE;ZESTORETIC) 20-25 MG PER TABLET    Take 1 tablet by mouth daily    MELATONIN 1 MG/4ML LIQD SL LIQUID    Place 0.3 mg under the tongue nightly as needed for Sleep    METOPROLOL TARTRATE (LOPRESSOR) 50 MG TABLET    Take 1 tablet by mouth 2 times daily    MULTIPLE VITAMINS-IRON (TAB-A-REBEKAH/IRON) TABS    Take by mouth daily    NICOTINE (NICODERM CQ) 21 MG/24HR    Place 1 patch onto the skin daily    OMEGA-3 1000 MG CAPS    Take by mouth    SIMETHICONE (MYLICON) 80 MG CHEWABLE TABLET    Take 1 tablet by mouth every 6 hours as needed for Flatulence (abdominal cramping)    VENLAFAXINE (EFFEXOR XR) 150 MG EXTENDED RELEASE CAPSULE    Take 1 capsule by mouth daily    VITAMIN B-1 100 MG TABLET    Take 2 tablets by mouth daily       ALLERGIES     has No Known Allergies. FAMILY HISTORY     He indicated that his mother is alive. He indicated that his father is . He indicated that his brother is alive. He indicated that the status of his paternal uncle is unknown.   family history includes Alcohol Abuse in his father and paternal uncle; Arthritis in his mother; Cancer in his father; Depression in his mother; Diabetes in his mother; Heart Disease in his mother; High Blood Pressure in his brother, father, and mother; High Cholesterol in his mother.     SOCIAL HISTORY      reports that he has been smoking e-cigarettes and cigarettes. He has a 27.00 pack-year smoking history. He has quit using smokeless tobacco. He reports that he drinks about 140.0 standard drinks of alcohol per week. He reports that he does not use drugs. PHYSICAL EXAM     INITIAL VITALS:  height is 5' 11\" (1.803 m) and weight is 240 lb (108.9 kg). His oral temperature is 98.3 °F (36.8 °C). His blood pressure is 146/92 (abnormal) and his pulse is 120. His respiration is 20 and oxygen saturation is 96%. Physical Exam   Constitutional: He is oriented to person, place, and time. He appears well-developed and well-nourished. No distress. Patient is intoxicated   HENT:   Head: Normocephalic and atraumatic. Mouth/Throat: Oropharynx is clear and moist.   Eyes: Conjunctivae and EOM are normal. Right eye exhibits no discharge. Left eye exhibits no discharge. Cardiovascular: Normal rate, regular rhythm, normal heart sounds and intact distal pulses. No murmur heard. Pulmonary/Chest: Effort normal and breath sounds normal. He has no wheezes. He exhibits no tenderness. Abdominal: Soft. Bowel sounds are normal. He exhibits no distension. There is no tenderness. There is no guarding. Musculoskeletal: Normal range of motion. He exhibits tenderness (Right hand fifth metatarsal and right wrist.  No tenderness palpation of the distal right forearm. No tenderness to palpation of the right hand fingers. ). He exhibits no edema. Neurological: He is alert and oriented to person, place, and time. No cranial nerve deficit. Skin: Skin is warm and dry. No rash noted. Psychiatric: He has a normal mood and affect. His behavior is normal.   Nursing note and vitals reviewed. DIFFERENTIAL DIAGNOSIS:   Fracture, contusion    DIAGNOSTIC RESULTS         RADIOLOGY: non-plain filmimages(s) such as CT, Ultrasound and MRI are read by the radiologist.  XR HAND RIGHT (MIN 3 VIEWS)   Final Result    No acute fracture or dislocation.    Numerous small well-defined sclerotic foci voice recognition program.  Efforts were made to edit the dictations but occasionally words are mis-transcribed.)    MD Hannah Washington MD  08/20/19 7883

## 2019-08-24 ENCOUNTER — HOSPITAL ENCOUNTER (EMERGENCY)
Age: 44
Discharge: HOME | End: 2019-08-24
Attending: EMERGENCY MEDICINE
Payer: MEDICAID

## 2019-08-24 ENCOUNTER — APPOINTMENT (OUTPATIENT)
Dept: GENERAL RADIOLOGY | Age: 44
End: 2019-08-24
Payer: MEDICAID

## 2019-08-24 VITALS
TEMPERATURE: 98.4 F | HEART RATE: 132 BPM | RESPIRATION RATE: 17 BRPM | HEIGHT: 71 IN | DIASTOLIC BLOOD PRESSURE: 71 MMHG | BODY MASS INDEX: 35.17 KG/M2 | OXYGEN SATURATION: 93 % | WEIGHT: 251.2 LBS | SYSTOLIC BLOOD PRESSURE: 140 MMHG

## 2019-08-24 DIAGNOSIS — R07.89 ATYPICAL CHEST PAIN: ICD-10-CM

## 2019-08-24 DIAGNOSIS — F10.920 ACUTE ALCOHOLIC INTOXICATION WITHOUT COMPLICATION (HCC): Primary | ICD-10-CM

## 2019-08-24 DIAGNOSIS — J40 BRONCHITIS: ICD-10-CM

## 2019-08-24 LAB
ALBUMIN SERPL-MCNC: 3.5 GM/DL (ref 3.4–5)
ALP BLD-CCNC: 165 U/L (ref 46–116)
ALT SERPL-CCNC: 104 U/L (ref 14–63)
ANALYZED BY:: NORMAL
ANION GAP: 13 MEQ/L (ref 8–16)
AST SERPL-CCNC: 106 U/L (ref 15–37)
BASOPHILS # BLD: 0.7 % (ref 0–3)
BILIRUB SERPL-MCNC: 0.6 MG/DL (ref 0.2–1)
BUN BLDV-MCNC: 16 MG/DL (ref 7–18)
CHLORIDE BLD-SCNC: 104 MEQ/L (ref 98–107)
CO2: 25 MEQ/L (ref 21–32)
CREAT SERPL-MCNC: 0.8 MG/DL (ref 0.6–1.3)
DATE OF COLLECTION: NORMAL
DRAWN BY: NORMAL
EKG ATRIAL RATE: 128 BPM
EKG P AXIS: 51 DEGREES
EKG P-R INTERVAL: 114 MS
EKG Q-T INTERVAL: 314 MS
EKG QRS DURATION: 88 MS
EKG QTC CALCULATION (BAZETT): 458 MS
EKG R AXIS: 52 DEGREES
EKG T AXIS: 45 DEGREES
EKG VENTRICULAR RATE: 128 BPM
EOSINOPHILS RELATIVE PERCENT: 1.3 % (ref 0–4)
ETHYL ALCOHOL: 0.31 % (GM/DL)
GFR, ESTIMATED: > 90 ML/MIN/1.73M2
GLUCOSE BLD-MCNC: 212 MG/DL (ref 74–106)
HCT VFR BLD CALC: 45.3 % (ref 42–52)
HEMOGLOBIN: 15.7 GM/DL (ref 14–18)
LYMPHOCYTES # BLD: 16 % (ref 15–47)
Lab: 2035
Lab: NORMAL
MCH RBC QN AUTO: 33.6 PG (ref 27–31)
MCHC RBC AUTO-ENTMCNC: 34.6 GM/DL (ref 33–37)
MCV RBC AUTO: 96.9 FL (ref 80–94)
MONOCYTES: 12.1 % (ref 0–12)
PDW BLD-RTO: 12.2 % (ref 11.5–14.5)
PLATELET # BLD: 278 THOU/MM3 (ref 130–400)
PLATELET ESTIMATE: ABNORMAL
PMV BLD AUTO: 7 FL (ref 7.4–10.4)
POC CALCIUM: 8.6 MG/DL (ref 8.5–10.1)
POTASSIUM SERPL-SCNC: 4.1 MEQ/L (ref 3.5–5.1)
RBC # BLD: 4.68 MILL/MM3 (ref 4.7–6.1)
SCAN OF BLOOD SMEAR: NORMAL
SEGS: 69.9 % (ref 43–75)
SODIUM BLD-SCNC: 142 MEQ/L (ref 136–145)
TOTAL PROTEIN: 7.3 GM/DL (ref 6.4–8.2)
TROPONIN I: < 0.017 NG/ML (ref 0.02–0.05)
WBC # BLD: 9.1 THOU/MM3 (ref 4.8–10.8)

## 2019-08-24 PROCEDURE — G0480 DRUG TEST DEF 1-7 CLASSES: HCPCS

## 2019-08-24 PROCEDURE — 6370000000 HC RX 637 (ALT 250 FOR IP): Performed by: EMERGENCY MEDICINE

## 2019-08-24 PROCEDURE — 84520 ASSAY OF UREA NITROGEN: CPT

## 2019-08-24 PROCEDURE — 84460 ALANINE AMINO (ALT) (SGPT): CPT

## 2019-08-24 PROCEDURE — 93010 ELECTROCARDIOGRAM REPORT: CPT | Performed by: INTERNAL MEDICINE

## 2019-08-24 PROCEDURE — 84132 ASSAY OF SERUM POTASSIUM: CPT

## 2019-08-24 PROCEDURE — 82310 ASSAY OF CALCIUM: CPT

## 2019-08-24 PROCEDURE — 82435 ASSAY OF BLOOD CHLORIDE: CPT

## 2019-08-24 PROCEDURE — 99284 EMERGENCY DEPT VISIT MOD MDM: CPT

## 2019-08-24 PROCEDURE — 85025 COMPLETE CBC W/AUTO DIFF WBC: CPT

## 2019-08-24 PROCEDURE — 6360000002 HC RX W HCPCS: Performed by: EMERGENCY MEDICINE

## 2019-08-24 PROCEDURE — 2709999900 HC NON-CHARGEABLE SUPPLY

## 2019-08-24 PROCEDURE — 84450 TRANSFERASE (AST) (SGOT): CPT

## 2019-08-24 PROCEDURE — 96374 THER/PROPH/DIAG INJ IV PUSH: CPT

## 2019-08-24 PROCEDURE — 82374 ASSAY BLOOD CARBON DIOXIDE: CPT

## 2019-08-24 PROCEDURE — 84155 ASSAY OF PROTEIN SERUM: CPT

## 2019-08-24 PROCEDURE — 93005 ELECTROCARDIOGRAM TRACING: CPT | Performed by: EMERGENCY MEDICINE

## 2019-08-24 PROCEDURE — 82565 ASSAY OF CREATININE: CPT

## 2019-08-24 PROCEDURE — 84484 ASSAY OF TROPONIN QUANT: CPT

## 2019-08-24 PROCEDURE — 82040 ASSAY OF SERUM ALBUMIN: CPT

## 2019-08-24 PROCEDURE — 82247 BILIRUBIN TOTAL: CPT

## 2019-08-24 PROCEDURE — 84295 ASSAY OF SERUM SODIUM: CPT

## 2019-08-24 PROCEDURE — 36415 COLL VENOUS BLD VENIPUNCTURE: CPT

## 2019-08-24 PROCEDURE — 2580000003 HC RX 258: Performed by: EMERGENCY MEDICINE

## 2019-08-24 PROCEDURE — 82947 ASSAY GLUCOSE BLOOD QUANT: CPT

## 2019-08-24 PROCEDURE — 71045 X-RAY EXAM CHEST 1 VIEW: CPT

## 2019-08-24 PROCEDURE — 84075 ASSAY ALKALINE PHOSPHATASE: CPT

## 2019-08-24 RX ORDER — ALBUTEROL SULFATE 90 UG/1
2 AEROSOL, METERED RESPIRATORY (INHALATION) EVERY 4 HOURS PRN
Qty: 1 INHALER | Refills: 0 | Status: ON HOLD | OUTPATIENT
Start: 2019-08-24 | End: 2019-09-06 | Stop reason: SDUPTHER

## 2019-08-24 RX ORDER — IPRATROPIUM BROMIDE AND ALBUTEROL SULFATE 2.5; .5 MG/3ML; MG/3ML
1 SOLUTION RESPIRATORY (INHALATION)
Status: DISCONTINUED | OUTPATIENT
Start: 2019-08-24 | End: 2019-08-25 | Stop reason: HOSPADM

## 2019-08-24 RX ORDER — IPRATROPIUM BROMIDE AND ALBUTEROL SULFATE 2.5; .5 MG/3ML; MG/3ML
1 SOLUTION RESPIRATORY (INHALATION)
Status: DISCONTINUED | OUTPATIENT
Start: 2019-08-25 | End: 2019-08-24 | Stop reason: SDUPTHER

## 2019-08-24 RX ORDER — ONDANSETRON 2 MG/ML
4 INJECTION INTRAMUSCULAR; INTRAVENOUS EVERY 30 MIN PRN
Status: DISCONTINUED | OUTPATIENT
Start: 2019-08-24 | End: 2019-08-25 | Stop reason: HOSPADM

## 2019-08-24 RX ORDER — IPRATROPIUM BROMIDE AND ALBUTEROL SULFATE 2.5; .5 MG/3ML; MG/3ML
1 SOLUTION RESPIRATORY (INHALATION)
Status: DISCONTINUED | OUTPATIENT
Start: 2019-08-25 | End: 2019-08-24

## 2019-08-24 RX ORDER — ASPIRIN 81 MG/1
324 TABLET, CHEWABLE ORAL ONCE
Status: COMPLETED | OUTPATIENT
Start: 2019-08-24 | End: 2019-08-24

## 2019-08-24 RX ORDER — THIAMINE HYDROCHLORIDE 100 MG/ML
100 INJECTION, SOLUTION INTRAMUSCULAR; INTRAVENOUS ONCE
Status: COMPLETED | OUTPATIENT
Start: 2019-08-24 | End: 2019-08-24

## 2019-08-24 RX ORDER — SODIUM CHLORIDE 9 MG/ML
INJECTION, SOLUTION INTRAVENOUS CONTINUOUS
Status: DISCONTINUED | OUTPATIENT
Start: 2019-08-24 | End: 2019-08-25 | Stop reason: HOSPADM

## 2019-08-24 RX ADMIN — IPRATROPIUM BROMIDE AND ALBUTEROL SULFATE 1 AMPULE: .5; 3 SOLUTION RESPIRATORY (INHALATION) at 21:34

## 2019-08-24 RX ADMIN — SODIUM CHLORIDE: 9 INJECTION, SOLUTION INTRAVENOUS at 20:52

## 2019-08-24 RX ADMIN — ASPIRIN 81 MG 324 MG: 81 TABLET ORAL at 20:52

## 2019-08-24 RX ADMIN — THIAMINE HYDROCHLORIDE 100 MG: 100 INJECTION, SOLUTION INTRAMUSCULAR; INTRAVENOUS at 20:52

## 2019-08-24 ASSESSMENT — ENCOUNTER SYMPTOMS
SHORTNESS OF BREATH: 1
ABDOMINAL PAIN: 0
SORE THROAT: 0
WHEEZING: 0
EYE PAIN: 0
EYE DISCHARGE: 0
DIARRHEA: 0
BLOOD IN STOOL: 0

## 2019-08-25 LAB — ALT SERPL-CCNC: 91 U/L (ref 11–66)

## 2019-08-25 NOTE — ED NOTES
Mother at bedside. Updated on pt.  duoneb administered. Education complete.       Andrei Reyes RN  08/24/19 1093

## 2019-08-25 NOTE — ED NOTES
Cherelle Parikh MD at bedside to discuss plan of care. Mother brought to room and both educated pt is not to drive tonight or  to be notified. Verbalized understanding and mother stated she has his keys and is driving him home.        Ankita Hameed RN  08/24/19 5759

## 2019-08-25 NOTE — ED PROVIDER NOTES
colon, diagnostic. CURRENT MEDICATIONS       Previous Medications    BLOOD GLUCOSE TEST STRIPS (ASCENSIA AUTODISC VI;ONE TOUCH ULTRA TEST VI) STRIP    Test as needed. Dispense One Touch verio Test Strips. Dx: Type 2 diabetes (250.00)    CHOLECALCIFEROL (VITAMIN D3) 5000 UNITS TABS    Take 5,000 Units by mouth once a week    FAMOTIDINE (PEPCID) 20 MG TABLET    Take 1 tablet by mouth nightly    FOLIC ACID (FOLVITE) 1 MG TABLET    Take 1 tablet by mouth daily    GLUCOSE MONITORING KIT (FREESTYLE) MONITORING KIT    Glucometer with test strips and lancets. Check BS once daily  #100 strips and lancets    LACTULOSE PO    Take by mouth 2 times daily     LISINOPRIL-HYDROCHLOROTHIAZIDE (PRINZIDE;ZESTORETIC) 20-25 MG PER TABLET    Take 1 tablet by mouth daily    MELATONIN 1 MG/4ML LIQD SL LIQUID    Place 0.3 mg under the tongue nightly as needed for Sleep    METOPROLOL TARTRATE (LOPRESSOR) 50 MG TABLET    Take 1 tablet by mouth 2 times daily    MULTIPLE VITAMINS-IRON (TAB-A-REBEKAH/IRON) TABS    Take by mouth daily    OMEGA-3 1000 MG CAPS    Take by mouth    SIMETHICONE (MYLICON) 80 MG CHEWABLE TABLET    Take 1 tablet by mouth every 6 hours as needed for Flatulence (abdominal cramping)    VENLAFAXINE (EFFEXOR XR) 150 MG EXTENDED RELEASE CAPSULE    Take 1 capsule by mouth daily    VITAMIN B-1 100 MG TABLET    Take 2 tablets by mouth daily       ALLERGIES     has No Known Allergies. FAMILY HISTORY     He indicated that his mother is alive. He indicated that his father is . He indicated that his brother is alive. He indicated that the status of his paternal uncle is unknown.   family history includes Alcohol Abuse in his father and paternal uncle; Arthritis in his mother; Cancer in his father; Depression in his mother; Diabetes in his mother; Heart Disease in his mother; High Blood Pressure in his brother, father, and mother; High Cholesterol in his mother.     SOCIAL HISTORY      reports that he has been smoking e-cigarettes and cigarettes. He has a 27.00 pack-year smoking history. He has quit using smokeless tobacco. He reports that he drinks about 140.0 standard drinks of alcohol per week. He reports that he does not use drugs. PHYSICAL EXAM     INITIAL VITALS:  height is 5' 11\" (1.803 m) and weight is 251 lb 3.2 oz (113.9 kg). His tympanic temperature is 98.4 °F (36.9 °C). His blood pressure is 140/71 (abnormal) and his pulse is 132. His respiration is 17 and oxygen saturation is 93%. Physical Exam   Constitutional: He is oriented to person, place, and time. He smells of alcohol ,  answers questions appropriately   HENT:   Head: Atraumatic. Mouth/Throat: Oropharynx is clear and moist.   Eyes: Pupils are equal, round, and reactive to light. Conjunctivae are normal.   Neck: Neck supple. No JVD present. No tracheal deviation present. No thyromegaly present. Cardiovascular: Regular rhythm. Exam reveals no gallop and no friction rub. No murmur heard. Heart is tachycardic. Pulmonary/Chest: Effort normal and breath sounds normal.   Abdominal: Soft. Bowel sounds are normal. There is no tenderness. Musculoskeletal: He exhibits no edema or tenderness. Neurological: He is alert and oriented to person, place, and time. He has normal strength. No cranial nerve deficit. Coordination normal.   Psychiatric: He has a normal mood and affect. DIFFERENTIAL DIAGNOSIS:       DIAGNOSTIC RESULTS   Radiology:  Chest x-ray was unremarkable.   LABS:   Labs Reviewed   CBC WITH AUTO DIFFERENTIAL - Abnormal; Notable for the following components:       Result Value    RBC 4.68 (*)     MCV 96.9 (*)     MCH 33.6 (*)     MPV 7.0 (*)     Monocytes 12.1 (*)     All other components within normal limits   COMPREHENSIVE METABOLIC PANEL - Abnormal; Notable for the following components:    Glucose 212 (*)      (*)     Alkaline Phosphatase 165 (*)      (*)     All other components within normal limits   TROPONIN

## 2019-08-25 NOTE — ED NOTES
300 ml clear yellow urine in urinal. Pt requesting to call mother. Call placed.  Mother coming per pt     Luciana Hubbard RN  08/24/19 1997

## 2019-08-26 ENCOUNTER — TELEPHONE (OUTPATIENT)
Dept: FAMILY MEDICINE CLINIC | Age: 44
End: 2019-08-26

## 2019-08-29 ENCOUNTER — HOSPITAL ENCOUNTER (EMERGENCY)
Age: 44
Discharge: HOME | End: 2019-08-29
Attending: FAMILY MEDICINE
Payer: MEDICAID

## 2019-08-29 VITALS
TEMPERATURE: 99.1 F | WEIGHT: 245 LBS | RESPIRATION RATE: 20 BRPM | BODY MASS INDEX: 34.17 KG/M2 | SYSTOLIC BLOOD PRESSURE: 144 MMHG | DIASTOLIC BLOOD PRESSURE: 95 MMHG | HEART RATE: 127 BPM | OXYGEN SATURATION: 96 %

## 2019-08-29 DIAGNOSIS — F10.20 ALCOHOLISM (HCC): ICD-10-CM

## 2019-08-29 DIAGNOSIS — F10.920 ACUTE ALCOHOLIC INTOXICATION WITHOUT COMPLICATION (HCC): Primary | ICD-10-CM

## 2019-08-29 LAB
ALBUMIN SERPL-MCNC: 3.5 GM/DL (ref 3.4–5)
ALP BLD-CCNC: 228 U/L (ref 46–116)
ALT SERPL-CCNC: 213 U/L (ref 14–63)
AMORPHOUS: ABNORMAL
ANALYZED BY:: NORMAL
ANION GAP: 17 MEQ/L (ref 8–16)
AST SERPL-CCNC: 279 U/L (ref 5–40)
BACTERIA: ABNORMAL
BASOPHILS # BLD: 0.3 %
BASOPHILS ABSOLUTE: 0 THOU/MM3 (ref 0–0.1)
BILIRUB SERPL-MCNC: 2.9 MG/DL (ref 0.2–1)
BILIRUBIN DIRECT: 1.68 MG/DL (ref 0–0.2)
BILIRUBIN URINE: NEGATIVE
BLOOD, URINE: ABNORMAL
BUN BLDV-MCNC: 16 MG/DL (ref 7–18)
CASTS UA: ABNORMAL /LPF
CHARACTER, URINE: CLEAR
CHLORIDE BLD-SCNC: 98 MEQ/L (ref 98–107)
CO2: 19 MEQ/L (ref 21–32)
COLOR: YELLOW
CREAT SERPL-MCNC: 0.7 MG/DL (ref 0.6–1.3)
CRYSTALS, UA: ABNORMAL
DATE OF COLLECTION: NORMAL
DRAWN BY: NORMAL
EOSINOPHIL # BLD: 0 %
EOSINOPHILS ABSOLUTE: 0 THOU/MM3 (ref 0–0.4)
EPITHELIAL CELLS, UA: ABNORMAL /HPF
ETHYL ALCOHOL: 0.34 % (GM/DL)
GFR, ESTIMATED: > 90 ML/MIN/1.73M2
GLUCOSE BLD-MCNC: 117 MG/DL (ref 74–106)
GLUCOSE, URINE: NEGATIVE MG/DL
HCT VFR BLD CALC: 46.5 % (ref 42–52)
HEMOGLOBIN: 15.6 GM/DL (ref 14–18)
IMMATURE GRANS (ABS): 0.02 THOU/MM3 (ref 0–0.07)
IMMATURE GRANULOCYTES: 0 %
KETONES, URINE: NEGATIVE
LEUKOCYTE ESTERASE, URINE: NEGATIVE
LYMPHOCYTES # BLD: 20.6 %
LYMPHOCYTES ABSOLUTE: 1.8 THOU/MM3 (ref 1–4.8)
Lab: 1720
Lab: NORMAL
MCH RBC QN AUTO: 33.3 PG (ref 27–31)
MCHC RBC AUTO-ENTMCNC: 33.6 GM/DL (ref 33–37)
MCV RBC AUTO: 99 FL (ref 80–94)
MONOCYTES # BLD: 5.4 %
MONOCYTES ABSOLUTE: 0.5 THOU/MM3 (ref 0.4–1.3)
MUCUS: ABNORMAL
NITRITE, URINE: NEGATIVE
NUCLEATED RED BLOOD CELLS: 0 /100 WBC
PDW BLD-RTO: 13 % (ref 11.5–14.5)
PH UA: 5.5 (ref 5–9)
PLATELET # BLD: 170 THOU/MM3 (ref 130–400)
PMV BLD AUTO: 7.8 FL (ref 7.4–10.4)
POC CALCIUM: 8.1 MG/DL (ref 8.5–10.1)
POTASSIUM SERPL-SCNC: 3.8 MEQ/L (ref 3.5–5.1)
PROTEIN UA: 30 MG/DL
RBC # BLD: 4.7 MILL/MM3 (ref 4.7–6.1)
RBC UA: ABNORMAL /HPF
REFLEX TO URINE C & S: ABNORMAL
SEG NEUTROPHILS: 73.5 %
SEGMENTED NEUTROPHILS ABSOLUTE COUNT: 6.4 THOU/MM3 (ref 1.8–7.7)
SODIUM BLD-SCNC: 134 MEQ/L (ref 136–145)
SPECIFIC GRAVITY UA: < 1.005 (ref 1–1.03)
TOTAL PROTEIN: 7.1 GM/DL (ref 6.4–8.2)
UROBILINOGEN, URINE: 0.2 EU/DL (ref 0–1)
WBC # BLD: 9 THOU/MM3 (ref 4.8–10.8)
WBC UA: ABNORMAL /HPF

## 2019-08-29 PROCEDURE — 80048 BASIC METABOLIC PNL TOTAL CA: CPT

## 2019-08-29 PROCEDURE — 82247 BILIRUBIN TOTAL: CPT

## 2019-08-29 PROCEDURE — 84450 TRANSFERASE (AST) (SGOT): CPT

## 2019-08-29 PROCEDURE — 82040 ASSAY OF SERUM ALBUMIN: CPT

## 2019-08-29 PROCEDURE — G0480 DRUG TEST DEF 1-7 CLASSES: HCPCS

## 2019-08-29 PROCEDURE — 85025 COMPLETE CBC W/AUTO DIFF WBC: CPT

## 2019-08-29 PROCEDURE — 2580000003 HC RX 258: Performed by: FAMILY MEDICINE

## 2019-08-29 PROCEDURE — 6360000002 HC RX W HCPCS: Performed by: FAMILY MEDICINE

## 2019-08-29 PROCEDURE — 84155 ASSAY OF PROTEIN SERUM: CPT

## 2019-08-29 PROCEDURE — 2709999900 HC NON-CHARGEABLE SUPPLY

## 2019-08-29 PROCEDURE — 99284 EMERGENCY DEPT VISIT MOD MDM: CPT

## 2019-08-29 PROCEDURE — 82248 BILIRUBIN DIRECT: CPT

## 2019-08-29 PROCEDURE — 96374 THER/PROPH/DIAG INJ IV PUSH: CPT

## 2019-08-29 PROCEDURE — 81001 URINALYSIS AUTO W/SCOPE: CPT

## 2019-08-29 PROCEDURE — 84075 ASSAY ALKALINE PHOSPHATASE: CPT

## 2019-08-29 PROCEDURE — 84460 ALANINE AMINO (ALT) (SGPT): CPT

## 2019-08-29 PROCEDURE — 36415 COLL VENOUS BLD VENIPUNCTURE: CPT

## 2019-08-29 RX ORDER — THIAMINE HYDROCHLORIDE 100 MG/ML
100 INJECTION, SOLUTION INTRAMUSCULAR; INTRAVENOUS ONCE
Status: COMPLETED | OUTPATIENT
Start: 2019-08-29 | End: 2019-08-29

## 2019-08-29 RX ORDER — 0.9 % SODIUM CHLORIDE 0.9 %
1000 INTRAVENOUS SOLUTION INTRAVENOUS ONCE
Status: COMPLETED | OUTPATIENT
Start: 2019-08-29 | End: 2019-08-29

## 2019-08-29 RX ADMIN — THIAMINE HYDROCHLORIDE 100 MG: 100 INJECTION, SOLUTION INTRAMUSCULAR; INTRAVENOUS at 17:11

## 2019-08-29 RX ADMIN — SODIUM CHLORIDE 1000 ML: 9 INJECTION, SOLUTION INTRAVENOUS at 17:11

## 2019-08-29 NOTE — ED NOTES
Pt pink warm and dry, breathing with ease. Pt remains alert and oriented. AVS reviewed. Teach back method used. Denies questions or concerns. Pt discharged in stable condition.       Emilio Lynn RN  08/29/19 5090

## 2019-08-29 NOTE — ED PROVIDER NOTES
(EFFEXOR XR) 150 MG extended release capsule Take 1 capsule by mouth daily 30 capsule 5    simethicone (MYLICON) 80 MG chewable tablet Take 1 tablet by mouth every 6 hours as needed for Flatulence (abdominal cramping) 30 tablet 5    famotidine (PEPCID) 20 MG tablet Take 1 tablet by mouth nightly 30 tablet 1    Omega-3 1000 MG CAPS Take by mouth      Multiple Vitamins-Iron (TAB-A-REBEKAH/IRON) TABS Take by mouth daily      LACTULOSE PO Take by mouth 2 times daily       melatonin 1 MG/4ML LIQD SL liquid Place 0.3 mg under the tongue nightly as needed for Sleep      vitamin B-1 100 MG tablet Take 2 tablets by mouth daily 30 tablet 3    glucose monitoring kit (FREESTYLE) monitoring kit Glucometer with test strips and lancets. Check BS once daily  #100 strips and lancets 1 kit 5    blood glucose test strips (ASCENSIA AUTODISC VI;ONE TOUCH ULTRA TEST VI) strip Test as needed. Dispense One Touch verio Test Strips.   Dx: Type 2 diabetes (250.00) 60 strip 11    Cholecalciferol (VITAMIN D3) 5000 units TABS Take 5,000 Units by mouth once a week         ALLERGIES    No Known Allergies    FAMILY HISTORY    Family History   Problem Relation Age of Onset    High Blood Pressure Father     Cancer Father         Lung Cancer    Alcohol Abuse Father     High Blood Pressure Brother     Diabetes Mother     Heart Disease Mother         Stent Placement    Arthritis Mother     Depression Mother     High Blood Pressure Mother     High Cholesterol Mother     Alcohol Abuse Paternal Uncle        SOCIAL HISTORY    Social History     Socioeconomic History    Marital status: Single     Spouse name: None    Number of children: 0    Years of education: None    Highest education level: None   Occupational History     Employer: [employer]   Social Needs    Financial resource strain: None    Food insecurity:     Worry: None     Inability: None    Transportation needs:     Medical: None     Non-medical: None Tobacco Use    Smoking status: Current Every Day Smoker     Packs/day: 1.00     Years: 27.00     Pack years: 27.00     Types: E-Cigarettes, Cigarettes    Smokeless tobacco: Former User    Tobacco comment: vape   Substance and Sexual Activity    Alcohol use: Yes     Alcohol/week: 140.0 standard drinks     Types: 140 Cans of beer per week     Comment: drinks a 30 pack a day LAST TIME USED 5/5/2019    Drug use: No    Sexual activity: None   Lifestyle    Physical activity:     Days per week: None     Minutes per session: None    Stress: None   Relationships    Social connections:     Talks on phone: None     Gets together: None     Attends Worship service: None     Active member of club or organization: None     Attends meetings of clubs or organizations: None     Relationship status: None    Intimate partner violence:     Fear of current or ex partner: None     Emotionally abused: None     Physically abused: None     Forced sexual activity: None   Other Topics Concern    None   Social History Narrative    None       PHYSICAL EXAM    VITAL SIGNS: BP (!) 144/95   Pulse 127   Temp 99.1 °F (37.3 °C) (Oral)   Resp 20   Wt 245 lb (111.1 kg)   SpO2 96%   BMI 34.17 kg/m²   Constitutional:  Well developed, well nourished, no acute distress, non-toxic appearance   Eyes:   conjunctiva normal   HENT:  Atraumatic, external ears normal, nose normal, oropharynx moist. Neck supple   Respiratory:  No respiratory distress, normal breath sounds   Cardiovascular:  Normal rate, normal rhythm, no murmurs, no gallops, no rubs   GI: Soft nontender nondistended no rebound or guarding  Musculoskeletal:  No edema   Integument:  Well hydrated   Neurologic:  Alert & oriented x 3, no focal deficits      RADIOLOGY/PROCEDURES    The x-ray of his hand and wrist today were negative. The fracture    ED COURSE & MEDICAL DECISION MAKING    Pertinent Labs & Imaging studies reviewed.  (See chart for details)   Urine was clear CBC was essentially normal liver function slightly elevated. Electrolytes looked good. I had an extensive conversation with the patient is alcohol levels 0.34 conversation with him and his mother. I really did not have much to offer him if he sincere about his desire to stop drinking that he needs to present to [facility] tomorrow for an intake and they would be able to consider for an outpatient or extensive outpatient. He is failed multiple times this year there is no signs that he is going through withdrawal at this time he is obviously intoxicated. We did give him some thiamine and some fluids here in the emergency room. He does have the start of liver failure    FINAL IMPRESSION    Alcohol intoxication  Alcoholism  Right hand pain      Plan  Follow-up with Danny Hayes at [facility] for consideration and treatment when he desires to become sober.     He is welcome to return here for any type of problems change with our concern       Nestor Dietz MD  08/29/19 3068

## 2019-09-01 ENCOUNTER — APPOINTMENT (OUTPATIENT)
Dept: ULTRASOUND IMAGING | Age: 44
End: 2019-09-01
Payer: MEDICAID

## 2019-09-01 ENCOUNTER — APPOINTMENT (OUTPATIENT)
Dept: GENERAL RADIOLOGY | Age: 44
End: 2019-09-01
Payer: MEDICAID

## 2019-09-01 ENCOUNTER — HOSPITAL ENCOUNTER (INPATIENT)
Age: 44
LOS: 5 days | Discharge: HOME | End: 2019-09-06
Attending: INTERNAL MEDICINE | Admitting: INTERNAL MEDICINE
Payer: MEDICAID

## 2019-09-01 DIAGNOSIS — J43.1 PANLOBULAR EMPHYSEMA (HCC): Chronic | ICD-10-CM

## 2019-09-01 DIAGNOSIS — F10.921 ACUTE ALCOHOLIC INTOXICATION WITH DELIRIUM (HCC): Primary | ICD-10-CM

## 2019-09-01 DIAGNOSIS — F10.939 ALCOHOL WITHDRAWAL SYNDROME WITH COMPLICATION (HCC): ICD-10-CM

## 2019-09-01 PROBLEM — K85.90 ACUTE PANCREATITIS: Status: ACTIVE | Noted: 2019-09-01

## 2019-09-01 LAB
ALBUMIN SERPL-MCNC: 4 G/DL (ref 3.5–5.1)
ALP BLD-CCNC: 211 U/L (ref 38–126)
ALT SERPL-CCNC: 179 U/L (ref 11–66)
AMMONIA: 27 UMOL/L (ref 11–60)
AMPHETAMINE+METHAMPHETAMINE URINE SCREEN: NEGATIVE
ANION GAP SERPL CALCULATED.3IONS-SCNC: 19 MEQ/L (ref 8–16)
APTT: 34.5 SECONDS (ref 22–38)
AST SERPL-CCNC: 317 U/L (ref 5–40)
BACTERIA: ABNORMAL /HPF
BARBITURATE QUANTITATIVE URINE: NEGATIVE
BASOPHILS # BLD: 0.6 %
BASOPHILS ABSOLUTE: 0 THOU/MM3 (ref 0–0.1)
BENZODIAZEPINE QUANTITATIVE URINE: NEGATIVE
BILIRUB SERPL-MCNC: 3.9 MG/DL (ref 0.3–1.2)
BILIRUBIN DIRECT: 2.7 MG/DL (ref 0–0.3)
BILIRUBIN URINE: NEGATIVE
BLOOD, URINE: ABNORMAL
BUN BLDV-MCNC: 14 MG/DL (ref 7–22)
CALCIUM SERPL-MCNC: 8.4 MG/DL (ref 8.5–10.5)
CANNABINOID QUANTITATIVE URINE: NEGATIVE
CASTS 2: ABNORMAL /LPF
CASTS UA: ABNORMAL /LPF
CHARACTER, URINE: CLEAR
CHLORIDE BLD-SCNC: 93 MEQ/L (ref 98–111)
CO2: 19 MEQ/L (ref 23–33)
COCAINE METABOLITE QUANTITATIVE URINE: NEGATIVE
COLOR: YELLOW
CREAT SERPL-MCNC: 0.5 MG/DL (ref 0.4–1.2)
CRYSTALS, UA: ABNORMAL
EKG ATRIAL RATE: 113 BPM
EKG P AXIS: 42 DEGREES
EKG P-R INTERVAL: 130 MS
EKG Q-T INTERVAL: 336 MS
EKG QRS DURATION: 90 MS
EKG QTC CALCULATION (BAZETT): 460 MS
EKG R AXIS: 9 DEGREES
EKG T AXIS: 37 DEGREES
EKG VENTRICULAR RATE: 113 BPM
EOSINOPHIL # BLD: 0 %
EOSINOPHILS ABSOLUTE: 0 THOU/MM3 (ref 0–0.4)
EPITHELIAL CELLS, UA: ABNORMAL /HPF
ERYTHROCYTE [DISTWIDTH] IN BLOOD BY AUTOMATED COUNT: 14.5 % (ref 11.5–14.5)
ERYTHROCYTE [DISTWIDTH] IN BLOOD BY AUTOMATED COUNT: 49.7 FL (ref 35–45)
ETHYL ALCOHOL, SERUM: 0.25 %
GFR SERPL CREATININE-BSD FRML MDRD: > 90 ML/MIN/1.73M2
GLUCOSE BLD-MCNC: 93 MG/DL (ref 70–108)
GLUCOSE URINE: NEGATIVE MG/DL
HCT VFR BLD CALC: 40.1 % (ref 42–52)
HEMOGLOBIN: 14.3 GM/DL (ref 14–18)
IMMATURE GRANS (ABS): 0.01 THOU/MM3 (ref 0–0.07)
IMMATURE GRANULOCYTES: 0 %
INR BLD: 0.95 (ref 0.85–1.13)
KETONES, URINE: NEGATIVE
LEUKOCYTE ESTERASE, URINE: NEGATIVE
LIPASE: 78.5 U/L (ref 5.6–51.3)
LYMPHOCYTES # BLD: 13.4 %
LYMPHOCYTES ABSOLUTE: 0.9 THOU/MM3 (ref 1–4.8)
MCH RBC QN AUTO: 33.6 PG (ref 26–33)
MCHC RBC AUTO-ENTMCNC: 35.7 GM/DL (ref 32.2–35.5)
MCV RBC AUTO: 94.1 FL (ref 80–94)
MISCELLANEOUS 2: ABNORMAL
MONOCYTES # BLD: 4.9 %
MONOCYTES ABSOLUTE: 0.3 THOU/MM3 (ref 0.4–1.3)
NITRITE, URINE: NEGATIVE
NUCLEATED RED BLOOD CELLS: 0 /100 WBC
OPIATES, URINE: NEGATIVE
OSMOLALITY CALCULATION: 262.8 MOSMOL/KG (ref 275–300)
OXYCODONE: NEGATIVE
PH UA: 6 (ref 5–9)
PHENCYCLIDINE QUANTITATIVE URINE: NEGATIVE
PLATELET # BLD: 144 THOU/MM3 (ref 130–400)
PMV BLD AUTO: 9.4 FL (ref 9.4–12.4)
POTASSIUM REFLEX MAGNESIUM: 3.9 MEQ/L (ref 3.5–5.2)
PROTEIN UA: 100
RBC # BLD: 4.26 MILL/MM3 (ref 4.7–6.1)
RBC URINE: ABNORMAL /HPF
RENAL EPITHELIAL, UA: ABNORMAL
SEG NEUTROPHILS: 81 %
SEGMENTED NEUTROPHILS ABSOLUTE COUNT: 5.6 THOU/MM3 (ref 1.8–7.7)
SODIUM BLD-SCNC: 131 MEQ/L (ref 135–145)
SPECIFIC GRAVITY, URINE: 1.01 (ref 1–1.03)
TOTAL PROTEIN: 7.1 G/DL (ref 6.1–8)
UROBILINOGEN, URINE: 2 EU/DL (ref 0–1)
WBC # BLD: 6.9 THOU/MM3 (ref 4.8–10.8)
WBC UA: ABNORMAL /HPF
YEAST: ABNORMAL

## 2019-09-01 PROCEDURE — 80307 DRUG TEST PRSMV CHEM ANLYZR: CPT

## 2019-09-01 PROCEDURE — 83690 ASSAY OF LIPASE: CPT

## 2019-09-01 PROCEDURE — 93005 ELECTROCARDIOGRAM TRACING: CPT | Performed by: NURSE PRACTITIONER

## 2019-09-01 PROCEDURE — 2580000003 HC RX 258: Performed by: NURSE PRACTITIONER

## 2019-09-01 PROCEDURE — 76705 ECHO EXAM OF ABDOMEN: CPT

## 2019-09-01 PROCEDURE — 2709999900 HC NON-CHARGEABLE SUPPLY

## 2019-09-01 PROCEDURE — 82140 ASSAY OF AMMONIA: CPT

## 2019-09-01 PROCEDURE — 81001 URINALYSIS AUTO W/SCOPE: CPT

## 2019-09-01 PROCEDURE — 2500000003 HC RX 250 WO HCPCS: Performed by: NURSE PRACTITIONER

## 2019-09-01 PROCEDURE — 85730 THROMBOPLASTIN TIME PARTIAL: CPT

## 2019-09-01 PROCEDURE — 6370000000 HC RX 637 (ALT 250 FOR IP): Performed by: INTERNAL MEDICINE

## 2019-09-01 PROCEDURE — 71046 X-RAY EXAM CHEST 2 VIEWS: CPT

## 2019-09-01 PROCEDURE — 6360000002 HC RX W HCPCS: Performed by: INTERNAL MEDICINE

## 2019-09-01 PROCEDURE — 99285 EMERGENCY DEPT VISIT HI MDM: CPT

## 2019-09-01 PROCEDURE — 80076 HEPATIC FUNCTION PANEL: CPT

## 2019-09-01 PROCEDURE — 1200000000 HC SEMI PRIVATE

## 2019-09-01 PROCEDURE — 2500000003 HC RX 250 WO HCPCS: Performed by: INTERNAL MEDICINE

## 2019-09-01 PROCEDURE — 85025 COMPLETE CBC W/AUTO DIFF WBC: CPT

## 2019-09-01 PROCEDURE — 80048 BASIC METABOLIC PNL TOTAL CA: CPT

## 2019-09-01 PROCEDURE — 99223 1ST HOSP IP/OBS HIGH 75: CPT | Performed by: INTERNAL MEDICINE

## 2019-09-01 PROCEDURE — 2580000003 HC RX 258: Performed by: INTERNAL MEDICINE

## 2019-09-01 PROCEDURE — 93010 ELECTROCARDIOGRAM REPORT: CPT | Performed by: NUCLEAR MEDICINE

## 2019-09-01 PROCEDURE — 36415 COLL VENOUS BLD VENIPUNCTURE: CPT

## 2019-09-01 PROCEDURE — G0480 DRUG TEST DEF 1-7 CLASSES: HCPCS

## 2019-09-01 PROCEDURE — 85610 PROTHROMBIN TIME: CPT

## 2019-09-01 PROCEDURE — 96374 THER/PROPH/DIAG INJ IV PUSH: CPT

## 2019-09-01 PROCEDURE — 6360000002 HC RX W HCPCS: Performed by: NURSE PRACTITIONER

## 2019-09-01 RX ORDER — ALBUTEROL SULFATE 90 UG/1
2 AEROSOL, METERED RESPIRATORY (INHALATION) EVERY 4 HOURS PRN
Status: DISCONTINUED | OUTPATIENT
Start: 2019-09-01 | End: 2019-09-06 | Stop reason: HOSPADM

## 2019-09-01 RX ORDER — LORAZEPAM 2 MG/1
2 TABLET ORAL
Status: DISCONTINUED | OUTPATIENT
Start: 2019-09-01 | End: 2019-09-02

## 2019-09-01 RX ORDER — SODIUM CHLORIDE 0.9 % (FLUSH) 0.9 %
10 SYRINGE (ML) INJECTION PRN
Status: DISCONTINUED | OUTPATIENT
Start: 2019-09-01 | End: 2019-09-06 | Stop reason: HOSPADM

## 2019-09-01 RX ORDER — LORAZEPAM 2 MG/ML
1 INJECTION INTRAMUSCULAR
Status: DISCONTINUED | OUTPATIENT
Start: 2019-09-01 | End: 2019-09-02

## 2019-09-01 RX ORDER — LORAZEPAM 2 MG/ML
3 INJECTION INTRAMUSCULAR
Status: DISCONTINUED | OUTPATIENT
Start: 2019-09-01 | End: 2019-09-02

## 2019-09-01 RX ORDER — LORAZEPAM 1 MG/1
1 TABLET ORAL
Status: DISCONTINUED | OUTPATIENT
Start: 2019-09-01 | End: 2019-09-02

## 2019-09-01 RX ORDER — ONDANSETRON 2 MG/ML
4 INJECTION INTRAMUSCULAR; INTRAVENOUS EVERY 6 HOURS PRN
Status: DISCONTINUED | OUTPATIENT
Start: 2019-09-01 | End: 2019-09-06 | Stop reason: HOSPADM

## 2019-09-01 RX ORDER — VENLAFAXINE HYDROCHLORIDE 150 MG/1
150 CAPSULE, EXTENDED RELEASE ORAL DAILY
Status: DISCONTINUED | OUTPATIENT
Start: 2019-09-01 | End: 2019-09-06 | Stop reason: HOSPADM

## 2019-09-01 RX ORDER — LORAZEPAM 2 MG/ML
4 INJECTION INTRAMUSCULAR
Status: DISCONTINUED | OUTPATIENT
Start: 2019-09-01 | End: 2019-09-02

## 2019-09-01 RX ORDER — LORAZEPAM 2 MG/1
4 TABLET ORAL
Status: DISCONTINUED | OUTPATIENT
Start: 2019-09-01 | End: 2019-09-02

## 2019-09-01 RX ORDER — LORAZEPAM 2 MG/ML
2 INJECTION INTRAMUSCULAR
Status: DISCONTINUED | OUTPATIENT
Start: 2019-09-01 | End: 2019-09-02

## 2019-09-01 RX ORDER — LORAZEPAM 2 MG/ML
2 INJECTION INTRAMUSCULAR ONCE
Status: COMPLETED | OUTPATIENT
Start: 2019-09-01 | End: 2019-09-01

## 2019-09-01 RX ORDER — NICOTINE 21 MG/24HR
1 PATCH, TRANSDERMAL 24 HOURS TRANSDERMAL DAILY
Status: DISCONTINUED | OUTPATIENT
Start: 2019-09-01 | End: 2019-09-02

## 2019-09-01 RX ORDER — SODIUM CHLORIDE 0.9 % (FLUSH) 0.9 %
10 SYRINGE (ML) INJECTION EVERY 12 HOURS SCHEDULED
Status: DISCONTINUED | OUTPATIENT
Start: 2019-09-01 | End: 2019-09-06 | Stop reason: HOSPADM

## 2019-09-01 RX ORDER — METOPROLOL TARTRATE 50 MG/1
50 TABLET, FILM COATED ORAL 2 TIMES DAILY
Status: DISCONTINUED | OUTPATIENT
Start: 2019-09-01 | End: 2019-09-06 | Stop reason: HOSPADM

## 2019-09-01 RX ADMIN — LORAZEPAM 2 MG: 2 INJECTION INTRAMUSCULAR; INTRAVENOUS at 13:34

## 2019-09-01 RX ADMIN — LORAZEPAM 2 MG: 2 INJECTION INTRAMUSCULAR; INTRAVENOUS at 16:53

## 2019-09-01 RX ADMIN — SODIUM CHLORIDE, PRESERVATIVE FREE 10 ML: 5 INJECTION INTRAVENOUS at 22:14

## 2019-09-01 RX ADMIN — FAMOTIDINE 20 MG: 10 INJECTION INTRAVENOUS at 20:01

## 2019-09-01 RX ADMIN — FOLIC ACID: 5 INJECTION, SOLUTION INTRAMUSCULAR; INTRAVENOUS; SUBCUTANEOUS at 13:53

## 2019-09-01 RX ADMIN — LORAZEPAM 2 MG: 2 INJECTION INTRAMUSCULAR; INTRAVENOUS at 20:00

## 2019-09-01 RX ADMIN — ENOXAPARIN SODIUM 40 MG: 40 INJECTION SUBCUTANEOUS at 20:00

## 2019-09-01 RX ADMIN — LORAZEPAM 2 MG: 2 INJECTION INTRAMUSCULAR; INTRAVENOUS at 22:08

## 2019-09-01 RX ADMIN — METOPROLOL TARTRATE 50 MG: 25 TABLET ORAL at 22:08

## 2019-09-01 ASSESSMENT — PAIN SCALES - GENERAL
PAINLEVEL_OUTOF10: 7
PAINLEVEL_OUTOF10: 9

## 2019-09-01 ASSESSMENT — PAIN DESCRIPTION - ORIENTATION
ORIENTATION: RIGHT;LEFT
ORIENTATION: LEFT;RIGHT

## 2019-09-01 ASSESSMENT — ENCOUNTER SYMPTOMS
EYE REDNESS: 0
WHEEZING: 0
RHINORRHEA: 0
COUGH: 0
SHORTNESS OF BREATH: 0
VOMITING: 1
EYE DISCHARGE: 0
SORE THROAT: 0
BACK PAIN: 0
ABDOMINAL PAIN: 1
DIARRHEA: 1
NAUSEA: 1

## 2019-09-01 ASSESSMENT — PAIN DESCRIPTION - ONSET: ONSET: ON-GOING

## 2019-09-01 ASSESSMENT — PAIN DESCRIPTION - PROGRESSION
CLINICAL_PROGRESSION: NOT CHANGED

## 2019-09-01 ASSESSMENT — PAIN DESCRIPTION - LOCATION
LOCATION: HEAD
LOCATION: ABDOMEN;FLANK

## 2019-09-01 ASSESSMENT — PAIN DESCRIPTION - PAIN TYPE
TYPE: ACUTE PAIN
TYPE: ACUTE PAIN

## 2019-09-01 ASSESSMENT — PAIN DESCRIPTION - DESCRIPTORS: DESCRIPTORS: HEADACHE

## 2019-09-01 ASSESSMENT — PAIN - FUNCTIONAL ASSESSMENT: PAIN_FUNCTIONAL_ASSESSMENT: PREVENTS OR INTERFERES SOME ACTIVE ACTIVITIES AND ADLS

## 2019-09-01 ASSESSMENT — PAIN DESCRIPTION - FREQUENCY
FREQUENCY: CONTINUOUS
FREQUENCY: CONTINUOUS

## 2019-09-01 NOTE — LETTER
[facility] [address]  [address] [facility] [address]  Phone: [phone]             September 3, 2019    Patient: Michele Minors   YOB: 1975   Date of Visit: 9/1/2019       To Whom It May Concern:    Ronald Lamb is currently admitted and is being treated in our facility beginning 9/1/2019 and remains as an inpatient at this time. Sincerely,       Aniceto James.  Boo Canasg Sara CNP         Signature:__________________________________

## 2019-09-01 NOTE — ED NOTES
Resting in bed eating potato chips family at bedside at this time.       Yaneth Coughlin RN  09/01/19 6265

## 2019-09-01 NOTE — ED PROVIDER NOTES
Neurological: Positive for tremors. Negative for dizziness, syncope, weakness, light-headedness, numbness and headaches. Hematological: Negative for adenopathy. Psychiatric/Behavioral: Positive for hallucinations (auditory and visual). Negative for confusion and suicidal ideas. The patient is not nervous/anxious. PAST MEDICAL HISTORY    has a past medical history of COPD (chronic obstructive pulmonary disease) ([facility] [address] Depression, Diabetes mellitus ([facility] [address] ETOH abuse, Hyperlipidemia, Hypertension, Liver disease, and Seizures ([facility] [address] SURGICAL HISTORY      has a past surgical history that includes Endoscopy, colon, diagnostic. CURRENT MEDICATIONS       Current Discharge Medication List      CONTINUE these medications which have NOT CHANGED    Details   albuterol sulfate HFA (PROVENTIL HFA) 108 (90 Base) MCG/ACT inhaler Inhale 2 puffs into the lungs every 4 hours as needed for Wheezing or Shortness of Breath (Space out to every 6 hours as symptoms improve) Space out to every 6 hours as symptoms improve.   Qty: 1 Inhaler, Refills: 0      folic acid (FOLVITE) 1 MG tablet Take 1 tablet by mouth daily  Qty: 30 tablet, Refills: 5    Associated Diagnoses: Bipolar 1 disorder (HCC)      metoprolol tartrate (LOPRESSOR) 50 MG tablet Take 1 tablet by mouth 2 times daily  Qty: 60 tablet, Refills: 5    Associated Diagnoses: Bipolar 1 disorder (HCC)      lisinopril-hydrochlorothiazide (PRINZIDE;ZESTORETIC) 20-25 MG per tablet Take 1 tablet by mouth daily  Qty: 30 tablet, Refills: 5    Associated Diagnoses: Type 2 diabetes mellitus without complication, without long-term current use of insulin (HCC)      venlafaxine (EFFEXOR XR) 150 MG extended release capsule Take 1 capsule by mouth daily  Qty: 30 capsule, Refills: 5    Associated Diagnoses: Bipolar 1 disorder (HCC)      famotidine (PEPCID) 20 MG tablet Take 1 tablet by mouth nightly  Qty: 30 tablet, Refills: 1    Associated Diagnoses: Bipolar 1 disorder (HCC) other components within normal limits   HEPATIC FUNCTION PANEL - Abnormal; Notable for the following components: Total Bilirubin 3.9 (*)     Bilirubin, Direct 2.7 (*)     Alkaline Phosphatase 211 (*)      (*)      (*)     All other components within normal limits   LIPASE - Abnormal; Notable for the following components:    Lipase 78.5 (*)     All other components within normal limits   URINE WITH REFLEXED MICRO - Abnormal; Notable for the following components:    Blood, Urine SMALL (*)     Protein,  (*)     Urobilinogen, Urine 2.0 (*)     All other components within normal limits   ANION GAP - Abnormal; Notable for the following components:    Anion Gap 19.0 (*)     All other components within normal limits   OSMOLALITY - Abnormal; Notable for the following components:    Osmolality Calc 262.8 (*)     All other components within normal limits   PROTIME-INR   APTT   ETHANOL   URINE DRUG SCREEN   AMMONIA   GLOMERULAR FILTRATION RATE, ESTIMATED   BASIC METABOLIC PANEL W/ REFLEX TO MG FOR LOW K   HEPATIC FUNCTION PANEL   MAGNESIUM   CBC   LIPASE   HEPATITIS PANEL, ACUTE       EMERGENCY DEPARTMENT COURSE:   Vitals:    Vitals:    09/01/19 1606 09/01/19 1638 09/01/19 1945 09/01/19 2204   BP: 136/78 (!) 141/92 135/67 (!) 143/65   Pulse: 126 130 124 120   Resp: 18 18 18    Temp:  99.3 °F (37.4 °C) 98.6 °F (37 °C)    TempSrc:  Oral Oral    SpO2: 94% 92% 92%    Weight:  241 lb 9.6 oz (109.6 kg)     Height:  5' 11\" (1.803 m)         1:15 PM: The patient was seen and evaluated. MDM:  The patient was seen and evaluated within the ED today following abdominal pain. Within the department, I observed the patient to be tachycardic. On exam, I appreciated the patient to have generalized abdominal tenderness and bilateral scleral icterus. Patient also has the smell of alcohol on his breath. Laboratory work was reviewed and discussed. Negative chest xray.  Within the department, the patient was treated with

## 2019-09-01 NOTE — H&P
monitor        Thank you Patria Eisenmenger, APRN - [facility] for the opportunity to be involved in this patient's care.     Electronically signed by Gretchen Liu MD on 9/1/2019 at 4:29 PM

## 2019-09-02 ENCOUNTER — APPOINTMENT (OUTPATIENT)
Dept: ULTRASOUND IMAGING | Age: 44
End: 2019-09-02
Payer: MEDICAID

## 2019-09-02 PROBLEM — F10.921 ACUTE ALCOHOLIC INTOXICATION WITH DELIRIUM (HCC): Status: ACTIVE | Noted: 2019-03-02

## 2019-09-02 LAB
ALBUMIN SERPL-MCNC: 3.6 G/DL (ref 3.5–5.1)
ALP BLD-CCNC: 210 U/L (ref 38–126)
ALT SERPL-CCNC: 150 U/L (ref 11–66)
ANION GAP SERPL CALCULATED.3IONS-SCNC: 14 MEQ/L (ref 8–16)
AST SERPL-CCNC: 255 U/L (ref 5–40)
BILIRUB SERPL-MCNC: 3.4 MG/DL (ref 0.3–1.2)
BILIRUBIN DIRECT: 2 MG/DL (ref 0–0.3)
BUN BLDV-MCNC: 13 MG/DL (ref 7–22)
CALCIUM SERPL-MCNC: 8.4 MG/DL (ref 8.5–10.5)
CHLORIDE BLD-SCNC: 100 MEQ/L (ref 98–111)
CO2: 22 MEQ/L (ref 23–33)
CREAT SERPL-MCNC: 0.6 MG/DL (ref 0.4–1.2)
ERYTHROCYTE [DISTWIDTH] IN BLOOD BY AUTOMATED COUNT: 14.7 % (ref 11.5–14.5)
ERYTHROCYTE [DISTWIDTH] IN BLOOD BY AUTOMATED COUNT: 51.5 FL (ref 35–45)
GFR SERPL CREATININE-BSD FRML MDRD: > 90 ML/MIN/1.73M2
GLUCOSE BLD-MCNC: 91 MG/DL (ref 70–108)
HAV IGM SER IA-ACNC: NEGATIVE
HCT VFR BLD CALC: 35.7 % (ref 42–52)
HEMOGLOBIN: 12.4 GM/DL (ref 14–18)
HEPATITIS B CORE IGM ANTIBODY: NEGATIVE
HEPATITIS B SURFACE ANTIGEN: NEGATIVE
HEPATITIS C ANTIBODY: NEGATIVE
LIPASE: 108 U/L (ref 5.6–51.3)
MAGNESIUM: 1.9 MG/DL (ref 1.6–2.4)
MCH RBC QN AUTO: 33.6 PG (ref 26–33)
MCHC RBC AUTO-ENTMCNC: 34.7 GM/DL (ref 32.2–35.5)
MCV RBC AUTO: 96.7 FL (ref 80–94)
MRSA SCREEN RT-PCR: NEGATIVE
PHOSPHORUS: 2 MG/DL (ref 2.4–4.7)
PLATELET # BLD: 104 THOU/MM3 (ref 130–400)
PMV BLD AUTO: 9.8 FL (ref 9.4–12.4)
POTASSIUM REFLEX MAGNESIUM: 3.8 MEQ/L (ref 3.5–5.2)
RBC # BLD: 3.69 MILL/MM3 (ref 4.7–6.1)
SODIUM BLD-SCNC: 136 MEQ/L (ref 135–145)
TOTAL PROTEIN: 6.3 G/DL (ref 6.1–8)
VANCOMYCIN RESISTANT ENTEROCOCCUS: NEGATIVE
WBC # BLD: 4.2 THOU/MM3 (ref 4.8–10.8)

## 2019-09-02 PROCEDURE — 80074 ACUTE HEPATITIS PANEL: CPT

## 2019-09-02 PROCEDURE — 99233 SBSQ HOSP IP/OBS HIGH 50: CPT | Performed by: INTERNAL MEDICINE

## 2019-09-02 PROCEDURE — 2500000003 HC RX 250 WO HCPCS: Performed by: INTERNAL MEDICINE

## 2019-09-02 PROCEDURE — 85027 COMPLETE CBC AUTOMATED: CPT

## 2019-09-02 PROCEDURE — 6370000000 HC RX 637 (ALT 250 FOR IP): Performed by: INTERNAL MEDICINE

## 2019-09-02 PROCEDURE — 6360000002 HC RX W HCPCS: Performed by: INTERNAL MEDICINE

## 2019-09-02 PROCEDURE — 87500 VANOMYCIN DNA AMP PROBE: CPT

## 2019-09-02 PROCEDURE — 2709999900 HC NON-CHARGEABLE SUPPLY

## 2019-09-02 PROCEDURE — 2580000003 HC RX 258: Performed by: INTERNAL MEDICINE

## 2019-09-02 PROCEDURE — 80048 BASIC METABOLIC PNL TOTAL CA: CPT

## 2019-09-02 PROCEDURE — 99291 CRITICAL CARE FIRST HOUR: CPT | Performed by: INTERNAL MEDICINE

## 2019-09-02 PROCEDURE — 80076 HEPATIC FUNCTION PANEL: CPT

## 2019-09-02 PROCEDURE — 36415 COLL VENOUS BLD VENIPUNCTURE: CPT

## 2019-09-02 PROCEDURE — 2000000000 HC ICU R&B

## 2019-09-02 PROCEDURE — 87081 CULTURE SCREEN ONLY: CPT

## 2019-09-02 PROCEDURE — 83735 ASSAY OF MAGNESIUM: CPT

## 2019-09-02 PROCEDURE — 83690 ASSAY OF LIPASE: CPT

## 2019-09-02 PROCEDURE — 84100 ASSAY OF PHOSPHORUS: CPT

## 2019-09-02 PROCEDURE — 76705 ECHO EXAM OF ABDOMEN: CPT

## 2019-09-02 PROCEDURE — 87641 MR-STAPH DNA AMP PROBE: CPT

## 2019-09-02 RX ORDER — SODIUM CHLORIDE, SODIUM LACTATE, POTASSIUM CHLORIDE, CALCIUM CHLORIDE 600; 310; 30; 20 MG/100ML; MG/100ML; MG/100ML; MG/100ML
INJECTION, SOLUTION INTRAVENOUS CONTINUOUS
Status: DISCONTINUED | OUTPATIENT
Start: 2019-09-02 | End: 2019-09-06

## 2019-09-02 RX ORDER — MULTIVITAMIN WITH FOLIC ACID 400 MCG
1 TABLET ORAL DAILY
Status: DISCONTINUED | OUTPATIENT
Start: 2019-09-02 | End: 2019-09-06 | Stop reason: HOSPADM

## 2019-09-02 RX ORDER — FOLIC ACID 5 MG/ML
1 INJECTION, SOLUTION INTRAMUSCULAR; INTRAVENOUS; SUBCUTANEOUS DAILY
Status: DISCONTINUED | OUTPATIENT
Start: 2019-09-02 | End: 2019-09-06 | Stop reason: HOSPADM

## 2019-09-02 RX ORDER — THIAMINE HYDROCHLORIDE 100 MG/ML
100 INJECTION, SOLUTION INTRAMUSCULAR; INTRAVENOUS DAILY
Status: DISCONTINUED | OUTPATIENT
Start: 2019-09-02 | End: 2019-09-06 | Stop reason: HOSPADM

## 2019-09-02 RX ORDER — LISINOPRIL 10 MG/1
10 TABLET ORAL DAILY
Status: DISCONTINUED | OUTPATIENT
Start: 2019-09-02 | End: 2019-09-05

## 2019-09-02 RX ADMIN — LORAZEPAM 2 MG: 2 INJECTION INTRAMUSCULAR; INTRAVENOUS at 01:26

## 2019-09-02 RX ADMIN — SODIUM CHLORIDE, PRESERVATIVE FREE 10 ML: 5 INJECTION INTRAVENOUS at 20:54

## 2019-09-02 RX ADMIN — LORAZEPAM 4 MG: 2 INJECTION INTRAMUSCULAR; INTRAVENOUS at 13:22

## 2019-09-02 RX ADMIN — ENOXAPARIN SODIUM 40 MG: 40 INJECTION SUBCUTANEOUS at 18:12

## 2019-09-02 RX ADMIN — LORAZEPAM 4 MG: 2 INJECTION INTRAMUSCULAR; INTRAVENOUS at 14:21

## 2019-09-02 RX ADMIN — FOLIC ACID 1 MG: 5 INJECTION, SOLUTION INTRAMUSCULAR; INTRAVENOUS; SUBCUTANEOUS at 10:11

## 2019-09-02 RX ADMIN — THERA TABS 1 TABLET: TAB at 10:12

## 2019-09-02 RX ADMIN — SODIUM CHLORIDE, POTASSIUM CHLORIDE, SODIUM LACTATE AND CALCIUM CHLORIDE: 600; 310; 30; 20 INJECTION, SOLUTION INTRAVENOUS at 17:56

## 2019-09-02 RX ADMIN — PHENOBARBITAL SODIUM 130 MG: 65 INJECTION INTRAMUSCULAR; INTRAVENOUS at 16:20

## 2019-09-02 RX ADMIN — LORAZEPAM 2 MG: 2 INJECTION INTRAMUSCULAR; INTRAVENOUS at 06:35

## 2019-09-02 RX ADMIN — FAMOTIDINE 20 MG: 10 INJECTION INTRAVENOUS at 20:54

## 2019-09-02 RX ADMIN — VENLAFAXINE HYDROCHLORIDE 150 MG: 150 CAPSULE, EXTENDED RELEASE ORAL at 09:10

## 2019-09-02 RX ADMIN — FAMOTIDINE 20 MG: 10 INJECTION INTRAVENOUS at 10:11

## 2019-09-02 RX ADMIN — METOPROLOL TARTRATE 50 MG: 25 TABLET ORAL at 09:10

## 2019-09-02 RX ADMIN — SODIUM CHLORIDE, PRESERVATIVE FREE 10 ML: 5 INJECTION INTRAVENOUS at 10:14

## 2019-09-02 RX ADMIN — LISINOPRIL 10 MG: 10 TABLET ORAL at 10:12

## 2019-09-02 RX ADMIN — LORAZEPAM 3 MG: 2 INJECTION INTRAMUSCULAR; INTRAVENOUS at 11:15

## 2019-09-02 RX ADMIN — THIAMINE HYDROCHLORIDE 100 MG: 100 INJECTION, SOLUTION INTRAMUSCULAR; INTRAVENOUS at 10:11

## 2019-09-02 RX ADMIN — LORAZEPAM 4 MG: 2 INJECTION INTRAMUSCULAR; INTRAVENOUS at 12:17

## 2019-09-02 RX ADMIN — PHENOBARBITAL SODIUM 260 MG: 65 INJECTION INTRAMUSCULAR; INTRAVENOUS at 23:53

## 2019-09-02 RX ADMIN — METOPROLOL TARTRATE 50 MG: 25 TABLET ORAL at 21:40

## 2019-09-02 RX ADMIN — PHENOBARBITAL SODIUM 260 MG: 65 INJECTION INTRAMUSCULAR; INTRAVENOUS at 18:01

## 2019-09-02 ASSESSMENT — PAIN DESCRIPTION - PROGRESSION

## 2019-09-02 ASSESSMENT — PAIN SCALES - GENERAL
PAINLEVEL_OUTOF10: 0
PAINLEVEL_OUTOF10: 0
PAINLEVEL_OUTOF10: 6
PAINLEVEL_OUTOF10: 0

## 2019-09-02 NOTE — PROGRESS NOTES
Pt seen; he is getting more hyperkinetic, needs large doses ativan. Discussed with Dr Kodi Villafana.   Will transfer ICU

## 2019-09-02 NOTE — CONSULTS
fever.  He denies chest pain. He denies shortness of breath. Does have some abdominal discomfort and intermittent nausea. He denies swelling in his legs. He remains delirious and is difficult to reorient. PMH:  Per HPI  SHX: Smokes 1 pack of cigarettes a day. Vapes. Drinks up to 30 beers a day. FHX: Positive for diabetes, hypertension, and heart disease. Allergies: No known drug allergies. Medications:       thiamine  100 mg Intramuscular Daily    folic acid  1 mg Intramuscular Daily    multivitamin  1 tablet Oral Daily    lisinopril  10 mg Oral Daily    potassium replacement protocol   Other RX Placeholder    phosphorus replacement protocol   Other RX Placeholder    magnesium replacement protocol   Other RX Placeholder    metoprolol tartrate  50 mg Oral BID    venlafaxine  150 mg Oral Daily    sodium chloride flush  10 mL Intravenous 2 times per day    enoxaparin  40 mg Subcutaneous Daily    famotidine (PEPCID) injection  20 mg Intravenous BID       Vital Signs: T: 99.3: P: 102 RR: 18 B/P: 159/89: FiO2: 2 L: O2 Sat: 95%: I/O: Pending  CAM-ICU:   GCS 14.  General:   Acutely ill-appearing heavyset white male. HEENT:  normocephalic and atraumatic. Mild scleral icterus. PERR  Neck: supple. No Thyromegaly. Lungs: Expiratory wheeze with exhalation. No retractions  Cardiac: RRR. No JVD. Abdomen: soft. Nontender. Extremities:  No clubbing, cyanosis, or edema x 4. Vasculature: capillary refill < 3 seconds. Palpable dorsalis pedis pulses. Skin:  warm and moist.  Psych: Awake but confused. Slight slurred speech. Oriented to person. Patient is not oriented to time place or circumstance. Lymph:  No supraclavicular adenopathy. Neurologic:  No focal deficit. No seizures. Data: (All radiographs, tracings, PFTs, and imaging are personally viewed and interpreted unless otherwise noted).  Telemetry shows sinus tachycardia with a rate of 102.    Sodium 136, potassium 3.8, chloride 100,

## 2019-09-02 NOTE — PROGRESS NOTES
Pt admitted to  [phone] from ED and via cart/stretcher. Complaints: alcohol detox. IV banana bag infusing into the antecubital left, condition patent and no redness at a rate of 100 mls/ hour with about 700 mls in the bag still. IV site free of s/s of infection or infiltration. Vital signs obtained. Assessment and data collection initiated. Oriented to room. All questions answered with no further questions at this time. Fall prevention and safety brochure discussed with patient. Assessment and data collection initiated. Two nurse skin assessment performed by Gisela Watt and Severiano Dyers RN. Oriented to room. Policies and procedures for 5K explained. All questions answered with no further questions at this time. Fall prevention and safety brochure discussed with patient. Bed alarm on. Call light in reach. The best day to schedule a follow up Dr appointment is:  Monday a.mSerge Loaiza.  Leslie Varela RN 9/1/2019 8:20 PM

## 2019-09-02 NOTE — PROGRESS NOTES
Utilize [facility] alcohol withdrawal scale (Based on [place] Modified Alcohol Withdrawal Scale). Tabulate score based on classifications including Tremor, Sweating, Hallucination, Orientation, and Agitation. Tremor: 0  Sweatin  Hallucinations: 0  Orientation: 2  Agitation: 1  Total Score: 3  Action perform as described below     Tremor:  No tremor is 0 points. Tremor on movement is 1 point. Tremor at rest is 2 points. Sweating: No Sweat 0 points. Moist is 1 point. Drenching sweats is 2 points. Hallucinations: No present 0 points. Dissuadable is 1 point. Not dissuadable is 2 points. Orientation: Oriented 0 points. Vague/detached 1 point. Disoriented/no contact 2 points. Agitation: Calm 0 points. Anxious 1 point. Panicky 2 points. Check scale every 2 hours. Discontinue scoring with 4 consecutive scorings of 0. Scale 0: No phenobarbital given. Re-assess every 30 minutes as needed. Scale 1-3: Phenobarbital 130 mg IV over 3 minutes. Re-assess every 30 minutes as needed. May administer every 30 minutes to a maximum dose of phenobarbital 1040 mg in 24 hours! Scale 4-8: Phenobarbital 260 mg IV over 5 minutes. Re-assess every 30 minutes as needed. May administer every 30 minutes to a maximum dose of phenobarbital 1040mg in 24 hours! Scale 9-10: Transfer to ICU (if not already in ICU). Administer 10mg/kg phenobarbital IV over 30 minutes. Maximum dose phenobarbital is 1040mg in 24 hours!

## 2019-09-03 LAB
ALBUMIN SERPL-MCNC: 3.5 G/DL (ref 3.5–5.1)
ALP BLD-CCNC: 169 U/L (ref 38–126)
ALT SERPL-CCNC: 132 U/L (ref 11–66)
AMYLASE: 77 U/L (ref 20–104)
ANION GAP SERPL CALCULATED.3IONS-SCNC: 16 MEQ/L (ref 8–16)
AST SERPL-CCNC: 211 U/L (ref 5–40)
BILIRUB SERPL-MCNC: 3.2 MG/DL (ref 0.3–1.2)
BILIRUBIN DIRECT: 2 MG/DL (ref 0–0.3)
BUN BLDV-MCNC: 7 MG/DL (ref 7–22)
CALCIUM SERPL-MCNC: 8.5 MG/DL (ref 8.5–10.5)
CHLORIDE BLD-SCNC: 101 MEQ/L (ref 98–111)
CHOLESTEROL, FASTING: 380 MG/DL (ref 100–199)
CO2: 19 MEQ/L (ref 23–33)
CREAT SERPL-MCNC: 0.5 MG/DL (ref 0.4–1.2)
GFR SERPL CREATININE-BSD FRML MDRD: > 90 ML/MIN/1.73M2
GLUCOSE BLD-MCNC: 78 MG/DL (ref 70–108)
HDLC SERPL-MCNC: 17 MG/DL
LDL CHOLESTEROL CALCULATED: ABNORMAL MG/DL
LIPASE: 124 U/L (ref 5.6–51.3)
MAGNESIUM: 1.7 MG/DL (ref 1.6–2.4)
POTASSIUM SERPL-SCNC: 3.5 MEQ/L (ref 3.5–5.2)
SODIUM BLD-SCNC: 136 MEQ/L (ref 135–145)
TOTAL PROTEIN: 6.3 G/DL (ref 6.1–8)
TRIGLYCERIDE, FASTING: 1034 MG/DL (ref 0–199)

## 2019-09-03 PROCEDURE — 2000000000 HC ICU R&B

## 2019-09-03 PROCEDURE — 99291 CRITICAL CARE FIRST HOUR: CPT | Performed by: INTERNAL MEDICINE

## 2019-09-03 PROCEDURE — 6360000002 HC RX W HCPCS: Performed by: INTERNAL MEDICINE

## 2019-09-03 PROCEDURE — 83690 ASSAY OF LIPASE: CPT

## 2019-09-03 PROCEDURE — 2580000003 HC RX 258: Performed by: INTERNAL MEDICINE

## 2019-09-03 PROCEDURE — 6370000000 HC RX 637 (ALT 250 FOR IP): Performed by: INTERNAL MEDICINE

## 2019-09-03 PROCEDURE — 94640 AIRWAY INHALATION TREATMENT: CPT

## 2019-09-03 PROCEDURE — 80053 COMPREHEN METABOLIC PANEL: CPT

## 2019-09-03 PROCEDURE — 2500000003 HC RX 250 WO HCPCS: Performed by: INTERNAL MEDICINE

## 2019-09-03 PROCEDURE — 2500000003 HC RX 250 WO HCPCS: Performed by: NURSE PRACTITIONER

## 2019-09-03 PROCEDURE — 83735 ASSAY OF MAGNESIUM: CPT

## 2019-09-03 PROCEDURE — 82248 BILIRUBIN DIRECT: CPT

## 2019-09-03 PROCEDURE — 94761 N-INVAS EAR/PLS OXIMETRY MLT: CPT

## 2019-09-03 PROCEDURE — 80061 LIPID PANEL: CPT

## 2019-09-03 PROCEDURE — 82150 ASSAY OF AMYLASE: CPT

## 2019-09-03 PROCEDURE — 36415 COLL VENOUS BLD VENIPUNCTURE: CPT

## 2019-09-03 PROCEDURE — 2580000003 HC RX 258: Performed by: NURSE PRACTITIONER

## 2019-09-03 PROCEDURE — 2709999900 HC NON-CHARGEABLE SUPPLY

## 2019-09-03 RX ORDER — FENOFIBRATE 160 MG/1
160 TABLET ORAL DAILY
Status: DISCONTINUED | OUTPATIENT
Start: 2019-09-03 | End: 2019-09-06 | Stop reason: HOSPADM

## 2019-09-03 RX ORDER — PRAVASTATIN SODIUM 40 MG
40 TABLET ORAL NIGHTLY
Status: DISCONTINUED | OUTPATIENT
Start: 2019-09-03 | End: 2019-09-06 | Stop reason: HOSPADM

## 2019-09-03 RX ADMIN — DEXMEDETOMIDINE 0.2 MCG/KG/HR: 100 INJECTION, SOLUTION, CONCENTRATE INTRAVENOUS at 04:34

## 2019-09-03 RX ADMIN — DEXMEDETOMIDINE 0.2 MCG/KG/HR: 100 INJECTION, SOLUTION, CONCENTRATE INTRAVENOUS at 14:27

## 2019-09-03 RX ADMIN — PRAVASTATIN SODIUM 40 MG: 40 TABLET ORAL at 21:07

## 2019-09-03 RX ADMIN — METOPROLOL TARTRATE 50 MG: 25 TABLET ORAL at 11:02

## 2019-09-03 RX ADMIN — THIAMINE HYDROCHLORIDE 100 MG: 100 INJECTION, SOLUTION INTRAMUSCULAR; INTRAVENOUS at 11:03

## 2019-09-03 RX ADMIN — SODIUM CHLORIDE, PRESERVATIVE FREE 10 ML: 5 INJECTION INTRAVENOUS at 21:07

## 2019-09-03 RX ADMIN — DEXMEDETOMIDINE 53 MCG: 100 INJECTION, SOLUTION, CONCENTRATE INTRAVENOUS at 05:23

## 2019-09-03 RX ADMIN — THERA TABS 1 TABLET: TAB at 11:02

## 2019-09-03 RX ADMIN — SODIUM CHLORIDE, PRESERVATIVE FREE 10 ML: 5 INJECTION INTRAVENOUS at 08:32

## 2019-09-03 RX ADMIN — ENOXAPARIN SODIUM 40 MG: 40 INJECTION SUBCUTANEOUS at 17:45

## 2019-09-03 RX ADMIN — METOPROLOL TARTRATE 50 MG: 25 TABLET ORAL at 21:07

## 2019-09-03 RX ADMIN — SODIUM CHLORIDE, POTASSIUM CHLORIDE, SODIUM LACTATE AND CALCIUM CHLORIDE: 600; 310; 30; 20 INJECTION, SOLUTION INTRAVENOUS at 12:08

## 2019-09-03 RX ADMIN — FAMOTIDINE 20 MG: 10 INJECTION INTRAVENOUS at 11:01

## 2019-09-03 RX ADMIN — VENLAFAXINE HYDROCHLORIDE 150 MG: 150 CAPSULE, EXTENDED RELEASE ORAL at 11:01

## 2019-09-03 RX ADMIN — PHENOBARBITAL SODIUM 260 MG: 65 INJECTION INTRAMUSCULAR; INTRAVENOUS at 02:21

## 2019-09-03 RX ADMIN — GLYCOPYRROLATE AND FORMOTEROL FUMARATE 2 PUFF: 9; 4.8 AEROSOL, METERED RESPIRATORY (INHALATION) at 18:49

## 2019-09-03 RX ADMIN — FOLIC ACID 1 MG: 5 INJECTION, SOLUTION INTRAMUSCULAR; INTRAVENOUS; SUBCUTANEOUS at 08:28

## 2019-09-03 RX ADMIN — FAMOTIDINE 20 MG: 10 INJECTION INTRAVENOUS at 21:08

## 2019-09-03 RX ADMIN — SODIUM CHLORIDE, POTASSIUM CHLORIDE, SODIUM LACTATE AND CALCIUM CHLORIDE: 600; 310; 30; 20 INJECTION, SOLUTION INTRAVENOUS at 02:21

## 2019-09-03 RX ADMIN — LISINOPRIL 10 MG: 10 TABLET ORAL at 12:43

## 2019-09-03 RX ADMIN — FENOFIBRATE 160 MG: 160 TABLET ORAL at 16:21

## 2019-09-03 RX ADMIN — SODIUM CHLORIDE, POTASSIUM CHLORIDE, SODIUM LACTATE AND CALCIUM CHLORIDE: 600; 310; 30; 20 INJECTION, SOLUTION INTRAVENOUS at 21:06

## 2019-09-03 ASSESSMENT — PAIN SCALES - GENERAL: PAINLEVEL_OUTOF10: 0

## 2019-09-03 NOTE — PROGRESS NOTES
Utilize [facility] alcohol withdrawal scale (Based on [place] Modified Alcohol Withdrawal Scale). Tabulate score based on classifications including Tremor, Sweating, Hallucination, Orientation, and Agitation.     Tremor: 1  Sweatin  Hallucinations:  2  Orientation: 2  Agitation: 1  Total Score: 7  Action perform as described below      Tremor:  No tremor is 0 points.  Tremor on movement is 1 point.  Tremor at rest is 2 points. Sweating: No Sweat 0 points. Moist is 1 point.  Drenching sweats is 2 points. Hallucinations: No present 0 points. Dissuadable is 1 point. Not dissuadable is 2 points. Orientation: Oriented 0 points. Vague/detached 1 point. Disoriented/no contact 2 points. Agitation: Calm 0 points.  Anxious 1 point. Panicky 2 points.     Check scale every 2 hours.  Discontinue scoring with 4 consecutive scorings of 0. Scale 0: No phenobarbital given.  Re-assess every 30 minutes as needed. Scale 1-3: Phenobarbital 130 mg IV over 3 minutes. Re-assess every 30 minutes as needed.  May administer every 30 minutes to a maximum dose of phenobarbital 1040 mg in 24 hours! Scale 4-8: Phenobarbital 260 mg IV over 5 minutes.  Re-assess every 30 minutes as needed. May administer every 30 minutes to a maximum dose of phenobarbital 1040mg in 24 hours! Scale 9-10: Transfer to ICU (if not already in ICU).  Administer 10mg/kg phenobarbital IV over 30 minutes.  Maximum dose phenobarbital is 1040mg in 24 hours!

## 2019-09-03 NOTE — PROGRESS NOTES
5279 - Patient trying to get out of bed. Uncooperative with redirection of sitter Yolanda. [de-identified] Sitter yelled for help. Co-workers into room and patient is at the end of the bed with feet on the floor. Patient aggressive, restless, mumbling. Re-oriented patient. Zahra Palomo into room. Patient very impulsive, calm at times reaching arms in the air and mumbles words. Then patient trying to sit up to geting out of bed. Assessment completed. Patient followed commands when prompted, only oriented to name. Patient asked for drink of water, swabbed mouth. Pt then spit at this RN. Re-directed patient about inappropriate behavior. Kole Shafer placed order for Precedex gtt.

## 2019-09-03 NOTE — SIGNIFICANT EVENT
Nabila Venegas 38year old gentlemen transferred to the ICU 9/2/2019 patient required higher level of care secondary to escalated doses of Ativan per CIWA. Patient has known ETOH abuse with ETOH level 0.25 9/1/2019. Phenobarbital was started however he has received 910 milligrams in the past 12 hours. Patient is aggressive, kicking lower legs, not redirectable. [place] Modified Alcohol Withdrawal Scale : 8. Precedex gtt will be started.

## 2019-09-04 LAB
LIPASE: 140.6 U/L (ref 5.6–51.3)
MRSA SCREEN: NORMAL

## 2019-09-04 PROCEDURE — 6370000000 HC RX 637 (ALT 250 FOR IP): Performed by: INTERNAL MEDICINE

## 2019-09-04 PROCEDURE — 2709999900 HC NON-CHARGEABLE SUPPLY

## 2019-09-04 PROCEDURE — 94640 AIRWAY INHALATION TREATMENT: CPT

## 2019-09-04 PROCEDURE — 2500000003 HC RX 250 WO HCPCS: Performed by: INTERNAL MEDICINE

## 2019-09-04 PROCEDURE — 83690 ASSAY OF LIPASE: CPT

## 2019-09-04 PROCEDURE — 1200000003 HC TELEMETRY R&B

## 2019-09-04 PROCEDURE — 2580000003 HC RX 258: Performed by: INTERNAL MEDICINE

## 2019-09-04 PROCEDURE — 36415 COLL VENOUS BLD VENIPUNCTURE: CPT

## 2019-09-04 PROCEDURE — 6360000002 HC RX W HCPCS: Performed by: INTERNAL MEDICINE

## 2019-09-04 PROCEDURE — 94761 N-INVAS EAR/PLS OXIMETRY MLT: CPT

## 2019-09-04 PROCEDURE — 99233 SBSQ HOSP IP/OBS HIGH 50: CPT | Performed by: INTERNAL MEDICINE

## 2019-09-04 RX ORDER — PHENOBARBITAL 32.4 MG/1
32.4 TABLET ORAL 2 TIMES DAILY
Status: COMPLETED | OUTPATIENT
Start: 2019-09-05 | End: 2019-09-06

## 2019-09-04 RX ORDER — PHENOBARBITAL 32.4 MG/1
64.8 TABLET ORAL 2 TIMES DAILY
Status: COMPLETED | OUTPATIENT
Start: 2019-09-04 | End: 2019-09-04

## 2019-09-04 RX ORDER — FAMOTIDINE 20 MG/1
20 TABLET, FILM COATED ORAL 2 TIMES DAILY
Status: DISCONTINUED | OUTPATIENT
Start: 2019-09-04 | End: 2019-09-06 | Stop reason: HOSPADM

## 2019-09-04 RX ADMIN — FENOFIBRATE 160 MG: 160 TABLET ORAL at 08:31

## 2019-09-04 RX ADMIN — GLYCOPYRROLATE AND FORMOTEROL FUMARATE 2 PUFF: 9; 4.8 AEROSOL, METERED RESPIRATORY (INHALATION) at 07:41

## 2019-09-04 RX ADMIN — PHENOBARBITAL 64.8 MG: 32.4 TABLET ORAL at 14:43

## 2019-09-04 RX ADMIN — LISINOPRIL 10 MG: 10 TABLET ORAL at 10:12

## 2019-09-04 RX ADMIN — METOPROLOL TARTRATE 50 MG: 25 TABLET ORAL at 23:17

## 2019-09-04 RX ADMIN — FAMOTIDINE 20 MG: 20 TABLET ORAL at 22:14

## 2019-09-04 RX ADMIN — THIAMINE HYDROCHLORIDE 100 MG: 100 INJECTION, SOLUTION INTRAMUSCULAR; INTRAVENOUS at 08:31

## 2019-09-04 RX ADMIN — FOLIC ACID 1 MG: 5 INJECTION, SOLUTION INTRAMUSCULAR; INTRAVENOUS; SUBCUTANEOUS at 08:32

## 2019-09-04 RX ADMIN — PRAVASTATIN SODIUM 40 MG: 40 TABLET ORAL at 23:17

## 2019-09-04 RX ADMIN — SODIUM CHLORIDE, POTASSIUM CHLORIDE, SODIUM LACTATE AND CALCIUM CHLORIDE: 600; 310; 30; 20 INJECTION, SOLUTION INTRAVENOUS at 13:52

## 2019-09-04 RX ADMIN — ENOXAPARIN SODIUM 40 MG: 40 INJECTION SUBCUTANEOUS at 18:41

## 2019-09-04 RX ADMIN — PHENOBARBITAL 64.8 MG: 32.4 TABLET ORAL at 22:14

## 2019-09-04 RX ADMIN — SODIUM CHLORIDE, POTASSIUM CHLORIDE, SODIUM LACTATE AND CALCIUM CHLORIDE: 600; 310; 30; 20 INJECTION, SOLUTION INTRAVENOUS at 04:01

## 2019-09-04 RX ADMIN — SODIUM CHLORIDE, PRESERVATIVE FREE 10 ML: 5 INJECTION INTRAVENOUS at 08:35

## 2019-09-04 RX ADMIN — THERA TABS 1 TABLET: TAB at 08:31

## 2019-09-04 RX ADMIN — VENLAFAXINE HYDROCHLORIDE 150 MG: 150 CAPSULE, EXTENDED RELEASE ORAL at 08:31

## 2019-09-04 RX ADMIN — METOPROLOL TARTRATE 50 MG: 25 TABLET ORAL at 08:31

## 2019-09-04 RX ADMIN — FAMOTIDINE 20 MG: 10 INJECTION INTRAVENOUS at 08:31

## 2019-09-04 ASSESSMENT — PAIN DESCRIPTION - ORIENTATION: ORIENTATION: RIGHT

## 2019-09-04 ASSESSMENT — PAIN DESCRIPTION - ONSET: ONSET: ON-GOING

## 2019-09-04 ASSESSMENT — PAIN SCALES - GENERAL
PAINLEVEL_OUTOF10: 0
PAINLEVEL_OUTOF10: 2
PAINLEVEL_OUTOF10: 5

## 2019-09-04 ASSESSMENT — PAIN DESCRIPTION - DESCRIPTORS: DESCRIPTORS: THROBBING

## 2019-09-04 ASSESSMENT — PAIN - FUNCTIONAL ASSESSMENT: PAIN_FUNCTIONAL_ASSESSMENT: ACTIVITIES ARE NOT PREVENTED

## 2019-09-04 ASSESSMENT — PAIN DESCRIPTION - LOCATION
LOCATION: WRIST
LOCATION: WRIST

## 2019-09-04 ASSESSMENT — PAIN DESCRIPTION - PAIN TYPE: TYPE: ACUTE PAIN

## 2019-09-04 ASSESSMENT — PAIN DESCRIPTION - FREQUENCY: FREQUENCY: INTERMITTENT

## 2019-09-04 ASSESSMENT — PAIN DESCRIPTION - PROGRESSION: CLINICAL_PROGRESSION: NOT CHANGED

## 2019-09-04 NOTE — CARE COORDINATION
9/4/19, 2:55 PM      Mikel Romo day: 3  Location: [facility] Reason for admit: Acute pancreatitis, unspecified complication status, unspecified pancreatitis type [K85.90]   Procedure:   9/1 CXR: No acute process  9/1 US Pancreas: Mild prominence of the gallbladder wall. Correlation with clinical exam recommended. No other secondary changes of biliary obstruction suspected  9/2 US Liver: Hepatomegaly with increased echogenicity of the liver consistent with fatty infiltration versus nonspecific hepatocellular disease; Nodular liver surface consistent with cirrhosis; Pancreas obscured by bowel gas; No gallstones or biliary dilatation  Treatment Plan of Care: Precedex off today. Afebrile. On room air. Ox4. Addiction Services consulted. Telemetry, seizure precautions, up with assist. IVF, lovenox, pepcid, folic acid, inhaler, lisinopril, lopressor, multivitamin, prn phenobarbital, prevastatin, IM thiamine, effexor, Electrolyte replacement protocols. Lipase 140.6. PCP: NILAM Orozco CNP  Readmission Risk Score: 42%  Discharge Plan: Spoke with Arline Kyle; states he lives at home with his mother and did not use any DME or have any HH services PTA. He has a PCP. Arline Kyle states he plans to return home with his mother at discharge, denies needs, and declines HH. Transferred to [phone]. Handoff report given to HAILEY Curran CM.

## 2019-09-04 NOTE — PROGRESS NOTES
Pharmacy Intravenous to Oral Protocol  Medication changed per P&T protocol: Pepcid    Patient meets criteria based on the followin. IV therapy > 24 hours - Yes  2. Nausea/vomiting - No  3. Regular diet - Yes  4. Tolerating other oral medications: Yes  5. Afebrile for 24 hours: N/A  6.  WBC trending downward: N/A    Norman Hilton PharmD, BCPS  2019  1:32 PM

## 2019-09-04 NOTE — PROGRESS NOTES
Vapes.  Drinks up to 30 beers a day. FHX: Positive for diabetes, hypertension, and heart disease. Allergies: No known drug allergies. Medications:     lactated ringers 125 mL/hr at 09/04/19 1352      famotidine  20 mg Oral BID    PHENobarbital  64.8 mg Oral BID    Followed by   Rico Lockett ON 9/5/2019] PHENobarbital  32.4 mg Oral BID    pravastatin  40 mg Oral Nightly    fenofibrate  160 mg Oral Daily    thiamine  100 mg Intramuscular Daily    folic acid  1 mg Intramuscular Daily    multivitamin  1 tablet Oral Daily    lisinopril  10 mg Oral Daily    potassium replacement protocol   Other RX Placeholder    phosphorus replacement protocol   Other RX Placeholder    magnesium replacement protocol   Other RX Placeholder    glycopyrrolate-formoterol  2 puff Inhalation BID    metoprolol tartrate  50 mg Oral BID    venlafaxine  150 mg Oral Daily    sodium chloride flush  10 mL Intravenous 2 times per day    enoxaparin  40 mg Subcutaneous Daily           Vital Signs:   T: 98.7: P: 89: RR: 20: B/P: 142/72: FiO2: 21%: O2 Sat: 95:   CAM-ICU:  Negative  General:   Chronically ill-appearing heavyset white male. HEENT:  normocephalic and atraumatic.  Mild scleral icterus. PERR  Neck: supple.  No Thyromegaly. Lungs: CTA bilaterally  No retractions  Cardiac: RRR.  No JVD. Abdomen: soft.  Nontender. Extremities:  No clubbing, cyanosis, or edema x 4.    Vasculature: capillary refill < 3 seconds. Palpable dorsalis pedis pulses. Skin:  warm and moist.  Psych:  Alert and oriented x3 affect appropriate. Lymph:  No supraclavicular adenopathy. Neurologic:  No focal deficit. No seizures.     Data: (All radiographs, tracings, PFTs, and imaging are personally viewed and interpreted unless otherwise noted). · Telemetry shows sinus rhythm. · Ultrasound pancreas report 9/1/2019: Gallbladder wall thickening. · Viral hepatitis screen is negative. · US liver report 9/2/19:  Hepatomegaly c/c fatty iniltration.   Nodular

## 2019-09-05 PROCEDURE — 6370000000 HC RX 637 (ALT 250 FOR IP): Performed by: INTERNAL MEDICINE

## 2019-09-05 PROCEDURE — 99232 SBSQ HOSP IP/OBS MODERATE 35: CPT | Performed by: INTERNAL MEDICINE

## 2019-09-05 PROCEDURE — 2580000003 HC RX 258: Performed by: INTERNAL MEDICINE

## 2019-09-05 PROCEDURE — 6360000002 HC RX W HCPCS: Performed by: INTERNAL MEDICINE

## 2019-09-05 PROCEDURE — 2500000003 HC RX 250 WO HCPCS: Performed by: INTERNAL MEDICINE

## 2019-09-05 PROCEDURE — 2709999900 HC NON-CHARGEABLE SUPPLY

## 2019-09-05 PROCEDURE — 1200000003 HC TELEMETRY R&B

## 2019-09-05 PROCEDURE — 6370000000 HC RX 637 (ALT 250 FOR IP): Performed by: PHYSICIAN ASSISTANT

## 2019-09-05 RX ORDER — LISINOPRIL 20 MG/1
20 TABLET ORAL DAILY
Status: DISCONTINUED | OUTPATIENT
Start: 2019-09-06 | End: 2019-09-06 | Stop reason: HOSPADM

## 2019-09-05 RX ORDER — LISINOPRIL 10 MG/1
10 TABLET ORAL ONCE
Status: COMPLETED | OUTPATIENT
Start: 2019-09-05 | End: 2019-09-05

## 2019-09-05 RX ADMIN — FAMOTIDINE 20 MG: 20 TABLET ORAL at 10:32

## 2019-09-05 RX ADMIN — METOPROLOL TARTRATE 50 MG: 25 TABLET ORAL at 22:05

## 2019-09-05 RX ADMIN — SODIUM CHLORIDE, POTASSIUM CHLORIDE, SODIUM LACTATE AND CALCIUM CHLORIDE: 600; 310; 30; 20 INJECTION, SOLUTION INTRAVENOUS at 06:16

## 2019-09-05 RX ADMIN — SODIUM CHLORIDE, PRESERVATIVE FREE 10 ML: 5 INJECTION INTRAVENOUS at 10:34

## 2019-09-05 RX ADMIN — ENOXAPARIN SODIUM 40 MG: 40 INJECTION SUBCUTANEOUS at 22:06

## 2019-09-05 RX ADMIN — LISINOPRIL 10 MG: 10 TABLET ORAL at 14:30

## 2019-09-05 RX ADMIN — FENOFIBRATE 160 MG: 160 TABLET ORAL at 10:32

## 2019-09-05 RX ADMIN — FAMOTIDINE 20 MG: 20 TABLET ORAL at 22:06

## 2019-09-05 RX ADMIN — PRAVASTATIN SODIUM 40 MG: 40 TABLET ORAL at 22:05

## 2019-09-05 RX ADMIN — PHENOBARBITAL 32.4 MG: 32.4 TABLET ORAL at 10:32

## 2019-09-05 RX ADMIN — PHENOBARBITAL 32.4 MG: 32.4 TABLET ORAL at 22:06

## 2019-09-05 RX ADMIN — VENLAFAXINE HYDROCHLORIDE 150 MG: 150 CAPSULE, EXTENDED RELEASE ORAL at 10:31

## 2019-09-05 RX ADMIN — THIAMINE HYDROCHLORIDE 100 MG: 100 INJECTION, SOLUTION INTRAMUSCULAR; INTRAVENOUS at 13:23

## 2019-09-05 RX ADMIN — LISINOPRIL 10 MG: 10 TABLET ORAL at 10:32

## 2019-09-05 RX ADMIN — THERA TABS 1 TABLET: TAB at 10:32

## 2019-09-05 RX ADMIN — METOPROLOL TARTRATE 50 MG: 25 TABLET ORAL at 10:32

## 2019-09-05 RX ADMIN — FOLIC ACID 1 MG: 5 INJECTION, SOLUTION INTRAMUSCULAR; INTRAVENOUS; SUBCUTANEOUS at 10:32

## 2019-09-05 ASSESSMENT — PATIENT HEALTH QUESTIONNAIRE - PHQ9: SUM OF ALL RESPONSES TO PHQ QUESTIONS 1-9: 3

## 2019-09-05 ASSESSMENT — PAIN SCALES - GENERAL
PAINLEVEL_OUTOF10: 0
PAINLEVEL_OUTOF10: 0

## 2019-09-05 NOTE — PLAN OF CARE
Problem: Falls - Risk of:  Goal: Will remain free from falls  Description  Will remain free from falls  9/2/2019 1505 by Benjamin Yo RN  Outcome: Ongoing  9/2/2019 0314 by Tomi Mota RN  Outcome: Ongoing  Note:   Remains free of falls this shift. Bed in lowest position, call light within reach. Bed alarm on. Rounding hourly. Problem: Pain:  Goal: Control of acute pain  Description  Control of acute pain  9/2/2019 1505 by Benjamin Yo RN  Outcome: Ongoing  9/2/2019 0314 by Tomi Mota RN  Outcome: Ongoing  Patient denies pain this shift. Problem: Discharge Planning:  Goal: Discharged to appropriate level of care  Description  Discharged to appropriate level of care  9/2/2019 1505 by Benjamin Yo RN  Outcome: Ongoing  9/2/2019 0314 by Tomi Mota RN  Outcome: Ongoing  Note:   Discharge plan is still in the works. Pt is from home. Pt had been in a recovery program in [place] for 2 months before he relapsed. Care plan reviewed with patient and Mother. Patient and Mother verbalize understanding of the plan of care and contribute to goal setting.
Problem: Falls - Risk of:  Goal: Will remain free from falls  Description  Will remain free from falls  9/5/2019 0512 by Tasha Cueto RN  Outcome: Ongoing  Note:   Patient at risk for falls due to decreased mobility and IV tubing. Fall assessment completed. Patient verbalizes understanding of call light for needs this shift. Fall band in place on patient's arm. Fall signs posted. Bed alarm in place and functioning properly. Problem: Falls - Risk of:  Goal: Absence of physical injury  Description  Absence of physical injury  9/5/2019 0512 by Tasha Cueto RN  Outcome: Ongoing  Note:   Patient free from physical injury this shift. Will continue to monitor. Problem: Pain:  Goal: Patient's pain/discomfort is manageable  Description  Patient's pain/discomfort is manageable  9/5/2019 0512 by Tasha Cueto RN  Outcome: Ongoing  Note:   Patient denying any pain this shift. Will continue to monitor. Problem: Discharge Planning:  Goal: Discharged to appropriate level of care  Description  Discharged to appropriate level of care  9/5/2019 0512 by Tasha Cueto RN  Outcome: Ongoing  Note:   Discharge plan reviewed with patient. Patient continues to actively participate in current discharge plan. Case management/ following patient. Problem: Fluid Volume - Deficit:  Goal: Absence of fluid volume deficit signs and symptoms  Description  Absence of fluid volume deficit signs and symptoms  9/4/2019 1742 by Basilio Jalloh RN  Outcome: Ongoing  Note:   Vitals are stable, patient has Lactated ringers infusing at this time. Problem: Nutrition Deficit:  Goal: Ability to achieve adequate nutritional intake will improve  Description  Ability to achieve adequate nutritional intake will improve  9/4/2019 1742 by Basilio Jalloh RN  Outcome: Ongoing  Note:   Patient eats inadequately at meals.       Problem: Sleep Pattern Disturbance:  Goal: Appears
improve  Description  Ability to achieve adequate nutritional intake will improve  9/4/2019 0144 by Tray Astorga RN  Outcome: Ongoing  Note:   Patient on a general diet. Patient is not eating that much at this time. Nutrition education provided. Problem: Sleep Pattern Disturbance:  Goal: Appears well-rested  Description  Appears well-rested  9/4/2019 0144 by Tray Astorga RN  Outcome: Ongoing  Note:   Patient remains going through withdrawal symptoms. Patient on Precedex gtt at this due to behavior. Problem: Skin Integrity:  Goal: Will show no infection signs and symptoms  Description  Will show no infection signs and symptoms  9/4/2019 0144 by Tray Astorga RN  Outcome: Ongoing  Note:   No signs of skin breakdown. Skin clean, dry, intact. Mucous membranes pink, moist. Patient turns self. Assistance with turns/ambulation provided PRN. Continue to monitor. Care plan reviewed with patient and RN. Patient and RN verbalize understanding of the plan of care and contribute to goal setting.
behavior. Problem: Skin Integrity:  Goal: Will show no infection signs and symptoms  Description  Will show no infection signs and symptoms  9/4/2019 0853 by Sarah Black RN  Outcome: Ongoing  Note:   No new skin issues noted this shift, will continue to encourage frequent repositioning, and assist with turns as needed, but pt adequately turns self. 9/4/2019 0144 by Lisa Obrien RN  Outcome: Ongoing  Note:   No signs of skin breakdown. Skin clean, dry, intact. Mucous membranes pink, moist. Patient turns self. Assistance with turns/ambulation provided PRN. Continue to monitor. Goal: Absence of new skin breakdown  Description  Absence of new skin breakdown  9/4/2019 0853 by Sarah Black RN  Outcome: Ongoing  Note:   No new skin breakdown noted this shift, will continue to encourage frequent repositioning. 9/4/2019 0144 by Lisa Obrien RN  Outcome: Ongoing     Care plan reviewed with patient and family. Patient and family verbalize understanding of the plan of care and contribute to goal setting.

## 2019-09-05 NOTE — PROGRESS NOTES
Hospitalist Progress Note    Patient:  Trey Arvizu  YOB: 1975  MRN: [id number]   PCP: NILAM Bull CNP       Acct: [de-identified]  Unit/Bed: [facility]23023A    Date of Admission: 9/1/2019      ASSESSMENT     1. Uncomplicated alcohol withdrawal with delirium   1. Required ICU stay with Precedex, also required phenobarbital taper. Now improved  2. Hypertension, uncontrolled HTN  1. Lisinopril dose increased to 20 mg daily  3. COPD: Treat with Bevespi and as needed albuterol. 4. Type 2 diabetes mellitus: Sliding scale insulin. 5. Hyperlipidemia: Statin and fenobibrate. 6. Depression: On venlafaxine. 7. Alcoholic hepatitis: Ultrasound liver and gallbladder showed findings consistent with the beginnings of liver cirrhosis. 8. Acute alcoholic pancreatitis: Increase diet. PLAN     1. Consult to Psychiatry  2. Increase lisinopril to 20 mg daily 2/2 to uncontrolled HTN  3. Continue phenobarbital taper   4. Plan to discharge in 1 to days  5. Monitoring BP    Anticipated Discharge in : 2 days    Code Status: Full Code    Electronically signed by Royce Cheng MD on 9/5/2019 at 11:58 AM      Chief Complaint     Request for alcohol detox    SUBJECTIVE     The patient is a 38 y.o. Estanislado Lobstein yo male w/ a hx of chronic alcoholism, delirium tremens and alcohol withdrawal seizures,  who was admitted to [address] [place] [address] on 9/1/2019 for alcohol detoxification. He initially complained of yellow discoloring of his skin and abdominal discomfort.  He has been binge drinking up to 30 beers a day. Chichi Smith was initially started on CIWA and admitted to the medical floor. Chichi Smith continued to have high Ativan requirements and was transferred to the intensive care unit secondary to high intensity sedation requirements. Despite phenobarbital, the patient was quite combative and later required Precedex. He was eventually weaned off of Precedex.  LFTs and lipase enzymes which were initially elevated were trending may contain minor errors which are inherent in voice recognition technology. ** Final report electronically signed by Dr. Sandra Bridges on 8/20/2019 11:20 PM    Us Liver    Result Date: 9/2/2019  PROCEDURE: US LIVER CLINICAL INFORMATION: elevated liver enzymes. COMPARISON: No prior study. TECHNIQUE: Multiplanar sonographic images were obtained of the liver. Color flow and spectral Doppler ultrasound of the hepatic and portal veins, IVC and hepatic artery were performed. FINDINGS: There is hepatomegaly with the right lobe of the liver measuring 19.1 cm. Liver is echogenic consistent with fatty infiltration versus nonspecific hepatocellular disease and results in suboptimal penetration of the posterior portions of the liver. . Liver  surface is nodular consistent with cirrhosis. There are no liver masses. There is no intrahepatic biliary ductal dilatation. There is normal color flow in the main portal vein, left portal veins, inferior vena cava and left hepatic vein. The right portal vein and middle and right hepatic veins and hepatic artery were not adequately visualized. There is antegrade flow in the imaged hepatic veins and portal veins. The pancreas was obscured by bowel gas. The gallbladder is not distended. There is no pericholecystic fluid or gallbladder wall thickening or edema. There are no gallstones. The common bile duct is normal and measures 5 mm. . Varela's sign is negative. Limited imaging of the right kidney demonstrates no obvious hydronephrosis. Hepatomegaly with increased echogenicity of the liver consistent with fatty infiltration versus nonspecific hepatocellular disease. Nodular liver surface consistent with cirrhosis. Pancreas obscured by bowel gas. No gallstones or biliary dilatation. **This report has been created using voice recognition software. It may contain minor errors which are inherent in voice recognition technology. ** Final report electronically signed by Dr. Sandra Bridges on ? Encourage ambulation            ? Already on Anticoagulation     Disposition:    ?  Home

## 2019-09-06 ENCOUNTER — TELEPHONE (OUTPATIENT)
Dept: FAMILY MEDICINE CLINIC | Age: 44
End: 2019-09-06

## 2019-09-06 VITALS
RESPIRATION RATE: 16 BRPM | WEIGHT: 230 LBS | HEART RATE: 66 BPM | TEMPERATURE: 98.6 F | OXYGEN SATURATION: 98 % | HEIGHT: 71 IN | DIASTOLIC BLOOD PRESSURE: 80 MMHG | BODY MASS INDEX: 32.2 KG/M2 | SYSTOLIC BLOOD PRESSURE: 142 MMHG

## 2019-09-06 PROCEDURE — 99239 HOSP IP/OBS DSCHRG MGMT >30: CPT | Performed by: INTERNAL MEDICINE

## 2019-09-06 PROCEDURE — 6370000000 HC RX 637 (ALT 250 FOR IP): Performed by: INTERNAL MEDICINE

## 2019-09-06 PROCEDURE — 6370000000 HC RX 637 (ALT 250 FOR IP): Performed by: PHYSICIAN ASSISTANT

## 2019-09-06 PROCEDURE — 6360000002 HC RX W HCPCS: Performed by: INTERNAL MEDICINE

## 2019-09-06 RX ORDER — ALBUTEROL SULFATE 90 UG/1
2 AEROSOL, METERED RESPIRATORY (INHALATION) EVERY 6 HOURS PRN
Qty: 1 INHALER | Refills: 3 | Status: SHIPPED | OUTPATIENT
Start: 2019-09-06 | End: 2019-09-06 | Stop reason: HOSPADM

## 2019-09-06 RX ORDER — ALBUTEROL SULFATE 90 UG/1
2 AEROSOL, METERED RESPIRATORY (INHALATION) EVERY 4 HOURS PRN
Qty: 1 INHALER | Refills: 0 | Status: SHIPPED | OUTPATIENT
Start: 2019-09-06 | End: 2022-02-10 | Stop reason: ALTCHOICE

## 2019-09-06 RX ORDER — PRAVASTATIN SODIUM 40 MG
40 TABLET ORAL NIGHTLY
Qty: 30 TABLET | Refills: 3 | Status: SHIPPED | OUTPATIENT
Start: 2019-09-06 | End: 2019-09-11

## 2019-09-06 RX ADMIN — LISINOPRIL 20 MG: 20 TABLET ORAL at 08:57

## 2019-09-06 RX ADMIN — PHENOBARBITAL 32.4 MG: 32.4 TABLET ORAL at 08:57

## 2019-09-06 RX ADMIN — THERA TABS 1 TABLET: TAB at 08:57

## 2019-09-06 RX ADMIN — FENOFIBRATE 160 MG: 160 TABLET ORAL at 08:57

## 2019-09-06 RX ADMIN — VENLAFAXINE HYDROCHLORIDE 150 MG: 150 CAPSULE, EXTENDED RELEASE ORAL at 08:57

## 2019-09-06 RX ADMIN — FAMOTIDINE 20 MG: 20 TABLET ORAL at 08:57

## 2019-09-06 RX ADMIN — THIAMINE HYDROCHLORIDE 100 MG: 100 INJECTION, SOLUTION INTRAMUSCULAR; INTRAVENOUS at 08:58

## 2019-09-06 RX ADMIN — METOPROLOL TARTRATE 50 MG: 25 TABLET ORAL at 08:57

## 2019-09-06 ASSESSMENT — PAIN SCALES - WONG BAKER: WONGBAKER_NUMERICALRESPONSE: 0

## 2019-09-06 ASSESSMENT — PAIN SCALES - GENERAL: PAINLEVEL_OUTOF10: 0

## 2019-09-06 NOTE — DISCHARGE SUMMARY
initially elevated and were trending downwards at the time of discharge. The patient was tolerating an oral intake. The patient was restarted on his lisinopril-hydrochlorothizide at the time of discharge. His CIWA score was 2 at the time of discharge and he was otherwise stable. He will continue to follow with his gastroenterologist for his history of liver cirrhosis, whom he seems twice a year. Discharge Medications      Prashant, [address] [place] [address] Medication Instructions SUO:[id number]    Printed on:09/06/19 3724   Medication Information                      albuterol sulfate HFA (PROVENTIL HFA) 108 (90 Base) MCG/ACT inhaler  Inhale 2 puffs into the lungs every 4 hours as needed for Wheezing or Shortness of Breath (Space out to every 6 hours as symptoms improve) Space out to every 6 hours as symptoms improve. blood glucose test strips (ASCENSIA AUTODISC VI;ONE TOUCH ULTRA TEST VI) strip  Test as needed. Dispense One Touch verio Test Strips. Dx: Type 2 diabetes (250.00)             Cholecalciferol (VITAMIN D3) 5000 units TABS  Take 5,000 Units by mouth once a week             famotidine (PEPCID) 20 MG tablet  Take 1 tablet by mouth nightly             folic acid (FOLVITE) 1 MG tablet  Take 1 tablet by mouth daily             glucose monitoring kit (FREESTYLE) monitoring kit  Glucometer with test strips and lancets.   Check BS once daily  #100 strips and lancets             glycopyrrolate-formoterol (BEVESPI) 9-4.8 MCG/ACT AERO  Inhale 2 puffs into the lungs 2 times daily Dispense 5.9 g inhaler             LACTULOSE PO  Take by mouth 2 times daily              lisinopril-hydrochlorothiazide (PRINZIDE;ZESTORETIC) 20-25 MG per tablet  Take 1 tablet by mouth daily             melatonin 1 MG/4ML LIQD SL liquid  Place 0.3 mg under the tongue nightly as needed for Sleep             metoprolol tartrate (LOPRESSOR) 50 MG tablet  Take 1 tablet by mouth 2 times daily             Multiple Vitamins-Iron dislocation. 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 **This report has been created using voice recognition software. It may contain minor errors which are inherent in voice recognition technology. ** Final report electronically signed by Dr. Royce Scott on 8/20/2019 11:22 PM    Xr Hand Right (min 3 Views)    Result Date: 8/20/2019  PROCEDURE: XR HAND RIGHT (MIN 3 VIEWS) CLINICAL INFORMATION: trauma . COMPARISON:  No prior study. TECHNIQUE:  3 views of the right hand. 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 There is no acute fracture or dislocation. There is a well-defined corticated bone fragment adjacent to the tip of the ulnar styloid process likely related to old trauma. No joint space narrowing or erosive changes. Soft tissues: No significant soft tissue swelling. No acute fracture or dislocation. Numerous small well-defined sclerotic foci in the bones of the hand and wrist most consistent with osteopoikilosis. **This report has been created using voice recognition software. It may contain minor errors which are inherent in voice recognition technology. ** Final report electronically signed by Dr. Royce Scott on 8/20/2019 11:20 PM    Us Liver    Result Date: 9/2/2019  PROCEDURE: US LIVER CLINICAL INFORMATION: elevated liver enzymes. COMPARISON: No prior study. TECHNIQUE: Multiplanar sonographic images were obtained of the liver. Color flow and spectral Doppler ultrasound of the hepatic and portal veins, IVC and hepatic artery were performed. FINDINGS: There is hepatomegaly with the right lobe of the liver measuring 19.1 cm.  Liver is echogenic consistent with fatty infiltration versus nonspecific hepatocellular disease and results in suboptimal

## 2019-09-09 ENCOUNTER — TELEPHONE (OUTPATIENT)
Dept: FAMILY MEDICINE CLINIC | Age: 44
End: 2019-09-09

## 2019-09-11 ENCOUNTER — OFFICE VISIT (OUTPATIENT)
Dept: INTERNAL MEDICINE CLINIC | Age: 44
End: 2019-09-11
Payer: MEDICAID

## 2019-09-11 VITALS
DIASTOLIC BLOOD PRESSURE: 83 MMHG | BODY MASS INDEX: 32.9 KG/M2 | HEART RATE: 84 BPM | WEIGHT: 235 LBS | HEIGHT: 71 IN | SYSTOLIC BLOOD PRESSURE: 135 MMHG

## 2019-09-11 DIAGNOSIS — F10.20 SEVERE ALCOHOL USE DISORDER (HCC): Primary | ICD-10-CM

## 2019-09-11 PROCEDURE — 4004F PT TOBACCO SCREEN RCVD TLK: CPT | Performed by: INTERNAL MEDICINE

## 2019-09-11 PROCEDURE — 99213 OFFICE O/P EST LOW 20 MIN: CPT | Performed by: INTERNAL MEDICINE

## 2019-09-11 PROCEDURE — 80305 DRUG TEST PRSMV DIR OPT OBS: CPT | Performed by: INTERNAL MEDICINE

## 2019-09-11 PROCEDURE — G8427 DOCREV CUR MEDS BY ELIG CLIN: HCPCS | Performed by: INTERNAL MEDICINE

## 2019-09-11 PROCEDURE — G8417 CALC BMI ABV UP PARAM F/U: HCPCS | Performed by: INTERNAL MEDICINE

## 2019-09-11 PROCEDURE — 1111F DSCHRG MED/CURRENT MED MERGE: CPT | Performed by: INTERNAL MEDICINE

## 2019-09-11 RX ORDER — NALTREXONE HYDROCHLORIDE 50 MG/1
50 TABLET, FILM COATED ORAL DAILY
Qty: 14 TABLET | Refills: 0 | Status: SHIPPED | OUTPATIENT
Start: 2019-09-11 | End: 2019-09-25

## 2019-09-11 NOTE — PROGRESS NOTES
Glucometer with test strips and lancets. Check BS once daily  #100 strips and lancets 1 kit 5    blood glucose test strips (ASCENSIA AUTODISC VI;ONE TOUCH ULTRA TEST VI) strip Test as needed. Dispense One Touch verio Test Strips. Dx: Type 2 diabetes (250.00) 60 strip 11    Cholecalciferol (VITAMIN D3) 5000 units TABS Take 5,000 Units by mouth daily       OLANZapine (ZYPREXA) 10 MG tablet Take 1 tablet by mouth nightly 30 tablet 5    famotidine (PEPCID) 20 MG tablet Take 1 tablet by mouth nightly 30 tablet 1     No current facility-administered medications for this visit. Review of Systems - . He says he is not having any urges her cravings to drink  Blood pressure 135/83, pulse 84, height 5' 11\" (1.803 m), weight 235 lb (106.6 kg). Physical Examination: General appearance - alert, well appearing, and in no distress  HEENT-head normocephalic, atraumatic  Mental status - alert, oriented to person, place, and time    Urine drug screen today    Amphetamine Screen, Urine 09/11/2019 10:45 AM Unknown   NEG    Barbiturate Screen, Urine 09/11/2019 10:45 AM Unknown   POS    Benzodiazepine Screen, Urine 09/11/2019 10:45 AM Unknown   NEG    Buprenorphine Urine 09/11/2019 10:45 AM Unknown   NEG    Cocaine Metabolite Screen, Urine 09/11/2019 10:45 AM Unknown   NEG    Gabapentin Screen, Urine 09/11/2019 10:45 AM Unknown   N/A    MDMA, Urine 09/11/2019 10:45 AM Unknown   NEG    Methamphetamine, Urine 09/11/2019 10:45 AM Unknown   NEG    Methadone Screen, Urine 09/11/2019 10:45 AM Unknown   NEG    Opiate Scrn, Ur 09/11/2019 10:45 AM Unknown   NEG    Oxycodone Screen, Ur 09/11/2019 10:45 AM Unknown   NEG    PCP Screen, Urine 09/11/2019 10:45 AM Unknown   NEG    Propoxyphene Screen, Urine 09/11/2019 10:45 AM Unknown   N/A    THC Screen, Urine 09/11/2019 10:45 AM Unknown   NEG    Tricyclic Antidepressants, Urine 09/11/2019 10:45 AM Unknown   N/A         Diagnosis Orders   1.  Severe alcohol use disorder ([address]  POCT

## 2019-09-12 ENCOUNTER — OFFICE VISIT (OUTPATIENT)
Dept: FAMILY MEDICINE CLINIC | Age: 44
End: 2019-09-12
Payer: MEDICAID

## 2019-09-12 VITALS
RESPIRATION RATE: 16 BRPM | DIASTOLIC BLOOD PRESSURE: 78 MMHG | HEART RATE: 84 BPM | SYSTOLIC BLOOD PRESSURE: 122 MMHG | HEIGHT: 71 IN | BODY MASS INDEX: 32.87 KG/M2 | WEIGHT: 234.8 LBS | TEMPERATURE: 98 F

## 2019-09-12 DIAGNOSIS — F31.9 BIPOLAR 1 DISORDER (HCC): ICD-10-CM

## 2019-09-12 DIAGNOSIS — F10.20 ALCOHOL DEPENDENCE WITH PHYSIOLOGICAL DEPENDENCE, CONTINUOUS (HCC): ICD-10-CM

## 2019-09-12 DIAGNOSIS — E11.9 TYPE 2 DIABETES MELLITUS WITHOUT COMPLICATION, WITHOUT LONG-TERM CURRENT USE OF INSULIN (HCC): Primary | ICD-10-CM

## 2019-09-12 PROCEDURE — 1111F DSCHRG MED/CURRENT MED MERGE: CPT | Performed by: NURSE PRACTITIONER

## 2019-09-12 PROCEDURE — 99496 TRANSJ CARE MGMT HIGH F2F 7D: CPT | Performed by: NURSE PRACTITIONER

## 2019-09-12 RX ORDER — OLANZAPINE 10 MG/1
10 TABLET ORAL NIGHTLY
Qty: 30 TABLET | Refills: 5 | Status: SHIPPED | OUTPATIENT
Start: 2019-09-12 | End: 2020-06-11

## 2019-09-12 NOTE — PROGRESS NOTES
ordered at the time of discharge from [facility], [address] Medication Instructions JOSE:    Printed on:09/12/19 1640   Medication Information                      albuterol sulfate HFA (PROVENTIL HFA) 108 (90 Base) MCG/ACT inhaler  Inhale 2 puffs into the lungs every 4 hours as needed for Wheezing or Shortness of Breath (Space out to every 6 hours as symptoms improve) Space out to every 6 hours as symptoms improve. blood glucose test strips (ASCENSIA AUTODISC VI;ONE TOUCH ULTRA TEST VI) strip  Test as needed. Dispense One Touch verio Test Strips. Dx: Type 2 diabetes (250.00)             Cholecalciferol (VITAMIN D3) 5000 units TABS  Take 5,000 Units by mouth daily              famotidine (PEPCID) 20 MG tablet  Take 1 tablet by mouth nightly             folic acid (FOLVITE) 1 MG tablet  Take 1 tablet by mouth daily             glucose monitoring kit (FREESTYLE) monitoring kit  Glucometer with test strips and lancets.   Check BS once daily  #100 strips and lancets             LACTULOSE PO  Take by mouth 2 times daily              lisinopril-hydrochlorothiazide (PRINZIDE;ZESTORETIC) 20-25 MG per tablet  Take 1 tablet by mouth daily             melatonin 1 MG/4ML LIQD SL liquid  Place 0.3 mg under the tongue nightly as needed for Sleep             metoprolol tartrate (LOPRESSOR) 50 MG tablet  Take 1 tablet by mouth 2 times daily             Multiple Vitamins-Iron (TAB-A-REBEKAH/IRON) TABS  Take by mouth daily             naltrexone (DEPADE) 50 MG tablet  Take 1 tablet by mouth daily for 14 days             OLANZapine (ZYPREXA) 10 MG tablet  Take 1 tablet by mouth nightly             Omega-3 1000 MG CAPS  Take by mouth             venlafaxine (EFFEXOR XR) 150 MG extended release capsule  Take 1 capsule by mouth daily             vitamin B-1 100 MG tablet  Take 2 tablets by mouth daily                   Medications marked \"taking\" at this time  Outpatient Medications Marked as Taking for the Hospital diagnosis(es):    Diagnosis Orders   1. Type 2 diabetes mellitus without complication, without long-term current use of insulin ([address]  MI DISCHARGE MEDS RECONCILED W/ CURRENT OUTPATIENT MED LIST   2. Bipolar 1 disorder (HCC)  OLANZapine (ZYPREXA) 10 MG tablet    MI DISCHARGE MEDS RECONCILED W/ CURRENT OUTPATIENT MED LIST   3. Alcohol dependence with physiological dependence, continuous ([address]  MI DISCHARGE MEDS RECONCILED W/ CURRENT OUTPATIENT MED LIST     Pt admitted for alcohol intoxication. Reports no drinking since in hospital   Did see dr flood yesterday and received naltrexone. Pt would like to restart zyprexa to help him sleep. Inpatient course: Discharge summary reviewed- see chart. Interval history/Current status: pt reports sober since being discharged. Had naltrexone yesterday. Is going to AA  Would like to restart zyprexa to \"help sleep and slow mind down\"    A comprehensive review of systems was negative except for what was noted in the HPI. Vitals:    09/12/19 1307   BP: 122/78   Site: Left Upper Arm   Position: Sitting   Cuff Size: Medium Adult   Pulse: 84   Resp: 16   Temp: 98 °F (36.7 °C)   TempSrc: Temporal   Weight: 234 lb 12.8 oz (106.5 kg)   Height: 5' 10.98\" (1.803 m)     Body mass index is 32.76 kg/m².    Wt Readings from Last 3 Encounters:   09/12/19 234 lb 12.8 oz (106.5 kg)   09/11/19 235 lb (106.6 kg)   09/04/19 230 lb (104.3 kg)     BP Readings from Last 3 Encounters:   09/12/19 122/78   09/11/19 135/83   09/06/19 (!) 142/80        Physical Exam:  General Appearance: alert and oriented to person, place and time, well developed and well- nourished, in no acute distress  Skin: warm and dry, no rash or erythema  Head: normocephalic and atraumatic  Eyes: pupils equal, round, and reactive to light, extraocular eye movements intact, conjunctivae normal  ENT: tympanic membrane, external ear and ear canal normal bilaterally, nose without deformity, nasal mucosa and

## 2019-09-23 ENCOUNTER — TELEPHONE (OUTPATIENT)
Dept: INTERNAL MEDICINE CLINIC | Age: 44
End: 2019-09-23

## 2019-09-25 ENCOUNTER — OFFICE VISIT (OUTPATIENT)
Dept: INTERNAL MEDICINE CLINIC | Age: 44
End: 2019-09-25
Payer: MEDICAID

## 2019-09-25 VITALS
HEART RATE: 82 BPM | DIASTOLIC BLOOD PRESSURE: 70 MMHG | WEIGHT: 238 LBS | SYSTOLIC BLOOD PRESSURE: 139 MMHG | BODY MASS INDEX: 33.32 KG/M2 | HEIGHT: 71 IN

## 2019-09-25 DIAGNOSIS — F10.20 SEVERE ALCOHOL USE DISORDER (HCC): Primary | ICD-10-CM

## 2019-09-25 PROCEDURE — 4004F PT TOBACCO SCREEN RCVD TLK: CPT | Performed by: INTERNAL MEDICINE

## 2019-09-25 PROCEDURE — 1111F DSCHRG MED/CURRENT MED MERGE: CPT | Performed by: INTERNAL MEDICINE

## 2019-09-25 PROCEDURE — 80305 DRUG TEST PRSMV DIR OPT OBS: CPT | Performed by: INTERNAL MEDICINE

## 2019-09-25 PROCEDURE — G8427 DOCREV CUR MEDS BY ELIG CLIN: HCPCS | Performed by: INTERNAL MEDICINE

## 2019-09-25 PROCEDURE — 99213 OFFICE O/P EST LOW 20 MIN: CPT | Performed by: INTERNAL MEDICINE

## 2019-09-25 PROCEDURE — G8417 CALC BMI ABV UP PARAM F/U: HCPCS | Performed by: INTERNAL MEDICINE

## 2019-10-14 ENCOUNTER — OFFICE VISIT (OUTPATIENT)
Dept: FAMILY MEDICINE CLINIC | Age: 44
End: 2019-10-14
Payer: MEDICAID

## 2019-10-14 ENCOUNTER — OFFICE VISIT (OUTPATIENT)
Dept: INTERNAL MEDICINE CLINIC | Age: 44
End: 2019-10-14
Payer: MEDICAID

## 2019-10-14 VITALS
WEIGHT: 233 LBS | DIASTOLIC BLOOD PRESSURE: 86 MMHG | SYSTOLIC BLOOD PRESSURE: 136 MMHG | TEMPERATURE: 97.5 F | BODY MASS INDEX: 32.62 KG/M2 | HEIGHT: 71 IN | RESPIRATION RATE: 16 BRPM | HEART RATE: 96 BPM | OXYGEN SATURATION: 97 %

## 2019-10-14 VITALS
WEIGHT: 231.31 LBS | SYSTOLIC BLOOD PRESSURE: 132 MMHG | BODY MASS INDEX: 32.38 KG/M2 | HEIGHT: 71 IN | DIASTOLIC BLOOD PRESSURE: 75 MMHG | HEART RATE: 97 BPM

## 2019-10-14 DIAGNOSIS — F31.9 BIPOLAR 1 DISORDER (HCC): Primary | ICD-10-CM

## 2019-10-14 DIAGNOSIS — J43.1 PANLOBULAR EMPHYSEMA (HCC): Chronic | ICD-10-CM

## 2019-10-14 DIAGNOSIS — E11.9 TYPE 2 DIABETES MELLITUS WITHOUT COMPLICATION, WITHOUT LONG-TERM CURRENT USE OF INSULIN (HCC): ICD-10-CM

## 2019-10-14 DIAGNOSIS — F19.10 POLYSUBSTANCE ABUSE (HCC): ICD-10-CM

## 2019-10-14 DIAGNOSIS — F10.931 ALCOHOL WITHDRAWAL SYNDROME, WITH DELIRIUM (HCC): ICD-10-CM

## 2019-10-14 DIAGNOSIS — F10.20 ALCOHOL DEPENDENCE WITH PHYSIOLOGICAL DEPENDENCE, CONTINUOUS (HCC): ICD-10-CM

## 2019-10-14 DIAGNOSIS — E11.8 TYPE 2 DIABETES MELLITUS WITH COMPLICATION, WITHOUT LONG-TERM CURRENT USE OF INSULIN (HCC): ICD-10-CM

## 2019-10-14 DIAGNOSIS — F10.20 SEVERE ALCOHOL USE DISORDER (HCC): Primary | ICD-10-CM

## 2019-10-14 PROBLEM — S60.229A CONTUSION OF HAND: Status: ACTIVE | Noted: 2019-10-14

## 2019-10-14 PROBLEM — R07.89 ATYPICAL CHEST PAIN: Status: ACTIVE | Noted: 2019-10-14

## 2019-10-14 PROBLEM — Q78.8 OSTEOPOIKILOSIS: Status: ACTIVE | Noted: 2019-10-14

## 2019-10-14 LAB
AMPHETAMINE SCREEN, URINE: ABNORMAL
BARBITURATE SCREEN, URINE: ABNORMAL
BENZODIAZEPINE SCREEN, URINE: ABNORMAL
BUPRENORPHINE URINE: ABNORMAL
CHP ED QC CHECK: NORMAL
COCAINE METABOLITE SCREEN URINE: ABNORMAL
GABAPENTIN SCREEN, URINE: ABNORMAL
GLUCOSE BLD-MCNC: 93 MG/DL
MDMA URINE: ABNORMAL
METHADONE SCREEN, URINE: ABNORMAL
METHAMPHETAMINE, URINE: ABNORMAL
OPIATE SCREEN URINE: ABNORMAL
OXYCODONE SCREEN URINE: ABNORMAL
PHENCYCLIDINE SCREEN URINE: ABNORMAL
PROPOXYPHENE SCREEN, URINE: ABNORMAL
THC SCREEN, URINE: ABNORMAL
TRICYCLIC ANTIDEPRESSANTS, UR: ABNORMAL

## 2019-10-14 PROCEDURE — G8484 FLU IMMUNIZE NO ADMIN: HCPCS | Performed by: NURSE PRACTITIONER

## 2019-10-14 PROCEDURE — G8428 CUR MEDS NOT DOCUMENT: HCPCS | Performed by: INTERNAL MEDICINE

## 2019-10-14 PROCEDURE — 2022F DILAT RTA XM EVC RTNOPTHY: CPT | Performed by: NURSE PRACTITIONER

## 2019-10-14 PROCEDURE — 3044F HG A1C LEVEL LT 7.0%: CPT | Performed by: NURSE PRACTITIONER

## 2019-10-14 PROCEDURE — 99214 OFFICE O/P EST MOD 30 MIN: CPT | Performed by: NURSE PRACTITIONER

## 2019-10-14 PROCEDURE — 99213 OFFICE O/P EST LOW 20 MIN: CPT | Performed by: INTERNAL MEDICINE

## 2019-10-14 PROCEDURE — 80305 DRUG TEST PRSMV DIR OPT OBS: CPT | Performed by: INTERNAL MEDICINE

## 2019-10-14 PROCEDURE — G8417 CALC BMI ABV UP PARAM F/U: HCPCS | Performed by: INTERNAL MEDICINE

## 2019-10-14 PROCEDURE — G8417 CALC BMI ABV UP PARAM F/U: HCPCS | Performed by: NURSE PRACTITIONER

## 2019-10-14 PROCEDURE — 82962 GLUCOSE BLOOD TEST: CPT | Performed by: NURSE PRACTITIONER

## 2019-10-14 PROCEDURE — G8926 SPIRO NO PERF OR DOC: HCPCS | Performed by: NURSE PRACTITIONER

## 2019-10-14 PROCEDURE — 4004F PT TOBACCO SCREEN RCVD TLK: CPT | Performed by: NURSE PRACTITIONER

## 2019-10-14 PROCEDURE — 3023F SPIROM DOC REV: CPT | Performed by: NURSE PRACTITIONER

## 2019-10-14 PROCEDURE — G8427 DOCREV CUR MEDS BY ELIG CLIN: HCPCS | Performed by: NURSE PRACTITIONER

## 2019-10-14 PROCEDURE — 4004F PT TOBACCO SCREEN RCVD TLK: CPT | Performed by: INTERNAL MEDICINE

## 2019-10-14 PROCEDURE — G8484 FLU IMMUNIZE NO ADMIN: HCPCS | Performed by: INTERNAL MEDICINE

## 2019-10-14 RX ORDER — AZITHROMYCIN 250 MG/1
TABLET, FILM COATED ORAL
Qty: 1 PACKET | Refills: 0 | Status: SHIPPED | OUTPATIENT
Start: 2019-10-14 | End: 2019-11-25

## 2019-10-14 RX ORDER — FLUTICASONE PROPIONATE 110 UG/1
1 AEROSOL, METERED RESPIRATORY (INHALATION) 2 TIMES DAILY
Qty: 1 INHALER | Refills: 3 | Status: SHIPPED | OUTPATIENT
Start: 2019-10-14 | End: 2020-01-21

## 2019-10-14 RX ORDER — DIAZEPAM 5 MG/1
5 TABLET ORAL EVERY 12 HOURS PRN
Qty: 6 TABLET | Refills: 0 | Status: SHIPPED | OUTPATIENT
Start: 2019-10-14 | End: 2019-11-25 | Stop reason: SDUPTHER

## 2019-10-14 ASSESSMENT — ENCOUNTER SYMPTOMS
WHEEZING: 1
EYES NEGATIVE: 1
GASTROINTESTINAL NEGATIVE: 1

## 2019-10-17 DIAGNOSIS — F11.20 SEVERE OPIOID DEPENDENCE (HCC): Primary | ICD-10-CM

## 2019-10-21 ENCOUNTER — TELEPHONE (OUTPATIENT)
Dept: INTERNAL MEDICINE CLINIC | Age: 44
End: 2019-10-21

## 2019-11-21 ENCOUNTER — OFFICE VISIT (OUTPATIENT)
Dept: INTERNAL MEDICINE CLINIC | Age: 44
End: 2019-11-21
Payer: MEDICAID

## 2019-11-21 VITALS
HEIGHT: 71 IN | WEIGHT: 246 LBS | HEART RATE: 88 BPM | SYSTOLIC BLOOD PRESSURE: 147 MMHG | BODY MASS INDEX: 34.44 KG/M2 | DIASTOLIC BLOOD PRESSURE: 96 MMHG

## 2019-11-21 DIAGNOSIS — F19.10 POLYSUBSTANCE ABUSE (HCC): ICD-10-CM

## 2019-11-21 DIAGNOSIS — F11.20 SEVERE OPIOID DEPENDENCE (HCC): Primary | ICD-10-CM

## 2019-11-21 PROCEDURE — 80305 DRUG TEST PRSMV DIR OPT OBS: CPT | Performed by: INTERNAL MEDICINE

## 2019-11-21 PROCEDURE — G8417 CALC BMI ABV UP PARAM F/U: HCPCS | Performed by: INTERNAL MEDICINE

## 2019-11-21 PROCEDURE — 99213 OFFICE O/P EST LOW 20 MIN: CPT | Performed by: INTERNAL MEDICINE

## 2019-11-21 PROCEDURE — 4004F PT TOBACCO SCREEN RCVD TLK: CPT | Performed by: INTERNAL MEDICINE

## 2019-11-21 PROCEDURE — G8484 FLU IMMUNIZE NO ADMIN: HCPCS | Performed by: INTERNAL MEDICINE

## 2019-11-21 PROCEDURE — G8427 DOCREV CUR MEDS BY ELIG CLIN: HCPCS | Performed by: INTERNAL MEDICINE

## 2019-11-25 ENCOUNTER — OFFICE VISIT (OUTPATIENT)
Dept: FAMILY MEDICINE CLINIC | Age: 44
End: 2019-11-25
Payer: MEDICAID

## 2019-11-25 VITALS
HEIGHT: 71 IN | DIASTOLIC BLOOD PRESSURE: 86 MMHG | RESPIRATION RATE: 16 BRPM | HEART RATE: 90 BPM | SYSTOLIC BLOOD PRESSURE: 132 MMHG | WEIGHT: 241.6 LBS | BODY MASS INDEX: 33.82 KG/M2

## 2019-11-25 DIAGNOSIS — F10.20 ALCOHOL DEPENDENCE WITH PHYSIOLOGICAL DEPENDENCE, CONTINUOUS (HCC): ICD-10-CM

## 2019-11-25 DIAGNOSIS — E11.9 TYPE 2 DIABETES MELLITUS WITHOUT COMPLICATION, WITHOUT LONG-TERM CURRENT USE OF INSULIN (HCC): ICD-10-CM

## 2019-11-25 DIAGNOSIS — F10.931 ALCOHOL WITHDRAWAL SYNDROME, WITH DELIRIUM (HCC): ICD-10-CM

## 2019-11-25 DIAGNOSIS — F31.9 BIPOLAR 1 DISORDER (HCC): Primary | ICD-10-CM

## 2019-11-25 PROCEDURE — 3044F HG A1C LEVEL LT 7.0%: CPT | Performed by: NURSE PRACTITIONER

## 2019-11-25 PROCEDURE — 2022F DILAT RTA XM EVC RTNOPTHY: CPT | Performed by: NURSE PRACTITIONER

## 2019-11-25 PROCEDURE — G8427 DOCREV CUR MEDS BY ELIG CLIN: HCPCS | Performed by: NURSE PRACTITIONER

## 2019-11-25 PROCEDURE — 4004F PT TOBACCO SCREEN RCVD TLK: CPT | Performed by: NURSE PRACTITIONER

## 2019-11-25 PROCEDURE — G8417 CALC BMI ABV UP PARAM F/U: HCPCS | Performed by: NURSE PRACTITIONER

## 2019-11-25 PROCEDURE — G8484 FLU IMMUNIZE NO ADMIN: HCPCS | Performed by: NURSE PRACTITIONER

## 2019-11-25 PROCEDURE — 99214 OFFICE O/P EST MOD 30 MIN: CPT | Performed by: NURSE PRACTITIONER

## 2019-11-25 RX ORDER — DIAZEPAM 5 MG/1
5 TABLET ORAL EVERY 12 HOURS PRN
Qty: 6 TABLET | Refills: 0 | Status: SHIPPED | OUTPATIENT
Start: 2019-11-25 | End: 2020-09-04 | Stop reason: SDUPTHER

## 2019-11-25 ASSESSMENT — ENCOUNTER SYMPTOMS
RESPIRATORY NEGATIVE: 1
EYES NEGATIVE: 1
GASTROINTESTINAL NEGATIVE: 1

## 2019-12-23 ENCOUNTER — TELEPHONE (OUTPATIENT)
Dept: INTERNAL MEDICINE CLINIC | Age: 44
End: 2019-12-23

## 2019-12-23 NOTE — TELEPHONE ENCOUNTER
Aurora Li called in today for an appointment to get a Vivitrol inject. Pt says [facility] told him to get in touch with our office because of difficulty getting Vivitrol. Patient says he know his medication has to be ordered. I told him his shot has to be ordered and could be on Thursday 12/26/19 when Madison Party is back in the office/ per Jaden.

## 2019-12-26 ENCOUNTER — OFFICE VISIT (OUTPATIENT)
Dept: INTERNAL MEDICINE CLINIC | Age: 44
End: 2019-12-26
Payer: MEDICAID

## 2019-12-26 VITALS
WEIGHT: 240 LBS | HEIGHT: 71 IN | SYSTOLIC BLOOD PRESSURE: 137 MMHG | BODY MASS INDEX: 33.6 KG/M2 | HEART RATE: 82 BPM | DIASTOLIC BLOOD PRESSURE: 73 MMHG

## 2019-12-26 DIAGNOSIS — F10.20 SEVERE ALCOHOL USE DISORDER (HCC): Primary | ICD-10-CM

## 2019-12-26 PROCEDURE — G8484 FLU IMMUNIZE NO ADMIN: HCPCS | Performed by: INTERNAL MEDICINE

## 2019-12-26 PROCEDURE — G8417 CALC BMI ABV UP PARAM F/U: HCPCS | Performed by: INTERNAL MEDICINE

## 2019-12-26 PROCEDURE — 99213 OFFICE O/P EST LOW 20 MIN: CPT | Performed by: INTERNAL MEDICINE

## 2019-12-26 PROCEDURE — 4004F PT TOBACCO SCREEN RCVD TLK: CPT | Performed by: INTERNAL MEDICINE

## 2019-12-26 PROCEDURE — G8427 DOCREV CUR MEDS BY ELIG CLIN: HCPCS | Performed by: INTERNAL MEDICINE

## 2019-12-26 PROCEDURE — 80305 DRUG TEST PRSMV DIR OPT OBS: CPT | Performed by: INTERNAL MEDICINE

## 2019-12-26 RX ORDER — NALTREXONE HYDROCHLORIDE 50 MG/1
50 TABLET, FILM COATED ORAL DAILY
Qty: 14 TABLET | Refills: 0 | Status: SHIPPED | OUTPATIENT
Start: 2019-12-26 | End: 2020-01-09

## 2019-12-26 RX ORDER — NALTREXONE HYDROCHLORIDE 50 MG/1
50 TABLET, FILM COATED ORAL DAILY
COMMUNITY
End: 2019-12-26 | Stop reason: SDUPTHER

## 2019-12-30 ENCOUNTER — OFFICE VISIT (OUTPATIENT)
Dept: FAMILY MEDICINE CLINIC | Age: 44
End: 2019-12-30
Payer: MEDICAID

## 2019-12-30 ENCOUNTER — HOSPITAL ENCOUNTER (OUTPATIENT)
Age: 44
Discharge: HOME | End: 2019-12-30
Payer: MEDICAID

## 2019-12-30 VITALS
SYSTOLIC BLOOD PRESSURE: 114 MMHG | HEART RATE: 80 BPM | DIASTOLIC BLOOD PRESSURE: 70 MMHG | HEIGHT: 71 IN | RESPIRATION RATE: 16 BRPM | WEIGHT: 239 LBS | TEMPERATURE: 97.9 F | BODY MASS INDEX: 33.46 KG/M2

## 2019-12-30 DIAGNOSIS — F10.20 SEVERE ALCOHOL USE DISORDER (HCC): ICD-10-CM

## 2019-12-30 DIAGNOSIS — E11.9 TYPE 2 DIABETES MELLITUS WITHOUT COMPLICATION, WITHOUT LONG-TERM CURRENT USE OF INSULIN (HCC): ICD-10-CM

## 2019-12-30 DIAGNOSIS — F31.9 BIPOLAR 1 DISORDER (HCC): ICD-10-CM

## 2019-12-30 LAB
ALBUMIN SERPL-MCNC: 4.4 G/DL (ref 3.5–5.1)
ALP BLD-CCNC: 126 U/L (ref 38–126)
ALT SERPL-CCNC: 43 U/L (ref 11–66)
AST SERPL-CCNC: 33 U/L (ref 5–40)
BILIRUB SERPL-MCNC: 0.3 MG/DL (ref 0.3–1.2)
BILIRUBIN DIRECT: < 0.2 MG/DL (ref 0–0.3)
TOTAL PROTEIN: 7.8 G/DL (ref 6.1–8)

## 2019-12-30 PROCEDURE — 3044F HG A1C LEVEL LT 7.0%: CPT | Performed by: NURSE PRACTITIONER

## 2019-12-30 PROCEDURE — 2022F DILAT RTA XM EVC RTNOPTHY: CPT | Performed by: NURSE PRACTITIONER

## 2019-12-30 PROCEDURE — 36415 COLL VENOUS BLD VENIPUNCTURE: CPT

## 2019-12-30 PROCEDURE — G8417 CALC BMI ABV UP PARAM F/U: HCPCS | Performed by: NURSE PRACTITIONER

## 2019-12-30 PROCEDURE — G8427 DOCREV CUR MEDS BY ELIG CLIN: HCPCS | Performed by: NURSE PRACTITIONER

## 2019-12-30 PROCEDURE — 80076 HEPATIC FUNCTION PANEL: CPT

## 2019-12-30 PROCEDURE — G8484 FLU IMMUNIZE NO ADMIN: HCPCS | Performed by: NURSE PRACTITIONER

## 2019-12-30 PROCEDURE — 4004F PT TOBACCO SCREEN RCVD TLK: CPT | Performed by: NURSE PRACTITIONER

## 2019-12-30 PROCEDURE — 99214 OFFICE O/P EST MOD 30 MIN: CPT | Performed by: NURSE PRACTITIONER

## 2019-12-30 RX ORDER — FAMOTIDINE 20 MG/1
20 TABLET, FILM COATED ORAL NIGHTLY
Qty: 30 TABLET | Refills: 1 | Status: SHIPPED | OUTPATIENT
Start: 2019-12-30 | End: 2020-06-11 | Stop reason: ALTCHOICE

## 2019-12-30 RX ORDER — LISINOPRIL AND HYDROCHLOROTHIAZIDE 25; 20 MG/1; MG/1
1 TABLET ORAL DAILY
Qty: 30 TABLET | Refills: 5 | Status: SHIPPED | OUTPATIENT
Start: 2019-12-30 | End: 2020-09-29 | Stop reason: SDUPTHER

## 2019-12-30 ASSESSMENT — ENCOUNTER SYMPTOMS
GASTROINTESTINAL NEGATIVE: 1
RESPIRATORY NEGATIVE: 1
EYES NEGATIVE: 1

## 2020-01-06 ENCOUNTER — TELEPHONE (OUTPATIENT)
Dept: INTERNAL MEDICINE CLINIC | Age: 45
End: 2020-01-06

## 2020-01-06 ENCOUNTER — CARE COORDINATION (OUTPATIENT)
Dept: CARE COORDINATION | Age: 45
End: 2020-01-06

## 2020-01-06 NOTE — TELEPHONE ENCOUNTER
[employer] [employer] [facility]-  For vivitrol  -  KYE need completed by Vanessa Maurer. (medical benefits investigation)  When this is completed the PA will be approved. ( in past [facility] 2019- did not require pa for vivitrol)  She suggested I call back on 01 7 20  To set up delivery date for vivitrol   Patient appt is  01 09 2020.

## 2020-01-06 NOTE — CARE COORDINATION
Attempted to reach patient for Care Coordination enrollment s/p recent list referral for assistance with education and chronic disease management. Patient was not available at the time of my call and generic voicemail message was left asking patient to please return call to my direct number. Introduction letter also mailed to patient. Will continue to work to f/u with patient in the future.

## 2020-01-15 ENCOUNTER — OFFICE VISIT (OUTPATIENT)
Dept: INTERNAL MEDICINE CLINIC | Age: 45
End: 2020-01-15
Payer: MEDICAID

## 2020-01-15 VITALS
BODY MASS INDEX: 33.88 KG/M2 | WEIGHT: 242 LBS | HEIGHT: 71 IN | HEART RATE: 74 BPM | SYSTOLIC BLOOD PRESSURE: 125 MMHG | DIASTOLIC BLOOD PRESSURE: 66 MMHG

## 2020-01-15 PROCEDURE — G8417 CALC BMI ABV UP PARAM F/U: HCPCS | Performed by: INTERNAL MEDICINE

## 2020-01-15 PROCEDURE — G8484 FLU IMMUNIZE NO ADMIN: HCPCS | Performed by: INTERNAL MEDICINE

## 2020-01-15 PROCEDURE — G8427 DOCREV CUR MEDS BY ELIG CLIN: HCPCS | Performed by: INTERNAL MEDICINE

## 2020-01-15 PROCEDURE — 99213 OFFICE O/P EST LOW 20 MIN: CPT | Performed by: INTERNAL MEDICINE

## 2020-01-15 PROCEDURE — 80305 DRUG TEST PRSMV DIR OPT OBS: CPT | Performed by: INTERNAL MEDICINE

## 2020-01-15 PROCEDURE — 4004F PT TOBACCO SCREEN RCVD TLK: CPT | Performed by: INTERNAL MEDICINE

## 2020-01-15 NOTE — PROGRESS NOTES
Verbal order per Dr. Alejo Randall for urine drug screen. Negative for all drugs. Verified results with Altagracia Randall ordered Vivitrol 380mg injection into left buttocks- no adverse reactions for patient. Patient was sent home with no meds and will be seen back in the office 1/12/2020.
Unknown   N/A         Diagnosis Orders   1. Severe alcohol use disorder (HCC)  POCT Rapid Drug Screen   2.  Polysubstance abuse ([facility]         Orders Placed This Encounter   Procedures    POCT Rapid Drug Screen     Patient was given 380 mg IM naltrexone  He tolerated the procedure well  I am going to check a comprehensive urine I suspect that he is still drinking  Tentatively see him in 4 weeks

## 2020-01-17 ENCOUNTER — CARE COORDINATION (OUTPATIENT)
Dept: CARE COORDINATION | Age: 45
End: 2020-01-17

## 2020-01-17 SDOH — HEALTH STABILITY: MENTAL HEALTH
STRESS IS WHEN SOMEONE FEELS TENSE, NERVOUS, ANXIOUS, OR CAN'T SLEEP AT NIGHT BECAUSE THEIR MIND IS TROUBLED. HOW STRESSED ARE YOU?: ONLY A LITTLE

## 2020-01-17 SDOH — SOCIAL STABILITY: SOCIAL NETWORK: HOW OFTEN DO YOU ATTENT MEETINGS OF THE CLUB OR ORGANIZATION YOU BELONG TO?: MORE THAN 4 TIMES PER YEAR

## 2020-01-17 SDOH — SOCIAL STABILITY: SOCIAL NETWORK
IN A TYPICAL WEEK, HOW MANY TIMES DO YOU TALK ON THE PHONE WITH FAMILY, FRIENDS, OR NEIGHBORS?: MORE THAN THREE TIMES A WEEK

## 2020-01-17 SDOH — SOCIAL STABILITY: SOCIAL NETWORK
DO YOU BELONG TO ANY CLUBS OR ORGANIZATIONS SUCH AS CHURCH GROUPS UNIONS, FRATERNAL OR ATHLETIC GROUPS, OR SCHOOL GROUPS?: YES

## 2020-01-17 SDOH — ECONOMIC STABILITY: INCOME INSECURITY: HOW HARD IS IT FOR YOU TO PAY FOR THE VERY BASICS LIKE FOOD, HOUSING, MEDICAL CARE, AND HEATING?: NOT HARD AT ALL

## 2020-01-17 SDOH — ECONOMIC STABILITY: TRANSPORTATION INSECURITY
IN THE PAST 12 MONTHS, HAS LACK OF TRANSPORTATION KEPT YOU FROM MEETINGS, WORK, OR FROM GETTING THINGS NEEDED FOR DAILY LIVING?: YES

## 2020-01-17 SDOH — HEALTH STABILITY: PHYSICAL HEALTH: ON AVERAGE, HOW MANY DAYS PER WEEK DO YOU ENGAGE IN MODERATE TO STRENUOUS EXERCISE (LIKE A BRISK WALK)?: 0 DAYS

## 2020-01-17 SDOH — HEALTH STABILITY: PHYSICAL HEALTH: ON AVERAGE, HOW MANY MINUTES DO YOU ENGAGE IN EXERCISE AT THIS LEVEL?: 0 MIN

## 2020-01-17 SDOH — HEALTH STABILITY: MENTAL HEALTH: HOW OFTEN DO YOU HAVE A DRINK CONTAINING ALCOHOL?: PATIENT DECLINED

## 2020-01-17 SDOH — ECONOMIC STABILITY: TRANSPORTATION INSECURITY
IN THE PAST 12 MONTHS, HAS THE LACK OF TRANSPORTATION KEPT YOU FROM MEDICAL APPOINTMENTS OR FROM GETTING MEDICATIONS?: NO

## 2020-01-17 SDOH — ECONOMIC STABILITY: FOOD INSECURITY: WITHIN THE PAST 12 MONTHS, THE FOOD YOU BOUGHT JUST DIDN'T LAST AND YOU DIDN'T HAVE MONEY TO GET MORE.: NEVER TRUE

## 2020-01-17 SDOH — SOCIAL STABILITY: SOCIAL NETWORK: HOW OFTEN DO YOU ATTEND CHURCH OR RELIGIOUS SERVICES?: NEVER

## 2020-01-17 SDOH — ECONOMIC STABILITY: FOOD INSECURITY: WITHIN THE PAST 12 MONTHS, YOU WORRIED THAT YOUR FOOD WOULD RUN OUT BEFORE YOU GOT MONEY TO BUY MORE.: NEVER TRUE

## 2020-01-17 SDOH — SOCIAL STABILITY: SOCIAL NETWORK: HOW OFTEN DO YOU GET TOGETHER WITH FRIENDS OR RELATIVES?: MORE THAN THREE TIMES A WEEK

## 2020-01-17 NOTE — CARE COORDINATION
Ambulatory Care Coordination Note  1/17/2020  CM Risk Score: 14  Charlson 10 Year Mortality Risk Score: 98%     ACC: Rose Merchant RN    Summary Note: Patient was called to establish Care Coordination s/p recent list referral for assistance with the management of his DM and COPD and his healthcare needs. Care Coordination program was reviewed with patient and initial eval information obtained. Patient shared he has been doing \"ok. \"  Patient reported he does have a history of alcohol abuse but he is currently not drinking. Patient stated he is attending [place] [id number] meetings routinely and also follows with [facility] for counseling and IM for medication to assist with sobriety. Patient was congratulated on quitting and encouragement and support provided t/o our call. 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 Patient stated his employer has been supportive to him as he quit drinking approx 12 years ago. Employer picks patient up and brings him home from work daily. Patient stated his mother currently assists with all other transportation as he is currently unable to drive. [facility] did review medication transportation assistance options with patient and he verbalized understanding. Patient will f/u with his medicaid program if needed. Patient stated he manages all of his own medications and he denied any current concerns with cost/coverage. Med Rec was completed and PCP updated. Patient stated his breathing remains at his baseline and he denied any c/o increased SOB or cough being present. Patient shared he feels breathing is improved s/p him quitting drinking. Patient reported he only monitors BS approx one time a week and they are typically in the  range. Patient admits to history of hypoglycemia symptoms but denied any being recent.   Hypoglycemia symptoms, treatment, and prevention education reviewed with patient and he verbalized understanding. Support provided to patient t/o our call and patient denied any other questions, concerns, or needs. Patient was encouraged to call with any that may develop. Patient verbalized agreement to ACM following up with him again in the future. Actions:   - Reviewed Care Coordination program   - Completed initial eval information   - Completed SDOH  - Completed Med Rec and monitored for medication concerns/needs  - Monitored for ADL/DME needs  - Discussed possible transportation assistance options with patient  - Reviewed hypoglycemia education   - Monitored for additional needs          Plan of Care:   - Continue with Care Coordination f/u calls for assistance with DM and COPD management as well as other healthcare needs  - Complete COPD education   - Complete DM education   - Provide support and encouragement to remain alcohol free  - Monitor for interest in quitting smoking  - Monitor for transportation assistance needs  - Review same day appointment, urgent care/walk in clinic, and after hours on call resources  - Monitor for additional needs  - Monitor for readiness to discharge from Care Coordination   l    Ambulatory Care Coordination Assessment    Care Coordination Protocol  Program Enrollment:  Complex Care  Referral from Primary Care Provider:  No  Week 1 - Initial Assessment     Do you have all of your prescriptions and are they filled?:  Yes  Barriers to medication adherence:  None  Are you able to afford your medications?:  Yes  How often do you have trouble taking your medications the way you have been told to take them?:  I always take them as prescribed. Do you have Home O2 Therapy?:  No      Ability to seek help/take action for Emergent Urgent situations i.e. fire, crime, inclement weather or health crisis. :  Independent  Ability to ambulate to restroom:  Independent  Ability handle personal hygeine needs (bathing/dressing/grooming):   Independent  Ability to manage tablet by mouth nightly 9/12/19  Yes NILAM Busby CNP   folic acid (FOLVITE) 1 MG tablet Take 1 tablet by mouth daily 8/8/19  Yes NILAM Busby CNP   metoprolol tartrate (LOPRESSOR) 50 MG tablet Take 1 tablet by mouth 2 times daily 8/8/19  Yes NILAM Busby CNP   venlafaxine (EFFEXOR XR) 150 MG extended release capsule Take 1 capsule by mouth daily 8/8/19  Yes NILAM Busby CNP   fluticasone (FLOVENT HFA) 110 MCG/ACT inhaler Inhale 1 puff into the lungs 2 times daily  Patient not taking: Reported on 1/17/2020 10/14/19   NILAM Busby CNP   albuterol sulfate HFA (PROVENTIL HFA) 108 (90 Base) MCG/ACT inhaler Inhale 2 puffs into the lungs every 4 hours as needed for Wheezing or Shortness of Breath (Space out to every 6 hours as symptoms improve) Space out to every 6 hours as symptoms improve. Patient not taking: Reported on 1/17/2020 9/6/19   Fransico Fonseca MD   LACTULOSE PO Take by mouth 2 times daily     Historical Provider, MD   melatonin 1 MG/4ML LIQD SL liquid Place 0.3 mg under the tongue nightly as needed for Sleep    Historical Provider, MD   glucose monitoring kit (FREESTYLE) monitoring kit Glucometer with test strips and lancets. Check BS once daily  #100 strips and lancets 10/4/18   NILAM Busyb CNP   blood glucose test strips (ASCENSIA AUTODISC VI;ONE TOUCH ULTRA TEST VI) strip Test as needed. Dispense One Touch verio Test Strips.   Dx: Type 2 diabetes (250.00) 10/4/18   NILAM Castillo CNP       Future Appointments   Date Time Provider Amanda Jerome   2/12/2020 11:00 AM Albertina Parsons MD [facility] [email or web address]   3/30/2020  3:45 PM NILAM Castillo CNP [facility] [email or web address]     ,   Diabetes Assessment    Meal Planning:  Avoidance of concentrated sweets   How often do you test your blood sugar?:  No Testing (Comment: monitors BS approx 1 x a week )   Do you have barriers with adherence to non-pharmacologic self-management interventions?  (Nutrition/Exercise/Self-Monitoring):  No   Have you ever had to go to the ED for symptoms of low blood sugar?:  No       No patient-reported symptoms      ,   COPD Assessment    Is the patient able to verbalize Rescue vs. Long Acting medications?:  Yes  Does the patient have a nebulizer?:  No  Does the patient use a space with inhaled medications?:  No     No patient-reported symptoms         Symptoms:      Symptom course:  stable  Increase use of rapid acting/rescue inhaled medications?:  No  Change in chronic cough?:  No/At Baseline  Change in sputum?:  No/At Baseline     ,   General Assessment    Do you have any symptoms that are causing concern?:  No      and Care Coordination Episodes    Type: Amb Care Coordination  Episode: Complex Care  Noted: 1/17/2020  Comments: list referral

## 2020-01-21 ENCOUNTER — OFFICE VISIT (OUTPATIENT)
Dept: FAMILY MEDICINE CLINIC | Age: 45
End: 2020-01-21
Payer: MEDICAID

## 2020-01-21 ENCOUNTER — CARE COORDINATION (OUTPATIENT)
Dept: CARE COORDINATION | Age: 45
End: 2020-01-21

## 2020-01-21 VITALS
WEIGHT: 241 LBS | HEART RATE: 80 BPM | DIASTOLIC BLOOD PRESSURE: 80 MMHG | RESPIRATION RATE: 12 BRPM | BODY MASS INDEX: 33.61 KG/M2 | SYSTOLIC BLOOD PRESSURE: 130 MMHG

## 2020-01-21 PROBLEM — G31.2 ALCOHOLIC ENCEPHALOPATHY (HCC): Status: ACTIVE | Noted: 2020-01-21

## 2020-01-21 PROCEDURE — 3023F SPIROM DOC REV: CPT | Performed by: NURSE PRACTITIONER

## 2020-01-21 PROCEDURE — G8484 FLU IMMUNIZE NO ADMIN: HCPCS | Performed by: NURSE PRACTITIONER

## 2020-01-21 PROCEDURE — G8926 SPIRO NO PERF OR DOC: HCPCS | Performed by: NURSE PRACTITIONER

## 2020-01-21 PROCEDURE — G8417 CALC BMI ABV UP PARAM F/U: HCPCS | Performed by: NURSE PRACTITIONER

## 2020-01-21 PROCEDURE — 3046F HEMOGLOBIN A1C LEVEL >9.0%: CPT | Performed by: NURSE PRACTITIONER

## 2020-01-21 PROCEDURE — 4004F PT TOBACCO SCREEN RCVD TLK: CPT | Performed by: NURSE PRACTITIONER

## 2020-01-21 PROCEDURE — 99213 OFFICE O/P EST LOW 20 MIN: CPT | Performed by: NURSE PRACTITIONER

## 2020-01-21 PROCEDURE — G8427 DOCREV CUR MEDS BY ELIG CLIN: HCPCS | Performed by: NURSE PRACTITIONER

## 2020-01-21 PROCEDURE — 2022F DILAT RTA XM EVC RTNOPTHY: CPT | Performed by: NURSE PRACTITIONER

## 2020-01-21 RX ORDER — OMEPRAZOLE 40 MG/1
40 CAPSULE, DELAYED RELEASE ORAL
Qty: 30 CAPSULE | Refills: 0 | Status: SHIPPED | OUTPATIENT
Start: 2020-01-21 | End: 2020-06-11 | Stop reason: ALTCHOICE

## 2020-01-21 ASSESSMENT — ENCOUNTER SYMPTOMS
RESPIRATORY NEGATIVE: 1
NAUSEA: 1
EYES NEGATIVE: 1

## 2020-01-21 NOTE — PROGRESS NOTES
Candy Reynaga is a 38 y.o. male whopresents today for :  Chief Complaint   Patient presents with    Nausea     worst in the morning       HPI:     HPI  Pt reports nausea worse in the am and after eating. Pain in epigastric region.        Patient Active Problem List   Diagnosis    Hyperlipemia    Uncontrolled hypertension    Type 2 diabetes mellitus with complication, without long-term current use of insulin (HCC)    Alcohol withdrawal (HCC)    Alcoholic hepatitis    COPD (chronic obstructive pulmonary disease) ([address]    Passive-dependent personality disorder ([address]    Alcohol abuse    Type 2 diabetes mellitus without complication, without long-term current use of insulin (HCC)    Alcoholic liver failure (HCC)    Transaminitis    Hyperbilirubinemia    RUQ pain    Delirious    Alcoholic hepatitis without ascites    Alcoholic steatohepatitis    Hypophosphatemia    Bipolar 1 disorder (HCC)    Alcoholic gastritis without bleeding    Alcohol withdrawal syndrome with complication (HCC)    Alcohol dependence with physiological dependence, continuous (HCC)    Decompensated liver disease (HCC)    Alcoholic cirrhosis ([address]    Alcohol intoxication in alcoholism with blood level over 0.3 with complication ([address]    Acute alcoholic intoxication with delirium ([address]    Acute alcoholic hepatitis    Alcohol ingestion    Alcohol withdrawal delirium ([address]    Essential hypertension    Alcohol withdrawal, uncomplicated (HCC)    Alcohol use disorder, severe, dependence ([address]    Acute pancreatitis    Depression    Osteopoikilosis    Contusion of hand    Atypical chest pain    Alcoholic encephalopathy ([address]        Past Medical History:   Diagnosis Date    COPD (chronic obstructive pulmonary disease) ([address]     Depression     Diabetes mellitus ([address]     ETOH abuse     Hyperlipidemia     Hypertension     Liver disease     Seizures ([address]     etoh wdl      Past Surgical History:   Procedure extended release capsule Take 1 capsule by mouth daily 30 capsule 5    LACTULOSE PO Take by mouth 2 times daily       melatonin 1 MG/4ML LIQD SL liquid Place 0.3 mg under the tongue nightly as needed for Sleep      glucose monitoring kit (FREESTYLE) monitoring kit Glucometer with test strips and lancets. Check BS once daily  #100 strips and lancets 1 kit 5    blood glucose test strips (ASCENSIA AUTODISC VI;ONE TOUCH ULTRA TEST VI) strip Test as needed. Dispense One Touch verio Test Strips. Dx: Type 2 diabetes (250.00) 60 strip 11     No current facility-administered medications for this visit. No Known Allergies  Health Maintenance   Topic Date Due    Hepatitis A vaccine (1 of 2 - Risk 2-dose series) 04/10/1977    Pneumococcal 0-64 years Vaccine (1 of 1 - PPSV23) 04/10/1982    Diabetic retinal exam  04/10/1986    DTaP/Tdap/Td vaccine (1 - Tdap) 04/10/1987    HIV screen  04/10/1991    Hepatitis B vaccine (1 of 3 - Risk 3-dose series) 04/10/1995    Diabetic foot exam  08/23/2018    Diabetic microalbuminuria test  10/04/2019    Flu vaccine (1) 11/25/2020 (Originally 9/1/2019)    A1C test (Diabetic or Prediabetic)  08/08/2020    Lipid screen  09/03/2020    Potassium monitoring  09/03/2020    Creatinine monitoring  09/03/2020       Subjective:     Review of Systems   Constitutional: Negative. HENT: Negative. Eyes: Negative. Respiratory: Negative. Cardiovascular: Negative. Gastrointestinal: Positive for nausea. Musculoskeletal: Negative. Skin: Negative. Neurological: Negative. Objective:     Vitals:    01/21/20 0838   BP: 130/80   Site: Left Upper Arm   Position: Sitting   Cuff Size: Large Adult   Pulse: 80   Resp: 12   Weight: 241 lb (109.3 kg)       Physical Exam  Constitutional:       Appearance: He is well-developed. HENT:      Head: Normocephalic.       Right Ear: Tympanic membrane and external ear normal.      Left Ear: Tympanic membrane and external ear

## 2020-01-21 NOTE — CARE COORDINATION
Ambulatory Care Coordination Note  1/21/2020  CM Risk Score: 14  Charlson 10 Year Mortality Risk Score: 98%     ACC: Jeremy Rodriguez, RN    Summary Note: ACM met with patient prior to his PCP visit this AM for continued Care Coordination follow up and education re: the management of his healthcare needs and his DM and COPD. Patient reported he is feeling better today but has had an \"upset stomach\" for the last 3 days. Patient was encouraged to discuss symptoms with PCP during their visit for evaluation and patient acknowledged understanding. Patient stated his breathing remains at his baseline and he denied any c/o increased SOB or cough being present. COPD education, including zone handout, signs/symptoms to call and report, and importance of early symptom recognition, reviewed with patient. Patient verbalized understanding. Patient reported BS remain stable but he does not have any recent readings available for review. DM education, including prevention of hypoglycemia and hyperglycemia and DM zone handout, reviewed with patient. Patient acknowledged understanding. Patient denied any other questions, concerns ,or needs and he was encouraged to call with any that may develop.            Actions:   - Reviewed COPD and DM education   - Reviewed and provided copies of COPD and DM zone handouts  - Reviewed sing/symptoms to call and report  - Reviewed importance of early symptom recognition   - Monitored for additional needs          Plan of Care:   - Continue with Care Coordination f/u calls for assistance with DM and COPD management as well as other healthcare needs  - Complete COPD education - In Process   - Complete DM education - In Process  - Provide support and encouragement to remain alcohol free  - Monitor for interest in quitting smoking  - Monitor for transportation assistance needs  - Review same day appointment, urgent care/walk in clinic, and after hours on call resources  - Monitor for additional Provider, MD   OLANZapine (ZYPREXA) 10 MG tablet Take 1 tablet by mouth nightly 9/12/19   NILAM Busby CNP   albuterol sulfate HFA (PROVENTIL HFA) 108 (90 Base) MCG/ACT inhaler Inhale 2 puffs into the lungs every 4 hours as needed for Wheezing or Shortness of Breath (Space out to every 6 hours as symptoms improve) Space out to every 6 hours as symptoms improve. 9/6/19   Laura Casiano MD   folic acid (FOLVITE) 1 MG tablet Take 1 tablet by mouth daily 8/8/19   NILAM Busby CNP   metoprolol tartrate (LOPRESSOR) 50 MG tablet Take 1 tablet by mouth 2 times daily 8/8/19   NILAM Busby CNP   venlafaxine (EFFEXOR XR) 150 MG extended release capsule Take 1 capsule by mouth daily 8/8/19   NILAM Busby CNP   LACTULOSE PO Take by mouth 2 times daily     Historical Provider, MD   melatonin 1 MG/4ML LIQD SL liquid Place 0.3 mg under the tongue nightly as needed for Sleep    Historical Provider, MD   glucose monitoring kit (FREESTYLE) monitoring kit Glucometer with test strips and lancets. Check BS once daily  #100 strips and lancets 10/4/18   NILAM Busby CNP   blood glucose test strips (ASCENSIA AUTODISC VI;ONE TOUCH ULTRA TEST VI) strip Test as needed. Dispense One Touch verio Test Strips.   Dx: Type 2 diabetes (250.00) 10/4/18   NILAM Martin CNP       Future Appointments   Date Time Provider Amanda Jerome   2/12/2020 11:00 AM Anne Marie Maurice MD [facility]   3/30/2020  3:45 PM NILAM Martin CNP [facility]     ,   COPD Assessment    Is the patient able to verbalize Rescue vs. Long Acting medications?:  Yes  Does the patient have a nebulizer?:  No  Does the patient use a space with inhaled medications?:  No     No patient-reported symptoms         Symptoms:   None:  Yes      Symptom course:  stable  Increase use of rapid acting/rescue inhaled medications?:  No  Change in chronic cough?:  No/At Baseline  Change in sputum?:  No/At Baseline     ,   General Assessment    Do you have any symptoms that are causing concern?:  Yes  Progression since Onset:  Intermittent - Waxing/Waning  Reported Symptoms:  Nausea, Vomiting      and Care Coordination Episodes    Type: Amb Care Coordination  Episode: Complex Care  Noted: 1/17/2020  Comments: list referral

## 2020-01-30 ENCOUNTER — CARE COORDINATION (OUTPATIENT)
Dept: CARE COORDINATION | Age: 45
End: 2020-01-30

## 2020-01-30 NOTE — CARE COORDINATION
)  Transportation Support: In Process  Zone Management Tools:  Completed (Comment: Jan. 2020)         Goals Addressed                 This Visit's Progress       Care Coordination     Behavioral Health   Improving     I will work towards the following [facility] goals: I will continue to follow up with my psychologist Ada Barlow and/or psychiatrist., I will schedule my follow up appointment with my primary care physician when I leave the office today., I will take my medications daily as prescribed. and I will seek treatment for alcohol/substance use. Barriers: financial, stress and lack of education  Plan for overcoming my barriers: Provide support and education to patient   Confidence: 7/10  Anticipated Goal Completion Date: 6/20/20            Prior to Admission medications    Medication Sig Start Date End Date Taking? Authorizing Provider   omeprazole (PRILOSEC) 40 MG delayed release capsule Take 1 capsule by mouth every morning (before breakfast) 1/21/20   NILAM Busby CNP   famotidine (PEPCID) 20 MG tablet Take 1 tablet by mouth nightly 12/30/19 1/29/20  NILAM Busby CNP   lisinopril-hydrochlorothiazide (PRINZIDE;ZESTORETIC) 20-25 MG per tablet Take 1 tablet by mouth daily 12/30/19   NILAM Busby CNP   Naltrexone (VIVITROL IM) Inject into the muscle Indications: monthly    Historical Provider, MD   OLANZapine (ZYPREXA) 10 MG tablet Take 1 tablet by mouth nightly 9/12/19   NILAM Busby CNP   albuterol sulfate HFA (PROVENTIL HFA) 108 (90 Base) MCG/ACT inhaler Inhale 2 puffs into the lungs every 4 hours as needed for Wheezing or Shortness of Breath (Space out to every 6 hours as symptoms improve) Space out to every 6 hours as symptoms improve.  9/6/19   Jean-Claude Xavier MD   folic acid (FOLVITE) 1 MG tablet Take 1 tablet by mouth daily 8/8/19   NILAM Busby CNP   metoprolol tartrate (LOPRESSOR) 50 MG tablet Take 1 tablet by mouth 2 times daily 8/8/19   NILAM Busby CNP   venlafaxine (EFFEXOR XR) 150 MG extended release capsule Take 1 capsule by mouth daily 8/8/19   NILAM Busby CNP   LACTULOSE PO Take by mouth 2 times daily     Historical Provider, MD   melatonin 1 MG/4ML LIQD SL liquid Place 0.3 mg under the tongue nightly as needed for Sleep    Historical Provider, MD   glucose monitoring kit (FREESTYLE) monitoring kit Glucometer with test strips and lancets. Check BS once daily  #100 strips and lancets 10/4/18   NILAM Busby CNP   blood glucose test strips (ASCENSIA AUTODISC VI;ONE TOUCH ULTRA TEST VI) strip Test as needed. Dispense One Touch verio Test Strips. Dx: Type 2 diabetes (250.00) 10/4/18   NILAM Valencia CNP       Future Appointments   Date Time Provider Amanda Jerome   2/12/2020 11:00 AM [facility] [place] [facility]   3/30/2020  3:45 PM NIL[facility] Valencia [facility] [place]     ,   Diabetes Assessment    Meal Planning:  Avoidance of concentrated sweets   How often do you test your blood sugar?:  No Testing (Comment: monitors BS approx 1 x a week )   Do you have barriers with adherence to non-pharmacologic self-management interventions?  (Nutrition/Exercise/Self-Monitoring):  No   Have you ever had to go to the ED for symptoms of low blood sugar?:  No       No patient-reported symptoms      ,   COPD Assessment    Is the patient able to verbalize Rescue vs. Long Acting medications?:  Yes  Does the patient have a nebulizer?:  No  Does the patient use a space with inhaled medications?:  No     No patient-reported symptoms         Symptoms:   None:  Yes      Symptom course:  stable  Increase use of rapid acting/rescue inhaled medications?:  No  Change in chronic cough?:  No/At Baseline  Change in sputum?:  No/At Baseline     ,   General Assessment    Do you have any symptoms that are causing concern?:  Yes  Progression since Onset:  Gradually Improving  Reported Symptoms: Abdominal Pain, Nausea      and Care Coordination Episodes    Type: Amb Care Coordination  Episode: Complex Care  Noted: 1/17/2020  Comments: list referral

## 2020-02-06 ENCOUNTER — CARE COORDINATION (OUTPATIENT)
Dept: CARE COORDINATION | Age: 45
End: 2020-02-06

## 2020-02-06 NOTE — CARE COORDINATION
Attempted to reach patient for continued Care Coordination follow up and education. Patient was unavailable at the time of my call, and generic voicemail message was left asking patient to please return call to my direct number.   Raechel Crigler, RN Care Coordinator

## 2020-02-08 NOTE — PROGRESS NOTES
Pt. Instructed on ventolin inhaler with spacer, able to return demostrate without problems, discharge instructions given with Rx. For prednison, zithromax and cleocin, pt. Also given order and appt. Time for GB U/S for Monday here at [facility] 11:00 am, pt given prep and verbalizes understanding, released ambulatory in satis. Cond, with mother to drive home. Pulse regular. Extremities warm. Respirations regular and quiet. Mucous membranes pink & moist. Alert and oriented times 3. No nausea or vomiting. Range of motion within patient's limits. Skin sl. yellow, warm and dry. Calm and cooperative. no

## 2020-02-13 ENCOUNTER — CARE COORDINATION (OUTPATIENT)
Dept: CARE COORDINATION | Age: 45
End: 2020-02-13

## 2020-02-16 ENCOUNTER — HOSPITAL ENCOUNTER (EMERGENCY)
Age: 45
Discharge: HOME | End: 2020-02-16
Attending: EMERGENCY MEDICINE
Payer: MEDICAID

## 2020-02-16 VITALS
BODY MASS INDEX: 33.6 KG/M2 | DIASTOLIC BLOOD PRESSURE: 99 MMHG | SYSTOLIC BLOOD PRESSURE: 129 MMHG | HEIGHT: 71 IN | OXYGEN SATURATION: 95 % | HEART RATE: 101 BPM | WEIGHT: 240 LBS | TEMPERATURE: 98.4 F | RESPIRATION RATE: 14 BRPM

## 2020-02-16 LAB
ALBUMIN SERPL-MCNC: 3.5 GM/DL (ref 3.4–5)
ALP BLD-CCNC: 228 U/L (ref 46–116)
ALT SERPL-CCNC: 115 U/L (ref 14–63)
ANALYZED BY:: NORMAL
ANION GAP: 12 MEQ/L (ref 8–16)
AST SERPL-CCNC: 105 U/L (ref 5–40)
BASOPHILS # BLD: 0 % (ref 0–3)
BILIRUB SERPL-MCNC: 1.3 MG/DL (ref 0.2–1)
BUN BLDV-MCNC: 16 MG/DL (ref 7–18)
CHLORIDE BLD-SCNC: 96 MEQ/L (ref 98–107)
CO2: 23 MEQ/L (ref 21–32)
CREAT SERPL-MCNC: 0.8 MG/DL (ref 0.6–1.3)
DATE OF COLLECTION: NORMAL
DIFFERENTIAL, MANUAL: NORMAL
DRAWN BY: NORMAL
EKG ATRIAL RATE: 122 BPM
EKG P AXIS: 55 DEGREES
EKG P-R INTERVAL: 116 MS
EKG Q-T INTERVAL: 318 MS
EKG QRS DURATION: 86 MS
EKG QTC CALCULATION (BAZETT): 453 MS
EKG R AXIS: 30 DEGREES
EKG T AXIS: 50 DEGREES
EKG VENTRICULAR RATE: 122 BPM
EOSINOPHILS RELATIVE PERCENT: 4 % (ref 0–4)
ETHYL ALCOHOL: 0.12 % (GM/DL)
GFR, ESTIMATED: > 90 ML/MIN/1.73M2
GLUCOSE BLD-MCNC: 127 MG/DL (ref 74–106)
HCT VFR BLD CALC: 51.7 % (ref 42–52)
HEMOGLOBIN: 17.3 GM/DL (ref 14–18)
LIPASE: 184 U/L (ref 73–393)
LYMPHOCYTES # BLD: 19 % (ref 15–47)
Lab: 552
Lab: NORMAL
MCH RBC QN AUTO: 33.1 PG (ref 27–31)
MCHC RBC AUTO-ENTMCNC: 33.5 GM/DL (ref 33–37)
MCV RBC AUTO: 98.7 FL (ref 80–94)
MONOCYTES: 6 % (ref 0–12)
PDW BLD-RTO: 12 % (ref 11.5–14.5)
PLATELET # BLD: 236 THOU/MM3 (ref 130–400)
PMV BLD AUTO: 6.5 FL (ref 7.4–10.4)
POC CALCIUM: 8.4 MG/DL (ref 8.5–10.1)
POTASSIUM SERPL-SCNC: 3.9 MEQ/L (ref 3.5–5.1)
RBC # BLD: 5.24 MILL/MM3 (ref 4.7–6.1)
SEGS: 71 % (ref 43–75)
SODIUM BLD-SCNC: 131 MEQ/L (ref 136–145)
TOTAL PROTEIN: 7.3 GM/DL (ref 6.4–8.2)
WBC # BLD: 6 THOU/MM3 (ref 4.8–10.8)

## 2020-02-16 PROCEDURE — 93005 ELECTROCARDIOGRAM TRACING: CPT | Performed by: EMERGENCY MEDICINE

## 2020-02-16 PROCEDURE — 82040 ASSAY OF SERUM ALBUMIN: CPT

## 2020-02-16 PROCEDURE — 84075 ASSAY ALKALINE PHOSPHATASE: CPT

## 2020-02-16 PROCEDURE — 84520 ASSAY OF UREA NITROGEN: CPT

## 2020-02-16 PROCEDURE — 84295 ASSAY OF SERUM SODIUM: CPT

## 2020-02-16 PROCEDURE — 84132 ASSAY OF SERUM POTASSIUM: CPT

## 2020-02-16 PROCEDURE — 2580000003 HC RX 258: Performed by: EMERGENCY MEDICINE

## 2020-02-16 PROCEDURE — 82247 BILIRUBIN TOTAL: CPT

## 2020-02-16 PROCEDURE — 82374 ASSAY BLOOD CARBON DIOXIDE: CPT

## 2020-02-16 PROCEDURE — 85025 COMPLETE CBC W/AUTO DIFF WBC: CPT

## 2020-02-16 PROCEDURE — 84155 ASSAY OF PROTEIN SERUM: CPT

## 2020-02-16 PROCEDURE — 96374 THER/PROPH/DIAG INJ IV PUSH: CPT

## 2020-02-16 PROCEDURE — 99285 EMERGENCY DEPT VISIT HI MDM: CPT

## 2020-02-16 PROCEDURE — 82435 ASSAY OF BLOOD CHLORIDE: CPT

## 2020-02-16 PROCEDURE — 6370000000 HC RX 637 (ALT 250 FOR IP): Performed by: EMERGENCY MEDICINE

## 2020-02-16 PROCEDURE — 93010 ELECTROCARDIOGRAM REPORT: CPT | Performed by: INTERNAL MEDICINE

## 2020-02-16 PROCEDURE — 36415 COLL VENOUS BLD VENIPUNCTURE: CPT

## 2020-02-16 PROCEDURE — G0480 DRUG TEST DEF 1-7 CLASSES: HCPCS

## 2020-02-16 PROCEDURE — 83690 ASSAY OF LIPASE: CPT

## 2020-02-16 PROCEDURE — 82947 ASSAY GLUCOSE BLOOD QUANT: CPT

## 2020-02-16 PROCEDURE — 84460 ALANINE AMINO (ALT) (SGPT): CPT

## 2020-02-16 PROCEDURE — 84450 TRANSFERASE (AST) (SGOT): CPT

## 2020-02-16 PROCEDURE — 2709999900 HC NON-CHARGEABLE SUPPLY

## 2020-02-16 PROCEDURE — 82310 ASSAY OF CALCIUM: CPT

## 2020-02-16 PROCEDURE — 82565 ASSAY OF CREATININE: CPT

## 2020-02-16 PROCEDURE — 2500000003 HC RX 250 WO HCPCS: Performed by: EMERGENCY MEDICINE

## 2020-02-16 RX ORDER — 0.9 % SODIUM CHLORIDE 0.9 %
1000 INTRAVENOUS SOLUTION INTRAVENOUS ONCE
Status: COMPLETED | OUTPATIENT
Start: 2020-02-16 | End: 2020-02-16

## 2020-02-16 RX ORDER — SODIUM CHLORIDE 9 MG/ML
INJECTION, SOLUTION INTRAVENOUS CONTINUOUS
Status: DISCONTINUED | OUTPATIENT
Start: 2020-02-16 | End: 2020-02-16 | Stop reason: HOSPADM

## 2020-02-16 RX ORDER — CLONIDINE HYDROCHLORIDE 0.1 MG/1
0.1 TABLET ORAL ONCE
Status: COMPLETED | OUTPATIENT
Start: 2020-02-16 | End: 2020-02-16

## 2020-02-16 RX ORDER — METOPROLOL TARTRATE 5 MG/5ML
5 INJECTION INTRAVENOUS ONCE
Status: COMPLETED | OUTPATIENT
Start: 2020-02-16 | End: 2020-02-16

## 2020-02-16 RX ORDER — CHLORDIAZEPOXIDE HYDROCHLORIDE 10 MG/1
10 CAPSULE, GELATIN COATED ORAL 4 TIMES DAILY PRN
Qty: 20 CAPSULE | Refills: 0 | Status: SHIPPED | OUTPATIENT
Start: 2020-02-16 | End: 2020-03-07

## 2020-02-16 RX ADMIN — METOPROLOL TARTRATE 5 MG: 5 INJECTION INTRAVENOUS at 06:15

## 2020-02-16 RX ADMIN — CLONIDINE HYDROCHLORIDE 0.1 MG: 0.1 TABLET ORAL at 06:14

## 2020-02-16 RX ADMIN — SODIUM CHLORIDE: 9 INJECTION, SOLUTION INTRAVENOUS at 07:14

## 2020-02-16 RX ADMIN — SODIUM CHLORIDE 1000 ML: 9 INJECTION, SOLUTION INTRAVENOUS at 05:55

## 2020-02-16 NOTE — ED NOTES
Dr. Paniagua Copa in to talk to pt and mom. Pt will be discharged home with instructions.      Leigh Ann Montenegro, ANOOP  02/16/20 [id number]

## 2020-02-16 NOTE — ED NOTES
Patient reassured, comfort provided, patient placed in a gown, warm blanket provided.      Clark Aguirre RN  02/16/20 0279

## 2020-02-16 NOTE — ED NOTES
Report received from [facility], and rounded.       Stew Brannon RN  02/16/20 8685

## 2020-02-16 NOTE — ED NOTES
Bedside report given to Gabriel Tijerina. Introductions given to the patient and family. Patient given more ice water.      Lm Cervantes, ANOOP  02/16/20 [id number]

## 2020-02-16 NOTE — ED PROVIDER NOTES
[address]  [address] [place] [address]  Phone: [phone]    Emergency Department encounter      CHIEF COMPLAINT    Chief Complaint   Patient presents with    Delirium Tremens (DTS)       HPI    Kristi Jones is a 38 y.o. male who presents above-noted complaint. Patient actually complains of some shakiness. He is not hallucinating which is feels somewhat paranoid. He is alcoholic. He stopped drinking back in the fall. He states this weekend he went back off the wagon and has been drinking for the last week or so. Denies other symptoms at this time such as chest pain although maybe is a little bit of diarrhea. No seizure activity other findings. PAST MEDICAL HISTORY    Past Medical History:   Diagnosis Date    [facility]ronic obstructive pulmonary disease) [facility]     Depression     Diabetes mellitus ([facility]     ETOH abuse     Hyperlipidemia     Hypertension     Liver disease     Seizures (HCC)     etoh wdl       SURGICAL HISTORY    Past Surgical History:   Procedure Laterality Date    ENDOSCOPY, COLON, DIAGNOSTIC         CURRENT MEDICATIONS    Current Outpatient Rx   Medication Sig Dispense Refill    chlordiazePOXIDE (LIBRIUM) 10 MG capsule Take 1 capsule by mouth 4 times daily as needed for Anxiety for up to 20 days.  20 capsule 0    omeprazole (PRILOSEC) 40 MG delayed release capsule Take 1 capsule by mouth every morning (before breakfast) 30 capsule 0    lisinopril-hydrochlorothiazide (PRINZIDE;ZESTORETIC) 20-25 MG per tablet Take 1 tablet by mouth daily 30 tablet 5    Naltrexone (VIVITROL IM) Inject into the muscle Indications: monthly      OLANZapine (ZYPREXA) 10 MG tablet Take 1 tablet by mouth nightly 30 tablet 5    albuterol sulfate HFA (PROVENTIL HFA) 108 (90 Base) MCG/ACT inhaler Inhale 2 puffs into the lungs every 4 hours as needed for Wheezing or Shortness of Breath (Space out to every 6 hours as symptoms improve) Space out to every 6 hours as symptoms improve. 1 Inhaler 0    folic acid (FOLVITE) 1 MG tablet Take 1 tablet by mouth daily 30 tablet 5    metoprolol tartrate (LOPRESSOR) 50 MG tablet Take 1 tablet by mouth 2 times daily 60 tablet 5    venlafaxine (EFFEXOR XR) 150 MG extended release capsule Take 1 capsule by mouth daily 30 capsule 5    LACTULOSE PO Take by mouth 2 times daily       melatonin 1 MG/4ML LIQD SL liquid Place 0.3 mg under the tongue nightly as needed for Sleep      glucose monitoring kit (FREESTYLE) monitoring kit Glucometer with test strips and lancets. Check BS once daily  #100 strips and lancets 1 kit 5    blood glucose test strips (ASCENSIA AUTODISC VI;ONE TOUCH ULTRA TEST VI) strip Test as needed. Dispense One Touch verio Test Strips.   Dx: Type 2 diabetes (250.00) 60 strip 11    famotidine (PEPCID) 20 MG tablet Take 1 tablet by mouth nightly 30 tablet 1       ALLERGIES    No Known Allergies    FAMILY HISTORY    Family History   Problem Relation Age of Onset    High Blood Pressure Father     Cancer Father         Lung Cancer    Alcohol Abuse Father     High Blood Pressure Brother     Diabetes Mother     Heart Disease Mother         Stent Placement    Arthritis Mother     Depression Mother     High Blood Pressure Mother     High Cholesterol Mother     Alcohol Abuse Paternal Uncle        SOCIAL HISTORY    Social History     Socioeconomic History    Marital status: Single     Spouse name: None    Number of children: 0    Years of education: 15    Highest education level: None   Occupational History     Employer: [employer]   Social Needs    Financial resource strain: Not hard at all   NayeKatja insecurity:     Worry: Never true     Inability: Never true    Transportation needs:     Medical: No     Non-medical: Yes   Tobacco Use    Smoking status: Current Every Day Smoker     Packs/day: 1.00     Years: 27.00     Pack years: 27.00     Types: E-Cigarettes, Cigarettes    Smokeless tobacco: Former User    Tobacco comment: vape   Substance and Sexual Activity    Alcohol use: Not Currently     Alcohol/week: 140.0 standard drinks     Types: 140 Cans of beer per week     Frequency: Patient refused     Binge frequency: Patient refused     Comment: drinks a 30 pack a day    Drug use: No    Sexual activity: Not Currently   Lifestyle    Physical activity:     Days per week: 0 days     Minutes per session: 0 min    Stress: Only a little   Relationships    Social connections:     Talks on phone: More than three times a week     Gets together: More than three times a week     Attends Judaism service: Never     Active member of club or organization: Yes     Attends meetings of clubs or organizations: More than 4 times per year     Relationship status: None    Intimate partner violence:     Fear of current or ex partner: None     Emotionally abused: None     Physically abused: None     Forced sexual activity: None   Other Topics Concern    None   Social History Narrative    Patient with history of etoh abuse but stated he is currently not drinking. Patient is receiving medication to assist him with staying sober and is attending routine AA meetings and counseling at [address] [place] [address]    Positive for shakiness. No chest pain no abdominal pain. No urinary symptoms. No weakness or syncope  All systems negative except as marked. PHYSICAL EXAM    VITAL SIGNS: BP (!) 143/93   Pulse 97   Temp 98.4 °F (36.9 °C) (Oral)   Resp 16   Ht 5' 11\" (1.803 m)   Wt 240 lb (108.9 kg)   SpO2 96%   BMI 33.47 kg/m²    Constitutional:  Alert not toxtic or ill, *smells of alcohol able to give coherent history  HENT:  Normocephalic, Atraumatic, Bilateral external ears normal, Oropharynx moist, No oral exudates, Nose normal.  Cervical Spine: Normal range of motion,  No stridor.  No tenderness, Supple,  Eyes:  No discharge or  Swelling,Conjunctiva normal, PERRL, EOMI,  Respiratory: No respiratory distress, Normal breath sounds,  No wheezing, No chest tenderness. Cardiovascular:  Normal heart rate, Normal rhythm, No murmurs, No rubs, No gallops. GI:  No reproducible pain, Bowel sounds normal, Soft, No masses, No pulsatile masses. No tenderness  Musculoskeletal:  Intact distal pulses, No edema, No tenderness, No cyanosis, No clubbing. Good range of motion in all major joints. No tenderness to palpation or major deformities noted. Back:No tenderness. Integument:  Warm, Dry, No erythema, No rash (on exposed areas)   Lymphatic:  No lymphadenopathy noted. Neurologic:  Alert & oriented x 3, Normal motor function, Normal sensory function, No focal deficits noted. Psychiatric:  Affect normal, Judgment normal, Mood normal.     EKG    Sinus tachycardia at 122. No ischemia or infarction. RADIOLOGY    No orders to display       PROCEDURES    none      CONSULTS:  None      CRITICAL CARE:  None    SCREENINGS  BP (!) 143/93   Pulse 97   Temp 98.4 °F (36.9 °C) (Oral)   Resp 16   Ht 5' 11\" (1.803 m)   Wt 240 lb (108.9 kg)   SpO2 96%   BMI 33.47 kg/m²        Screening For Hypertension and Follow-up (#317)   previously diagnosed with hypertension and not applicable for screen      Screening For Tobacco Use and Cessation Intervention (#226):   reports that he has been smoking e-cigarettes and cigarettes. He has a 27.00 pack-year smoking history. He has quit using smokeless tobacco.  Tobacco cessation encouraged with brief counseling. Written home care instructions for smoking cessation provided. ED COURSE & MEDICAL DECISION MAKING    Pertinent Labs & Imaging studies reviewed. (See chart for details)  Patient presents with alcohol use and wants to stop drinking again. He has had multiple visits to [place] [address] for detox in the past.  States he last drank yesterday although around 30 beers. He smells of alcohol.   Feels shaky and feels \"paranoid\" but no hallucinations auditory or tactile. REASSESSMENT  7:50 AM  Patient rechecked and updated on lab/xray status, progress and results. .  Patient was reassessed and condition was improved after tx. No further needs at this time. Resting comfortably after some IV fluids at this time. Blood pressures come down with some blood pressure medications. He has not been taking his medications as prescribed which is typical for him when he goes on a binge. His alcohol levels 0.12. Discussed with patient and family treatment options at this point. They felt they can be managed as an outpatient to have outpatient resources and counseling. I would like to put him back on some Librium/chlordiazepoxide over the weekend to treat for potential withdrawals again. They are comfortable with the plan. FINAL IMPRESSION    1. Alcohol abuse    2. Nicotine abuse         PATIENT REFERRED TO:  NILAM Forbes - CNP  [address], [facility]  [facility]    Call   For evaluation      DISCHARGE MEDICATIONS:  New Prescriptions    CHLORDIAZEPOXIDE (LIBRIUM) 10 MG CAPSULE    Take 1 capsule by mouth 4 times daily as needed for Anxiety for up to 20 days.            Ketan Aburto MD  02/16/20 4340

## 2020-02-16 NOTE — ED NOTES
Instructions and prescription given pt voiced understanding. Warm pink dry calm. Ready to go home.      Kamlesh Monroy RN  02/16/20 0800

## 2020-02-16 NOTE — ED TRIAGE NOTES
Observed patient steadily ambulate to room, odor of alcohol on breath. Patient reported, \"I need a detox\" I am an alcoholic and I started drinking last Friday. I thought I could drink a couple of beers and now I need help. I have been drinking since Friday, beer and a few shot. I drank a 24 pack yesterday. I woke my Mom up to bring me in. I don't feel right I am shaking and I feel short of breath, and my chest feels tight. \" Observed patient holding hand on chest, resp easy, some shaking. Patient appears anxious.

## 2020-02-17 ENCOUNTER — CARE COORDINATION (OUTPATIENT)
Dept: CARE COORDINATION | Age: 45
End: 2020-02-17

## 2020-02-17 NOTE — CARE COORDINATION
Ambulatory Care Coordination Note  2/17/2020  CM Risk Score: 14  Charlson 10 Year Mortality Risk Score: 98%     ACC: Rose Merchant, RN    Summary Note: Patient was called for continued Care Coordination f/u and education re: the management of his DM, COPD, and healthcare needs. Patient was not available at his mobile or home numbers, but I was able to speak with his mother/HIPPA contact, Santa Valentino. Santa Valentino stated patient is not interested in talking with anyone right now and she does not want to upset him. Santa Valentino shared patient returned to drinking last week and sought help at [facility] over the week end but then returned to drinking yesterday as he is concerned about hallucinations he has when going thru withdrawal.  Santa Valentino stated she does not believe he has started Librium as ordered as he was concerned about taking medication while drinking and if would show up on his required drug testing. Santa Valentino reported patient was doing well and she is unsure of how/when he got alcohol and started drinking again. Support and active listening provided t/o our call. Santa Valentino denied any questions re: ER discharge instructions and declined scheduling f/u appointment at this time as she does not want to upset patient. Santa Valentino reported patient is scheduled to f/u at [facility] tomorrow and will probably be required to have drug testing completed. Santa Valentino stated patient is also scheduled to f/u with his  later this week so she is unsure if he will have to return to [facility] or not. Santa Valentino stated patient is aware of AA meetings and she was provided with al-anon meeting information. Santa Valentino denied any other questions, concerns, or needs and she was encouraged to call with any that may develop. Ambulatory Care Coordination  ED Follow up Call    Reason for ED visit:  Alcohol abuse    Status:     not changed    Did you call your PCP prior to going to the ED? No      Did you receive a discharge instructions from the Emergency Room? Yes  Review of Instructions:     Understands what to report/when to return?:  Yes   Understands discharge instructions?:  Yes   Following discharge instructions?:  No   If not why? Continues to drink. Is scheduled to f/u at [facility] tomorrow. Are there any new complaints of pain? No  New Pain Meds? N/A    Constipation prophylaxis needed? N/A    If you have a wound is the dressing clean, dry, and intact? N/A  Understands wound care regimen? N/A    Are there any other complaints/concerns that you wish to tell your provider? Denied. Mother reported patient returned to drinking alcohol yesterday and did not start medication as ordered. Education and support provided to mother. Patient is to f/u at [facility] tomorrow. FU appts/Provider:    Future Appointments   Date Time Provider Amanda Jerome   3/30/2020  3:45 PM NILAM Chawla - CNP [facility]       New Medications?:   No      Medication Reconciliation by phone - No - Patient not available to review medications   Understands Medications? Unable to verify as patient not available at the time of my call  Taking Medications? Unknown to mother at this time   Can you swallow your pills? Yes    Any further needs in the home i.e. Equipment?   No    Link to services in community?:  Yes - Aware of local [place] [address] meetings and established with [facility] and             Actions:   - Reviewed ER discharge instructions  - Offered PCP/IM f/u appointments - declined  - Provided support/encouragement/active listening  - Monitored for additional needs          Plan of Care:   - Continue with Care Coordination f/u calls for assistance with DM and COPD management as well as other healthcare needs  - Complete COPD education - In Process   - Complete DM education - In Process  - Provide support and encouragement to remain alcohol free - In Process  - Monitor for interest in quitting smoking  - Monitor for transportation assistance needs  - Review same day appointment, urgent care/walk in clinic, and after hours on call resources  - Monitor for additional needs  - Monitor for readiness to discharge from [facility] Interventions    Program Enrollment:  Complex Care  Referral from Primary Care Provider:  No  Suggested Interventions and Community Resources  Behavorial Health:  Completed (Comment: [facility] counseling )  Diabetes Education:  Completed  Medication Assistance Program:  Declined (Comment: denied any need at this time)  Medi Set or Pill Pack:  Declined  Smoking Cessation: In Process  Social Work:  Not Started  Other Services:  Completed (Comment: Attends [employer] & f/u with  )  Transportation Support: In Process  Zone Management Tools:  Completed (Comment: Jan. 2020)         Goals Addressed                 This Visit's Progress       Care Coordination     Behavioral Health   Worsening     I will work towards the following Behavioral Health goals: I will continue to follow up with my psychologist Herb Ortega and/or psychiatrist., I will schedule my follow up appointment with my primary care physician when I leave the office today., I will take my medications daily as prescribed. and I will seek treatment for alcohol/substance use. Barriers: financial, stress and lack of education  Plan for overcoming my barriers: Provide support and education to patient   Confidence: 7/10  Anticipated Goal Completion Date: 6/20/20            Prior to Admission medications    Medication Sig Start Date End Date Taking? Authorizing Provider   chlordiazePOXIDE (LIBRIUM) 10 MG capsule Take 1 capsule by mouth 4 times daily as needed for Anxiety for up to 20 days.  2/16/20 3/7/20  Mckenna Beasley MD   omeprazole (PRILOSEC) 40 MG delayed release capsule Take 1 capsule by mouth every morning (before breakfast) 1/21/20   NILAM Busby - CNP   famotidine (PEPCID) 20 MG tablet Take 1 tablet by mouth nightly 12/30/19 self-management interventions?  (Nutrition/Exercise/Self-Monitoring):  No   Have you ever had to go to the ED for symptoms of low blood sugar?:  No       No patient-reported symptoms      ,   COPD Assessment    Is the patient able to verbalize Rescue vs. Long Acting medications?:  Yes  Does the patient have a nebulizer?:  No  Does the patient use a space with inhaled medications?:  No     No patient-reported symptoms         Symptoms:         ,   General Assessment    Do you have any symptoms that are causing concern?:  Yes  Progression since Onset:  Gradually Worsening  Reported Symptoms:  Other (Comment: alcohol use )      and Care Coordination Episodes    Type: Amb Care Coordination  Episode: Complex Care  Noted: 1/17/2020  Comments: list referral

## 2020-02-18 ENCOUNTER — HOSPITAL ENCOUNTER (INPATIENT)
Age: 45
LOS: 3 days | Discharge: HOME | End: 2020-02-21
Attending: FAMILY MEDICINE | Admitting: FAMILY MEDICINE
Payer: MEDICAID

## 2020-02-18 PROBLEM — F10.921 ALCOHOL INTOXICATION WITH DELIRIUM (HCC): Status: ACTIVE | Noted: 2020-02-18

## 2020-02-18 LAB
ACETAMINOPHEN LEVEL: < 5 UG/ML (ref 0–20)
ALBUMIN SERPL-MCNC: 4.3 G/DL (ref 3.5–5.1)
ALP BLD-CCNC: 228 U/L (ref 38–126)
ALT SERPL-CCNC: 89 U/L (ref 11–66)
AMMONIA: 31 UMOL/L (ref 11–60)
AMPHETAMINE+METHAMPHETAMINE URINE SCREEN: NEGATIVE
ANION GAP SERPL CALCULATED.3IONS-SCNC: 17 MEQ/L (ref 8–16)
AST SERPL-CCNC: 124 U/L (ref 5–40)
BARBITURATE QUANTITATIVE URINE: NEGATIVE
BASOPHILS # BLD: 0.5 %
BASOPHILS ABSOLUTE: 0 THOU/MM3 (ref 0–0.1)
BENZODIAZEPINE QUANTITATIVE URINE: NEGATIVE
BILIRUB SERPL-MCNC: 0.9 MG/DL (ref 0.3–1.2)
BILIRUBIN DIRECT: 0.5 MG/DL (ref 0–0.3)
BUN BLDV-MCNC: 18 MG/DL (ref 7–22)
CALCIUM SERPL-MCNC: 8.5 MG/DL (ref 8.5–10.5)
CANNABINOID QUANTITATIVE URINE: NEGATIVE
CHLORIDE BLD-SCNC: 100 MEQ/L (ref 98–111)
CO2: 19 MEQ/L (ref 23–33)
COCAINE METABOLITE QUANTITATIVE URINE: NEGATIVE
CREAT SERPL-MCNC: 0.8 MG/DL (ref 0.4–1.2)
EKG ATRIAL RATE: 132 BPM
EKG P AXIS: 64 DEGREES
EKG P-R INTERVAL: 114 MS
EKG Q-T INTERVAL: 300 MS
EKG QRS DURATION: 88 MS
EKG QTC CALCULATION (BAZETT): 444 MS
EKG R AXIS: 48 DEGREES
EKG T AXIS: 40 DEGREES
EKG VENTRICULAR RATE: 132 BPM
EOSINOPHIL # BLD: 0 %
EOSINOPHILS ABSOLUTE: 0 THOU/MM3 (ref 0–0.4)
ERYTHROCYTE [DISTWIDTH] IN BLOOD BY AUTOMATED COUNT: 12.9 % (ref 11.5–14.5)
ERYTHROCYTE [DISTWIDTH] IN BLOOD BY AUTOMATED COUNT: 44.8 FL (ref 35–45)
ETHYL ALCOHOL, SERUM: 0.38 %
GFR SERPL CREATININE-BSD FRML MDRD: > 90 ML/MIN/1.73M2
GLUCOSE BLD-MCNC: 100 MG/DL (ref 70–108)
HCT VFR BLD CALC: 48.2 % (ref 42–52)
HEMOGLOBIN: 17 GM/DL (ref 14–18)
IMMATURE GRANS (ABS): 0.02 THOU/MM3 (ref 0–0.07)
IMMATURE GRANULOCYTES: 0.2 %
INR BLD: 0.98 (ref 0.85–1.13)
LYMPHOCYTES # BLD: 31.7 %
LYMPHOCYTES ABSOLUTE: 2.7 THOU/MM3 (ref 1–4.8)
MCH RBC QN AUTO: 33.2 PG (ref 26–33)
MCHC RBC AUTO-ENTMCNC: 35.3 GM/DL (ref 32.2–35.5)
MCV RBC AUTO: 94.1 FL (ref 80–94)
MONOCYTES # BLD: 5.2 %
MONOCYTES ABSOLUTE: 0.4 THOU/MM3 (ref 0.4–1.3)
NUCLEATED RED BLOOD CELLS: 0 /100 WBC
OPIATES, URINE: NEGATIVE
OSMOLALITY CALCULATION: 273.9 MOSMOL/KG (ref 275–300)
OXYCODONE: NEGATIVE
PHENCYCLIDINE QUANTITATIVE URINE: NEGATIVE
PLATELET # BLD: 233 THOU/MM3 (ref 130–400)
PMV BLD AUTO: 8.8 FL (ref 9.4–12.4)
POTASSIUM SERPL-SCNC: 4.2 MEQ/L (ref 3.5–5.2)
RBC # BLD: 5.12 MILL/MM3 (ref 4.7–6.1)
SALICYLATE, SERUM: < 0.3 MG/DL (ref 2–10)
SEG NEUTROPHILS: 62.4 %
SEGMENTED NEUTROPHILS ABSOLUTE COUNT: 5.3 THOU/MM3 (ref 1.8–7.7)
SODIUM BLD-SCNC: 136 MEQ/L (ref 135–145)
TOTAL PROTEIN: 6.8 G/DL (ref 6.1–8)
WBC # BLD: 8.5 THOU/MM3 (ref 4.8–10.8)

## 2020-02-18 PROCEDURE — G0378 HOSPITAL OBSERVATION PER HR: HCPCS

## 2020-02-18 PROCEDURE — 96360 HYDRATION IV INFUSION INIT: CPT

## 2020-02-18 PROCEDURE — 93005 ELECTROCARDIOGRAM TRACING: CPT | Performed by: FAMILY MEDICINE

## 2020-02-18 PROCEDURE — 96365 THER/PROPH/DIAG IV INF INIT: CPT

## 2020-02-18 PROCEDURE — 99233 SBSQ HOSP IP/OBS HIGH 50: CPT | Performed by: FAMILY MEDICINE

## 2020-02-18 PROCEDURE — 36415 COLL VENOUS BLD VENIPUNCTURE: CPT

## 2020-02-18 PROCEDURE — G0480 DRUG TEST DEF 1-7 CLASSES: HCPCS

## 2020-02-18 PROCEDURE — 80053 COMPREHEN METABOLIC PANEL: CPT

## 2020-02-18 PROCEDURE — 2580000003 HC RX 258: Performed by: FAMILY MEDICINE

## 2020-02-18 PROCEDURE — 82248 BILIRUBIN DIRECT: CPT

## 2020-02-18 PROCEDURE — 85610 PROTHROMBIN TIME: CPT

## 2020-02-18 PROCEDURE — 85025 COMPLETE CBC W/AUTO DIFF WBC: CPT

## 2020-02-18 PROCEDURE — 6360000002 HC RX W HCPCS: Performed by: FAMILY MEDICINE

## 2020-02-18 PROCEDURE — 2060000000 HC ICU INTERMEDIATE R&B

## 2020-02-18 PROCEDURE — 82140 ASSAY OF AMMONIA: CPT

## 2020-02-18 PROCEDURE — 6370000000 HC RX 637 (ALT 250 FOR IP): Performed by: FAMILY MEDICINE

## 2020-02-18 PROCEDURE — 99284 EMERGENCY DEPT VISIT MOD MDM: CPT

## 2020-02-18 PROCEDURE — 96361 HYDRATE IV INFUSION ADD-ON: CPT

## 2020-02-18 PROCEDURE — 80307 DRUG TEST PRSMV CHEM ANLYZR: CPT

## 2020-02-18 RX ORDER — SODIUM CHLORIDE 9 MG/ML
INJECTION, SOLUTION INTRAVENOUS CONTINUOUS
Status: DISCONTINUED | OUTPATIENT
Start: 2020-02-18 | End: 2020-02-18

## 2020-02-18 RX ORDER — SODIUM CHLORIDE 0.9 % (FLUSH) 0.9 %
10 SYRINGE (ML) INJECTION EVERY 12 HOURS SCHEDULED
Status: DISCONTINUED | OUTPATIENT
Start: 2020-02-18 | End: 2020-02-19 | Stop reason: SDUPTHER

## 2020-02-18 RX ORDER — NICOTINE 21 MG/24HR
1 PATCH, TRANSDERMAL 24 HOURS TRANSDERMAL DAILY
Status: DISCONTINUED | OUTPATIENT
Start: 2020-02-18 | End: 2020-02-21 | Stop reason: HOSPADM

## 2020-02-18 RX ORDER — MELATONIN 3 MG
0.3 LOZENGE ORAL NIGHTLY PRN
Status: DISCONTINUED | OUTPATIENT
Start: 2020-02-19 | End: 2020-02-19

## 2020-02-18 RX ORDER — SODIUM CHLORIDE 9 MG/ML
INJECTION, SOLUTION INTRAVENOUS CONTINUOUS
Status: DISCONTINUED | OUTPATIENT
Start: 2020-02-18 | End: 2020-02-21

## 2020-02-18 RX ORDER — THIAMINE MONONITRATE (VIT B1) 100 MG
100 TABLET ORAL DAILY
Status: DISCONTINUED | OUTPATIENT
Start: 2020-02-19 | End: 2020-02-21 | Stop reason: HOSPADM

## 2020-02-18 RX ORDER — METOPROLOL TARTRATE 50 MG/1
50 TABLET, FILM COATED ORAL 2 TIMES DAILY
Status: DISCONTINUED | OUTPATIENT
Start: 2020-02-18 | End: 2020-02-21 | Stop reason: HOSPADM

## 2020-02-18 RX ORDER — 0.9 % SODIUM CHLORIDE 0.9 %
1000 INTRAVENOUS SOLUTION INTRAVENOUS ONCE
Status: COMPLETED | OUTPATIENT
Start: 2020-02-18 | End: 2020-02-18

## 2020-02-18 RX ORDER — LACTULOSE 10 G/15ML
20 SOLUTION ORAL 2 TIMES DAILY
Status: DISCONTINUED | OUTPATIENT
Start: 2020-02-18 | End: 2020-02-21 | Stop reason: HOSPADM

## 2020-02-18 RX ORDER — OLANZAPINE 10 MG/1
10 TABLET ORAL NIGHTLY
Status: DISCONTINUED | OUTPATIENT
Start: 2020-02-18 | End: 2020-02-21 | Stop reason: HOSPADM

## 2020-02-18 RX ORDER — VENLAFAXINE HYDROCHLORIDE 150 MG/1
150 CAPSULE, EXTENDED RELEASE ORAL DAILY
Status: DISCONTINUED | OUTPATIENT
Start: 2020-02-19 | End: 2020-02-21 | Stop reason: HOSPADM

## 2020-02-18 RX ORDER — SODIUM CHLORIDE 0.9 % (FLUSH) 0.9 %
10 SYRINGE (ML) INJECTION PRN
Status: DISCONTINUED | OUTPATIENT
Start: 2020-02-18 | End: 2020-02-19 | Stop reason: SDUPTHER

## 2020-02-18 RX ORDER — 0.9 % SODIUM CHLORIDE 0.9 %
1000 INTRAVENOUS SOLUTION INTRAVENOUS ONCE
Status: DISCONTINUED | OUTPATIENT
Start: 2020-02-18 | End: 2020-02-21 | Stop reason: HOSPADM

## 2020-02-18 RX ORDER — PANTOPRAZOLE SODIUM 40 MG/1
40 TABLET, DELAYED RELEASE ORAL
Status: DISCONTINUED | OUTPATIENT
Start: 2020-02-19 | End: 2020-02-21 | Stop reason: HOSPADM

## 2020-02-18 RX ORDER — FOLIC ACID 1 MG/1
1 TABLET ORAL DAILY
Status: DISCONTINUED | OUTPATIENT
Start: 2020-02-19 | End: 2020-02-21 | Stop reason: HOSPADM

## 2020-02-18 RX ORDER — FAMOTIDINE 20 MG/1
20 TABLET, FILM COATED ORAL NIGHTLY
Status: DISCONTINUED | OUTPATIENT
Start: 2020-02-18 | End: 2020-02-21 | Stop reason: HOSPADM

## 2020-02-18 RX ORDER — LISINOPRIL AND HYDROCHLOROTHIAZIDE 25; 20 MG/1; MG/1
1 TABLET ORAL DAILY
Status: DISCONTINUED | OUTPATIENT
Start: 2020-02-19 | End: 2020-02-19 | Stop reason: CLARIF

## 2020-02-18 RX ADMIN — THIAMINE HYDROCHLORIDE 100 MG: 100 INJECTION, SOLUTION INTRAMUSCULAR; INTRAVENOUS at 22:04

## 2020-02-18 RX ADMIN — SODIUM CHLORIDE 1000 ML: 9 INJECTION, SOLUTION INTRAVENOUS at 21:00

## 2020-02-18 ASSESSMENT — ENCOUNTER SYMPTOMS
COUGH: 1
ABDOMINAL PAIN: 0

## 2020-02-19 LAB
GLUCOSE BLD-MCNC: 73 MG/DL (ref 70–108)
GLUCOSE BLD-MCNC: 75 MG/DL (ref 70–108)
GLUCOSE BLD-MCNC: 81 MG/DL (ref 70–108)
GLUCOSE BLD-MCNC: 81 MG/DL (ref 70–108)

## 2020-02-19 PROCEDURE — 2060000000 HC ICU INTERMEDIATE R&B

## 2020-02-19 PROCEDURE — 2580000003 HC RX 258: Performed by: FAMILY MEDICINE

## 2020-02-19 PROCEDURE — 6370000000 HC RX 637 (ALT 250 FOR IP): Performed by: NURSE PRACTITIONER

## 2020-02-19 PROCEDURE — 2709999900 HC NON-CHARGEABLE SUPPLY

## 2020-02-19 PROCEDURE — 6360000002 HC RX W HCPCS

## 2020-02-19 PROCEDURE — 6370000000 HC RX 637 (ALT 250 FOR IP): Performed by: FAMILY MEDICINE

## 2020-02-19 PROCEDURE — 93010 ELECTROCARDIOGRAM REPORT: CPT | Performed by: INTERNAL MEDICINE

## 2020-02-19 PROCEDURE — 99232 SBSQ HOSP IP/OBS MODERATE 35: CPT | Performed by: NURSE PRACTITIONER

## 2020-02-19 PROCEDURE — 82948 REAGENT STRIP/BLOOD GLUCOSE: CPT

## 2020-02-19 PROCEDURE — 96375 TX/PRO/DX INJ NEW DRUG ADDON: CPT

## 2020-02-19 PROCEDURE — G0378 HOSPITAL OBSERVATION PER HR: HCPCS

## 2020-02-19 RX ORDER — NICOTINE POLACRILEX 4 MG
15 LOZENGE BUCCAL PRN
Status: DISCONTINUED | OUTPATIENT
Start: 2020-02-19 | End: 2020-02-21 | Stop reason: HOSPADM

## 2020-02-19 RX ORDER — LORAZEPAM 1 MG/1
3 TABLET ORAL
Status: DISCONTINUED | OUTPATIENT
Start: 2020-02-19 | End: 2020-02-21 | Stop reason: HOSPADM

## 2020-02-19 RX ORDER — ACETAMINOPHEN 325 MG/1
650 TABLET ORAL EVERY 6 HOURS PRN
Status: DISCONTINUED | OUTPATIENT
Start: 2020-02-19 | End: 2020-02-21 | Stop reason: HOSPADM

## 2020-02-19 RX ORDER — LORAZEPAM 2 MG/ML
2 INJECTION INTRAMUSCULAR
Status: DISCONTINUED | OUTPATIENT
Start: 2020-02-19 | End: 2020-02-21 | Stop reason: HOSPADM

## 2020-02-19 RX ORDER — DEXTROSE MONOHYDRATE 50 MG/ML
100 INJECTION, SOLUTION INTRAVENOUS PRN
Status: DISCONTINUED | OUTPATIENT
Start: 2020-02-19 | End: 2020-02-21 | Stop reason: HOSPADM

## 2020-02-19 RX ORDER — DEXTROSE MONOHYDRATE 25 G/50ML
12.5 INJECTION, SOLUTION INTRAVENOUS PRN
Status: DISCONTINUED | OUTPATIENT
Start: 2020-02-19 | End: 2020-02-21 | Stop reason: HOSPADM

## 2020-02-19 RX ORDER — ACETAMINOPHEN 650 MG/1
650 SUPPOSITORY RECTAL EVERY 6 HOURS PRN
Status: DISCONTINUED | OUTPATIENT
Start: 2020-02-19 | End: 2020-02-19

## 2020-02-19 RX ORDER — LORAZEPAM 2 MG/ML
1 INJECTION INTRAMUSCULAR
Status: DISCONTINUED | OUTPATIENT
Start: 2020-02-19 | End: 2020-02-21 | Stop reason: HOSPADM

## 2020-02-19 RX ORDER — LORAZEPAM 1 MG/1
2 TABLET ORAL
Status: DISCONTINUED | OUTPATIENT
Start: 2020-02-19 | End: 2020-02-21 | Stop reason: HOSPADM

## 2020-02-19 RX ORDER — LORAZEPAM 1 MG/1
4 TABLET ORAL
Status: DISCONTINUED | OUTPATIENT
Start: 2020-02-19 | End: 2020-02-21 | Stop reason: HOSPADM

## 2020-02-19 RX ORDER — LORAZEPAM 2 MG/ML
INJECTION INTRAMUSCULAR
Status: COMPLETED
Start: 2020-02-19 | End: 2020-02-19

## 2020-02-19 RX ORDER — LORAZEPAM 2 MG/ML
3 INJECTION INTRAMUSCULAR
Status: DISCONTINUED | OUTPATIENT
Start: 2020-02-19 | End: 2020-02-21 | Stop reason: HOSPADM

## 2020-02-19 RX ORDER — PROMETHAZINE HYDROCHLORIDE 25 MG/1
12.5 TABLET ORAL EVERY 6 HOURS PRN
Status: DISCONTINUED | OUTPATIENT
Start: 2020-02-19 | End: 2020-02-21 | Stop reason: HOSPADM

## 2020-02-19 RX ORDER — LANOLIN ALCOHOL/MO/W.PET/CERES
3 CREAM (GRAM) TOPICAL NIGHTLY PRN
Status: DISCONTINUED | OUTPATIENT
Start: 2020-02-19 | End: 2020-02-21 | Stop reason: HOSPADM

## 2020-02-19 RX ORDER — SODIUM CHLORIDE 0.9 % (FLUSH) 0.9 %
10 SYRINGE (ML) INJECTION EVERY 12 HOURS SCHEDULED
Status: DISCONTINUED | OUTPATIENT
Start: 2020-02-19 | End: 2020-02-21 | Stop reason: HOSPADM

## 2020-02-19 RX ORDER — LORAZEPAM 1 MG/1
1 TABLET ORAL
Status: DISCONTINUED | OUTPATIENT
Start: 2020-02-19 | End: 2020-02-21 | Stop reason: HOSPADM

## 2020-02-19 RX ORDER — ONDANSETRON 2 MG/ML
4 INJECTION INTRAMUSCULAR; INTRAVENOUS EVERY 6 HOURS PRN
Status: DISCONTINUED | OUTPATIENT
Start: 2020-02-19 | End: 2020-02-21 | Stop reason: HOSPADM

## 2020-02-19 RX ORDER — SODIUM CHLORIDE 0.9 % (FLUSH) 0.9 %
10 SYRINGE (ML) INJECTION PRN
Status: DISCONTINUED | OUTPATIENT
Start: 2020-02-19 | End: 2020-02-21 | Stop reason: HOSPADM

## 2020-02-19 RX ORDER — LORAZEPAM 2 MG/ML
4 INJECTION INTRAMUSCULAR
Status: DISCONTINUED | OUTPATIENT
Start: 2020-02-19 | End: 2020-02-21 | Stop reason: HOSPADM

## 2020-02-19 RX ORDER — HYDROCHLOROTHIAZIDE 25 MG/1
25 TABLET ORAL DAILY
Status: DISCONTINUED | OUTPATIENT
Start: 2020-02-19 | End: 2020-02-21 | Stop reason: HOSPADM

## 2020-02-19 RX ORDER — LISINOPRIL 20 MG/1
20 TABLET ORAL DAILY
Status: DISCONTINUED | OUTPATIENT
Start: 2020-02-19 | End: 2020-02-21 | Stop reason: HOSPADM

## 2020-02-19 RX ADMIN — FOLIC ACID 1 MG: 1 TABLET ORAL at 09:47

## 2020-02-19 RX ADMIN — METOPROLOL TARTRATE 50 MG: 50 TABLET, FILM COATED ORAL at 01:36

## 2020-02-19 RX ADMIN — METOPROLOL TARTRATE 50 MG: 50 TABLET, FILM COATED ORAL at 09:47

## 2020-02-19 RX ADMIN — LORAZEPAM 3 MG: 2 INJECTION INTRAMUSCULAR at 02:15

## 2020-02-19 RX ADMIN — Medication 100 MG: at 09:47

## 2020-02-19 RX ADMIN — METOPROLOL TARTRATE 50 MG: 50 TABLET, FILM COATED ORAL at 21:55

## 2020-02-19 RX ADMIN — FAMOTIDINE 20 MG: 20 TABLET ORAL at 01:37

## 2020-02-19 RX ADMIN — FAMOTIDINE 20 MG: 20 TABLET ORAL at 21:55

## 2020-02-19 RX ADMIN — Medication 3 MG: at 21:55

## 2020-02-19 RX ADMIN — LISINOPRIL 20 MG: 20 TABLET ORAL at 12:44

## 2020-02-19 RX ADMIN — LORAZEPAM 3 MG: 2 INJECTION INTRAMUSCULAR; INTRAVENOUS at 02:15

## 2020-02-19 RX ADMIN — PANTOPRAZOLE SODIUM 40 MG: 40 TABLET, DELAYED RELEASE ORAL at 09:47

## 2020-02-19 RX ADMIN — OLANZAPINE 10 MG: 10 TABLET, FILM COATED ORAL at 01:37

## 2020-02-19 RX ADMIN — SODIUM CHLORIDE: 9 INJECTION, SOLUTION INTRAVENOUS at 09:47

## 2020-02-19 RX ADMIN — SODIUM CHLORIDE: 9 INJECTION, SOLUTION INTRAVENOUS at 01:36

## 2020-02-19 RX ADMIN — Medication 10 ML: at 09:52

## 2020-02-19 RX ADMIN — LACTULOSE 20 G: 20 SOLUTION ORAL at 13:33

## 2020-02-19 ASSESSMENT — PAIN SCALES - GENERAL
PAINLEVEL_OUTOF10: 0

## 2020-02-19 NOTE — PROGRESS NOTES
Hospitalist Progress Note    Patient:  Suzie Remy      Unit/Bed:4B-07/007-A    YOB: 1975    MRN: [id number]       Acct: [de-identified]     PCP: NILAM Forbes CNP    Date of Admission: 2/18/2020    Assessment/Plan:    1. Acute alcohol intoxication on chronic ETOH use. Failed Naltrexone OP. EtOH level 0.38 on presentation. History of alcohol withdrawal seizures. CIWA initiated by nocturnist. Seizure / Fall precautions. Oral vitamins. IVF. Case management and addiction services to see. 2. Tachycardia secondary to #1 and dehydration. Resolved. 3. Mild AG metabolic acidosis. Suspect 2/2 alcoholic ketoacidosis. IVF. Will trend. Hold HCTZ. 4. Alcoholic cirrhosis with chronic transaminitis. Llactulose. Trend LFTs.    5. Ess HTN, controlled. BB/ACE resumed. 6. Type 2 DM, controlled. Last A1c 5.7%. Dietary control. Add Humalog SSI ac/hs and trend glucose. Carb control meals. 7. COPD. Without exacerbation. Bronchodilator therapy prn.  8. GERD. Resume PPI, pepcid. 9. Depression. Resume effexor XR, zyprexa. 10. Chronic tobacco abuse. Cessation counseling. Nicotine patch.       Chief Complaint: Requesting ETOH detox. Hospital Course:      Admitted by nocturnist for ETOH detox. Acutely intoxicated. HX of chronic ETOH use. HX of withdrawal seizures. Started on CIWA    Subjective (past 24 hours): Drowsy this AM. Wakes up to name. Denies anxiety. No tremor.        Medications:  Reviewed    Infusion Medications    sodium chloride 125 mL/hr at 02/19/20 0136     Scheduled Medications    lisinopril  20 mg Oral Daily    And    hydroCHLOROthiazide  25 mg Oral Daily    sodium chloride flush  10 mL Intravenous 2 times per day    nicotine  1 patch Transdermal Daily    sodium chloride  1,000 mL Intravenous Once    famotidine  20 mg Oral Nightly    folic acid  1 mg Oral Daily    thiamine  100 mg Oral Daily    lactulose  20 g Oral BID    metoprolol tartrate  50 mg Oral BID    OLANZapine  10 mg Oral Nightly    pantoprazole  40 mg Oral QAM AC    venlafaxine  150 mg Oral Daily     PRN Meds: sodium chloride flush, acetaminophen, promethazine, ondansetron, LORazepam **OR** LORazepam **OR** LORazepam **OR** LORazepam **OR** LORazepam **OR** LORazepam **OR** LORazepam **OR** LORazepam, melatonin    No intake or output data in the 24 hours ending 02/19/20 0814    Diet:  DIET CLEAR LIQUID;    Exam:  /66   Pulse 82   Temp 98.4 °F (36.9 °C) (Oral)   Resp 18   Ht 5' 11\" (1.803 m)   Wt 240 lb (108.9 kg)   SpO2 95%   BMI 33.47 kg/m²     General appearance: No apparent distress, appears stated age and cooperative. HEENT: Pupils equal, round, and reactive to light. Conjunctivae/corneas clear. Neck: Supple, with full range of motion. No jugular venous distention. Trachea midline. Respiratory:  Normal respiratory effort. Clear to auscultation, bilaterally without Rales/Wheezes/Rhonchi. Cardiovascular: Regular rate and rhythm with normal S1/S2 without murmurs, rubs or gallops. Abdomen: Soft, non-tender, non-distended with normal bowel sounds. Musculoskeletal: passive and active ROM x 4 extremities. Skin: Skin color, texture, turgor normal.    Neurologic:  Neurovascularly intact without any focal sensory/motor deficits. Cranial nerves: II-XII intact, grossly non-focal.  Psychiatric: Alert and oriented, thought content appropriate  Capillary Refill: Brisk,< 3 seconds   Peripheral Pulses: +2 palpable, equal bilaterally       Labs:   Recent Labs     02/18/20 2035   WBC 8.5   HGB 17.0   HCT 48.2        Recent Labs     02/18/20 2035      K 4.2      CO2 19*   BUN 18   CREATININE 0.8   CALCIUM 8.5     Recent Labs     02/18/20 2035   *   ALT 89*   BILIDIR 0.5*   BILITOT 0.9   ALKPHOS 228*     Recent Labs     02/18/20 2035   INR 0.98     No results for input(s): Donnell Service in the last 72 hours.   No results for input(s): PROCAL in the last 72 hours.    Microbiology:      Urinalysis:      Lab Results   Component Value Date    NITRU NEGATIVE 09/01/2019    WBCUA NONE SEEN 09/01/2019    BACTERIA NONE 09/01/2019    RBCUA 0-2 09/01/2019    BLOODU SMALL 09/01/2019    SPECGRAV <1.005 08/29/2019    GLUCOSEU NEGATIVE 09/01/2019       Radiology:  No orders to display        Code Status: Full Code    Tele:   [x] yes             [] no    Active Hospital Problems    Diagnosis Date Noted    Alcohol intoxication with delirium [facility]) [F10.921] 02/18/2020       Electronically signed by NILAM Gerardo CNP on 2/19/2020 at 8:14 AM

## 2020-02-19 NOTE — ED PROVIDER NOTES
[facility]  eMERGENCY dEPARTMENT eNCOUnter          CHIEF COMPLAINT       Chief Complaint   Patient presents with    Addiction Problem     etoh       Nurses Notes reviewed and I agree except as noted in the HPI. HISTORY OF PRESENT ILLNESS    Loren Franklin is a 38 y.o. male who presents to the Emergency Department for the evaluation of alcohol addiction. The patient presents tonight after relapsing 3 weeks ago. Patient had been sober for 6 months prior to relapsing. He primarily drinks beer but will occasionally have other alcoholic beverages. Patient has been detox programs at [facility] 3-4 times in the past. He reports aggression and hallucinations with withdrawal. He admits to smoking 1-2 packs of cigarettes daily but his family reports he smokes closer to 3-4 packs per day. He denies recreational drug use. He has a medical history of liver cirrhosis, hypertension, and diabetes. He denies any known allergies. No further concerns or complaints at this time. The HPI was provided by the patient. REVIEW OF SYSTEMS     Review of Systems    PAST MEDICAL HISTORY    has a past medical history of COPD (chronic obstructive pulmonary disease) ([facility] Depression, Diabetes mellitus ([facility] ETOH abuse, Hyperlipidemia, Hypertension, Liver disease, and Seizures ([facility] SURGICAL HISTORY    has a past surgical history that includes Endoscopy, colon, diagnostic. CURRENT MEDICATIONS       Previous Medications    ALBUTEROL SULFATE HFA (PROVENTIL HFA) 108 (90 BASE) MCG/ACT INHALER    Inhale 2 puffs into the lungs every 4 hours as needed for Wheezing or Shortness of Breath (Space out to every 6 hours as symptoms improve) Space out to every 6 hours as symptoms improve. BLOOD GLUCOSE TEST STRIPS (ASCENSIA AUTODISC VI;ONE TOUCH ULTRA TEST VI) STRIP    Test as needed. Dispense One Touch verio Test Strips.   Dx: Type 2 diabetes (250.00)    CHLORDIAZEPOXIDE (LIBRIUM) 10 MG CAPSULE    Take 1 capsule by mouth 4 times daily as needed for Anxiety for up to 20 days. FAMOTIDINE (PEPCID) 20 MG TABLET    Take 1 tablet by mouth nightly    FOLIC ACID (FOLVITE) 1 MG TABLET    Take 1 tablet by mouth daily    GLUCOSE MONITORING KIT (FREESTYLE) MONITORING KIT    Glucometer with test strips and lancets. Check BS once daily  #100 strips and lancets    LACTULOSE PO    Take by mouth 2 times daily     LISINOPRIL-HYDROCHLOROTHIAZIDE (PRINZIDE;ZESTORETIC) 20-25 MG PER TABLET    Take 1 tablet by mouth daily    MELATONIN 1 MG/4ML LIQD SL LIQUID    Place 0.3 mg under the tongue nightly as needed for Sleep    METOPROLOL TARTRATE (LOPRESSOR) 50 MG TABLET    Take 1 tablet by mouth 2 times daily    NALTREXONE (VIVITROL IM)    Inject into the muscle Indications: monthly    OLANZAPINE (ZYPREXA) 10 MG TABLET    Take 1 tablet by mouth nightly    OMEPRAZOLE (PRILOSEC) 40 MG DELAYED RELEASE CAPSULE    Take 1 capsule by mouth every morning (before breakfast)    VENLAFAXINE (EFFEXOR XR) 150 MG EXTENDED RELEASE CAPSULE    Take 1 capsule by mouth daily       ALLERGIES     has No Known Allergies. FAMILY HISTORY     He indicated that his mother is alive. He indicated that his father is . He indicated that his brother is alive. He indicated that the status of his paternal uncle is unknown.   family history includes Alcohol Abuse in his father and paternal uncle; Arthritis in his mother; Cancer in his father; Depression in his mother; Diabetes in his mother; Heart Disease in his mother; High Blood Pressure in his brother, father, and mother; High Cholesterol in his mother. SOCIAL HISTORY      reports that he has been smoking e-cigarettes and cigarettes. He has a 27.00 pack-year smoking history. He has quit using smokeless tobacco. He reports previous alcohol use of about 140.0 standard drinks of alcohol per week. He reports that he does not use drugs. PHYSICAL EXAM     INITIAL VITALS:  vitals were not taken for this visit. Physical Exam    DIFFERENTIAL DIAGNOSIS:   ***    DIAGNOSTIC RESULTS     EKG: All EKG's are interpreted by the Emergency Department Physician who either signs or Co-signs this chart in the absence of a cardiologist.  EKG interpreted by Blossom Jordan MD:    ***    RADIOLOGY: non-plain film images(s) such as CT, Ultrasound and MRI are read by the radiologist.    No orders to display        LABS:   Labs Reviewed - No data to display    EMERGENCY DEPARTMENT COURSE:   Vitals: There were no vitals filed for this visit. 8:28 PM: The patient was seen and evaluated. ***     MDM:  ***    CRITICAL CARE:   ***     CONSULTS:  ***    PROCEDURES:  ***     FINAL IMPRESSION    No diagnosis found. DISPOSITION/PLAN   ***    PATIENT REFERRED TO:  No follow-up provider specified. DISCHARGE MEDICATIONS:  New Prescriptions    No medications on file       (Please note that portions of this note were completed with a voice recognition program.  Efforts were made to edit the dictations but occasionally words are mis-transcribed.)    Scribe:  Nancy Novak 2/18/20 8:28 PM Scribing for and in the presence of Blossom Jordan MD.    Signed by: Mj Donaldson, 02/18/20 8:28 PM    Provider:  I personally performed the services described in the documentation, reviewed and edited the documentation which was dictated to the scribe in my presence, and it accurately records my words and actions.     Blossom Jordan MD 2/18/20 8:28 PM

## 2020-02-19 NOTE — ED NOTES
ED to inpatient nurses report    Chief Complaint   Patient presents with    Addiction Problem     etoh      Present to ED from home  LOC: alert and orientated to name, place, date  Vital signs   Vitals:    02/18/20 2024 02/18/20 2055 02/18/20 2121 02/18/20 2157   BP: 128/79 133/81 (!) 147/95    Pulse: 132 119 123    Resp: 18 18 17   Temp:    98.4 °F (36.9 °C)   TempSrc: Oral   Oral   SpO2: 96% 96%  96%   Weight: 240 lb (108.9 kg)      Height: 5' 11\" (1.803 m)         Oxygen Baseline room air WNL    Current needs required room air WNL Bipap/Cpap No  LDAs:   Peripheral IV 02/18/20 Right Antecubital (Active)   Site Assessment Clean;Dry; Intact 2/18/2020  9:58 PM   Line Status Infusing 2/18/2020  9:58 PM   Dressing Status Clean;Dry; Intact 2/18/2020  9:58 PM     Mobility: Requires assistance * 1  Pending ED orders: thiamine infusion infusing  Present condition: patient resting in bed, talking with brother at bedside. No other concerns at this time. VSS.      Electronically signed by Sana Robert RN on 2/18/2020 at 10:17 PM     Sana Robert RN  02/18/20 7987

## 2020-02-19 NOTE — ED NOTES
Pt requesting something for anxiety. Dr. Delbert Rolon notified. States cannot give pt anything at this time. Will update pt.       Ela Blanco RN  02/18/20 2051

## 2020-02-19 NOTE — FLOWSHEET NOTE
Pt was alone at the time of the visit. He was dealing with alcohol intoxication but wanted prayer to cope and he was anointed     02/19/20 1600   Encounter Summary   Services provided to: Patient   Referral/Consult From: [facility] Family members   Place of [address] [place] [address] Visiting No   Volunteer Visit Yes  (2/19)   Length of Encounter 15 minutes   Routine   Type Initial   Assessment Approachable;Calm   Intervention [place];Prayer;Nurtured hope   Outcome Acceptance;Expressed gratitude; Hopeful;Encouraged;Receptive   Sacraments   Sacrament of Sick-Anointing Anointed

## 2020-02-19 NOTE — ED NOTES
Pt resting in bed in a semi fowlers position at this time and vital signs stable. Pt CIWA scale 3 at this time. Pt denies needs. Pt given ice water at this time. Pt has side rails up x2 and call light in reach.       Immanuel Anthony RN  02/19/20 6348

## 2020-02-19 NOTE — ED NOTES
Pt resting in bed in a semi fowlers position at this time and appears to be resting comfortably. Pt respirations easy and unlabored. Vital signs stable. CIWA scale not assessed at this time due to patient sleeping. Pt has side rails up x2 and call light within reach.       Aba Thomson RN  02/19/20 5411

## 2020-02-19 NOTE — ED PROVIDER NOTES
[facility]  eMERGENCY dEPARTMENT eNCOUnter          CHIEF COMPLAINT       Chief Complaint   Patient presents with    Addiction Problem     etoh       Nurses Notes reviewed and I agree except as noted in the HPI. HISTORY OF PRESENT ILLNESS    Maria E Deleon is a 38 y.o. male who presents wants alcohol detox    Location/Symptom: Requesting alcohol detox  Timing/Onset: Chronic EtOH. Relapse for 3 weeks  Context/Setting: Chronic alcoholism with hallucinations during withdrawal  Agitation during withdrawal in the past  He apparently made about 6 months worth of sobriety  Last 3 weeks been drinking again  Quality: Continuously intoxicated  Tachycardic  Duration: Last 3 weeks  Modifying Factors: None  Severity: 6/10    REVIEW OF SYSTEMS     Review of Systems   Constitutional: Negative for chills and diaphoresis. HENT: Negative for congestion. Eyes: Negative for visual disturbance. Respiratory: Positive for cough. Cardiovascular: Negative for chest pain. Gastrointestinal: Negative for abdominal pain. Some history of \"cirrhosis\"   Genitourinary: Negative for dysuria. Musculoskeletal: Negative for joint swelling. Skin: Negative for wound. Neurological: Negative for headaches. Hematological: Negative for adenopathy. Psychiatric/Behavioral: Positive for dysphoric mood. PAST MEDICAL HISTORY    has a past medical history of COPD (chronic obstructive pulmonary disease) ([facility] [address] Depression, Diabetes mellitus ([facility] [address] ETOH abuse, Hyperlipidemia, Hypertension, Liver disease, and Seizures ([facility] [address] SURGICAL HISTORY      has a past surgical history that includes Endoscopy, colon, diagnostic.     CURRENT MEDICATIONS       Previous Medications    ALBUTEROL SULFATE HFA (PROVENTIL HFA) 108 (90 BASE) MCG/ACT INHALER    Inhale 2 puffs into the lungs every 4 hours as needed for Wheezing or Shortness of Breath (Space out to every 6 hours as symptoms improve) Space out to every 6 hours as symptoms improve. BLOOD GLUCOSE TEST STRIPS (ASCENSIA AUTODISC VI;ONE TOUCH ULTRA TEST VI) STRIP    Test as needed. Dispense One Touch verio Test Strips. Dx: Type 2 diabetes (250.00)    CHLORDIAZEPOXIDE (LIBRIUM) 10 MG CAPSULE    Take 1 capsule by mouth 4 times daily as needed for Anxiety for up to 20 days. FAMOTIDINE (PEPCID) 20 MG TABLET    Take 1 tablet by mouth nightly    FOLIC ACID (FOLVITE) 1 MG TABLET    Take 1 tablet by mouth daily    GLUCOSE MONITORING KIT (FREESTYLE) MONITORING KIT    Glucometer with test strips and lancets. Check BS once daily  #100 strips and lancets    LACTULOSE PO    Take by mouth 2 times daily     LISINOPRIL-HYDROCHLOROTHIAZIDE (PRINZIDE;ZESTORETIC) 20-25 MG PER TABLET    Take 1 tablet by mouth daily    MELATONIN 1 MG/4ML LIQD SL LIQUID    Place 0.3 mg under the tongue nightly as needed for Sleep    METOPROLOL TARTRATE (LOPRESSOR) 50 MG TABLET    Take 1 tablet by mouth 2 times daily    NALTREXONE (VIVITROL IM)    Inject into the muscle Indications: monthly    OLANZAPINE (ZYPREXA) 10 MG TABLET    Take 1 tablet by mouth nightly    OMEPRAZOLE (PRILOSEC) 40 MG DELAYED RELEASE CAPSULE    Take 1 capsule by mouth every morning (before breakfast)    VENLAFAXINE (EFFEXOR XR) 150 MG EXTENDED RELEASE CAPSULE    Take 1 capsule by mouth daily       ALLERGIES     has No Known Allergies. FAMILY HISTORY     He indicated that his mother is alive. He indicated that his father is . He indicated that his brother is alive. He indicated that the status of his paternal uncle is unknown.   family history includes Alcohol Abuse in his father and paternal uncle; Arthritis in his mother; Cancer in his father; Depression in his mother; Diabetes in his mother; Heart Disease in his mother; High Blood Pressure in his brother, father, and mother; High Cholesterol in his mother. SOCIAL HISTORY      reports that he has been smoking e-cigarettes and cigarettes.  He has a 27.00 pack-year smoking the following components:       Result Value    MCV 94.1 (*)     MCH 33.2 (*)     MPV 8.8 (*)     All other components within normal limits   BASIC METABOLIC PANEL - Abnormal; Notable for the following components:    CO2 19 (*)     All other components within normal limits   HEPATIC FUNCTION PANEL - Abnormal; Notable for the following components:    Bilirubin, Direct 0.5 (*)     Alkaline Phosphatase 228 (*)      (*)     ALT 89 (*)     All other components within normal limits   SALICYLATE LEVEL - Abnormal; Notable for the following components:    Salicylate, Serum < 0.3 (*)     All other components within normal limits   ANION GAP - Abnormal; Notable for the following components:    Anion Gap 17.0 (*)     All other components within normal limits   OSMOLALITY - Abnormal; Notable for the following components:    Osmolality Calc 273.9 (*)     All other components within normal limits   PROTIME-INR   ETHANOL   AMMONIA   ACETAMINOPHEN LEVEL   URINE DRUG SCREEN   GLOMERULAR FILTRATION RATE, ESTIMATED       EMERGENCY DEPARTMENT COURSE:   Vitals:    Vitals:    02/18/20 2024 02/18/20 2055 02/18/20 2121 02/18/20 2157   BP: 128/79 133/81 (!) 147/95    Pulse: 132 119 123    Resp: 18 18 17   Temp:    98.4 °F (36.9 °C)   TempSrc: Oral   Oral   SpO2: 96% 96%  96%   Weight: 240 lb (108.9 kg)      Height: 5' 11\" (1.803 m)        Nursing notes reviewed    IV hydration    Thiamine IV    EtOH level 0.38 at 2030 on 2/18/2020. Heart rate around 120    Ciwa score equals 21 at 2130. Urine drug screen negative    Mild elevation of LFTs    Serum ammonia normal    Acetaminophen and salicylate negative    With his history of significant withdrawal symptoms including hallucinations and agitation recommend admission        CRITICAL CARE:   none    CONSULTS:  Dr. Jasso Fitting:  None    FINAL IMPRESSION      1.  Acute alcoholic intoxication with complication ([facility]          DISPOSITION/PLAN   Admit for alcohol

## 2020-02-19 NOTE — ED NOTES
ED nurse-to-nurse bedside report    Chief Complaint   Patient presents with    Addiction Problem     etoh      LOC: alert and orientated to name, place, date  Vital signs   Vitals:    02/19/20 0136 02/19/20 0143 02/19/20 0216 02/19/20 0305   BP: (!) 157/94 (!) 157/94  113/73   Pulse: 122 119 92 92   Resp:  18 18 14   Temp:       TempSrc:       SpO2:  97% 92% 95%   Weight:       Height:          Pain:    Pain Interventions: none  Pain Goal: 2  Oxygen: no    Current needs required none   Telemetry: yes  LDAs:   Peripheral IV 02/18/20 Right Antecubital (Active)   Site Assessment Clean;Dry; Intact 2/18/2020  9:58 PM   Line Status Infusing 2/18/2020  9:58 PM   Dressing Status Clean;Dry; Intact 2/18/2020  9:58 PM     Continuous Infusions:    sodium chloride 125 mL/hr at 02/19/20 0136     Mobility: Requires assistance * 1  Tapia Fall Risk Score: Fall Risk 1/17/2020   2 or more falls in past year? no   Fall with injury in past year? no     Fall Interventions: side rails up. Call light within reach.   Report given to: Bria Field RN  02/19/20 7727

## 2020-02-19 NOTE — H&P
History & Physical       Patient: Estefania Pal  YOB: 1975    MRN: [id number]     Acct: [de-identified]    PCP: NILAM Vigil CNP    Date of Admission: 2/18/2020    Date of Service: Patient seen / examined on 02/18/20 and admitted to inpatient with expected LOS greater than two midnights due to medical therapy. ASSESSMENT / PLAN:    Acute alcohol intoxication  EtOH level 0.38 on presentation. S/p outpatient naltrexone failure. Has expressed interest in detox program. Does report history of alcohol withdrawal seizures. Admit to stepdown unit (due to history of seizures and risk of clinical decompensation). Alcohol scoring / will initiate phenobarb protocol as indicated. Seizure precautions. Thiamine / folate ordered. IVF. Case management and addiction medicine consulted. Tachycardia  Sinus tachycardia on telemetry monitor. Suspect 2/2 #1, dehydration. Will obtain EKG. IVF. Monitor on telemetry. Mild AG metabolic acidosis  No LA obtained. Suspect 2/2 alcoholic ketoacidosis. IVF. Will trend. Chronic alcoholism  Per #1. Chronic tobacco abuse  Cessation counseling. Nicotine patch ordered. Alcoholic cirrhosis with chronic transaminitis  Noted. Resume lactulose. Monitor daily CMP. Chronic HTN  Controlled. Resume BB, ACE-I / hold HCTZ (providing IVF hydration). Monitor BP. Type 2 DM, controlled  No outpatient therapies listed. OK to monitor BG with daily BMP at this time. COPD (not supplemental O2 dependent)  Without acute exacerbation. Bronchodilator therapy prn. GERD  Controlled. Resume PPI, pepcid (chronic therapies). Depression  Resume effexor, zyprexa. Chief Complaint: addiction problem    History of Present Illness:  37 y/o M PMHx chronic alcoholism (with history of withdrawal seizures), alcoholic cirrhosis, HTN, DM2, GERD, COPD (not supplemental O2 dependent) and depression -- presents to [facility] with chief complaint of \"addiction problem. \" History obtained from ED provider and patient (limited historian due to acute intoxication). Patient reports relapsing from alcohol approximately 3 years ago following 6 months of sobriety (precipitant for relapse unclear). Admits to drinking beer (quantity unclear) and occasionally hard liquor / denies illicit drug use. He has attended detox programs at [facility] multiple times in the past / expresses interest in enrolling again. Does admit to history of hallucinations and seizures from withdrawal. ROS difficult to obtain / patient does not appear to be in any acute distress upon my interview. ED course: afebrile, tachycardic (120s-130s), BP normal, SpO2 96% on RA. Workup remarkable for: CO2: 19 (AG 17). ALT/AST: 89/124. AlkP 228. Dbili 0.5. INR nml. Ammonia nml. EtOH 0.38. UDS negative. Received 1L NS bolus, nicotine patch and thiamine in the ED. Patient expressed interested in detox program. Hospitalist service contacted for admission. Past Medical History:    Diagnosis Date   COPD (chronic obstructive pulmonary disease) ([facility] [address]    Depression    Diabetes mellitus (HCC)    ETOH abuse    GERD    Hypertension    Liver disease    Seizures ([facility] [address]    etoh wdl     Past Surgical History:    Procedure Laterality Date    ENDOSCOPY, COLON, DIAGNOSTIC        Medications Prior to Admission:   Medication Sig    chlordiazePOXIDE (LIBRIUM) 10 MG capsule Take 1 capsule by mouth 4 times daily as needed for Anxiety for up to 20 days.     omeprazole (PRILOSEC) 40 MG delayed release capsule Take 1 capsule by mouth every morning (before breakfast)    famotidine (PEPCID) 20 MG tablet Take 1 tablet by mouth nightly    lisinopril-hydrochlorothiazide (PRINZIDE;ZESTORETIC) 20-25 MG per tablet Take 1 tablet by mouth daily    Naltrexone (VIVITROL IM) Inject into the muscle Indications: monthly    OLANZapine (ZYPREXA) 10 MG tablet Take 1 tablet by mouth nightly    albuterol sulfate HFA (PROVENTIL HFA) 108 (90 Base) MCG/ACT exception of pertinent positives as stated in the HPI. PHYSICAL EXAM:  Vitals:    02/18/20 2024 02/18/20 2055 02/18/20 2121 02/18/20 2157   BP: 128/79 133/81 (!) 147/95    Pulse: 132 119 123    Resp: 18 18 17   Temp:    98.4 °F (36.9 °C)   TempSrc: Oral   Oral   SpO2: 96% 96%  96%   Weight: 240 lb (108.9 kg)      Height: 5' 11\" (1.803 m)      RA    General appearance: Alert / acutely-intoxicated  male. No obvious distress. HEENT: Normocephalic / atraumatic. PERRL. Conjunctivae appear normal.  Neck: Supple. No JVD. Respiratory: Normal respiratory effort on RA. CTAB. No wheezes / rales / rhonchi. Cardiovascular: Tachycardic rate. Regular rhythm. Normal S1/S2. No murmurs / rubs / gallops. Abdomen: Obese. Soft / non-tender / non-distended. BS present. Musculoskeletal: No cyanosis or edema. Skin: Warm / dry. No diaphoresis. Neurologic: A/O x 3. No tremors/involuntary movements or obvious focal neurologic deficits. Psychiatric: Psychomotor slowing noted. Capillary refill: Brisk bilaterally. Peripheral pulses: +2 bilaterally.     Labs:   Results for orders placed or performed during the hospital encounter of 02/18/20   Protime-INR   Result Value Ref Range    INR 0.98 0.85 - 1.13   CBC auto differential   Result Value Ref Range    WBC 8.5 4.8 - 10.8 thou/mm3    RBC 5.12 4.70 - 6.10 mill/mm3    Hemoglobin 17.0 14.0 - 18.0 gm/dl    Hematocrit 48.2 42.0 - 52.0 %    MCV 94.1 (H) 80.0 - 94.0 fL    MCH 33.2 (H) 26.0 - 33.0 pg    MCHC 35.3 32.2 - 35.5 gm/dl    RDW-CV 12.9 11.5 - 14.5 %    RDW-SD 44.8 35.0 - 45.0 fL    Platelets 669 967 - 279 thou/mm3    MPV 8.8 (L) 9.4 - 12.4 fL    Seg Neutrophils 62.4 %    Lymphocytes 31.7 %    Monocytes 5.2 %    Eosinophils 0.0 %    Basophils 0.5 %    Immature Granulocytes 0.2 %    Segs Absolute 5.3 1.8 - 7.7 thou/mm3    Lymphocytes Absolute 2.7 1.0 - 4.8 thou/mm3    Monocytes Absolute 0.4 0.4 - 1.3 thou/mm3    Eosinophils Absolute 0.0 0.0 - 0.4 thou/mm3    Basophils Absolute 0.0 0.0 - 0.1 thou/mm3    Immature Grans (Abs) 0.02 0.00 - 0.07 thou/mm3    nRBC 0 /100 wbc   Basic Metabolic Panel   Result Value Ref Range    Sodium 136 135 - 145 meq/L    Potassium 4.2 3.5 - 5.2 meq/L    Chloride 100 98 - 111 meq/L    CO2 19 (L) 23 - 33 meq/L    Glucose 100 70 - 108 mg/dL    BUN 18 7 - 22 mg/dL    CREATININE 0.8 0.4 - 1.2 mg/dL    Calcium 8.5 8.5 - 10.5 mg/dL   Hepatic function panel   Result Value Ref Range    Alb 4.3 3.5 - 5.1 g/dL    Total Bilirubin 0.9 0.3 - 1.2 mg/dL    Bilirubin, Direct 0.5 (H) 0.0 - 0.3 mg/dL    Alkaline Phosphatase 228 (H) 38 - 126 U/L     (H) 5 - 40 U/L    ALT 89 (H) 11 - 66 U/L    Total Protein 6.8 6.1 - 8.0 g/dL   Ethanol   Result Value Ref Range    ETHYL ALCOHOL, SERUM 0.38 0.00 %   Ammonia   Result Value Ref Range    Ammonia 31 11 - 60 umol/L   Acetaminophen level   Result Value Ref Range    Acetaminophen Level < 5.0 0.0 - 90.5 ug/mL   Salicylate Level   Result Value Ref Range    Salicylate, Serum < 0.3 (L) 2.0 - 10.0 mg/dL   Urine Drug Screen   Result Value Ref Range    AMPHETAMINE+METHAMPHETAMINE URINE SCREEN Negative NEGATIVE    Barbiturate Quant, Ur Negative NEGATIVE    Benzodiazepine Quant, Ur Negative NEGATIVE    Cannabinoid Quant, Ur Negative NEGATIVE    Cocaine Metab Quant, Ur Negative NEGATIVE    Opiates, Urine Negative NEGATIVE    Oxycodone Negative NEGATIVE    PCP Quant, Ur Negative NEGATIVE   Anion Gap   Result Value Ref Range    Anion Gap 17.0 (H) 8.0 - 16.0 meq/L   Glomerular Filtration Rate, Estimated   Result Value Ref Range    Est, Glom Filt Rate >90 ml/min/1.73m2   Osmolality   Result Value Ref Range    Osmolality Calc 273.9 (L) 275.0 - 300 mOsmol/kg     EKG / Radiology: none performed in the ED    CODE STATUS: FULL    Thank you NILAM Busby - SILVINO for the opportunity to be involved in this patient's care. Electronically signed by Keke Evans MD on 2/18/2020.

## 2020-02-20 LAB
ALBUMIN SERPL-MCNC: 3.3 G/DL (ref 3.5–5.1)
ALP BLD-CCNC: 163 U/L (ref 38–126)
ALT SERPL-CCNC: 58 U/L (ref 11–66)
ANION GAP SERPL CALCULATED.3IONS-SCNC: 12 MEQ/L (ref 8–16)
AST SERPL-CCNC: 73 U/L (ref 5–40)
BASOPHILS # BLD: 0.6 %
BASOPHILS ABSOLUTE: 0 THOU/MM3 (ref 0–0.1)
BILIRUB SERPL-MCNC: 2.1 MG/DL (ref 0.3–1.2)
BUN BLDV-MCNC: 12 MG/DL (ref 7–22)
CALCIUM SERPL-MCNC: 8.4 MG/DL (ref 8.5–10.5)
CHLORIDE BLD-SCNC: 106 MEQ/L (ref 98–111)
CO2: 21 MEQ/L (ref 23–33)
CREAT SERPL-MCNC: 0.6 MG/DL (ref 0.4–1.2)
EOSINOPHIL # BLD: 1.2 %
EOSINOPHILS ABSOLUTE: 0.1 THOU/MM3 (ref 0–0.4)
ERYTHROCYTE [DISTWIDTH] IN BLOOD BY AUTOMATED COUNT: 12.6 % (ref 11.5–14.5)
ERYTHROCYTE [DISTWIDTH] IN BLOOD BY AUTOMATED COUNT: 44.9 FL (ref 35–45)
GFR SERPL CREATININE-BSD FRML MDRD: > 90 ML/MIN/1.73M2
GLUCOSE BLD-MCNC: 152 MG/DL (ref 70–108)
GLUCOSE BLD-MCNC: 72 MG/DL (ref 70–108)
GLUCOSE BLD-MCNC: 80 MG/DL (ref 70–108)
GLUCOSE BLD-MCNC: 81 MG/DL (ref 70–108)
GLUCOSE BLD-MCNC: 83 MG/DL (ref 70–108)
HCT VFR BLD CALC: 42 % (ref 42–52)
HEMOGLOBIN: 14.2 GM/DL (ref 14–18)
IMMATURE GRANS (ABS): 0.01 THOU/MM3 (ref 0–0.07)
IMMATURE GRANULOCYTES: 0.2 %
LYMPHOCYTES # BLD: 24.6 %
LYMPHOCYTES ABSOLUTE: 1.2 THOU/MM3 (ref 1–4.8)
MCH RBC QN AUTO: 32.5 PG (ref 26–33)
MCHC RBC AUTO-ENTMCNC: 33.8 GM/DL (ref 32.2–35.5)
MCV RBC AUTO: 96.1 FL (ref 80–94)
MONOCYTES # BLD: 6.7 %
MONOCYTES ABSOLUTE: 0.3 THOU/MM3 (ref 0.4–1.3)
NUCLEATED RED BLOOD CELLS: 0 /100 WBC
PLATELET # BLD: 130 THOU/MM3 (ref 130–400)
PMV BLD AUTO: 9.3 FL (ref 9.4–12.4)
POTASSIUM SERPL-SCNC: 3.7 MEQ/L (ref 3.5–5.2)
RBC # BLD: 4.37 MILL/MM3 (ref 4.7–6.1)
SEG NEUTROPHILS: 66.7 %
SEGMENTED NEUTROPHILS ABSOLUTE COUNT: 3.3 THOU/MM3 (ref 1.8–7.7)
SODIUM BLD-SCNC: 139 MEQ/L (ref 135–145)
TOTAL PROTEIN: 6.1 G/DL (ref 6.1–8)
WBC # BLD: 5 THOU/MM3 (ref 4.8–10.8)

## 2020-02-20 PROCEDURE — 6370000000 HC RX 637 (ALT 250 FOR IP): Performed by: FAMILY MEDICINE

## 2020-02-20 PROCEDURE — 6370000000 HC RX 637 (ALT 250 FOR IP): Performed by: NURSE PRACTITIONER

## 2020-02-20 PROCEDURE — 2709999900 HC NON-CHARGEABLE SUPPLY

## 2020-02-20 PROCEDURE — 85025 COMPLETE CBC W/AUTO DIFF WBC: CPT

## 2020-02-20 PROCEDURE — 80053 COMPREHEN METABOLIC PANEL: CPT

## 2020-02-20 PROCEDURE — 2060000000 HC ICU INTERMEDIATE R&B

## 2020-02-20 PROCEDURE — 82948 REAGENT STRIP/BLOOD GLUCOSE: CPT

## 2020-02-20 PROCEDURE — 99232 SBSQ HOSP IP/OBS MODERATE 35: CPT | Performed by: INTERNAL MEDICINE

## 2020-02-20 PROCEDURE — 36415 COLL VENOUS BLD VENIPUNCTURE: CPT

## 2020-02-20 PROCEDURE — G0378 HOSPITAL OBSERVATION PER HR: HCPCS

## 2020-02-20 PROCEDURE — 2580000003 HC RX 258: Performed by: FAMILY MEDICINE

## 2020-02-20 RX ADMIN — Medication 100 MG: at 08:15

## 2020-02-20 RX ADMIN — LISINOPRIL 20 MG: 20 TABLET ORAL at 08:16

## 2020-02-20 RX ADMIN — METOPROLOL TARTRATE 50 MG: 50 TABLET, FILM COATED ORAL at 20:50

## 2020-02-20 RX ADMIN — FAMOTIDINE 20 MG: 20 TABLET ORAL at 20:50

## 2020-02-20 RX ADMIN — SODIUM CHLORIDE: 9 INJECTION, SOLUTION INTRAVENOUS at 11:39

## 2020-02-20 RX ADMIN — SODIUM CHLORIDE: 9 INJECTION, SOLUTION INTRAVENOUS at 22:39

## 2020-02-20 RX ADMIN — Medication 3 MG: at 20:50

## 2020-02-20 RX ADMIN — METOPROLOL TARTRATE 50 MG: 50 TABLET, FILM COATED ORAL at 08:16

## 2020-02-20 RX ADMIN — OLANZAPINE 10 MG: 10 TABLET, FILM COATED ORAL at 00:05

## 2020-02-20 RX ADMIN — OLANZAPINE 10 MG: 10 TABLET, FILM COATED ORAL at 20:50

## 2020-02-20 RX ADMIN — VENLAFAXINE HYDROCHLORIDE 150 MG: 150 CAPSULE, EXTENDED RELEASE ORAL at 08:16

## 2020-02-20 RX ADMIN — FOLIC ACID 1 MG: 1 TABLET ORAL at 08:16

## 2020-02-20 RX ADMIN — PANTOPRAZOLE SODIUM 40 MG: 40 TABLET, DELAYED RELEASE ORAL at 06:06

## 2020-02-20 RX ADMIN — INSULIN LISPRO 1 UNITS: 100 INJECTION, SOLUTION INTRAVENOUS; SUBCUTANEOUS at 20:51

## 2020-02-20 NOTE — CONSULTS
Brief Intervention and Referral to Treatment Summary    Patient was provided PHQ-9, AUDIT and DAST Screening:      PHQ-9 Score: n/a   AUDIT Score: 36   DAST Score:  n/a    Patients substance use is considered     Dependent    Patients depression is considered:     n/a    Brief Education Was Provided    Patient was receptive    Brief Intervention Is Provided (Only for AUDIT or DAST)     Patient reports readiness to decrease and/or stop use and a plan was discussed     Recommendations/Referrals for Brief and/or Specialized Treatment Provided to Patient     Completed addiction consult. Pt reports daily alcohol use, typically 15 beers. Pt denied other AOD use. Depressive Symptoms denied. Pt reports that he attends AA already and pt was receptive to receiving additional resources.

## 2020-02-21 VITALS
BODY MASS INDEX: 33.94 KG/M2 | WEIGHT: 242.4 LBS | SYSTOLIC BLOOD PRESSURE: 160 MMHG | HEIGHT: 71 IN | OXYGEN SATURATION: 96 % | TEMPERATURE: 98.6 F | RESPIRATION RATE: 16 BRPM | DIASTOLIC BLOOD PRESSURE: 97 MMHG | HEART RATE: 70 BPM

## 2020-02-21 LAB
ALBUMIN SERPL-MCNC: 3.6 G/DL (ref 3.5–5.1)
ALP BLD-CCNC: 159 U/L (ref 38–126)
ALT SERPL-CCNC: 66 U/L (ref 11–66)
ANION GAP SERPL CALCULATED.3IONS-SCNC: 10 MEQ/L (ref 8–16)
AST SERPL-CCNC: 75 U/L (ref 5–40)
BILIRUB SERPL-MCNC: 1.6 MG/DL (ref 0.3–1.2)
BUN BLDV-MCNC: 7 MG/DL (ref 7–22)
CALCIUM SERPL-MCNC: 8.9 MG/DL (ref 8.5–10.5)
CHLORIDE BLD-SCNC: 105 MEQ/L (ref 98–111)
CO2: 22 MEQ/L (ref 23–33)
CREAT SERPL-MCNC: 0.5 MG/DL (ref 0.4–1.2)
GFR SERPL CREATININE-BSD FRML MDRD: > 90 ML/MIN/1.73M2
GLUCOSE BLD-MCNC: 109 MG/DL (ref 70–108)
GLUCOSE BLD-MCNC: 139 MG/DL (ref 70–108)
GLUCOSE BLD-MCNC: 91 MG/DL (ref 70–108)
MAGNESIUM: 1.8 MG/DL (ref 1.6–2.4)
POTASSIUM SERPL-SCNC: 4 MEQ/L (ref 3.5–5.2)
REASON FOR REJECTION: NORMAL
REJECTED TEST: NORMAL
SODIUM BLD-SCNC: 137 MEQ/L (ref 135–145)
TOTAL PROTEIN: 6.7 G/DL (ref 6.1–8)

## 2020-02-21 PROCEDURE — 2580000003 HC RX 258: Performed by: FAMILY MEDICINE

## 2020-02-21 PROCEDURE — 82948 REAGENT STRIP/BLOOD GLUCOSE: CPT

## 2020-02-21 PROCEDURE — 99238 HOSP IP/OBS DSCHRG MGMT 30/<: CPT | Performed by: INTERNAL MEDICINE

## 2020-02-21 PROCEDURE — 6370000000 HC RX 637 (ALT 250 FOR IP): Performed by: FAMILY MEDICINE

## 2020-02-21 PROCEDURE — 83735 ASSAY OF MAGNESIUM: CPT

## 2020-02-21 PROCEDURE — G0378 HOSPITAL OBSERVATION PER HR: HCPCS

## 2020-02-21 PROCEDURE — 80053 COMPREHEN METABOLIC PANEL: CPT

## 2020-02-21 PROCEDURE — 36415 COLL VENOUS BLD VENIPUNCTURE: CPT

## 2020-02-21 PROCEDURE — 2709999900 HC NON-CHARGEABLE SUPPLY

## 2020-02-21 RX ORDER — FOLIC ACID 1 MG/1
1 TABLET ORAL DAILY
Qty: 30 TABLET | Refills: 5 | Status: SHIPPED | OUTPATIENT
Start: 2020-02-21 | End: 2021-06-01 | Stop reason: SDUPTHER

## 2020-02-21 RX ORDER — LANOLIN ALCOHOL/MO/W.PET/CERES
100 CREAM (GRAM) TOPICAL DAILY
Qty: 30 TABLET | Refills: 3 | Status: ON HOLD | OUTPATIENT
Start: 2020-02-22 | End: 2021-03-17

## 2020-02-21 RX ORDER — NICOTINE 21 MG/24HR
1 PATCH, TRANSDERMAL 24 HOURS TRANSDERMAL DAILY
Qty: 30 PATCH | Refills: 3 | Status: SHIPPED | OUTPATIENT
Start: 2020-02-22 | End: 2020-06-11 | Stop reason: ALTCHOICE

## 2020-02-21 RX ADMIN — Medication 100 MG: at 09:12

## 2020-02-21 RX ADMIN — VENLAFAXINE HYDROCHLORIDE 150 MG: 150 CAPSULE, EXTENDED RELEASE ORAL at 09:12

## 2020-02-21 RX ADMIN — LISINOPRIL 20 MG: 20 TABLET ORAL at 09:12

## 2020-02-21 RX ADMIN — SODIUM CHLORIDE: 9 INJECTION, SOLUTION INTRAVENOUS at 06:24

## 2020-02-21 RX ADMIN — METOPROLOL TARTRATE 50 MG: 50 TABLET, FILM COATED ORAL at 09:12

## 2020-02-21 RX ADMIN — Medication 10 ML: at 09:12

## 2020-02-21 RX ADMIN — PANTOPRAZOLE SODIUM 40 MG: 40 TABLET, DELAYED RELEASE ORAL at 06:24

## 2020-02-21 RX ADMIN — FOLIC ACID 1 MG: 1 TABLET ORAL at 09:12

## 2020-02-21 NOTE — PLAN OF CARE
Problem: Discharge Planning:  Goal: Discharged to appropriate level of care  Description  Discharged to appropriate level of care  Outcome: Ongoing  Note:   Discharge planning in process and discussed with patient/family. Social work consulted for any additional needs. Care manager aware of discharge needs. Problem: Fluid Volume - Deficit:  Goal: Absence of fluid volume deficit signs and symptoms  Description  Absence of fluid volume deficit signs and symptoms  Outcome: Ongoing  Note:   Patient has 0.9 at 125. Remain on a clear liquid diet. VSS stable this shift, showing no s/s of fluid volume deficit. Vitals:    02/20/20 1510 02/20/20 2035 02/20/20 2322 02/21/20 0340   BP: 133/68 139/81 121/63 110/66   Pulse: 77 79 74 77   Resp: 16 16 16 14   Temp: 98 °F (36.7 °C) 98 °F (36.7 °C) 98.5 °F (36.9 °C) 97.7 °F (36.5 °C)   TempSrc: Oral Oral Oral Oral   SpO2: 95% 95% 94% 96%   Weight:    242 lb 6.4 oz (110 kg)   Height:              Care plan reviewed with patient. Patient verbalizes understanding of the plan of care and contributed to goal setting.

## 2020-02-21 NOTE — CARE COORDINATION
2/21/20, 1:54 PM    Patient goals/plan/ treatment preferences discussed by  and . Patient goals/plan/ treatment preferences reviewed with patient/ family. Patient/ family verbalize understanding of discharge plan and are in agreement with goal/plan/treatment preferences. Understanding was demonstrated using the teach back method. AVS provided by RN at time of discharge, which includes all necessary medical information pertaining to the patients current course of illness, treatment, post-discharge goals of care, and treatment preferences. Addiction services seen and provided with resources. Patient already attends AA.

## 2020-02-21 NOTE — PROGRESS NOTES
 sodium chloride  1,000 mL Intravenous Once    famotidine  20 mg Oral Nightly    folic acid  1 mg Oral Daily    thiamine  100 mg Oral Daily    lactulose  20 g Oral BID    metoprolol tartrate  50 mg Oral BID    OLANZapine  10 mg Oral Nightly    pantoprazole  40 mg Oral QAM AC    venlafaxine  150 mg Oral Daily     PRN Meds: sodium chloride flush, acetaminophen, promethazine, ondansetron, LORazepam **OR** LORazepam **OR** LORazepam **OR** LORazepam **OR** LORazepam **OR** LORazepam **OR** LORazepam **OR** LORazepam, glucose, dextrose, glucagon (rDNA), dextrose, melatonin      Intake/Output Summary (Last 24 hours) at 2/20/2020 2321  Last data filed at 2/20/2020 2005  Gross per 24 hour   Intake 4117.23 ml   Output 4855 ml   Net -737.77 ml       Diet:  DIET CLEAR LIQUID; Carb Control: 5 carb choices (75 gms)/meal    Exam:  /81   Pulse 79   Temp 98 °F (36.7 °C) (Oral)   Resp 16   Ht 5' 11\" (1.803 m)   Wt 239 lb (108.4 kg)   SpO2 95%   BMI 33.33 kg/m²     General appearance: No apparent distress, appears stated age and cooperative. HEENT: Pupils equal, round, and reactive to light. Conjunctivae/corneas clear. Neck: Supple, with full range of motion. No jugular venous distention. Trachea midline. Respiratory:  Normal respiratory effort. Clear to auscultation, bilaterally without Rales/Wheezes/Rhonchi. Cardiovascular: Regular rate and rhythm with normal S1/S2 without murmurs, rubs or gallops. Abdomen: Soft, non-tender, non-distended with normal bowel sounds. Musculoskeletal: passive and active ROM x 4 extremities. Skin: Skin color, texture, turgor normal.    Neurologic:  Neurovascularly intact without any focal sensory/motor deficits.  Cranial nerves: II-XII intact, grossly non-focal.  Psychiatric: Alert and oriented, thought content appropriate  Capillary Refill: Brisk,< 3 seconds   Peripheral Pulses: +2 palpable, equal bilaterally       Labs:   Recent Labs     02/18/20 2035 02/20/20  1466 WBC 8.5 5.0   HGB 17.0 14.2   HCT 48.2 42.0    130     Recent Labs     02/18/20 2035 02/20/20  0358    139   K 4.2 3.7    106   CO2 19* 21*   BUN 18 12   CREATININE 0.8 0.6   CALCIUM 8.5 8.4*     Recent Labs     02/18/20 2035 02/20/20  0358   * 73*   ALT 89* 58   BILIDIR 0.5*  --    BILITOT 0.9 2.1*   ALKPHOS 228* 163*     Recent Labs     02/18/20 2035   INR 0.98     No results for input(s): Donnell Service in the last 72 hours. No results for input(s): PROCAL in the last 72 hours.     Microbiology:      Urinalysis:      Lab Results   Component Value Date    NITRU NEGATIVE 09/01/2019    WBCUA NONE SEEN 09/01/2019    BACTERIA NONE 09/01/2019    RBCUA 0-2 09/01/2019    BLOODU SMALL 09/01/2019    SPECGRAV <1.005 08/29/2019    GLUCOSEU NEGATIVE 09/01/2019       Radiology:  No orders to display        Code Status: Full Code    Tele:   [x] yes             [] no    Active Hospital Problems    Diagnosis Date Noted    Alcohol intoxication with delirium [facility]) [F10.921] 02/18/2020       Electronically signed by Luis Au MD on 2/20/2020 at 11:21 PM

## 2020-02-22 NOTE — DISCHARGE SUMMARY
Hospital Medicine Discharge Summary      Patient Identification:   Bridgett Gillette   : 1975  MRN: [id number]   Account: [de-identified]      Patient's PCP: NILAM Yoon CNP    Admit Date: 2020     Discharge Date: 2020      Admitting Physician: Ngozi Vargas MD     Discharge Physician: Tomas Alfred DO       Hospital Course:   Bridgett Gillette is a 38 y.o. male with past medical history of alcohol abuse, alcoholic hepatitis/cirrhosis with chronic transaminitis, hypertension, type 2 diabetes, COPD, GERD, depression chronic tobacco abuse admitted to [address] [place] [address] on 2020 for alcohol intoxication and detox. On arrival, the patient's alcohol level was 0.38, and he had evidence of elevated AST and ALT and a nearly 2:1 ratio, 124 and 89 respectively. Ammonia was within normal limits. The patient was started on CIWA protocol and monitor closely for seizures as he has a reported history of seizures and DTs. During his 3-day stay he was only given 1 dose of Ativan which was given his day of arrival, there were no further evidence of any withdrawal symptoms. Of note, he reported only drinking for the past 1 week after being sober and he is unlikely to have a significant withdrawal from this. In addition to counseling, addiction services met with patient, and formulated a plan -patient is receptive to continuing with AA. Case management/care coordination also assisted with discharge plan and he has no home needs at this time. He was discharged in stable condition, and will repeat a comprehensive metabolic panel in 1 week to monitor his LFTs. We will also arrange for follow-up with PCP. The patient was stable for discharge - appropriate follow up appointment was arranged prior to discharge. Please see below or view chart for more details from hospital course. Discharge Diagnoses:    Acute alcohol intoxication on chronic ETOH use:   Failed Naltrexone OP. EtOH level 0.38 on presentation. History of alcohol withdrawal seizures. -CIWA initiated. No evidence of withdrawal throughout his stay.   -Seizure / Fall precautions.   -Oral vitamins. -s/p IVF. -Case management and addiction services have seen     Tachycardia secondary to #1 and dehydration. Resolved. Mild AG metabolic acidosis. Suspect 2/2 alcoholic ketoacidosis. IVF. Will trend - improved. Alcoholic hepatitis/cirrhosis with chronic transaminitis. Lactulose. Trend LFTs.  Bilirubin elevated, but stable. Ess HTN, controlled. BB/ACE resumed. Type 2 DM, controlled. Last A1c 5.7%. COPD. Without exacerbation. Bronchodilator therapy prn. GERD. Resume PPI, pepcid. Depression. Resume effexor XR, zyprexa.     Chronic tobacco abuse. Cessation counseling. Nicotine patch. The patient was seen and examined on day of discharge and this discharge summary is in conjunction with any daily progress note from day of discharge. Exam:     Vitals:  Vitals:    02/20/20 2322 02/21/20 0340 02/21/20 0853 02/21/20 1133   BP: 121/63 110/66 (!) 145/98 (!) 160/97   Pulse: 74 77 83 70   Resp: 16 14 16 16   Temp: 98.5 °F (36.9 °C) 97.7 °F (36.5 °C) 97.6 °F (36.4 °C) 98.6 °F (37 °C)   TempSrc: Oral Oral Oral Oral   SpO2: 94% 96% 95% 96%   Weight:  242 lb 6.4 oz (110 kg)     Height:         Weight: Weight: 242 lb 6.4 oz (110 kg)     24 hour intake/output:No intake or output data in the 24 hours ending 02/22/20 1841        General appearance: No apparent distress, well developed, appears stated age. Obese  Eyes:  Pupils equal, round, and reactive to light. Conjunctivae/corneas clear. HENT: Head normal in appearance. External nares normal.  Oral mucosa moist without lesions. Hearing grossly intact. Neck: Supple, with full range of motion. No jugular venous distention. Trachea midline. Respiratory:  Normal respiratory effort. No rhonchi or rales. Trace wheezing on expiration. CQ  Place 1 patch onto the skin daily     thiamine 100 MG tablet  Take 1 tablet by mouth daily        CONTINUE taking these medications    albuterol sulfate  (90 Base) MCG/ACT inhaler  Commonly known as:  Proventil HFA  Inhale 2 puffs into the lungs every 4 hours as needed for Wheezing or Shortness of Breath (Space out to every 6 hours as symptoms improve) Space out to every 6 hours as symptoms improve. blood glucose test strips strip  Commonly known as:  ASCENSIA AUTODISC VI;ONE TOUCH ULTRA TEST VI  Test as needed. Dispense One Touch verio Test Strips. Dx: Type 2 diabetes (250.00)     chlordiazePOXIDE 10 MG capsule  Commonly known as:  LIBRIUM  Take 1 capsule by mouth 4 times daily as needed for Anxiety for up to 20 days. famotidine 20 MG tablet  Commonly known as:  PEPCID  Take 1 tablet by mouth nightly     folic acid 1 MG tablet  Commonly known as:  FOLVITE  Take 1 tablet by mouth daily     glucose monitoring kit monitoring kit  Glucometer with test strips and lancets.   Check BS once daily  #100 strips and lancets     LACTULOSE PO     lisinopril-hydroCHLOROthiazide 20-25 MG per tablet  Commonly known as:  PRINZIDE;ZESTORETIC  Take 1 tablet by mouth daily     melatonin 1 MG/4ML Liqd SL liquid     metoprolol tartrate 50 MG tablet  Commonly known as:  LOPRESSOR  Take 1 tablet by mouth 2 times daily     OLANZapine 10 MG tablet  Commonly known as:  ZYPREXA  Take 1 tablet by mouth nightly     omeprazole 40 MG delayed release capsule  Commonly known as:  PRILOSEC  Take 1 capsule by mouth every morning (before breakfast)     venlafaxine 150 MG extended release capsule  Commonly known as:  EFFEXOR XR  Take 1 capsule by mouth daily     VIVITROL IM           Where to Get Your Medications      These medications were sent to [facility]  [address], [address] [id number]    Phone:  [phone]   · folic acid 1 MG

## 2020-02-24 ENCOUNTER — TELEPHONE (OUTPATIENT)
Dept: FAMILY MEDICINE CLINIC | Age: 45
End: 2020-02-24

## 2020-02-25 ENCOUNTER — CARE COORDINATION (OUTPATIENT)
Dept: CARE COORDINATION | Age: 45
End: 2020-02-25

## 2020-02-25 NOTE — CARE COORDINATION
Edward:  Please see note below. Patient is asking if he can also have ammonia level completed with f/u CMP. Please advise. Thank you!

## 2020-02-25 NOTE — CARE COORDINATION
Ambulatory Care Coordination Note  2/25/2020  CM Risk Score: 14  Charlson 10 Year Mortality Risk Score: 98%     ACC: Elinor Marquez RN    Summary Note: Patient was called for continued Care Coordination follow up and eduction re: the management of his COPD, DM, and healthcare needs. Patient reported he has been doing \"ok\" since returning home from the hospital.  Patient denied any questions/concerns re: his discharge instructions. Patient reported he has remained alcohol free since returning home and he was encouraged to continue. Patient shared he is working to get his Charlett Soni scheduled as he is due for a shot and he is also working with [facility] re: re-setting up with his counselor. Patient denied any need for assistance at this time. Patient reported he doesn't have an \"official\" sponsor but he does utilize his boss as a sponsor. Patient reported his boss is very supportive and is helping him to remain alcohol free and working to increase his spirituality. Patient stated he plans to return to work next week. ACM discussed availability of CC SW/BHW and patient verbalized understanding but declined referral at this time. Patient was encouraged to alert ACM if he changes his mind and he verbalized understanding. Patient was reminded to call with any need for additional support/resources and he was reminded his team all wants him to succeed. Patient verbalized understanding. Patient is aware of need to have CMP completed and ACM updated patient he can have this completed tomorrow when he comes for PCP appointment and does not need to fast (verified with PCP prior to call being completed). Patient is also asking if he can have ammonia level drawn and will f/u with PCP. Patient stated he is taking his medications, but does not routinely take Lactulose. Med Rec was completed.   Patient denied any further questions, concerns, or needs at this time and he was again encouraged to call with any that may develop. Patient verbalized understanding. Support and encouragement provided thru out our call. Actions:   - Reviewed discharge instructions  - Reviewed available resources  - Reminded patient of need to have CMP completed  - Reviewed upcoming appointment information   - Completed Med Rec  - Provided support and encouragement  - Monitored for additional needs          Plan of Care:   - Continue with Care Coordination f/u calls for assistance with DM and COPD management as well as other healthcare needs  - Complete COPD education - In Process   - Complete DM education - In Process  - Provide support and encouragement to remain alcohol free - In Process  - Monitor for interest in quitting smoking  - Monitor for transportation assistance needs - In Process  - Review same day appointment, urgent care/walk in clinic, and after hours on call resources  - Monitor for additional needs  - Monitor for readiness to discharge from [address] [place] [address] Road Coordination Interventions    Program Enrollment:  Complex Care  Referral from Primary Care Provider:  No  Suggested Interventions and Community Resources  Behavorial Health:  Completed (Comment: [facility] counseling )  Diabetes Education:  Completed  Medication Assistance Program:  Declined (Comment: denied any need at this time)  Medi Set or Pill Pack:  Declined  Smoking Cessation: In Process  Social Work:  Declined  Other Services:  Completed (Comment: Attends [place] [id number] meetings & f/u with  )  Transportation Support: In Process  Zone Management Tools:  Completed (Comment: Jan. 2020)         Goals Addressed                 This Visit's Progress       Care Coordination     Behavioral Health   Improving     I will work towards the following [facility] goals: I will continue to follow up with my psychologist Anthony Menezes and/or psychiatrist., I will schedule my follow up appointment with my primary care physician when I leave the office today. , I will take my medications daily as prescribed. and I will seek treatment for alcohol/substance use. Barriers: financial, stress and lack of education  Plan for overcoming my barriers: Provide support and education to patient   Confidence: 7/10  Anticipated Goal Completion Date: 6/20/20            Prior to Admission medications    Medication Sig Start Date End Date Taking? Authorizing Provider   folic acid (FOLVITE) 1 MG tablet Take 1 tablet by mouth daily 2/21/20  Yes Padmini Sames, DO   vitamin B-1 100 MG tablet Take 1 tablet by mouth daily 2/22/20  Yes Padmini Sames, DO   chlordiazePOXIDE (LIBRIUM) 10 MG capsule Take 1 capsule by mouth 4 times daily as needed for Anxiety for up to 20 days. 2/16/20 3/7/20 Yes Arthur Ren MD   omeprazole (PRILOSEC) 40 MG delayed release capsule Take 1 capsule by mouth every morning (before breakfast) 1/21/20  Yes NILAM Busby CNP   lisinopril-hydrochlorothiazide (PRINZIDE;ZESTORETIC) 20-25 MG per tablet Take 1 tablet by mouth daily 12/30/19  Yes NILAM Busby CNP   OLANZapine (ZYPREXA) 10 MG tablet Take 1 tablet by mouth nightly 9/12/19  Yes NILAM Busby CNP   albuterol sulfate HFA (PROVENTIL HFA) 108 (90 Base) MCG/ACT inhaler Inhale 2 puffs into the lungs every 4 hours as needed for Wheezing or Shortness of Breath (Space out to every 6 hours as symptoms improve) Space out to every 6 hours as symptoms improve.  9/6/19  Yes Jean-Claude Xavier MD   metoprolol tartrate (LOPRESSOR) 50 MG tablet Take 1 tablet by mouth 2 times daily 8/8/19  Yes NILAM Busby CNP   venlafaxine (EFFEXOR XR) 150 MG extended release capsule Take 1 capsule by mouth daily 8/8/19  Yes NILAM Busby CNP   LACTULOSE PO Take by mouth 2 times daily    Yes Historical Provider, MD   melatonin 1 MG/4ML LIQD SL liquid Place 0.3 mg under the tongue nightly as needed for Sleep   Yes Historical Provider, MD   nicotine (NICODERM CQ) 21 MG/24HR Place 1 patch onto the skin daily 2/22/20   Radha Gil,    famotidine (PEPCID) 20 MG tablet Take 1 tablet by mouth nightly  Patient not taking: Reported on 2/25/2020 12/30/19 2/25/20  NILAM Busby CNP   Naltrexone (VIVITROL IM) Inject into the muscle Indications: monthly    Historical Provider, MD   glucose monitoring kit (FREESTYLE) monitoring kit Glucometer with test strips and lancets. Check BS once daily  #100 strips and lancets 10/4/18   NILAM Busby CNP   blood glucose test strips (ASCENSIA AUTODISC VI;ONE TOUCH ULTRA TEST VI) strip Test as needed. Dispense One Touch verio Test Strips. Dx: Type 2 diabetes (250.00) 10/4/18   NILAM Castillo CNP       Future Appointments   Date Time Provider Amanda Jerome   2/26/2020  9:30 AM NILAM Young CNP [facility] [place]   3/30/2020  3:45 PM NILAM Castillo CNP [facility] [place]     ,   Diabetes Assessment    Meal Planning:  Avoidance of concentrated sweets   How often do you test your blood sugar?:  No Testing (Comment: monitors BS approx 1 x a week )   Do you have barriers with adherence to non-pharmacologic self-management interventions?  (Nutrition/Exercise/Self-Monitoring):  No   Have you ever had to go to the ED for symptoms of low blood sugar?:  No       No patient-reported symptoms      ,   COPD Assessment    Is the patient able to verbalize Rescue vs. Long Acting medications?:  Yes  Does the patient have a nebulizer?:  No  Does the patient use a space with inhaled medications?:  No     No patient-reported symptoms         Symptoms:      Symptom course:  stable  Increase use of rapid acting/rescue inhaled medications?:  No  Change in chronic cough?:  No/At Baseline  Change in sputum?:  No/At Baseline     ,   General Assessment    Do you have any symptoms that are causing concern?:  Yes  Progression since Onset:  Gradually Improving  Reported Symptoms:  Other (Comment: etoh abuse )      and Care Coordination Episodes    Type: [facility] Care Coordination  Episode: Complex Care  Noted: 1/17/2020  Comments: list referral

## 2020-02-26 ENCOUNTER — OFFICE VISIT (OUTPATIENT)
Dept: FAMILY MEDICINE CLINIC | Age: 45
End: 2020-02-26
Payer: MEDICAID

## 2020-02-26 ENCOUNTER — NURSE ONLY (OUTPATIENT)
Dept: LAB | Age: 45
End: 2020-02-26

## 2020-02-26 VITALS
WEIGHT: 238 LBS | HEIGHT: 71 IN | RESPIRATION RATE: 14 BRPM | BODY MASS INDEX: 33.32 KG/M2 | DIASTOLIC BLOOD PRESSURE: 80 MMHG | TEMPERATURE: 98.5 F | SYSTOLIC BLOOD PRESSURE: 132 MMHG | HEART RATE: 76 BPM

## 2020-02-26 LAB
ALBUMIN SERPL-MCNC: 4 G/DL (ref 3.5–5.1)
ALP BLD-CCNC: 192 U/L (ref 38–126)
ALT SERPL-CCNC: 75 U/L (ref 11–66)
AMMONIA: 45 UMOL/L (ref 11–60)
ANION GAP SERPL CALCULATED.3IONS-SCNC: 11 MEQ/L (ref 8–16)
AST SERPL-CCNC: 46 U/L (ref 5–40)
BILIRUB SERPL-MCNC: 0.3 MG/DL (ref 0.3–1.2)
BUN BLDV-MCNC: 13 MG/DL (ref 7–22)
CALCIUM SERPL-MCNC: 9.7 MG/DL (ref 8.5–10.5)
CHLORIDE BLD-SCNC: 105 MEQ/L (ref 98–111)
CO2: 26 MEQ/L (ref 23–33)
CREAT SERPL-MCNC: 0.7 MG/DL (ref 0.4–1.2)
GFR SERPL CREATININE-BSD FRML MDRD: > 90 ML/MIN/1.73M2
GLUCOSE BLD-MCNC: 108 MG/DL (ref 70–108)
POTASSIUM SERPL-SCNC: 4.6 MEQ/L (ref 3.5–5.2)
SODIUM BLD-SCNC: 142 MEQ/L (ref 135–145)
TOTAL PROTEIN: 7.1 G/DL (ref 6.1–8)

## 2020-02-26 PROCEDURE — 99215 OFFICE O/P EST HI 40 MIN: CPT | Performed by: NURSE PRACTITIONER

## 2020-02-26 PROCEDURE — 1111F DSCHRG MED/CURRENT MED MERGE: CPT | Performed by: NURSE PRACTITIONER

## 2020-02-26 ASSESSMENT — ENCOUNTER SYMPTOMS
RESPIRATORY NEGATIVE: 1
EYES NEGATIVE: 1
GASTROINTESTINAL NEGATIVE: 1

## 2020-02-26 NOTE — PROGRESS NOTES
hand    Atypical chest pain    Al[facility]    Alcohol intoxication with delirium ([facility]       No Known Allergies    Medications listed as ordered at the time of discharge from hospital   Prashant, [address] Medication Instructions JOSE:    Printed on:02/26/20 2937   Medication Information                      albuterol sulfate HFA (PROVENTIL HFA) 108 (90 Base) MCG/ACT inhaler  Inhale 2 puffs into the lungs every 4 hours as needed for Wheezing or Shortness of Breath (Space out to every 6 hours as symptoms improve) Space out to every 6 hours as symptoms improve. blood glucose test strips (ASCENSIA AUTODISC VI;ONE TOUCH ULTRA TEST VI) strip  Test as needed. Dispense One Touch verio Test Strips. Dx: Type 2 diabetes (250.00)             chlordiazePOXIDE (LIBRIUM) 10 MG capsule  Take 1 capsule by mouth 4 times daily as needed for Anxiety for up to 20 days. famotidine (PEPCID) 20 MG tablet  Take 1 tablet by mouth nightly             folic acid (FOLVITE) 1 MG tablet  Take 1 tablet by mouth daily             glucose monitoring kit (FREESTYLE) monitoring kit  Glucometer with test strips and lancets.   Check BS once daily  #100 strips and lancets             LACTULOSE PO  Take by mouth 2 times daily              lisinopril-hydrochlorothiazide (PRINZIDE;ZESTORETIC) 20-25 MG per tablet  Take 1 tablet by mouth daily             melatonin 1 MG/4ML LIQD SL liquid  Place 0.3 mg under the tongue nightly as needed for Sleep             metoprolol tartrate (LOPRESSOR) 50 MG tablet  Take 1 tablet by mouth 2 times daily             Naltrexone (VIVITROL IM)  Inject into the muscle Indications: monthly             nicotine (NICODERM CQ) 21 MG/24HR  Place 1 patch onto the skin daily             OLANZapine (ZYPREXA) 10 MG tablet  Take 1 tablet by mouth nightly             omeprazole (PRILOSEC) 40 MG delayed release capsule  Take 1 capsule by mouth every morning (before breakfast) venlafaxine (EFFEXOR XR) 150 MG extended release capsule  Take 1 capsule by mouth daily             vitamin B-1 100 MG tablet  Take 1 tablet by mouth daily                   Medications marked \"taking\" at this time  Outpatient Medications Marked as Taking for the 2/26/20 encounter (Office Visit) with NILAM Kirby CNP   Medication Sig Dispense Refill    folic acid (FOLVITE) 1 MG tablet Take 1 tablet by mouth daily 30 tablet 5    vitamin B-1 100 MG tablet Take 1 tablet by mouth daily 30 tablet 3    chlordiazePOXIDE (LIBRIUM) 10 MG capsule Take 1 capsule by mouth 4 times daily as needed for Anxiety for up to 20 days. 20 capsule 0    omeprazole (PRILOSEC) 40 MG delayed release capsule Take 1 capsule by mouth every morning (before breakfast) 30 capsule 0    lisinopril-hydrochlorothiazide (PRINZIDE;ZESTORETIC) 20-25 MG per tablet Take 1 tablet by mouth daily 30 tablet 5    Naltrexone (VIVITROL IM) Inject into the muscle Indications: monthly      OLANZapine (ZYPREXA) 10 MG tablet Take 1 tablet by mouth nightly 30 tablet 5    albuterol sulfate HFA (PROVENTIL HFA) 108 (90 Base) MCG/ACT inhaler Inhale 2 puffs into the lungs every 4 hours as needed for Wheezing or Shortness of Breath (Space out to every 6 hours as symptoms improve) Space out to every 6 hours as symptoms improve. 1 Inhaler 0    metoprolol tartrate (LOPRESSOR) 50 MG tablet Take 1 tablet by mouth 2 times daily 60 tablet 5    venlafaxine (EFFEXOR XR) 150 MG extended release capsule Take 1 capsule by mouth daily 30 capsule 5    LACTULOSE PO Take by mouth 2 times daily       melatonin 1 MG/4ML LIQD SL liquid Place 0.3 mg under the tongue nightly as needed for Sleep      glucose monitoring kit (FREESTYLE) monitoring kit Glucometer with test strips and lancets. Check BS once daily  #100 strips and lancets 1 kit 5    blood glucose test strips (ASCENSIA AUTODISC VI;ONE TOUCH ULTRA TEST VI) strip Test as needed.   Dispense One Touch verio Test

## 2020-03-04 ENCOUNTER — CARE COORDINATION (OUTPATIENT)
Dept: CARE COORDINATION | Age: 45
End: 2020-03-04

## 2020-03-04 NOTE — CARE COORDINATION
Ambulatory Care Coordination Note  3/4/2020  CM Risk Score: 14  Charlson 10 Year Mortality Risk Score: 98%     ACC: Amaya Hannah RN    Summary Note: Patient was called for continued Care Coordination follow up and education re: the management of his COPD, DM, and healthcare needs. Patient shared he has been doing \"ok. \"  Patient stated he remains alcohol free and continues to follow at [facility] meetings and with his counselor. Patient shared he has not yet returned to work as his employer does not have enough work for him at this time. Support and encouragement provided to patient and he verbalized understanding. Patient reported his breathing remains at his baseline and he denied any c/o increased SOB or cough being present. COPD education, including zone information, signs/symptoms to call and report, and importance of early symptom recognition, reviewed with patient. Patient verbalized understanding. Patient stated he does not routinely monitor his BS but when he has they have been stable. Patient reported BS are usually stable as long as he is not drinking. DM education reviewed with patient. Patient was educated on signs/symptoms of hypoglycemia/hyperglycemia as well as prevention education. Patient verbalized understanding. Hypoglycemia treatment education was also reviewed with patient and he verbalized understanding. Patient denied any questions s/p recent PCP appointment and is aware of recent lab results. Patient denied any other questions, concerns, or needs and he was encouraged to call with any that may develop.            Actions:   - Reviewed COPD and DM education   - Reviewed signs/symptoms to call and report  - Reviewed importance of early symptom recognition   - Reviewed hypoglycemia/hyperglycemia prevention and signs/symptoms education   - Reviewed hypoglycemia treatment education   - Encouraged patient to continue to remain alcohol free  - Encouraged patient to continue to f/u with AA meetings and counselor as instructed  - Monitored for questions s/p recent PCP visit  - Monitored for additional needs          Plan of Care:   - Continue with Care Coordination f/u calls for assistance with DM and COPD management as well as other healthcare needs  - Complete COPD education - In Process   - Complete DM education - In Process  - Provide support and encouragement to remain alcohol free - In Process  - Monitor for interest in quitting smoking  - Monitor for transportation assistance needs - In Process  - Review same day appointment, urgent care/walk in clinic, and after hours on call resources  - Monitor for additional needs  - Monitor for readiness to discharge from [facility] Interventions    Program Enrollment:  Complex Care  Referral from Primary Care Provider:  No  Suggested Interventions and Community Resources  Behavorial Health:  Completed (Comment: [facility] counseling )  Diabetes Education:  Completed  Medication Assistance Program:  Declined (Comment: denied any need at this time)  Medi Set or Pill Pack:  Declined  Smoking Cessation: In Process  Social Work:  Declined  Other Services:  Completed (Comment: Attends [place] [address] meetings & f/u with  )  Transportation Support:  Completed  Zone Management Tools:  Completed (Comment: Jan. 2020)         Goals Addressed                 This Visit's Progress       Care Coordination     Behavioral Health   Improving     I will work towards the following [facility] goals: I will continue to follow up with my psychologist Yousif Field and/or psychiatrist., I will schedule my follow up appointment with my primary care physician when I leave the office today., I will take my medications daily as prescribed. and I will seek treatment for alcohol/substance use.     Barriers: financial, stress and lack of education  Plan for overcoming my barriers: Provide support and education to patient   Confidence: cough?:  No/At Baseline  Change in sputum?:  No/At Baseline     ,   General Assessment        and Care Coordination Episodes    Type: Amb Care Coordination  Episode: Complex Care  Noted: 1/17/2020  Comments: list referral

## 2020-03-10 ENCOUNTER — TELEPHONE (OUTPATIENT)
Dept: INTERNAL MEDICINE CLINIC | Age: 45
End: 2020-03-10

## 2020-03-12 ENCOUNTER — CARE COORDINATION (OUTPATIENT)
Dept: CARE COORDINATION | Age: 45
End: 2020-03-12

## 2020-03-26 ENCOUNTER — CARE COORDINATION (OUTPATIENT)
Dept: CARE COORDINATION | Age: 45
End: 2020-03-26

## 2020-03-26 NOTE — CARE COORDINATION
Ambulatory Care Coordination Note  3/26/2020  CM Risk Score: 14  Charlson 10 Year Mortality Risk Score: 98%     ACC: Michael Marcum    Summary Note: I spoke with the patient for continued Care Coordination follow up and education. Patient states he is doing well. Denies chest pain or tightness. Breathing is at baseline. No coughing or wheezing. We discussed Zone management tools for chronic disease(s) and patient denies current questions re: s/sx at this time. Educated on how to identify sx's that are worse than the baseline and the importance of early symptom recognition and reporting to prevent exacerbation which may lead to ED visits and hospital admissions. Patient continues to follow with [facility],  and [place] [id number] meetings. Patient states meetings with [facility] has been virtual.  We discussed COVID precautions and patient voiced understanding. No fever, coughing or issues with breathing. Encouraged to use PCP office versus ED treatment for sx's of chronic conditions. Patient voiced understanding. I advised patient to contact PCP office if needed. No further needs at this time. Care Coordination Interventions    Program Enrollment:  Complex Care  Referral from Primary Care Provider:  No  Suggested Interventions and Community Resources  [facility]  Completed (Comment: [facility] counseling )  Diabetes Education:  Completed  Medication Assistance Program:  Declined (Comment: denied any need at this time)  Medi Set or Pill Pack:  Declined  Smoking Cessation: In Process  Social Work:  Declined  Other Services:  Completed (Comment: [facility] [address] [facility] & f/u with  )  Transportation Support:  Completed  Zone Management Tools:  Completed (Comment: Jan. 2020)         Goals Addressed    None         Prior to Admission medications    Medication Sig Start Date End Date Taking?  Authorizing Provider   folic acid (FOLVITE) 1 MG tablet Take 1 tablet by mouth daily 2/21/20 10/4/18   NILAM Price CNP       Future Appointments   Date Time Provider Amanda Jerome   4/27/2020  3:45 PM NILAM Price CNP [facility] [address] [address]

## 2020-04-09 ENCOUNTER — CARE COORDINATION (OUTPATIENT)
Dept: CARE COORDINATION | Age: 45
End: 2020-04-09

## 2020-04-09 NOTE — CARE COORDINATION
for additional needs          Plan of Care:   - Continue with Care Coordination f/u calls for assistance with DM and COPD management as well as other healthcare needs  - Complete COPD education - In Process   - Complete DM education - In Process  - Provide support and encouragement to remain alcohol free - In Process  - Monitor for interest in quitting smoking  - Monitor for transportation assistance needs - In Process  - Review same day appointment, urgent care/walk in clinic, and after hours on call resources - Completed   - Monitor for additional needs  - Monitor for readiness to discharge from [address] [place] [address] Pkwy Coordination Interventions    Program Enrollment:  Complex Care  Referral from Primary Care Provider:  No  Suggested Interventions and Community Resources  Behavorial Health:  Completed (Comment: [facility] counseling )  Diabetes Education:  Completed  Medication Assistance Program:  Declined (Comment: denied any need at this time)  Medi Set or Pill Pack:  Declined  Smoking Cessation: In Process  Social Work:  Declined  Other Services:  Completed (Comment: Attends [place] [id number] meetings & f/u with  )  Transportation Support:  Completed  Zone Management Tools:  Completed (Comment: Jan. 2020)         Goals Addressed                 This [address]   On track     I will work towards the following [facility] goals: I will continue to follow up with my psychologist James Goodman and/or psychiatrist., I will schedule my follow up appointment with my primary care physician when I leave the office today., I will take my medications daily as prescribed. and I will seek treatment for alcohol/substance use.     Barriers: financial, stress and lack of education  Plan for overcoming my barriers: Provide support and education to patient   Confidence: 7/10  Anticipated Goal Completion Date: 6/20/20            Prior to Admission medications Medication Sig Start Date End Date Taking? Authorizing Provider   folic acid (FOLVITE) 1 MG tablet Take 1 tablet by mouth daily 2/21/20   Scot Fleeting, DO   nicotine (NICODERM CQ) 21 MG/24HR Place 1 patch onto the skin daily  Patient not taking: Reported on 2/26/2020 2/22/20   Scot Fleeting, DO   vitamin B-1 100 MG tablet Take 1 tablet by mouth daily 2/22/20   Scot Fleeting, DO   omeprazole (PRILOSEC) 40 MG delayed release capsule Take 1 capsule by mouth every morning (before breakfast) 1/21/20   NILAM Busby CNP   famotidine (PEPCID) 20 MG tablet Take 1 tablet by mouth nightly  Patient not taking: Reported on 2/25/2020 12/30/19 2/25/20  NILAM Busby CNP   lisinopril-hydrochlorothiazide (PRINZIDE;ZESTORETIC) 20-25 MG per tablet Take 1 tablet by mouth daily 12/30/19   NILAM Busby CNP   Naltrexone (VIVITROL IM) Inject into the muscle Indications: monthly    Historical Provider, MD   OLANZapine (ZYPREXA) 10 MG tablet Take 1 tablet by mouth nightly 9/12/19   NILAM Busby CNP   albuterol sulfate HFA (PROVENTIL HFA) 108 (90 Base) MCG/ACT inhaler Inhale 2 puffs into the lungs every 4 hours as needed for Wheezing or Shortness of Breath (Space out to every 6 hours as symptoms improve) Space out to every 6 hours as symptoms improve. 9/6/19   Melissa Barakat MD   metoprolol tartrate (LOPRESSOR) 50 MG tablet Take 1 tablet by mouth 2 times daily 8/8/19   NILAM Busby CNP   venlafaxine (EFFEXOR XR) 150 MG extended release capsule Take 1 capsule by mouth daily 8/8/19   NILAM Busby CNP   LACTULOSE PO Take by mouth 2 times daily     Historical Provider, MD   melatonin 1 MG/4ML LIQD SL liquid Place 0.3 mg under the tongue nightly as needed for Sleep    Historical Provider, MD   glucose monitoring kit (FREESTYLE) monitoring kit Glucometer with test strips and lancets.   Check BS once daily  #100 strips and lancets 10/4/18   NILAM Busby - CNP   blood glucose test strips

## 2020-04-23 ENCOUNTER — CARE COORDINATION (OUTPATIENT)
Dept: CARE COORDINATION | Age: 45
End: 2020-04-23

## 2020-05-12 ENCOUNTER — CARE COORDINATION (OUTPATIENT)
Dept: CARE COORDINATION | Age: 45
End: 2020-05-12

## 2020-05-18 ENCOUNTER — CARE COORDINATION (OUTPATIENT)
Dept: CARE COORDINATION | Age: 45
End: 2020-05-18

## 2020-05-18 NOTE — CARE COORDINATION
Ambulatory Care Coordination Note  5/18/2020  CM Risk Score: 14  Charlson 10 Year Mortality Risk Score: 98%     ACC: Tony Bowden RN    Summary Note: Patient was called for continued Care Coordination follow up and education re: the management of his DM, COPD, and healthcare needs. Patient was not available at his mobile number and generic voicemail message was left asking patient to please return call to my direct number. Home number was also attempted and patient was not available, but I was able to speak with his mother/HIPPA contact, Graham Reid. Graham Reid stated patient continues to do well. Graham Reid denied any current concerns or complaints. Graham Reid stated patient remains alcohol free. Graham Reid denied any questions, concerns, or needs and she was encouraged to call or have patient call with any that may develop.            Actions:   - Monitored for questions/concerns  - Monitored for etoh use  - Monitored for additional needs          Plan of Care:   - Continue with Care Coordination f/u calls for assistance with DM and COPD management as well as other healthcare needs  - Complete COPD education - In Process   - Complete DM education - In Process  - Provide support and encouragement to remain alcohol free - In Process  - Monitor for interest in quitting smoking - In Process  - Monitor for transportation assistance needs - In Process  - Review same day appointment, urgent care/walk in clinic, and after hours on call resources - Completed   - Monitor for additional needs  - Monitor for readiness to discharge from Care Coordination       Care Coordination Interventions    Program Enrollment:  Complex Care  Referral from Primary Care Provider:  No  Suggested Interventions and Community Resources  Behavorial Health:  Completed (Comment: [facility] counseling )  Diabetes Education:  Completed  Medication Assistance Program:  Declined (Comment: denied any need at this time)  Medi Set or Pill Pack:  Declined  Smoking Cessation:

## 2020-06-02 ENCOUNTER — CARE COORDINATION (OUTPATIENT)
Dept: CARE COORDINATION | Age: 45
End: 2020-06-02

## 2020-06-02 NOTE — CARE COORDINATION
Attempted to reach patient for continued Care Coordination follow up and education. Patient was unavailable at the time of my call, and generic voicemail message was left asking patient to please return call to my direct number.   Benny Newman RN Care Coordinator

## 2020-06-09 ENCOUNTER — CARE COORDINATION (OUTPATIENT)
Dept: CARE COORDINATION | Age: 45
End: 2020-06-09

## 2020-06-09 NOTE — CARE COORDINATION
Ambulatory Care Coordination Note  6/9/2020  CM Risk Score: 14  Charlson 10 Year Mortality Risk Score: 98%     ACC: Tony Bowden RN    Summary Note: Patient was called for continued Care Coordination follow up and education re: the management of his DM, COPD, and healthcare needs. Patient shared he has been doing \"ok. \"  Patient stated he remains alcohol free and he continues to f/u with [facility] and psychiatry as directed. Patient was congratulated on this accomplishment and he was encouraged to continue. Patient stated his breathing remains at his baseline. Patient denied any c/o worsening SOB or cough being present. Patient admits he continues to smoke with an e-cigarette. ACM educated patient on the benefits of quitting and smoking cessation was reviewed. Patient was also educated on COPD zone information, including signs/symptoms to call and report as well as the importance of early symptom recognition. Patient verbalized understanding. Patient shared he does not routinely monitor BS at home but when he has they have remained stable. DM education was also briefly reviewed with patient. Med Rec was completed and patient shared there have been some recent changes by psych. Patient reported he has also decreased lopressor to daily d/t c/o hypotension if he takes it BID now that he is sober. Patient is aware of PCP appointment later this week and he was instructed to share med changes and to discuss concerns with PCP at that time. Patient verbalized understanding. Patient denied any other questions, concerns, or needs and he was encouraged to call with any that may develop. Patient has remained stable and he is aware of the resources available to him so will plan to discharge from Care Coordination at this time. Patient verbalized understanding and agreement to this plan.           Actions:   - Reviewed COPD and DM education  - Reviewed signs/symptoms to call and report and zone information   - Reviewed importance of early symptom recognition  - Congratulated patient on success with sobriety  - Completed Med Rec  - Reviewed appointment information  - Monitored for additional needs          Plan of Care:   -Nikkie Rutherford from [address] Coordination Interventions    Program Enrollment:  Complex Care  Referral from Primary Care Provider:  No  Suggested Interventions and [address]:  Completed (Comment: [facility] )  Diabetes Education:  Completed  Medication Assistance Program:  Declined (Comment: denied any need at this time)  Medi Set or Pill Pack:  Declined  Smoking Cessation:  Completed  Social Work:  Declined  Other Services:  Completed (Comment: Attends [place] [id number] meetings & f/u with  )  Transportation Support:  Completed  Zone Management Tools:  Completed (Comment: Jan. 2020)         Goals Addressed                 This [address]   On track     I will work towards the following [address] [place] [address] goals: I will continue to follow up with my psychologist Olga Quevedo and/or psychiatrist., I will schedule my follow up appointment with my primary care physician when I leave the office today., I will take my medications daily as prescribed. and I will seek treatment for alcohol/substance use. Barriers: financial, stress and lack of education  Plan for overcoming my barriers: Provide support and education to patient   Confidence: 7/10  Anticipated Goal Completion Date: 6/20/20         Lifestyle     Use a nicotine replacement product or medication   Not on track     Patient Self-Management Goal for Health Maintenance  Goal: I will chose a goal related to tobacco cessation:  I will think about my triggers for smoking.   Barriers: none  Plan for overcoming my barriers: N/A  Confidence: 10/10  Anticipated Goal Completion Date: 8-23-18            Prior to Admission medications    Medication Sig Start lancets. Check BS once daily  #100 strips and lancets 10/4/18   NILAM Busby CNP   blood glucose test strips (ASCENSIA AUTODISC VI;ONE TOUCH ULTRA TEST VI) strip Test as needed. Dispense One Touch verio Test Strips. Dx: Type 2 diabetes (250.00) 10/4/18   NILAM Turner CNP       Future Appointments   Date Time Provider Amanda Jerome   6/11/2020  2:30 PM [facility]IL[facility] Vu [facility] [place] [facility]     ,   Diabetes Assessment    Meal Planning:  Avoidance of concentrated sweets   How often do you test your blood sugar?:  No Testing (Comment: monitors BS approx 1 x a week )   Do you have barriers with adherence to non-pharmacologic self-management interventions?  (Nutrition/Exercise/Self-Monitoring):  No   Have you ever had to go to the ED for symptoms of low blood sugar?:  No       No patient-reported symptoms   Do you have hyperglycemia symptoms?:  No   Do you have hypoglycemia symptoms?:  No   Blood Sugar Monitoring Regimen:  Not Testing   Blood Sugar Trends:  No Change      ,   COPD Assessment    Does the patient understand envrionmental exposure?:  Yes  Is the patient able to verbalize Rescue vs. Long Acting medications?:  Yes  Does the patient have a nebulizer?:  No  Does the patient use a space with inhaled medications?:  No     No patient-reported symptoms         Symptoms:      Symptom course:  stable  Increase use of rapid acting/rescue inhaled medications?:  No  Change in chronic cough?:  No/At Baseline  Change in sputum?:  No/At Baseline     ,   General Assessment    Do you have any symptoms that are causing concern?:  No      and Care Coordination Episodes    Type: Amb Care Coordination  Episode: Complex Care  Noted: 1/17/2020  Comments: list referral

## 2020-06-11 ENCOUNTER — OFFICE VISIT (OUTPATIENT)
Dept: FAMILY MEDICINE CLINIC | Age: 45
End: 2020-06-11
Payer: MEDICAID

## 2020-06-11 VITALS
HEART RATE: 86 BPM | WEIGHT: 250.2 LBS | DIASTOLIC BLOOD PRESSURE: 100 MMHG | RESPIRATION RATE: 20 BRPM | OXYGEN SATURATION: 98 % | SYSTOLIC BLOOD PRESSURE: 130 MMHG | TEMPERATURE: 97.8 F | HEIGHT: 71 IN | BODY MASS INDEX: 35.03 KG/M2

## 2020-06-11 PROCEDURE — 3046F HEMOGLOBIN A1C LEVEL >9.0%: CPT | Performed by: NURSE PRACTITIONER

## 2020-06-11 PROCEDURE — 99214 OFFICE O/P EST MOD 30 MIN: CPT | Performed by: NURSE PRACTITIONER

## 2020-06-11 PROCEDURE — G8417 CALC BMI ABV UP PARAM F/U: HCPCS | Performed by: NURSE PRACTITIONER

## 2020-06-11 PROCEDURE — G8427 DOCREV CUR MEDS BY ELIG CLIN: HCPCS | Performed by: NURSE PRACTITIONER

## 2020-06-11 PROCEDURE — 2022F DILAT RTA XM EVC RTNOPTHY: CPT | Performed by: NURSE PRACTITIONER

## 2020-06-11 PROCEDURE — 4004F PT TOBACCO SCREEN RCVD TLK: CPT | Performed by: NURSE PRACTITIONER

## 2020-06-11 RX ORDER — SILDENAFIL 100 MG/1
100 TABLET, FILM COATED ORAL DAILY PRN
Qty: 10 TABLET | Refills: 5 | Status: ON HOLD | OUTPATIENT
Start: 2020-06-11 | End: 2021-03-17

## 2020-06-11 RX ORDER — SILDENAFIL 100 MG/1
100 TABLET, FILM COATED ORAL DAILY PRN
Qty: 10 TABLET | Refills: 5 | Status: SHIPPED | OUTPATIENT
Start: 2020-06-11 | End: 2020-06-11 | Stop reason: SDUPTHER

## 2020-06-11 ASSESSMENT — ENCOUNTER SYMPTOMS
GASTROINTESTINAL NEGATIVE: 1
RESPIRATORY NEGATIVE: 1
EYES NEGATIVE: 1

## 2020-06-11 NOTE — PROGRESS NOTES
Left Ear: Tympanic membrane and external ear normal.      Nose: Nose normal.   Neck:      Musculoskeletal: Normal range of motion and neck supple. Cardiovascular:      Rate and Rhythm: Normal rate and regular rhythm. Heart sounds: Normal heart sounds. No murmur. No friction rub. No gallop. Pulmonary:      Effort: Pulmonary effort is normal.      Breath sounds: Normal breath sounds. No wheezing or rales. Abdominal:      General: Bowel sounds are normal.      Palpations: Abdomen is soft. Tenderness: There is no abdominal tenderness. There is no guarding. Musculoskeletal: Normal range of motion. Lymphadenopathy:      Cervical: No cervical adenopathy. Skin:     General: Skin is warm. Neurological:      Mental Status: He is alert and oriented to person, place, and time. Deep Tendon Reflexes: Reflexes are normal and symmetric. Assessment:      Diagnosis Orders   1. Erectile dysfunction, unspecified erectile dysfunction type  DISCONTINUED: sildenafil (VIAGRA) 100 MG tablet   2. Alcoholic cirrhosis of liver without ascites ([facility]     3. Alcoholic encephalopathy (HCC)  Ammonia   4. Type 2 diabetes mellitus with complication, without long-term current use of insulin ([facility])  CBC Auto Differential    Comprehensive Metabolic Panel    Lipid Panel    Hemoglobin A1C     DIABETES FOOT EXAM       Plan:      Return in about 4 weeks (around 7/9/2020).        Orders Placed This Encounter   Procedures    CBC Auto Differential     Standing Status:   Future     Standing Expiration Date:   6/11/2021    Comprehensive Metabolic Panel     Standing Status:   Future     Standing Expiration Date:   6/11/2021    Lipid Panel     Standing Status:   Future     Standing Expiration Date:   6/11/2021     Order Specific Question:   Is Patient Fasting?/# of Hours     Answer:   12    Hemoglobin A1C     Standing Status:   Future     Standing Expiration Date:   6/11/2021    Ammonia     Standing Status:   Future

## 2020-06-23 ENCOUNTER — HOSPITAL ENCOUNTER (OUTPATIENT)
Age: 45
Discharge: HOME | End: 2020-06-23
Payer: MEDICAID

## 2020-06-23 DIAGNOSIS — E11.8 TYPE 2 DIABETES MELLITUS WITH COMPLICATION, WITHOUT LONG-TERM CURRENT USE OF INSULIN (HCC): ICD-10-CM

## 2020-06-23 DIAGNOSIS — G31.2 ALCOHOLIC ENCEPHALOPATHY (HCC): ICD-10-CM

## 2020-06-23 LAB
ALBUMIN SERPL-MCNC: 4.2 G/DL (ref 3.5–5.1)
ALP BLD-CCNC: 105 U/L (ref 38–126)
ALT SERPL-CCNC: 42 U/L (ref 11–66)
AMMONIA: 48 UMOL/L (ref 11–60)
ANION GAP SERPL CALCULATED.3IONS-SCNC: 12 MEQ/L (ref 8–16)
AST SERPL-CCNC: 35 U/L (ref 5–40)
BASOPHILS # BLD: 0.7 %
BASOPHILS ABSOLUTE: 0 THOU/MM3 (ref 0–0.1)
BILIRUB SERPL-MCNC: 0.5 MG/DL (ref 0.3–1.2)
BUN BLDV-MCNC: 15 MG/DL (ref 7–22)
CALCIUM SERPL-MCNC: 9.5 MG/DL (ref 8.5–10.5)
CHLORIDE BLD-SCNC: 102 MEQ/L (ref 98–111)
CHOLESTEROL, TOTAL: 206 MG/DL (ref 100–199)
CO2: 25 MEQ/L (ref 23–33)
CREAT SERPL-MCNC: 0.8 MG/DL (ref 0.4–1.2)
EOSINOPHIL # BLD: 3.3 %
EOSINOPHILS ABSOLUTE: 0.2 THOU/MM3 (ref 0–0.4)
ERYTHROCYTE [DISTWIDTH] IN BLOOD BY AUTOMATED COUNT: 12.6 % (ref 11.5–14.5)
ERYTHROCYTE [DISTWIDTH] IN BLOOD BY AUTOMATED COUNT: 45.1 FL (ref 35–45)
GFR SERPL CREATININE-BSD FRML MDRD: > 90 ML/MIN/1.73M2
GLUCOSE BLD-MCNC: 116 MG/DL (ref 70–108)
HCT VFR BLD CALC: 51.5 % (ref 42–52)
HDLC SERPL-MCNC: 39 MG/DL
HEMOGLOBIN: 17.1 GM/DL (ref 14–18)
IMMATURE GRANS (ABS): 0.01 THOU/MM3 (ref 0–0.07)
IMMATURE GRANULOCYTES: 0.2 %
LDL CHOLESTEROL CALCULATED: 136 MG/DL
LYMPHOCYTES # BLD: 17.1 %
LYMPHOCYTES ABSOLUTE: 0.9 THOU/MM3 (ref 1–4.8)
MCH RBC QN AUTO: 32.4 PG (ref 26–33)
MCHC RBC AUTO-ENTMCNC: 33.2 GM/DL (ref 32.2–35.5)
MCV RBC AUTO: 97.5 FL (ref 80–94)
MONOCYTES # BLD: 6.6 %
MONOCYTES ABSOLUTE: 0.4 THOU/MM3 (ref 0.4–1.3)
NUCLEATED RED BLOOD CELLS: 0 /100 WBC
PLATELET # BLD: 190 THOU/MM3 (ref 130–400)
PMV BLD AUTO: 10.2 FL (ref 9.4–12.4)
POTASSIUM SERPL-SCNC: 4.6 MEQ/L (ref 3.5–5.2)
RBC # BLD: 5.28 MILL/MM3 (ref 4.7–6.1)
SEG NEUTROPHILS: 72.1 %
SEGMENTED NEUTROPHILS ABSOLUTE COUNT: 3.9 THOU/MM3 (ref 1.8–7.7)
SODIUM BLD-SCNC: 139 MEQ/L (ref 135–145)
TOTAL PROTEIN: 7.3 G/DL (ref 6.1–8)
TRIGL SERPL-MCNC: 154 MG/DL (ref 0–199)
WBC # BLD: 5.4 THOU/MM3 (ref 4.8–10.8)

## 2020-06-23 PROCEDURE — 80061 LIPID PANEL: CPT

## 2020-06-23 PROCEDURE — 36415 COLL VENOUS BLD VENIPUNCTURE: CPT

## 2020-06-23 PROCEDURE — 82140 ASSAY OF AMMONIA: CPT

## 2020-06-23 PROCEDURE — 80053 COMPREHEN METABOLIC PANEL: CPT

## 2020-06-23 PROCEDURE — 83036 HEMOGLOBIN GLYCOSYLATED A1C: CPT

## 2020-06-23 PROCEDURE — 85025 COMPLETE CBC W/AUTO DIFF WBC: CPT

## 2020-06-24 LAB
AVERAGE GLUCOSE: 111 MG/DL (ref 70–126)
HBA1C MFR BLD: 5.7 % (ref 4.4–6.4)

## 2020-07-16 ENCOUNTER — OFFICE VISIT (OUTPATIENT)
Dept: FAMILY MEDICINE CLINIC | Age: 45
End: 2020-07-16
Payer: MEDICAID

## 2020-07-16 VITALS
WEIGHT: 249.6 LBS | BODY MASS INDEX: 34.94 KG/M2 | SYSTOLIC BLOOD PRESSURE: 124 MMHG | TEMPERATURE: 98.2 F | RESPIRATION RATE: 18 BRPM | HEIGHT: 71 IN | OXYGEN SATURATION: 97 % | DIASTOLIC BLOOD PRESSURE: 72 MMHG | HEART RATE: 84 BPM

## 2020-07-16 PROCEDURE — 4004F PT TOBACCO SCREEN RCVD TLK: CPT | Performed by: NURSE PRACTITIONER

## 2020-07-16 PROCEDURE — 2022F DILAT RTA XM EVC RTNOPTHY: CPT | Performed by: NURSE PRACTITIONER

## 2020-07-16 PROCEDURE — 99214 OFFICE O/P EST MOD 30 MIN: CPT | Performed by: NURSE PRACTITIONER

## 2020-07-16 PROCEDURE — G8417 CALC BMI ABV UP PARAM F/U: HCPCS | Performed by: NURSE PRACTITIONER

## 2020-07-16 PROCEDURE — 3044F HG A1C LEVEL LT 7.0%: CPT | Performed by: NURSE PRACTITIONER

## 2020-07-16 PROCEDURE — G8427 DOCREV CUR MEDS BY ELIG CLIN: HCPCS | Performed by: NURSE PRACTITIONER

## 2020-07-16 ASSESSMENT — ENCOUNTER SYMPTOMS
EYES NEGATIVE: 1
GASTROINTESTINAL NEGATIVE: 1
RESPIRATORY NEGATIVE: 1

## 2020-07-16 NOTE — PROGRESS NOTES
Bridgett Gillette is a 41 y.o. male whopresents today for :  Chief Complaint   Patient presents with    1 Month Follow-Up       HPI:     HPI  Pt here for fu. He is still sober. Mentally doing well.   bp is doing very well tolerating meds well      Patient Active Problem List   Diagnosis    Hyperlipemia    Uncontrolled hypertension    Type 2 diabetes mellitus with complication, without long-term current use of insulin (HCC)    Alcohol withdrawal (HCC)    Alcoholic hepatitis    COPD (chronic obstructive pulmonary disease) ([address]    Passive-dependent personality disorder ([address]    Alcohol abuse    Type 2 diabetes mellitus without complication, without long-term current use of insulin (HCC)    Alcoholic liver failure (HCC)    Transaminitis    Hyperbilirubinemia    RUQ pain    Delirious    Alcoholic hepatitis without ascites    Alcoholic steatohepatitis    Hypophosphatemia    Bipolar 1 disorder (HCC)    Alcoholic gastritis without bleeding    Alcohol withdrawal syndrome with complication (HCC)    Alcohol dependence with physiological dependence, continuous (HCC)    Decompensated liver disease ([address]    Alcoholic cirrhosis ([address]    Alcohol intoxication in alcoholism with blood level over 0.3 with complication ([address]    Acute alcoholic intoxication with delirium ([address]    Acute alcoholic hepatitis    Alcohol ingestion    Alcohol withdrawal delirium ([address]    Essential hypertension    Uncomplicated alcohol withdrawal ([address]    Alcohol use disorder, severe, dependence ([address]    Acute pancreatitis    Depression    Osteopoikilosis    Contusion of hand    Atypical chest pain    Alcoholic encephalopathy ([address]    Alcohol intoxication with delirium ([address]        Past Medical History:   Diagnosis Date    COPD (chronic obstructive pulmonary disease) ([address]     Depression     Diabetes mellitus ([address]     ETOH abuse     Hyperlipidemia     Hypertension     Liver disease     Seizures ([address]     etoh wdl      Past Surgical History:   Procedure Laterality Date    ENDOSCOPY, COLON, DIAGNOSTIC       Family History   Problem Relation Age of Onset    High Blood Pressure Father     Cancer Father         Lung Cancer    Alcohol Abuse Father     High Blood Pressure Brother     Diabetes Mother     Heart Disease Mother         Stent Placement    Arthritis Mother     Depression Mother     High Blood Pressure Mother     High Cholesterol Mother     Alcohol Abuse Paternal Uncle      Social History     Tobacco Use    Smoking status: Current Every Day Smoker     Packs/day: 1.00     Years: 27.00     Pack years: 27.00     Types: E-Cigarettes, Cigarettes    Smokeless tobacco: Former User    Tobacco comment: vape   Substance Use Topics    Alcohol use: Not Currently     Alcohol/week: 140.0 standard drinks     Types: 140 Cans of beer per week     Frequency: Patient refused     Binge frequency: Patient refused     Comment: drinks a 30 pack a day      Current Outpatient Medications   Medication Sig Dispense Refill    blood glucose test strips (ASCENSIA AUTODISC VI;ONE TOUCH ULTRA TEST VI) strip Test as needed. Dispense One Touch verio Test Strips. Dx: Type 2 diabetes (250.00) 60 strip 11    folic acid (FOLVITE) 1 MG tablet Take 1 tablet by mouth daily 30 tablet 5    vitamin B-1 100 MG tablet Take 1 tablet by mouth daily 30 tablet 3    lisinopril-hydrochlorothiazide (PRINZIDE;ZESTORETIC) 20-25 MG per tablet Take 1 tablet by mouth daily 30 tablet 5    metoprolol tartrate (LOPRESSOR) 50 MG tablet Take 1 tablet by mouth 2 times daily 60 tablet 5    LACTULOSE PO Take by mouth 2 times daily       glucose monitoring kit (FREESTYLE) monitoring kit Glucometer with test strips and lancets.   Check BS once daily  #100 strips and lancets 1 kit 5    sildenafil (VIAGRA) 100 MG tablet Take 1 tablet by mouth daily as needed for Erectile Dysfunction (Patient not taking: Reported on 7/16/2020) 10 tablet 5    albuterol sulfate Rate and Rhythm: Normal rate and regular rhythm. Heart sounds: Normal heart sounds. No murmur. No friction rub. No gallop. Pulmonary:      Effort: Pulmonary effort is normal.      Breath sounds: Normal breath sounds. No wheezing or rales. Abdominal:      General: Bowel sounds are normal.      Palpations: Abdomen is soft. Tenderness: There is no abdominal tenderness. There is no guarding. Musculoskeletal: Normal range of motion. Lymphadenopathy:      Cervical: No cervical adenopathy. Skin:     General: Skin is warm. Neurological:      Mental Status: He is alert and oriented to person, place, and time. Deep Tendon Reflexes: Reflexes are normal and symmetric. Assessment:      Diagnosis Orders   1. Type 2 diabetes mellitus without complication, without long-term current use of insulin ([facility]     2. Erectile dysfunction, unspecified erectile dysfunction type     3. Alcoholic cirrhosis of liver without ascites ([facility]     4. Essential hypertension         Plan:      Return in about 3 months (around 10/16/2020). No orders of the defined types were placed in this encounter. No orders of the defined types were placed in this encounter. Doing very well   Cont meds  Fu in 3months    Patient given educational materials - seepatient instructions. Discussed use, benefit, and side effects of prescribed medications. All patient questions answered. Pt voiced understanding. Patient agreed withtreatment plan. Follow up as directed.      Electronically signed by NILAM Knapp CNP on 7/16/2020 at 1:03 PM

## 2020-09-04 ENCOUNTER — TELEPHONE (OUTPATIENT)
Dept: FAMILY MEDICINE CLINIC | Age: 45
End: 2020-09-04

## 2020-09-04 ENCOUNTER — HOSPITAL ENCOUNTER (EMERGENCY)
Age: 45
Discharge: HOME | End: 2020-09-04
Attending: EMERGENCY MEDICINE
Payer: MEDICAID

## 2020-09-04 VITALS
RESPIRATION RATE: 17 BRPM | HEART RATE: 110 BPM | WEIGHT: 260 LBS | SYSTOLIC BLOOD PRESSURE: 146 MMHG | TEMPERATURE: 99 F | BODY MASS INDEX: 36.4 KG/M2 | HEIGHT: 71 IN | DIASTOLIC BLOOD PRESSURE: 95 MMHG | OXYGEN SATURATION: 90 %

## 2020-09-04 DIAGNOSIS — F10.931 ALCOHOL WITHDRAWAL SYNDROME, WITH DELIRIUM (HCC): ICD-10-CM

## 2020-09-04 LAB
ALBUMIN SERPL-MCNC: 3.4 GM/DL (ref 3.4–5)
ALP BLD-CCNC: 176 U/L (ref 46–116)
ALT SERPL-CCNC: 153 U/L (ref 14–63)
ANALYZED BY:: NORMAL
ANION GAP: 13 MEQ/L (ref 8–16)
AST SERPL-CCNC: 156 U/L (ref 15–37)
BASOPHILS # BLD: 0.7 % (ref 0–3)
BILIRUB SERPL-MCNC: 0.8 MG/DL (ref 0.2–1)
BUN BLDV-MCNC: 12 MG/DL (ref 7–18)
CHLORIDE BLD-SCNC: 101 MEQ/L (ref 98–107)
CO2: 23 MEQ/L (ref 21–32)
CREAT SERPL-MCNC: 0.9 MG/DL (ref 0.6–1.3)
DATE OF COLLECTION: NORMAL
DRAWN BY: NORMAL
EOSINOPHILS RELATIVE PERCENT: 1.3 % (ref 0–4)
ETHYL ALCOHOL: 0.07 % (GM/DL)
GFR, ESTIMATED: > 90 ML/MIN/1.73M2
GLUCOSE BLD-MCNC: 254 MG/DL (ref 74–106)
HCT VFR BLD CALC: 50.1 % (ref 42–52)
HEMOCCULT STL QL: NEGATIVE
HEMOGLOBIN: 16.8 GM/DL (ref 14–18)
LYMPHOCYTES # BLD: 16.2 % (ref 15–47)
Lab: 1915
Lab: NORMAL
MAGNESIUM: 1.6 MG/DL (ref 1.8–2.4)
MCH RBC QN AUTO: 33 PG (ref 27–31)
MCHC RBC AUTO-ENTMCNC: 33.5 GM/DL (ref 33–37)
MCV RBC AUTO: 98.7 FL (ref 80–94)
MONOCYTES: 11.6 % (ref 0–12)
PDW BLD-RTO: 13.2 % (ref 11.5–14.5)
PLATELET # BLD: 250 THOU/MM3 (ref 130–400)
PLATELET ESTIMATE: ADEQUATE
PMV BLD AUTO: 6.9 FL (ref 7.4–10.4)
POC CALCIUM: 8.1 MG/DL (ref 8.5–10.1)
POTASSIUM SERPL-SCNC: 4 MEQ/L (ref 3.5–5.1)
RBC # BLD: 5.08 MILL/MM3 (ref 4.7–6.1)
SCAN OF BLOOD SMEAR: NORMAL
SEGS: 70.2 % (ref 43–75)
SODIUM BLD-SCNC: 137 MEQ/L (ref 136–145)
TOTAL PROTEIN: 7 GM/DL (ref 6.4–8.2)
WBC # BLD: 8.1 THOU/MM3 (ref 4.8–10.8)

## 2020-09-04 PROCEDURE — 82272 OCCULT BLD FECES 1-3 TESTS: CPT

## 2020-09-04 PROCEDURE — 85025 COMPLETE CBC W/AUTO DIFF WBC: CPT

## 2020-09-04 PROCEDURE — 6360000002 HC RX W HCPCS: Performed by: EMERGENCY MEDICINE

## 2020-09-04 PROCEDURE — 2709999900 HC NON-CHARGEABLE SUPPLY

## 2020-09-04 PROCEDURE — 2580000003 HC RX 258: Performed by: EMERGENCY MEDICINE

## 2020-09-04 PROCEDURE — 96374 THER/PROPH/DIAG INJ IV PUSH: CPT

## 2020-09-04 PROCEDURE — 83735 ASSAY OF MAGNESIUM: CPT

## 2020-09-04 PROCEDURE — 36415 COLL VENOUS BLD VENIPUNCTURE: CPT

## 2020-09-04 PROCEDURE — 80053 COMPREHEN METABOLIC PANEL: CPT

## 2020-09-04 PROCEDURE — 99282 EMERGENCY DEPT VISIT SF MDM: CPT

## 2020-09-04 PROCEDURE — C9113 INJ PANTOPRAZOLE SODIUM, VIA: HCPCS | Performed by: EMERGENCY MEDICINE

## 2020-09-04 PROCEDURE — G0480 DRUG TEST DEF 1-7 CLASSES: HCPCS

## 2020-09-04 PROCEDURE — 99284 EMERGENCY DEPT VISIT MOD MDM: CPT

## 2020-09-04 PROCEDURE — 96375 TX/PRO/DX INJ NEW DRUG ADDON: CPT

## 2020-09-04 RX ORDER — LORAZEPAM 2 MG/ML
1 INJECTION INTRAMUSCULAR ONCE
Status: COMPLETED | OUTPATIENT
Start: 2020-09-04 | End: 2020-09-04

## 2020-09-04 RX ORDER — 0.9 % SODIUM CHLORIDE 0.9 %
1000 INTRAVENOUS SOLUTION INTRAVENOUS ONCE
Status: COMPLETED | OUTPATIENT
Start: 2020-09-04 | End: 2020-09-04

## 2020-09-04 RX ORDER — DIAZEPAM 5 MG/1
5 TABLET ORAL EVERY 8 HOURS PRN
Qty: 9 TABLET | Refills: 0 | Status: SHIPPED | OUTPATIENT
Start: 2020-09-04 | End: 2020-09-07

## 2020-09-04 RX ORDER — PANTOPRAZOLE SODIUM 40 MG/1
40 TABLET, DELAYED RELEASE ORAL DAILY
Qty: 30 TABLET | Refills: 0 | Status: ON HOLD | OUTPATIENT
Start: 2020-09-04 | End: 2021-03-17

## 2020-09-04 RX ORDER — PANTOPRAZOLE SODIUM 40 MG/10ML
40 INJECTION, POWDER, LYOPHILIZED, FOR SOLUTION INTRAVENOUS DAILY
Status: DISCONTINUED | OUTPATIENT
Start: 2020-09-04 | End: 2020-09-04 | Stop reason: HOSPADM

## 2020-09-04 RX ADMIN — LORAZEPAM 1 MG: 2 INJECTION INTRAMUSCULAR; INTRAVENOUS at 20:12

## 2020-09-04 RX ADMIN — PANTOPRAZOLE SODIUM 40 MG: 40 INJECTION, POWDER, FOR SOLUTION INTRAVENOUS at 19:36

## 2020-09-04 RX ADMIN — SODIUM CHLORIDE 1000 ML: 9 INJECTION, SOLUTION INTRAVENOUS at 19:35

## 2020-09-04 ASSESSMENT — ENCOUNTER SYMPTOMS
VOMITING: 0
NAUSEA: 1
BLOOD IN STOOL: 0
COUGH: 0
SHORTNESS OF BREATH: 0
ABDOMINAL PAIN: 0
DIARRHEA: 1
WHEEZING: 0

## 2020-09-04 NOTE — ED NOTES
Kenna Osorio MD at bedside for patient evaluation.   Federico Mays RN to exam room 1 at bedside for rectal exam by physician     Kathy Minaya RN  09/04/20 [id number]

## 2020-09-04 NOTE — ED PROVIDER NOTES
[facility]  eMERGENCY dEPARTMENT eNCOUnter             [address] COMPLAINT    Chief Complaint   Patient presents with    Rectal Bleeding       Nurses Notes reviewed and I agree except as noted in the HPI. HPI    Kikae Demarcus is a 41 y.o. male who presents with complaint of \"loose,black, tarry stools \"since yesterday. He has had occasional nausea and dry heaves, no vomiting, no hematemesis. He is an alcoholic, who got out of alcohol treatment center about a week ago, and admits that he has consumed alcohol every day since then. He is drinking a combination of beer and liquor. He says he has not had any alcohol since 8:00 this morning, and is having \"mild withdrawal symptoms \". He contacted his primary care provider, who called in a prescription for Valium for him. He took a dose of Valium about 4 hours ago. He is starting to feel \"a little bit shaky \". He has never had upper endoscopy. He has never been diagnosed with varices. He is used to take Pepcid, but does not take that anymore because \"it causes cancer \". REVIEW OF SYSTEMS      Review of Systems   Constitutional: Positive for malaise/fatigue. Negative for diaphoresis and fever. Respiratory: Negative for cough, shortness of breath and wheezing. Cardiovascular: Negative for chest pain and palpitations. Gastrointestinal: Positive for diarrhea, melena and nausea. Negative for abdominal pain, blood in stool and vomiting. Genitourinary: Negative. Musculoskeletal: Negative. Neurological: Positive for weakness (and tremor). Psychiatric/Behavioral: Positive for substance abuse. The patient is nervous/anxious. All other systems reviewed and are negative.       PAST MEDICAL HISTORY     has a past medical history of COPD (chronic obstructive pulmonary disease) ([facility] Depression, Diabetes mellitus ([facility] ETOH abuse, Hyperlipidemia, Hypertension, Liver disease, and Seizures ([facility] SURGICAL HISTORY     has a past surgical history that includes Endoscopy, colon, diagnostic. CURRENT MEDICATIONS    Discharge Medication List as of 2020  8:14 PM      CONTINUE these medications which have NOT CHANGED    Details   diazePAM (VALIUM) 5 MG tablet Take 1 tablet by mouth every 8 hours as needed for Anxiety for up to 3 days. , Disp-9 tablet,R-0Normal      blood glucose test strips (ASCENSIA AUTODISC VI;ONE TOUCH ULTRA TEST VI) strip Disp-60 strip, R-11, NormalTest as needed. Dispense One Touch verio Test Strips. Dx: Type 2 diabetes (250.00)      sildenafil (VIAGRA) 100 MG tablet Take 1 tablet by mouth daily as needed for Erectile Dysfunction, Disp-10 tablet, [id number]      folic acid (FOLVITE) 1 MG tablet Take 1 tablet by mouth daily, Disp-30 tablet, R-5Normal      vitamin B-1 100 MG tablet Take 1 tablet by mouth daily, Disp-30 tablet, R-3Normal      lisinopril-hydrochlorothiazide (PRINZIDE;ZESTORETIC) 20-25 MG per tablet Take 1 tablet by mouth daily, Disp-30 tablet, R-5Normal      albuterol sulfate HFA (PROVENTIL HFA) 108 (90 Base) MCG/ACT inhaler Inhale 2 puffs into the lungs every 4 hours as needed for Wheezing or Shortness of Breath (Space out to every 6 hours as symptoms improve) Space out to every 6 hours as symptoms improve., Disp-1 Inhaler, R-0Normal      metoprolol tartrate (LOPRESSOR) 50 MG tablet Take 1 tablet by mouth 2 times daily, Disp-60 tablet, R-5Normal      LACTULOSE PO Take by mouth 2 times daily Historical Med      glucose monitoring kit (FREESTYLE) monitoring kit Disp-1 kit, R-5, NormalGlucometer with test strips and lancets. Check BS once daily  #100 strips and lancets             ALLERGIES    has No Known Allergies. FAMILY HISTORY    He indicated that his mother is alive. He indicated that his father is . He indicated that his brother is alive.  He indicated that the status of his paternal uncle is unknown.   family history includes Alcohol Abuse in his cervical adenopathy. Skin:     General: Skin is warm and dry. Capillary Refill: Capillary refill takes less than 2 seconds. Coloration: Skin is not pale. Neurological:      General: No focal deficit present. Mental Status: He is alert and oriented to person, place, and time. Psychiatric:         Behavior: Behavior normal.        LABS:     Labs Reviewed   CBC WITH AUTO DIFFERENTIAL - Abnormal; Notable for the following components:       Result Value    MCV 98.7 (*)     MCH 33.0 (*)     MPV 6.9 (*)     All other components within normal limits   COMPREHENSIVE METABOLIC PANEL - Abnormal; Notable for the following components:    Glucose 254 (*)     POC CALCIUM 8.1 (*)      (*)     Alkaline Phosphatase 176 (*)      (*)     All other components within normal limits   MAGNESIUM - Abnormal; Notable for the following components:    Magnesium 1.6 (*)     All other components within normal limits   BLOOD OCCULT STOOL SCREEN #1   ETHANOL   GLOMERULAR FILTRATION RATE, ESTIMATED   ANION GAP   SCAN OF BLOOD SMEAR      Stool occult blood is negative. Vitals:    Vitals:    09/04/20 1915 09/04/20 1942 09/04/20 1957 09/04/20 2012   BP: (!) 150/85 132/84 126/79 (!) 146/95   Pulse: 113 111 106 110   Resp: 23 17 22 17   Temp:       SpO2: 96% 97% 96% 90%   Weight:       Height:           EMERGENCY DEPARTMENT COURSE:    He received IVF bolus and IV Protonix, Ativan. He feels better. He is drinking clear liquids. Test results and plan of care discussed. Warning signs and symptoms discussed. FINAL IMPRESSION      1. Black tarry stools    2.  Alcoholism [facility])        DISPOSITION/PLAN    DISPOSITION        PATIENT REFERRED TO:    Anup Beckwith, APRN - CNP  [address]  [phone]    Schedule an appointment as soon as possible for a visit         DISCHARGE MEDICATIONS:    Discharge Medication List as of 9/4/2020  8:14 PM      START taking these medications    Details pantoprazole (PROTONIX) 40 MG tablet Take 1 tablet by mouth daily, Disp-30 tablet,R-0Print                (Please note that portions of this note were completed with a voice recognition program.  Efforts were made to edit the dictations but occasionally words are mis-transcribed.)      Neli Jensen MD  09/04/20 [id number]

## 2020-09-04 NOTE — ED NOTES
Meds administered. Education complete. Pt anxious. Mother remains at bedside. Pt requesting something to drink.  Educated will need physician approval     Bri Alston RN  09/04/20 1942

## 2020-09-04 NOTE — TELEPHONE ENCOUNTER
Pt called and states that he had a relapse and started drinking again and he now is shaking and needed the prescription for diazapam called in. I put in the refill request and he stated he needed it today as it is what helps him not to drink.

## 2020-09-05 NOTE — ED NOTES
Discharge teaching and instructions for condition explained to patient. AVS reviewed. Printed prescriptions given to patient. Patient voiced understanding regarding prescriptions, follow up appointments and care of self at home. Pt discharged to home in stable condition with mother.        Milka Joshua RN  09/04/20 2024

## 2020-09-10 ENCOUNTER — OFFICE VISIT (OUTPATIENT)
Dept: FAMILY MEDICINE CLINIC | Age: 45
End: 2020-09-10
Payer: MEDICAID

## 2020-09-10 VITALS
RESPIRATION RATE: 16 BRPM | SYSTOLIC BLOOD PRESSURE: 130 MMHG | TEMPERATURE: 97.1 F | WEIGHT: 250 LBS | HEIGHT: 71 IN | HEART RATE: 88 BPM | BODY MASS INDEX: 35 KG/M2 | DIASTOLIC BLOOD PRESSURE: 80 MMHG

## 2020-09-10 PROBLEM — E66.01 MORBIDLY OBESE (HCC): Status: ACTIVE | Noted: 2020-09-10

## 2020-09-10 PROCEDURE — 2022F DILAT RTA XM EVC RTNOPTHY: CPT | Performed by: NURSE PRACTITIONER

## 2020-09-10 PROCEDURE — G8417 CALC BMI ABV UP PARAM F/U: HCPCS | Performed by: NURSE PRACTITIONER

## 2020-09-10 PROCEDURE — 3044F HG A1C LEVEL LT 7.0%: CPT | Performed by: NURSE PRACTITIONER

## 2020-09-10 PROCEDURE — 4004F PT TOBACCO SCREEN RCVD TLK: CPT | Performed by: NURSE PRACTITIONER

## 2020-09-10 PROCEDURE — 99214 OFFICE O/P EST MOD 30 MIN: CPT | Performed by: NURSE PRACTITIONER

## 2020-09-10 PROCEDURE — G8427 DOCREV CUR MEDS BY ELIG CLIN: HCPCS | Performed by: NURSE PRACTITIONER

## 2020-09-10 RX ORDER — VENLAFAXINE HYDROCHLORIDE 75 MG/1
75 CAPSULE, EXTENDED RELEASE ORAL DAILY
Qty: 30 CAPSULE | Refills: 3 | Status: ON HOLD | OUTPATIENT
Start: 2020-09-10 | End: 2021-03-17

## 2020-09-10 ASSESSMENT — ENCOUNTER SYMPTOMS
RESPIRATORY NEGATIVE: 1
EYES NEGATIVE: 1
GASTROINTESTINAL NEGATIVE: 1

## 2020-09-10 NOTE — PROGRESS NOTES
Lupe Silva is a 41 y.o. male whopresents today for :  Chief Complaint   Patient presents with    ED Follow-up     [facility] 9/4    Depression    Anxiety       HPI:     HPI     pt here for fu of his ER. Pt did have a relapse. Pt reports has been sober since the ER. Checking sugar daily. Reports sugar this am was 105,  Mentally he feels agitated and high anxiety.   He would like to restart effexor as that helped him in the past.    Patient Active Problem List   Diagnosis    Hyperlipemia    Uncontrolled hypertension    Type 2 diabetes mellitus with complication, without long-term current use of insulin ([address]    Alcohol withdrawal ([address]    Alcoholic hepatitis    COPD (chronic obstructive pulmonary disease) ([address]    Passive-dependent personality disorder ([address]    Alcohol abuse    Type 2 diabetes mellitus without complication, without long-term current use of insulin (HCC)    Alcoholic liver failure (HCC)    Transaminitis    Hyperbilirubinemia    RUQ pain    Delirious    Alcoholic hepatitis without ascites    Alcoholic steatohepatitis    Hypophosphatemia    Bipolar 1 disorder (HCC)    Alcoholic gastritis without bleeding    Alcohol withdrawal syndrome with complication (HCC)    Alcohol dependence with physiological dependence, continuous (HCC)    Decompensated liver disease ([address]    Alcoholic cirrhosis ([address]    Alcohol intoxication in alcoholism with blood level over 0.3 with complication ([address]    Acute alcoholic intoxication with delirium ([address]    Acute alcoholic hepatitis    Alcohol ingestion    Alcohol withdrawal delirium ([address]    Essential hypertension    Uncomplicated alcohol withdrawal ([address]    Alcohol use disorder, severe, dependence ([address]    Acute pancreatitis    Depression    Osteopoikilosis    Contusion of hand    Atypical chest pain    Alcoholic encephalopathy ([address]    Alcohol intoxication with delirium ([address]    Morbidly obese ([address]        Past Medical History: Diagnosis Date    [facility]     Depression     Diabetes mellitus ([facility]     ETOH abuse     Hyperlipidemia     Hypertension     Liver disease     Seizures ([facility]     etoh wdl      Past Surgical History:   Procedure Laterality Date    ENDOSCOPY, COLON, DIAGNOSTIC       Family History   Problem Relation Age of Onset    High Blood Pressure Father     Cancer Father         Lung Cancer    Alcohol Abuse Father     High Blood Pressure Brother     Diabetes Mother     Heart Disease Mother         Stent Placement    Arthritis Mother     Depression Mother     High Blood Pressure Mother     High Cholesterol Mother     Alcohol Abuse Paternal Uncle      Social History     Tobacco Use    Smoking status: Current Every Day Smoker     Packs/day: 1.00     Years: 27.00     Pack years: 27.00     Types: E-Cigarettes, Cigarettes    Smokeless tobacco: Former User    Tobacco comment: vape   Substance Use Topics    Alcohol use: Not Currently     Alcohol/week: 140.0 standard drinks     Types: 140 Cans of beer per week     Frequency: Patient refused     Binge frequency: Patient refused     Comment: drinks a 30 pack a day      Current Outpatient Medications   Medication Sig Dispense Refill    Multiple Vitamin (MULTI VITAMIN MENS PO) Take 1 tablet by mouth daily      venlafaxine (EFFEXOR XR) 75 MG extended release capsule Take 1 capsule by mouth daily 30 capsule 3    pantoprazole (PROTONIX) 40 MG tablet Take 1 tablet by mouth daily 30 tablet 0    blood glucose test strips (ASCENSIA AUTODISC VI;ONE TOUCH ULTRA TEST VI) strip Test as needed. Dispense One Touch verio Test Strips.   Dx: Type 2 diabetes (250.00) 60 strip 11    sildenafil (VIAGRA) 100 MG tablet Take 1 tablet by mouth daily as needed for Erectile Dysfunction 10 tablet 5    folic acid (FOLVITE) 1 MG tablet Take 1 tablet by mouth daily 30 tablet 5    vitamin B-1 100 MG tablet Take 1 tablet by mouth daily 30 tablet 3 Resp: 16   Temp: 97.1 °F (36.2 °C)   TempSrc: Temporal   Weight: 250 lb (113.4 kg)   Height: 5' 10.98\" (1.803 m)       Physical Exam  Constitutional:       Appearance: He is well-developed. HENT:      Head: Normocephalic. Right Ear: Tympanic membrane and external ear normal.      Left Ear: Tympanic membrane and external ear normal.      Nose: Nose normal.   Neck:      Musculoskeletal: Normal range of motion and neck supple. Cardiovascular:      Rate and Rhythm: Normal rate and regular rhythm. Heart sounds: Normal heart sounds. No murmur. No friction rub. No gallop. Pulmonary:      Effort: Pulmonary effort is normal.      Breath sounds: Normal breath sounds. No wheezing or rales. Abdominal:      General: Bowel sounds are normal.      Palpations: Abdomen is soft. Tenderness: There is no abdominal tenderness. There is no guarding. Musculoskeletal: Normal range of motion. Lymphadenopathy:      Cervical: No cervical adenopathy. Skin:     General: Skin is warm. Neurological:      Mental Status: He is alert and oriented to person, place, and time. Deep Tendon Reflexes: Reflexes are normal and symmetric. Psychiatric:         Mood and Affect: Mood is anxious. Speech: Speech is rapid and pressured. Behavior: Behavior is agitated. Thought Content: Thought content is not delusional. Thought content does not include homicidal or suicidal plan. Assessment:      Diagnosis Orders   1. Type 2 diabetes mellitus without complication, without long-term current use of insulin (HCC)  Comprehensive Metabolic Panel    Hemoglobin A1C    Microalbumin / Creatinine Urine Ratio   2. Morbidly obese ([address]     3. Alcohol withdrawal syndrome, with delirium ([address]     4. Alcoholic cirrhosis of liver without ascites (HCC)  CBC Auto Differential    Comprehensive Metabolic Panel    Hemoglobin A1C   5. Essential hypertension     6.  Anxiety  venlafaxine (EFFEXOR XR) 75 MG extended release capsule   7. Bipolar 1 disorder ([facility]         Plan:      Return in about 4 weeks (around 10/8/2020). did restart effexor  Labs in 2 weeks  Monitor sugar  Fu in 1 month  Orders Placed This Encounter   Procedures    CBC Auto Differential     Standing Status:   Future     Standing Expiration Date:   9/10/2021    Comprehensive Metabolic Panel     Standing Status:   Future     Standing Expiration Date:   9/10/2021    Hemoglobin A1C     Standing Status:   Future     Standing Expiration Date:   9/10/2021    Microalbumin / Creatinine Urine Ratio     Standing Status:   Future     Standing Expiration Date:   9/10/2021     Orders Placed This Encounter   Medications    venlafaxine (EFFEXOR XR) 75 MG extended release capsule     Sig: Take 1 capsule by mouth daily     Dispense:  30 capsule     Refill:  3          Patient given educational materials - seepatient instructions. Discussed use, benefit, and side effects of prescribed medications. All patient questions answered. Pt voiced understanding. Patient agreed withtreatment plan. Follow up as directed.      Electronically signed by NILAM Temple CNP on 9/10/2020 at 5:13 PM

## 2020-09-10 NOTE — LETTER
[address] [facility] [address]  Phone: [phone]  Fax: [address] "Freda" Daniel, APRN - [place]        September 10, 2020     Patient: Karine Ramsey   YOB: 1975   Date of Visit: 9/10/2020       To Whom It May Concern: It is my medical opinion that Eliot aVrela is currently unable to work. Lola Saha has a history of alcoholism and mental health illness. Currently he is recovering from a recent exacerbation of his illness and he is exhibiting signs of severe anxiety and restlessness. His current mental state prevents him from being able to gain employment. If you have any questions or concerns, please don't hesitate to call.     Sincerely,          Terese Lynch, NILAM - CNP

## 2020-09-23 ENCOUNTER — HOSPITAL ENCOUNTER (INPATIENT)
Age: 45
LOS: 4 days | Discharge: HOME | End: 2020-09-27
Attending: EMERGENCY MEDICINE | Admitting: FAMILY MEDICINE
Payer: MEDICAID

## 2020-09-23 LAB
ALBUMIN SERPL-MCNC: 4 G/DL (ref 3.5–5.1)
ALP BLD-CCNC: 240 U/L (ref 38–126)
ALT SERPL-CCNC: 128 U/L (ref 11–66)
AMMONIA: 43 UMOL/L (ref 11–60)
AMPHETAMINE+METHAMPHETAMINE URINE SCREEN: NEGATIVE
ANION GAP SERPL CALCULATED.3IONS-SCNC: 13 MEQ/L (ref 8–16)
ANION GAP SERPL CALCULATED.3IONS-SCNC: 15 MEQ/L (ref 8–16)
ANION GAP SERPL CALCULATED.3IONS-SCNC: 16 MEQ/L (ref 8–16)
ANION GAP SERPL CALCULATED.3IONS-SCNC: 17 MEQ/L (ref 8–16)
AST SERPL-CCNC: 153 U/L (ref 5–40)
BACTERIA: ABNORMAL /HPF
BARBITURATE QUANTITATIVE URINE: NEGATIVE
BASOPHILS # BLD: 0.6 %
BASOPHILS # BLD: 0.7 %
BASOPHILS ABSOLUTE: 0.1 THOU/MM3 (ref 0–0.1)
BASOPHILS ABSOLUTE: 0.1 THOU/MM3 (ref 0–0.1)
BENZODIAZEPINE QUANTITATIVE URINE: NEGATIVE
BILIRUB SERPL-MCNC: 1.1 MG/DL (ref 0.3–1.2)
BILIRUBIN DIRECT: 0.7 MG/DL (ref 0–0.3)
BILIRUBIN URINE: NEGATIVE
BLOOD, URINE: NEGATIVE
BUN BLDV-MCNC: 16 MG/DL (ref 7–22)
BUN BLDV-MCNC: 17 MG/DL (ref 7–22)
CALCIUM SERPL-MCNC: 8 MG/DL (ref 8.5–10.5)
CALCIUM SERPL-MCNC: 8.4 MG/DL (ref 8.5–10.5)
CALCIUM SERPL-MCNC: 8.5 MG/DL (ref 8.5–10.5)
CALCIUM SERPL-MCNC: 8.6 MG/DL (ref 8.5–10.5)
CANNABINOID QUANTITATIVE URINE: NEGATIVE
CASTS 2: ABNORMAL /LPF
CASTS UA: ABNORMAL /LPF
CHARACTER, URINE: CLEAR
CHLORIDE BLD-SCNC: 101 MEQ/L (ref 98–111)
CHLORIDE BLD-SCNC: 102 MEQ/L (ref 98–111)
CHLORIDE BLD-SCNC: 95 MEQ/L (ref 98–111)
CHLORIDE BLD-SCNC: 99 MEQ/L (ref 98–111)
CO2: 20 MEQ/L (ref 23–33)
CO2: 20 MEQ/L (ref 23–33)
CO2: 21 MEQ/L (ref 23–33)
CO2: 21 MEQ/L (ref 23–33)
COCAINE METABOLITE QUANTITATIVE URINE: NEGATIVE
COLOR: YELLOW
CREAT SERPL-MCNC: 0.6 MG/DL (ref 0.4–1.2)
CREAT SERPL-MCNC: 0.6 MG/DL (ref 0.4–1.2)
CREAT SERPL-MCNC: 0.7 MG/DL (ref 0.4–1.2)
CREAT SERPL-MCNC: 0.7 MG/DL (ref 0.4–1.2)
CRYSTALS, UA: ABNORMAL
EOSINOPHIL # BLD: 10 %
EOSINOPHIL # BLD: 2.8 %
EOSINOPHILS ABSOLUTE: 0.3 THOU/MM3 (ref 0–0.4)
EOSINOPHILS ABSOLUTE: 0.9 THOU/MM3 (ref 0–0.4)
EPITHELIAL CELLS, UA: ABNORMAL /HPF
ERYTHROCYTE [DISTWIDTH] IN BLOOD BY AUTOMATED COUNT: 13.2 % (ref 11.5–14.5)
ERYTHROCYTE [DISTWIDTH] IN BLOOD BY AUTOMATED COUNT: 13.2 % (ref 11.5–14.5)
ERYTHROCYTE [DISTWIDTH] IN BLOOD BY AUTOMATED COUNT: 44 FL (ref 35–45)
ERYTHROCYTE [DISTWIDTH] IN BLOOD BY AUTOMATED COUNT: 44.5 FL (ref 35–45)
ETHYL ALCOHOL, SERUM: 0.02 %
ETHYL ALCOHOL, SERUM: 0.36 %
ETHYL ALCOHOL, SERUM: 0.4 %
GFR SERPL CREATININE-BSD FRML MDRD: > 90 ML/MIN/1.73M2
GLUCOSE BLD-MCNC: 106 MG/DL (ref 70–108)
GLUCOSE BLD-MCNC: 119 MG/DL (ref 70–108)
GLUCOSE BLD-MCNC: 120 MG/DL (ref 70–108)
GLUCOSE BLD-MCNC: 123 MG/DL (ref 70–108)
GLUCOSE BLD-MCNC: 134 MG/DL (ref 70–108)
GLUCOSE BLD-MCNC: 136 MG/DL (ref 70–108)
GLUCOSE BLD-MCNC: 77 MG/DL (ref 70–108)
GLUCOSE BLD-MCNC: 90 MG/DL (ref 70–108)
GLUCOSE URINE: NEGATIVE MG/DL
HCT VFR BLD CALC: 44.9 % (ref 42–52)
HCT VFR BLD CALC: 46.8 % (ref 42–52)
HEMOGLOBIN: 16.4 GM/DL (ref 14–18)
HEMOGLOBIN: 17.4 GM/DL (ref 14–18)
IMMATURE GRANS (ABS): 0.01 THOU/MM3 (ref 0–0.07)
IMMATURE GRANS (ABS): 0.02 THOU/MM3 (ref 0–0.07)
IMMATURE GRANULOCYTES: 0.1 %
IMMATURE GRANULOCYTES: 0.2 %
KETONES, URINE: NEGATIVE
LEUKOCYTE ESTERASE, URINE: NEGATIVE
LIPASE: 66.9 U/L (ref 5.6–51.3)
LYMPHOCYTES # BLD: 31 %
LYMPHOCYTES # BLD: 31.4 %
LYMPHOCYTES ABSOLUTE: 2.9 THOU/MM3 (ref 1–4.8)
LYMPHOCYTES ABSOLUTE: 3.4 THOU/MM3 (ref 1–4.8)
MAGNESIUM: 2 MG/DL (ref 1.6–2.4)
MCH RBC QN AUTO: 33.7 PG (ref 26–33)
MCH RBC QN AUTO: 33.7 PG (ref 26–33)
MCHC RBC AUTO-ENTMCNC: 36.5 GM/DL (ref 32.2–35.5)
MCHC RBC AUTO-ENTMCNC: 37.2 GM/DL (ref 32.2–35.5)
MCV RBC AUTO: 90.7 FL (ref 80–94)
MCV RBC AUTO: 92.2 FL (ref 80–94)
MISCELLANEOUS 2: ABNORMAL
MONOCYTES # BLD: 5.7 %
MONOCYTES # BLD: 6.1 %
MONOCYTES ABSOLUTE: 0.5 THOU/MM3 (ref 0.4–1.3)
MONOCYTES ABSOLUTE: 0.7 THOU/MM3 (ref 0.4–1.3)
NITRITE, URINE: NEGATIVE
NUCLEATED RED BLOOD CELLS: 0 /100 WBC
NUCLEATED RED BLOOD CELLS: 0 /100 WBC
OPIATES, URINE: NEGATIVE
OSMOLALITY CALCULATION: 265.3 MOSMOL/KG (ref 275–300)
OXYCODONE: NEGATIVE
PH UA: 5.5 (ref 5–9)
PHENCYCLIDINE QUANTITATIVE URINE: NEGATIVE
PHOSPHORUS: 3.3 MG/DL (ref 2.4–4.7)
PLATELET # BLD: 218 THOU/MM3 (ref 130–400)
PLATELET # BLD: 250 THOU/MM3 (ref 130–400)
PMV BLD AUTO: 8.7 FL (ref 9.4–12.4)
PMV BLD AUTO: 8.7 FL (ref 9.4–12.4)
POTASSIUM REFLEX MAGNESIUM: 4 MEQ/L (ref 3.5–5.2)
POTASSIUM REFLEX MAGNESIUM: 4.5 MEQ/L (ref 3.5–5.2)
POTASSIUM REFLEX MAGNESIUM: 4.6 MEQ/L (ref 3.5–5.2)
POTASSIUM SERPL-SCNC: 3.8 MEQ/L (ref 3.5–5.2)
PROTEIN UA: 30
RBC # BLD: 4.87 MILL/MM3 (ref 4.7–6.1)
RBC # BLD: 5.16 MILL/MM3 (ref 4.7–6.1)
RBC URINE: ABNORMAL /HPF
RENAL EPITHELIAL, UA: ABNORMAL
SEG NEUTROPHILS: 52.2 %
SEG NEUTROPHILS: 59.2 %
SEGMENTED NEUTROPHILS ABSOLUTE COUNT: 4.9 THOU/MM3 (ref 1.8–7.7)
SEGMENTED NEUTROPHILS ABSOLUTE COUNT: 6.6 THOU/MM3 (ref 1.8–7.7)
SODIUM BLD-SCNC: 131 MEQ/L (ref 135–145)
SODIUM BLD-SCNC: 135 MEQ/L (ref 135–145)
SODIUM BLD-SCNC: 135 MEQ/L (ref 135–145)
SODIUM BLD-SCNC: 139 MEQ/L (ref 135–145)
SPECIFIC GRAVITY, URINE: 1 (ref 1–1.03)
TOTAL PROTEIN: 7.3 G/DL (ref 6.1–8)
TROPONIN T: < 0.01 NG/ML
UROBILINOGEN, URINE: 0.2 EU/DL (ref 0–1)
WBC # BLD: 11.1 THOU/MM3 (ref 4.8–10.8)
WBC # BLD: 9.3 THOU/MM3 (ref 4.8–10.8)
WBC UA: ABNORMAL /HPF
YEAST: ABNORMAL

## 2020-09-23 PROCEDURE — 80307 DRUG TEST PRSMV CHEM ANLYZR: CPT

## 2020-09-23 PROCEDURE — 2580000003 HC RX 258: Performed by: EMERGENCY MEDICINE

## 2020-09-23 PROCEDURE — 2580000003 HC RX 258: Performed by: PHYSICIAN ASSISTANT

## 2020-09-23 PROCEDURE — 83690 ASSAY OF LIPASE: CPT

## 2020-09-23 PROCEDURE — 2709999900 HC NON-CHARGEABLE SUPPLY

## 2020-09-23 PROCEDURE — 99223 1ST HOSP IP/OBS HIGH 75: CPT | Performed by: PHYSICIAN ASSISTANT

## 2020-09-23 PROCEDURE — 84100 ASSAY OF PHOSPHORUS: CPT

## 2020-09-23 PROCEDURE — 82140 ASSAY OF AMMONIA: CPT

## 2020-09-23 PROCEDURE — 82948 REAGENT STRIP/BLOOD GLUCOSE: CPT

## 2020-09-23 PROCEDURE — 2580000003 HC RX 258: Performed by: NURSE PRACTITIONER

## 2020-09-23 PROCEDURE — 83735 ASSAY OF MAGNESIUM: CPT

## 2020-09-23 PROCEDURE — 6360000002 HC RX W HCPCS: Performed by: NURSE PRACTITIONER

## 2020-09-23 PROCEDURE — 6370000000 HC RX 637 (ALT 250 FOR IP): Performed by: EMERGENCY MEDICINE

## 2020-09-23 PROCEDURE — 81001 URINALYSIS AUTO W/SCOPE: CPT

## 2020-09-23 PROCEDURE — 6370000000 HC RX 637 (ALT 250 FOR IP): Performed by: PHYSICIAN ASSISTANT

## 2020-09-23 PROCEDURE — 36415 COLL VENOUS BLD VENIPUNCTURE: CPT

## 2020-09-23 PROCEDURE — 80053 COMPREHEN METABOLIC PANEL: CPT

## 2020-09-23 PROCEDURE — 82248 BILIRUBIN DIRECT: CPT

## 2020-09-23 PROCEDURE — 84484 ASSAY OF TROPONIN QUANT: CPT

## 2020-09-23 PROCEDURE — 6360000002 HC RX W HCPCS: Performed by: PHYSICIAN ASSISTANT

## 2020-09-23 PROCEDURE — 2500000003 HC RX 250 WO HCPCS: Performed by: NURSE PRACTITIONER

## 2020-09-23 PROCEDURE — 85025 COMPLETE CBC W/AUTO DIFF WBC: CPT

## 2020-09-23 PROCEDURE — G0480 DRUG TEST DEF 1-7 CLASSES: HCPCS

## 2020-09-23 PROCEDURE — 1200000003 HC TELEMETRY R&B

## 2020-09-23 PROCEDURE — 99284 EMERGENCY DEPT VISIT MOD MDM: CPT

## 2020-09-23 PROCEDURE — 99283 EMERGENCY DEPT VISIT LOW MDM: CPT

## 2020-09-23 PROCEDURE — 99285 EMERGENCY DEPT VISIT HI MDM: CPT

## 2020-09-23 RX ORDER — FOLIC ACID 1 MG/1
1 TABLET ORAL DAILY
Status: DISCONTINUED | OUTPATIENT
Start: 2020-09-23 | End: 2020-09-27 | Stop reason: HOSPADM

## 2020-09-23 RX ORDER — DEXTROSE MONOHYDRATE 25 G/50ML
12.5 INJECTION, SOLUTION INTRAVENOUS PRN
Status: DISCONTINUED | OUTPATIENT
Start: 2020-09-23 | End: 2020-09-27 | Stop reason: HOSPADM

## 2020-09-23 RX ORDER — METOPROLOL TARTRATE 50 MG/1
50 TABLET, FILM COATED ORAL 2 TIMES DAILY
Status: DISCONTINUED | OUTPATIENT
Start: 2020-09-23 | End: 2020-09-27 | Stop reason: HOSPADM

## 2020-09-23 RX ORDER — ONDANSETRON 2 MG/ML
4 INJECTION INTRAMUSCULAR; INTRAVENOUS EVERY 6 HOURS PRN
Status: DISCONTINUED | OUTPATIENT
Start: 2020-09-23 | End: 2020-09-27 | Stop reason: HOSPADM

## 2020-09-23 RX ORDER — PHENOBARBITAL SODIUM 65 MG/ML
130 INJECTION INTRAMUSCULAR
Status: DISCONTINUED | OUTPATIENT
Start: 2020-09-23 | End: 2020-09-23

## 2020-09-23 RX ORDER — LORAZEPAM 2 MG/ML
1 INJECTION INTRAMUSCULAR
Status: DISCONTINUED | OUTPATIENT
Start: 2020-09-23 | End: 2020-09-24

## 2020-09-23 RX ORDER — HYDROCHLOROTHIAZIDE 25 MG/1
25 TABLET ORAL DAILY
Status: DISCONTINUED | OUTPATIENT
Start: 2020-09-23 | End: 2020-09-27 | Stop reason: HOSPADM

## 2020-09-23 RX ORDER — POTASSIUM CHLORIDE 7.45 MG/ML
10 INJECTION INTRAVENOUS PRN
Status: DISCONTINUED | OUTPATIENT
Start: 2020-09-23 | End: 2020-09-27 | Stop reason: HOSPADM

## 2020-09-23 RX ORDER — PHENOBARBITAL SODIUM 65 MG/ML
260 INJECTION INTRAMUSCULAR
Status: DISCONTINUED | OUTPATIENT
Start: 2020-09-23 | End: 2020-09-23

## 2020-09-23 RX ORDER — SODIUM CHLORIDE 0.9 % (FLUSH) 0.9 %
10 SYRINGE (ML) INJECTION PRN
Status: DISCONTINUED | OUTPATIENT
Start: 2020-09-23 | End: 2020-09-23 | Stop reason: SDUPTHER

## 2020-09-23 RX ORDER — LORAZEPAM 2 MG/ML
3 INJECTION INTRAMUSCULAR
Status: DISCONTINUED | OUTPATIENT
Start: 2020-09-23 | End: 2020-09-24

## 2020-09-23 RX ORDER — PROMETHAZINE HYDROCHLORIDE 25 MG/1
12.5 TABLET ORAL EVERY 6 HOURS PRN
Status: DISCONTINUED | OUTPATIENT
Start: 2020-09-23 | End: 2020-09-27 | Stop reason: HOSPADM

## 2020-09-23 RX ORDER — LISINOPRIL 20 MG/1
20 TABLET ORAL DAILY
Status: DISCONTINUED | OUTPATIENT
Start: 2020-09-23 | End: 2020-09-27 | Stop reason: HOSPADM

## 2020-09-23 RX ORDER — LORAZEPAM 2 MG/ML
4 INJECTION INTRAMUSCULAR
Status: DISCONTINUED | OUTPATIENT
Start: 2020-09-23 | End: 2020-09-24

## 2020-09-23 RX ORDER — FOLIC ACID 1 MG/1
1 TABLET ORAL ONCE
Status: COMPLETED | OUTPATIENT
Start: 2020-09-23 | End: 2020-09-23

## 2020-09-23 RX ORDER — SODIUM CHLORIDE 0.9 % (FLUSH) 0.9 %
10 SYRINGE (ML) INJECTION EVERY 12 HOURS SCHEDULED
Status: DISCONTINUED | OUTPATIENT
Start: 2020-09-23 | End: 2020-09-23 | Stop reason: SDUPTHER

## 2020-09-23 RX ORDER — LORAZEPAM 1 MG/1
1 TABLET ORAL ONCE
Status: COMPLETED | OUTPATIENT
Start: 2020-09-23 | End: 2020-09-23

## 2020-09-23 RX ORDER — POTASSIUM CHLORIDE 20 MEQ/1
40 TABLET, EXTENDED RELEASE ORAL PRN
Status: DISCONTINUED | OUTPATIENT
Start: 2020-09-23 | End: 2020-09-27 | Stop reason: HOSPADM

## 2020-09-23 RX ORDER — THIAMINE MONONITRATE (VIT B1) 100 MG
50 TABLET ORAL ONCE
Status: COMPLETED | OUTPATIENT
Start: 2020-09-23 | End: 2020-09-23

## 2020-09-23 RX ORDER — LORAZEPAM 2 MG/1
4 TABLET ORAL
Status: DISCONTINUED | OUTPATIENT
Start: 2020-09-23 | End: 2020-09-24

## 2020-09-23 RX ORDER — VENLAFAXINE HYDROCHLORIDE 75 MG/1
75 CAPSULE, EXTENDED RELEASE ORAL DAILY
Status: DISCONTINUED | OUTPATIENT
Start: 2020-09-23 | End: 2020-09-27 | Stop reason: HOSPADM

## 2020-09-23 RX ORDER — PANTOPRAZOLE SODIUM 40 MG/1
40 TABLET, DELAYED RELEASE ORAL
Status: DISCONTINUED | OUTPATIENT
Start: 2020-09-23 | End: 2020-09-27 | Stop reason: HOSPADM

## 2020-09-23 RX ORDER — POLYETHYLENE GLYCOL 3350 17 G/17G
17 POWDER, FOR SOLUTION ORAL DAILY PRN
Status: DISCONTINUED | OUTPATIENT
Start: 2020-09-23 | End: 2020-09-27 | Stop reason: HOSPADM

## 2020-09-23 RX ORDER — LORAZEPAM 2 MG/ML
2 INJECTION INTRAMUSCULAR
Status: DISCONTINUED | OUTPATIENT
Start: 2020-09-23 | End: 2020-09-24

## 2020-09-23 RX ORDER — SODIUM CHLORIDE 0.9 % (FLUSH) 0.9 %
10 SYRINGE (ML) INJECTION EVERY 12 HOURS SCHEDULED
Status: DISCONTINUED | OUTPATIENT
Start: 2020-09-23 | End: 2020-09-27 | Stop reason: HOSPADM

## 2020-09-23 RX ORDER — LISINOPRIL AND HYDROCHLOROTHIAZIDE 25; 20 MG/1; MG/1
1 TABLET ORAL DAILY
Status: DISCONTINUED | OUTPATIENT
Start: 2020-09-23 | End: 2020-09-23 | Stop reason: CLARIF

## 2020-09-23 RX ORDER — ACETAMINOPHEN 325 MG/1
650 TABLET ORAL EVERY 6 HOURS PRN
Status: DISCONTINUED | OUTPATIENT
Start: 2020-09-23 | End: 2020-09-27 | Stop reason: HOSPADM

## 2020-09-23 RX ORDER — ACETAMINOPHEN 650 MG/1
650 SUPPOSITORY RECTAL EVERY 6 HOURS PRN
Status: DISCONTINUED | OUTPATIENT
Start: 2020-09-23 | End: 2020-09-27 | Stop reason: HOSPADM

## 2020-09-23 RX ORDER — 0.9 % SODIUM CHLORIDE 0.9 %
1000 INTRAVENOUS SOLUTION INTRAVENOUS ONCE
Status: COMPLETED | OUTPATIENT
Start: 2020-09-23 | End: 2020-09-23

## 2020-09-23 RX ORDER — LACTULOSE 10 G/15ML
20 SOLUTION ORAL 2 TIMES DAILY
Status: DISCONTINUED | OUTPATIENT
Start: 2020-09-23 | End: 2020-09-27 | Stop reason: HOSPADM

## 2020-09-23 RX ORDER — NICOTINE POLACRILEX 4 MG
15 LOZENGE BUCCAL PRN
Status: DISCONTINUED | OUTPATIENT
Start: 2020-09-23 | End: 2020-09-27 | Stop reason: HOSPADM

## 2020-09-23 RX ORDER — SODIUM CHLORIDE 0.9 % (FLUSH) 0.9 %
10 SYRINGE (ML) INJECTION PRN
Status: DISCONTINUED | OUTPATIENT
Start: 2020-09-23 | End: 2020-09-27 | Stop reason: HOSPADM

## 2020-09-23 RX ORDER — LORAZEPAM 2 MG/1
2 TABLET ORAL
Status: DISCONTINUED | OUTPATIENT
Start: 2020-09-23 | End: 2020-09-24

## 2020-09-23 RX ORDER — MAGNESIUM SULFATE IN WATER 40 MG/ML
2 INJECTION, SOLUTION INTRAVENOUS PRN
Status: DISCONTINUED | OUTPATIENT
Start: 2020-09-23 | End: 2020-09-27 | Stop reason: HOSPADM

## 2020-09-23 RX ORDER — LORAZEPAM 1 MG/1
1 TABLET ORAL
Status: DISCONTINUED | OUTPATIENT
Start: 2020-09-23 | End: 2020-09-24

## 2020-09-23 RX ORDER — ALBUTEROL SULFATE 90 UG/1
2 AEROSOL, METERED RESPIRATORY (INHALATION) EVERY 4 HOURS PRN
Status: DISCONTINUED | OUTPATIENT
Start: 2020-09-23 | End: 2020-09-27 | Stop reason: HOSPADM

## 2020-09-23 RX ORDER — NICOTINE 21 MG/24HR
1 PATCH, TRANSDERMAL 24 HOURS TRANSDERMAL DAILY
Status: DISCONTINUED | OUTPATIENT
Start: 2020-09-23 | End: 2020-09-27 | Stop reason: HOSPADM

## 2020-09-23 RX ORDER — ONDANSETRON 2 MG/ML
4 INJECTION INTRAMUSCULAR; INTRAVENOUS ONCE
Status: DISCONTINUED | OUTPATIENT
Start: 2020-09-23 | End: 2020-09-23 | Stop reason: HOSPADM

## 2020-09-23 RX ORDER — DEXTROSE MONOHYDRATE 50 MG/ML
100 INJECTION, SOLUTION INTRAVENOUS PRN
Status: DISCONTINUED | OUTPATIENT
Start: 2020-09-23 | End: 2020-09-27 | Stop reason: HOSPADM

## 2020-09-23 RX ADMIN — FOLIC ACID: 5 INJECTION, SOLUTION INTRAMUSCULAR; INTRAVENOUS; SUBCUTANEOUS at 13:11

## 2020-09-23 RX ADMIN — METOPROLOL TARTRATE 50 MG: 50 TABLET, FILM COATED ORAL at 20:45

## 2020-09-23 RX ADMIN — HYDROCHLOROTHIAZIDE 25 MG: 25 TABLET ORAL at 09:35

## 2020-09-23 RX ADMIN — METOPROLOL TARTRATE 50 MG: 50 TABLET, FILM COATED ORAL at 09:34

## 2020-09-23 RX ADMIN — LORAZEPAM 2 MG: 2 INJECTION INTRAMUSCULAR; INTRAVENOUS at 22:20

## 2020-09-23 RX ADMIN — SODIUM CHLORIDE, PRESERVATIVE FREE 10 ML: 5 INJECTION INTRAVENOUS at 09:40

## 2020-09-23 RX ADMIN — SODIUM CHLORIDE 1000 ML: 9 INJECTION, SOLUTION INTRAVENOUS at 01:25

## 2020-09-23 RX ADMIN — FOLIC ACID 1 MG: 1 TABLET ORAL at 01:26

## 2020-09-23 RX ADMIN — LORAZEPAM 1 MG: 1 TABLET ORAL at 02:31

## 2020-09-23 RX ADMIN — LISINOPRIL 20 MG: 20 TABLET ORAL at 09:35

## 2020-09-23 RX ADMIN — Medication 50 MG: at 01:26

## 2020-09-23 RX ADMIN — ENOXAPARIN SODIUM 40 MG: 40 INJECTION SUBCUTANEOUS at 09:35

## 2020-09-23 RX ADMIN — PANTOPRAZOLE SODIUM 40 MG: 40 TABLET, DELAYED RELEASE ORAL at 09:34

## 2020-09-23 RX ADMIN — VENLAFAXINE HYDROCHLORIDE 75 MG: 75 CAPSULE, EXTENDED RELEASE ORAL at 09:34

## 2020-09-23 RX ADMIN — FOLIC ACID 1 MG: 1 TABLET ORAL at 09:35

## 2020-09-23 RX ADMIN — LACTULOSE 20 G: 20 SOLUTION ORAL at 09:35

## 2020-09-23 ASSESSMENT — ENCOUNTER SYMPTOMS
CHOKING: 0
DIARRHEA: 0
SINUS PRESSURE: 0
RHINORRHEA: 0
EYE REDNESS: 0
EYE PAIN: 0
WHEEZING: 0
EYE ITCHING: 0
SORE THROAT: 0
NAUSEA: 0
CONSTIPATION: 0
COUGH: 0
PHOTOPHOBIA: 0
VOICE CHANGE: 0
VOMITING: 0
BACK PAIN: 0
BLOOD IN STOOL: 0
TROUBLE SWALLOWING: 0
SHORTNESS OF BREATH: 0
ABDOMINAL PAIN: 0
EYE DISCHARGE: 0
ABDOMINAL DISTENTION: 0
CHEST TIGHTNESS: 0

## 2020-09-23 ASSESSMENT — PAIN SCALES - GENERAL
PAINLEVEL_OUTOF10: 0

## 2020-09-23 NOTE — ED PROVIDER NOTES
[facility]  eMERGENCY dEPARTMENT eNCOUnter          CHIEF COMPLAINT       Chief Complaint   Patient presents with    Alcohol Problem       Nurses Notes reviewed and I agree except as noted in the HPI. HISTORY OF PRESENT ILLNESS    Estefania Pal is a 41 y.o. male who presents for acute alcohol intoxication. Apparently he just got into rehab. He came back home. Started drinking again. He became too much for his mother who he lives with the handle. His brother brought him in today for further evaluation and treatment. Patient does have diabetes. He has uncontrolled hypertension. He is noncompliant with his medications. He has had a history of alcoholic hepatitis. Patient states that he drank approximately 18 tall boys tonight. Last drink was approximately 1 hour ago. Currently the patient is resting comfortably on the cot no apparent distress. Obviously intoxicated. Unable to undo his own belt buckle on his pants. REVIEW OF SYSTEMS     Review of Systems   Constitutional: Negative for activity change, appetite change, diaphoresis, fatigue and unexpected weight change. Alcohol intoxication   HENT: Negative for congestion, ear discharge, ear pain, hearing loss, rhinorrhea, sinus pressure, sore throat, trouble swallowing and voice change. Eyes: Negative for photophobia, pain, discharge, redness and itching. Respiratory: Negative for cough, choking, chest tightness, shortness of breath and wheezing. Cardiovascular: Negative for chest pain, palpitations and leg swelling. Gastrointestinal: Negative for abdominal distention, abdominal pain, blood in stool, constipation, diarrhea, nausea and vomiting. Endocrine: Negative for polydipsia, polyphagia and polyuria. Genitourinary: Negative for decreased urine volume, difficulty urinating, dysuria, enuresis, frequency, hematuria, penile pain, penile swelling, scrotal swelling, testicular pain and urgency. Musculoskeletal: Negative for arthralgias, back pain, gait problem, myalgias, neck pain and neck stiffness. Skin: Negative for pallor and rash. Allergic/Immunologic: Negative for immunocompromised state. Neurological: Negative for dizziness, tremors, seizures, syncope, facial asymmetry, weakness, light-headedness, numbness and headaches. Hematological: Negative for adenopathy. Does not bruise/bleed easily. Psychiatric/Behavioral: Negative for agitation, hallucinations and suicidal ideas. The patient is not nervous/anxious. PAST MEDICAL HISTORY    has a past medical history of COPD (chronic obstructive pulmonary disease) ([facility] Depression, Diabetes mellitus ([facility] ETOH abuse, Hyperlipidemia, Hypertension, Liver disease, and Seizures ([facility] SURGICAL HISTORY      has a past surgical history that includes Endoscopy, colon, diagnostic. CURRENT MEDICATIONS       Previous Medications    ALBUTEROL SULFATE HFA (PROVENTIL HFA) 108 (90 BASE) MCG/ACT INHALER    Inhale 2 puffs into the lungs every 4 hours as needed for Wheezing or Shortness of Breath (Space out to every 6 hours as symptoms improve) Space out to every 6 hours as symptoms improve. BLOOD GLUCOSE TEST STRIPS (ASCENSIA AUTODISC VI;ONE TOUCH ULTRA TEST VI) STRIP    Test as needed. Dispense One Touch verio Test Strips. Dx: Type 2 diabetes (397.71)    FOLIC ACID (FOLVITE) 1 MG TABLET    Take 1 tablet by mouth daily    GLUCOSE MONITORING KIT (FREESTYLE) MONITORING KIT    Glucometer with test strips and lancets.   Check BS once daily  #100 strips and lancets    LACTULOSE PO    Take by mouth 2 times daily     LISINOPRIL-HYDROCHLOROTHIAZIDE (PRINZIDE;ZESTORETIC) 20-25 MG PER TABLET    Take 1 tablet by mouth daily    METOPROLOL TARTRATE (LOPRESSOR) 50 MG TABLET    Take 1 tablet by mouth 2 times daily    MULTIPLE VITAMIN (MULTI VITAMIN MENS PO)    Take 1 tablet by mouth daily    PANTOPRAZOLE (PROTONIX) 40 MG TABLET    Take 1 tablet by mouth daily    SILDENAFIL (VIAGRA) 100 MG TABLET    Take 1 tablet by mouth daily as needed for Erectile Dysfunction    VENLAFAXINE (EFFEXOR XR) 75 MG EXTENDED RELEASE CAPSULE    Take 1 capsule by mouth daily    VITAMIN B-1 100 MG TABLET    Take 1 tablet by mouth daily       ALLERGIES     has No Known Allergies. FAMILY HISTORY     He indicated that his mother is alive. He indicated that his father is . He indicated that his brother is alive. He indicated that the status of his paternal uncle is unknown.   family history includes Alcohol Abuse in his father and paternal uncle; Arthritis in his mother; Cancer in his father; Depression in his mother; Diabetes in his mother; Heart Disease in his mother; High Blood Pressure in his brother, father, and mother; High Cholesterol in his mother. SOCIAL HISTORY      reports that he has been smoking e-cigarettes and cigarettes. He has a 27.00 pack-year smoking history. He has quit using smokeless tobacco. He reports previous alcohol use of about 140.0 standard drinks of alcohol per week. He reports that he does not use drugs. PHYSICAL EXAM     INITIAL VITALS:  height is 6' (1.829 m) and weight is 250 lb (113.4 kg). His oral temperature is 97.9 °F (36.6 °C). His blood pressure is 143/90 (abnormal) and his pulse is 118. His respiration is 15 and oxygen saturation is 92%. Physical Exam  Vitals signs and nursing note reviewed. Constitutional:       General: He is not in acute distress. Appearance: He is well-developed. He is not diaphoretic. HENT:      Head: Normocephalic and atraumatic. Right Ear: External ear normal.      Left Ear: External ear normal.      Nose: Nose normal.   Eyes:      General: Lids are normal. No scleral icterus. Right eye: No discharge. Left eye: No discharge. Conjunctiva/sclera: Conjunctivae normal.      Right eye: No exudate. Left eye: No exudate. Pupils: Pupils are equal, round, and reactive to light. Neck:      Musculoskeletal: Normal range of motion and neck supple. Normal range of motion. Thyroid: No thyromegaly. Vascular: No JVD. Trachea: No tracheal deviation. Cardiovascular:      Rate and Rhythm: Normal rate and regular rhythm. Pulses: Normal pulses. Heart sounds: Normal heart sounds, S1 normal and S2 normal. No murmur. No friction rub. No gallop. Pulmonary:      Effort: Pulmonary effort is normal. No respiratory distress. Breath sounds: Normal breath sounds. No stridor. No wheezing or rales. Chest:      Chest wall: No tenderness. Abdominal:      General: Bowel sounds are normal. There is no distension. Palpations: Abdomen is soft. There is no mass. Tenderness: There is no abdominal tenderness. There is no guarding or rebound. Musculoskeletal: Normal range of motion. General: No tenderness. Right shoulder: He exhibits no tenderness, no bony tenderness, no crepitus and normal strength. Lymphadenopathy:      Cervical: No cervical adenopathy. Skin:     General: Skin is warm and dry. Capillary Refill: Capillary refill takes less than 2 seconds. Findings: No bruising, ecchymosis, lesion or rash. Neurological:      General: No focal deficit present. Mental Status: He is alert and oriented to person, place, and time. Mental status is at baseline. Cranial Nerves: No cranial nerve deficit. Sensory: No sensory deficit. Motor: No weakness. Coordination: Coordination normal.      Deep Tendon Reflexes: Reflexes are normal and symmetric. Psychiatric:         Mood and Affect: Mood normal. Affect is blunt. Speech: Speech is slurred. Behavior: Behavior normal.         Thought Content:  Thought content normal.         Judgment: Judgment normal.           DIFFERENTIAL DIAGNOSIS:   Differential diagnosis includes but not limited to acute alcohol intoxication alcohol detox polysubstance abuse    DIAGNOSTIC RESULTS     EKG: All EKG's are interpreted by the Emergency Department Physician who either signs or Co-signs this chart in the absence of a cardiologist.  None    RADIOLOGY: non-plain film images(s) such as CT, Ultrasound and MRI are read by the radiologist.  None    LABS:   Labs Reviewed   CBC WITH AUTO DIFFERENTIAL - Abnormal; Notable for the following components:       Result Value    WBC 11.1 (*)     MCH 33.7 (*)     MCHC 37.2 (*)     MPV 8.7 (*)     All other components within normal limits   BASIC METABOLIC PANEL - Abnormal; Notable for the following components:    Sodium 131 (*)     Chloride 95 (*)     CO2 21 (*)     Glucose 119 (*)     Calcium 8.4 (*)     All other components within normal limits   HEPATIC FUNCTION PANEL - Abnormal; Notable for the following components:    Bilirubin, Direct 0.7 (*)     Alkaline Phosphatase 240 (*)     All other components within normal limits   LIPASE - Abnormal; Notable for the following components:    Lipase 66.9 (*)     All other components within normal limits   URINE WITH REFLEXED MICRO - Abnormal; Notable for the following components:    Protein, UA 30 (*)     All other components within normal limits   OSMOLALITY - Abnormal; Notable for the following components:    Osmolality Calc 265.3 (*)     All other components within normal limits   TROPONIN   MAGNESIUM   ETHANOL   URINE DRUG SCREEN   GLOMERULAR FILTRATION RATE, ESTIMATED   ANION GAP       EMERGENCY DEPARTMENT COURSE:   Vitals:    Vitals:    09/23/20 0102 09/23/20 0202   BP: (!) 161/99 (!) 143/90   Pulse: 128 118   Resp: 20 15   Temp: 97.9 °F (36.6 °C)    TempSrc: Oral    SpO2: 95% 92%   Weight: 250 lb (113.4 kg)    Height: 6' (1.829 m)      Patient was assessed at bedside appropriate labs were ordered. Patient was given thiamine folic acid and fluids at bedside as well as Zofran. Patient does admit to hearing voices which told him to kill himself.   He has a history of delirium with his acute alcohol

## 2020-09-23 NOTE — PLAN OF CARE
Problem: Falls - Risk of:  Goal: Will remain free from falls  Description: Will remain free from falls  Note: Free of falls this shift call light within reach. Bed alarm on. Falling star program in place. Will continue to monitor. Goal: Absence of physical injury  Description: Absence of physical injury  Note: Free of falls this shift call light within reach. Bed alarm on. Falling star program in place. Will continue to monitor. Problem: Discharge Planning:  Goal: Discharged to appropriate level of care  Description: Discharged to appropriate level of care  Note: Discharge planning in progress. Problem: Cardiac Output - Decreased:  Goal: Hemodynamic stability will improve  Description: Hemodynamic stability will improve  Note: Vitals and labs monitored. Problem: Serum Glucose Level - Abnormal:  Goal: Ability to maintain appropriate glucose levels will improve  Description: Ability to maintain appropriate glucose levels will improve  Note: Glucose levels monitored. Problem: Pain:  Goal: Pain level will decrease  Description: Pain level will decrease  Note: Patient denies pain at this time. Will continue to monitor. Problem: Fluid Volume - Deficit:  Goal: Absence of fluid volume deficit signs and symptoms  Description: Absence of fluid volume deficit signs and symptoms  Note: Vitals, labs and I/Os monitored. Care plans reviewed with patient using teachback methods. No concerns voiced at this time.

## 2020-09-23 NOTE — PROGRESS NOTES
Received call from Vlad Be, RN    Stated that pt wanted to be admitted for alcohol detox, but pt is currently intoxicated. RN informed that pt was not suicidal, but does struggle with depression. Clinician offered to bring A[facility] and [address] resources for pt. 0148: Consulted with Dr Emily Bell face-to-face. Asked if he wanted me to give pt resources. He advised that he might admit pt. Dr Emily Bell offered to give pt resource packet if he is discharged. Pt given to Dr Emily Bell at this time.

## 2020-09-23 NOTE — ED TRIAGE NOTES
PT presents to the ED lobby with c/o alcohol problem. Pt states he is drinking 18 25oz cans of beer daily. Pt was sober for six months earlier this month then relapsed. Pt reports that he drinks all day every day. Pt states he had 12 beers today. PT has hx of DTs which he has been admitted to the hospital for. VSS, respirations even and unlabored.  Pt speech slurred upon arrival.

## 2020-09-23 NOTE — H&P
Take 1 tablet by mouth daily      venlafaxine (EFFEXOR XR) 75 MG extended release capsule Take 1 capsule by mouth daily 30 capsule 3    pantoprazole (PROTONIX) 40 MG tablet Take 1 tablet by mouth daily 30 tablet 0    blood glucose test strips (ASCENSIA AUTODISC VI;ONE TOUCH ULTRA TEST VI) strip Test as needed. Dispense One Touch verio Test Strips. Dx: Type 2 diabetes (250.00) 60 strip 11    sildenafil (VIAGRA) 100 MG tablet Take 1 tablet by mouth daily as needed for Erectile Dysfunction 10 tablet 5    folic acid (FOLVITE) 1 MG tablet Take 1 tablet by mouth daily 30 tablet 5    vitamin B-1 100 MG tablet Take 1 tablet by mouth daily 30 tablet 3    lisinopril-hydrochlorothiazide (PRINZIDE;ZESTORETIC) 20-25 MG per tablet Take 1 tablet by mouth daily 30 tablet 5    albuterol sulfate HFA (PROVENTIL HFA) 108 (90 Base) MCG/ACT inhaler Inhale 2 puffs into the lungs every 4 hours as needed for Wheezing or Shortness of Breath (Space out to every 6 hours as symptoms improve) Space out to every 6 hours as symptoms improve. 1 Inhaler 0    metoprolol tartrate (LOPRESSOR) 50 MG tablet Take 1 tablet by mouth 2 times daily 60 tablet 5    LACTULOSE PO Take by mouth 2 times daily       glucose monitoring kit (FREESTYLE) monitoring kit Glucometer with test strips and lancets. Check BS once daily  #100 strips and lancets 1 kit 5       Allergies:    Patient has no known allergies. Social History:    reports that he has been smoking e-cigarettes and cigarettes. He has a 27.00 pack-year smoking history. He has quit using smokeless tobacco. He reports previous alcohol use of about 140.0 standard drinks of alcohol per week. He reports that he does not use drugs.     Family History:       Problem Relation Age of Onset    High Blood Pressure Father     Cancer Father         Lung Cancer    Alcohol Abuse Father     High Blood Pressure Brother     Diabetes Mother     Heart Disease Mother         Stent Placement    Arthritis Mother     Depression Mother     High Blood Pressure Mother     High Cholesterol Mother     Alcohol Abuse Paternal Uncle        Diet:  No diet orders on file    Review of systems:   Pertinent positives as noted in the HPI. All other systems reviewed and negative. PHYSICAL EXAM:  BP (!) 143/90   Pulse 118   Temp 97.9 °F (36.6 °C) (Oral)   Resp 15   Ht 6' (1.829 m)   Wt 250 lb (113.4 kg)   SpO2 92%   BMI 33.91 kg/m²   General appearance: No apparent distress, appears stated age and cooperative. HEENT: Normal cephalic, atraumatic without obvious deformity. Pupils equal, round, and reactive to light. Extra ocular muscles intact. Conjunctivae/corneas clear. Neck: Supple, with full range of motion. No jugular venous distention. Trachea midline. Respiratory:  Normal respiratory effort. Clear to auscultation, bilaterally without Rales/Wheezes/Rhonchi. Cardiovascular: Regular rate and rhythm with normal S1/S2 without murmurs, rubs or gallops. Abdomen: Soft, non-tender, non-distended with normal bowel sounds. Musculoskeletal:  No clubbing, cyanosis or edema bilaterally. Skin: Skin color, texture, turgor normal.  No rashes or lesions. Neurologic:  Neurovascularly intact without any focal sensory/motor deficits. Cranial nerves: II-XII intact, grossly non-focal.  Psychiatric: somnolent but rousable, thought content appropriate, normal insight  Capillary Refill: Brisk,< 3 seconds   Peripheral Pulses: +2 palpable, equal bilaterally     Labs:   Recent Labs     09/23/20  0128   WBC 11.1*   HGB 17.4   HCT 46.8        Recent Labs     09/23/20  0128   *   K 3.8   CL 95*   CO2 21*   BUN 17   CREATININE 0.7   CALCIUM 8.4*     Recent Labs     09/23/20  0128   BILIDIR 0.7*   BILITOT 1.1   ALKPHOS 240*     No results for input(s): INR in the last 72 hours. No results for input(s): [place] in the last 72 hours.     Urinalysis:    Lab Results   Component Value Date    NITRU NEGATIVE 09/23/2020 WBCUA NONE SEEN 09/23/2020    BACTERIA NONE SEEN 09/23/2020    RBCUA NONE SEEN 09/23/2020    BLOODU NEGATIVE 09/23/2020    SPECGRAV <1.005 08/29/2019    GLUCOSEU NEGATIVE 09/23/2020       Radiology:   No orders to display     No results found.       EKG: none    Electronically signed by Altagracia Gregory PA-C on 9/23/2020 at 2:27 AM

## 2020-09-23 NOTE — ED NOTES
ED to inpatient nurses report    Chief Complaint   Patient presents with    Alcohol Problem      Present to ED from home  LOC: alert and orientated to name, place, date  Vital signs   Vitals:    09/23/20 0102 09/23/20 0202   BP: (!) 161/99 (!) 143/90   Pulse: 128 118   Resp: 20 15   Temp: 97.9 °F (36.6 °C)    TempSrc: Oral    SpO2: 95% 92%   Weight: 250 lb (113.4 kg)    Height: 6' (1.829 m)       Oxygen Baseline 94%    Current needs required room air  Bipap/Cpap No  LDAs:   Peripheral IV 09/23/20 Left Hand (Active)   Site Assessment Clean;Dry; Intact 09/23/20 0202   Line Status Infusing 09/23/20 0202   Dressing Status Clean;Dry; Intact 09/23/20 0202     Mobility: Independent  Pending ED orders: none   Present condition: stable, tachycardic, pt keeps stating he is anxious and paranoid.      Electronically signed by Siomara Dow RN on 9/23/2020 at 2:27 AM       Siomara Dow RN  09/23/20 [id number]

## 2020-09-23 NOTE — CARE COORDINATION
9/23/20, 9:16 AM EDT  DISCHARGE PLANNING EVALUATION:    [address]       Admitted from: ER 9/23/2020/ [address] day: 0   Location: [address] [place] [address]- Reason for admit: Acute alcohol intoxication in patient with alcoholism with blood alcohol level over 0.3, with delirium ([facility] [address] [F10.221] Status: Inpt  Admit order signed?: yes  PMH:  has a past medical history of COPD (chronic obstructive pulmonary disease) ([facility] [address] Depression, Diabetes mellitus ([facility] [address] ETOH abuse, Hyperlipidemia, Hypertension, Liver disease, and Seizures ([facility] [address] Procedure: No  Pertinent abnormal Imaging:No  Medications:  Scheduled Meds:   folic acid  1 mg Oral Daily    lactulose  20 g Oral BID    metoprolol tartrate  50 mg Oral BID    pantoprazole  40 mg Oral QAM AC    venlafaxine  75 mg Oral Daily    sodium chloride flush  10 mL Intravenous 2 times per day    enoxaparin  40 mg Subcutaneous Daily    lisinopril  20 mg Oral Daily    And    hydroCHLOROthiazide  25 mg Oral Daily    nicotine  1 patch Transdermal Daily    insulin lispro  0-6 Units Subcutaneous TID WC    insulin lispro  0-3 Units Subcutaneous Nightly     Continuous Infusions:   dextrose        Pertinent Info/Orders/Treatment Plan: To ER with acute intoxication. Recently started rehab, came back home. Mother couldn't handle. Bro brought him to ER. Highly intoxicated. Noncompliant. Hx ETOH hepatitis. Thiamine, Folic Acid, IVF. Hx deliruim with his detox. Admits to hearing voices telling him to kill himself. On telemetry. NPO. Monitor blood glucose. Diet: Diet NPO Effective Now   Smoking status:  reports that he has been smoking e-cigarettes and cigarettes. He has a 27.00 pack-year smoking history.  He has quit using smokeless tobacco.   PCP: NILAM Dunn CNP  Readmission 30 days or less: No  Readmission Risk Score: 12%    Discharge Planning Evaluation  Current Residence:  Private Residence  Living Arrangements:  Parent   Support Systems:  Parent, Family Members  Current Services PTA:     Potential Assistance Needed:  N/A  Potential Assistance Purchasing Medications:  No  Does patient want to participate in local refill/ meds to beds program?  No  Type of Home Care Services:  None  Patient expects to be discharged to:  home  Expected Discharge date:  09/25/20  Follow Up Appointment: Best Day/ Time: Monday AM    Patient Goals/Plan/Treatment Preferences: CM visit deferred today as pt still intoxicated this morning and dry heaves. May attempt visit at alternate time. Transportation/Food Security/Housekeeping Addressed:  No issues identified.     Evaluation: no

## 2020-09-23 NOTE — PROGRESS NOTES
Pt admitted to  8AB34 via from ED from private residence. .  Complaints: None. IV normal saline infusing into the hand left, condition patent and no redness   IV site free of s/s of infection or infiltration. Vital signs obtained. Assessment and data collection initiated. Two nurse skin assessment performed by latoya DAVALOS and Nu Bess RN. Patient refused to allow this nurse and other RN complete full skin assessment. Oriented to room. Policies and procedures for 8AB explained. All questions answered with no further questions at this time. Fall prevention and safety brochure discussed with patient. Bed alarm on. Call light in reach. Oriented to room. Bryant Montoya RN 9/23/2020 4:05 AM     Explained patients right to have family, representative or physician notified of their admission. Patient has Declined for physician to be notified. Patient has Declined for family/representative to be notified. Patient would like family notified once per shift?  No

## 2020-09-23 NOTE — ED NOTES
PT used urinal. Pt has urinated 900 mL. VS reassessed. RR reg. Pt states he is getting anxious and paranoid. PT appears calm and not agitated. No diaphoresis noted. Pt states his alcohol level is dropping very low. Asked pt is he wanted Zofran for nausea and pt stated that he is not nauseous and does not want it.  Will continue to monitor      Meredith Maguire RN  09/23/20 0477 [facility] RN  09/23/20 [id number]

## 2020-09-24 LAB
ERYTHROCYTE [DISTWIDTH] IN BLOOD BY AUTOMATED COUNT: 13.6 % (ref 11.5–14.5)
ERYTHROCYTE [DISTWIDTH] IN BLOOD BY AUTOMATED COUNT: 46.6 FL (ref 35–45)
ETHYL ALCOHOL, SERUM: < 0.01 %
GLUCOSE BLD-MCNC: 116 MG/DL (ref 70–108)
GLUCOSE BLD-MCNC: 118 MG/DL (ref 70–108)
GLUCOSE BLD-MCNC: 124 MG/DL (ref 70–108)
GLUCOSE BLD-MCNC: 129 MG/DL (ref 70–108)
HCT VFR BLD CALC: 44 % (ref 42–52)
HEMOGLOBIN: 15.7 GM/DL (ref 14–18)
MCH RBC QN AUTO: 33.6 PG (ref 26–33)
MCHC RBC AUTO-ENTMCNC: 35.7 GM/DL (ref 32.2–35.5)
MCV RBC AUTO: 94.2 FL (ref 80–94)
PHOSPHORUS: 2.4 MG/DL (ref 2.4–4.7)
PLATELET # BLD: 165 THOU/MM3 (ref 130–400)
PMV BLD AUTO: 9 FL (ref 9.4–12.4)
RBC # BLD: 4.67 MILL/MM3 (ref 4.7–6.1)
WBC # BLD: 8.7 THOU/MM3 (ref 4.8–10.8)

## 2020-09-24 PROCEDURE — 6370000000 HC RX 637 (ALT 250 FOR IP): Performed by: PHYSICIAN ASSISTANT

## 2020-09-24 PROCEDURE — 94760 N-INVAS EAR/PLS OXIMETRY 1: CPT

## 2020-09-24 PROCEDURE — 84100 ASSAY OF PHOSPHORUS: CPT

## 2020-09-24 PROCEDURE — 6360000002 HC RX W HCPCS: Performed by: PHYSICIAN ASSISTANT

## 2020-09-24 PROCEDURE — 2500000003 HC RX 250 WO HCPCS: Performed by: NURSE PRACTITIONER

## 2020-09-24 PROCEDURE — 85027 COMPLETE CBC AUTOMATED: CPT

## 2020-09-24 PROCEDURE — 6360000002 HC RX W HCPCS: Performed by: NURSE PRACTITIONER

## 2020-09-24 PROCEDURE — 99232 SBSQ HOSP IP/OBS MODERATE 35: CPT | Performed by: NURSE PRACTITIONER

## 2020-09-24 PROCEDURE — G0480 DRUG TEST DEF 1-7 CLASSES: HCPCS

## 2020-09-24 PROCEDURE — 82948 REAGENT STRIP/BLOOD GLUCOSE: CPT

## 2020-09-24 PROCEDURE — 2580000003 HC RX 258: Performed by: NURSE PRACTITIONER

## 2020-09-24 PROCEDURE — 36415 COLL VENOUS BLD VENIPUNCTURE: CPT

## 2020-09-24 PROCEDURE — 2580000003 HC RX 258: Performed by: PHYSICIAN ASSISTANT

## 2020-09-24 PROCEDURE — 1200000003 HC TELEMETRY R&B

## 2020-09-24 RX ORDER — PHENOBARBITAL 32.4 MG/1
32.4 TABLET ORAL 2 TIMES DAILY
Status: COMPLETED | OUTPATIENT
Start: 2020-09-26 | End: 2020-09-27

## 2020-09-24 RX ORDER — PHENOBARBITAL 32.4 MG/1
64.8 TABLET ORAL 2 TIMES DAILY
Status: COMPLETED | OUTPATIENT
Start: 2020-09-25 | End: 2020-09-25

## 2020-09-24 RX ADMIN — METOPROLOL TARTRATE 50 MG: 50 TABLET, FILM COATED ORAL at 09:28

## 2020-09-24 RX ADMIN — FOLIC ACID 1 MG: 1 TABLET ORAL at 09:28

## 2020-09-24 RX ADMIN — METOPROLOL TARTRATE 50 MG: 50 TABLET, FILM COATED ORAL at 20:03

## 2020-09-24 RX ADMIN — SODIUM CHLORIDE, PRESERVATIVE FREE 10 ML: 5 INJECTION INTRAVENOUS at 09:29

## 2020-09-24 RX ADMIN — VENLAFAXINE HYDROCHLORIDE 75 MG: 75 CAPSULE, EXTENDED RELEASE ORAL at 09:28

## 2020-09-24 RX ADMIN — LORAZEPAM 2 MG: 2 INJECTION INTRAMUSCULAR; INTRAVENOUS at 04:07

## 2020-09-24 RX ADMIN — FOLIC ACID: 5 INJECTION, SOLUTION INTRAMUSCULAR; INTRAVENOUS; SUBCUTANEOUS at 12:16

## 2020-09-24 RX ADMIN — PHENOBARBITAL SODIUM 369.85 MG: 65 INJECTION INTRAMUSCULAR; INTRAVENOUS at 17:55

## 2020-09-24 RX ADMIN — PHENOBARBITAL SODIUM 369.85 MG: 65 INJECTION INTRAMUSCULAR; INTRAVENOUS at 13:48

## 2020-09-24 RX ADMIN — LISINOPRIL 20 MG: 20 TABLET ORAL at 09:28

## 2020-09-24 RX ADMIN — PHENOBARBITAL SODIUM 369.85 MG: 65 INJECTION INTRAMUSCULAR; INTRAVENOUS at 11:12

## 2020-09-24 RX ADMIN — LORAZEPAM 1 MG: 2 INJECTION INTRAMUSCULAR; INTRAVENOUS at 09:28

## 2020-09-24 RX ADMIN — PANTOPRAZOLE SODIUM 40 MG: 40 TABLET, DELAYED RELEASE ORAL at 05:38

## 2020-09-24 RX ADMIN — HYDROCHLOROTHIAZIDE 25 MG: 25 TABLET ORAL at 09:28

## 2020-09-24 RX ADMIN — ENOXAPARIN SODIUM 40 MG: 40 INJECTION SUBCUTANEOUS at 09:28

## 2020-09-24 ASSESSMENT — PAIN SCALES - GENERAL
PAINLEVEL_OUTOF10: 0

## 2020-09-24 NOTE — FLOWSHEET NOTE
09/23/20 2045   Provider Notification   Reason for Communication Evaluate   Provider Name Claudean Laser   Provider Notification [facility] Practice Clinician (CNS, NP, CNM, CRNA, PA)   Method of Communication Secure Message   Response Waiting for response   Notification Time 2044     Notified about patient starting to get agitated and shaking. CIWA score is 14 at this time.

## 2020-09-24 NOTE — PLAN OF CARE
Problem: Falls - Risk of:  Goal: Will remain free from falls  Description: Will remain free from falls  Outcome: Ongoing  Note: Pt remains free from falls/injury this shift. Wearing nonslip socks, uses call light appropriately, utilizes hourly rounding. Frequently used items within reach. Goal: Absence of physical injury  Description: Absence of physical injury  Outcome: Ongoing  Note: Pt remains free from falls/injury this shift. Wearing nonslip socks, uses call light appropriately, utilizes hourly rounding. Frequently used items within reach. Problem: Discharge Planning:  Goal: Discharged to appropriate level of care  Description: Discharged to appropriate level of care  Outcome: Ongoing  Note: Plans to discharge home     Problem: Cardiac Output - Decreased:  Goal: Hemodynamic stability will improve  Description: Hemodynamic stability will improve  Outcome: Ongoing     Problem: Serum Glucose Level - Abnormal:  Goal: Ability to maintain appropriate glucose levels will improve  Description: Ability to maintain appropriate glucose levels will improve  Outcome: Ongoing  Note: Glucose level WDL     Problem: Pain:  Goal: Pain level will decrease  Description: Pain level will decrease  Outcome: Ongoing  Note: Denies pain this shift     Problem: Fluid Volume - Deficit:  Goal: Absence of fluid volume deficit signs and symptoms  Description: Absence of fluid volume deficit signs and symptoms  Outcome: Ongoing  Note: Absent of fluid volume deficit. Care plan reviewed with patient. Patient verbalize understanding of the plan of care and contribute to goal setting.

## 2020-09-24 NOTE — PROGRESS NOTES
[address] attempted, pt declined, state he plan on following up outpatient with [facility]. Pt receptive to receiving resources from Clinician including AA/NA Meeting information, OP MH/AOD (including [facility]) and inpatient rehab.

## 2020-09-24 NOTE — PROGRESS NOTES
Hospitalist Progress Note      Patient:  Auburn Shone    Unit/Bed:8B-34/034-A  YOB: 1975  MRN: [id number]   Acct: [de-identified]   PCP: , APRN - CNP  Date of Admission: 9/23/2020    Assessment/Plan:    1. Acute alcohol intoxication: Improved. Patient is recently out of rehab and began drinking again, patient's mother could not manage his at home at this level of intoxication. 2. DM II with insulin use:  Hemoglobin A1c 5.7 on 6/23/2020. Continue Accu-Cheks before meals and at bedtime with sliding scale insulin coverage (low-dose corrective algorithm). Hypoglycemia protocol in place, maintain carb controlled diet. 3. High risk for alcohol withdrawal:  Patient has history of DTs/seizure when in withdrawal, admitted with alcohol level over 0.3. Phenobarbital protocol initiated this morning. Continue banana bags (#2 of 3). 4. Hypertension: History, stable. Currently normotensive. Continue Lopressor. 5. Hyperlipidemia: Patient does not appear to take any statin therapy at home. 6. Depression: History. Continue Effexor ER. 7. Request for STD and HIV testing: patient requesting STD and HIV testing while intoxicated, will obtain consent when patient is no longer intoxicated. 8. Tobacco abuse: Patient is a daily smoker. Counseled on the importance of tobacco cessation. Continue NRT. 9. Obesity: BMI 34.34. Chief Complaint: Acute alcohol intoxication    Initial H and P:-    **From Chart Review:  \"Patient presents to the ED for admission for alcohol withdrawal.  He is highly intoxicated on presentation but his mother has concerns that he will have DTs when his alcohol level goes back down. The patient has a history of the same. The patient was recently in a rehab program.\"    Subjective (past 24 hours):   Patient resting in bed watching TV at the time of the interview.   Currently denies any physical complaints and denies any chest pain, shortness of breath, nausea, vomiting, abdominal pain, diarrhea, constipation, urinary complaints, lightheadedness, dizziness, headaches, changes in vision, fever, or chills. He was updated on the current plan of care, verbalizes understanding, and had no other needs or questions at this time. Past medical history, family history, social history and allergies reviewed again and is unchanged since admission. ROS (14 point review of systems completed. Pertinent positives noted. Otherwise ROS is negative) :  GENERAL: No fever,chills, or night sweats. SKIN: No lesions or rashes. HEAD: No headaches or recent injury. EYES: No acute changes in vision, no diplopia or blurred vision. EARS: No hearing loss, no tinnitus. NOSE/THROAT: No rhinorrhea or pharyngitis, no nasal drainage. NECK: No lumps or unusual neck stiffness. PULMONARY: Respirations easy and non-labored, no acute distress. CARDIAC: No chest pain, pressure, Negative for lower leg edema. GI: Abdomen is soft and non-tender, non-distended. PERIPHERAL VASCULAR: No intermittent claudication or unusual leg cramps. MUSCULOSKELETAL: Occasional arthralgias, myalgias. NEUROLOGICAL: Denies any headache, near syncope, seizures or syncope. HEMATOLOGIC:  No unusual bruising or bleeding. PSYCH: Denies any homicidal or suicidial ideations.     Medications:  Reviewed    Infusion Medications    dextrose       Scheduled Medications    PHENobarbital IVPB  4 mg/kg (Adjusted) Intravenous Q4H    Followed by   [place] ON 9/25/2020] PHENobarbital  64.8 mg Oral BID    Followed by   [place] ON 9/26/2020] PHENobarbital  32.4 mg Oral BID    folic acid  1 mg Oral Daily    lactulose  20 g Oral BID    metoprolol tartrate  50 mg Oral BID    pantoprazole  40 mg Oral QAM AC    venlafaxine  75 mg Oral Daily    sodium chloride flush  10 mL Intravenous 2 times per day    enoxaparin  40 mg Subcutaneous Daily    lisinopril  20 mg Oral Daily    And    hydroCHLOROthiazide  25 mg Oral Daily    nicotine  1 patch Transdermal Daily    insulin lispro  0-6 Units Subcutaneous TID WC    insulin lispro  0-3 Units Subcutaneous Nightly    folic acid, thiamine, multi-vitamin with vitamin K infusion   Intravenous Daily    phosphorus replacement protocol   Other RX Placeholder     PRN Meds: albuterol sulfate HFA, sodium chloride flush, acetaminophen **OR** acetaminophen, polyethylene glycol, promethazine **OR** ondansetron, potassium chloride **OR** potassium alternative oral replacement **OR** potassium chloride, magnesium sulfate, glucose, dextrose, glucagon (rDNA), dextrose, LORazepam **OR** LORazepam **OR** LORazepam **OR** LORazepam **OR** LORazepam **OR** LORazepam **OR** LORazepam **OR** LORazepam      Intake/Output Summary (Last 24 hours) at 9/24/2020 1227  Last data filed at 9/24/2020 1111  Gross per 24 hour   Intake 1814.4 ml   Output 900 ml   Net 914.4 ml       Diet:  DIET CARDIAC; Carb Control: 4 carb choices (60 gms)/meal    Exam:  BP (!) 163/91   Pulse 96   Temp 98.1 °F (36.7 °C) (Oral)   Resp 16   Ht 6' (1.829 m)   Wt 253 lb 3.2 oz (114.9 kg)   SpO2 92%   BMI 34.34 kg/m²     General appearance: Alert and appropriate, pleasant adult male. No apparent distress, appears stated age and cooperative. HEENT: Pupils equal, round, and reactive to light. Conjunctivae/corneas clear. Neck: Supple, with full range of motion. No jugular venous distention. Trachea midline. Respiratory:  Normal respiratory effort. Clear to auscultation, bilaterally without Rales/Wheezes/Rhonchi. Cardiovascular: Regular rate and rhythm with normal S1/S2 without murmurs, rubs or gallops. Abdomen: Soft, non-tender, non-distended with normal bowel sounds. Musculoskeletal: Passive and active ROM x 4 extremities. Skin: Skin color, texture, turgor normal.  No rashes or lesions. Neurologic:  Neurovascularly intact without any focal sensory/motor deficits.  Cranial nerves: II-XII intact, grossly non-focal.  Psychiatric: Alert and oriented to person, place, time, and situation. Thought content appropriate, normal insight  Capillary Refill: Brisk,< 3 seconds   Peripheral Pulses: +2 palpable, equal bilaterally     Labs:   Recent Labs     09/23/20  0128 09/23/20  0518 09/24/20  0431   WBC 11.1* 9.3 8.7   HGB 17.4 16.4 15.7   HCT 46.8 44.9 44.0    218 165     Recent Labs     09/23/20  0518 09/23/20  1134 09/23/20  1415 09/24/20  0431    135 139  --    K 4.0 4.6 4.5  --     99 102  --    CO2 21* 20* 20*  --    BUN 16 16 16  --    CREATININE 0.6 0.6 0.7  --    CALCIUM 8.0* 8.6 8.5  --    PHOS  --  3.3  --  2.4     Recent Labs     09/23/20  0128   *   *   BILIDIR 0.7*   BILITOT 1.1   ALKPHOS 240*     No results for input(s): INR in the last 72 hours. No results for input(s): Edgar Mccormack in the last 72 hours. Microbiology:    Blood culture #1: No results found for: BC    Blood culture #2:No results found for: Joshua Ko    Organism:No results found for: ORG    No results found for: LABGRAM    MRSA culture only:No results found for: [facility]    Urine culture:   Lab Results   Component Value Date    LABURIN No growth-preliminary  No growth 07/09/2014       Respiratory culture: No results found for: CULTRESP    Aerobic and Anaerobic :  No results found for: LABAERO  No results found for: LABANAE    Urinalysis:      Lab Results   Component Value Date    NITRU NEGATIVE 09/23/2020    WBCUA NONE SEEN 09/23/2020    BACTERIA NONE SEEN 09/23/2020    RBCUA NONE SEEN 09/23/2020    BLOODU NEGATIVE 09/23/2020    SPECGRAV <1.005 08/29/2019    Bob Carol Case 994 NEGATIVE 09/23/2020       Radiology:  No orders to display     No results found. **This report has been created using voice recognition software. It may contain minor errors which are inherent in voice recognition technology. **  Electronically signed by NILAM Del Valle - CNP on 9/24/2020 at 12:27 PM

## 2020-09-24 NOTE — PROGRESS NOTES
Utilize [facility] alcohol withdrawal scale (Based on [place] Modified Alcohol Withdrawal Scale). Tabulate score based on classifications including Tremor, Sweating, Hallucination, Orientation, and Agitation. Tremor: 1  Sweatin  Hallucinations: 0  Orientation: 0  Agitation: 1  Total Score: 3  Action perform as described below     Tremor:  No tremor is 0 points. Tremor on movement is 1 point. Tremor at rest is 2 points. Sweating: No Sweat 0 points. Moist is 1 point. Drenching sweats is 2 points. Hallucinations: No present 0 points. Dissuadable is 1 point. Not dissuadable is 2 points. Orientation: Oriented 0 points. Vague/detached 1 point. Disoriented/no contact 2 points. Agitation: Calm 0 points. Anxious 1 point. Panicky 2 points. Check scale every 2 hours. Discontinue scoring with 4 consecutive scorings of 0. Scale 0: No phenobarbital given. Re-assess every 60 minutes as needed. Scale 1-3: Phenobarbital 130 mg IV over 3 minutes. Re-assess every 60 minutes as needed. May administer every 60 minutes to a maximum dose of phenobarbital 1040 mg in 24 hours! Scale 4-8: Phenobarbital 260 mg IV over 5 minutes. Re-assess every 60 minutes as needed. May administer every 60 minutes to a maximum dose of phenobarbital 1040mg in 24 hours! Scale 9-10: Transfer to ICU (if not already in ICU). Administer 10mg/kg phenobarbital IV over 60 minutes. Maximum dose phenobarbital is 1040mg in 24 hours!

## 2020-09-24 NOTE — PLAN OF CARE
Problem: Falls - Risk of:  Goal: Will remain free from falls  Description: Will remain free from falls  Outcome: Ongoing  Note: Patient free from falls. Fall precautions in place. Patient encouraged to use call light. Patient ambulating with staff. Problem: Falls - Risk of:  Goal: Absence of physical injury  Description: Absence of physical injury  Outcome: Ongoing  Note: Patient free from physical injury. Problem: Discharge Planning:  Goal: Discharged to appropriate level of care  Description: Discharged to appropriate level of care  Outcome: Ongoing  Note: Patient plans to discharge home once medically stable. Problem: Cardiac Output - Decreased:  Goal: Hemodynamic stability will improve  Description: Hemodynamic stability will improve  Outcome: Ongoing  Note: Patient will remain hemodynamically stable. Blood pressure elevated. Metoprolol given. Problem: Serum Glucose Level - Abnormal:  Goal: Ability to maintain appropriate glucose levels will improve  Description: Ability to maintain appropriate glucose levels will improve  Outcome: Ongoing  Note: Chems checked ACHS. Sliding scale insulin ordered. No insulin administered. Blood sugar 90       Problem: Pain:  Goal: Pain level will decrease  Description: Pain level will decrease  Outcome: Ongoing  Note: Patient will have decrease pain. Patient will rate pain on a 0-10 scale. Non-pharmaceutical pain interventions will be used before medications. Patient denies pain, no questions or concerns at this time. Problem: Fluid Volume - Deficit:  Goal: Absence of fluid volume deficit signs and symptoms  Description: Absence of fluid volume deficit signs and symptoms  Outcome: Ongoing  Note: Patient's I&O's being monitored, MM intact, no edema noted. IV fluids infusing @ 100mL/ HR     Care plan reviewed with patient, no questions or concerns at this time.

## 2020-09-25 LAB
ANION GAP SERPL CALCULATED.3IONS-SCNC: 9 MEQ/L (ref 8–16)
BUN BLDV-MCNC: 11 MG/DL (ref 7–22)
CALCIUM SERPL-MCNC: 8.7 MG/DL (ref 8.5–10.5)
CHLORIDE BLD-SCNC: 98 MEQ/L (ref 98–111)
CO2: 25 MEQ/L (ref 23–33)
CREAT SERPL-MCNC: 0.7 MG/DL (ref 0.4–1.2)
ERYTHROCYTE [DISTWIDTH] IN BLOOD BY AUTOMATED COUNT: 12.9 % (ref 11.5–14.5)
ERYTHROCYTE [DISTWIDTH] IN BLOOD BY AUTOMATED COUNT: 45.2 FL (ref 35–45)
GFR SERPL CREATININE-BSD FRML MDRD: > 90 ML/MIN/1.73M2
GLUCOSE BLD-MCNC: 101 MG/DL (ref 70–108)
GLUCOSE BLD-MCNC: 104 MG/DL (ref 70–108)
GLUCOSE BLD-MCNC: 117 MG/DL (ref 70–108)
GLUCOSE BLD-MCNC: 118 MG/DL (ref 70–108)
GLUCOSE BLD-MCNC: 141 MG/DL (ref 70–108)
HCT VFR BLD CALC: 43.3 % (ref 42–52)
HEMOGLOBIN: 15.1 GM/DL (ref 14–18)
MCH RBC QN AUTO: 33.1 PG (ref 26–33)
MCHC RBC AUTO-ENTMCNC: 34.9 GM/DL (ref 32.2–35.5)
MCV RBC AUTO: 95 FL (ref 80–94)
PHOSPHORUS: 2.2 MG/DL (ref 2.4–4.7)
PLATELET # BLD: 129 THOU/MM3 (ref 130–400)
PMV BLD AUTO: 9.6 FL (ref 9.4–12.4)
POTASSIUM REFLEX MAGNESIUM: 3.8 MEQ/L (ref 3.5–5.2)
RBC # BLD: 4.56 MILL/MM3 (ref 4.7–6.1)
SODIUM BLD-SCNC: 132 MEQ/L (ref 135–145)
WBC # BLD: 5.3 THOU/MM3 (ref 4.8–10.8)

## 2020-09-25 PROCEDURE — 6360000002 HC RX W HCPCS: Performed by: NURSE PRACTITIONER

## 2020-09-25 PROCEDURE — 99232 SBSQ HOSP IP/OBS MODERATE 35: CPT | Performed by: NURSE PRACTITIONER

## 2020-09-25 PROCEDURE — 36415 COLL VENOUS BLD VENIPUNCTURE: CPT

## 2020-09-25 PROCEDURE — 1200000003 HC TELEMETRY R&B

## 2020-09-25 PROCEDURE — 2580000003 HC RX 258: Performed by: NURSE PRACTITIONER

## 2020-09-25 PROCEDURE — 80048 BASIC METABOLIC PNL TOTAL CA: CPT

## 2020-09-25 PROCEDURE — 6370000000 HC RX 637 (ALT 250 FOR IP): Performed by: PHYSICIAN ASSISTANT

## 2020-09-25 PROCEDURE — 6360000002 HC RX W HCPCS: Performed by: PHYSICIAN ASSISTANT

## 2020-09-25 PROCEDURE — 84100 ASSAY OF PHOSPHORUS: CPT

## 2020-09-25 PROCEDURE — 82948 REAGENT STRIP/BLOOD GLUCOSE: CPT

## 2020-09-25 PROCEDURE — 6370000000 HC RX 637 (ALT 250 FOR IP): Performed by: NURSE PRACTITIONER

## 2020-09-25 PROCEDURE — 94760 N-INVAS EAR/PLS OXIMETRY 1: CPT

## 2020-09-25 PROCEDURE — 85027 COMPLETE CBC AUTOMATED: CPT

## 2020-09-25 PROCEDURE — 2500000003 HC RX 250 WO HCPCS: Performed by: NURSE PRACTITIONER

## 2020-09-25 RX ADMIN — LISINOPRIL 20 MG: 20 TABLET ORAL at 08:11

## 2020-09-25 RX ADMIN — METOPROLOL TARTRATE 50 MG: 50 TABLET, FILM COATED ORAL at 21:55

## 2020-09-25 RX ADMIN — METOPROLOL TARTRATE 50 MG: 50 TABLET, FILM COATED ORAL at 08:11

## 2020-09-25 RX ADMIN — HYDROCHLOROTHIAZIDE 25 MG: 25 TABLET ORAL at 08:11

## 2020-09-25 RX ADMIN — VENLAFAXINE HYDROCHLORIDE 75 MG: 75 CAPSULE, EXTENDED RELEASE ORAL at 08:11

## 2020-09-25 RX ADMIN — PHENOBARBITAL 64.8 MG: 32.4 TABLET ORAL at 21:55

## 2020-09-25 RX ADMIN — FOLIC ACID: 5 INJECTION, SOLUTION INTRAMUSCULAR; INTRAVENOUS; SUBCUTANEOUS at 11:22

## 2020-09-25 RX ADMIN — PHENOBARBITAL 64.8 MG: 32.4 TABLET ORAL at 08:11

## 2020-09-25 RX ADMIN — ENOXAPARIN SODIUM 40 MG: 40 INJECTION SUBCUTANEOUS at 08:19

## 2020-09-25 RX ADMIN — SODIUM PHOSPHATE, MONOBASIC, MONOHYDRATE 17 MMOL: 276; 142 INJECTION, SOLUTION INTRAVENOUS at 08:11

## 2020-09-25 RX ADMIN — FOLIC ACID 1 MG: 1 TABLET ORAL at 08:11

## 2020-09-25 RX ADMIN — PANTOPRAZOLE SODIUM 40 MG: 40 TABLET, DELAYED RELEASE ORAL at 05:21

## 2020-09-25 ASSESSMENT — PAIN SCALES - GENERAL
PAINLEVEL_OUTOF10: 0

## 2020-09-25 NOTE — PROGRESS NOTES
Utilize [facility] alcohol withdrawal scale (Based on [place] Modified Alcohol Withdrawal Scale). Tabulate score based on classifications including Tremor, Sweating, Hallucination, Orientation, and Agitation. Tremor: 1  Sweatin  Hallucinations: 0  Orientation: 0  Agitation: 1  Total Score: 2  Action perform as described below     Tremor:  No tremor is 0 points. Tremor on movement is 1 point. Tremor at rest is 2 points. Sweating: No Sweat 0 points. Moist is 1 point. Drenching sweats is 2 points. Hallucinations: No present 0 points. Dissuadable is 1 point. Not dissuadable is 2 points. Orientation: Oriented 0 points. Vague/detached 1 point. Disoriented/no contact 2 points. Agitation: Calm 0 points. Anxious 1 point. Panicky 2 points. Check scale every 2 hours. Discontinue scoring with 4 consecutive scorings of 0. Scale 0: No phenobarbital given. Re-assess every 60 minutes as needed. Scale 1-3: Phenobarbital 130 mg IV over 3 minutes. Re-assess every 60 minutes as needed. May administer every 60 minutes to a maximum dose of phenobarbital 1040 mg in 24 hours! Scale 4-8: Phenobarbital 260 mg IV over 5 minutes. Re-assess every 60 minutes as needed. May administer every 60 minutes to a maximum dose of phenobarbital 1040mg in 24 hours! Scale 9-10: Transfer to ICU (if not already in ICU). Administer 10mg/kg phenobarbital IV over 60 minutes. Maximum dose phenobarbital is 1040mg in 24 hours!

## 2020-09-25 NOTE — PLAN OF CARE
Problem: Falls - Risk of:  Goal: Absence of physical injury  Description: Absence of physical injury  9/24/2020 2154 by Sera Munoz RN  Outcome: Ongoing  Note: Patient free from physical injury. Problem: Falls - Risk of:  Goal: Will remain free from falls  Description: Will remain free from falls  9/24/2020 2154 by Sera Munoz RN  Outcome: Ongoing  Note: Patient free from falls. Fall precautions in place. Patient encouraged to use call light. Bed alarm on. Patient ambulating with staff. Problem: Discharge Planning:  Goal: Discharged to appropriate level of care  Description: Discharged to appropriate level of care  9/24/2020 2154 by Sera Munoz RN  Outcome: Ongoing  Note: Patient plans to discharge home with mother once medically stable. Problem: Cardiac Output - Decreased:  Goal: Hemodynamic stability will improve  Description: Hemodynamic stability will improve  9/24/2020 2154 by Sera Munoz RN  Outcome: Ongoing  Note: Patient will remain hemodynamically stable. Blood pressure elevated, 50mg metoprolol given . Problem: Serum Glucose Level - Abnormal:  Goal: Ability to maintain appropriate glucose levels will improve  Description: Ability to maintain appropriate glucose levels will improve  9/24/2020 2154 by Sera Munoz RN  Outcome: Ongoing  Note: Chems checked ACHS. Sliding scale insulin ordered. Blood sugar 129. Problem: Pain:  Goal: Pain level will decrease  Description: Pain level will decrease  9/24/2020 2154 by Sera uMnoz RN  Outcome: Ongoing  Note: Patient will have decrease pain. Patient will rate pain on a 0-10 scale. Non-pharmaceutical pain interventions will be used before medications.       Problem: Fluid Volume - Deficit:  Goal: Absence of fluid volume deficit signs and symptoms  Description: Absence of fluid volume deficit signs and symptoms  9/24/2020 2154 by Sera Munoz RN  Outcome: Ongoing  Note: Patient's I&O's being monitored, MM

## 2020-09-25 NOTE — ADT AUTH CERT
Utilization Reviews         Substance-Related Disorders, Adult - Care Day 2 (9/24/2020) by Jenelle Wallace RN         Review Status  Review Entered    Completed  9/25/2020 13:58        Criteria Review       Care Day: 2 Care Date: 9/24/2020 Level of Care:    Guideline Day 2    Clinical Status    (X) * No dangerous behavior [U] for at least 24 hours    9/25/2020 1:58 PM EDT by Asim Preston      No dangerous harm in 24h    (X) * No Current plan for serious Harm for at least 24 hours    9/25/2020 1:58 PM EDT by Zoila Barajas plan for harm    Evaluation    (X) * Substance use, medical, and social assessments completed and reviewed    9/25/2020 1:58 PM EDT by Stanislaw Wan: 47  AOD Consult attempted, pt declined, state he plan on following up outpatient with [facility]. Pt receptive to receiving resources from Clinician including AA/NA Meeting information, OP MH/AOD (including [facility]) and inpatient rehab. * Milestone    Additional Notes    9/24/2020 Care Day 2       Vitals: BP: 163/91, T: 98.1, P: 96, R: 16, Spo2: 92%       CIWA: 8       Abnormal Labs: Glucose 129       Medications: Lovenox 04NO, Folic Acid 1mg, LIsinopril 20mg, Hydrodiuril 25mg, Humalog SS, Lopressor 50mg BID, Nicoderm CQ, Protonix 40mg, Luminal IV q 4h, Banana Bag IV daily, Effexor XR 75mg, Ativan 1mg IV prn x 1, 2mg IV prn x 1       Internal Medicine:    Assessment/Plan:    1. Acute alcohol intoxication: Patient is recently out of rehab and began drinking again, patient's mother could not manage his at home at this level of intoxication. 2. DM II with insulin use:  Hemoglobin A1c 5.7 on 6/23/2020.  Continue Accu-Cheks before meals and at bedtime with sliding scale insulin coverage (low-dose corrective algorithm).  Hypoglycemia protocol in place, maintain carb controlled diet.     3. **High risk for alcohol withdrawal:  Patient has history of DTs/seizure when in withdrawal, admitted with alcohol level over 0.3.  Phenobarbital protocol initiated this morning.  Continue banana bags (#2 of 3). 4. Hypertension: Lopressor. 5. Hyperlipidemia: Patient does not appear to take any statin therapy at home. 6. Depression: History.  Continue Effexor ER. 7. Request for STD and HIV testing: patient requesting STD and HIV testing while intoxicated, will obtain consent when patient is no longer intoxicated. 8. Tobacco abuse: Patient is a daily smoker.  Counseled on the importance of tobacco cessation.  Continue NRT. 9. Obesity: BMI 34.34.

## 2020-09-25 NOTE — CARE COORDINATION
9/25/20, 8:46 AM EDT    DISCHARGE ON GOING EVALUATION    Elida Hernandez day: 2  Location: [address] [place] [address]-A Reason for admit: Acute alcohol intoxication in patient with alcoholism with blood alcohol level over 0.3, with delirium ([facility] [address] [F10.221]   Procedure: No  Treatment Plan of Care: SW from [address] [place] [address] has seen pt to offer services. Apparently (from SW note) pt has a plan to follow up OP with [facility]. Continues with Banana bag. PO Pheno. Met with Mando Baugh today. Lives with his mother. He is not able to drive at present. He has no services or DME. He has a PCP. He states he plans to get back with AA meeting per Zoom and get reconnected with [facility]. Encouraged him to get phone call placed to them while he is sitting here in hospital while awaiting readiness for discharge. He is understanding of this. Barriers to Discharge: Readiness. PCP: NILAM Lopez CNP  Readmission Risk Score: 13%  Patient Goals/Plan/Treatment Preferences:     9/25/20, 10:10 AM EDT    Patient goals/plan/ treatment preferences discussed by  and . Patient goals/plan/ treatment preferences reviewed with patient/ family. Patient/ family verbalize understanding of discharge plan and are in agreement with goal/plan/treatment preferences. Understanding was demonstrated using the teach back method. AVS provided by RN at time of discharge, which includes all necessary medical information pertaining to the patients current course of illness, treatment, post-discharge goals of care, and treatment preferences. Potential discharge in next 24-48 hours. Pt plans to follow with AA and [facility]. No other discharge needs voiced.

## 2020-09-26 LAB
ANION GAP SERPL CALCULATED.3IONS-SCNC: 11 MEQ/L (ref 8–16)
BUN BLDV-MCNC: 10 MG/DL (ref 7–22)
CALCIUM SERPL-MCNC: 8.7 MG/DL (ref 8.5–10.5)
CHLORIDE BLD-SCNC: 102 MEQ/L (ref 98–111)
CO2: 23 MEQ/L (ref 23–33)
CREAT SERPL-MCNC: 0.6 MG/DL (ref 0.4–1.2)
GFR SERPL CREATININE-BSD FRML MDRD: > 90 ML/MIN/1.73M2
GLUCOSE BLD-MCNC: 100 MG/DL (ref 70–108)
GLUCOSE BLD-MCNC: 109 MG/DL (ref 70–108)
GLUCOSE BLD-MCNC: 109 MG/DL (ref 70–108)
GLUCOSE BLD-MCNC: 90 MG/DL (ref 70–108)
POTASSIUM REFLEX MAGNESIUM: 3.6 MEQ/L (ref 3.5–5.2)
SODIUM BLD-SCNC: 136 MEQ/L (ref 135–145)

## 2020-09-26 PROCEDURE — 6370000000 HC RX 637 (ALT 250 FOR IP): Performed by: PHYSICIAN ASSISTANT

## 2020-09-26 PROCEDURE — 36415 COLL VENOUS BLD VENIPUNCTURE: CPT

## 2020-09-26 PROCEDURE — 80048 BASIC METABOLIC PNL TOTAL CA: CPT

## 2020-09-26 PROCEDURE — 6370000000 HC RX 637 (ALT 250 FOR IP): Performed by: NURSE PRACTITIONER

## 2020-09-26 PROCEDURE — 99232 SBSQ HOSP IP/OBS MODERATE 35: CPT | Performed by: NURSE PRACTITIONER

## 2020-09-26 PROCEDURE — 6360000002 HC RX W HCPCS: Performed by: PHYSICIAN ASSISTANT

## 2020-09-26 PROCEDURE — 82948 REAGENT STRIP/BLOOD GLUCOSE: CPT

## 2020-09-26 PROCEDURE — 2580000003 HC RX 258: Performed by: PHYSICIAN ASSISTANT

## 2020-09-26 PROCEDURE — 1200000003 HC TELEMETRY R&B

## 2020-09-26 RX ADMIN — PHENOBARBITAL 32.4 MG: 32.4 TABLET ORAL at 09:43

## 2020-09-26 RX ADMIN — PHENOBARBITAL 32.4 MG: 32.4 TABLET ORAL at 21:23

## 2020-09-26 RX ADMIN — LISINOPRIL 20 MG: 20 TABLET ORAL at 09:42

## 2020-09-26 RX ADMIN — VENLAFAXINE HYDROCHLORIDE 75 MG: 75 CAPSULE, EXTENDED RELEASE ORAL at 09:42

## 2020-09-26 RX ADMIN — SODIUM CHLORIDE, PRESERVATIVE FREE 10 ML: 5 INJECTION INTRAVENOUS at 21:24

## 2020-09-26 RX ADMIN — METOPROLOL TARTRATE 50 MG: 50 TABLET, FILM COATED ORAL at 09:42

## 2020-09-26 RX ADMIN — ENOXAPARIN SODIUM 40 MG: 40 INJECTION SUBCUTANEOUS at 09:42

## 2020-09-26 RX ADMIN — METOPROLOL TARTRATE 50 MG: 50 TABLET, FILM COATED ORAL at 21:23

## 2020-09-26 RX ADMIN — PANTOPRAZOLE SODIUM 40 MG: 40 TABLET, DELAYED RELEASE ORAL at 06:43

## 2020-09-26 RX ADMIN — SODIUM CHLORIDE, PRESERVATIVE FREE 10 ML: 5 INJECTION INTRAVENOUS at 09:43

## 2020-09-26 RX ADMIN — FOLIC ACID 1 MG: 1 TABLET ORAL at 09:42

## 2020-09-26 RX ADMIN — HYDROCHLOROTHIAZIDE 25 MG: 25 TABLET ORAL at 09:42

## 2020-09-26 ASSESSMENT — PAIN SCALES - GENERAL: PAINLEVEL_OUTOF10: 0

## 2020-09-26 NOTE — PROGRESS NOTES
Hospitalist Progress Note      Patient:  Estefania Pal    Unit/Bed:8B-34/034-A  YOB: 1975  MRN: [id number]   Acct: [de-identified]   PCP: NILAM Vigil CNP  Date of Admission: 9/23/2020    Assessment/Plan:    1. Acute alcohol intoxication:  Improved.  Patient is recently out of rehab and began drinking again, patient's mother could not manage his at home at this level of intoxication. 2. DM II with insulin use:  Hemoglobin A1c 5.7 on 6/23/2020.  Continue Accu-Cheks before meals and at bedtime with sliding scale insulin coverage (low-dose corrective algorithm).  Hypoglycemia protocol in place, maintain carb controlled diet. 3. High risk for alcohol withdrawal:  Patient has history of DTs/seizure when in withdrawal, admitted with alcohol level over 0.3. Continue phenobarbital panel (Day #3 of 3). Received a course of 3 banana bags. 4. Hypertension: History, stable.  Currently normotensive.  Continue Lopressor. 5. Hyperlipidemia: Patient does not appear to take any statin therapy at home. 6. Depression: History.  Continue Effexor ER. 7. Request for STD and HIV testing: Patient requesting STD and HIV testing while intoxicated, will obtain consent when patient is no longer intoxicated. 8. Tobacco abuse: Patient is a daily smoker.  Counseled on the importance of tobacco cessation.  Continue NRT. 9. Obesity: BMI 34.15. Disposition: Likely discharge tomorrow.     Chief Complaint: Acute alcohol intoxication    Initial H and P:-    **From Chart Review:  \"Patient presents to the ED for admission for alcohol withdrawal. [facility] is highly intoxicated on presentation but his mother has concerns that he will have DTs when his alcohol level goes back down.  The patient has a history of the same.  The patient was recently in a rehab program.\"    Subjective (past 24 hours):   Patient resting in bed at the time of the interview with no changes to report overnight or throughout the morning. Currently denies any physical complaints and states that he is tired and just wants to rest today. He was updated on the current plan of care, verbalizes understanding, had no other needs or questions at this time. Past medical history, family history, social history and allergies reviewed again and is unchanged since admission. ROS (14 point review of systems completed. Pertinent positives noted. Otherwise ROS is negative) :  GENERAL: No fever,chills, or night sweats. SKIN: No lesions or rashes. HEAD: No headaches or recent injury. EYES: No acute changes in vision, no diplopia or blurred vision. EARS: No hearing loss, no tinnitus. NOSE/THROAT: No rhinorrhea or pharyngitis, no nasal drainage. NECK: No lumps or unusual neck stiffness. PULMONARY: Respirations easy and non-labored, no acute distress. CARDIAC: No chest pain, pressure, Negative for lower leg edema. GI: Abdomen is soft and non-tender, non-distended. PERIPHERAL VASCULAR: No intermittent claudication or unusual leg cramps. MUSCULOSKELETAL: Occasional arthralgias, myalgias. NEUROLOGICAL: Denies any headache, near syncope, seizures or syncope. HEMATOLOGIC:  No unusual bruising or bleeding. PSYCH: Denies any homicidal or suicidial ideations.     Medications:  Reviewed    Infusion Medications    dextrose       Scheduled Medications    PHENobarbital  32.4 mg Oral BID    folic acid  1 mg Oral Daily    lactulose  20 g Oral BID    metoprolol tartrate  50 mg Oral BID    pantoprazole  40 mg Oral QAM AC    venlafaxine  75 mg Oral Daily    sodium chloride flush  10 mL Intravenous 2 times per day    enoxaparin  40 mg Subcutaneous Daily    lisinopril  20 mg Oral Daily    And    hydroCHLOROthiazide  25 mg Oral Daily    nicotine  1 patch Transdermal Daily    insulin lispro  0-6 Units Subcutaneous TID WC    insulin lispro  0-3 Units Subcutaneous Nightly    phosphorus replacement protocol   Other RX Placeholder     PRN Meds: albuterol sulfate HFA, sodium chloride flush, acetaminophen **OR** acetaminophen, polyethylene glycol, promethazine **OR** ondansetron, potassium chloride **OR** potassium alternative oral replacement **OR** potassium chloride, magnesium sulfate, glucose, dextrose, glucagon (rDNA), dextrose      Intake/Output Summary (Last 24 hours) at 9/26/2020 1422  Last data filed at 9/26/2020 1239  Gross per 24 hour   Intake 130 ml   Output 2725 ml   Net -2595 ml       Diet:  DIET CARDIAC; Carb Control: 4 carb choices (60 gms)/meal    Exam:  /64   Pulse 70   Temp 98.6 °F (37 °C) (Oral)   Resp 16   Ht 6' (1.829 m)   Wt 251 lb 12.8 oz (114.2 kg)   SpO2 96%   BMI 34.15 kg/m²     General appearance: Alert and appropriate, pleasant adult male.  No apparent distress, appears stated age and cooperative. HEENT: Pupils equal, round, and reactive to light. Conjunctivae/corneas clear. Neck: Supple, with full range of motion. No jugular venous distention. Trachea midline. Respiratory:  Normal respiratory effort. Clear to auscultation, bilaterally without Rales/Wheezes/Rhonchi. Cardiovascular: Regular rate and rhythm with normal S1/S2 without murmurs, rubs or gallops. Abdomen: Soft, non-tender, non-distended with normal bowel sounds. Musculoskeletal: Passive and active ROM x 4 extremities. Skin: Skin color, texture, turgor normal.  No rashes or lesions. Neurologic:  Neurovascularly intact without any focal sensory/motor deficits. Cranial nerves: II-XII intact, grossly non-focal.  Psychiatric: Alert and oriented to person, place, time, and situation.  Thought content appropriate, normal insight  Capillary Refill: Brisk,< 3 seconds   Peripheral Pulses: +2 palpable, equal bilaterally    Labs:   Recent Labs     09/24/20 0431 09/25/20 0444   WBC 8.7 5.3   HGB 15.7 15.1   HCT 44.0 43.3    129*     Recent Labs     09/24/20  0431 09/25/20 0444 09/26/20 0443   NA  --  132* 136   K  --  3.8 3.6

## 2020-09-26 NOTE — PROGRESS NOTES
Hospitalist Progress Note      Patient:  Josias Emerson    Unit/Bed:8B-34/034-A  YOB: 1975  MRN: [id number]   Acct: [de-identified]   PCP: NILAM Oropeza CNP  Date of Admission: 9/23/2020    Assessment/Plan:    1. Acute alcohol intoxication:  Resolved. Patient is recently out of rehab and began drinking again, patient's mother could not manage his at home at this level of intoxication. 2. DM II with insulin use:  Hemoglobin A1c 5.7 on 6/23/2020. Continue Accu-Cheks before meals and at bedtime with sliding scale insulin coverage (low-dose corrective algorithm). Hypoglycemia protocol in place, maintain carb controlled diet. 3. High risk for alcohol withdrawal:  Patient has history of DTs/seizure when in withdrawal, admitted with alcohol level over 0.3. Continue phenobarbital panel (Day #2 of 3). Continue banana bags (#3 of 3). 4. Hypertension: History, stable. Currently normotensive. Continue Lopressor. 5. Hyperlipidemia: Patient does not appear to take any statin therapy at home. 6. Depression: History. Continue Effexor ER. 7. Request for STD and HIV testing: Patient requesting STD and HIV testing while intoxicated, will obtain consent when patient is no longer intoxicated. 8. Tobacco abuse: Patient is a daily smoker. Counseled on the importance of tobacco cessation. Continue NRT. 9. Obesity: BMI 34.15. Chief Complaint: Acute alcohol intoxication    Initial H and P:-    **From Chart Review:  \"Patient presents to the ED for admission for alcohol withdrawal. Rei Samaniego is highly intoxicated on presentation but his mother has concerns that he will have DTs when his alcohol level goes back down.  The patient has a history of the same.  The patient was recently in a rehab program.\"    Subjective (past 24 hours):   Patient resting bed the time of the interview.   No changes or complaints to report overnight or throughout the morning from yesterday. He was updated on the current plan of care, verbalizes understanding, and had no other needs or questions at this time. Past medical history, family history, social history and allergies reviewed again and is unchanged since admission. ROS (14 point review of systems completed. Pertinent positives noted. Otherwise ROS is negative) :  GENERAL: No fever,chills, or night sweats. SKIN: No lesions or rashes. HEAD: No headaches or recent injury. EYES: No acute changes in vision, no diplopia or blurred vision. EARS: No hearing loss, no tinnitus. NOSE/THROAT: No rhinorrhea or pharyngitis, no nasal drainage. NECK: No lumps or unusual neck stiffness. PULMONARY: Respirations easy and non-labored, no acute distress. CARDIAC: No chest pain, pressure, Negative for lower leg edema. GI: Abdomen is soft and non-tender, non-distended. PERIPHERAL VASCULAR: No intermittent claudication or unusual leg cramps. MUSCULOSKELETAL: Occasional arthralgias, myalgias. NEUROLOGICAL: Denies any headache, near syncope, seizures or syncope. HEMATOLOGIC:  No unusual bruising or bleeding. PSYCH: Denies any homicidal or suicidial ideations.     Medications:  Reviewed    Infusion Medications    dextrose       Scheduled Medications    PHENobarbital  64.8 mg Oral BID    Followed by   Roanna Counter ON 9/26/2020] PHENobarbital  32.4 mg Oral BID    folic acid  1 mg Oral Daily    lactulose  20 g Oral BID    metoprolol tartrate  50 mg Oral BID    pantoprazole  40 mg Oral QAM AC    venlafaxine  75 mg Oral Daily    sodium chloride flush  10 mL Intravenous 2 times per day    enoxaparin  40 mg Subcutaneous Daily    lisinopril  20 mg Oral Daily    And    hydroCHLOROthiazide  25 mg Oral Daily    nicotine  1 patch Transdermal Daily    insulin lispro  0-6 Units Subcutaneous TID WC    insulin lispro  0-3 Units Subcutaneous Nightly    phosphorus replacement protocol   Other RX Placeholder     PRN Meds: albuterol sulfate HFA, sodium chloride flush, acetaminophen **OR** acetaminophen, polyethylene glycol, promethazine **OR** ondansetron, potassium chloride **OR** potassium alternative oral replacement **OR** potassium chloride, magnesium sulfate, glucose, dextrose, glucagon (rDNA), dextrose      Intake/Output Summary (Last 24 hours) at 9/25/2020 2110  Last data filed at 9/25/2020 2059  Gross per 24 hour   Intake 1000 ml   Output 4250 ml   Net -3250 ml       Diet:  DIET CARDIAC; Carb Control: 4 carb choices (60 gms)/meal    Exam:  /66   Pulse 81   Temp 97.5 °F (36.4 °C) (Oral)   Resp 16   Ht 6' (1.829 m)   Wt 251 lb 12.8 oz (114.2 kg)   SpO2 92%   BMI 34.15 kg/m²     General appearance: Alert and appropriate, pleasant adult male. No apparent distress, appears stated age and cooperative. HEENT: Pupils equal, round, and reactive to light. Conjunctivae/corneas clear. Neck: Supple, with full range of motion. No jugular venous distention. Trachea midline. Respiratory:  Normal respiratory effort. Clear to auscultation, bilaterally without Rales/Wheezes/Rhonchi. Cardiovascular: Regular rate and rhythm with normal S1/S2 without murmurs, rubs or gallops. Abdomen: Soft, non-tender, non-distended with normal bowel sounds. Musculoskeletal: Passive and active ROM x 4 extremities. Skin: Skin color, texture, turgor normal.  No rashes or lesions. Neurologic:  Neurovascularly intact without any focal sensory/motor deficits. Cranial nerves: II-XII intact, grossly non-focal.  Psychiatric: Alert and oriented to person, place, time, and situation.  Thought content appropriate, normal insight  Capillary Refill: Brisk,< 3 seconds   Peripheral Pulses: +2 palpable, equal bilaterally    Labs:   Recent Labs     09/23/20  0518 09/24/20  0431 09/25/20  0444   WBC 9.3 8.7 5.3   HGB 16.4 15.7 15.1   HCT 44.9 44.0 43.3    165 129*     Recent Labs     09/23/20  1134 09/23/20  1415 09/24/20  0431 09/25/20  0444    139  --  132*   K 4.6 4.5 --  3.8   CL 99 102  --  98   CO2 20* 20*  --  25   BUN 16 16  --  11   CREATININE 0.6 0.7  --  0.7   CALCIUM 8.6 8.5  --  8.7   PHOS 3.3  --  2.4 2.2*     Recent Labs     09/23/20  0128   *   *   BILIDIR 0.7*   BILITOT 1.1   ALKPHOS 240*     No results for input(s): INR in the last 72 hours. No results for input(s): Donnell Service in the last 72 hours. Microbiology:    Blood culture #1: No results found for: BC    Blood culture #2:No results found for: Lois Wells    Organism:No results found for: ORG    No results found for: LABGRAM    MRSA culture only:No results found for: [facility]    Urine culture:   Lab Results   Component Value Date    LABURIN No growth-preliminary  No growth 07/09/2014       Respiratory culture: No results found for: CULTRESP    Aerobic and Anaerobic :  No results found for: LABAERO  No results found for: LABANAE    Urinalysis:      Lab Results   Component Value Date    NITRU NEGATIVE 09/23/2020    WBCUA NONE SEEN 09/23/2020    BACTERIA NONE SEEN 09/23/2020    RBCUA NONE SEEN 09/23/2020    BLOODU NEGATIVE 09/23/2020    SPECGRAV <1.005 08/29/2019    Bob Case 994 NEGATIVE 09/23/2020       Radiology:  No orders to display     No results found. **This report has been created using voice recognition software. It may contain minor errors which are inherent in voice recognition technology. **  Electronically signed by NILAM Fallon CNP on 9/25/2020 at 9:10 PM

## 2020-09-27 VITALS
OXYGEN SATURATION: 95 % | HEART RATE: 80 BPM | DIASTOLIC BLOOD PRESSURE: 63 MMHG | WEIGHT: 251.8 LBS | RESPIRATION RATE: 18 BRPM | TEMPERATURE: 98.1 F | HEIGHT: 72 IN | BODY MASS INDEX: 34.1 KG/M2 | SYSTOLIC BLOOD PRESSURE: 122 MMHG

## 2020-09-27 LAB
ANION GAP SERPL CALCULATED.3IONS-SCNC: 12 MEQ/L (ref 8–16)
BUN BLDV-MCNC: 16 MG/DL (ref 7–22)
CALCIUM SERPL-MCNC: 9 MG/DL (ref 8.5–10.5)
CHLORIDE BLD-SCNC: 98 MEQ/L (ref 98–111)
CO2: 24 MEQ/L (ref 23–33)
CREAT SERPL-MCNC: 0.7 MG/DL (ref 0.4–1.2)
GFR SERPL CREATININE-BSD FRML MDRD: > 90 ML/MIN/1.73M2
GLUCOSE BLD-MCNC: 101 MG/DL (ref 70–108)
GLUCOSE BLD-MCNC: 106 MG/DL (ref 70–108)
MAGNESIUM: 2.1 MG/DL (ref 1.6–2.4)
POTASSIUM REFLEX MAGNESIUM: 3.5 MEQ/L (ref 3.5–5.2)
SODIUM BLD-SCNC: 134 MEQ/L (ref 135–145)

## 2020-09-27 PROCEDURE — 94760 N-INVAS EAR/PLS OXIMETRY 1: CPT

## 2020-09-27 PROCEDURE — 36415 COLL VENOUS BLD VENIPUNCTURE: CPT

## 2020-09-27 PROCEDURE — 99238 HOSP IP/OBS DSCHRG MGMT 30/<: CPT | Performed by: NURSE PRACTITIONER

## 2020-09-27 PROCEDURE — 6370000000 HC RX 637 (ALT 250 FOR IP): Performed by: NURSE PRACTITIONER

## 2020-09-27 PROCEDURE — 6360000002 HC RX W HCPCS: Performed by: PHYSICIAN ASSISTANT

## 2020-09-27 PROCEDURE — 82948 REAGENT STRIP/BLOOD GLUCOSE: CPT

## 2020-09-27 PROCEDURE — 6370000000 HC RX 637 (ALT 250 FOR IP): Performed by: PHYSICIAN ASSISTANT

## 2020-09-27 PROCEDURE — 80048 BASIC METABOLIC PNL TOTAL CA: CPT

## 2020-09-27 PROCEDURE — 83735 ASSAY OF MAGNESIUM: CPT

## 2020-09-27 RX ORDER — M-VIT,TX,IRON,MINS/CALC/FOLIC 27MG-0.4MG
1 TABLET ORAL DAILY
Qty: 30 TABLET | Refills: 0 | Status: SHIPPED | OUTPATIENT
Start: 2020-09-27 | End: 2021-03-29

## 2020-09-27 RX ORDER — NICOTINE 21 MG/24HR
1 PATCH, TRANSDERMAL 24 HOURS TRANSDERMAL DAILY
Qty: 30 PATCH | Refills: 0 | Status: ON HOLD | OUTPATIENT
Start: 2020-09-28 | End: 2021-03-17

## 2020-09-27 RX ADMIN — METOPROLOL TARTRATE 50 MG: 50 TABLET, FILM COATED ORAL at 08:28

## 2020-09-27 RX ADMIN — VENLAFAXINE HYDROCHLORIDE 75 MG: 75 CAPSULE, EXTENDED RELEASE ORAL at 08:28

## 2020-09-27 RX ADMIN — PANTOPRAZOLE SODIUM 40 MG: 40 TABLET, DELAYED RELEASE ORAL at 05:52

## 2020-09-27 RX ADMIN — LISINOPRIL 20 MG: 20 TABLET ORAL at 08:28

## 2020-09-27 RX ADMIN — ENOXAPARIN SODIUM 40 MG: 40 INJECTION SUBCUTANEOUS at 08:28

## 2020-09-27 RX ADMIN — FOLIC ACID 1 MG: 1 TABLET ORAL at 08:28

## 2020-09-27 RX ADMIN — HYDROCHLOROTHIAZIDE 25 MG: 25 TABLET ORAL at 08:28

## 2020-09-27 RX ADMIN — PHENOBARBITAL 32.4 MG: 32.4 TABLET ORAL at 08:28

## 2020-09-27 ASSESSMENT — PAIN SCALES - GENERAL: PAINLEVEL_OUTOF10: 0

## 2020-09-27 NOTE — PROGRESS NOTES
Patient given discharge instructions and discussed with patient at bedside. Pt has no further questions at this time. Belongings given, patient awaiting family member for a ride home.

## 2020-09-27 NOTE — PLAN OF CARE
Problem: Falls - Risk of:  Goal: Will remain free from falls  Description: Will remain free from falls  Outcome: Ongoing  Note: Patient free of falls this shift. Patient on falling star program. Fall band intact. RN visually checks on patient with hourly rounds. Patient tolerates ambulation each shift. Goal: Absence of physical injury  Description: Absence of physical injury  Outcome: Ongoing  Note: Patient free from injury this shift     Problem: Discharge Planning:  Goal: Discharged to appropriate level of care  Description: Discharged to appropriate level of care  Outcome: Ongoing  Note: Patient plans to return home at discharge. Problem: Cardiac Output - Decreased:  Goal: Hemodynamic stability will improve  Description: Hemodynamic stability will improve  Outcome: Ongoing  Note: Blood pressure WNL. Vitals monitored and recorded. Patient hemodynamically stable. Cardiac monitor in place with strips being read every four hours. Problem: Serum Glucose Level - Abnormal:  Goal: Ability to maintain appropriate glucose levels will improve  Description: Ability to maintain appropriate glucose levels will improve  Outcome: Ongoing  Note: Chem sticks being monitored ACHS. Oral and prescribed insulins being given as needed. Patient being monitored for hyper and hypoglycemia. Problem: Pain:  Goal: Pain level will decrease  Description: Pain level will decrease  Outcome: Ongoing  Note: Patient denies pain this shift     Problem: Fluid Volume - Deficit:  Goal: Absence of fluid volume deficit signs and symptoms  Description: Absence of fluid volume deficit signs and symptoms  Outcome: Ongoing  Note: I & O's are monitored every shift   Care plan reviewed with patient. Patient verbalizes understanding of the plan of care and contribute to goal setting.

## 2020-09-27 NOTE — DISCHARGE SUMMARY
No rashes or lesions. Neurologic:  Neurovascularly intact without any focal sensory/motor deficits. Cranial nerves: II-XII intact, grossly non-focal.  Psychiatric: Alert and oriented to person, place, time, and situation. Thought content appropriate, normal insight  Capillary Refill: Brisk,< 3 seconds   Peripheral Pulses: +2 palpable, equal bilaterally    Discharge Medications:-      Medication List      START taking these medications    nicotine 21 MG/24HR  Commonly known as:  [id number] [facility] 1 patch onto the skin daily  Start taking on:  September 28, 2020     therapeutic multivitamin-minerals tablet  Take 1 tablet by mouth daily        CONTINUE taking these medications    albuterol sulfate  (90 Base) MCG/ACT inhaler  Commonly known as:  Proventil HFA  Inhale 2 puffs into the lungs every 4 hours as needed for Wheezing or Shortness of Breath (Space out to every 6 hours as symptoms improve) Space out to every 6 hours as symptoms improve. blood glucose test strips strip  Commonly known as:  ASCENSIA AUTODISC VI;ONE TOUCH ULTRA TEST VI  Test as needed. Dispense One Touch verio Test Strips. Dx: Type 2 diabetes (582.33)     folic acid 1 MG tablet  Commonly known as:  FOLVITE  Take 1 tablet by mouth daily     glucose monitoring kit monitoring kit  Glucometer with test strips and lancets.   Check BS once daily  #100 strips and lancets     LACTULOSE PO     lisinopril-hydroCHLOROthiazide 20-25 MG per tablet  Commonly known as:  PRINZIDE;ZESTORETIC  Take 1 tablet by mouth daily     metoprolol tartrate 50 MG tablet  Commonly known as:  LOPRESSOR  Take 1 tablet by mouth 2 times daily     pantoprazole 40 MG tablet  Commonly known as:  PROTONIX  Take 1 tablet by mouth daily     sildenafil 100 MG tablet  Commonly known as:  VIAGRA  Take 1 tablet by mouth daily as needed for Erectile Dysfunction     thiamine 100 MG tablet  Take 1 tablet by mouth daily     venlafaxine 75 MG extended release capsule  Commonly known as:  Effexor XR  Take 1 capsule by mouth daily        STOP taking these medications    MULTI VITAMIN MENS PO           Where to Get Your Medications      These medications were sent to [facility], [address] [phone] - f [phone] [address], [place] [id number]    Phone:  [phone]   · nicotine 21 MG/24HR  · therapeutic multivitamin-minerals tablet          Labs :-  Recent Results (from the past 72 hour(s))   POCT Glucose    Collection Time: 09/24/20 12:22 PM   Result Value Ref Range    POC Glucose 124 (H) 70 - 108 mg/dl   POCT Glucose    Collection Time: 09/24/20  5:24 PM   Result Value Ref Range    POC Glucose 116 (H) 70 - 108 mg/dl   POCT Glucose    Collection Time: 09/24/20  7:42 PM   Result Value Ref Range    POC Glucose 129 (H) 70 - 108 mg/dl   CBC    Collection Time: 09/25/20  4:44 AM   Result Value Ref Range    WBC 5.3 4.8 - 10.8 thou/mm3    RBC 4.56 (L) 4.70 - 6.10 mill/mm3    Hemoglobin 15.1 14.0 - 18.0 gm/dl    Hematocrit 43.3 42.0 - 52.0 %    MCV 95.0 (H) 80.0 - 94.0 fL    MCH 33.1 (H) 26.0 - 33.0 pg    MCHC 34.9 32.2 - 35.5 gm/dl    RDW-CV 12.9 11.5 - 14.5 %    RDW-SD 45.2 (H) 35.0 - 45.0 fL    Platelets 931 (L) 071 - 400 thou/mm3    MPV 9.6 9.4 - 12.4 fL   Basic Metabolic Panel w/ Reflex to MG    Collection Time: 09/25/20  4:44 AM   Result Value Ref Range    Sodium 132 (L) 135 - 145 meq/L    Potassium reflex Magnesium 3.8 3.5 - 5.2 meq/L    Chloride 98 98 - 111 meq/L    CO2 25 23 - 33 meq/L    Glucose 117 (H) 70 - 108 mg/dL    BUN 11 7 - 22 mg/dL    CREATININE 0.7 0.4 - 1.2 mg/dL    Calcium 8.7 8.5 - 10.5 mg/dL   Phosphorus    Collection Time: 09/25/20  4:44 AM   Result Value Ref Range    Phosphorus 2.2 (L) 2.4 - 4.7 mg/dL   Anion Gap    Collection Time: 09/25/20  4:44 AM   Result Value Ref Range    Anion Gap 9.0 8.0 - 16.0 meq/L   Glomerular Filtration Rate, Estimated    Collection Time: 09/25/20  4:44 AM   Result Value Ref Range    Est, Glom Filt Rate >90 ml/min/1.73m2   POCT Glucose    Collection Time: 09/25/20  8:35 AM   Result Value Ref Range    POC Glucose 118 (H) 70 - 108 mg/dl   POCT Glucose    Collection Time: 09/25/20 12:28 PM   Result Value Ref Range    POC Glucose 141 (H) 70 - 108 mg/dl   POCT Glucose    Collection Time: 09/25/20  5:30 PM   Result Value Ref Range    POC Glucose 101 70 - 108 mg/dl   POCT Glucose    Collection Time: 09/25/20  8:54 PM   Result Value Ref Range    POC Glucose 104 70 - 108 mg/dl   Basic Metabolic Panel w/ Reflex to MG    Collection Time: 09/26/20  4:43 AM   Result Value Ref Range    Sodium 136 135 - 145 meq/L    Potassium reflex Magnesium 3.6 3.5 - 5.2 meq/L    Chloride 102 98 - 111 meq/L    CO2 23 23 - 33 meq/L    Glucose 90 70 - 108 mg/dL    BUN 10 7 - 22 mg/dL    CREATININE 0.6 0.4 - 1.2 mg/dL    Calcium 8.7 8.5 - 10.5 mg/dL   Anion Gap    Collection Time: 09/26/20  4:43 AM   Result Value Ref Range    Anion Gap 11.0 8.0 - 16.0 meq/L   Glomerular Filtration Rate, Estimated    Collection Time: 09/26/20  4:43 AM   Result Value Ref Range    Est, Glom Filt Rate >90 ml/min/1.73m2   POCT Glucose    Collection Time: 09/26/20  8:03 AM   Result Value Ref Range    POC Glucose 100 70 - 108 mg/dl   POCT Glucose    Collection Time: 09/26/20 12:37 PM   Result Value Ref Range    POC Glucose 109 (H) 70 - 108 mg/dl   POCT Glucose    Collection Time: 09/26/20  7:48 PM   Result Value Ref Range    POC Glucose 109 (H) 70 - 108 mg/dl   Basic Metabolic Panel w/ Reflex to MG    Collection Time: 09/27/20  6:46 AM   Result Value Ref Range    Sodium 134 (L) 135 - 145 meq/L    Potassium reflex Magnesium 3.5 3.5 - 5.2 meq/L    Chloride 98 98 - 111 meq/L    CO2 24 23 - 33 meq/L    Glucose 101 70 - 108 mg/dL    BUN 16 7 - 22 mg/dL    CREATININE 0.7 0.4 - 1.2 mg/dL    Calcium 9.0 8.5 - 10.5 mg/dL   Anion Gap    Collection Time: 09/27/20  6:46 AM   Result Value Ref Range    Anion Gap 12.0 8.0 - 16.0 meq/L   Glomerular Filtration Rate, Estimated    Collection Time: 09/27/20  6:46 AM   Result Value Ref Range    Est, Glom Filt Rate >90 ml/min/1.73m2   Magnesium    Collection Time: 09/27/20  6:46 AM   Result Value Ref Range    Magnesium 2.1 1.6 - 2.4 mg/dL   POCT Glucose    Collection Time: 09/27/20  7:56 AM   Result Value Ref Range    POC Glucose 106 70 - 108 mg/dl        Microbiology:    Blood culture #1: No results found for: BC    Blood culture #2:No results found for: BLOODCULT2    Organism:  No results found for: LABGRAM    MRSA culture only:No results found for: [facility]    Urine culture:   Lab Results   Component Value Date    LABURIN No growth-preliminary  No growth 07/09/2014     No results found for: ORG     Respiratory culture: No results found for: CULTRESP    Aerobic and Anaerobic :  No results found for: LABAERO  No results found for: LABANAE    Urinalysis:      Lab Results   Component Value Date    NITRU NEGATIVE 09/23/2020    WBCUA NONE SEEN 09/23/2020    BACTERIA NONE SEEN 09/23/2020    RBCUA NONE SEEN 09/23/2020    BLOODU NEGATIVE 09/23/2020    SPECGRAV <1.005 08/29/2019    [address] 994 NEGATIVE 09/23/2020       Radiology:-  No results found. Follow-up scheduled after discharge :-    in 1-2 weeks with NILAM Avalos CNP  This week with [facility]    Consultations during this hospital stay:-  [] NONE [] Cardiology  [] Nephrology  [] Hemo onco   [] GI   [] ID  [] Endocrine  [] Pulm    [] Neuro    [] Psych   [] Urology  [] ENT   [] G SURGERY   []Ortho    []CV surg    [] Palliative  [] Hospice [] Pain management   [x]    []TCU   [] PT/OT  OTHERS:-    Disposition: home  Condition at Discharge: Stable    Time Spent:- 25 minutes    **This report has been created using voice recognition software. It may contain minor errors which are inherent in voice recognition technology. **  Electronically signed by NILAM Nieto CNP on 9/27/2020 at 11:01 AM  Discharging Hospitalist

## 2020-09-28 ENCOUNTER — TELEPHONE (OUTPATIENT)
Dept: FAMILY MEDICINE CLINIC | Age: 45
End: 2020-09-28

## 2020-09-28 NOTE — TELEPHONE ENCOUNTER
[facility] Transitions Initial Follow Up Call    Outreach made within 2 business days of discharge: Yes    Patient: Nehal Akbar Patient : 1975   MRN: [id number]  Reason for Admission: There are no discharge diagnoses documented for the most recent discharge. Discharge Date: 20       Spoke with: Patient    Discharge department/facility: [facility]    TCM Interactive Patient Contact:  Was patient able to fill all prescriptions: yes  Was patient instructed to bring all medications to the follow-up visit: Yes  Is patient taking all medications as directed in the discharge summary?  Yes  Does patient understand their discharge instructions: Yes  Does patient have questions or concerns that need addressed prior to 7-14 day follow up office visit: no    Scheduled appointment with PCP within 7-14 days    Follow Up  Future Appointments   Date Time Provider Amanda Jerome   10/21/2020  1:30 PM , APRN - CNP [facility] - [address] [address], [place]

## 2020-09-29 RX ORDER — METOPROLOL TARTRATE 50 MG/1
50 TABLET, FILM COATED ORAL 2 TIMES DAILY
Qty: 60 TABLET | Refills: 5 | Status: SHIPPED | OUTPATIENT
Start: 2020-09-29 | End: 2022-02-28 | Stop reason: SDUPTHER

## 2020-09-29 RX ORDER — LISINOPRIL AND HYDROCHLOROTHIAZIDE 25; 20 MG/1; MG/1
1 TABLET ORAL DAILY
Qty: 30 TABLET | Refills: 5 | Status: ON HOLD | OUTPATIENT
Start: 2020-09-29 | End: 2021-05-10 | Stop reason: HOSPADM

## 2020-09-29 NOTE — TELEPHONE ENCOUNTER
Last visit- 9/10/2020  Next visit- 10/21/2020    Requested Prescriptions     Pending Prescriptions Disp Refills    metoprolol tartrate (LOPRESSOR) 50 MG tablet 60 tablet 5     Sig: Take 1 tablet by mouth 2 times daily    lisinopril-hydroCHLOROthiazide (PRINZIDE;ZESTORETIC) 20-25 MG per tablet 30 tablet 5     Sig: Take 1 tablet by mouth daily

## 2020-11-03 PROBLEM — I10 ESSENTIAL HYPERTENSION: Status: RESOLVED | Noted: 2020-11-03 | Resolved: 2020-11-03

## 2020-12-11 ENCOUNTER — CARE COORDINATION (OUTPATIENT)
Dept: CARE COORDINATION | Age: 45
End: 2020-12-11

## 2020-12-11 NOTE — CARE COORDINATION
Attempted to reach patient for Care Coordination enrollment s/p recent list referral for assistance with the management of his DM and healthcare needs. Patient was not available at the time of my call and generic voicemail message was left asking patient to please return call to my direct number. My Chart message also sent to patient. Will continue to work to f/u with patient in the future.

## 2020-12-15 ENCOUNTER — CARE COORDINATION (OUTPATIENT)
Dept: CARE COORDINATION | Age: 45
End: 2020-12-15

## 2020-12-22 ENCOUNTER — CARE COORDINATION (OUTPATIENT)
Dept: CARE COORDINATION | Age: 45
End: 2020-12-22

## 2020-12-22 SDOH — SOCIAL STABILITY: SOCIAL NETWORK: ARE YOU MARRIED, WIDOWED, DIVORCED, SEPARATED, NEVER MARRIED, OR LIVING WITH A PARTNER?: NEVER MARRIED

## 2020-12-22 NOTE — CARE COORDINATION
Ambulatory Care Coordination Note  12/22/2020  CM Risk Score: 14  Charlson 10 Year Mortality Risk Score: 98%     ACC: Radha Truong RN    Summary Note: Patient was called for Care Coordination enrollment s/p recent list referral for assistance with the management of his DM, COPD, and healthcare needs. Patient familiar with Care Coordination from past encounters and initial eval information obtained. Patient shared he is doing \"ok\" and he denied any need for assistance with ADLs or transportation at this time. Patient is aware of transportation resources available thru his insurance. Patient reported he manages his own medications and he denied any concerns with cost/coverage of medications. Med Rec was completed. Patient declined pharmacy referral at this time and he was encouraged to call if he changes his mind. Patient stated his breathing remains at his baseline and he denied any c/o worsening SOB or cough being present. COPD education was briefly reviewed. Patient reported he is monitoring his BS BID and they have remained stable. BS was 130 today. Patient admits to occasional hypoglycemia 2-3 times a month and hypoglycemia education and treatment information was reviewed. Patient verbalized understanding. Patient stated he continues to f/u with [facility] via [facility] but is currently not following with [facility] for S services. Patient denied any recent drinking and stated he continues to be in touch with his  once a month. Support and encouragement provided to patient. Patient denied any other questions, concerns, or needs and he was encouraged to call with any that may develop. Patient verbalized agreement to [facility] following up with him again in the future.            Actions:   - Reviewed Care Coordination program   - Completed initial eval and SDOH assessments  - Completed Med Rec - Monitored for medications concerns/questions - Pt. declined pharmacy consult or med assistance needs   - Monitored for ADL, DME, and transportation needs  - Monitored for [facility] needs/[facility] follow up   - Reviewed hypoglycemia education and treatment information   - Monitored for additional needs          Plan of Care:   - Continue with Care Coordination f/u calls for assistance with the management of his DM, COPD, and healthcare needs  - Complete DM education   - Complete COPD education   - Review availability of same day appointments, urgent care/walk in clinic options, and after hours on call resources  - Amarilis Wiseman information and interest in Living Will   - Encourage Flu/Pneumonia vaccine information   - A1C 5.7% (June 2020)  - Monitor for additional needs and readiness to discharge from Care Coordination       Ambulatory Care Coordination Assessment    Care Coordination Protocol  Program Enrollment: Complex Care  Referral from Primary Care Provider: No  Week 1 - Initial Assessment     Do you have all of your prescriptions and are they filled?: Yes  Barriers to medication adherence: None  Are you able to afford your medications?: Yes  How often do you have trouble taking your medications the way you have been told to take them?: Sometimes I take them as prescribed. Do you have Home O2 Therapy?: No      Ability to seek help/take action for Emergent Urgent situations i.e. fire, crime, inclement weather or health crisis. : Independent  Ability to ambulate to restroom: Independent  Ability handle personal hygeine needs (bathing/dressing/grooming): Independent  Ability to manage Medications: Independent  Ability to prepare Food Preparation: Independent  Ability to maintain home (clean home, laundry): Independent  Ability to drive and/or has transportation: Dependent  Ability to do shopping: Needs Assistance  Ability to manage finances:  Independent  Is patient able to live independently?: Yes How wells does the patient now understand their health and well-being (symptoms, signs or risk factors) and what they need to do to manage their health?: Reasonable to good understanding but do not feel able to engage with advice at this time   How well do you think your patient can engage in healthcare discussions? (Barriers include language, deafness, aphasia, alcohol or drug problems, learning difficulties, concentration): Adequate communication, with or without minor barriers   Do other services need to be involved to help this patient?: Other care/services not required at this time   Suggested Interventions and [employer] [facility]: Declined (Comment: Prev. followed with [facility] )    Diabetes Education: Completed (Comment: Dec. 2020)    Disease Specific Clinic: Not Started   Medication Assistance Program: Declined   Medi Set or Pill Pack: Declined   Pharmacist: Declined   Registered Dietician: Not Started   Other Services: Completed   Transportation Services: Declined   Zone Management Tools: In Process         Set up/Review an Education Plan, Set up/Review Goals              Prior to Admission medications    Medication Sig Start Date End Date Taking?  Authorizing Provider   metoprolol tartrate (LOPRESSOR) 50 MG tablet Take 1 tablet by mouth 2 times daily 9/29/20  Yes NILAM Busby CNP   lisinopril-hydroCHLOROthiazide (PRINZIDE;ZESTORETIC) 20-25 MG per tablet Take 1 tablet by mouth daily 9/29/20  Yes NILAM Busby CNP   Multiple Vitamins-Minerals (THERAPEUTIC MULTIVITAMIN-MINERALS) tablet Take 1 tablet by mouth daily 9/27/20 12/22/20 Yes NILAM Carrillo CNP   folic acid (FOLVITE) 1 MG tablet Take 1 tablet by mouth daily 2/21/20  Yes Kaur Harrell, DO albuterol sulfate HFA (PROVENTIL HFA) 108 (90 Base) MCG/ACT inhaler Inhale 2 puffs into the lungs every 4 hours as needed for Wheezing or Shortness of Breath (Space out to every 6 hours as symptoms improve) Space out to every 6 hours as symptoms improve. 9/6/19  Yes Brendan Yeboah MD   nicotine (NICODERM CQ) 21 MG/24HR Place 1 patch onto the skin daily 9/28/20   NILAM Lake CNP   venlafaxine (EFFEXOR XR) 75 MG extended release capsule Take 1 capsule by mouth daily  Patient not taking: Reported on 12/22/2020 9/10/20   NILAM Busby CNP   pantoprazole (PROTONIX) 40 MG tablet Take 1 tablet by mouth daily  Patient not taking: Reported on 12/22/2020 9/4/20   Irving Joseph MD   blood glucose test strips (ASCENSIA AUTODISC VI;ONE TOUCH ULTRA TEST VI) strip Test as needed. Dispense One Touch verio Test Strips. Dx: Type 2 diabetes (250.00) 6/11/20   NILAM Busby CNP   sildenafil (VIAGRA) 100 MG tablet Take 1 tablet by mouth daily as needed for Erectile Dysfunction 6/11/20   NILAM Busby CNP   vitamin B-1 100 MG tablet Take 1 tablet by mouth daily  Patient not taking: Reported on 12/22/2020 2/22/20   Drucella Pall, DO   LACTULOSE PO Take by mouth 2 times daily     Historical Provider, MD   glucose monitoring kit (FREESTYLE) monitoring kit Glucometer with test strips and lancets. Check BS once daily  #100 strips and lancets 10/4/18   NILAM Busby CNP       No future appointments. ,   Diabetes Assessment    Meal Planning: Avoidance of concentrated sweets   How often do you test your blood sugar?: Bedtime, Daily (Comment: monitors BS approx 1 x a week )   Do you have barriers with adherence to non-pharmacologic self-management interventions?  (Nutrition/Exercise/Self-Monitoring): No   Have you ever had to go to the ED for symptoms of low blood sugar?: No       No patient-reported symptoms   Do you have hyperglycemia symptoms?: No   Do you have hypoglycemia symptoms?: Yes Frequency of Episodes: 3 Per: Month   Last Blood Sugar Value: 130   Blood Sugar Monitoring Regimen: At Bedtime, Morning Fasting   Blood Sugar Trends: Fluctuating      ,   COPD Assessment    Does the patient understand envrionmental exposure?: Yes  Is the patient able to verbalize Rescue vs. Long Acting medications?: Yes  Does the patient have a nebulizer?: No  Does the patient use a space with inhaled medications?: No     No patient-reported symptoms         Symptoms:     Symptom course: stable  Increase use of rapid acting/rescue inhaled medications?: No  Change in chronic cough?: No/At Baseline  Change in sputum?: No/At Baseline     ,   General Assessment    Do you have any symptoms that are causing concern?: No      and Care Coordination Episodes    Type: Amb Care Coordination  Episode: Complex Care  Noted: 12/22/2020  Comments: list referral

## 2020-12-29 ENCOUNTER — CARE COORDINATION (OUTPATIENT)
Dept: CARE COORDINATION | Age: 45
End: 2020-12-29

## 2020-12-29 NOTE — CARE COORDINATION
Called pt to F/U. Pt was unavailable at the time of my call and a vm was left asking pt to return my call to [phone].

## 2020-12-30 ENCOUNTER — CARE COORDINATION (OUTPATIENT)
Dept: CARE COORDINATION | Age: 45
End: 2020-12-30

## 2020-12-30 NOTE — CARE COORDINATION
3rd attempt to reach pt for follow-up. Pt  unavailable at the time of my call and a vm was left explaining who I am,  and asking pt to return my call to [phone].

## 2021-01-05 ENCOUNTER — CARE COORDINATION (OUTPATIENT)
Dept: CARE COORDINATION | Age: 46
End: 2021-01-05

## 2021-01-05 NOTE — CARE COORDINATION
Attempted to reach patient for continued Care Coordination follow up and education. Patient was unavailable at the time of my call, and generic voicemail message was left asking patient to please return call to my direct number.   Anika Perez RN Care Coordinator

## 2021-01-06 ENCOUNTER — CARE COORDINATION (OUTPATIENT)
Dept: CARE COORDINATION | Age: 46
End: 2021-01-06

## 2021-01-13 ENCOUNTER — CARE COORDINATION (OUTPATIENT)
Dept: CARE COORDINATION | Age: 46
End: 2021-01-13

## 2021-01-13 NOTE — CARE COORDINATION
Ambulatory Care Coordination Note  1/13/2021  CM Risk Score: 14  Charlson 10 Year Mortality Risk Score: 98%     ACC: Jose Taylor RN    Summary Note: Patient was called for continued Care Coordination follow up and education re: the management of his COPD, DM, and healthcare needs. Patient shared he has been doing \"ok\" but has had some recent c/o hypoglycemia. Patient shared this has happened a \"couple of times over the last couple of weeks. \"  Patient shared BS was 56 yesterday after eating breakfast and lunch and taking a nap. Patient stated BS improved with eating and drinking of a pop. Patient shared when he was recently at the treatment facility they had him taking 1000 mg of Metformin but he has not needed or been taking since returning home. Hypoglycemia education was reviewed with patient. Patient was educated on DM diet and importance of including protein at each meal.  Patient verbalized understanding. Hypoglycemia treatment education was also reviewed with patient and patient was instructed to call with any continued c/o hypoglycemia. Patient verbalized understanding. Patient shared his breathing remains at his baseline and he denied any c/o worsening SOB or cough being present. COPD education, including signs/symptoms to call and report, were also reviewed with patient. Patient acknowledged understanding. Patient was also educated on the availability of same day appointments, urgent care/walk in clinic options, and after hours on call resources. Patient verbalized understanding. Healthcare Decision Maker information was also reviewed and ACP note completed. Patient denied any other questions, concerns, or needs and he was encouraged to call with any that may develop.            Actions:   - Reviewed COPD and DM education   - Reviewed hypoglycemia education   - Educated on hypoglycemia prevention and treatment   - Reviewed DM diet education - Educated on COPD signs/symptoms to call and report   - Educated on availability of same day appointment, urgent care/walk in clinic options, and after hours on call resources  - [place] information and completed ACP note   - Monitored for additional needs          Plan of Care:   - Continue with Care Coordination f/u calls for assistance with the management of his DM, COPD, and healthcare needs  - Complete DM education - In Process   - Complete COPD education - In Process  - Review availability of same day appointments, urgent care/walk in clinic options, and after hours on call resources - Completed   - Review Healthcare Decision Maker information - Completed and interest in Living Will   - Encourage Flu/Pneumonia vaccine information   - A1C 5.7% (June 2020)  - Monitor for additional needs and readiness to discharge from [facility] Interventions    Program Enrollment: Complex Care  Referral from Primary Care Provider: No  Suggested Interventions and [address]: Declined (Comment: Prev. followed with [facility] )  Diabetes Education: Completed (Comment: Dec. 2020)  Disease Specific Clinic: Not Started  Medication Assistance Program: Declined (Comment: denied any need - Dec. 2020)  Medi Set or Pill Pack: Declined  Pharmacist: Declined (Comment: Dec. 2020)  Registered Dietician: Not Started  Other Services: Completed (Comment: Follows with  sarah and attends [facility])  Transportation Support: Declined  Zone Management Tools: Completed (Comment: Jan. 2021)         Goals Addressed                 This Visit's Progress       Care Coordination     Conditions and Symptoms   Improving     I will schedule office visits, as directed by my provider. I will keep my appointment or reschedule if I have to cancel. I will notify my provider of any barriers to my plan of care. I will notify my provider of any symptoms that indicate a worsening of my condition. Barriers: lack of motivation, financial, lack of support, overwhelmed by complexity of regimen, stress, and lack of education  Plan for overcoming my barriers: Provide education to patient re: his DM and COPD and monitor for resource needs  Confidence: 7/10  Anticipated Goal Completion Date: 4/20/2021              Prior to Admission medications    Medication Sig Start Date End Date Taking? Authorizing Provider   metoprolol tartrate (LOPRESSOR) 50 MG tablet Take 1 tablet by mouth 2 times daily 9/29/20   NILAM Felder CNP   lisinopril-hydroCHLOROthiazide (PRINZIDE;ZESTORETIC) 20-25 MG per tablet Take 1 tablet by mouth daily 9/29/20   NILAM Felder CNP   nicotine (NICODERM CQ) 21 MG/24HR Place 1 patch onto the skin daily 9/28/20   NILAM Arceo CNP   Multiple Vitamins-Minerals (THERAPEUTIC MULTIVITAMIN-MINERALS) tablet Take 1 tablet by mouth daily 9/27/20 12/22/20  NILAM Arceo CNP   venlafaxine (EFFEXOR XR) 75 MG extended release capsule Take 1 capsule by mouth daily  Patient not taking: Reported on 12/22/2020 9/10/20   NILAM Busby CNP   pantoprazole (PROTONIX) 40 MG tablet Take 1 tablet by mouth daily  Patient not taking: Reported on 12/22/2020 9/4/20   Christ Colindres MD   blood glucose test strips (ASCENSIA AUTODISC VI;ONE TOUCH ULTRA TEST VI) strip Test as needed. Dispense One Touch verio Test Strips.   Dx: Type 2 diabetes (250.00) 6/11/20   NILAM Busby CNP   sildenafil (VIAGRA) 100 MG tablet Take 1 tablet by mouth daily as needed for Erectile Dysfunction 6/11/20   NILAM Felder CNP   folic acid (FOLVITE) 1 MG tablet Take 1 tablet by mouth daily 2/21/20   Larry Mcdaniels DO   vitamin B-1 100 MG tablet Take 1 tablet by mouth daily  Patient not taking: Reported on 12/22/2020 2/22/20   Larry Mcdaniels DO albuterol sulfate HFA (PROVENTIL HFA) 108 (90 Base) MCG/ACT inhaler Inhale 2 puffs into the lungs every 4 hours as needed for Wheezing or Shortness of Breath (Space out to every 6 hours as symptoms improve) Space out to every 6 hours as symptoms improve. 9/6/19   Spencer Esposito MD   LACTULOSE PO Take by mouth 2 times daily     Historical Provider, MD   glucose monitoring kit (FREESTYLE) monitoring kit Glucometer with test strips and lancets. Check BS once daily  #100 strips and lancets 10/4/18   Edward Denton APRN - CNP       No future appointments. ,   Diabetes Assessment    Meal Planning: Avoidance of concentrated sweets   How often do you test your blood sugar?: Bedtime, Daily (Comment: monitors BS approx 1 x a week )   Do you have barriers with adherence to non-pharmacologic self-management interventions?  (Nutrition/Exercise/Self-Monitoring): No   Have you ever had to go to the ED for symptoms of low blood sugar?: No       Symptoms of Low Blood Sugar   Do you have hyperglycemia symptoms?: No   Do you have hypoglycemia symptoms?: Yes   Frequency of Episodes: 2 Per: Week   Blood Sugar Monitoring Regimen: At Bedtime, Morning Fasting   Blood Sugar Trends: Steady Decrease      ,   General Assessment    Do you have any symptoms that are causing concern?: No      and Care Coordination Episodes    Type: Amb Care Coordination  Episode: Complex Care  Noted: 12/22/2020  Comments: list referral

## 2021-01-26 ENCOUNTER — CARE COORDINATION (OUTPATIENT)
Dept: CARE COORDINATION | Age: 46
End: 2021-01-26

## 2021-01-26 ASSESSMENT — ENCOUNTER SYMPTOMS: DYSPNEA ASSOCIATED WITH: EXERTION

## 2021-01-26 NOTE — CARE COORDINATION
- Complete COPD education - In Process  - Review availability of same day appointments, urgent care/walk in clinic options, and after hours on call resources - Completed   - Review Healthcare Decision Maker information - Completed and interest in Living Will   - Encourage Flu/Pneumonia vaccine information   - A1C 5.7% (June 2020)  - Monitor for additional needs and readiness to discharge from [address] [place] [address] Interventions    Program Enrollment: Complex Care  Referral from Primary Care Provider: No  Suggested Interventions and [address]: Declined (Comment: Prev. followed with [facility] )  Diabetes Education: Completed (Comment: Dec. 2020)  Disease Specific Clinic: Declined (Comment: Jan. 2021)  Medication Assistance Program: Declined (Comment: denied any need - Dec. 2020)  Medi Set or Pill Pack: Declined  Pharmacist: Albert Reason (Comment: Dec. 2020)  Registered Dietician: Declined (Comment: Jan. 2021)  Other Services: Completed (Comment: Follows with  sarah and attends [facility])  Transportation Support: Declined  Zone Management Tools: Completed (Comment: Jan. 2021)         Goals Addressed                 This Visit's Progress       Care Coordination     Conditions and Symptoms   No change     I will schedule office visits, as directed by my provider. I will keep my appointment or reschedule if I have to cancel. I will notify my provider of any barriers to my plan of care. I will notify my provider of any symptoms that indicate a worsening of my condition.     Barriers: lack of motivation, financial, lack of support, overwhelmed by complexity of regimen, stress, and lack of education  Plan for overcoming my barriers: Provide education to patient re: his DM and COPD and monitor for resource needs  Confidence: 7/10  Anticipated Goal Completion Date: 4/20/2021              Prior to Admission medications Medication Sig Start Date End Date Taking? Authorizing Provider   metoprolol tartrate (LOPRESSOR) 50 MG tablet Take 1 tablet by mouth 2 times daily 9/29/20   [facility] C[facility]   lisinopril-hydroCHLOROthiazide (PRINZIDE;ZESTORETIC) 20-25 MG per tablet Take 1 tablet by mouth daily 9/29/20   [facility], APRN - CNP   nicotine (NICODERM CQ) 21 MG/24HR Place 1 patch onto the skin daily 9/28/20   NILAM Shields CNP   Multiple Vitamins-Minerals (THERAPEUTIC MULTIVITAMIN-MINERALS) tablet Take 1 tablet by mouth daily 9/27/20 12/22/20  NILAM Shields CNP   venlafaxine (EFFEXOR XR) 75 MG extended release capsule Take 1 capsule by mouth daily  Patient not taking: Reported on 12/22/2020 9/10/20   NILAM Busby CNP   pantoprazole (PROTONIX) 40 MG tablet Take 1 tablet by mouth daily  Patient not taking: Reported on 12/22/2020 9/4/20   Lulú Mclain MD   blood glucose test strips (ASCENSIA AUTODISC VI;ONE TOUCH ULTRA TEST VI) strip Test as needed. Dispense One Touch verio Test Strips. Dx: Type 2 diabetes (250.00) 6/11/20   NILAM Busby CNP   sildenafil (VIAGRA) 100 MG tablet Take 1 tablet by mouth daily as needed for Erectile Dysfunction 6/11/20   [facility], APRN - CNP   folic acid (FOLVITE) 1 MG tablet Take 1 tablet by mouth daily 2/21/20   Deana Hardy DO   vitamin B-1 100 MG tablet Take 1 tablet by mouth daily  Patient not taking: Reported on 12/22/2020 2/22/20   Deana Hardy DO   albuterol sulfate HFA (PROVENTIL HFA) 108 (90 Base) MCG/ACT inhaler Inhale 2 puffs into the lungs every 4 hours as needed for Wheezing or Shortness of Breath (Space out to every 6 hours as symptoms improve) Space out to every 6 hours as symptoms improve.  9/6/19   Sendy Melendez MD   LACTULOSE PO Take by mouth 2 times daily     Historical Provider, MD

## 2021-02-09 ENCOUNTER — CARE COORDINATION (OUTPATIENT)
Dept: CARE COORDINATION | Age: 46
End: 2021-02-09

## 2021-02-09 NOTE — CARE COORDINATION
Ambulatory Care Coordination Note  2/9/2021  CM Risk Score: 14  Charlson 10 Year Mortality Risk Score: 98%     ACC: Lauren Walker RN    Summary Note: Patient was called for continued Care Coordination follow up and education re: the management of his DM, COPD, and healthcare needs. Patient shared he has been doing \"ok. \"  Patient stated his breathing remains at his baseline and he denied any c/o worsening SOB or cough being present. Patient reported BS remain stable in the 80-90 range and he denied any recent c/o hypoglycemia. COPD and DM zone education was reviewed with patient and he verbalized understanding. Patient was educated to call with any worsening SOB or cough or any change in activity d/t his breathing. Patient acknowledged understanding. Importance of keeping BS controlled and DM managed to prevent the development/worsening of chronic complications was reviewed with patient. Patient verbalized understanding. Patient stated he continues to attend online AA meetings. ACM reviewed importance of routine PCP f/u and offered to schedule appointment for patient. Patient declined appointment during our call and stated he will try to call on his own next week. Patient denied any other questions, concerns, or needs and he was encouraged to call with any that may develop. Actions:   - Reviewed DM and COPD education   - Educated on zone information and signs/symptoms to call and report  - Reviewed importance of early symptom recognition to prevent unplanned ED/hospital admissions  - Educated on importance of keeping BS controlled and DM managed to prevent development of/worsening of chronic complications  - Monitored for ongoing AA attendance  - Offered routine PCP appointment for DM/COPD management - Pt.  Declined  - Monitored for additional needs          Plan of Care:   - Continue with Care Coordination f/u calls for assistance with the management of his DM, COPD, and healthcare needs - Complete DM education - In Process   - Complete COPD education - In Process  - Review availability of same day appointments, urgent care/walk in clinic options, and after hours on call [address] information - Completed and interest in Living Will   - Encourage Flu/Pneumonia vaccine information   - A1C 5.7% (June 2020)  - Monitor for additional needs and readiness to discharge from [address] Coordination Interventions    Program Enrollment: Complex Care  Referral from Primary Care Provider: No  Suggested Interventions and [address]: Declined (Comment: Prev. followed with [facility] )  Diabetes Education: Completed (Comment: Dec. 2020)  Disease Specific Clinic: Declined (Comment: Jan. 2021)  Medication Assistance Program: Declined (Comment: denied any need - Dec. 2020)  Medi Set or Pill Pack: Declined  Pharmacist: Douglas Abernathy (Comment: Dec. 2020)  Registered Dietician: Declined (Comment: Jan. 2021)  Other Services: Completed (Comment: Follows with  sarah and attends [facility])  Transportation Support: Declined  Zone Management Tools: Completed (Comment: Jan. 2021)         Goals Addressed                 This Visit's Progress       Care Coordination     Conditions and Symptoms   No change     I will schedule office visits, as directed by my provider. I will keep my appointment or reschedule if I have to cancel. I will notify my provider of any barriers to my plan of care. I will notify my provider of any symptoms that indicate a worsening of my condition.     Barriers: lack of motivation, financial, lack of support, overwhelmed by complexity of regimen, stress, and lack of education  Plan for overcoming my barriers: Provide education to patient re: his DM and COPD and monitor for resource needs  Confidence: 7/10  Anticipated Goal Completion Date: 4/20/2021              Prior to Admission medications Medication Sig Start Date End Date Taking? Authorizing Provider   metoprolol tartrate (LOPRESSOR) 50 MG tablet Take 1 tablet by mouth 2 times daily 9/29/20   NILAM Riley CNP   lisinopril-hydroCHLOROthiazide (PRINZIDE;ZESTORETIC) 20-25 MG per tablet Take 1 tablet by mouth daily 9/29/20   NILAM Riley CNP   nicotine (NICODERM CQ) 21 MG/24HR Place 1 patch onto the skin daily 9/28/20   NILAM Begum CNP   Multiple Vitamins-Minerals (THERAPEUTIC MULTIVITAMIN-MINERALS) tablet Take 1 tablet by mouth daily 9/27/20 12/22/20  NILAM Begum CNP   venlafaxine (EFFEXOR XR) 75 MG extended release capsule Take 1 capsule by mouth daily  Patient not taking: Reported on 12/22/2020 9/10/20   NILAM Busby CNP   pantoprazole (PROTONIX) 40 MG tablet Take 1 tablet by mouth daily  Patient not taking: Reported on 12/22/2020 9/4/20   Abdirahman Peng MD   blood glucose test strips (ASCENSIA AUTODISC VI;ONE TOUCH ULTRA TEST VI) strip Test as needed. Dispense One Touch verio Test Strips. Dx: Type 2 diabetes (250.00) 6/11/20   NILAM Busby CNP   sildenafil (VIAGRA) 100 MG tablet Take 1 tablet by mouth daily as needed for Erectile Dysfunction 6/11/20   NILAM Riley CNP   folic acid (FOLVITE) 1 MG tablet Take 1 tablet by mouth daily 2/21/20   Cassia Nelson DO   vitamin B-1 100 MG tablet Take 1 tablet by mouth daily  Patient not taking: Reported on 12/22/2020 2/22/20   Cassia Nelson DO   albuterol sulfate HFA (PROVENTIL HFA) 108 (90 Base) MCG/ACT inhaler Inhale 2 puffs into the lungs every 4 hours as needed for Wheezing or Shortness of Breath (Space out to every 6 hours as symptoms improve) Space out to every 6 hours as symptoms improve.  9/6/19   Scott Smith MD   LACTULOSE PO Take by mouth 2 times daily     Historical Provider, MD glucose monitoring kit (FREESTYLE) monitoring kit Glucometer with test strips and lancets. Check BS once daily  #100 strips and lancets 10/4/18   NILAM Busby CNP       No future appointments. ,   Diabetes Assessment    Meal Planning: Avoidance of concentrated sweets   How often do you test your blood sugar?: Bedtime, Daily (Comment: monitors BS approx 1 x a week )   Do you have barriers with adherence to non-pharmacologic self-management interventions?  (Nutrition/Exercise/Self-Monitoring): No   Have you ever had to go to the ED for symptoms of low blood sugar?: No       No patient-reported symptoms   Do you have hyperglycemia symptoms?: No   Do you have hypoglycemia symptoms?: No   Last Blood Sugar Value: 90   Blood Sugar Monitoring Regimen: Morning Fasting, At Bedtime   Blood Sugar Trends: No Change      ,   COPD Assessment    Does the patient understand envrionmental exposure?: Yes  Is the patient able to verbalize Rescue vs. Long Acting medications?: Yes  Does the patient have a nebulizer?: No  Does the patient use a space with inhaled medications?: No     No patient-reported symptoms         Symptoms:     Symptom course: stable  Breathlessness: none  Increase use of rapid acting/rescue inhaled medications?: No  Change in chronic cough?: No/At Baseline  Change in sputum?: No/At Baseline     ,   General Assessment    Do you have any symptoms that are causing concern?: No  Progression since Onset: Unchanged, Gradually Improving      and Care Coordination Episodes    Type: Amb Care Coordination  Episode: Complex Care  Noted: 12/22/2020  Comments: list referral

## 2021-02-24 ENCOUNTER — CARE COORDINATION (OUTPATIENT)
Dept: CARE COORDINATION | Age: 46
End: 2021-02-24

## 2021-02-24 NOTE — CARE COORDINATION
Attempted to reach patient for continued Care Coordination follow up and education. Patient was unavailable at the time of my call, and generic voicemail message was left asking patient to please return call to my direct number.   Roro Machado RN Care Coordinator

## 2021-03-01 ENCOUNTER — CARE COORDINATION (OUTPATIENT)
Dept: CARE COORDINATION | Age: 46
End: 2021-03-01

## 2021-03-01 ASSESSMENT — ENCOUNTER SYMPTOMS: DYSPNEA ASSOCIATED WITH: EXERTION

## 2021-03-01 NOTE — CARE COORDINATION
Ambulatory Care Coordination Note  3/1/2021  CM Risk Score: 6  Charlson 10 Year Mortality Risk Score: 98%     ACC: Travis Rizo RN    Summary Note: Patient was called for continued Care Coordination follow up and education re: the management of his DM, COPD, and healthcare needs. Patient shared he has been doing \"ok. \"  Patient reported his breathing remains at his baseline and he denied any c/o worsening SOB or cough being present. COPD education and zone information was reviewed with patient. Patient was educated on signs/symptoms he needs to call and report as well as the importance of early symptom recognition. Patient verbalized understanding. Patient stated BS remain stable and have ranged from  recently. Patient denied any c/o hypoglycemia or hyperglycemia being present. DM education was reviewed with patient. Patient was educated on prevention of hyperglycemia as well as importance of keeping BS controlled and DM managed. ACM also reviewed the importance of routine exercise/activity and how this also helps with control of his blood sugars. Patient verbalized understanding. ACM educated patient on the need for routine A1C monitoring as well as regular PCP f/u appointments. Patient verbalized understanding but continues to decline PCP appointment for routine f/u. Patient was encouraged to call and schedule PCP appointment as soon as he is able and he verbalized understanding. Patient stated he continues to work on being sober and is attending [place] [address] meetings when he is able. [facility] reviewed the importance of following up with his sponsor and attending [place] [address] meetings to maintain his health and patient verbalized understanding. Patient denied any other questions, concerns, or needs and she was encouraged to call with any that may develop.           Actions:   - Reviewed DM and COPD education   - Educated on signs/symptoms to call and report  - Reviewed on importance of early symptom recognition   - Educated on hyperglycemia prevention and importance of routine f/u visits and A1C monitoring   - Educated on importance of routine exercise and activity   - Offered to assist with scheduling PCP appointment - Pt. Declined  - Reviewed importance of continued sobriety and f/u with sponsor and [place] [id number] meetings  - Monitored for additional needs          Plan of Care:   - Continue with Care Coordination f/u calls for assistance with the management of his DM, COPD, and healthcare needs  - Complete DM education - In Process   - Complete COPD education - In Process  - Review availability of same day appointments, urgent care/walk in clinic options, and after hours on call [address] information - Completed and interest in Living Will   - Encourage Flu/Pneumonia vaccine information   - A1C 5.7% (June 2020)  - Monitor for additional needs and readiness to discharge from [address] [place] [address] Coordination Interventions    Program Enrollment: Complex Care  Referral from Primary Care Provider: No  Suggested Interventions and [address]: Declined (Comment: Prev. followed with [facility] )  Diabetes Education: Completed (Comment: Dec. 2020)  Disease Specific Clinic: Declined (Comment: Jan. 2021)  Medication Assistance Program: Declined (Comment: denied any need - Dec. 2020)  Medi Set or Pill Pack: Declined  Pharmacist: Declined (Comment: Dec. 2020)  Registered Dietician: Declined (Comment: Jan. 2021)  Other Services: Completed (Comment: Follows with  sarah and attends [facility])  Transportation Support: Declined  Zone Management Tools: Completed (Comment: Jan. 2021)         Goals Addressed                 This Visit's Progress       Care Coordination     Conditions and Symptoms   No change     I will schedule office visits, as directed by my provider. I will keep my appointment or reschedule if I have to cancel.   I will notify my HFA (PROVENTIL HFA) 108 (90 Base) MCG/ACT inhaler Inhale 2 puffs into the lungs every 4 hours as needed for Wheezing or Shortness of Breath (Space out to every 6 hours as symptoms improve) Space out to every 6 hours as symptoms improve. 9/6/19   Scott Smith MD   LACTULOSE PO Take by mouth 2 times daily     Historical Provider, MD   glucose monitoring kit (FREESTYLE) monitoring kit Glucometer with test strips and lancets. Check BS once daily  #100 strips and lancets 10/4/18   Edward Denton, APRN - CNP       No future appointments. ,   Diabetes Assessment    Meal Planning: Avoidance of concentrated sweets   How often do you test your blood sugar?: Bedtime, Daily (Comment: monitors BS approx 1 x a week )   Do you have barriers with adherence to non-pharmacologic self-management interventions?  (Nutrition/Exercise/Self-Monitoring): No   Have you ever had to go to the ED for symptoms of low blood sugar?: No       No patient-reported symptoms   Do you have hyperglycemia symptoms?: No   Do you have hypoglycemia symptoms?: No   Last Blood Sugar Value: 140   Blood Sugar Monitoring Regimen: Morning Fasting, At Bedtime   Blood Sugar Trends: No Change      ,   COPD Assessment    Does the patient understand envrionmental exposure?: Yes  Is the patient able to verbalize Rescue vs. Long Acting medications?: Yes  Does the patient have a nebulizer?: No  Does the patient use a space with inhaled medications?: No     No patient-reported symptoms         Symptoms:     Symptom course: stable  Breathlessness: exertion  Increase use of rapid acting/rescue inhaled medications?: No  Change in chronic cough?: No/At Baseline  Change in sputum?: No/At Baseline     ,   General Assessment    Do you have any symptoms that are causing concern?: No      and Care Coordination Episodes    Type: Amb Care Coordination  Episode: Complex Care  Noted: 12/22/2020  Comments: list referral

## 2021-03-17 ENCOUNTER — HOSPITAL ENCOUNTER (INPATIENT)
Age: 46
LOS: 4 days | Discharge: HOME | End: 2021-03-21
Attending: EMERGENCY MEDICINE | Admitting: INTERNAL MEDICINE
Payer: MEDICAID

## 2021-03-17 DIAGNOSIS — F41.9 ANXIETY: ICD-10-CM

## 2021-03-17 DIAGNOSIS — F10.930 ALCOHOL WITHDRAWAL SYNDROME WITHOUT COMPLICATION (HCC): Primary | ICD-10-CM

## 2021-03-17 PROBLEM — F10.10 ETOH ABUSE: Status: ACTIVE | Noted: 2021-03-17

## 2021-03-17 LAB
ALBUMIN SERPL-MCNC: 3.9 G/DL (ref 3.5–5.1)
ALP BLD-CCNC: 142 U/L (ref 38–126)
ALT SERPL-CCNC: 160 U/L (ref 11–66)
AMPHETAMINE+METHAMPHETAMINE URINE SCREEN: NEGATIVE
ANION GAP SERPL CALCULATED.3IONS-SCNC: 18 MEQ/L (ref 8–16)
AST SERPL-CCNC: 104 U/L (ref 5–40)
BARBITURATE QUANTITATIVE URINE: NEGATIVE
BASOPHILS # BLD: 0.4 %
BASOPHILS ABSOLUTE: 0 THOU/MM3 (ref 0–0.1)
BENZODIAZEPINE QUANTITATIVE URINE: NEGATIVE
BILIRUB SERPL-MCNC: 1.9 MG/DL (ref 0.3–1.2)
BUN BLDV-MCNC: 15 MG/DL (ref 7–22)
CALCIUM SERPL-MCNC: 8.6 MG/DL (ref 8.5–10.5)
CANNABINOID QUANTITATIVE URINE: NEGATIVE
CHLORIDE BLD-SCNC: 94 MEQ/L (ref 98–111)
CO2: 20 MEQ/L (ref 23–33)
COCAINE METABOLITE QUANTITATIVE URINE: NEGATIVE
CREAT SERPL-MCNC: 0.6 MG/DL (ref 0.4–1.2)
EKG ATRIAL RATE: 97 BPM
EKG P AXIS: 48 DEGREES
EKG P-R INTERVAL: 116 MS
EKG Q-T INTERVAL: 384 MS
EKG QRS DURATION: 90 MS
EKG QTC CALCULATION (BAZETT): 487 MS
EKG R AXIS: 27 DEGREES
EKG T AXIS: 44 DEGREES
EKG VENTRICULAR RATE: 97 BPM
EOSINOPHIL # BLD: 0 %
EOSINOPHILS ABSOLUTE: 0 THOU/MM3 (ref 0–0.4)
ERYTHROCYTE [DISTWIDTH] IN BLOOD BY AUTOMATED COUNT: 12.5 % (ref 11.5–14.5)
ERYTHROCYTE [DISTWIDTH] IN BLOOD BY AUTOMATED COUNT: 42.4 FL (ref 35–45)
ETHYL ALCOHOL, SERUM: 0.04 %
GFR SERPL CREATININE-BSD FRML MDRD: > 90 ML/MIN/1.73M2
GLUCOSE BLD-MCNC: 126 MG/DL (ref 70–108)
GLUCOSE BLD-MCNC: 201 MG/DL (ref 70–108)
HCT VFR BLD CALC: 46.1 % (ref 42–52)
HEMOGLOBIN: 15.8 GM/DL (ref 14–18)
IMMATURE GRANS (ABS): 0.04 THOU/MM3 (ref 0–0.07)
IMMATURE GRANULOCYTES: 0.4 %
LYMPHOCYTES # BLD: 12.1 %
LYMPHOCYTES ABSOLUTE: 1.3 THOU/MM3 (ref 1–4.8)
MCH RBC QN AUTO: 31.7 PG (ref 26–33)
MCHC RBC AUTO-ENTMCNC: 34.3 GM/DL (ref 32.2–35.5)
MCV RBC AUTO: 92.6 FL (ref 80–94)
MONOCYTES # BLD: 6.8 %
MONOCYTES ABSOLUTE: 0.7 THOU/MM3 (ref 0.4–1.3)
NUCLEATED RED BLOOD CELLS: 0 /100 WBC
OPIATES, URINE: NEGATIVE
OSMOLALITY CALCULATION: 271 MOSMOL/KG (ref 275–300)
OXYCODONE: NEGATIVE
PHENCYCLIDINE QUANTITATIVE URINE: NEGATIVE
PLATELET # BLD: 225 THOU/MM3 (ref 130–400)
PMV BLD AUTO: 9 FL (ref 9.4–12.4)
POTASSIUM REFLEX MAGNESIUM: 3.7 MEQ/L (ref 3.5–5.2)
RBC # BLD: 4.98 MILL/MM3 (ref 4.7–6.1)
SEG NEUTROPHILS: 80.3 %
SEGMENTED NEUTROPHILS ABSOLUTE COUNT: 8.8 THOU/MM3 (ref 1.8–7.7)
SODIUM BLD-SCNC: 132 MEQ/L (ref 135–145)
TOTAL PROTEIN: 6.8 G/DL (ref 6.1–8)
WBC # BLD: 10.9 THOU/MM3 (ref 4.8–10.8)

## 2021-03-17 PROCEDURE — 6360000002 HC RX W HCPCS: Performed by: INTERNAL MEDICINE

## 2021-03-17 PROCEDURE — 93005 ELECTROCARDIOGRAM TRACING: CPT | Performed by: INTERNAL MEDICINE

## 2021-03-17 PROCEDURE — 6370000000 HC RX 637 (ALT 250 FOR IP): Performed by: INTERNAL MEDICINE

## 2021-03-17 PROCEDURE — 82948 REAGENT STRIP/BLOOD GLUCOSE: CPT

## 2021-03-17 PROCEDURE — 1200000003 HC TELEMETRY R&B

## 2021-03-17 PROCEDURE — 80053 COMPREHEN METABOLIC PANEL: CPT

## 2021-03-17 PROCEDURE — 36415 COLL VENOUS BLD VENIPUNCTURE: CPT

## 2021-03-17 PROCEDURE — 99283 EMERGENCY DEPT VISIT LOW MDM: CPT

## 2021-03-17 PROCEDURE — 82077 ASSAY SPEC XCP UR&BREATH IA: CPT

## 2021-03-17 PROCEDURE — 80307 DRUG TEST PRSMV CHEM ANLYZR: CPT

## 2021-03-17 PROCEDURE — 6360000002 HC RX W HCPCS: Performed by: EMERGENCY MEDICINE

## 2021-03-17 PROCEDURE — 2580000003 HC RX 258: Performed by: INTERNAL MEDICINE

## 2021-03-17 PROCEDURE — 99223 1ST HOSP IP/OBS HIGH 75: CPT | Performed by: INTERNAL MEDICINE

## 2021-03-17 PROCEDURE — 85025 COMPLETE CBC W/AUTO DIFF WBC: CPT

## 2021-03-17 RX ORDER — PROMETHAZINE HYDROCHLORIDE 25 MG/1
12.5 TABLET ORAL EVERY 6 HOURS PRN
Status: DISCONTINUED | OUTPATIENT
Start: 2021-03-17 | End: 2021-03-21 | Stop reason: HOSPADM

## 2021-03-17 RX ORDER — PHENOBARBITAL 32.4 MG/1
32.4 TABLET ORAL 2 TIMES DAILY
Status: COMPLETED | OUTPATIENT
Start: 2021-03-19 | End: 2021-03-20

## 2021-03-17 RX ORDER — NICOTINE POLACRILEX 4 MG
15 LOZENGE BUCCAL PRN
Status: DISCONTINUED | OUTPATIENT
Start: 2021-03-17 | End: 2021-03-21 | Stop reason: HOSPADM

## 2021-03-17 RX ORDER — HYDROCHLOROTHIAZIDE 25 MG/1
25 TABLET ORAL DAILY
Status: DISCONTINUED | OUTPATIENT
Start: 2021-03-17 | End: 2021-03-21 | Stop reason: HOSPADM

## 2021-03-17 RX ORDER — LANOLIN ALCOHOL/MO/W.PET/CERES
100 CREAM (GRAM) TOPICAL DAILY
Status: DISCONTINUED | OUTPATIENT
Start: 2021-03-17 | End: 2021-03-21 | Stop reason: HOSPADM

## 2021-03-17 RX ORDER — POLYETHYLENE GLYCOL 3350 17 G/17G
17 POWDER, FOR SOLUTION ORAL DAILY PRN
Status: DISCONTINUED | OUTPATIENT
Start: 2021-03-17 | End: 2021-03-19

## 2021-03-17 RX ORDER — DEXTROSE MONOHYDRATE 50 MG/ML
100 INJECTION, SOLUTION INTRAVENOUS PRN
Status: DISCONTINUED | OUTPATIENT
Start: 2021-03-17 | End: 2021-03-21 | Stop reason: HOSPADM

## 2021-03-17 RX ORDER — ACETAMINOPHEN 650 MG/1
650 SUPPOSITORY RECTAL EVERY 6 HOURS PRN
Status: DISCONTINUED | OUTPATIENT
Start: 2021-03-17 | End: 2021-03-21 | Stop reason: HOSPADM

## 2021-03-17 RX ORDER — PHENOBARBITAL 32.4 MG/1
64.8 TABLET ORAL 2 TIMES DAILY
Status: COMPLETED | OUTPATIENT
Start: 2021-03-18 | End: 2021-03-18

## 2021-03-17 RX ORDER — FOLIC ACID 1 MG/1
1 TABLET ORAL DAILY
Status: DISCONTINUED | OUTPATIENT
Start: 2021-03-17 | End: 2021-03-21 | Stop reason: HOSPADM

## 2021-03-17 RX ORDER — METOPROLOL TARTRATE 50 MG/1
50 TABLET, FILM COATED ORAL 2 TIMES DAILY
Status: DISCONTINUED | OUTPATIENT
Start: 2021-03-17 | End: 2021-03-21 | Stop reason: HOSPADM

## 2021-03-17 RX ORDER — SODIUM CHLORIDE 0.9 % (FLUSH) 0.9 %
10 SYRINGE (ML) INJECTION PRN
Status: DISCONTINUED | OUTPATIENT
Start: 2021-03-17 | End: 2021-03-21 | Stop reason: HOSPADM

## 2021-03-17 RX ORDER — VENLAFAXINE HYDROCHLORIDE 75 MG/1
75 CAPSULE, EXTENDED RELEASE ORAL DAILY
Status: DISCONTINUED | OUTPATIENT
Start: 2021-03-17 | End: 2021-03-21 | Stop reason: HOSPADM

## 2021-03-17 RX ORDER — NICOTINE 21 MG/24HR
1 PATCH, TRANSDERMAL 24 HOURS TRANSDERMAL EVERY 24 HOURS
Status: DISCONTINUED | OUTPATIENT
Start: 2021-03-17 | End: 2021-03-21 | Stop reason: HOSPADM

## 2021-03-17 RX ORDER — PANTOPRAZOLE SODIUM 40 MG/1
40 TABLET, DELAYED RELEASE ORAL DAILY
Status: DISCONTINUED | OUTPATIENT
Start: 2021-03-17 | End: 2021-03-21 | Stop reason: HOSPADM

## 2021-03-17 RX ORDER — LISINOPRIL 20 MG/1
20 TABLET ORAL DAILY
Status: DISCONTINUED | OUTPATIENT
Start: 2021-03-17 | End: 2021-03-21 | Stop reason: HOSPADM

## 2021-03-17 RX ORDER — LORAZEPAM 2 MG/ML
2 INJECTION INTRAMUSCULAR ONCE
Status: COMPLETED | OUTPATIENT
Start: 2021-03-17 | End: 2021-03-17

## 2021-03-17 RX ORDER — ACETAMINOPHEN 325 MG/1
650 TABLET ORAL EVERY 6 HOURS PRN
Status: DISCONTINUED | OUTPATIENT
Start: 2021-03-17 | End: 2021-03-21 | Stop reason: HOSPADM

## 2021-03-17 RX ORDER — ONDANSETRON 2 MG/ML
4 INJECTION INTRAMUSCULAR; INTRAVENOUS EVERY 6 HOURS PRN
Status: DISCONTINUED | OUTPATIENT
Start: 2021-03-17 | End: 2021-03-21 | Stop reason: HOSPADM

## 2021-03-17 RX ORDER — DEXTROSE MONOHYDRATE 25 G/50ML
12.5 INJECTION, SOLUTION INTRAVENOUS PRN
Status: DISCONTINUED | OUTPATIENT
Start: 2021-03-17 | End: 2021-03-21 | Stop reason: HOSPADM

## 2021-03-17 RX ORDER — SODIUM CHLORIDE 0.9 % (FLUSH) 0.9 %
10 SYRINGE (ML) INJECTION EVERY 12 HOURS SCHEDULED
Status: DISCONTINUED | OUTPATIENT
Start: 2021-03-17 | End: 2021-03-21 | Stop reason: HOSPADM

## 2021-03-17 RX ORDER — LISINOPRIL AND HYDROCHLOROTHIAZIDE 25; 20 MG/1; MG/1
1 TABLET ORAL DAILY
Status: DISCONTINUED | OUTPATIENT
Start: 2021-03-17 | End: 2021-03-17 | Stop reason: CLARIF

## 2021-03-17 RX ORDER — ALBUTEROL SULFATE 90 UG/1
2 AEROSOL, METERED RESPIRATORY (INHALATION) EVERY 4 HOURS PRN
Status: DISCONTINUED | OUTPATIENT
Start: 2021-03-17 | End: 2021-03-21 | Stop reason: HOSPADM

## 2021-03-17 RX ORDER — SODIUM CHLORIDE 9 MG/ML
INJECTION, SOLUTION INTRAVENOUS CONTINUOUS
Status: DISCONTINUED | OUTPATIENT
Start: 2021-03-17 | End: 2021-03-17

## 2021-03-17 RX ADMIN — PHENOBARBITAL SODIUM 300.95 MG: 65 INJECTION INTRAMUSCULAR at 23:25

## 2021-03-17 RX ADMIN — SODIUM CHLORIDE: 9 INJECTION, SOLUTION INTRAVENOUS at 18:51

## 2021-03-17 RX ADMIN — METOPROLOL TARTRATE 50 MG: 50 TABLET, FILM COATED ORAL at 21:32

## 2021-03-17 RX ADMIN — ENOXAPARIN SODIUM 40 MG: 40 INJECTION SUBCUTANEOUS at 18:50

## 2021-03-17 RX ADMIN — ONDANSETRON 4 MG: 2 INJECTION INTRAMUSCULAR; INTRAVENOUS at 18:51

## 2021-03-17 RX ADMIN — VENLAFAXINE HYDROCHLORIDE 75 MG: 75 CAPSULE, EXTENDED RELEASE ORAL at 18:51

## 2021-03-17 RX ADMIN — PANTOPRAZOLE SODIUM 40 MG: 40 TABLET, DELAYED RELEASE ORAL at 18:50

## 2021-03-17 RX ADMIN — HYDROCHLOROTHIAZIDE 25 MG: 25 TABLET ORAL at 18:51

## 2021-03-17 RX ADMIN — SODIUM CHLORIDE, PRESERVATIVE FREE 10 ML: 5 INJECTION INTRAVENOUS at 21:32

## 2021-03-17 RX ADMIN — LORAZEPAM 2 MG: 2 INJECTION INTRAMUSCULAR; INTRAVENOUS at 16:48

## 2021-03-17 RX ADMIN — FOLIC ACID 1 MG: 1 TABLET ORAL at 18:51

## 2021-03-17 RX ADMIN — Medication 100 MG: at 18:51

## 2021-03-17 RX ADMIN — LISINOPRIL 20 MG: 20 TABLET ORAL at 18:51

## 2021-03-17 RX ADMIN — PHENOBARBITAL SODIUM 300.95 MG: 65 INJECTION INTRAMUSCULAR at 19:23

## 2021-03-17 ASSESSMENT — ENCOUNTER SYMPTOMS
BACK PAIN: 0
SHORTNESS OF BREATH: 0
TROUBLE SWALLOWING: 0
EYE REDNESS: 0
NAUSEA: 0
COUGH: 0
ABDOMINAL PAIN: 0
VOMITING: 0

## 2021-03-17 ASSESSMENT — PAIN SCALES - GENERAL: PAINLEVEL_OUTOF10: 6

## 2021-03-17 NOTE — H&P
Hospitalist - History & Physical      Patient: Shaniqua Sanchez    Unit/Bed:23/023A  YOB: 1975  MRN: [id number]   Acct: [de-identified]   PCP: NILAM Junior - CNP    Date of Service: Pt seen/examined on 03/17/21  and Admitted to Inpatient with expected LOS greater than two midnights due to medical therapy. Chief Complaint:  etoh withdrawal    Assessment and Plan:-  1. History of alcohol abuse currently in withdrawal- PAW score of 6, will initiate phenobarb treatment protocol, folic acid, thiamine, seizure seizure and aspiration precautions-monitor vital signs closely. 2. History of type 2 diabetes-controlled added insulin sliding scale monitor blood sugars meals and at bedtime  3. History of benign essential hypertension-currently controlled watch with withdrawals will monitor closely. 4. Chronic hyponatremia from alcohol abuse-fluid restriction-BMP a.m.  5. History of COPD currently not in exacerbation-smoking history-councelled , will add nicotine patch, restart home inhalers. 6. History of GERD - on protonix continue      History Of Present Illness: This is 81-year-old gentleman with past medical history described below who comes in wanting to detox from alcohol-patient's last drink was last night around 930. He has had delirium, hallucinations, questionable seizures previously. Has had been at the rehab and then rebounded. Current symptoms are just tremors and anxiety , no hallucinations no agitation.   No nausea no vomiting no fever shortness of breath or chest pain        Past Medical History:        Diagnosis Date    COPD [facility]chronic obstructive [facility] disease) ([facility] [address]     Depression     Diabetes mellitus ([facility] [address]     ETOH abuse     Hyperlipidemia     Hypertension     Liver disease     Seizures ([facility] [address]     etoh wdl       Past Surgical History:        Procedure Laterality Date    ENDOSCOPY, COLON, DIAGNOSTIC         Home Medications:   No current facility-administered history. He has quit using smokeless tobacco. He reports previous alcohol use of about 140.0 standard drinks of alcohol per week. He reports that he does not use drugs. Family History:       Problem Relation Age of Onset    High Blood Pressure Father     Cancer Father         Lung Cancer    Alcohol Abuse Father     High Blood Pressure Brother     Diabetes Mother     Heart Disease Mother         Stent Placement    Arthritis Mother     Depression Mother     High Blood Pressure Mother     High Cholesterol Mother     Alcohol Abuse Paternal Uncle        Diet:  No diet orders on file    Review of systems:   Pertinent positives as noted in the HPI. All other systems reviewed and negative. PHYSICAL EXAM:  BP (!) 161/98   Pulse 94   Resp 17   SpO2 98%   General appearance: No apparent distress, appears stated age and cooperative. HEENT: Normal cephalic, atraumatic without obvious deformity. Pupils equal, round, and reactive to light. Extra ocular muscles intact. Conjunctivae/corneas clear. Neck: Supple, with full range of motion. No jugular venous distention. Trachea midline. Respiratory:  Normal respiratory effort. Clear to auscultation, bilaterally without Rales/Wheezes/Rhonchi. Cardiovascular: Regular rate and rhythm with normal S1/S2 without murmurs, rubs or gallops. Abdomen: Soft, non-tender, non-distended with normal bowel sounds. Musculoskeletal:  No clubbing, cyanosis or edema bilaterally. Skin: Skin color, texture, turgor normal.  No rashes or lesions. Neurologic:  Neurovascularly intact without any focal sensory/motor deficits. Cranial nerves: II-XII intact, grossly non-focal.  Tremors  Psychiatric: Alert and oriented, thought content appropriate, normal insight  Capillary Refill: Brisk,< 3 seconds   Peripheral Pulses: +2 palpable, equal bilaterally     Labs:   No results for input(s): WBC, HGB, HCT, PLT in the last 72 hours.   No results for input(s): NA, K, CL, CO2, BUN, CREATININE, CALCIUM, PHOS in the last 72 hours. Invalid input(s): MAGNES  No results for input(s): AST, ALT, BILIDIR, BILITOT, ALKPHOS in the last 72 hours. No results for input(s): INR in the last 72 hours. No results for input(s): Maryl Peek in the last 72 hours. Urinalysis:    Lab Results   Component Value Date    NITRU NEGATIVE 09/23/2020    WBCUA NONE SEEN 09/23/2020    BACTERIA NONE SEEN 09/23/2020    RBCUA NONE SEEN 09/23/2020    BLOODU NEGATIVE 09/23/2020    SPECGRAV <1.005 08/29/2019    GLUCOSEU NEGATIVE 09/23/2020       Radiology:   No orders to display     No results found.       EKG: pending    Electronically signed by Daniel Borges MD on 3/17/2021 at 3:52 PM

## 2021-03-17 NOTE — ED PROVIDER NOTES
[facility]    EMERGENCY MEDICINE     Pt Name: Nila Montero  MRN: [id number]  [place] 1975  Date of evaluation: 3/17/2021  Provider: Mary Rocha MD,     CHIEF COMPLAINT       Chief Complaint   Patient presents with    Anxiety    Alcohol Problem       HISTORY OF PRESENT ILLNESS    Nila Montero is a pleasant 41 y.o. male who presents to the emergency department from home 17 for alcohol detox. Patient states that his last drink was at 930 last night. He has detoxed before and during that time he is unsure if he had seizures but he knows that he had hallucinations. Patient had been sober up until 10 days ago. He did not want to discuss why he had started drinking again. He does want to quit again. He denies any chest pain but he does feel shaky. He is also feeling cold and clammy. He denies any nausea, vomiting, or diarrhea. Triage notes and Nursing notes were reviewed by myself. Any discrepancies are addressed above.     PAST MEDICAL HISTORY     Past Medical History:   Diagnosis Date    COPD (chronic obstructive pulmonary disease) ([facility] [address]     Depression     Diabetes mellitus ([facility] [address]     ETOH abuse     GERD (gastroesophageal reflux disease)     Hyperlipidemia     Hypertension     Liver disease     Seizures (HCC)     etoh wdl       SURGICAL HISTORY       Past Surgical History:   Procedure Laterality Date    ENDOSCOPY, COLON, DIAGNOSTIC         CURRENT MEDICATIONS       Current Discharge Medication List      CONTINUE these medications which have NOT CHANGED    Details   metoprolol tartrate (LOPRESSOR) 50 MG tablet Take 1 tablet by mouth 2 times daily  Qty: 60 tablet, Refills: 5    Associated Diagnoses: Bipolar 1 disorder (HCC)      lisinopril-hydroCHLOROthiazide (PRINZIDE;ZESTORETIC) 20-25 MG per tablet Take 1 tablet by mouth daily  Qty: 30 tablet, Refills: 5    Associated Diagnoses: Type 2 diabetes mellitus without complication, without long-term current use of insulin ([facility] [address]      Multiple Vitamins-Minerals (THERAPEUTIC MULTIVITAMIN-MINERALS) tablet Take 1 tablet by mouth daily  Qty: 30 tablet, Refills: 0      folic acid (FOLVITE) 1 MG tablet Take 1 tablet by mouth daily  Qty: 30 tablet, Refills: 5    Associated Diagnoses: Bipolar 1 disorder ([facility])      albuterol sulfate HFA (PROVENTIL HFA) 108 (90 Base) MCG/ACT inhaler Inhale 2 puffs into the lungs every 4 hours as needed for Wheezing or Shortness of Breath (Space out to every 6 hours as symptoms improve) Space out to every 6 hours as symptoms improve. Qty: 1 Inhaler, Refills: 0      blood glucose test strips (ASCENSIA AUTODISC VI;ONE TOUCH ULTRA TEST VI) strip Test as needed. Dispense One Touch verio Test Strips. Dx: Type 2 diabetes (250.00)  Qty: 60 strip, Refills: 11    Associated Diagnoses: Type 2 diabetes mellitus without complication, without long-term current use of insulin ([facility])      glucose monitoring kit (FREESTYLE) monitoring kit Glucometer with test strips and lancets. Check BS once daily  #100 strips and lancets  Qty: 1 kit, Refills: 5    Associated Diagnoses: Type 2 diabetes mellitus without complication, without long-term current use of insulin ([facility] [address]             ALLERGIES     Patient has no known allergies.     FAMILY HISTORY       Family History   Problem Relation Age of Onset    High Blood Pressure Father     Cancer Father         Lung Cancer    Alcohol Abuse Father     High Blood Pressure Brother     Diabetes Mother     Heart Disease Mother         Stent Placement    Arthritis Mother     Depression Mother     High Blood Pressure Mother     High Cholesterol Mother     Alcohol Abuse Paternal Uncle         SOCIAL HISTORY       Social History     Socioeconomic History    Marital status: Single     Spouse name: None    Number of children: 0    Years of education: 12    Highest education level: None   Occupational History     Employer: [address] [place] [address] "[employer] Needs    Financial resource strain: Not hard at all    Food insecurity     Worry: Never true     Inability: Never true    Transportation needs     Medical: No     Non-medical: Yes   Tobacco Use    Smoking status: Current Every Day Smoker     Packs/day: 1.00     Years: 27.00     Pack years: 27.00     Types: E-Cigarettes, Cigarettes    Smokeless tobacco: Former User    Tobacco comment: vape   Substance and Sexual Activity    Alcohol use: Not Currently     Alcohol/week: 140.0 standard drinks     Types: 140 Cans of beer per week     Frequency: Patient refused     Binge frequency: Patient refused     Comment: 12 pk beer & 6 hard lemonade daily -last 8:30 3/16/21    Drug use: No    Sexual activity: Yes   Lifestyle    Physical activity     Days per week: 0 days     Minutes per session: 0 min    Stress: Only a little   Relationships    Social connections     Talks on phone: More than three times a week     Gets together: More than three times a week     Attends Hinduism service: Never     Active member of club or organization: Yes     Attends meetings of clubs or organizations: More than 4 times per year     Relationship status: Never     Intimate partner violence     Fear of current or ex partner: None     Emotionally abused: None     Physically abused: None     Forced sexual activity: None   Other Topics Concern    None   Social History Narrative    Patient with history of etoh abuse but stated he is currently not drinking. Patient is receiving medication to assist him with staying sober and is attending routine AA meetings via zoom        REVIEW OF SYSTEMS     Review of Systems   Constitutional: Negative for fatigue and fever. HENT: Negative for congestion and trouble swallowing. Eyes: Negative for redness. Respiratory: Negative for cough and shortness of breath. Cardiovascular: Negative for chest pain. Gastrointestinal: Negative for abdominal pain, nausea and vomiting.    Genitourinary: Negative for difficulty urinating. Musculoskeletal: Negative for back pain. Skin: Negative for rash. Allergic/Immunologic: Negative for immunocompromised state. Neurological: Positive for tremors. Negative for light-headedness and headaches. Hematological: Does not bruise/bleed easily. Psychiatric/Behavioral: The patient is nervous/anxious. Except as noted above the remainder of the review of systems was reviewed and is. PHYSICAL EXAM    (up to 7 for level 4, 8 or more for level 5)     ED Triage Vitals [03/17/21 1443]   BP Temp Temp src Pulse Resp SpO2 Height Weight   (!) 161/98 -- -- 94 17 98 % -- --       Physical Exam  Vitals signs and nursing note reviewed. Constitutional:       General: He is not in acute distress. Appearance: He is obese. He is not ill-appearing or diaphoretic. HENT:      Head: Normocephalic and atraumatic. Mouth/Throat:      Mouth: Mucous membranes are moist.      Pharynx: Oropharynx is clear. Eyes:      Extraocular Movements: Extraocular movements intact. Pupils: Pupils are equal, round, and reactive to light. Neck:      Musculoskeletal: Neck supple. Cardiovascular:      Rate and Rhythm: Normal rate and regular rhythm. Pulses: Normal pulses. Heart sounds: Normal heart sounds. No murmur. Pulmonary:      Effort: Pulmonary effort is normal. No respiratory distress. Breath sounds: Normal breath sounds. No decreased breath sounds. Abdominal:      General: Bowel sounds are normal.      Palpations: Abdomen is soft. Tenderness: There is no abdominal tenderness. There is no guarding or rebound. Musculoskeletal:      Right lower leg: No edema. Left lower leg: No edema. Skin:     General: Skin is warm and dry. Capillary Refill: Capillary refill takes less than 2 seconds. Neurological:      General: No focal deficit present. Mental Status: He is alert. GCS: GCS eye subscore is 4. GCS verbal subscore is 5. GCS motor subscore is 6. Motor: Tremor present. No seizure activity. DIAGNOSTIC RESULTS     EKG:(none if blank)  All EKG's are interpreted by [facility] who either signs or Co-signs this chart in the absence of a cardiologist.        RADIOLOGY: (none if blank)   Interpretation per the Radiologistbelow, if available at the time of this note:    No orders to display       LABS:  Labs Reviewed   CBC WITH AUTO DIFFERENTIAL - Abnormal; Notable for the following components:       Result Value    WBC 10.9 (*)     MPV 9.0 (*)     Segs Absolute 8.8 (*)     All other components within normal limits   COMPREHENSIVE METABOLIC PANEL W/ REFLEX TO MG FOR LOW K - Abnormal; Notable for the following components:    Glucose 201 (*)     Sodium 132 (*)     Chloride 94 (*)     CO2 20 (*)      (*)     Alkaline Phosphatase 142 (*)     Total Bilirubin 1.9 (*)      (*)     All other components within normal limits   ANION GAP - Abnormal; Notable for the following components:    Anion Gap 18.0 (*)     All other components within normal limits   OSMOLALITY - Abnormal; Notable for the following components:    Osmolality Calc 271.0 (*)     All other components within normal limits   POCT GLUCOSE - Abnormal; Notable for the following components:    POC Glucose 126 (*)     All other components within normal limits   ETHANOL   URINE DRUG SCREEN   GLOMERULAR FILTRATION RATE, ESTIMATED   CBC WITH AUTO DIFFERENTIAL   BASIC METABOLIC PANEL W/ REFLEX TO MG FOR LOW K       All other labs were within normal range or not returned as of this dictation. Please note, any cultures that may have been sent were not resulted at the time of this patient visit.     EMERGENCY DEPARTMENT COURSE andMedical Decision Making:     MDM  Number of Diagnoses or Management Options  Diagnosis management comments: 81-year-old male presents emergency room for alcohol withdrawal.  Patient states he would like to be admitted secondary to alcohol withdrawal symptoms. He is concerned because he has had hallucinations in the past.  He is unsure if he had seizures but he does remember the seizure pads being on the bed. Patient states his last drink was yesterday at 930. He is currently having tremors. He no evidence of altered mental status or tachycardia at this time. I spoke with our inpatient hospitalist who was agreeable for admission to treat his alcohol withdrawal.  /       The patient was evaluated during the global COVID-19 pandemic, and that diagnosis was considered upon their initial presentation. Their evaluation, treatment and testing was consistent with current guidelines for patients who present with complaints or symptoms that may be related to COVID-19.     Strict returnprecautions and follow up instructions were discussed with the patient with which the patient agrees        ED Medications administered this visit:    Medications   sodium chloride flush 0.9 % injection 10 mL (has no administration in time range)   sodium chloride flush 0.9 % injection 10 mL (has no administration in time range)   enoxaparin (LOVENOX) injection 40 mg (40 mg Subcutaneous Given 3/17/21 1850)   promethazine (PHENERGAN) tablet 12.5 mg ( Oral See Alternative 3/17/21 1851)     Or   ondansetron (ZOFRAN) injection 4 mg (4 mg Intravenous Given 3/17/21 1851)   polyethylene glycol (GLYCOLAX) packet 17 g (has no administration in time range)   acetaminophen (TYLENOL) tablet 650 mg (has no administration in time range)     Or   acetaminophen (TYLENOL) suppository 650 mg (has no administration in time range)   albuterol sulfate  (90 Base) MCG/ACT inhaler 2 puff (has no administration in time range)   folic acid (FOLVITE) tablet 1 mg (1 mg Oral Given 3/17/21 1851)   metoprolol tartrate (LOPRESSOR) tablet 50 mg (has no administration in time range)   nicotine (NICODERM CQ) 21 MG/24HR 1 patch (1 patch Transdermal Patch Applied 3/17/21 1851)   pantoprazole (PROTONIX) tablet 40 mg (40 mg Oral Given 3/17/21 1850)   venlafaxine (EFFEXOR XR) extended release capsule 75 mg (75 mg Oral Given 3/17/21 1851)   thiamine tablet 100 mg (100 mg Oral Given 3/17/21 1851)   PHENobarbital (LUMINAL) 300.95 mg in sodium chloride 0.9 % 100 mL IVPB (0 mg Intravenous Stopped 3/17/21 2043)     Followed by   PHENobarbital (LUMINAL) tablet 64.8 mg (has no administration in time range)     Followed by   PHENobarbital (LUMINAL) tablet 32.4 mg (has no administration in time range)   insulin lispro (HUMALOG) injection vial 0-18 Units (0 Units Subcutaneous Not Given 3/17/21 1800)   insulin lispro (HUMALOG) injection vial 0-9 Units (has no administration in time range)   glucose (GLUTOSE) 40 % oral gel 15 g (has no administration in time range)   dextrose 50 % IV solution (has no administration in time range)   glucagon (rDNA) injection 1 mg (has no administration in time range)   dextrose 5 % solution (has no administration in time range)   lisinopril (PRINIVIL;ZESTRIL) tablet 20 mg (20 mg Oral Given 3/17/21 1851)     And   hydroCHLOROthiazide (HYDRODIURIL) tablet 25 mg (25 mg Oral Given 3/17/21 1851)   LORazepam (ATIVAN) injection 2 mg (2 mg Intramuscular Given 3/17/21 1648)         Procedures: (None if blank)       CLINICAL     No diagnosis found. DISPOSITION/PLAN   DISPOSITION Admitted 03/17/2021 03:51:33 PM      PATIENT REFERRED TO:  No follow-up provider specified.     DISCHARGE MEDICATIONS:  Current Discharge Medication List                 (Please note that portions of this note were completed with a voice recognition program.  Efforts were made to edit the dictations but occasionallywords are mis-transcribed.)      Electronically signed by Suraj Betancourt MD on 3/17/21 at 4:07 PM EDT    Attending Physician, Emergency Department       Danny eRa MD  03/17/21 7523

## 2021-03-17 NOTE — ED TRIAGE NOTES
Patient presents wanting help with his alcohol problem. States his last drink was somewhere between 2130 and 0000. States he has been admitted before for alcohol problems. States he has never seized from alcohol withdraw.  States he feels anxious

## 2021-03-17 NOTE — ED NOTES
Bed: 023A  Expected date:   Expected time:   Means of arrival: Chrissy  Comments:     Sara Mcghee RN  03/17/21 1727

## 2021-03-18 ENCOUNTER — APPOINTMENT (OUTPATIENT)
Dept: GENERAL RADIOLOGY | Age: 46
End: 2021-03-18
Payer: MEDICAID

## 2021-03-18 LAB
ANION GAP SERPL CALCULATED.3IONS-SCNC: 11 MEQ/L (ref 8–16)
BASOPHILS # BLD: 0.8 %
BASOPHILS ABSOLUTE: 0.1 THOU/MM3 (ref 0–0.1)
BUN BLDV-MCNC: 11 MG/DL (ref 7–22)
CALCIUM SERPL-MCNC: 8.7 MG/DL (ref 8.5–10.5)
CHLORIDE BLD-SCNC: 94 MEQ/L (ref 98–111)
CO2: 26 MEQ/L (ref 23–33)
CREAT SERPL-MCNC: 0.6 MG/DL (ref 0.4–1.2)
EOSINOPHIL # BLD: 1.4 %
EOSINOPHILS ABSOLUTE: 0.1 THOU/MM3 (ref 0–0.4)
ERYTHROCYTE [DISTWIDTH] IN BLOOD BY AUTOMATED COUNT: 12.7 % (ref 11.5–14.5)
ERYTHROCYTE [DISTWIDTH] IN BLOOD BY AUTOMATED COUNT: 42.9 FL (ref 35–45)
GFR SERPL CREATININE-BSD FRML MDRD: > 90 ML/MIN/1.73M2
GLUCOSE BLD-MCNC: 116 MG/DL (ref 70–108)
GLUCOSE BLD-MCNC: 125 MG/DL (ref 70–108)
GLUCOSE BLD-MCNC: 129 MG/DL (ref 70–108)
GLUCOSE BLD-MCNC: 141 MG/DL (ref 70–108)
GLUCOSE BLD-MCNC: 145 MG/DL (ref 70–108)
GLUCOSE BLD-MCNC: 168 MG/DL (ref 70–108)
HCT VFR BLD CALC: 45 % (ref 42–52)
HEMOGLOBIN: 15.7 GM/DL (ref 14–18)
IMMATURE GRANS (ABS): 0.01 THOU/MM3 (ref 0–0.07)
IMMATURE GRANULOCYTES: 0.1 %
LYMPHOCYTES # BLD: 24.6 %
LYMPHOCYTES ABSOLUTE: 1.9 THOU/MM3 (ref 1–4.8)
MAGNESIUM: 1.5 MG/DL (ref 1.6–2.4)
MCH RBC QN AUTO: 32.3 PG (ref 26–33)
MCHC RBC AUTO-ENTMCNC: 34.9 GM/DL (ref 32.2–35.5)
MCV RBC AUTO: 92.6 FL (ref 80–94)
MONOCYTES # BLD: 7 %
MONOCYTES ABSOLUTE: 0.5 THOU/MM3 (ref 0.4–1.3)
NUCLEATED RED BLOOD CELLS: 0 /100 WBC
PLATELET # BLD: 187 THOU/MM3 (ref 130–400)
PMV BLD AUTO: 9.4 FL (ref 9.4–12.4)
POTASSIUM REFLEX MAGNESIUM: 3.5 MEQ/L (ref 3.5–5.2)
RBC # BLD: 4.86 MILL/MM3 (ref 4.7–6.1)
SEG NEUTROPHILS: 66.1 %
SEGMENTED NEUTROPHILS ABSOLUTE COUNT: 5.2 THOU/MM3 (ref 1.8–7.7)
SODIUM BLD-SCNC: 131 MEQ/L (ref 135–145)
WBC # BLD: 7.8 THOU/MM3 (ref 4.8–10.8)

## 2021-03-18 PROCEDURE — 80048 BASIC METABOLIC PNL TOTAL CA: CPT

## 2021-03-18 PROCEDURE — 93010 ELECTROCARDIOGRAM REPORT: CPT | Performed by: INTERNAL MEDICINE

## 2021-03-18 PROCEDURE — 6360000002 HC RX W HCPCS: Performed by: INTERNAL MEDICINE

## 2021-03-18 PROCEDURE — 83735 ASSAY OF MAGNESIUM: CPT

## 2021-03-18 PROCEDURE — 74018 RADEX ABDOMEN 1 VIEW: CPT

## 2021-03-18 PROCEDURE — 85025 COMPLETE CBC W/AUTO DIFF WBC: CPT

## 2021-03-18 PROCEDURE — 99232 SBSQ HOSP IP/OBS MODERATE 35: CPT | Performed by: INTERNAL MEDICINE

## 2021-03-18 PROCEDURE — 36415 COLL VENOUS BLD VENIPUNCTURE: CPT

## 2021-03-18 PROCEDURE — 6370000000 HC RX 637 (ALT 250 FOR IP): Performed by: INTERNAL MEDICINE

## 2021-03-18 PROCEDURE — 82948 REAGENT STRIP/BLOOD GLUCOSE: CPT

## 2021-03-18 PROCEDURE — 1200000003 HC TELEMETRY R&B

## 2021-03-18 PROCEDURE — 2580000003 HC RX 258: Performed by: INTERNAL MEDICINE

## 2021-03-18 RX ORDER — MAGNESIUM SULFATE IN WATER 40 MG/ML
2000 INJECTION, SOLUTION INTRAVENOUS PRN
Status: DISCONTINUED | OUTPATIENT
Start: 2021-03-18 | End: 2021-03-21 | Stop reason: HOSPADM

## 2021-03-18 RX ADMIN — Medication 100 MG: at 07:58

## 2021-03-18 RX ADMIN — ENOXAPARIN SODIUM 40 MG: 40 INJECTION SUBCUTANEOUS at 17:47

## 2021-03-18 RX ADMIN — PHENOBARBITAL 64.8 MG: 32.4 TABLET ORAL at 08:00

## 2021-03-18 RX ADMIN — LISINOPRIL 20 MG: 20 TABLET ORAL at 07:59

## 2021-03-18 RX ADMIN — SODIUM CHLORIDE, PRESERVATIVE FREE 10 ML: 5 INJECTION INTRAVENOUS at 20:04

## 2021-03-18 RX ADMIN — HYDROCHLOROTHIAZIDE 25 MG: 25 TABLET ORAL at 07:59

## 2021-03-18 RX ADMIN — METOPROLOL TARTRATE 50 MG: 50 TABLET, FILM COATED ORAL at 20:03

## 2021-03-18 RX ADMIN — METOPROLOL TARTRATE 50 MG: 50 TABLET, FILM COATED ORAL at 08:00

## 2021-03-18 RX ADMIN — MAGNESIUM SULFATE HEPTAHYDRATE 3000 MG: 500 INJECTION, SOLUTION INTRAMUSCULAR; INTRAVENOUS at 12:34

## 2021-03-18 RX ADMIN — FOLIC ACID 1 MG: 1 TABLET ORAL at 08:00

## 2021-03-18 RX ADMIN — PANTOPRAZOLE SODIUM 40 MG: 40 TABLET, DELAYED RELEASE ORAL at 06:18

## 2021-03-18 RX ADMIN — VENLAFAXINE HYDROCHLORIDE 75 MG: 75 CAPSULE, EXTENDED RELEASE ORAL at 07:58

## 2021-03-18 RX ADMIN — PHENOBARBITAL SODIUM 300.95 MG: 65 INJECTION INTRAMUSCULAR at 03:26

## 2021-03-18 RX ADMIN — INSULIN LISPRO 3 UNITS: 100 INJECTION, SOLUTION INTRAVENOUS; SUBCUTANEOUS at 08:01

## 2021-03-18 RX ADMIN — SODIUM CHLORIDE, PRESERVATIVE FREE 10 ML: 5 INJECTION INTRAVENOUS at 08:03

## 2021-03-18 RX ADMIN — PHENOBARBITAL 64.8 MG: 32.4 TABLET ORAL at 20:03

## 2021-03-18 RX ADMIN — ACETAMINOPHEN 650 MG: 325 TABLET ORAL at 07:58

## 2021-03-18 RX ADMIN — ONDANSETRON 4 MG: 2 INJECTION INTRAMUSCULAR; INTRAVENOUS at 06:19

## 2021-03-18 ASSESSMENT — PAIN DESCRIPTION - PAIN TYPE: TYPE: ACUTE PAIN

## 2021-03-18 ASSESSMENT — PAIN DESCRIPTION - LOCATION
LOCATION: BACK
LOCATION: HEAD

## 2021-03-18 ASSESSMENT — PAIN SCALES - GENERAL
PAINLEVEL_OUTOF10: 6
PAINLEVEL_OUTOF10: 4
PAINLEVEL_OUTOF10: 3

## 2021-03-18 NOTE — FLOWSHEET NOTE
Pt was alone at the time of the visit. He was dealing with detox (ETOH Abuse). He said that he will not drink again but wanted prayer to cope. He was anointed. 03/18/21 1518   Encounter Summary   Services provided to: Patient   Referral/Consult From: [facility]ing   [facility]   Continue Visiting Yes  (3/18)   Complexity of Encounter Low   Length of Encounter 15 minutes   Routine   Type Initial   Assessment Approachable;Calm   Intervention Prayer;[place];Nurtured hope   Outcome Acceptance;Expressed gratitude;Encouraged; Hopeful   Sacraments   Sacrament of Sick-Anointing Anointed

## 2021-03-18 NOTE — PROGRESS NOTES
Hospitalist - progress    Patient: Arthur Vazquez    Unit/Bed:8B-26/026-A  YOB: 1975  MRN: [id number]   Acct: [de-identified]   PCP: NILAM Santana CNP    Date of Service: Pt seen/examined on 03/18/21  and Admitted to Inpatient with expected LOS greater than two midnights due to medical therapy. Chief Complaint:  etoh withdrawal    Assessment and Plan:-  1. History of alcohol abuse currently in withdrawal- PAW score of 6, will initiate phenobarb treatment protocol, folic acid, thiamine, seizure seizure and aspiration precautions-monitor vital signs closely. 3/18-tremors currently on oral phenobarbital complaining of some nausea and abdominal pain today will do a KUB-bowel regimen  2. History of type 2 diabetes-controlled added insulin sliding scale monitor blood sugars meals and at bedtime  3. History of benign essential hypertension-currently controlled watch with withdrawals will monitor closely. 4. Chronic hyponatremia from alcohol abuse-fluid restriction-BMP a.m.  5. History of COPD currently not in exacerbation-smoking history-councelled , will add nicotine patch, restart home inhalers. 6. History of GERD - on protonix continue  7. Hypomagnesemia secondary to alcohol abuse-will replace 3 g of IV magnesium today 3/18-added magnesium protocol      History Of Present Illness: This is 81-year-old gentleman with past medical history described below who comes in wanting to detox from alcohol-patient's last drink was last night around 930. He has had delirium, hallucinations, questionable seizures previously. Has had been at the rehab and then rebounded. Current symptoms are just tremors and anxiety , no hallucinations no agitation.   No nausea no vomiting no fever shortness of breath or chest pain        Subjective:- (Last 24 hours)      Doing okay  Complaining of nausea and abdominal pain  Positive for anxiety however better than yesterday   And tremor is better  Past medical history, family history, social history and allergies reviewed again and is unchanged since admission. ROS (12 point review of systems completed. Pertinent positives noted. Otherwise ROS is negative)      Scheduled Meds:   magnesium sulfate  3,000 mg Intravenous Once    sodium chloride flush  10 mL Intravenous 2 times per day    enoxaparin  40 mg Subcutaneous L67A    folic acid  1 mg Oral Daily    metoprolol tartrate  50 mg Oral BID    nicotine  1 patch Transdermal Q24H    pantoprazole  40 mg Oral Daily    venlafaxine  75 mg Oral Daily    thiamine  100 mg Oral Daily    PHENobarbital  64.8 mg Oral BID    Followed by   Sav Ariza ON 3/19/2021] PHENobarbital  32.4 mg Oral BID    insulin lispro  0-18 Units Subcutaneous TID WC    insulin lispro  0-9 Units Subcutaneous Nightly    lisinopril  20 mg Oral Daily    And    hydroCHLOROthiazide  25 mg Oral Daily     Continuous Infusions:   dextrose       PRN Meds:.magnesium sulfate, sodium chloride flush, promethazine **OR** ondansetron, polyethylene glycol, acetaminophen **OR** acetaminophen, albuterol sulfate HFA, glucose, dextrose, glucagon (rDNA), dextrose   Diet:  DIET CARDIAC; Review of systems:   Pertinent positives as noted in the HPI. All other systems reviewed and negative. PHYSICAL EXAM:  /85   Pulse 83   Temp 97.6 °F (36.4 °C) (Oral)   Resp 16   Ht 5' 11\" (1.803 m)   Wt 253 lb 11.2 oz (115.1 kg)   SpO2 92%   BMI 35.38 kg/m²   General appearance: No apparent distress, appears stated age and cooperative. HEENT: Normal cephalic, atraumatic without obvious deformity. Pupils equal, round, and reactive to light. Extra ocular muscles intact. Conjunctivae/corneas clear. Neck: Supple, with full range of motion. No jugular venous distention. Trachea midline. Respiratory:  Normal respiratory effort. Clear to auscultation, bilaterally without Rales/Wheezes/Rhonchi.   Cardiovascular: Regular rate and rhythm with normal S1/S2 without murmurs, rubs or gallops. Abdomen: Soft, non-tender, non-distended with normal bowel sounds. Musculoskeletal:  No clubbing, cyanosis or edema bilaterally. Skin: Skin color, texture, turgor normal.  No rashes or lesions. Neurologic:  Neurovascularly intact without any focal sensory/motor deficits. Cranial nerves: II-XII intact, grossly non-focal.  Tremors  Psychiatric: Alert and oriented, thought content appropriate, normal insight  Capillary Refill: Brisk,< 3 seconds   Peripheral Pulses: +2 palpable, equal bilaterally     Labs:   Recent Labs     03/17/21  1547 03/18/21  0540   WBC 10.9* 7.8   HGB 15.8 15.7   HCT 46.1 45.0    187     Recent Labs     03/17/21  1547 03/18/21  0540   * 131*   K 3.7 3.5   CL 94* 94*   CO2 20* 26   BUN 15 11   CREATININE 0.6 0.6   CALCIUM 8.6 8.7     Recent Labs     03/17/21  1547   *   *   BILITOT 1.9*   ALKPHOS 142*     No results for input(s): INR in the last 72 hours. No results for input(s): Carrie Eboni in the last 72 hours. Urinalysis:    Lab Results   Component Value Date    NITRU NEGATIVE 09/23/2020    WBCUA NONE SEEN 09/23/2020    BACTERIA NONE SEEN 09/23/2020    RBCUA NONE SEEN 09/23/2020    BLOODU NEGATIVE 09/23/2020    SPECGRAV <1.005 08/29/2019    GLUCOSEU NEGATIVE 09/23/2020       Radiology:   XR ABDOMEN (KUB) (SINGLE AP VIEW)   Final Result   Nonspecific, nonobstructive bowel gas pattern may represent developing adynamic ileus. **This report has been created using voice recognition software. It may contain minor errors which are inherent in voice recognition technology. **      Final report electronically signed by Dr. Edward Pascal MD on 3/18/2021 9:55 AM        No results found.       EKG: Prolonged QT interval or tu fusion, consider myocardial disease, electrolyte imbalance, or drug effects  Abnormal ECG  When compared with ECG of 18-FEB-2020 20:26,  No significant change was found    Electronically signed by Esteban Segura Andrea Alvarez MD on 3/18/2021 at 10:51 AM

## 2021-03-19 LAB
GLUCOSE BLD-MCNC: 114 MG/DL (ref 70–108)
GLUCOSE BLD-MCNC: 121 MG/DL (ref 70–108)
GLUCOSE BLD-MCNC: 135 MG/DL (ref 70–108)
GLUCOSE BLD-MCNC: 147 MG/DL (ref 70–108)
GLUCOSE BLD-MCNC: 155 MG/DL (ref 70–108)
GLUCOSE BLD-MCNC: 170 MG/DL (ref 70–108)
MAGNESIUM: 1.9 MG/DL (ref 1.6–2.4)

## 2021-03-19 PROCEDURE — 99232 SBSQ HOSP IP/OBS MODERATE 35: CPT | Performed by: INTERNAL MEDICINE

## 2021-03-19 PROCEDURE — 36415 COLL VENOUS BLD VENIPUNCTURE: CPT

## 2021-03-19 PROCEDURE — 6370000000 HC RX 637 (ALT 250 FOR IP): Performed by: STUDENT IN AN ORGANIZED HEALTH CARE EDUCATION/TRAINING PROGRAM

## 2021-03-19 PROCEDURE — 6360000002 HC RX W HCPCS: Performed by: INTERNAL MEDICINE

## 2021-03-19 PROCEDURE — 82948 REAGENT STRIP/BLOOD GLUCOSE: CPT

## 2021-03-19 PROCEDURE — 1200000003 HC TELEMETRY R&B

## 2021-03-19 PROCEDURE — 6370000000 HC RX 637 (ALT 250 FOR IP): Performed by: INTERNAL MEDICINE

## 2021-03-19 PROCEDURE — 94760 N-INVAS EAR/PLS OXIMETRY 1: CPT

## 2021-03-19 PROCEDURE — 2580000003 HC RX 258: Performed by: INTERNAL MEDICINE

## 2021-03-19 PROCEDURE — 83735 ASSAY OF MAGNESIUM: CPT

## 2021-03-19 RX ORDER — PHENOBARBITAL SODIUM 65 MG/ML
260 INJECTION INTRAMUSCULAR
Status: DISCONTINUED | OUTPATIENT
Start: 2021-03-19 | End: 2021-03-21 | Stop reason: HOSPADM

## 2021-03-19 RX ORDER — LACTULOSE 10 G/15ML
20 SOLUTION ORAL 2 TIMES DAILY
Status: COMPLETED | OUTPATIENT
Start: 2021-03-19 | End: 2021-03-20

## 2021-03-19 RX ORDER — POLYETHYLENE GLYCOL 3350 17 G/17G
17 POWDER, FOR SOLUTION ORAL DAILY
Status: DISCONTINUED | OUTPATIENT
Start: 2021-03-19 | End: 2021-03-21 | Stop reason: HOSPADM

## 2021-03-19 RX ORDER — PHENOBARBITAL 32.4 MG/1
32.4 TABLET ORAL ONCE
Status: COMPLETED | OUTPATIENT
Start: 2021-03-19 | End: 2021-03-19

## 2021-03-19 RX ORDER — PHENOBARBITAL SODIUM 65 MG/ML
130 INJECTION INTRAMUSCULAR
Status: DISCONTINUED | OUTPATIENT
Start: 2021-03-19 | End: 2021-03-21 | Stop reason: HOSPADM

## 2021-03-19 RX ORDER — POTASSIUM CHLORIDE 20 MEQ/1
40 TABLET, EXTENDED RELEASE ORAL PRN
Status: DISCONTINUED | OUTPATIENT
Start: 2021-03-19 | End: 2021-03-21 | Stop reason: HOSPADM

## 2021-03-19 RX ORDER — POTASSIUM CHLORIDE 20 MEQ/1
20 TABLET, EXTENDED RELEASE ORAL PRN
Status: DISCONTINUED | OUTPATIENT
Start: 2021-03-19 | End: 2021-03-21 | Stop reason: HOSPADM

## 2021-03-19 RX ORDER — POTASSIUM CHLORIDE 7.45 MG/ML
10 INJECTION INTRAVENOUS PRN
Status: DISCONTINUED | OUTPATIENT
Start: 2021-03-19 | End: 2021-03-21 | Stop reason: HOSPADM

## 2021-03-19 RX ADMIN — PHENOBARBITAL SODIUM 130 MG: 65 INJECTION INTRAMUSCULAR at 18:23

## 2021-03-19 RX ADMIN — LACTULOSE 20 G: 20 SOLUTION ORAL at 12:03

## 2021-03-19 RX ADMIN — ONDANSETRON 4 MG: 2 INJECTION INTRAMUSCULAR; INTRAVENOUS at 07:48

## 2021-03-19 RX ADMIN — PHENOBARBITAL SODIUM 130 MG: 65 INJECTION INTRAMUSCULAR at 09:42

## 2021-03-19 RX ADMIN — FOLIC ACID 1 MG: 1 TABLET ORAL at 07:44

## 2021-03-19 RX ADMIN — ENOXAPARIN SODIUM 40 MG: 40 INJECTION SUBCUTANEOUS at 17:18

## 2021-03-19 RX ADMIN — PHENOBARBITAL SODIUM 130 MG: 65 INJECTION INTRAMUSCULAR at 16:20

## 2021-03-19 RX ADMIN — VENLAFAXINE HYDROCHLORIDE 75 MG: 75 CAPSULE, EXTENDED RELEASE ORAL at 07:45

## 2021-03-19 RX ADMIN — PHENOBARBITAL SODIUM 130 MG: 65 INJECTION INTRAMUSCULAR at 12:10

## 2021-03-19 RX ADMIN — HYDROCHLOROTHIAZIDE 25 MG: 25 TABLET ORAL at 07:44

## 2021-03-19 RX ADMIN — ONDANSETRON 4 MG: 2 INJECTION INTRAMUSCULAR; INTRAVENOUS at 17:15

## 2021-03-19 RX ADMIN — METOPROLOL TARTRATE 50 MG: 50 TABLET, FILM COATED ORAL at 07:44

## 2021-03-19 RX ADMIN — INSULIN LISPRO 3 UNITS: 100 INJECTION, SOLUTION INTRAVENOUS; SUBCUTANEOUS at 16:23

## 2021-03-19 RX ADMIN — POLYETHYLENE GLYCOL 3350 17 G: 17 POWDER, FOR SOLUTION ORAL at 12:03

## 2021-03-19 RX ADMIN — SODIUM CHLORIDE, PRESERVATIVE FREE 10 ML: 5 INJECTION INTRAVENOUS at 07:47

## 2021-03-19 RX ADMIN — METOPROLOL TARTRATE 50 MG: 50 TABLET, FILM COATED ORAL at 20:44

## 2021-03-19 RX ADMIN — Medication 100 MG: at 07:44

## 2021-03-19 RX ADMIN — PHENOBARBITAL 32.4 MG: 32.4 TABLET ORAL at 07:43

## 2021-03-19 RX ADMIN — PHENOBARBITAL 32.4 MG: 32.4 TABLET ORAL at 20:44

## 2021-03-19 RX ADMIN — LIDOCAINE HYDROCHLORIDE: 20 SOLUTION ORAL; TOPICAL at 12:03

## 2021-03-19 RX ADMIN — SODIUM CHLORIDE, PRESERVATIVE FREE 10 ML: 5 INJECTION INTRAVENOUS at 20:45

## 2021-03-19 RX ADMIN — LACTULOSE 20 G: 20 SOLUTION ORAL at 20:45

## 2021-03-19 RX ADMIN — ONDANSETRON 4 MG: 2 INJECTION INTRAMUSCULAR; INTRAVENOUS at 02:20

## 2021-03-19 RX ADMIN — LISINOPRIL 20 MG: 20 TABLET ORAL at 07:44

## 2021-03-19 RX ADMIN — PHENOBARBITAL 32.4 MG: 32.4 TABLET ORAL at 03:24

## 2021-03-19 RX ADMIN — PANTOPRAZOLE SODIUM 40 MG: 40 TABLET, DELAYED RELEASE ORAL at 06:32

## 2021-03-19 ASSESSMENT — PAIN SCALES - GENERAL: PAINLEVEL_OUTOF10: 0

## 2021-03-19 NOTE — PROGRESS NOTES
Pt called RN into the room stating he is hot, nauseated, and \"feels like he is going to die. \" Vitals show stable -- see flowsheet. Blood sugar checked and is 155. Pt states his chest is burning also. Will notify on call provider.

## 2021-03-19 NOTE — CARE COORDINATION
3/19/21, 8:49 AM EDT    DISCHARGE ON GOING EVALUATION    [place] Walker day: 2  Location: [facility]26/026-A Reason for admit: ETOH abuse [F10.10]   Procedure: No  Barriers to Discharge: PRN Pheno added today. Pt with some hallucinating. Awaiting Addictions consult to be completed. Folic Acid and thiamine. PCP: NILAM Armas CNP  Readmission Risk Score: 13%  Patient Goals/Plan/Treatment Preferences: Plans return home with mother. Awaiting Addictions to see pt with resources.

## 2021-03-19 NOTE — PROGRESS NOTES
Utilize [facility] alcohol withdrawal scale (Based on [place] Modified Alcohol Withdrawal Scale). Tabulate score based on classifications including Tremor, Sweating, Hallucination, Orientation, and Agitation. Tremor:0  Sweatin  Hallucinations: 1  Orientation: 0  Agitation: 1  Total Score: Action perform as described below     Tremor:  No tremor is 0 points. Tremor on movement is 1 point. Tremor at rest is 2 points. Sweating: No Sweat 0 points. Moist is 1 point. Drenching sweats is 2 points. Hallucinations: No present 0 points. Dissuadable is 1 point. Not dissuadable is 2 points. Orientation: Oriented 0 points. Vague/detached 1 point. Disoriented/no contact 2 points. Agitation: Calm 0 points. Anxious 1 point. Panicky 2 points. Check scale every 2 hours. Discontinue scoring with 4 consecutive scorings of 0. Scale 0: No phenobarbital given. Re-assess every 60 minutes as needed. Scale 1-3: Phenobarbital 130 mg IV over 3 minutes. Re-assess every 60 minutes as needed. May administer every 60 minutes to a maximum dose of phenobarbital 1040 mg in 24 hours! Scale 4-8: Phenobarbital 260 mg IV over 5 minutes. Re-assess every 60 minutes as needed. May administer every 60 minutes to a maximum dose of phenobarbital 1040mg in 24 hours! Scale 9-10: Transfer to ICU (if not already in ICU). Administer 10mg/kg phenobarbital IV over 60 minutes. Maximum dose phenobarbital is 1040mg in 24 hours!

## 2021-03-19 NOTE — PROGRESS NOTES
Utilize [facility] alcohol withdrawal scale (Based on [place] Modified Alcohol Withdrawal Scale). Tabulate score based on classifications including Tremor, Sweating, Hallucination, Orientation, and Agitation. Tremor: 0  Sweatin  Hallucinations: 0  Orientation: 0  Agitation: 0  Total Score: 0  Action perform as described below     Tremor:  No tremor is 0 points. Tremor on movement is 1 point. Tremor at rest is 2 points. Sweating: No Sweat 0 points. Moist is 1 point. Drenching sweats is 2 points. Hallucinations: No present 0 points. Dissuadable is 1 point. Not dissuadable is 2 points. Orientation: Oriented 0 points. Vague/detached 1 point. Disoriented/no contact 2 points. Agitation: Calm 0 points. Anxious 1 point. Panicky 2 points. Check scale every 2 hours. Discontinue scoring with 4 consecutive scorings of 0. Scale 0: No phenobarbital given. Re-assess every 60 minutes as needed. Scale 1-3: Phenobarbital 130 mg IV over 3 minutes. Re-assess every 60 minutes as needed. May administer every 60 minutes to a maximum dose of phenobarbital 1040 mg in 24 hours! Scale 4-8: Phenobarbital 260 mg IV over 5 minutes. Re-assess every 60 minutes as needed. May administer every 60 minutes to a maximum dose of phenobarbital 1040mg in 24 hours! Scale 9-10: Transfer to ICU (if not already in ICU). Administer 10mg/kg phenobarbital IV over 60 minutes. Maximum dose phenobarbital is 1040mg in 24 hours!

## 2021-03-19 NOTE — PROGRESS NOTES
Utilize [facility] alcohol withdrawal scale (Based on [place] Modified Alcohol Withdrawal Scale). Tabulate score based on classifications including Tremor, Sweating, Hallucination, Orientation, and Agitation. Tremor: 1  Sweatin  Hallucinations:0  Orientation: 0  Agitation:2  Total Score: 5  Action perform as described below    Tremor: No tremors is 0 points. Tremor on movement is 1 point. Tremor at rest is 2 points. Sweating: No sweat is 0 points. Moist is 1 point. Drenching sweats is 2 points. Hallucinations: No present 0 points. Dissuadable is 1 point. No dissuadable is 2 points. Orientation: Oriented is 0 points. Vague/detached is 1 point. Disoriented/no contact is 2 points. Agitation: Calm is 0 points. Anxious 1 point. Panicky is 2 points. Check scale every 2 hours. Discontinue scoring with 4 consecutive scorings of 0. Scale 0: No phenobarbital given. Re-assess every 60 minutes as needed. Scale 1-3: Phenobarbital 130 mg IV over 3 minutes. Re-assess every 60 minutes as needed. May administer every 60 minutes to a maximum dose of phenobarbital 1040mg in 24 hours! Scale 4-8: Phenobarbital 260 mg IV over 5 minutes. Re-assess every 60 minutes as needed. May administer every 60 minutes to a maximum dose of phenobarbital 1040mg in 24 hours! Scale 9-10: Transfer to ICU (if not already in ICU). Administer 10mg/kg phenobarbital IV over 60 minutes. Maximum dose of phenobarbital is 1040mg in 24 hours!

## 2021-03-19 NOTE — PROGRESS NOTES
Utilize [facility] alcohol withdrawal scale (Based on [place] Modified Alcohol Withdrawal Scale). Tabulate score based on classifications including Tremor, Sweating, Hallucination, Orientation, and Agitation. Tremor: 0  Sweatin  Hallucinations: 1  Orientation: 0  Agitation: 1  Total Score: 2  Action perform as described below     Tremor:  No tremor is 0 points. Tremor on movement is 1 point. Tremor at rest is 2 points. Sweating: No Sweat 0 points. Moist is 1 point. Drenching sweats is 2 points. Hallucinations: No present 0 points. Dissuadable is 1 point. Not dissuadable is 2 points. Orientation: Oriented 0 points. Vague/detached 1 point. Disoriented/no contact 2 points. Agitation: Calm 0 points. Anxious 1 point. Panicky 2 points. Check scale every 2 hours. Discontinue scoring with 4 consecutive scorings of 0. Scale 0: No phenobarbital given. Re-assess every 60 minutes as needed. Scale 1-3: Phenobarbital 130 mg IV over 3 minutes. Re-assess every 60 minutes as needed. May administer every 60 minutes to a maximum dose of phenobarbital 1040 mg in 24 hours! Scale 4-8: Phenobarbital 260 mg IV over 5 minutes. Re-assess every 60 minutes as needed. May administer every 60 minutes to a maximum dose of phenobarbital 1040mg in 24 hours! Scale 9-10: Transfer to ICU (if not already in ICU). Administer 10mg/kg phenobarbital IV over 60 minutes. Maximum dose phenobarbital is 1040mg in 24 hours!

## 2021-03-19 NOTE — CONSULTS
Brief Intervention and Referral to Treatment Summary    Patient was provided PHQ-9, AUDIT and DAST Screening:      PHQ-9 Score: n/a  AUDIT Score:  30  DAST Score:  n/a    Patients substance use is considered     Dependent    Patients depression is considered:     n/a    Brief Education Was Provided    Patient was receptive    Brief Intervention Is Provided (Only for AUDIT or DAST)     Patient reports readiness to decrease and/or stop use and a plan was discussed     Recommendations/Referrals for Brief and/or Specialized Treatment Provided to Patient     Patient interested in longer-term programming for AOD use. Interested in [employer], number provided. Also refferred to [facility] and [facility].

## 2021-03-19 NOTE — PROGRESS NOTES
Utilize [facility] alcohol withdrawal scale (Based on [place] Modified Alcohol Withdrawal Scale). Tabulate score based on classifications including Tremor, Sweating, Hallucination, Orientation, and Agitation. Tremor: 0  Sweatin  Hallucinations: 1  Orientation: 0  Agitation: 1  Total Score: 2  Action perform as described below     Tremor:  No tremor is 0 points. Tremor on movement is 1 point. Tremor at rest is 2 points. Sweating: No Sweat 0 points. Moist is 1 point. Drenching sweats is 2 points. Hallucinations: No present 0 points. Dissuadable is 1 point. Not dissuadable is 2 points. Orientation: Oriented 0 points. Vague/detached 1 point. Disoriented/no contact 2 points. Agitation: Calm 0 points. Anxious 1 point. Panicky 2 points. Check scale every 2 hours. Discontinue scoring with 4 consecutive scorings of 0. Scale 0: No phenobarbital given. Re-assess every 60 minutes as needed. Scale 1-3: Phenobarbital 130 mg IV over 3 minutes. Re-assess every 60 minutes as needed. May administer every 60 minutes to a maximum dose of phenobarbital 1040 mg in 24 hours! Scale 4-8: Phenobarbital 260 mg IV over 5 minutes. Re-assess every 60 minutes as needed. May administer every 60 minutes to a maximum dose of phenobarbital 1040mg in 24 hours! Scale 9-10: Transfer to ICU (if not already in ICU). Administer 10mg/kg phenobarbital IV over 60 minutes. Maximum dose phenobarbital is 1040mg in 24 hours!

## 2021-03-19 NOTE — PROGRESS NOTES
Hospitalist - progress    Patient: Kamryn Franco    Unit/Bed:8B-26/026-A  YOB: 1975  MRN: [id number]   Acct: [de-identified]   PCP: NILAM Jim CNP    Date of Service: Pt seen/examined on 03/19/21  and Admitted to Inpatient with expected LOS greater than two midnights due to medical therapy. Chief Complaint:  etoh withdrawal    Assessment and Plan:-  1. History of alcohol abuse currently in withdrawal- PAW score of 6, will initiate phenobarb treatment protocol, folic acid, thiamine, seizure seizure and aspiration precautions-monitor vital signs closely. 3/18-tremors currently on oral phenobarbital complaining of some nausea and abdominal pain today will do a KUB-bowel regimen  3/19-hallucinating today seeing things in the hallway-we will start the as needed phenobarb protocol continue thiamine and folic acid seizures and aspiration precautions  2. History of type 2 diabetes-controlled added insulin sliding scale monitor blood sugars meals and at bedtime  3. Constipation-we will add lactulose, GI cocktail, MiraLAX daily  4. History of benign essential hypertension-currently controlled watch with withdrawals will monitor closely. 5. Chronic hyponatremia from alcohol abuse-fluid restriction-BMP a.m.  6. History of COPD currently not in exacerbation-smoking history-councelled , will add nicotine patch, restart home inhalers. 7. History of GERD - on protonix continue  8. Hypomagnesemia secondary to alcohol abuse-will replace 3 g of IV magnesium today 3/18-added magnesium protocol      History Of Present Illness: This is 46-year-old gentleman with past medical history described below who comes in wanting to detox from alcohol-patient's last drink was last night around 930. He has had delirium, hallucinations, questionable seizures previously. Has had been at the rehab and then rebounded. Current symptoms are just tremors and anxiety , no hallucinations no agitation.   No nausea no vomiting no fever shortness of breath or chest pain        Subjective:- (Last 24 hours)    Seeing things in the hallway that is trying to harm him as per the patient  Mild tremors  Anxious    Past medical history, family history, social history and allergies reviewed again and is unchanged since admission. ROS (12 point review of systems completed. Pertinent positives noted. Otherwise ROS is negative)      Scheduled Meds:   sodium chloride flush  10 mL Intravenous 2 times per day    enoxaparin  40 mg Subcutaneous N89I    folic acid  1 mg Oral Daily    metoprolol tartrate  50 mg Oral BID    nicotine  1 patch Transdermal Q24H    pantoprazole  40 mg Oral Daily    venlafaxine  75 mg Oral Daily    thiamine  100 mg Oral Daily    PHENobarbital  32.4 mg Oral BID    insulin lispro  0-18 Units Subcutaneous TID WC    insulin lispro  0-9 Units Subcutaneous Nightly    lisinopril  20 mg Oral Daily    And    hydroCHLOROthiazide  25 mg Oral Daily     Continuous Infusions:   dextrose       PRN Meds:. PHENobarbital **OR** PHENobarbital **OR** PHENobarbital IVPB, magnesium sulfate, sodium chloride flush, promethazine **OR** ondansetron, polyethylene glycol, acetaminophen **OR** acetaminophen, albuterol sulfate HFA, glucose, dextrose, glucagon (rDNA), dextrose   Diet:  DIET CARDIAC; Review of systems:   Pertinent positives as noted in the HPI. All other systems reviewed and negative. PHYSICAL EXAM:  BP (!) 153/97   Pulse 105   Temp 98.1 °F (36.7 °C) (Oral)   Resp 20   Ht 5' 11\" (1.803 m)   Wt 253 lb 11.2 oz (115.1 kg)   SpO2 94%   BMI 35.38 kg/m²   General appearance: No apparent distress, appears stated age and cooperative. HEENT: Normal cephalic, atraumatic without obvious deformity. Pupils equal, round, and reactive to light. Extra ocular muscles intact. Conjunctivae/corneas clear. Neck: Supple, with full range of motion. No jugular venous distention. Trachea midline.   Respiratory:  Normal respiratory effort. Clear to auscultation, bilaterally without Rales/Wheezes/Rhonchi. Cardiovascular: Regular rate and rhythm with normal S1/S2 without murmurs, rubs or gallops. Abdomen: Soft, non-tender, non-distended with normal bowel sounds. Musculoskeletal:  No clubbing, cyanosis or edema bilaterally. Skin: Skin color, texture, turgor normal.  No rashes or lesions. Neurologic:  Neurovascularly intact without any focal sensory/motor deficits. Cranial nerves: II-XII intact, grossly non-focal.  Tremors  Psychiatric: Alert and oriented, thought content appropriate, normal insight  Capillary Refill: Brisk,< 3 seconds   Peripheral Pulses: +2 palpable, equal bilaterally     Labs:   Recent Labs     03/17/21  1547 03/18/21  0540   WBC 10.9* 7.8   HGB 15.8 15.7   HCT 46.1 45.0    187     Recent Labs     03/17/21  1547 03/18/21  0540   * 131*   K 3.7 3.5   CL 94* 94*   CO2 20* 26   BUN 15 11   CREATININE 0.6 0.6   CALCIUM 8.6 8.7     Recent Labs     03/17/21  1547   *   *   BILITOT 1.9*   ALKPHOS 142*     No results for input(s): INR in the last 72 hours. No results for input(s): Rafael Spoon in the last 72 hours. Urinalysis:    Lab Results   Component Value Date    NITRU NEGATIVE 09/23/2020    WBCUA NONE SEEN 09/23/2020    BACTERIA NONE SEEN 09/23/2020    RBCUA NONE SEEN 09/23/2020    BLOODU NEGATIVE 09/23/2020    SPECGRAV <1.005 08/29/2019    GLUCOSEU NEGATIVE 09/23/2020       Radiology:   XR ABDOMEN (KUB) (SINGLE AP VIEW)   Final Result   Nonspecific, nonobstructive bowel gas pattern may represent developing adynamic ileus. **This report has been created using voice recognition software. It may contain minor errors which are inherent in voice recognition technology. **      Final report electronically signed by Dr. India Kocher, MD on 3/18/2021 9:55 AM        No results found.       EKG: Prolonged QT interval or tu fusion, consider myocardial disease, electrolyte imbalance, or drug effects  Abnormal ECG  When compared with ECG of 18-FEB-2020 20:26,  No significant change was found    Electronically signed by Angela Varner MD on 3/19/2021 at 10:43 AM

## 2021-03-20 LAB
ANION GAP SERPL CALCULATED.3IONS-SCNC: 12 MEQ/L (ref 8–16)
BUN BLDV-MCNC: 11 MG/DL (ref 7–22)
CALCIUM SERPL-MCNC: 8.9 MG/DL (ref 8.5–10.5)
CHLORIDE BLD-SCNC: 90 MEQ/L (ref 98–111)
CO2: 26 MEQ/L (ref 23–33)
CREAT SERPL-MCNC: 0.6 MG/DL (ref 0.4–1.2)
GFR SERPL CREATININE-BSD FRML MDRD: > 90 ML/MIN/1.73M2
GLUCOSE BLD-MCNC: 115 MG/DL (ref 70–108)
GLUCOSE BLD-MCNC: 116 MG/DL (ref 70–108)
GLUCOSE BLD-MCNC: 119 MG/DL (ref 70–108)
GLUCOSE BLD-MCNC: 125 MG/DL (ref 70–108)
GLUCOSE BLD-MCNC: 148 MG/DL (ref 70–108)
MAGNESIUM: 1.8 MG/DL (ref 1.6–2.4)
POTASSIUM SERPL-SCNC: 3 MEQ/L (ref 3.5–5.2)
POTASSIUM SERPL-SCNC: 4 MEQ/L (ref 3.5–5.2)
REASON FOR REJECTION: NORMAL
REJECTED TEST: NORMAL
SODIUM BLD-SCNC: 128 MEQ/L (ref 135–145)

## 2021-03-20 PROCEDURE — 82948 REAGENT STRIP/BLOOD GLUCOSE: CPT

## 2021-03-20 PROCEDURE — 6360000002 HC RX W HCPCS: Performed by: INTERNAL MEDICINE

## 2021-03-20 PROCEDURE — 1200000003 HC TELEMETRY R&B

## 2021-03-20 PROCEDURE — 2580000003 HC RX 258: Performed by: INTERNAL MEDICINE

## 2021-03-20 PROCEDURE — 83735 ASSAY OF MAGNESIUM: CPT

## 2021-03-20 PROCEDURE — 99232 SBSQ HOSP IP/OBS MODERATE 35: CPT | Performed by: INTERNAL MEDICINE

## 2021-03-20 PROCEDURE — 80048 BASIC METABOLIC PNL TOTAL CA: CPT

## 2021-03-20 PROCEDURE — 84132 ASSAY OF SERUM POTASSIUM: CPT

## 2021-03-20 PROCEDURE — 36415 COLL VENOUS BLD VENIPUNCTURE: CPT

## 2021-03-20 PROCEDURE — 94760 N-INVAS EAR/PLS OXIMETRY 1: CPT

## 2021-03-20 PROCEDURE — 6370000000 HC RX 637 (ALT 250 FOR IP): Performed by: INTERNAL MEDICINE

## 2021-03-20 RX ORDER — POTASSIUM CHLORIDE 20 MEQ/1
80 TABLET, EXTENDED RELEASE ORAL ONCE
Status: COMPLETED | OUTPATIENT
Start: 2021-03-20 | End: 2021-03-20

## 2021-03-20 RX ORDER — SODIUM CHLORIDE 1000 MG
1 TABLET, SOLUBLE MISCELLANEOUS
Status: DISCONTINUED | OUTPATIENT
Start: 2021-03-20 | End: 2021-03-21 | Stop reason: HOSPADM

## 2021-03-20 RX ORDER — MAGNESIUM SULFATE IN WATER 40 MG/ML
2000 INJECTION, SOLUTION INTRAVENOUS ONCE
Status: DISCONTINUED | OUTPATIENT
Start: 2021-03-20 | End: 2021-03-21 | Stop reason: HOSPADM

## 2021-03-20 RX ADMIN — METOPROLOL TARTRATE 50 MG: 50 TABLET, FILM COATED ORAL at 23:05

## 2021-03-20 RX ADMIN — Medication 1 G: at 12:32

## 2021-03-20 RX ADMIN — SODIUM CHLORIDE, PRESERVATIVE FREE 10 ML: 5 INJECTION INTRAVENOUS at 23:05

## 2021-03-20 RX ADMIN — VENLAFAXINE HYDROCHLORIDE 75 MG: 75 CAPSULE, EXTENDED RELEASE ORAL at 08:50

## 2021-03-20 RX ADMIN — MAGNESIUM SULFATE HEPTAHYDRATE 2000 MG: 40 INJECTION, SOLUTION INTRAVENOUS at 11:36

## 2021-03-20 RX ADMIN — ENOXAPARIN SODIUM 40 MG: 40 INJECTION SUBCUTANEOUS at 17:07

## 2021-03-20 RX ADMIN — FOLIC ACID 1 MG: 1 TABLET ORAL at 08:50

## 2021-03-20 RX ADMIN — METOPROLOL TARTRATE 50 MG: 50 TABLET, FILM COATED ORAL at 08:50

## 2021-03-20 RX ADMIN — HYDROCHLOROTHIAZIDE 25 MG: 25 TABLET ORAL at 08:49

## 2021-03-20 RX ADMIN — PHENOBARBITAL SODIUM 260 MG: 65 INJECTION INTRAMUSCULAR; INTRAVENOUS at 11:37

## 2021-03-20 RX ADMIN — SODIUM CHLORIDE, PRESERVATIVE FREE 10 ML: 5 INJECTION INTRAVENOUS at 12:32

## 2021-03-20 RX ADMIN — LISINOPRIL 20 MG: 20 TABLET ORAL at 08:49

## 2021-03-20 RX ADMIN — PHENOBARBITAL SODIUM 130 MG: 65 INJECTION INTRAMUSCULAR at 00:22

## 2021-03-20 RX ADMIN — POTASSIUM CHLORIDE 80 MEQ: 1500 TABLET, EXTENDED RELEASE ORAL at 11:36

## 2021-03-20 RX ADMIN — INSULIN LISPRO 3 UNITS: 100 INJECTION, SOLUTION INTRAVENOUS; SUBCUTANEOUS at 12:33

## 2021-03-20 RX ADMIN — LACTULOSE 20 G: 20 SOLUTION ORAL at 11:36

## 2021-03-20 RX ADMIN — Medication 1 G: at 17:07

## 2021-03-20 RX ADMIN — PHENOBARBITAL 32.4 MG: 32.4 TABLET ORAL at 08:50

## 2021-03-20 RX ADMIN — Medication 100 MG: at 08:50

## 2021-03-20 RX ADMIN — PANTOPRAZOLE SODIUM 40 MG: 40 TABLET, DELAYED RELEASE ORAL at 06:11

## 2021-03-20 ASSESSMENT — PAIN SCALES - GENERAL
PAINLEVEL_OUTOF10: 0
PAINLEVEL_OUTOF10: 0

## 2021-03-20 NOTE — PROGRESS NOTES
Utilize [facility] alcohol withdrawal scale (Based on [place] Modified Alcohol Withdrawal Scale). Tabulate score based on classifications including Tremor, Sweating, Hallucination, Orientation, and Agitation. Tremor: 0  Sweatin  Hallucinations: 2  Orientation: 0  Agitation: 1  Total Score: Action perform as described below     Tremor:  No tremor is 0 points. Tremor on movement is 1 point. Tremor at rest is 2 points. Sweating: No Sweat 0 points. Moist is 1 point. Drenching sweats is 2 points. Hallucinations: No present 0 points. Dissuadable is 1 point. Not dissuadable is 2 points. Orientation: Oriented 0 points. Vague/detached 1 point. Disoriented/no contact 2 points. Agitation: Calm 0 points. Anxious 1 point. Panicky 2 points. Check scale every 2 hours. Discontinue scoring with 4 consecutive scorings of 0. Scale 0: No phenobarbital given. Re-assess every 60 minutes as needed. Scale 1-3: Phenobarbital 130 mg IV over 3 minutes. Re-assess every 60 minutes as needed. May administer every 60 minutes to a maximum dose of phenobarbital 1040 mg in 24 hours! Scale 4-8: Phenobarbital 260 mg IV over 5 minutes. Re-assess every 60 minutes as needed. May administer every 60 minutes to a maximum dose of phenobarbital 1040mg in 24 hours! Scale 9-10: Transfer to ICU (if not already in ICU). Administer 10mg/kg phenobarbital IV over 60 minutes. Maximum dose phenobarbital is 1040mg in 24 hours!

## 2021-03-20 NOTE — PROGRESS NOTES
Utilize [facility] alcohol withdrawal scale (Based on [place] Modified Alcohol Withdrawal Scale). Tabulate score based on classifications including Tremor, Sweating, Hallucination, Orientation, and Agitation. Tremor: 0  Sweatin  Hallucinations: 2  Orientation: 2  Agitation: 1  Total Score: 6  Action perform as described below     Tremor:  No tremor is 0 points. Tremor on movement is 1 point. Tremor at rest is 2 points. Sweating: No Sweat 0 points. Moist is 1 point. Drenching sweats is 2 points. Hallucinations: No present 0 points. Dissuadable is 1 point. Not dissuadable is 2 points. Orientation: Oriented 0 points. Vague/detached 1 point. Disoriented/no contact 2 points. Agitation: Calm 0 points. Anxious 1 point. Panicky 2 points. Check scale every 2 hours. Discontinue scoring with 4 consecutive scorings of 0. Scale 0: No phenobarbital given. Re-assess every 60 minutes as needed. Scale 1-3: Phenobarbital 130 mg IV over 3 minutes. Re-assess every 60 minutes as needed. May administer every 60 minutes to a maximum dose of phenobarbital 1040 mg in 24 hours! Scale 4-8: Phenobarbital 260 mg IV over 5 minutes. Re-assess every 60 minutes as needed. May administer every 60 minutes to a maximum dose of phenobarbital 1040mg in 24 hours! Scale 9-10: Transfer to ICU (if not already in ICU). Administer 10mg/kg phenobarbital IV over 60 minutes. Maximum dose phenobarbital is 1040mg in 24 hours!

## 2021-03-20 NOTE — PROGRESS NOTES
Hospitalist - progress    Patient: Crystal Her    Unit/Bed:8B-26/026-A  YOB: 1975  MRN: [id number]   Acct: [de-identified]   PCP: NILAM Abarca - CNP    Date of Service: Pt seen/examined on 03/20/21  and Admitted to Inpatient with expected LOS greater than two midnights due to medical therapy. Chief Complaint:  etoh withdrawal    Assessment and Plan:-  1. History of alcohol abuse currently in withdrawal- PAW score of 6, will initiate phenobarb treatment protocol, folic acid, thiamine, seizure seizure and aspiration precautions-monitor vital signs closely. 3/18-tremors currently on oral phenobarbital complaining of some nausea and abdominal pain today will do a KUB-bowel regimen  3/19-hallucinating today seeing things in the hallway-we will start the as needed phenobarb protocol continue thiamine and folic acid seizures and aspiration precautions    3/20-hallucinating today however much better than yesterday-receiving phenobarbital as needed prophylaxis still patient does seem to be improved since yesterday. replace K and mag    2. History of type 2 diabetes-controlled added insulin sliding scale monitor blood sugars meals and at bedtime  3. Constipation-we will add lactulose, GI cocktail, MiraLAX daily-resolved  4. History of benign essential hypertension-currently controlled watch with withdrawals will monitor closely. 5. Chronic hyponatremia from alcohol abuse-fluid restriction-BMP a.m.  6. History of COPD currently not in exacerbation-smoking history-councelled , will add nicotine patch, restart home inhalers. 7. History of GERD - on protonix continue  8. Hypomagnesemia secondary to alcohol abuse-will replace 3 g of IV magnesium today 3/18-added magnesium protocol      History Of Present Illness: This is 16-year-old gentleman with past medical history described below who comes in wanting to detox from alcohol-patient's last drink was last night around 930.   He has had delirium, hallucinations, questionable seizures previously. Has had been at the rehab and then rebounded. Current symptoms are just tremors and anxiety , no hallucinations no agitation. No nausea no vomiting no fever shortness of breath or chest pain        Subjective:- (Last 24 hours)    Today morning had difficulty in knowing what he was however currently doing much better alert awake oriented x3  Not hallucinating as much today  Anxiety improving denies any nausea vomiting    Past medical history, family history, social history and allergies reviewed again and is unchanged since admission. ROS (12 point review of systems completed. Pertinent positives noted. Otherwise ROS is negative)      Scheduled Meds:   sodium chloride  1 g Oral TID WC    magnesium sulfate  2,000 mg Intravenous Once    polyethylene glycol  17 g Oral Daily    sodium chloride flush  10 mL Intravenous 2 times per day    enoxaparin  40 mg Subcutaneous M58L    folic acid  1 mg Oral Daily    metoprolol tartrate  50 mg Oral BID    nicotine  1 patch Transdermal Q24H    pantoprazole  40 mg Oral Daily    venlafaxine  75 mg Oral Daily    thiamine  100 mg Oral Daily    insulin lispro  0-18 Units Subcutaneous TID WC    insulin lispro  0-9 Units Subcutaneous Nightly    lisinopril  20 mg Oral Daily    And    hydroCHLOROthiazide  25 mg Oral Daily     Continuous Infusions:   dextrose       PRN Meds:. PHENobarbital **OR** PHENobarbital **OR** PHENobarbital IVPB, potassium chloride **OR** potassium alternative oral replacement **OR** potassium chloride, potassium chloride, magnesium sulfate, sodium chloride flush, promethazine **OR** ondansetron, acetaminophen **OR** acetaminophen, albuterol sulfate HFA, glucose, dextrose, glucagon (rDNA), dextrose   Diet:  DIET CARDIAC; Review of systems:   Pertinent positives as noted in the HPI. All other systems reviewed and negative.     PHYSICAL EXAM:  /72   Pulse 73   Temp 98.1 °F (36.7 °C) (Oral) ABDOMEN (KUB) (SINGLE AP VIEW)   Final Result   Nonspecific, nonobstructive bowel gas pattern may represent developing adynamic ileus. **This report has been created using voice recognition software. It may contain minor errors which are inherent in voice recognition technology. **      Final report electronically signed by Dr. Edward Pascal MD on 3/18/2021 9:55 AM        No results found.       EKG: Prolonged QT interval or tu fusion, consider myocardial disease, electrolyte imbalance, or drug effects  Abnormal ECG  When compared with ECG of 18-FEB-2020 20:26,  No significant change was found    Electronically signed by John Alonzo MD on 3/20/2021 at 3:00 PM

## 2021-03-21 VITALS
HEIGHT: 71 IN | SYSTOLIC BLOOD PRESSURE: 124 MMHG | DIASTOLIC BLOOD PRESSURE: 85 MMHG | HEART RATE: 90 BPM | BODY MASS INDEX: 35.52 KG/M2 | WEIGHT: 253.7 LBS | TEMPERATURE: 98.1 F | RESPIRATION RATE: 18 BRPM | OXYGEN SATURATION: 94 %

## 2021-03-21 LAB
ANION GAP SERPL CALCULATED.3IONS-SCNC: 11 MEQ/L (ref 8–16)
BUN BLDV-MCNC: 20 MG/DL (ref 7–22)
CALCIUM SERPL-MCNC: 8.6 MG/DL (ref 8.5–10.5)
CHLORIDE BLD-SCNC: 96 MEQ/L (ref 98–111)
CO2: 26 MEQ/L (ref 23–33)
CREAT SERPL-MCNC: 0.8 MG/DL (ref 0.4–1.2)
GFR SERPL CREATININE-BSD FRML MDRD: > 90 ML/MIN/1.73M2
GLUCOSE BLD-MCNC: 105 MG/DL (ref 70–108)
GLUCOSE BLD-MCNC: 110 MG/DL (ref 70–108)
GLUCOSE BLD-MCNC: 98 MG/DL (ref 70–108)
MAGNESIUM: 2.2 MG/DL (ref 1.6–2.4)
POTASSIUM SERPL-SCNC: 3.7 MEQ/L (ref 3.5–5.2)
SODIUM BLD-SCNC: 133 MEQ/L (ref 135–145)

## 2021-03-21 PROCEDURE — 99239 HOSP IP/OBS DSCHRG MGMT >30: CPT | Performed by: INTERNAL MEDICINE

## 2021-03-21 PROCEDURE — 83735 ASSAY OF MAGNESIUM: CPT

## 2021-03-21 PROCEDURE — 36415 COLL VENOUS BLD VENIPUNCTURE: CPT

## 2021-03-21 PROCEDURE — 82948 REAGENT STRIP/BLOOD GLUCOSE: CPT

## 2021-03-21 PROCEDURE — 80048 BASIC METABOLIC PNL TOTAL CA: CPT

## 2021-03-21 PROCEDURE — 2580000003 HC RX 258: Performed by: INTERNAL MEDICINE

## 2021-03-21 PROCEDURE — 6370000000 HC RX 637 (ALT 250 FOR IP): Performed by: INTERNAL MEDICINE

## 2021-03-21 RX ORDER — PANTOPRAZOLE SODIUM 40 MG/1
40 TABLET, DELAYED RELEASE ORAL DAILY
Qty: 30 TABLET | Refills: 0 | Status: SHIPPED | OUTPATIENT
Start: 2021-03-21 | End: 2022-02-28

## 2021-03-21 RX ORDER — LANOLIN ALCOHOL/MO/W.PET/CERES
100 CREAM (GRAM) TOPICAL DAILY
Qty: 30 TABLET | Refills: 3 | Status: ON HOLD | OUTPATIENT
Start: 2021-03-21 | End: 2021-04-16 | Stop reason: SDUPTHER

## 2021-03-21 RX ORDER — VENLAFAXINE HYDROCHLORIDE 75 MG/1
75 CAPSULE, EXTENDED RELEASE ORAL DAILY
Qty: 30 CAPSULE | Refills: 3 | Status: SHIPPED | OUTPATIENT
Start: 2021-03-21 | End: 2021-03-29

## 2021-03-21 RX ORDER — POTASSIUM CHLORIDE 20 MEQ/1
20 TABLET, EXTENDED RELEASE ORAL DAILY
Qty: 90 TABLET | Refills: 1 | Status: ON HOLD | OUTPATIENT
Start: 2021-03-21 | End: 2021-05-10 | Stop reason: HOSPADM

## 2021-03-21 RX ORDER — SODIUM CHLORIDE 1000 MG
1 TABLET, SOLUBLE MISCELLANEOUS
Qty: 6 TABLET | Refills: 0 | Status: SHIPPED | OUTPATIENT
Start: 2021-03-21 | End: 2021-03-29

## 2021-03-21 RX ORDER — NICOTINE 21 MG/24HR
1 PATCH, TRANSDERMAL 24 HOURS TRANSDERMAL DAILY
Qty: 30 PATCH | Refills: 0 | Status: SHIPPED | OUTPATIENT
Start: 2021-03-21 | End: 2021-04-21

## 2021-03-21 RX ADMIN — HYDROCHLOROTHIAZIDE 25 MG: 25 TABLET ORAL at 09:31

## 2021-03-21 RX ADMIN — Medication 1 G: at 09:31

## 2021-03-21 RX ADMIN — Medication 1 G: at 12:24

## 2021-03-21 RX ADMIN — LISINOPRIL 20 MG: 20 TABLET ORAL at 09:31

## 2021-03-21 RX ADMIN — SODIUM CHLORIDE, PRESERVATIVE FREE 10 ML: 5 INJECTION INTRAVENOUS at 09:33

## 2021-03-21 RX ADMIN — FOLIC ACID 1 MG: 1 TABLET ORAL at 09:31

## 2021-03-21 RX ADMIN — VENLAFAXINE HYDROCHLORIDE 75 MG: 75 CAPSULE, EXTENDED RELEASE ORAL at 09:31

## 2021-03-21 RX ADMIN — PANTOPRAZOLE SODIUM 40 MG: 40 TABLET, DELAYED RELEASE ORAL at 05:57

## 2021-03-21 RX ADMIN — METOPROLOL TARTRATE 50 MG: 50 TABLET, FILM COATED ORAL at 09:31

## 2021-03-21 RX ADMIN — POLYETHYLENE GLYCOL 3350 17 G: 17 POWDER, FOR SOLUTION ORAL at 09:31

## 2021-03-21 RX ADMIN — Medication 100 MG: at 09:31

## 2021-03-21 ASSESSMENT — PAIN SCALES - GENERAL: PAINLEVEL_OUTOF10: 0

## 2021-03-21 NOTE — PROGRESS NOTES
Discharge teaching and instructions for diagnosis/procedure of alcohol withdrawal completed with patient using teachback method. AVS reviewed. Printed prescriptions given to patient. Patient voiced understanding regarding prescriptions, follow up appointments, and care of self at home. Discharged ambulatory and in a wheelchair to  home with support per family.

## 2021-03-21 NOTE — DISCHARGE SUMMARY
Hospitalist -discharge  Patient: Amna Merida    Unit/Bed:8B-26/026-A  YOB: 1975  MRN: [id number]   Acct: [de-identified]   PCP: NILAM Wright - CNP    Date of Service: Pt seen/examined on 03/21/21  and Admitted to Inpatient with expected LOS greater than two midnights due to medical therapy. Chief Complaint:  etoh withdrawal    Assessment and Plan:-  1. History of alcohol abuse currently in withdrawal- PAW score of 6, will initiate phenobarb treatment protocol, folic acid, thiamine, seizure seizure and aspiration precautions-monitor vital signs closely. 3/18-tremors currently on oral phenobarbital complaining of some nausea and abdominal pain today will do a KUB-bowel regimen  3/19-hallucinating today seeing things in the hallway-we will start the as needed phenobarb protocol continue thiamine and folic acid seizures and aspiration precautions    3/20-hallucinating today however much better than yesterday-receiving phenobarbital as needed prophylaxis still patient does seem to be improved since yesterday. replace K and mag    3/21-no more hallucination did not receive any phenobarbital the night-no more agitation or delirium , vitals stable, ambulating on the floor-denies any tremors or anxiety feels better to go home today  2. History of type 2 diabetes-controlled added insulin sliding scale monitor blood sugars meals and at bedtime  3. Constipation-we will add lactulose, GI cocktail, MiraLAX daily-resolved  4. History of benign essential hypertension-currently controlled watch with withdrawals will monitor closely. 5. Chronic hyponatremia from alcohol abuse-fluid restriction-BMP a.m.  6. History of COPD currently not in exacerbation-smoking history-councelled , will add nicotine patch, restart home inhalers. 7. History of GERD - on protonix continue  8.  Hypomagnesemia secondary to alcohol abuse-will replace 3 g of IV magnesium today 3/18-added magnesium protocol      Stable for discharge  Counseled to quit drinking completely  PCP in 1 week    Discharge time 35 minutes      History Of Present Illness: This is 50-year-old gentleman with past medical history described below who comes in wanting to detox from alcohol-patient's last drink was last night around 930. He has had delirium, hallucinations, questionable seizures previously. Has had been at the rehab and then rebounded. Current symptoms are just tremors and anxiety , no hallucinations no agitation. No nausea no vomiting no fever shortness of breath or chest pain         Medication List      START taking these medications    potassium chloride 20 MEQ extended release tablet  Commonly known as: KLOR-CON M  Take 1 tablet by mouth daily     sodium chloride 1 g tablet  Take 1 tablet by mouth 3 times daily (with meals) for 2 days        CONTINUE taking these medications    albuterol sulfate  (90 Base) MCG/ACT inhaler  Commonly known as: Proventil HFA  Inhale 2 puffs into the lungs every 4 hours as needed for Wheezing or Shortness of Breath (Space out to every 6 hours as symptoms improve) Space out to every 6 hours as symptoms improve. blood glucose test strips strip  Commonly known as: ASCENSIA AUTODISC VI;ONE TOUCH ULTRA TEST VI  Test as needed. Dispense One Touch verio Test Strips. Dx: Type 2 diabetes (408.02)     folic acid 1 MG tablet  Commonly known as: FOLVITE  Take 1 tablet by mouth daily     glucose monitoring kit monitoring kit  Glucometer with test strips and lancets.   Check BS once daily  #100 strips and lancets     lisinopril-hydroCHLOROthiazide 20-25 MG per tablet  Commonly known as: PRINZIDE;ZESTORETIC  Take 1 tablet by mouth daily     metoprolol tartrate 50 MG tablet  Commonly known as: LOPRESSOR  Take 1 tablet by mouth 2 times daily     nicotine 21 MG/24HR  Commonly known as: NICODERM CQ  Place 1 patch onto the skin daily     pantoprazole 40 MG tablet  Commonly known as: PROTONIX  Take 1 tablet by mouth

## 2021-03-22 ENCOUNTER — CARE COORDINATION (OUTPATIENT)
Dept: CASE MANAGEMENT | Age: 46
End: 2021-03-22

## 2021-03-22 DIAGNOSIS — F10.930 UNCOMPLICATED ALCOHOL WITHDRAWAL (HCC): Primary | ICD-10-CM

## 2021-03-22 NOTE — CARE COORDINATION
[facility] Transitions Initial Follow Up Call    Call within 2 business days of discharge: Yes    Patient: Conard Dubin Patient : 1975   MRN: [id number]  Reason for Admission: Alcohol withdrawal syndrome without complication   Discharge Date: 3/21/21 RARS: Readmission Risk Score: 16      Last Discharge [facility]       Complaint Diagnosis Description Type Department Provider    3/17/21 Anxiety; Alcohol Problem Alcohol withdrawal syndrome without complication ([facility] [address] . .. ED to Hosp-Admission (Discharged) (ADMITTED) [address], [facility]/s MD Jaquelin; Viveca Olszewski. .. Spoke with: Patient        TRANSITION OF CARE CALL: KALIA INITIAL CALL     Charting Summary:     Patient states he is up and working around the house. He feels unsteady on his feet. Encouraged patient to increase fluid intake. Patient states he is drinking diet pop - encourage to drink mare water. Patient expresses understanding. Reviewed medications with patient. Patient confirms he has all medications EXCEPT Potassium and Sodium Chloride - he will pick these up later today. He is taking rest of medications as directed. Patient has no questions concerning medications at this time. [id number] sent to PCP    Patient has good appetite and is trying to drink adequate fluids. Normal elimination patterns (Urine and BM). Offered to make F/U PCP appointment - patient states he will call later today. Instructed to make appointment in 7 days. Patient expresses understanding. Blood sugar normal today per patient. Patient has no symptoms of hyperglycemia or hypoglycemia. Patient is aware of when to contact MD with any new or worsening symptoms. Will Hand Off patient to ANOOP Parra, [facility] / [facility] Planning:   Does patient have an Advance Directive:  reviewed and current. Was this a readmission?  No  Patient stated reason for admission: Alcohol Withdrawal  Patients top risk factors for readmission: lack of knowledge about disease, medical condition and medication management      Care Transition Nurse (CTN) contacted the patient by telephone to perform post hospital discharge assessment. Verified name and  with patient as identifiers. Provided introduction to self, and explanation of the CTN role. CTN reviewed discharge instructions, medical action plan and red flags with patient who verbalized understanding. Patient given an opportunity to ask questions and does not have any further questions or concerns at this time. Were discharge instructions available to patient? Yes. Reviewed appropriate site of care based on symptoms and resources available to patient including: PCP and When to call 911. The patient agrees to contact the PCP office for questions related to their healthcare. Medication reconciliation was performed with patient, who verbalizes understanding of administration of home medications. Advised obtaining a 90-day supply of all daily and as-needed medications. Was patient discharged with a pulse oximeter? No     Discussed follow-up appointments. If no appointment was previously scheduled, appointment scheduling offered: Yes. Is follow up appointment scheduled within 7 days of discharge? Unknown  Non-[facility] follow up appointment(s): Patient states he will call later this afternoon for appointment. Plan for follow-up call in 3-5 days based on severity of symptoms and risk factors. Plan for next call: symptom management-Unsteady, self management-Not drinking and follow up appointment-PCP appointment  CTN provided contact information for future needs.       Mala Moya LPN    [phone]  Nigel Issa / [address] [place] [address] Transitions 24 Hour Call    Schedule Follow Up Appointment with PCP: Completed  Do you have any ongoing symptoms?: Yes  Patient-reported symptoms:  (Comment: Unsteady on feet)  Do you have a copy of your discharge instructions?: Yes  Do you have all of your prescriptions and are they filled?: No  Have you been contacted by a [facility] [address]?: No  Have you scheduled your follow up appointment?: No  Were you discharged with any Home Care or Post Acute Services: No  Do you have support at home?: Alone  Do you feel like you have everything you need to keep you well at home?: Yes  Are you an active caregiver in your home?: No  Care Transitions Interventions  No Identified Needs         Follow Up  No future appointments.     Silvino Campo LPN

## 2021-03-25 ENCOUNTER — OFFICE VISIT (OUTPATIENT)
Dept: FAMILY MEDICINE CLINIC | Age: 46
End: 2021-03-25
Payer: MEDICAID

## 2021-03-25 VITALS
RESPIRATION RATE: 18 BRPM | OXYGEN SATURATION: 97 % | HEART RATE: 86 BPM | DIASTOLIC BLOOD PRESSURE: 86 MMHG | TEMPERATURE: 97 F | BODY MASS INDEX: 35.17 KG/M2 | WEIGHT: 251.2 LBS | SYSTOLIC BLOOD PRESSURE: 124 MMHG | HEIGHT: 71 IN

## 2021-03-25 DIAGNOSIS — F31.9 BIPOLAR 1 DISORDER (HCC): ICD-10-CM

## 2021-03-25 DIAGNOSIS — Q78.8 OSTEOPOIKILOSIS: ICD-10-CM

## 2021-03-25 DIAGNOSIS — J43.1 PANLOBULAR EMPHYSEMA (HCC): ICD-10-CM

## 2021-03-25 DIAGNOSIS — K70.30 ALCOHOLIC CIRRHOSIS OF LIVER WITHOUT ASCITES (HCC): ICD-10-CM

## 2021-03-25 DIAGNOSIS — E11.00 UNCONTROLLED TYPE 2 DIABETES MELLITUS WITH HYPEROSMOLARITY WITHOUT COMA (HCC): ICD-10-CM

## 2021-03-25 PROBLEM — F11.20 SEVERE OPIOID DEPENDENCE (HCC): Status: ACTIVE | Noted: 2021-03-25

## 2021-03-25 PROBLEM — N18.5 CHRONIC KIDNEY DISEASE, STAGE 5 (HCC): Status: ACTIVE | Noted: 2021-03-25

## 2021-03-25 PROCEDURE — 99214 OFFICE O/P EST MOD 30 MIN: CPT | Performed by: NURSE PRACTITIONER

## 2021-03-25 ASSESSMENT — ENCOUNTER SYMPTOMS
GASTROINTESTINAL NEGATIVE: 1
EYES NEGATIVE: 1
RESPIRATORY NEGATIVE: 1

## 2021-03-25 NOTE — PROGRESS NOTES
Post-Discharge Transitional Care Management Services or Hospital Follow Up      Blaire Chavez   YOB: 1975    Date of Office Visit:  3/25/2021  Date of Hospital Admission: 3/17/21  Date of Hospital Discharge: 3/21/21  Readmission Risk Score(high >=14%.  Medium >=10%):Readmission Risk Score: 16      Care management risk score Rising risk (score 2-5) and Complex Care (Scores >=6): 11     Non face to face  following discharge, date last encounter closed (first attempt may have been earlier): 3/22/2021 11:20 AM 3/22/2021 11:20 AM    Call initiated 2 business days of discharge: Yes     Patient Active Problem List   Diagnosis    Hyperlipemia    Uncontrolled hypertension    Uncontrolled type 2 diabetes mellitus with hyperosmolarity without coma ([address]    Alcohol withdrawal ([address]    Alcoholic hepatitis    COPD (chronic obstructive pulmonary disease) ([address]    Passive-dependent personality disorder ([address]    Alcohol abuse    Type 2 diabetes mellitus without complication, without long-term current use of insulin (HCC)    Alcoholic liver failure (HCC)    Transaminitis    Hyperbilirubinemia    RUQ pain    Delirious    Alcoholic hepatitis without ascites    Alcoholic steatohepatitis    Hypophosphatemia    Bipolar 1 disorder (HCC)    Alcoholic gastritis without bleeding    Alcohol withdrawal syndrome with complication (HCC)    Alcohol dependence with physiological dependence, continuous (HCC)    Decompensated liver disease ([address]    Alcoholic cirrhosis ([address]    Alcohol intoxication in alcoholism with blood level over 0.3 with complication ([address]    Acute alcoholic intoxication with delirium ([address]    Acute alcoholic hepatitis    Alcohol ingestion    Alcohol withdrawal delirium (HCC)    Uncomplicated alcohol withdrawal ([address]    Alcohol use disorder, severe, dependence ([address]    Acute pancreatitis    Depression    Osteopoikilosis    Contusion of hand    Atypical chest pain    Alcoholic encephalopathy ([facility] [address]    Alcohol intoxication with delirium ([facility] [address]    Morbidly obese ([facility] [address]    ETOH abuse    Chronic kidney disease, stage 5 (HCC)    Severe opioid dependence ([facility] [address]       No Known Allergies    Medications listed as ordered at the time of discharge from hospital   Prashant, [address] Medication Instructions JOSE:    Printed on:03/25/21 5777   Medication Information                      albuterol sulfate HFA (PROVENTIL HFA) 108 (90 Base) MCG/ACT inhaler  Inhale 2 puffs into the lungs every 4 hours as needed for Wheezing or Shortness of Breath (Space out to every 6 hours as symptoms improve) Space out to every 6 hours as symptoms improve. blood glucose test strips (ASCENSIA AUTODISC VI;ONE TOUCH ULTRA TEST VI) strip  Test as needed. Dispense One Touch verio Test Strips. Dx: Type 2 diabetes (234.83)             folic acid (FOLVITE) 1 MG tablet  Take 1 tablet by mouth daily             glucose monitoring kit (FREESTYLE) monitoring kit  Glucometer with test strips and lancets.   Check BS once daily  #100 strips and lancets             lisinopril-hydroCHLOROthiazide (PRINZIDE;ZESTORETIC) 20-25 MG per tablet  Take 1 tablet by mouth daily             metoprolol tartrate (LOPRESSOR) 50 MG tablet  Take 1 tablet by mouth 2 times daily             Multiple Vitamins-Minerals (THERAPEUTIC MULTIVITAMIN-MINERALS) tablet  Take 1 tablet by mouth daily             nicotine (NICODERM CQ) 21 MG/24HR  Place 1 patch onto the skin daily             pantoprazole (PROTONIX) 40 MG tablet  Take 1 tablet by mouth daily             potassium chloride (KLOR-CON M) 20 MEQ extended release tablet  Take 1 tablet by mouth daily             sodium chloride 1 g tablet  Take 1 tablet by mouth 3 times daily (with meals) for 2 days             thiamine 100 MG tablet  Take 1 tablet by mouth daily             venlafaxine (EFFEXOR XR) 75 MG extended release capsule  Take 1 capsule by mouth daily Medications marked \"taking\" at this time  Outpatient Medications Marked as Taking for the 3/25/21 encounter (Office Visit) with NILAM Concepcion CNP   Medication Sig Dispense Refill    thiamine 100 MG tablet Take 1 tablet by mouth daily 30 tablet 3    pantoprazole (PROTONIX) 40 MG tablet Take 1 tablet by mouth daily 30 tablet 0    potassium chloride (KLOR-CON M) 20 MEQ extended release tablet Take 1 tablet by mouth daily 90 tablet 1    metoprolol tartrate (LOPRESSOR) 50 MG tablet Take 1 tablet by mouth 2 times daily 60 tablet 5    lisinopril-hydroCHLOROthiazide (PRINZIDE;ZESTORETIC) 20-25 MG per tablet Take 1 tablet by mouth daily 30 tablet 5    Multiple Vitamins-Minerals (THERAPEUTIC MULTIVITAMIN-MINERALS) tablet Take 1 tablet by mouth daily 30 tablet 0    blood glucose test strips (ASCENSIA AUTODISC VI;ONE TOUCH ULTRA TEST VI) strip Test as needed. Dispense One Touch verio Test Strips. Dx: Type 2 diabetes (250.00) 60 strip 11    folic acid (FOLVITE) 1 MG tablet Take 1 tablet by mouth daily 30 tablet 5    albuterol sulfate HFA (PROVENTIL HFA) 108 (90 Base) MCG/ACT inhaler Inhale 2 puffs into the lungs every 4 hours as needed for Wheezing or Shortness of Breath (Space out to every 6 hours as symptoms improve) Space out to every 6 hours as symptoms improve. 1 Inhaler 0    glucose monitoring kit (FREESTYLE) monitoring kit Glucometer with test strips and lancets. Check BS once daily  #100 strips and lancets 1 kit 5        Medications patient taking as of now reconciled against medications ordered at time of hospital discharge: Yes    Chief Complaint   Patient presents with    Follow-up     alcohol detox       HPI  Pt is an alcoholic  Reports he had been remission and recently had an exacerbation. Went to hospital for detox. Pt is motivated to quit  Inpatient course: Discharge summary reviewed- see chart. Interval history/Current status: feels good today.   Has been sober since discharge Metabolic Panel; Future  Will contact dr flood regarding vivitrol injections. Pt felt they did help  Discussed AA meeting  2. Bipolar 1 disorder (HCC)  stable    3. Osteopoikilosis  stable    4.  Panlobular emphysema (HCC)  stable              7. Uncontrolled type 2 diabetes mellitus with hyperosmolarity without coma ([facility] [address]  Is controlled when not drinking        Medical Decision Making: moderate complexity   Fu in 2months

## 2021-03-26 ENCOUNTER — CARE COORDINATION (OUTPATIENT)
Dept: CARE COORDINATION | Age: 46
End: 2021-03-26

## 2021-03-26 NOTE — CARE COORDINATION
Ambulatory Care Coordination Note  3/26/2021  CM Risk Score: 11  Charlson 10 Year Mortality Risk Score: 100%     ACC: Richar Hansen RN    Summary Note: Patient was called for continued Care Coordination follow up and education re: the management of his DM, COPD, and healthcare needs. Patient shared he continues to do well and he was able to f/u with PCP earlier this week. Patient denied any questions re: his appointment. Patient shared IM office has been in contact with him today and they are following up with IM provider re: restarting of Vivitrol injections. ACM educated patient on need for f/u labs and he verbalized understanding. Patient stated BS have remained stable and he denied any c/o hypoglycemia or hyperglycemia symptoms being present. ACM reviewed importance of routine BS monitoring and keeping DM controlled to prevent the development/worsening of chronic complications. Patient verbalized understanding. DM/COPD zone education and signs/symptoms to call and report were reviewed with patient and he verbalized understanding. Patient remains sober at this time and he was encouraged to continue to f/u with AA and sponsors as needed. Patient verbalized understanding and denied any need for assistance with AA resources at this time. Patient denied any other questions, concerns, or needs and he was encouraged to call with any that may develop. Will continue to work to f/u with patient in the future.            Actions:   - Reviewed DM and COPD education   - Reviewed signs/symptoms to call and report  - Reviewed importance of early symptom recognition   - Monitored for questions s/p recent PCP appointment  - Reviewed f/u lab orders  - F/u on IM consult for Vivitrol injections  - Monitored for AA support needs  - Monitored for additional needs         Plan of Care:   - Continue with Care Coordination f/u calls for assistance with the management of his DM, COPD, and healthcare needs  - Complete DM education - In Process   - Complete COPD education - In Process  - F/u on Vivitrol injections  - Review availability of same day appointments, urgent care/walk in clinic options, and after hours on call [address] information - Completed and interest in Living Will   - Encourage Flu/Pneumonia vaccine information   - A1C 5.7% (June 2020)  - Monitor for additional needs and readiness to discharge from [facility] Coordination Interventions    Program Enrollment: Complex Care  Referral from Primary Care Provider: No  Suggested Interventions and [address]: Declined (Comment: Prev. followed with [facility] )  Diabetes Education: Completed (Comment: Dec. 2020)  Disease Specific Clinic: Declined (Comment: Jan. 2021)  Medication Assistance Program: Declined (Comment: denied any need - Dec. 2020)  Medi Set or Pill Pack: Declined  Pharmacist: Declined (Comment: Dec. 2020)  Registered Dietician: Saroj Lombardo (Comment: Jan. 2021)  Other Therapy Services: In Process (Comment: Vivitrol inj at IM office - March 2021)  Other Services: Completed (Comment: Follows with  sarah and attends [facility])  Transportation Support: Declined  Zone Management Tools: Completed (Comment: Jan. 2021)         Goals Addressed                 This Visit's Progress       Care Coordination     Conditions and Symptoms   Improving     I will schedule office visits, as directed by my provider. I will keep my appointment or reschedule if I have to cancel. I will notify my provider of any barriers to my plan of care. I will notify my provider of any symptoms that indicate a worsening of my condition.     Barriers: lack of motivation, financial, lack of support, overwhelmed by complexity of regimen, stress, and lack of education  Plan for overcoming my barriers: Provide education to patient re: his DM and COPD and monitor for resource needs (FREESTYLE) monitoring kit Glucometer with test strips and lancets. Check BS once daily  #100 strips and lancets 10/4/18   NILAM Nicolas CNP       Future Appointments   Date Time Provider Amanda Jerome   6/1/2021  1:30 PM NILAM Nicolas CNP  [facility]     ,   Diabetes Assessment    Meal Planning: Avoidance of concentrated sweets   How often do you test your blood sugar?: Daily (Comment: monitors BS approx 1 x a week )   Do you have barriers with adherence to non-pharmacologic self-management interventions?  (Nutrition/Exercise/Self-Monitoring): No   Have you ever had to go to the ED for symptoms of low blood sugar?: No       No patient-reported symptoms   Do you have hyperglycemia symptoms?: No   Do you have hypoglycemia symptoms?: No   Blood Sugar Monitoring Regimen: Once a Day   Blood Sugar Trends: No Change      ,   COPD Assessment    Does the patient understand envrionmental exposure?: Yes  Is the patient able to verbalize Rescue vs. Long Acting medications?: Yes  Does the patient have a nebulizer?: No  Does the patient use a space with inhaled medications?: No     No patient-reported symptoms         Symptoms:  None: Yes      Symptom course: stable  Increase use of rapid acting/rescue inhaled medications?: No  Change in chronic cough?: No/At Baseline  Change in sputum?: No/At Baseline     ,   General Assessment    Do you have any symptoms that are causing concern?: No      and Care Coordination Episodes    Type: Amb Care Coordination  Episode: Complex Care  Noted: 12/22/2020  Comments: list referral

## 2021-03-29 ENCOUNTER — OFFICE VISIT (OUTPATIENT)
Dept: INTERNAL MEDICINE CLINIC | Age: 46
End: 2021-03-29
Payer: MEDICAID

## 2021-03-29 VITALS
WEIGHT: 257 LBS | HEART RATE: 89 BPM | SYSTOLIC BLOOD PRESSURE: 137 MMHG | HEIGHT: 71 IN | BODY MASS INDEX: 35.98 KG/M2 | DIASTOLIC BLOOD PRESSURE: 84 MMHG | TEMPERATURE: 98.7 F

## 2021-03-29 DIAGNOSIS — F10.20 ALCOHOL USE DISORDER, SEVERE, DEPENDENCE (HCC): Primary | ICD-10-CM

## 2021-03-29 LAB
ALCOHOL URINE: ABNORMAL
AMPHETAMINE SCREEN, URINE: ABNORMAL
BARBITURATE SCREEN, URINE: ABNORMAL
BENZODIAZEPINE SCREEN, URINE: ABNORMAL
BUPRENORPHINE URINE: ABNORMAL
COCAINE METABOLITE SCREEN URINE: ABNORMAL
FENTANYL SCREEN, URINE: ABNORMAL
GABAPENTIN SCREEN, URINE: ABNORMAL
MDMA URINE: ABNORMAL
METHADONE SCREEN, URINE: ABNORMAL
METHAMPHETAMINE, URINE: ABNORMAL
OPIATE SCREEN URINE: ABNORMAL
OXYCODONE SCREEN URINE: ABNORMAL
PHENCYCLIDINE SCREEN URINE: ABNORMAL
PROPOXYPHENE SCREEN, URINE: ABNORMAL
SYNTHETIC CANNABINOIDS(K2) SCREEN, URINE: ABNORMAL
THC SCREEN, URINE: ABNORMAL
TRAMADOL SCREEN URINE: ABNORMAL
TRICYCLIC ANTIDEPRESSANTS, UR: ABNORMAL

## 2021-03-29 PROCEDURE — G8484 FLU IMMUNIZE NO ADMIN: HCPCS | Performed by: INTERNAL MEDICINE

## 2021-03-29 PROCEDURE — 4004F PT TOBACCO SCREEN RCVD TLK: CPT | Performed by: INTERNAL MEDICINE

## 2021-03-29 PROCEDURE — 1111F DSCHRG MED/CURRENT MED MERGE: CPT | Performed by: INTERNAL MEDICINE

## 2021-03-29 PROCEDURE — G8417 CALC BMI ABV UP PARAM F/U: HCPCS | Performed by: INTERNAL MEDICINE

## 2021-03-29 PROCEDURE — 99214 OFFICE O/P EST MOD 30 MIN: CPT | Performed by: INTERNAL MEDICINE

## 2021-03-29 PROCEDURE — 80305 DRUG TEST PRSMV DIR OPT OBS: CPT | Performed by: INTERNAL MEDICINE

## 2021-03-29 PROCEDURE — G8427 DOCREV CUR MEDS BY ELIG CLIN: HCPCS | Performed by: INTERNAL MEDICINE

## 2021-03-29 RX ORDER — NALTREXONE HYDROCHLORIDE 50 MG/1
50 TABLET, FILM COATED ORAL DAILY
Qty: 7 TABLET | Refills: 0 | Status: SHIPPED | OUTPATIENT
Start: 2021-03-29 | End: 2021-04-05

## 2021-03-29 NOTE — PROGRESS NOTES
lisinopril-hydroCHLOROthiazide (PRINZIDE;ZESTORETIC) 20-25 MG per tablet Take 1 tablet by mouth daily 30 tablet 5    blood glucose test strips (ASCENSIA AUTODISC VI;ONE TOUCH ULTRA TEST VI) strip Test as needed. Dispense One Touch verio Test Strips. Dx: Type 2 diabetes (250.00) 60 strip 11    folic acid (FOLVITE) 1 MG tablet Take 1 tablet by mouth daily 30 tablet 5    albuterol sulfate HFA (PROVENTIL HFA) 108 (90 Base) MCG/ACT inhaler Inhale 2 puffs into the lungs every 4 hours as needed for Wheezing or Shortness of Breath (Space out to every 6 hours as symptoms improve) Space out to every 6 hours as symptoms improve. 1 Inhaler 0    glucose monitoring kit (FREESTYLE) monitoring kit Glucometer with test strips and lancets. Check BS once daily  #100 strips and lancets 1 kit 5     No current facility-administered medications for this visit. Social  Not  has no girlfriend  No children  He lives with his mother  Currently unemployed    Review of Systems - . He says he is not having any urges her cravings to drink    Blood pressure 137/84, pulse 89, temperature 98.7 °F (37.1 °C), height 5' 11\" (1.803 m), weight 257 lb (116.6 kg).     Physical Examination: General appearance - alert, well appearing, and in no distress  HEENT-head normocephalic, atraumatic  Mental Status Examination:  Level of consciousness:  within normal limits and awake  Appearance:  well-appearing, in chair, good grooming and good hygiene  Behavior/Motor:  {Normal  Attitude toward examiner:  cooperative and attentive  Speech:  spontaneous and normal volume  Mood: good  Affect:  mood congruent  Thought processes:  linear  Thought content:  Denies homicidal ideations  Suicidal Ideation:  denies suicidal ideation  Delusions:  no evidence of delusions  Perceptual Disturbance:  denies any perceptual disturbance  Cognition:  oriented to person, place, and time    Insight : poor  Judgment: poor  Medication Side Effects:none      Urine drug screen today  Alcohol, Urine 03/29/2021  3:25 PM Unknown   POS    Amphetamine Screen, Urine 03/29/2021  3:25 PM Unknown   NEG    Barbiturate Screen, Urine 03/29/2021  3:25 PM Unknown   POS    Benzodiazepine Screen, Urine 03/29/2021  3:25 PM Unknown   NEG    Buprenorphine Urine 03/29/2021  3:25 PM Unknown   NEG    Cocaine Metabolite Screen, Urine 03/29/2021  3:25 PM Unknown   NEG    FENTANYL SCREEN, URINE 03/29/2021  3:25 PM Unknown   NEG    Gabapentin Screen, Urine 03/29/2021  3:25 PM Unknown   N/A    MDMA, Urine 03/29/2021  3:25 PM Unknown   NEG    Methadone Screen, Urine 03/29/2021  3:25 PM Unknown   NEG    Methamphetamine, Urine 03/29/2021  3:25 PM Unknown   NEG    Opiate Scrn, Ur 03/29/2021  3:25 PM Unknown   NEG    Oxycodone Screen, Ur 03/29/2021  3:25 PM Unknown   NEG    PCP Screen, Urine 03/29/2021  3:25 PM Unknown   NEG    Propoxyphene Screen, Urine 03/29/2021  3:25 PM Unknown   N/A    Synthetic Cannabinoids (K2) Screen, Urine 03/29/2021  3:25 PM Unknown   NEG    THC Screen, Urine 03/29/2021  3:25 PM Unknown   NEG    Tramadol Scrn, Ur 03/29/2021  3:25 PM Unknown   NEG    Tricyclic Antidepressants, Urine 03/29/2021  3:25 PM Unknown   N/A         Diagnosis Orders   1.  Alcohol use disorder, severe, dependence (HCC)  POCT Rapid Drug Screen    naltrexone (DEPADE) 50 MG tablet       Orders Placed This Encounter   Procedures    POCT Rapid Drug Screen     Patient was given 25 mg naltrexone without complication  We will start him on 50 mg p.o. naltrexone  We will do the prior authorization for Vivitrol  He knows he needs to do counseling AA etc.  Follow-up 1 week  I reviewed the [place] Automated Rx Reporting System report     There does not appear to be any discrepancies or overprescribing of controlled substances

## 2021-03-29 NOTE — PROGRESS NOTES
Verbal order per Dr. Charisma Main for urine drug screen. Positive for BAR Alcohol. Verified results with Alyssia Harrison RN. Dr. Charisma Main ordered Naltrexone 25mg tab given at [phone]- no adverse reactions noted for patient. Verified dose with patient. Patient was sent home with 1 week script of Naltrexone 50mg tab daily and will be seen back in the office 4/5/21.

## 2021-04-01 ENCOUNTER — CARE COORDINATION (OUTPATIENT)
Dept: CARE COORDINATION | Age: 46
End: 2021-04-01

## 2021-04-08 ENCOUNTER — CARE COORDINATION (OUTPATIENT)
Dept: CARE COORDINATION | Age: 46
End: 2021-04-08

## 2021-04-08 ENCOUNTER — APPOINTMENT (OUTPATIENT)
Dept: ULTRASOUND IMAGING | Age: 46
DRG: 426 | End: 2021-04-08
Payer: MEDICAID

## 2021-04-08 ENCOUNTER — HOSPITAL ENCOUNTER (INPATIENT)
Age: 46
LOS: 8 days | Discharge: HOME | DRG: 426 | End: 2021-04-16
Attending: FAMILY MEDICINE | Admitting: FAMILY MEDICINE
Payer: MEDICAID

## 2021-04-08 DIAGNOSIS — E87.1 HYPONATREMIA: ICD-10-CM

## 2021-04-08 DIAGNOSIS — K72.90 LIVER FAILURE WITHOUT HEPATIC COMA, UNSPECIFIED CHRONICITY (HCC): Primary | ICD-10-CM

## 2021-04-08 DIAGNOSIS — F10.929 ACUTE ALCOHOLIC INTOXICATION WITH COMPLICATION (HCC): ICD-10-CM

## 2021-04-08 LAB
ACETAMINOPHEN LEVEL: < 5 UG/ML (ref 0–20)
ALBUMIN SERPL-MCNC: 3.4 G/DL (ref 3.5–5.1)
ALP BLD-CCNC: 241 U/L (ref 38–126)
ALT SERPL-CCNC: 264 U/L (ref 11–66)
AMMONIA: 27 UMOL/L (ref 11–60)
AMPHETAMINE+METHAMPHETAMINE URINE SCREEN: NEGATIVE
ANION GAP SERPL CALCULATED.3IONS-SCNC: 18 MEQ/L (ref 8–16)
AST SERPL-CCNC: 458 U/L (ref 5–40)
BACTERIA: ABNORMAL
BARBITURATE QUANTITATIVE URINE: POSITIVE
BASOPHILS # BLD: 0.1 %
BASOPHILS ABSOLUTE: 0 THOU/MM3 (ref 0–0.1)
BENZODIAZEPINE QUANTITATIVE URINE: NEGATIVE
BETA-HYDROXYBUTYRATE: 1.92 MG/DL (ref 0.2–2.81)
BILIRUB SERPL-MCNC: 5.6 MG/DL (ref 0.3–1.2)
BILIRUBIN DIRECT: 4.4 MG/DL (ref 0–0.3)
BILIRUBIN URINE: NEGATIVE
BLOOD, URINE: ABNORMAL
BUN BLDV-MCNC: 10 MG/DL (ref 7–22)
CALCIUM SERPL-MCNC: 7.8 MG/DL (ref 8.5–10.5)
CANNABINOID QUANTITATIVE URINE: NEGATIVE
CASTS: ABNORMAL /LPF
CASTS: ABNORMAL /LPF
CHARACTER, URINE: CLEAR
CHLORIDE BLD-SCNC: 79 MEQ/L (ref 98–111)
CHLORIDE, URINE: < 20 MEQ/L
CO2: 18 MEQ/L (ref 23–33)
COCAINE METABOLITE QUANTITATIVE URINE: NEGATIVE
COLOR: YELLOW
CREAT SERPL-MCNC: 0.5 MG/DL (ref 0.4–1.2)
CREATININE URINE: 11 MG/DL
CRYSTALS: ABNORMAL
EKG ATRIAL RATE: 111 BPM
EKG P AXIS: 61 DEGREES
EKG P-R INTERVAL: 116 MS
EKG Q-T INTERVAL: 340 MS
EKG QRS DURATION: 96 MS
EKG QTC CALCULATION (BAZETT): 462 MS
EKG R AXIS: 53 DEGREES
EKG T AXIS: 58 DEGREES
EKG VENTRICULAR RATE: 111 BPM
EOSINOPHIL # BLD: 0 %
EOSINOPHILS ABSOLUTE: 0 THOU/MM3 (ref 0–0.4)
EPITHELIAL CELLS, UA: ABNORMAL /HPF
ERYTHROCYTE [DISTWIDTH] IN BLOOD BY AUTOMATED COUNT: 13.3 % (ref 11.5–14.5)
ERYTHROCYTE [DISTWIDTH] IN BLOOD BY AUTOMATED COUNT: 42.9 FL (ref 35–45)
ETHYL ALCOHOL, SERUM: 0.36 %
GFR SERPL CREATININE-BSD FRML MDRD: > 90 ML/MIN/1.73M2
GLUCOSE BLD-MCNC: 97 MG/DL (ref 70–108)
GLUCOSE, URINE: NEGATIVE MG/DL
HCT VFR BLD CALC: 38.5 % (ref 42–52)
HEMOGLOBIN: 14.2 GM/DL (ref 14–18)
IMMATURE GRANS (ABS): 0.05 THOU/MM3 (ref 0–0.07)
IMMATURE GRANULOCYTES: 0.6 %
INR BLD: 1.18 (ref 0.85–1.13)
KETONES, URINE: ABNORMAL
LACTIC ACID: 5.4 MMOL/L (ref 0.5–2.2)
LEUKOCYTE ESTERASE, URINE: NEGATIVE
LIPASE: 55.9 U/L (ref 5.6–51.3)
LYMPHOCYTES # BLD: 23.8 %
LYMPHOCYTES ABSOLUTE: 2 THOU/MM3 (ref 1–4.8)
MAGNESIUM: 1.7 MG/DL (ref 1.6–2.4)
MCH RBC QN AUTO: 32.5 PG (ref 26–33)
MCHC RBC AUTO-ENTMCNC: 36.9 GM/DL (ref 32.2–35.5)
MCV RBC AUTO: 88.1 FL (ref 80–94)
MISCELLANEOUS LAB TEST RESULT: ABNORMAL
MONOCYTES # BLD: 5.4 %
MONOCYTES ABSOLUTE: 0.4 THOU/MM3 (ref 0.4–1.3)
NITRITE, URINE: NEGATIVE
NUCLEATED RED BLOOD CELLS: 0 /100 WBC
OPIATES, URINE: NEGATIVE
OSMOLALITY CALCULATION: 231.9 MOSMOL/KG (ref 275–300)
OSMOLALITY URINE: 126 MOSMOL/KG (ref 250–750)
OXYCODONE: NEGATIVE
PH UA: 6 (ref 5–9)
PHENCYCLIDINE QUANTITATIVE URINE: NEGATIVE
PHOSPHORUS: 1.4 MG/DL (ref 2.4–4.7)
PLATELET # BLD: 143 THOU/MM3 (ref 130–400)
PMV BLD AUTO: 10 FL (ref 9.4–12.4)
POTASSIUM SERPL-SCNC: 3.9 MEQ/L (ref 3.5–5.2)
POTASSIUM, URINE: < 3 MEQ/L
PROTEIN UA: ABNORMAL MG/DL
RBC # BLD: 4.37 MILL/MM3 (ref 4.7–6.1)
RBC URINE: ABNORMAL /HPF
RENAL EPITHELIAL, UA: ABNORMAL
SALICYLATE, SERUM: < 0.3 MG/DL (ref 2–10)
SEG NEUTROPHILS: 70.1 %
SEGMENTED NEUTROPHILS ABSOLUTE COUNT: 5.7 THOU/MM3 (ref 1.8–7.7)
SODIUM BLD-SCNC: 115 MEQ/L (ref 135–145)
SODIUM BLD-SCNC: 118 MEQ/L (ref 135–145)
SODIUM BLD-SCNC: 122 MEQ/L (ref 135–145)
SODIUM URINE: < 20 MEQ/L
SPECIFIC GRAVITY UA: 1.01 (ref 1–1.03)
TOTAL PROTEIN: 6.1 G/DL (ref 6.1–8)
UROBILINOGEN, URINE: 1 EU/DL (ref 0–1)
WBC # BLD: 8.2 THOU/MM3 (ref 4.8–10.8)
WBC UA: ABNORMAL /HPF
YEAST: ABNORMAL

## 2021-04-08 PROCEDURE — 81001 URINALYSIS AUTO W/SCOPE: CPT

## 2021-04-08 PROCEDURE — 83605 ASSAY OF LACTIC ACID: CPT

## 2021-04-08 PROCEDURE — 2060000000 HC ICU INTERMEDIATE R&B

## 2021-04-08 PROCEDURE — 96361 HYDRATE IV INFUSION ADD-ON: CPT

## 2021-04-08 PROCEDURE — 82248 BILIRUBIN DIRECT: CPT

## 2021-04-08 PROCEDURE — 80307 DRUG TEST PRSMV CHEM ANLYZR: CPT

## 2021-04-08 PROCEDURE — 82570 ASSAY OF URINE CREATININE: CPT

## 2021-04-08 PROCEDURE — 96360 HYDRATION IV INFUSION INIT: CPT

## 2021-04-08 PROCEDURE — 83735 ASSAY OF MAGNESIUM: CPT

## 2021-04-08 PROCEDURE — 84100 ASSAY OF PHOSPHORUS: CPT

## 2021-04-08 PROCEDURE — 99285 EMERGENCY DEPT VISIT HI MDM: CPT

## 2021-04-08 PROCEDURE — 93005 ELECTROCARDIOGRAM TRACING: CPT | Performed by: NURSE PRACTITIONER

## 2021-04-08 PROCEDURE — 82077 ASSAY SPEC XCP UR&BREATH IA: CPT

## 2021-04-08 PROCEDURE — 85610 PROTHROMBIN TIME: CPT

## 2021-04-08 PROCEDURE — 36415 COLL VENOUS BLD VENIPUNCTURE: CPT

## 2021-04-08 PROCEDURE — 82010 KETONE BODYS QUAN: CPT

## 2021-04-08 PROCEDURE — 76705 ECHO EXAM OF ABDOMEN: CPT

## 2021-04-08 PROCEDURE — 99223 1ST HOSP IP/OBS HIGH 75: CPT | Performed by: FAMILY MEDICINE

## 2021-04-08 PROCEDURE — 2580000003 HC RX 258: Performed by: NURSE PRACTITIONER

## 2021-04-08 PROCEDURE — 80179 DRUG ASSAY SALICYLATE: CPT

## 2021-04-08 PROCEDURE — 82436 ASSAY OF URINE CHLORIDE: CPT

## 2021-04-08 PROCEDURE — 82140 ASSAY OF AMMONIA: CPT

## 2021-04-08 PROCEDURE — 84133 ASSAY OF URINE POTASSIUM: CPT

## 2021-04-08 PROCEDURE — 83690 ASSAY OF LIPASE: CPT

## 2021-04-08 PROCEDURE — 85025 COMPLETE CBC W/AUTO DIFF WBC: CPT

## 2021-04-08 PROCEDURE — 84295 ASSAY OF SERUM SODIUM: CPT

## 2021-04-08 PROCEDURE — 84300 ASSAY OF URINE SODIUM: CPT

## 2021-04-08 PROCEDURE — 80143 DRUG ASSAY ACETAMINOPHEN: CPT

## 2021-04-08 PROCEDURE — 2580000003 HC RX 258: Performed by: STUDENT IN AN ORGANIZED HEALTH CARE EDUCATION/TRAINING PROGRAM

## 2021-04-08 PROCEDURE — 83935 ASSAY OF URINE OSMOLALITY: CPT

## 2021-04-08 PROCEDURE — 80053 COMPREHEN METABOLIC PANEL: CPT

## 2021-04-08 RX ORDER — DESMOPRESSIN ACETATE 4 UG/ML
2 INJECTION, SOLUTION INTRAVENOUS; SUBCUTANEOUS ONCE
Status: COMPLETED | OUTPATIENT
Start: 2021-04-09 | End: 2021-04-09

## 2021-04-08 RX ORDER — THIAMINE HYDROCHLORIDE 100 MG/ML
100 INJECTION, SOLUTION INTRAMUSCULAR; INTRAVENOUS DAILY
Status: DISCONTINUED | OUTPATIENT
Start: 2021-04-09 | End: 2021-04-08 | Stop reason: SDUPTHER

## 2021-04-08 RX ORDER — DEXTROSE MONOHYDRATE 50 MG/ML
INJECTION, SOLUTION INTRAVENOUS ONCE
Status: COMPLETED | OUTPATIENT
Start: 2021-04-09 | End: 2021-04-08

## 2021-04-08 RX ORDER — 0.9 % SODIUM CHLORIDE 0.9 %
1000 INTRAVENOUS SOLUTION INTRAVENOUS ONCE
Status: COMPLETED | OUTPATIENT
Start: 2021-04-08 | End: 2021-04-08

## 2021-04-08 RX ORDER — SODIUM CHLORIDE 0.9 % (FLUSH) 0.9 %
5-40 SYRINGE (ML) INJECTION EVERY 12 HOURS SCHEDULED
Status: DISCONTINUED | OUTPATIENT
Start: 2021-04-08 | End: 2021-04-16 | Stop reason: HOSPADM

## 2021-04-08 RX ORDER — ACETAMINOPHEN 650 MG/1
650 SUPPOSITORY RECTAL EVERY 6 HOURS PRN
Status: DISCONTINUED | OUTPATIENT
Start: 2021-04-08 | End: 2021-04-16 | Stop reason: HOSPADM

## 2021-04-08 RX ORDER — SODIUM CHLORIDE 0.9 % (FLUSH) 0.9 %
5-40 SYRINGE (ML) INJECTION PRN
Status: DISCONTINUED | OUTPATIENT
Start: 2021-04-08 | End: 2021-04-16 | Stop reason: HOSPADM

## 2021-04-08 RX ORDER — PROMETHAZINE HYDROCHLORIDE 25 MG/1
12.5 TABLET ORAL EVERY 6 HOURS PRN
Status: DISCONTINUED | OUTPATIENT
Start: 2021-04-08 | End: 2021-04-16 | Stop reason: HOSPADM

## 2021-04-08 RX ORDER — NICOTINE 21 MG/24HR
1 PATCH, TRANSDERMAL 24 HOURS TRANSDERMAL DAILY
Status: DISCONTINUED | OUTPATIENT
Start: 2021-04-09 | End: 2021-04-16 | Stop reason: HOSPADM

## 2021-04-08 RX ORDER — LISINOPRIL AND HYDROCHLOROTHIAZIDE 25; 20 MG/1; MG/1
1 TABLET ORAL DAILY
Status: DISCONTINUED | OUTPATIENT
Start: 2021-04-09 | End: 2021-04-09

## 2021-04-08 RX ORDER — ONDANSETRON 2 MG/ML
4 INJECTION INTRAMUSCULAR; INTRAVENOUS EVERY 6 HOURS PRN
Status: DISCONTINUED | OUTPATIENT
Start: 2021-04-08 | End: 2021-04-16 | Stop reason: HOSPADM

## 2021-04-08 RX ORDER — FOLIC ACID 5 MG/ML
1 INJECTION, SOLUTION INTRAMUSCULAR; INTRAVENOUS; SUBCUTANEOUS DAILY
Status: DISCONTINUED | OUTPATIENT
Start: 2021-04-09 | End: 2021-04-16 | Stop reason: HOSPADM

## 2021-04-08 RX ORDER — METOPROLOL TARTRATE 50 MG/1
50 TABLET, FILM COATED ORAL 2 TIMES DAILY
Status: DISCONTINUED | OUTPATIENT
Start: 2021-04-09 | End: 2021-04-16 | Stop reason: HOSPADM

## 2021-04-08 RX ORDER — SODIUM CHLORIDE 9 MG/ML
25 INJECTION, SOLUTION INTRAVENOUS PRN
Status: DISCONTINUED | OUTPATIENT
Start: 2021-04-08 | End: 2021-04-16 | Stop reason: HOSPADM

## 2021-04-08 RX ORDER — ACETAMINOPHEN 325 MG/1
650 TABLET ORAL EVERY 6 HOURS PRN
Status: DISCONTINUED | OUTPATIENT
Start: 2021-04-08 | End: 2021-04-16 | Stop reason: HOSPADM

## 2021-04-08 RX ORDER — PANTOPRAZOLE SODIUM 40 MG/1
40 TABLET, DELAYED RELEASE ORAL
Status: DISCONTINUED | OUTPATIENT
Start: 2021-04-09 | End: 2021-04-16 | Stop reason: HOSPADM

## 2021-04-08 RX ORDER — POLYETHYLENE GLYCOL 3350 17 G/17G
17 POWDER, FOR SOLUTION ORAL DAILY PRN
Status: DISCONTINUED | OUTPATIENT
Start: 2021-04-08 | End: 2021-04-16 | Stop reason: HOSPADM

## 2021-04-08 RX ADMIN — DEXTROSE MONOHYDRATE: 50 INJECTION, SOLUTION INTRAVENOUS at 23:56

## 2021-04-08 RX ADMIN — SODIUM CHLORIDE 1000 ML: 9 INJECTION, SOLUTION INTRAVENOUS at 18:33

## 2021-04-08 ASSESSMENT — ENCOUNTER SYMPTOMS
CONSTIPATION: 0
COUGH: 0
RHINORRHEA: 0
ABDOMINAL PAIN: 0
RECTAL PAIN: 0
CHEST TIGHTNESS: 0
DIARRHEA: 0
COLOR CHANGE: 1
VOMITING: 0
NAUSEA: 0
SHORTNESS OF BREATH: 0
BLOOD IN STOOL: 1
ABDOMINAL DISTENTION: 1
SORE THROAT: 0
BACK PAIN: 0

## 2021-04-08 NOTE — ED NOTES
Pt to ED via intake with brother, with c/o wanting to detox from alcohol. Pt reports they drink approx a 24 pack of shlomo light beers daily. Pt reports they have recently had an admission for ETOH dextox. Pt reports they got their stimulus check and have been on a binge since then. Pt appears to be very anxious at this time and restless on cot. Pt reports they drank a 6 pack prior to arrival to ED. EKG completed. Tele applied.  Call light in reach, will continue to monitor     Danilo Chi RN  04/08/21 2622

## 2021-04-08 NOTE — ED PROVIDER NOTES
[facility] Emergency Department    CHIEF COMPLAINT       Chief Complaint   Patient presents with    Alcohol Problem       Nurses Notes reviewed and I agree except as noted in the HPI. HISTORY OF PRESENT ILLNESS    Blanche Lozano shyam 41 y.o. male who presents to the ED for evaluation of alcohol problem. Patient states he had his last drink a couple hours ago he notes he drinks 6 pack of tall boys. His brother is at the bedside states he is probably intoxicated. His brother states that he is [facility] his [facility]. Patient notes he was in the hospital recently for detox. He went approximately a week without drinking and then he started back up. He notes he does not take his medicines reliably. He currently notes abdominal bloating, he denies any nausea or vomiting, denies any diarrhea. Does note some blood in his stool. Denies any chest pain or shortness of breath, denies any lightheadedness. Has a past medical history of gastric reflux diabetes depression COPD and liver disease. Has had seizures in the past with withdrawal.        HPI was provided by the patient. REVIEW OF SYSTEMS     Review of Systems   Constitutional: Negative for activity change, chills, fatigue and fever. HENT: Negative for congestion, rhinorrhea and sore throat. Respiratory: Negative for cough, chest tightness and shortness of breath. Cardiovascular: Negative for chest pain. Gastrointestinal: Positive for abdominal distention and blood in stool. Negative for abdominal pain, constipation, diarrhea, nausea, rectal pain and vomiting. Genitourinary: Negative for decreased urine volume, difficulty urinating, dysuria, flank pain and frequency. Musculoskeletal: Negative for arthralgias, back pain and gait problem. Skin: Positive for color change. Negative for rash. Allergic/Immunologic: Negative for immunocompromised state. Neurological: Negative for weakness, light-headedness, numbness and headaches. Hematological: Does not bruise/bleed easily. Psychiatric/Behavioral: Negative for agitation, behavioral problems, confusion, decreased concentration, dysphoric mood, hallucinations, self-injury and suicidal ideas. PAST MEDICAL HISTORY     Past Medical History:   Diagnosis Date    [facility] obstructive [facility] disease) [facility]     Depression     Diabetes mellitus ([facility]     ETOH abuse     GERD (gastroesophageal reflux disease)     Hyperlipidemia     Hypertension     Liver disease     Seizures ([facility]     etoh wdl       SURGICALHISTORY      has a past surgical history that includes Endoscopy, colon, diagnostic. CURRENT MEDICATIONS       Previous Medications    ALBUTEROL SULFATE HFA (PROVENTIL HFA) 108 (90 BASE) MCG/ACT INHALER    Inhale 2 puffs into the lungs every 4 hours as needed for Wheezing or Shortness of Breath (Space out to every 6 hours as symptoms improve) Space out to every 6 hours as symptoms improve. BLOOD GLUCOSE TEST STRIPS (ASCENSIA AUTODISC VI;ONE TOUCH ULTRA TEST VI) STRIP    Test as needed. Dispense One Touch verio Test Strips. Dx: Type 2 diabetes (831.80)    FOLIC ACID (FOLVITE) 1 MG TABLET    Take 1 tablet by mouth daily    GLUCOSE MONITORING KIT (FREESTYLE) MONITORING KIT    Glucometer with test strips and lancets. Check BS once daily  #100 strips and lancets    LISINOPRIL-HYDROCHLOROTHIAZIDE (PRINZIDE;ZESTORETIC) 20-25 MG PER TABLET    Take 1 tablet by mouth daily    METOPROLOL TARTRATE (LOPRESSOR) 50 MG TABLET    Take 1 tablet by mouth 2 times daily    NICOTINE (NICODERM CQ) 21 MG/24HR    Place 1 patch onto the skin daily    PANTOPRAZOLE (PROTONIX) 40 MG TABLET    Take 1 tablet by mouth daily    POTASSIUM CHLORIDE (KLOR-CON M) 20 MEQ EXTENDED RELEASE TABLET    Take 1 tablet by mouth daily    THIAMINE 100 MG TABLET    Take 1 tablet by mouth daily       ALLERGIES     has No Known Allergies. FAMILY HISTORY     He indicated that his mother is alive.  He indicated Emotionally abused: Not on file     Physically abused: Not on file     Forced sexual activity: Not on file   Other Topics Concern    Not on file   Social History Narrative    Patient with history of etoh abuse but stated he is currently not drinking. Patient is receiving medication to assist him with staying sober and is attending routine AA meetings via zoom        PHYSICAL EXAM     INITIAL VITALS:  height is 5' 11\" (1.803 m) and weight is 257 lb (116.6 kg). His oral temperature is 98.6 °F (37 °C). His blood pressure is 127/68 and his pulse is 107. His respiration is 19 and oxygen saturation is 96%. Physical Exam  Vitals signs and nursing note reviewed. Constitutional:       General: He is not in acute distress. Appearance: Normal appearance. He is well-developed and overweight. Comments: Intoxicated   HENT:      Head: Normocephalic. Right Ear: External ear normal.      Left Ear: External ear normal.      Nose: Nose normal.      Mouth/Throat:      Pharynx: Uvula midline. Eyes:      General: Scleral icterus present. Conjunctiva/sclera: Conjunctivae normal.   Neck:      Musculoskeletal: Normal range of motion and neck supple. Cardiovascular:      Rate and Rhythm: Normal rate and regular rhythm. Heart sounds: Normal heart sounds, S1 normal and S2 normal.   Pulmonary:      Effort: Pulmonary effort is normal. No respiratory distress. Breath sounds: Normal breath sounds. Chest:      Chest wall: No tenderness. Abdominal:      General: Bowel sounds are normal. There is distension. Palpations: Abdomen is soft. Tenderness: There is no abdominal tenderness. Musculoskeletal: Normal range of motion. Skin:     General: Skin is warm and dry. Capillary Refill: Capillary refill takes less than 2 seconds. Coloration: Skin is not pale. Findings: No erythema or rash. Neurological:      Mental Status: He is alert and oriented to person, place, and time. Psychiatric:         Behavior: Behavior normal.         Thought Content: Thought content normal.         Judgment: Judgment normal.         DIFFERENTIAL DIAGNOSIS:   Alcohol intoxication, liver failure, cirrhosis, anemia    DIAGNOSTIC RESULTS     EKG: All EKG's are interpreted by the Emergency Department Physician who eithersigns or Co-signs this chart in the absence of a cardiologist.    EKG read and interpreted by myself with comparison to March 17, 2021 gives impression of sinus tachycardia with heart rate of 111; interval 116; QRS 96;QTc 462; axis P-61, R-53, T-58. RADIOLOGY: non-plainfilm images(s) such as CT, Ultrasound and MRI are read by the radiologist.  Plain radiographic images are visualized and preliminarily interpreted by the emergency physician unless otherwise stated below.   No orders to display         LABS:   Labs Reviewed   CBC WITH AUTO DIFFERENTIAL - Abnormal; Notable for the following components:       Result Value    RBC 4.37 (*)     Hematocrit 38.5 (*)     MCHC 36.9 (*)     All other components within normal limits   BASIC METABOLIC PANEL - Abnormal; Notable for the following components:    Sodium 115 (*)     Chloride 79 (*)     CO2 18 (*)     Calcium 7.8 (*)     All other components within normal limits   HEPATIC FUNCTION PANEL - Abnormal; Notable for the following components:    Albumin 3.4 (*)     Total Bilirubin 5.6 (*)     Bilirubin, Direct 4.4 (*)     Alkaline Phosphatase 241 (*)      (*)      (*)     All other components within normal limits   LIPASE - Abnormal; Notable for the following components:    Lipase 55.9 (*)     All other components within normal limits   PHOSPHORUS - Abnormal; Notable for the following components:    Phosphorus 1.4 (*)     All other components within normal limits   SALICYLATE LEVEL - Abnormal; Notable for the following components:    Salicylate, Serum < 0.3 (*)     All other components within normal limits   URINALYSIS WITH MICROSCOPIC - Abnormal; Notable for the following components:    Ketones, Urine TRACE (*)     Blood, Urine SMALL (*)     Protein, UA TRACE (*)     All other components within normal limits   ANION GAP - Abnormal; Notable for the following components:    Anion Gap 18.0 (*)     All other components within normal limits   OSMOLALITY - Abnormal; Notable for the following components:    Osmolality Calc 231.9 (*)     All other components within normal limits   SODIUM - Abnormal; Notable for the following components:    Sodium 118 (*)     All other components within normal limits   MAGNESIUM   ACETAMINOPHEN LEVEL   AMMONIA   ETHANOL   URINE DRUG SCREEN   GLOMERULAR FILTRATION RATE, ESTIMATED   ELECTROLYTES URINE RANDOM   OSMOLALITY, URINE   CREATININE, RANDOM URINE       EMERGENCY DEPARTMENT COURSE:   Vitals:    Vitals:    04/08/21 1807 04/08/21 1939 04/08/21 2029   BP: (!) 147/95  127/68   Pulse: 115 107 107   Resp: 17 14 19   Temp: 98.6 °F (37 °C)     TempSrc: Oral     SpO2: 96% 95% 96%   Weight: 257 lb (116.6 kg)     Height: 5' 11\" (1.803 m)         MDM    Patient was seen and evaluated in the emergency department, patient appeared to be in no acute distress, vital signs were reviewed, tachycardia was noted. Physical exam completed, he is intoxicated, he has scleral icterus, he has some distention to the abdomen, he has some right upper quadrant tenderness, negative Varela sign. Labs were ordered critical hyponatremia noted. Patient was already receiving normal saline IV infusion. Sodium was rechecked. Had increased to 118. Lab work further reviewed, patient has worsening liver disease. He requires admission. Discussed this with the patient and his brother and they are amenable. Discussed the case with Dr. Veronica Redd who graciously except the patient for admission. While here in the emergency department patient maintained stable course appropriate for admission.   Medications   0.9 % sodium chloride bolus (0 mLs Intravenous Stopped 4/8/21 2029)       Patient was seenindependently by myself. The patient's final impression and disposition and plan was determined by myself. CRITICAL CARE:   None    CONSULTS:  Dr. Riccardo Degroot hospitalist service    PROCEDURES:  None    FINAL IMPRESSION     1. Liver failure without hepatic coma, unspecified chronicity ([facility]    2. Hyponatremia    3. Acute alcoholic intoxication with complication [facility])          DISPOSITION/PLAN   Patient admitted to hospitalist service  PATIENT REFERREDTO:  No follow-up provider specified. DISCHARGE MEDICATIONS:  New Prescriptions    No medications on file       (Please note that portions of this note were completed with a voice recognition program.  Efforts were made to edit the dictations but occasionally words are mis-transcribed.)    Provider:  I personally performed the services described in the documentation,reviewed and edited the documentation which was dictated to the scribe in my presence, and it accurately records my words and actions.     Ye Fitzpatrick CNP 04/08/21 8:51 PM    Sindy Fitzpatrick, APRN - SILVINO        [employer], NILAM - CNP  04/08/21 2140

## 2021-04-08 NOTE — ED NOTES
Pt using urinal at this time. Pt VSS. Call light in reach, will continue to monitor.       Monie Alvarado RN  04/08/21 1940

## 2021-04-08 NOTE — CARE COORDINATION
Attempted to reach patient for continued Care Coordination follow up and education. Patient was unavailable at the time of my call, and a generic voicemail message was left asking patient to return my call at [phone].

## 2021-04-09 ENCOUNTER — APPOINTMENT (OUTPATIENT)
Dept: GENERAL RADIOLOGY | Age: 46
DRG: 426 | End: 2021-04-09
Payer: MEDICAID

## 2021-04-09 LAB
ALBUMIN SERPL-MCNC: 3.2 G/DL (ref 3.5–5.1)
ALP BLD-CCNC: 208 U/L (ref 38–126)
ALT SERPL-CCNC: 228 U/L (ref 11–66)
ANION GAP SERPL CALCULATED.3IONS-SCNC: 13 MEQ/L (ref 8–16)
ANION GAP SERPL CALCULATED.3IONS-SCNC: 14 MEQ/L (ref 8–16)
ANION GAP SERPL CALCULATED.3IONS-SCNC: 15 MEQ/L (ref 8–16)
AST SERPL-CCNC: 404 U/L (ref 5–40)
BILIRUB SERPL-MCNC: 5.7 MG/DL (ref 0.3–1.2)
BUN BLDV-MCNC: 10 MG/DL (ref 7–22)
BUN BLDV-MCNC: 11 MG/DL (ref 7–22)
BUN BLDV-MCNC: 14 MG/DL (ref 7–22)
CALCIUM IONIZED: 0.93 MMOL/L (ref 1.12–1.32)
CALCIUM SERPL-MCNC: 7.1 MG/DL (ref 8.5–10.5)
CALCIUM SERPL-MCNC: 7.8 MG/DL (ref 8.5–10.5)
CALCIUM SERPL-MCNC: 7.9 MG/DL (ref 8.5–10.5)
CHLORIDE BLD-SCNC: 85 MEQ/L (ref 98–111)
CHLORIDE BLD-SCNC: 88 MEQ/L (ref 98–111)
CHLORIDE BLD-SCNC: 90 MEQ/L (ref 98–111)
CO2: 17 MEQ/L (ref 23–33)
CO2: 21 MEQ/L (ref 23–33)
CO2: 21 MEQ/L (ref 23–33)
CREAT SERPL-MCNC: 0.5 MG/DL (ref 0.4–1.2)
GFR SERPL CREATININE-BSD FRML MDRD: > 90 ML/MIN/1.73M2
GLUCOSE BLD-MCNC: 128 MG/DL (ref 70–108)
GLUCOSE BLD-MCNC: 172 MG/DL (ref 70–108)
GLUCOSE BLD-MCNC: 99 MG/DL (ref 70–108)
MAGNESIUM: 1.9 MG/DL (ref 1.6–2.4)
MAGNESIUM: 1.9 MG/DL (ref 1.6–2.4)
PHOSPHORUS: 1.5 MG/DL (ref 2.4–4.7)
PHOSPHORUS: 2.2 MG/DL (ref 2.4–4.7)
POTASSIUM SERPL-SCNC: 3.8 MEQ/L (ref 3.5–5.2)
POTASSIUM SERPL-SCNC: 4.1 MEQ/L (ref 3.5–5.2)
POTASSIUM SERPL-SCNC: 4.2 MEQ/L (ref 3.5–5.2)
SODIUM BLD-SCNC: 119 MEQ/L (ref 135–145)
SODIUM BLD-SCNC: 121 MEQ/L (ref 135–145)
SODIUM BLD-SCNC: 122 MEQ/L (ref 135–145)
SODIUM BLD-SCNC: 123 MEQ/L (ref 135–145)
SODIUM BLD-SCNC: 123 MEQ/L (ref 135–145)
TOTAL PROTEIN: 5.7 G/DL (ref 6.1–8)

## 2021-04-09 PROCEDURE — 36556 INSERT NON-TUNNEL CV CATH: CPT | Performed by: FAMILY MEDICINE

## 2021-04-09 PROCEDURE — 99233 SBSQ HOSP IP/OBS HIGH 50: CPT | Performed by: INTERNAL MEDICINE

## 2021-04-09 PROCEDURE — 99223 1ST HOSP IP/OBS HIGH 75: CPT | Performed by: INTERNAL MEDICINE

## 2021-04-09 PROCEDURE — 82330 ASSAY OF CALCIUM: CPT

## 2021-04-09 PROCEDURE — 83735 ASSAY OF MAGNESIUM: CPT

## 2021-04-09 PROCEDURE — 36556 INSERT NON-TUNNEL CV CATH: CPT

## 2021-04-09 PROCEDURE — 6370000000 HC RX 637 (ALT 250 FOR IP): Performed by: STUDENT IN AN ORGANIZED HEALTH CARE EDUCATION/TRAINING PROGRAM

## 2021-04-09 PROCEDURE — 84100 ASSAY OF PHOSPHORUS: CPT

## 2021-04-09 PROCEDURE — 2580000003 HC RX 258: Performed by: INTERNAL MEDICINE

## 2021-04-09 PROCEDURE — 2500000003 HC RX 250 WO HCPCS: Performed by: FAMILY MEDICINE

## 2021-04-09 PROCEDURE — 2060000000 HC ICU INTERMEDIATE R&B

## 2021-04-09 PROCEDURE — 2580000003 HC RX 258: Performed by: STUDENT IN AN ORGANIZED HEALTH CARE EDUCATION/TRAINING PROGRAM

## 2021-04-09 PROCEDURE — 84295 ASSAY OF SERUM SODIUM: CPT

## 2021-04-09 PROCEDURE — 80053 COMPREHEN METABOLIC PANEL: CPT

## 2021-04-09 PROCEDURE — 6360000002 HC RX W HCPCS: Performed by: INTERNAL MEDICINE

## 2021-04-09 PROCEDURE — 2500000003 HC RX 250 WO HCPCS: Performed by: STUDENT IN AN ORGANIZED HEALTH CARE EDUCATION/TRAINING PROGRAM

## 2021-04-09 PROCEDURE — 6360000002 HC RX W HCPCS: Performed by: STUDENT IN AN ORGANIZED HEALTH CARE EDUCATION/TRAINING PROGRAM

## 2021-04-09 PROCEDURE — 6360000002 HC RX W HCPCS: Performed by: FAMILY MEDICINE

## 2021-04-09 PROCEDURE — 36415 COLL VENOUS BLD VENIPUNCTURE: CPT

## 2021-04-09 PROCEDURE — 2580000003 HC RX 258: Performed by: FAMILY MEDICINE

## 2021-04-09 PROCEDURE — 02HV33Z INSERTION OF INFUSION DEVICE INTO SUPERIOR VENA CAVA, PERCUTANEOUS APPROACH: ICD-10-PCS | Performed by: INTERNAL MEDICINE

## 2021-04-09 PROCEDURE — 71045 X-RAY EXAM CHEST 1 VIEW: CPT

## 2021-04-09 PROCEDURE — 36592 COLLECT BLOOD FROM PICC: CPT

## 2021-04-09 RX ORDER — LORAZEPAM 2 MG/ML
2 INJECTION INTRAMUSCULAR ONCE
Status: COMPLETED | OUTPATIENT
Start: 2021-04-09 | End: 2021-04-09

## 2021-04-09 RX ORDER — PHENOBARBITAL 32.4 MG/1
32.4 TABLET ORAL 2 TIMES DAILY
Status: DISCONTINUED | OUTPATIENT
Start: 2021-04-11 | End: 2021-04-11

## 2021-04-09 RX ORDER — DEXTROSE MONOHYDRATE 50 MG/ML
INJECTION, SOLUTION INTRAVENOUS CONTINUOUS
Status: DISCONTINUED | OUTPATIENT
Start: 2021-04-09 | End: 2021-04-09

## 2021-04-09 RX ORDER — FENTANYL CITRATE 50 UG/ML
50 INJECTION, SOLUTION INTRAMUSCULAR; INTRAVENOUS ONCE
Status: COMPLETED | OUTPATIENT
Start: 2021-04-09 | End: 2021-04-09

## 2021-04-09 RX ORDER — LORAZEPAM 2 MG/ML
2 INJECTION INTRAMUSCULAR
Status: DISCONTINUED | OUTPATIENT
Start: 2021-04-09 | End: 2021-04-11

## 2021-04-09 RX ORDER — LORAZEPAM 2 MG/ML
1 INJECTION INTRAMUSCULAR
Status: DISCONTINUED | OUTPATIENT
Start: 2021-04-09 | End: 2021-04-11

## 2021-04-09 RX ORDER — LORAZEPAM 2 MG/ML
3 INJECTION INTRAMUSCULAR
Status: DISCONTINUED | OUTPATIENT
Start: 2021-04-09 | End: 2021-04-11

## 2021-04-09 RX ORDER — PHENOBARBITAL 32.4 MG/1
32.4 TABLET ORAL 2 TIMES DAILY
Status: CANCELLED | OUTPATIENT
Start: 2021-04-11 | End: 2021-04-12

## 2021-04-09 RX ORDER — DEXTROSE MONOHYDRATE 50 MG/ML
INJECTION, SOLUTION INTRAVENOUS ONCE
Status: COMPLETED | OUTPATIENT
Start: 2021-04-09 | End: 2021-04-09

## 2021-04-09 RX ORDER — PHENOBARBITAL 32.4 MG/1
64.8 TABLET ORAL 2 TIMES DAILY
Status: COMPLETED | OUTPATIENT
Start: 2021-04-10 | End: 2021-04-10

## 2021-04-09 RX ORDER — POTASSIUM CHLORIDE 29.8 MG/ML
20 INJECTION INTRAVENOUS PRN
Status: DISCONTINUED | OUTPATIENT
Start: 2021-04-09 | End: 2021-04-16 | Stop reason: HOSPADM

## 2021-04-09 RX ORDER — LORAZEPAM 2 MG/1
4 TABLET ORAL
Status: DISCONTINUED | OUTPATIENT
Start: 2021-04-09 | End: 2021-04-11

## 2021-04-09 RX ORDER — FENTANYL CITRATE 50 UG/ML
INJECTION, SOLUTION INTRAMUSCULAR; INTRAVENOUS
Status: DISPENSED
Start: 2021-04-09 | End: 2021-04-09

## 2021-04-09 RX ORDER — LISINOPRIL 20 MG/1
20 TABLET ORAL DAILY
Status: DISCONTINUED | OUTPATIENT
Start: 2021-04-09 | End: 2021-04-16 | Stop reason: HOSPADM

## 2021-04-09 RX ORDER — LORAZEPAM 1 MG/1
1 TABLET ORAL
Status: DISCONTINUED | OUTPATIENT
Start: 2021-04-09 | End: 2021-04-11

## 2021-04-09 RX ORDER — PHENOBARBITAL 32.4 MG/1
64.8 TABLET ORAL 2 TIMES DAILY
Status: CANCELLED | OUTPATIENT
Start: 2021-04-10 | End: 2021-04-11

## 2021-04-09 RX ORDER — LORAZEPAM 2 MG/ML
4 INJECTION INTRAMUSCULAR
Status: DISCONTINUED | OUTPATIENT
Start: 2021-04-09 | End: 2021-04-11

## 2021-04-09 RX ORDER — LORAZEPAM 2 MG/1
2 TABLET ORAL
Status: DISCONTINUED | OUTPATIENT
Start: 2021-04-09 | End: 2021-04-11

## 2021-04-09 RX ORDER — MAGNESIUM SULFATE IN WATER 40 MG/ML
2000 INJECTION, SOLUTION INTRAVENOUS PRN
Status: DISCONTINUED | OUTPATIENT
Start: 2021-04-09 | End: 2021-04-09

## 2021-04-09 RX ORDER — LORAZEPAM 2 MG/ML
INJECTION INTRAMUSCULAR
Status: DISPENSED
Start: 2021-04-09 | End: 2021-04-09

## 2021-04-09 RX ORDER — MAGNESIUM SULFATE IN WATER 40 MG/ML
2000 INJECTION, SOLUTION INTRAVENOUS PRN
Status: DISCONTINUED | OUTPATIENT
Start: 2021-04-09 | End: 2021-04-16 | Stop reason: HOSPADM

## 2021-04-09 RX ADMIN — DEXTROSE MONOHYDRATE: 50 INJECTION, SOLUTION INTRAVENOUS at 04:36

## 2021-04-09 RX ADMIN — SODIUM CHLORIDE, PRESERVATIVE FREE 10 ML: 5 INJECTION INTRAVENOUS at 21:55

## 2021-04-09 RX ADMIN — PANTOPRAZOLE SODIUM 40 MG: 40 TABLET, DELAYED RELEASE ORAL at 06:38

## 2021-04-09 RX ADMIN — FOLIC ACID 1 MG: 5 INJECTION, SOLUTION INTRAMUSCULAR; INTRAVENOUS; SUBCUTANEOUS at 09:56

## 2021-04-09 RX ADMIN — PHENOBARBITAL SODIUM 300.95 MG: 65 INJECTION INTRAMUSCULAR; INTRAVENOUS at 13:35

## 2021-04-09 RX ADMIN — METOPROLOL TARTRATE 50 MG: 50 TABLET, FILM COATED ORAL at 08:04

## 2021-04-09 RX ADMIN — PHENOBARBITAL SODIUM 300.95 MG: 65 INJECTION INTRAMUSCULAR; INTRAVENOUS at 21:55

## 2021-04-09 RX ADMIN — FENTANYL CITRATE 50 MCG: 50 INJECTION, SOLUTION INTRAMUSCULAR; INTRAVENOUS at 01:05

## 2021-04-09 RX ADMIN — DESMOPRESSIN ACETATE 2 MCG: 4 SOLUTION INTRAVENOUS at 00:13

## 2021-04-09 RX ADMIN — SODIUM CHLORIDE 25 ML: 9 INJECTION, SOLUTION INTRAVENOUS at 18:13

## 2021-04-09 RX ADMIN — DEXTROSE MONOHYDRATE: 50 INJECTION, SOLUTION INTRAVENOUS at 07:51

## 2021-04-09 RX ADMIN — PHENOBARBITAL SODIUM 300.95 MG: 65 INJECTION INTRAMUSCULAR; INTRAVENOUS at 18:10

## 2021-04-09 RX ADMIN — CALCIUM GLUCONATE 2000 MG: 98 INJECTION, SOLUTION INTRAVENOUS at 09:56

## 2021-04-09 RX ADMIN — POTASSIUM PHOSPHATE, MONOBASIC AND POTASSIUM PHOSPHATE, DIBASIC 30 MMOL: 224; 236 INJECTION, SOLUTION, CONCENTRATE INTRAVENOUS at 07:52

## 2021-04-09 RX ADMIN — LORAZEPAM 2 MG: 2 INJECTION INTRAMUSCULAR; INTRAVENOUS at 01:01

## 2021-04-09 RX ADMIN — LISINOPRIL 20 MG: 20 TABLET ORAL at 08:04

## 2021-04-09 RX ADMIN — THIAMINE HYDROCHLORIDE 100 MG: 100 INJECTION, SOLUTION INTRAMUSCULAR; INTRAVENOUS at 02:33

## 2021-04-09 RX ADMIN — SODIUM CHLORIDE, PRESERVATIVE FREE 10 ML: 5 INJECTION INTRAVENOUS at 08:05

## 2021-04-09 ASSESSMENT — PAIN SCALES - GENERAL: PAINLEVEL_OUTOF10: 0

## 2021-04-09 NOTE — CARE COORDINATION
04/09/21 1231   Readmission Assessment   Number of Days since last admission? 8-30 days   Previous Disposition Home with Family   Who is being Interviewed Patient   What was the patient's/caregiver's perception as to why they think they needed to return back to the hospital?   (Alcohol Detox)   Did you visit your Primary Care Physician after you left the hospital, before you returned this time? Yes   Did you see a specialist, such as Cardiac, Pulmonary, Orthopedic Physician, etc. after you left the hospital? No   Who advised the patient to return to the hospital? Self-referral   Does the patient report anything that got in the way of taking their medications? No  (was not taking medications, client reported \"not really\")   In our efforts to provide the best possible care to you and others like you, can you think of anything that we could have done to help you after you left the hospital the first time, so that you might not have needed to return so soon?  Other (Comment)  (no)

## 2021-04-09 NOTE — ED NOTES
Patient transferred to [facility] room 021.  Nurse informed     Kehinde Joaquin  04/08/21 6665

## 2021-04-09 NOTE — ED NOTES
ED to inpatient nurses report    Chief Complaint   Patient presents with    Alcohol Problem      Present to ED from home  LOC: alert and orientated to name, place, date  Vital signs   Vitals:    04/08/21 1807 04/08/21 1939   BP: (!) 147/95    Pulse: 115 107   Resp: 17 14   Temp: 98.6 °F (37 °C)    TempSrc: Oral    SpO2: 96% 95%   Weight: 257 lb (116.6 kg)    Height: 5' 11\" (1.803 m)       Oxygen Baseline RA    Current needs required RA Bipap/Cpap No  LDAs:   Peripheral IV 04/08/21 Left Hand (Active)   Site Assessment Dry; Intact; Clean 04/08/21 1833   Line Status Blood return noted; Flushed 04/08/21 1833   Dressing Status Clean; Intact;Dry 04/08/21 1833     Mobility: Requires assistance * 1  Pending ED orders: None  Present condition: PT resting on cot, respirations easy and unlabored.        Electronically signed by Virginia Riley RN on 4/8/2021 at 8:28 PM     Radha Mo RN  04/08/21 2028

## 2021-04-09 NOTE — FLOWSHEET NOTE
Pt was alone at the time of the visit. He was anointed. 04/09/21 2484   Encounter Summary   Services provided to: Patient   Referral/Consult From: Rounding   Place of [address] [place] [address] Visiting Yes  (4/8 )   Complexity of Encounter Low   Length of Encounter 15 minutes   Routine   Type Initial   Assessment Approachable;Calm   Intervention [place];Prayer;Nurtured hope   Outcome Acceptance;Expressed gratitude;Encouraged; Hopeful   Sacraments   Sacrament of Sick-Anointing Anointed

## 2021-04-09 NOTE — CONSULTS
[facility]    [address], [address]  [facility]  4/9/2021 11:24 AM    Pt Name:    Lola Trujillo  MRN:     [id number]   [id number]  YOB: 1975  Admit Date:    4/8/2021  6:00 PM  Primary Care Physician:  NILAM Dove CNP    [facility] Number:   [id number]    Reason for Consult:  Hyponatremia  Requesting provider:  Dr. Eliezer Corbett    History:   The patient is a 41 y. o. white male with history of hypertension, dyslipidemia, severe alcohol abuse, smoking who presented to the emergency room last night wanting to undergo detox from alcohol. Patient drinks roughly 24 beers daily on regular basis. He has had previous admissions for alcohol withdrawal.  Patient reports several days duration of intermittent nausea associated with vomiting about 3-4 times per day. No alleviating or aggravating factors. Denies any chest pain or any shortness of breath. Reports he had mild epigastric abdominal pain few days ago which has since resolved. No alleviating or aggravating factors. Reports that he felt that his stools were dark recently. Denies any gross hematuria. Denies any history of any seizure disorder. Denies any use of antidepressants or diuretics. Denies any diarrhea. In the emergency room patient was noted to be tachycardic. His sodium was 115. Lactic acid was 5.4. Bilirubin was 4.4. Also had liver enzymes which were elevated. Patient received a bolus of normal saline in the emergency room. Subsequently his sodium corrected to 122. He was given a liter of D5W and DDAVP. Subsequently nephrology was consulted for further evaluation. Spoke with nurse overnight several times and patient sodium levels were followed closely. Multiple boluses of D5W were given. Patient was subsequently seen and examined earlier today. He was seen to be in comfortable condition. Awake and alert.     Past Medical History:  Past Medical History: Talks on phone: More than three times a week     Gets together: More than three times a week     Attends [employer] service: Never     Active member of club or organization: Yes     Attends meetings of clubs or organizations: More than 4 times per year     Relationship status: Never     Intimate partner violence     Fear of current or ex partner: Not on file     Emotionally abused: Not on file     Physically abused: Not on file     Forced sexual activity: Not on file   Other Topics Concern    Not on file   Social History Narrative    Patient with history of etoh abuse but stated he is currently not drinking. Patient is receiving medication to assist him with staying sober and is attending routine AA meetings via [employer]        [place] Meds:  Prior to Admission medications    Medication Sig Start Date End Date Taking? Authorizing Provider   thiamine 100 MG tablet Take 1 tablet by mouth daily 3/21/21  Yes Carlos Powell MD   pantoprazole (PROTONIX) 40 MG tablet Take 1 tablet by mouth daily 3/21/21  Yes Carlos Powell MD   potassium chloride (KLOR-CON M) 20 MEQ extended release tablet Take 1 tablet by mouth daily 3/21/21  Yes Carlos Powell MD   metoprolol tartrate (LOPRESSOR) 50 MG tablet Take 1 tablet by mouth 2 times daily 9/29/20  Yes NILAM Busby CNP   lisinopril-hydroCHLOROthiazide (PRINZIDE;ZESTORETIC) 20-25 MG per tablet Take 1 tablet by mouth daily 9/29/20  Yes NILAM Busby CNP   folic acid (FOLVITE) 1 MG tablet Take 1 tablet by mouth daily 2/21/20  Yes Rad Roberts,    nicotine (NICODERM CQ) 21 MG/24HR Place 1 patch onto the skin daily 3/21/21   Carlos Powell MD   blood glucose test strips (ASCENSIA AUTODISC VI;ONE TOUCH ULTRA TEST VI) strip Test as needed. Dispense One Touch verio Test Strips.   Dx: Type 2 diabetes (250.00) 6/11/20   NILAM Busby CNP   albuterol sulfate HFA (PROVENTIL HFA) 108 (90 Base) MCG/ACT inhaler Inhale 2 puffs into the lungs every 4 hours as needed prn: potassium chloride, magnesium sulfate, sodium chloride flush, sodium chloride, promethazine **OR** ondansetron, polyethylene glycol, [Held by provider] acetaminophen **OR** [Held by provider] acetaminophen, ipratropium     Allergies/Intolerances: ALLERGIES: Patient has no known allergies. 24HR INTAKE/OUTPUT:      Intake/Output Summary (Last 24 hours) at 4/9/2021 1124  Last data filed at 4/9/2021 1039  Gross per 24 hour   Intake 1811.63 ml   Output 1550 ml   Net 261.63 ml     I/O last 3 completed shifts: In: 1781.6 [I.V.:1081.6; IV [id number]:158]  Out: 4410 [KCVAC:1404]  I/O this shift:  In: 30 [I.V.:30]  Out: 275 [Urine:275]  Admission weight: 257 lb (116.6 kg)  Wt Readings from Last 3 Encounters:   04/08/21 257 lb (116.6 kg)   03/29/21 257 lb (116.6 kg)   03/25/21 251 lb 3.2 oz (113.9 kg)     Body mass index is 35.84 kg/m². Physical Examination:  VITALS:   Vitals:    04/08/21 2230 04/08/21 2304 04/09/21 0445 04/09/21 0745   BP: (!) 142/63 (!) 145/64 115/72 124/64   Pulse: 117 117 112 110   Resp: 17 18 16 18   Temp: 98.2 °F (36.8 °C) 98.7 °F (37.1 °C) 97.9 °F (36.6 °C) 98.6 °F (37 °C)   TempSrc: Oral Oral Oral Oral   SpO2: 97% 96% 93% 93%   Weight:       Height:         Weight:   Wt Readings from Last 3 Encounters:   04/08/21 257 lb (116.6 kg)   03/29/21 257 lb (116.6 kg)   03/25/21 251 lb 3.2 oz (113.9 kg)     Constitutional and General Appearance: alert and cooperative with exam, appears comfortable, no distress, not diaphoretic  Eyes:  no pallor conjunctiva in left or right eye, no discharge seen from left eye or right eye  Ears and Nose: normal external appearance of left and right ear. Normal external appearance of nose. No active drainage from nose.    Oral: moist oral mucus membranes  Neck: No jugular venous distention, appears symmetric, good ROM  Lungs: Air entry B/L, no crackles or rales, no use of accessory muscles or labored breathing  Chest: No chest wall tenderness  Heart: regular rate, S1, S2  Extremities: No significant LE edema, no tenderness  GI: soft, non-tender, no guarding, no distention, no right upper quadrant abdominal tenderness on palpation  Skin: no rash seen on exposed extremities, warm to touch  Musculo: moves all extremities, no clubbing or cyanosis of digits of either upper extremity. Neuro: no slurred speech, no facial drooping, symmetric strength  Psychiatric: Normal mood and affect, Not agitated    Lab Data  CBC:   Recent Labs     04/08/21 1833   WBC 8.2   HGB 14.2   HCT 38.5*        BMP:  Recent Labs     04/08/21 1833 04/08/21  1833 04/08/21  2235 04/09/21  0225 04/09/21  0626   *   < > 122* 123* 122*  122*   K 3.9  --   --   --  3.8   CL 79*  --   --   --  90*   CO2 18*  --   --   --  17*   BUN 10  --   --   --  10   CREATININE 0.5  --   --   --  0.5   GLUCOSE 97  --   --   --  128*   CALCIUM 7.8*  --   --   --  7.8*   MG 1.7  --   --   --  1.9    < > = values in this interval not displayed. PTH: @PTH@  TSH: No results for input(s): TSH in the last 72 hours. HgBa1c: No results for input(s): LABA1C in the last 72 hours. Hepatic:   Recent Labs     04/08/21 1833 04/09/21  0626   LABALBU 3.4* 3.2*   * 404*   * 228*   BILITOT 5.6* 5.7*   ALKPHOS 241* 208*       Additional Labs: Urine sodium less than 20, urine chloride less than 20, urine osmolality 126  Diagnostics: Chest x-ray without any acute infiltrates  Ultrasound liver shows cirrhosis and possibly hepatic steatosis. Old labs and diagnostics reviewed. Echo: None available  Labs: Chronic hyponatremia. Sodium has been in 125+ range in the past.    Impression and Plan:  1. Chronic hyponatremia. Sodium 115 on admission. Hyponatremia secondary to likely beer potomania in background setting of hydrochlorothiazide use + liver dysfunction possibly cirrhosis. Patient sodium level corrected by 7 mEq in 4 hours last night. Subsequently received D5W bolus last DDAVP.   I subsequently gave

## 2021-04-09 NOTE — PROGRESS NOTES
Hospitalist Progress Note    Patient:  Johnny Chi      Unit/Bed:4K-21/021-A    YOB: 1975    MRN: [id number]       Acct: [de-identified]     PCP: NILAM Mccarthy CNP    Date of Admission: 4/8/2021    Assessment/Plan:  Chronic euvolemic hypo-osmolar hyponatremia: Secondary to beer portal kyaw. Asymptomatic on arrival. Given 750cc NS in ED s/p D5W and DDAVP 2 mcg. Fluid restrict 1200cc liquid daily. Serial BMP Q6H. Goal for 6-8mEq over 24H. Monitor I&O. Seizure precautions. Nephrology following    Chronic EtOH abuse: Patient offered choice between continued alcohol on admission and undergoing withdrawal.  Discussed R/B/A. Patient chooses to withdraw at this time. PAWSS 9. Started prophylactic phenobarbital protocol with loading dose of phenobarbital given on 4/9/21. Added CIWA w/ PRN Ativan given previous hx of complicated withdraw    Refeeding Syndrome: 2/2 malnutrition in context of chronic EtOH. Replace K, Mg, phosphorous, Ca per protocol. Monitor electrolytes    Tranaminitis: Suspect chronic related to alcoholic liver disease. ALT//458 in hepatocellular pattern. Liver US shows cirrhosis w/o ascites GB w/ sludge. Holding acetaminophen     COPD: Not in exacerbation, No O2 at baseline. ipratropium PRN. Albuterol held per tachycardia     GERD: PPI    HLD: statin    Primary HTN: resumed w/ parameters    Anion gap acidosis (resolved): 2/2 to lactic acidosis in context of chronic alcoholism. Improved with IVF    Expected discharge date:  TBD    Disposition:    [x] Home       [] TCU       [] Rehab       [] Psych       [] SNF       [] [place]       [] Other-    Chief Complaint: [address] Course:   Lena Bragg is a 55-year-old male with a past medical history significant for COPD, depression, alcohol abuse, alcoholic cirrhosis, GERD, HTN who presents to [facility] on 4/8/2021 for acute alcohol intoxication.   Patient was brought to the ED by his brother when he saw him in acute distress. Patient is well-known to staff members at [address] [place] [address] Dr had 5 encounters for acute alcohol withdrawal in the last year and a history of 2 ICU admissions for delirium tremens. Patient reports that he has been in and out of rehab a couple times in the past and currently lives with his mother. Patient reports that he usually drinks about 12-24 beers per day along with 2 bottles of hard liquor. Patient stated that his last drink was around 2:30 PM on the day of admission. Patient reports 4-5 bouts of emesis prior to admission and has continued to drink to avoid withdrawal.  At the time of admission patient reported abdominal pain, headache, numbness and tingling in his right arm radiating to the elbow patient is unsure if he had a seizure. Patient reports that he has not had any food to eat for the past 2 weeks and has been to the ICU    Patient vitals in the ED on arrival were T 98.6 RR 17  /95 SPO2 96% on room air. Laboratory values were significant for sodium 115 chloride 79 bicarb 18 anion gap 18 lactic acid 5.4 calcium 7.8 phosphorus 1.4 and moderate transaminitis with ALT//458 bilirubin elevated 5.6 direct bilirubin 4.4 lipase 55.9. Chest x-ray demonstrates no acute process. Nursing staff was unable to obtain IV access overnight and a central line was placed for electrolyte replacement      Subjective (past 24 hours):   Patient seen and evaluated bedside this morning. Discussed treatment options with patient regarding continuing alcohol versus being treated for acute alcohol withdrawal and pursuing treatment outpatient. Patient adamant that he would like to be treated and pursue further addiction counseling. Patient reports continued headache and nausea but otherwise no issues at this time.         Medications:  Reviewed    Infusion Medications    dextrose      sodium chloride       Scheduled Medications    thiamine (VITAMIN B1) IVPB  100 mg Intravenous Q24H    LORazepam        fentaNYL        lisinopril  20 mg Oral Daily    potassium phosphate IVPB  30 mmol Intravenous Once    phosphorus replacement protocol   Other RX Placeholder    sodium chloride flush  5-40 mL Intravenous 2 times per day    enoxaparin  40 mg Subcutaneous Daily    folic acid  1 mg Intramuscular Daily    metoprolol tartrate  50 mg Oral BID    nicotine  1 patch Transdermal Daily    pantoprazole  40 mg Oral QAM AC    [Held by provider] lisinopril-hydroCHLOROthiazide  1 tablet Oral Daily     PRN Meds: sodium chloride flush, sodium chloride, promethazine **OR** ondansetron, polyethylene glycol, [Held by provider] acetaminophen **OR** [Held by provider] acetaminophen, ipratropium      Intake/Output Summary (Last 24 hours) at 4/9/2021 0821  Last data filed at 4/9/2021 0445  Gross per 24 hour   Intake 1781.63 ml   Output 1275 ml   Net 506.63 ml       Diet:  Diet NPO Effective Now    Exam:  /64   Pulse 110   Temp 98.6 °F (37 °C) (Oral)   Resp 18   Ht 5' 11\" (1.803 m)   Wt 257 lb (116.6 kg)   SpO2 93%   BMI 35.84 kg/m²     General appearance: Middle aged white male no acute distress  HEENT: Pupils equal, round, and reactive to light. Conjunctivae/corneas clear. Neck: Supple, with full range of motion. No jugular venous distention. Trachea midline. Respiratory: Mild wheezes. Normal respiratory effort. Clear to auscultation, bilaterally without Rales/Rhonchi. Cardiovascular: Regular rate and rhythm with normal S1/S2 without murmurs, rubs or gallops. Abdomen: Soft, non-tender, non-distended with normal bowel sounds. Musculoskeletal: passive and active ROM x 4 extremities. Skin: Skin color, texture, turgor normal.  No rashes or lesions. Neurologic:  Neurovascularly intact without any focal sensory/motor deficits.  Cranial nerves: II-XII intact, grossly non-focal.  Psychiatric: Alert and oriented, thought content appropriate, normal insight  Capillary Refill: Brisk,< 3 seconds   Peripheral Pulses: +2 palpable, equal bilaterally       Labs:   Recent Labs     04/08/21 1833   WBC 8.2   HGB 14.2   HCT 38.5*        Recent Labs     04/08/21  1833 04/08/21  1833 04/08/21 2235 04/09/21  0225 04/09/21  0626   *   < > 122* 123* 122*  122*   K 3.9  --   --   --   --    CL 79*  --   --   --   --    CO2 18*  --   --   --   --    BUN 10  --   --   --   --    CREATININE 0.5  --   --   --   --    CALCIUM 7.8*  --   --   --   --    PHOS 1.4*  --   --   --   --     < > = values in this interval not displayed. Recent Labs     04/08/21 1833   *   *   BILIDIR 4.4*   BILITOT 5.6*   ALKPHOS 241*     Recent Labs     04/08/21 2235   INR 1.18*     No results for input(s): CKTOTAL, TROPONINI in the last 72 hours.     Microbiology:      Urinalysis:      Lab Results   Component Value Date    NITRU NEGATIVE 04/08/2021    WBCUA NONE SEEN 04/08/2021    BACTERIA NONE SEEN 04/08/2021    RBCUA 0-2 04/08/2021    BLOODU SMALL 04/08/2021    SPECGRAV 1.008 04/08/2021    GLUCOSEU NEGATIVE 09/23/2020       Radiology:  XR CHEST PORTABLE   Final Result   Impression:      Right central line without acute infiltrates      This document has been electronically signed by: Sharif Gillette MD on    04/09/2021 02:43 AM      US LIVER    (Results Pending)   [facility]    (Results Pending)       DVT prophylaxis: [x] Lovenox                                 [] SCDs                                 [] SQ Heparin                                 [] Encourage ambulation           [] Already on Anticoagulation     Code Status: Full Code    PT/OT Eval Status: not indicated    Tele:   [x] yes             [] no    Active Hospital Problems    Diagnosis Date Noted    Lack of intravenous access [Z78.9]     Hyponatremia [E87.1] 04/08/2021       Electronically signed by Goyo Butcher DO on 4/9/2021 at 8:21 AM

## 2021-04-09 NOTE — CARE COORDINATION
4/9/21, 11:52 AM EDT  DISCHARGE PLANNING EVALUATION:    Alireza Cerda       Admitted: 4/8/2021/ 1800   Hospital day: 1   Location: [facility]21/021-A Reason for admit: Hyponatremia [E87.1]   PMH:  has a past medical history of COPD (chronic obstructive pulmonary disease) ([facility] [address] Depression, Diabetes mellitus ([facility] [address] ETOH abuse, GERD (gastroesophageal reflux disease), Hyperlipidemia, Hypertension, Liver disease, and Seizures ([facility] [address] Procedure:   4/9 Liver US; Liver Cirrhosis  4/9 CVC  Barriers to Discharge:  Hyponatremia w history COPD, DM, Alcoholic Liver Cirrhosis, Seizures, Smoker, Bipolar. Na+ 119, (+) ETOH 0.36, elevated LFT/s; monitor. Nephrology following. Treated w IV Ativan, IVF, Thiamine today  PCP: NILAM Mcfadden CNP  Readmission Risk Score: 31%    Patient Goals/Plan/Treatment Preferences: met w client; lives w mother Roula Pompa; await Addiction Services recommendations re: patient unsure if he wants [facility]) detox treatment center (see AVS); [employer] in past (see AVS)  Transportation/Food Security/Housekeeping Addressed:  No issues identified. 4/9/21, 12:42 PM EDT    Patient goals/plan/ treatment preferences discussed by  and . Patient goals/plan/ treatment preferences reviewed with patient/ family. Patient/ family verbalize understanding of discharge plan and are in agreement with goal/plan/treatment preferences. Understanding was demonstrated using the teach back method. AVS provided by RN at time of discharge, which includes all necessary medical information pertaining to the patients current course of illness, treatment, post-discharge goals of care, and treatment preferences.

## 2021-04-09 NOTE — PROCEDURES
Central Venous Catheterization Procedure Note    Indication:  [x] Lack of adequate peripheral iv access  [] Infusion of medications with high risk of extravasation injury  [] Hemodynamic monitoring  [] Hemodialysis  [] Extracorporeal therapies  [x] Other: Frequent blood draws                     Time Out:  [x] Time out was completed immediately prior to the start of the procedure which included verification of the correct patient, correct site and agreement on the procedure to be done. [] Time out was not done because procedure was emergent      Consent:  [x] The patient/surrogate decision maker was informed of the procedure indications, risks, benefits and alternatives. Questions were answered and consent was obtained to proceed with the procedure. [] Consent was not obtained, because the procedure was emergent or patient was unable to consent and surrogate decision maker was not available. Type of Central Line Being Placed:   [x] Central venous    [] Hemodialysis  [] Pulmonary artery    Location:   [x] Internal Jugular Vein [x] Right [] Left  [] Subclavian Vein  [] Right [] Left  [] Femoral Vein   [] Right [] Left      Central Line Bundle:  [x] I washed/disinfected my hands prior to starting procedure  [x] Hat      [x] Mask      [x] Sterile gown      [x] Sterile gloves were worn throughout the procedure  [x] Everyone in the room during the procedure wore a mask  [x] Chlorhexidine was utilized to prep the skin and allowed to dry completely  [] Povidone-iodine was used to prep the skin and allowed to dry completely  [x] Full body drape was used to cover the patient    Ultrasound Guidance:   [x] Yes      [] No    Anesthetic Used:  [x] Lidocaine      [] Other    Sterile Technique Used Throughout Procedure?   [x] Yes      [] No    Description/Findings:   [x] Dark nonpulsatile blood return obtained from all ports  [] Appropriate wavefom(s) seen  [] Other     Complications:  [x] None apparent   [] Other:    Blood Cultures obtained during line insertion due to the following indication(s):  No indication(s) met for blood culture collection    Follow-up x-ray: Yes, confirmed correct placement. No pneumothorax identified. Procedure Performed By:  Fabio Rutherford MD    Assistant(s): If supervised:  Belle Mayo MD was physically present and supervised, all key elements of this procedure.     Electronically signed by Fabio Rutherford MD on 4/9/2021 at 1:43 AM

## 2021-04-09 NOTE — CARE COORDINATION
4/9/21, 9:49 AM EDT    DISCHARGE PLANNING EVALUATION      SW received an order \"For consideration of rehab\". Addiction service will manage this need.  Discussed with CM who place an order for them

## 2021-04-09 NOTE — H&P
History & Physical       Patient: Johnny Chi  YOB: 1975    MRN: [id number]     Acct: [de-identified]    PCP: NILAM Mccarthy CNP    Date of Admission: 4/8/2021    Date of Service: Patient seen / examined on 04/08/21 and admitted to Inpatient with expected LOS greater than two midnights due to medical therapy. ASSESSMENT / PLAN:    Severe Hyponatremia, Likely chronic: Secondary to beer potomania /low solute intake. Asymptomatic. Given 750 mL IVF bolus in the ED, corrected 7 mEq/L (115 -->122) in 4 hours. Will administer 1L D5W bolus along with DDAVP 2 mcg. Urine electrolytes pending. Uosm pending. Goal Na 6-8 mEq/L in first 24h. Will monitor urine output. Concern for free water diuresis and increased rate of correction. Sodium checks every 2 hours. Monitor I's and O's. Seizure precautions. Normal saline discontinued. Nephrology following. Acute Alcohol Intoxication: Chronic EToH use. Serum EtOH on arrival 0.36. Hx of alcohol withdrawal  seizure. Repeat EToH level in AM.  PAWSS scoring / will initiate phenobarb protocol as indicated. Seizure precautions. Neuro checks. Thiamine /folate ordered. Case management and addiction medicine consulted. Mild AG metabolic acidosis: Suspect secondary to lactic acidosis. Lactic acid 5.4. IVF as above. Daily BMP. Repeat lactate in the AM.    Alcoholic hepatitis: Chronic transaminitis. AST/ALT > 1.5. INR 1.18. Hyperbilirubinemia. Will obtain US of Liver/ Gallbladder. Hypocalcemia: Calcium 7.8. Ical pending. Replace as needed. Hypophosphatemia: Phosphorous 1.4 on arrival. Discussed with pharmacy. Will replace with Kphos reconstituted in dextrose. Primary hypertension: Controlled. Metoprolol resumed with parameters. Lisinopril resumed. HCTZ held. Chronic tobacco abuse: Cessation counseling. Nicotine patch ordered. COPD (not dependent on supplemental O2): Without exacerbation.   Bronchodilator therapy as needed. GERD: PPI resumed. Chief Complaint:  Alcohol Abuse    History of Present Illness:  41 y.o. male with a history of COPD, depression, alcohol abuse, alcoholic cirrhosis, GERD, HTN who presented to [address] [place] [address] for alcohol intoxication. Upon my interview, patient was alert and oriented x3 but was slow to respond and had difficulty with recall. No family at bedside. History was given by patient. Patient has a history of alcohol abuse and is known to [address] [place] [address]. He states that he usually drinks about 12-24 beers a day along with 2 little bottles of hard liquor. He states that his last drink was around 2:30 PM today. Patient states that during the past 3 days he has vomited about 4-5 times a day. Despite having to vomit from alcohol intoxication, patient continues to drink to avoid delirium tremens. He states that he has a bottle of alcohol next to his bed and drinks anytime he starts shaking to avoid withdrawal.  Patient also complains of abdominal pain, headache, and numbness and tingling in his right arm that radiates up to the elbow. He is unsure if he has had a seizure. He states that he has not eating any food in 2 weeks. He states that he has had 2 ICU admissions for delirium tremens. He states that he has been in and out of rehab couple of times in the past. Patient lives with his mom. He states that his brother brought him to the ED after he saw patient was in distress. Smokes 3 packs of cigarettes a day. Denies illicit drug use. In the ED, vitals on arrival were: Temp 98.6, RR 17, , /95, 96% on RA. Pertinent labs: Na 115, Cl 79, bicarbonate 18, AG 18, lactic acid 5.4, calcium 7.8, phosphorus 1.4, albumin 3.4, alk phos 241, , , bilirubin 5.6, direct bilirubin 4.4, lipase 55.9.     Urine drug screen positive for barbiturates        Past Medical History:    Past Medical History:   Diagnosis Date    COPD (chronic obstructive pulmonary disease) ([facility]     Depression     Diabetes mellitus ([facility]     ETOH abuse     GERD (gastroesophageal reflux disease)     Hyperlipidemia     Hypertension     Liver disease     Seizures ([facility]     etoh wdl     Past Surgical History:    Past Surgical History:   Procedure Laterality Date    ENDOSCOPY, COLON, DIAGNOSTIC        Medications Prior to Admission:   No current facility-administered medications on file prior to encounter. Current Outpatient Medications on File Prior to Encounter   Medication Sig Dispense Refill    thiamine 100 MG tablet Take 1 tablet by mouth daily 30 tablet 3    nicotine (NICODERM CQ) 21 MG/24HR Place 1 patch onto the skin daily 30 patch 0    pantoprazole (PROTONIX) 40 MG tablet Take 1 tablet by mouth daily 30 tablet 0    potassium chloride (KLOR-CON M) 20 MEQ extended release tablet Take 1 tablet by mouth daily 90 tablet 1    metoprolol tartrate (LOPRESSOR) 50 MG tablet Take 1 tablet by mouth 2 times daily 60 tablet 5    lisinopril-hydroCHLOROthiazide (PRINZIDE;ZESTORETIC) 20-25 MG per tablet Take 1 tablet by mouth daily 30 tablet 5    blood glucose test strips (ASCENSIA AUTODISC VI;ONE TOUCH ULTRA TEST VI) strip Test as needed. Dispense One Touch verio Test Strips. Dx: Type 2 diabetes (250.00) 60 strip 11    folic acid (FOLVITE) 1 MG tablet Take 1 tablet by mouth daily 30 tablet 5    albuterol sulfate HFA (PROVENTIL HFA) 108 (90 Base) MCG/ACT inhaler Inhale 2 puffs into the lungs every 4 hours as needed for Wheezing or Shortness of Breath (Space out to every 6 hours as symptoms improve) Space out to every 6 hours as symptoms improve. 1 Inhaler 0    glucose monitoring kit (FREESTYLE) monitoring kit Glucometer with test strips and lancets. Check BS once daily  #100 strips and lancets 1 kit 5     Allergies:   Patient has no known allergies.     Social History:   Social History     Socioeconomic History    Marital status: Single     Spouse name: Not on file    Number of children: 0    Years of education: 15    Highest education level: Not on file   Occupational History     Employer: [id number] Needs    Financial resource strain: Not hard at all   [place] insecurity     Worry: Never true     Inability: Never true   [employer] needs     Medical: No     Non-medical: Yes   Tobacco Use    Smoking status: Current Every Day Smoker     Packs/day: 1.00     Years: 27.00     Pack years: 27.00     Types: E-Cigarettes, Cigarettes    Smokeless tobacco: Former User    Tobacco comment: vape   Substance and Sexual Activity    Alcohol use: Not Currently     Alcohol/week: 140.0 standard drinks     Types: 140 Cans of beer per week     Frequency: Patient refused     Binge frequency: Patient refused     Comment: 12 pk beer & 6 hard lemonade daily -last 8:30 3/16/21    Drug use: No    Sexual activity: Yes   Lifestyle    Physical activity     Days per week: 0 days     Minutes per session: 0 min    Stress: Only a little   Relationships    Social connections     Talks on phone: More than three times a week     Gets together: More than three times a week     Attends [employer] service: Never     Active member of club or organization: Yes     Attends meetings of clubs or organizations: More than 4 times per year     Relationship status: Never     Intimate partner violence     Fear of current or ex partner: Not on file     Emotionally abused: Not on file     Physically abused: Not on file     Forced sexual activity: Not on file   Other Topics Concern    Not on file   Social History Narrative    Patient with history of etoh abuse but stated he is currently not drinking.   Patient is receiving medication to assist him with staying sober and is attending routine AA meetings via zoom      Family History:    Family History   Problem Relation Age of Onset    High Blood Pressure Father     Cancer Father         Lung Cancer    Alcohol Abuse Father     High Blood Basophils 0.1 %    Immature Granulocytes 0.6 %    Segs Absolute 5.7 1 - 7 thou/mm3    Lymphocytes Absolute 2.0 1.0 - 4.8 thou/mm3    Monocytes Absolute 0.4 0.4 - 1.3 thou/mm3    Eosinophils Absolute 0.0 0.0 - 0.4 thou/mm3    Basophils Absolute 0.0 0.0 - 0.1 thou/mm3    Immature Grans (Abs) 0.05 0.00 - 0.07 thou/mm3    nRBC 0 /100 wbc   Basic Metabolic Panel   Result Value Ref Range    Sodium 115 (LL) 135 - 145 meq/L    Potassium 3.9 3.5 - 5.2 meq/L    Chloride 79 (L) 98 - 111 meq/L    CO2 18 (L) 23 - 33 meq/L    Glucose 97 70 - 108 mg/dL    BUN 10 7 - 22 mg/dL    CREATININE 0.5 0.4 - 1.2 mg/dL    Calcium 7.8 (L) 8.5 - 10.5 mg/dL   Hepatic function panel   Result Value Ref Range    Albumin 3.4 (L) 3.5 - 5.1 g/dL    Total Bilirubin 5.6 (H) 0.3 - 1.2 mg/dL    Bilirubin, Direct 4.4 (H) 0.0 - 0.3 mg/dL    Alkaline Phosphatase 241 (H) 38 - 126 U/L     (H) 5 - 40 U/L     (H) 11 - 66 U/L    Total Protein 6.1 6.1 - 8.0 g/dL   Lipase   Result Value Ref Range    Lipase 55.9 (H) 5.6 - 51.3 U/L   Magnesium   Result Value Ref Range    Magnesium 1.7 1.6 - 2.4 mg/dL   Phosphorus   Result Value Ref Range    Phosphorus 1.4 (L) 2.4 - 4.7 mg/dL   Acetaminophen level   Result Value Ref Range    Acetaminophen Level < 5.0 0.0 - 73.3 ug/mL   Salicylate Level   Result Value Ref Range    Salicylate, Serum < 0.3 (L) 2.0 - 10.0 mg/dL   Ammonia   Result Value Ref Range    Ammonia 27 11 - 60 umol/L   Ethanol   Result Value Ref Range    ETHYL ALCOHOL, SERUM 0.36 0.00 %   Urine Drug Screen   Result Value Ref Range    AMPHETAMINE+METHAMPHETAMINE URINE SCREEN Negative NEGATIVE    Barbiturate Quant, Ur POSITIVE NEGATIVE    Benzodiazepine Quant, Ur Negative NEGATIVE    Cannabinoid Quant, Ur Negative NEGATIVE    Cocaine Metab Quant, Ur Negative NEGATIVE    Opiates, Urine Negative NEGATIVE    Oxycodone Negative NEGATIVE    PCP Quant, Ur Negative NEGATIVE   Urinalysis with microscopic   Result Value Ref Range    Glucose, Urine NEGATIVE NEGATIVE mg/dl    Bilirubin Urine NEGATIVE NEGATIVE    Ketones, Urine TRACE (A) NEGATIVE    Specific Gravity, UA 1.008 1.002 - 1.030    Blood, Urine SMALL (A) NEGATIVE    pH, UA 6.0 5.0 - 9.0    Protein, UA TRACE (A) NEGATIVE mg/dl    Urobilinogen, Urine 1.0 0.0 - 1.0 eu/dl    Nitrite, Urine NEGATIVE NEGATIVE    Leukocyte Esterase, Urine NEGATIVE NEGATIVE    Color, UA YELLOW YELLOW-STRAW    Character, Urine CLEAR [id number]    RBC, UA 0-2 0-2/hpf /hpf    WBC, UA NONE SEEN 0-4/hpf /hpf    Epithelial Cells, UA NONE SEEN 3-5/hpf /hpf    Bacteria, UA NONE SEEN FEW/NONE SEEN    Casts NONE SEEN NONE SEEN /lpf    Crystals NONE SEEN NONE SEEN    Renal Epithelial, UA NONE SEEN NONE SEEN    Yeast, UA NONE SEEN NONE SEEN    Casts NONE SEEN /lpf    Miscellaneous Lab Test Result NONE SEEN    Glomerular Filtration Rate, Estimated   Result Value Ref Range    Est, Glom Filt Rate >90 ml/min/1.73m2   Anion Gap   Result Value Ref Range    Anion Gap 18.0 (H) 8.0 - 16.0 meq/L   Osmolality   Result Value Ref Range    Osmolality Calc 231.9 (L) 275.0 - 300.0 mOsmol/kg   Sodium   Result Value Ref Range    Sodium 118 (LL) 135 - 145 meq/L   EKG Emergency   Result Value Ref Range    Ventricular Rate 111 BPM    Atrial Rate 111 BPM    P-R Interval 116 ms    QRS Duration 96 ms    Q-T Interval 340 ms    QTc Calculation (Bazett) 462 ms    P Axis 61 degrees    R Axis 53 degrees    T Axis 58 degrees       EKG / Radiology:     Xr Abdomen (kub) (single Ap View)    Result Date: 3/18/2021  PROCEDURE: XR ABDOMEN (KUB) (SINGLE AP VIEW) CLINICAL INFORMATION: abdominal pain. COMPARISON: Abdominal radiographs dated 9/27/2018. TECHNIQUE: Single projection of the abdomen with 4 images. FINDINGS: There is scattered gas throughout the large bowel. There is scattered gas in mildly distended small bowel loops. Nonspecific, nonobstructive bowel gas pattern may represent developing adynamic ileus. **This report has been created using voice recognition software. It may contain minor errors which are inherent in voice recognition technology. ** Final report electronically signed by Dr. Neli Sy MD on 3/18/2021 9:55 AM    FEN/GI/DVT:  IVF: None  Electrolytes: Monitor and replace per protocols  Diet: NPO  GI PPX: No  DVT Prophylaxis: Lovenox    CODE STATUS:  Full    Thank you NILAM Busby CNP for the opportunity to be involved in this patient's care.     Electronically signed by Kyle Rdz MD on 4/8/2021 at 8:54 PM

## 2021-04-09 NOTE — PLAN OF CARE
Problem: Pain Control  Goal: Maintain pain level at or below patient's acceptable level (or 5 if patient is unable to determine acceptable level)  Outcome: Ongoing  Goal: Improvement in pain related behaviors BP/HR WNL  Outcome: Ongoing     Problem: Neurological  Goal: Maximum potential motor/sensory/cognitive function  Outcome: Ongoing  Note: Neurological assessments being conducted q4hrs. Problem: Cardiovascular  Goal: No DVT, peripheral vascular complications  Outcome: Ongoing  Note: No signs of symptoms of DVT at this time. Patient has lovenox as DVT prophylaxis. Goal: Hemodynamic stability  Outcome: Ongoing  Goal: Anticoagulate/Hct stable  Outcome: Ongoing     Problem: Respiratory  Goal: No pulmonary complications  Outcome: Ongoing  Note: Patient has sporadic dry cough. O2 levels >92% on room air. Lungs are clear and diminished. Patient has hx of COPD. Goal: O2 Sat > 90%  Outcome: Ongoing     Problem: GI  Goal: No bowel complications  Outcome: Ongoing  Note: Patient states he has had more frequent bowel movements. No bowel movements noted this shift. Bowel sounds are active. Problem:   Goal: Adequate urinary output  Outcome: Ongoing  Note: Patient has had a substantial amount of urine output. 2mcg of DDAVP were given. Patient has no pain or discomfort. Goal: No urinary complication  Outcome: Ongoing     Problem: Nutrition  Goal: Optimal nutrition therapy  Outcome: Ongoing  Note: Patient states he has not ate in a week. Currently NPO at this time. Goal: Understanding of nutritional guidelines  Outcome: Ongoing     Problem: Skin Integrity/Risk  Goal: No skin breakdown during hospitalization  Outcome: Ongoing  Note: Patient would not allow this RN to conduct a 2 nurse skin assessment. Some scattered abrasions and bruising can be seen. Patient encouraged to move in bed every 2 hours. Patient turns self well.       Problem: Musculor/Skeletal Functional Status  Goal: Absence of falls  Outcome: Ongoing  Note: Patient is currently on bed rest. No falls this shift. Problem: Intellectual/Education/Knowledge Deficit  Goal: Teaching initiated upon admission  Outcome: Ongoing     Problem: Emotional/Psychosocial  Goal: Understanding of community resources  Outcome: Ongoing     Problem: Discharge Planning:  Goal: Discharged to appropriate level of care  Description: Discharged to appropriate level of care  Outcome: Ongoing  Note: Patient is unsure if he would like to go home with his mom or if he wants to go to a treatment facility. Problem: Fluid Volume - Deficit:  Goal: Absence of fluid volume deficit signs and symptoms  Description: Absence of fluid volume deficit signs and symptoms  Outcome: Ongoing     Problem: Sleep Pattern Disturbance:  Goal: Appears well-rested  Description: Appears well-rested  Outcome: Ongoing     Problem: Violence - Risk of, Self/Other-Directed:  Goal: Knowledge of developmental care interventions  Description: Absence of violence  Outcome: Ongoing     Care plan reviewed with patient. Patient verbalizes understanding of the plan of care and contribute to goal setting.

## 2021-04-09 NOTE — PLAN OF CARE
Problem: Pain Control  Goal: Maintain pain level at or below patient's acceptable level (or 5 if patient is unable to determine acceptable level)  4/9/2021 0814 by Camilla Clark RN  Outcome: Ongoing  Flowsheets (Taken 4/9/2021 0879)  Patient's Stated Pain Goal: No pain  Note: Denies pain this shift. 4/9/2021 0250 by Geoff Hurley RN  Outcome: Ongoing  Goal: Improvement in pain related behaviors BP/HR WNL  4/9/2021 0814 by Camilla Clark RN  Outcome: Ongoing  Note: Vitals stable. 4/9/2021 0250 by Geoff Hurley RN  Outcome: Ongoing     Problem: Neurological  Goal: Maximum potential motor/sensory/cognitive function  4/9/2021 0814 by Camilla Clark RN  Outcome: Ongoing  Note: Alert & oriented this shift. 4/9/2021 0250 by Geoff Hurley RN  Outcome: Ongoing  Note: Neurological assessments being conducted q4hrs. Problem: Cardiovascular  Goal: No DVT, peripheral vascular complications  1/2/5929 [id number] by Camilla Clark RN  Outcome: Ongoing  Note: No signs of DVT.   4/9/2021 0250 by Geoff Hurley RN  Outcome: Ongoing  Note: No signs of symptoms of DVT at this time. Patient has lovenox as DVT prophylaxis. Goal: Hemodynamic stability  4/9/2021 0814 by Camilla Clark RN  Outcome: Ongoing  Note: Vitals stable. 4/9/2021 0250 by Geoff Hurley RN  Outcome: Ongoing  Goal: Anticoagulate/Hct stable  4/9/2021 0814 by Camilla Clark RN  Outcome: Ongoing  Note: On lovenox SQ.   4/9/2021 0250 by Geoff Hurley RN  Outcome: Ongoing     Problem: Respiratory  Goal: No pulmonary complications  9/8/3671 4301 by Camilla Clark RN  Outcome: Ongoing  Note: Patient on room air. 4/9/2021 0250 by Geoff Hurley RN  Outcome: Ongoing  Note: Patient has sporadic dry cough. O2 levels >92% on room air. Lungs are clear and diminished. Patient has hx of COPD. Goal: O2 Sat > 90%  4/9/2021 0814 by Camilla Clark RN  Outcome: Ongoing  Note: Sats 93% on room air.    4/9/2021 0250 by Geoff Hurley, RN  Outcome: Ongoing     Problem: symptoms  Outcome: Ongoing  Note: No signs of infection. Goal: Absence of new skin breakdown  Description: Absence of new skin breakdown  Outcome: Ongoing  Note: No new skin breakdown. Care plan reviewed with patient. Patient verbalize understanding of the plan of care and contribute to goal setting.

## 2021-04-10 PROBLEM — K72.90 LIVER FAILURE WITHOUT HEPATIC COMA (HCC): Status: ACTIVE | Noted: 2018-12-14

## 2021-04-10 LAB
ALBUMIN SERPL-MCNC: 2.9 G/DL (ref 3.5–5.1)
ALP BLD-CCNC: 214 U/L (ref 38–126)
ALT SERPL-CCNC: 198 U/L (ref 11–66)
ANION GAP SERPL CALCULATED.3IONS-SCNC: 10 MEQ/L (ref 8–16)
AST SERPL-CCNC: 319 U/L (ref 5–40)
BILIRUB SERPL-MCNC: 8.5 MG/DL (ref 0.3–1.2)
BILIRUBIN DIRECT: 6.2 MG/DL (ref 0–0.3)
BUN BLDV-MCNC: 13 MG/DL (ref 7–22)
CALCIUM SERPL-MCNC: 8.1 MG/DL (ref 8.5–10.5)
CHLORIDE BLD-SCNC: 91 MEQ/L (ref 98–111)
CO2: 22 MEQ/L (ref 23–33)
CREAT SERPL-MCNC: 0.5 MG/DL (ref 0.4–1.2)
GFR SERPL CREATININE-BSD FRML MDRD: > 90 ML/MIN/1.73M2
GLUCOSE BLD-MCNC: 92 MG/DL (ref 70–108)
MAGNESIUM: 1.8 MG/DL (ref 1.6–2.4)
PHOSPHORUS: 2.3 MG/DL (ref 2.4–4.7)
POTASSIUM SERPL-SCNC: 3.9 MEQ/L (ref 3.5–5.2)
SODIUM BLD-SCNC: 121 MEQ/L (ref 135–145)
SODIUM BLD-SCNC: 122 MEQ/L (ref 135–145)
SODIUM BLD-SCNC: 123 MEQ/L (ref 135–145)
SODIUM BLD-SCNC: 123 MEQ/L (ref 135–145)
TOTAL PROTEIN: 5.4 G/DL (ref 6.1–8)

## 2021-04-10 PROCEDURE — 6370000000 HC RX 637 (ALT 250 FOR IP): Performed by: INTERNAL MEDICINE

## 2021-04-10 PROCEDURE — 6360000002 HC RX W HCPCS: Performed by: STUDENT IN AN ORGANIZED HEALTH CARE EDUCATION/TRAINING PROGRAM

## 2021-04-10 PROCEDURE — 83735 ASSAY OF MAGNESIUM: CPT

## 2021-04-10 PROCEDURE — 84100 ASSAY OF PHOSPHORUS: CPT

## 2021-04-10 PROCEDURE — 2060000000 HC ICU INTERMEDIATE R&B

## 2021-04-10 PROCEDURE — 82248 BILIRUBIN DIRECT: CPT

## 2021-04-10 PROCEDURE — 99232 SBSQ HOSP IP/OBS MODERATE 35: CPT | Performed by: INTERNAL MEDICINE

## 2021-04-10 PROCEDURE — 84295 ASSAY OF SERUM SODIUM: CPT

## 2021-04-10 PROCEDURE — 2580000003 HC RX 258: Performed by: STUDENT IN AN ORGANIZED HEALTH CARE EDUCATION/TRAINING PROGRAM

## 2021-04-10 PROCEDURE — 80053 COMPREHEN METABOLIC PANEL: CPT

## 2021-04-10 PROCEDURE — 36592 COLLECT BLOOD FROM PICC: CPT

## 2021-04-10 PROCEDURE — 99233 SBSQ HOSP IP/OBS HIGH 50: CPT | Performed by: INTERNAL MEDICINE

## 2021-04-10 PROCEDURE — 36415 COLL VENOUS BLD VENIPUNCTURE: CPT

## 2021-04-10 PROCEDURE — 94761 N-INVAS EAR/PLS OXIMETRY MLT: CPT

## 2021-04-10 PROCEDURE — 2580000003 HC RX 258: Performed by: FAMILY MEDICINE

## 2021-04-10 PROCEDURE — 6370000000 HC RX 637 (ALT 250 FOR IP): Performed by: STUDENT IN AN ORGANIZED HEALTH CARE EDUCATION/TRAINING PROGRAM

## 2021-04-10 PROCEDURE — 94640 AIRWAY INHALATION TREATMENT: CPT

## 2021-04-10 PROCEDURE — 6360000002 HC RX W HCPCS: Performed by: FAMILY MEDICINE

## 2021-04-10 RX ADMIN — POTASSIUM & SODIUM PHOSPHATES POWDER PACK 280-160-250 MG 250 MG: 280-160-250 PACK at 10:40

## 2021-04-10 RX ADMIN — Medication 15 G: at 08:35

## 2021-04-10 RX ADMIN — LORAZEPAM 4 MG: 2 INJECTION INTRAMUSCULAR; INTRAVENOUS at 20:27

## 2021-04-10 RX ADMIN — IPRATROPIUM BROMIDE 0.5 MG: 0.5 SOLUTION RESPIRATORY (INHALATION) at 01:59

## 2021-04-10 RX ADMIN — SODIUM CHLORIDE, PRESERVATIVE FREE 10 ML: 5 INJECTION INTRAVENOUS at 08:28

## 2021-04-10 RX ADMIN — PHENOBARBITAL 64.8 MG: 32.4 TABLET ORAL at 08:35

## 2021-04-10 RX ADMIN — POTASSIUM & SODIUM PHOSPHATES POWDER PACK 280-160-250 MG 250 MG: 280-160-250 PACK at 20:27

## 2021-04-10 RX ADMIN — THIAMINE HYDROCHLORIDE 100 MG: 100 INJECTION, SOLUTION INTRAMUSCULAR; INTRAVENOUS at 01:38

## 2021-04-10 RX ADMIN — METOPROLOL TARTRATE 50 MG: 50 TABLET, FILM COATED ORAL at 08:27

## 2021-04-10 RX ADMIN — PANTOPRAZOLE SODIUM 40 MG: 40 TABLET, DELAYED RELEASE ORAL at 06:22

## 2021-04-10 RX ADMIN — LISINOPRIL 20 MG: 20 TABLET ORAL at 08:27

## 2021-04-10 RX ADMIN — ENOXAPARIN SODIUM 40 MG: 40 INJECTION SUBCUTANEOUS at 08:27

## 2021-04-10 RX ADMIN — PHENOBARBITAL 64.8 MG: 32.4 TABLET ORAL at 20:27

## 2021-04-10 RX ADMIN — LORAZEPAM 4 MG: 2 INJECTION INTRAMUSCULAR; INTRAVENOUS at 22:59

## 2021-04-10 RX ADMIN — POTASSIUM & SODIUM PHOSPHATES POWDER PACK 280-160-250 MG 250 MG: 280-160-250 PACK at 14:20

## 2021-04-10 RX ADMIN — SODIUM CHLORIDE, PRESERVATIVE FREE 10 ML: 5 INJECTION INTRAVENOUS at 20:26

## 2021-04-10 RX ADMIN — METOPROLOL TARTRATE 50 MG: 50 TABLET, FILM COATED ORAL at 20:27

## 2021-04-10 ASSESSMENT — PAIN SCALES - GENERAL: PAINLEVEL_OUTOF10: 0

## 2021-04-10 NOTE — PLAN OF CARE
Problem: Pain Control  Goal: Maintain pain level at or below patient's acceptable level (or 5 if patient is unable to determine acceptable level)  Outcome: Ongoing  Flowsheets (Taken 4/10/2021 6612)  Patient's Stated Pain Goal: No pain  Note: Denies pain  Goal: Improvement in pain related behaviors BP/HR WNL  Outcome: Ongoing     Problem: Neurological  Goal: Maximum potential motor/sensory/cognitive function  Outcome: Ongoing  Note: Alert and oriented. Problem: Cardiovascular  Goal: No DVT, peripheral vascular complications  Outcome: Ongoing  Note: No signs of DVT. Goal: Hemodynamic stability  Outcome: Ongoing  Note: Blood pressure stable. Goal: Anticoagulate/Hct stable  Outcome: Ongoing  Note: H/H stable. Problem: Respiratory  Goal: No pulmonary complications  Outcome: Ongoing  Note: On room air. Goal: O2 Sat > 90%  Outcome: Ongoing  Note: Patient on room air. Problem: GI  Goal: No bowel complications  Outcome: Ongoing  Note: Bowels moved this shift. Problem:   Goal: Adequate urinary output  Outcome: Ongoing  Note: Urinating without difficulty. Goal: No urinary complication  Outcome: Ongoing  Note: Urinating without difficulty. Problem: Nutrition  Goal: Optimal nutrition therapy  Outcome: Ongoing  Note: Eating without difficulty. Goal: Understanding of nutritional guidelines  Outcome: Ongoing  Note: Verbalizes nutritional guideline. Problem: Skin Integrity/Risk  Goal: No skin breakdown during hospitalization  Outcome: Ongoing  Note: No new skin breakdown     Problem: Musculor/Skeletal Functional Status  Goal: Absence of falls  Outcome: Ongoing  Note: No falls this shift. Problem: Intellectual/Education/Knowledge Deficit  Goal: Teaching initiated upon admission  Outcome: Ongoing  Note: Education throughout shift. Problem: Emotional/Psychosocial  Goal: Understanding of community resources  Outcome: Ongoing  Note: Understands community resources.       Problem: Discharge Planning:  Goal: Discharged to appropriate level of care  Description: Discharged to appropriate level of care  Outcome: Ongoing  Note: Plans to return home at discharge. Problem: Fluid Volume - Deficit:  Goal: Absence of fluid volume deficit signs and symptoms  Description: Absence of fluid volume deficit signs and symptoms  Outcome: Ongoing  Note: Monitoring sodium levels every 4 hours. Problem: Sleep Pattern Disturbance:  Goal: Appears well-rested  Description: Appears well-rested  Outcome: Ongoing  Note: Resting comfortably. Problem: Violence - Risk of, Self/Other-Directed:  Goal: Knowledge of developmental care interventions  Description: Absence of violence  Outcome: Completed     Problem: Skin Integrity:  Goal: Will show no infection signs and symptoms  Description: Will show no infection signs and symptoms  Outcome: Ongoing  Note: No skin infections signs and symptoms  Goal: Absence of new skin breakdown  Description: Absence of new skin breakdown  Outcome: Ongoing  Note: No new skin breakdown. Problem: Falls - Risk of:  Goal: Will remain free from falls  Description: Will remain free from falls  Outcome: Ongoing  Note: No falls this shift. Goal: Absence of physical injury  Description: Absence of physical injury  Outcome: Ongoing     Care plan reviewed with patient. Patient verbalize understanding of the plan of care and contribute to goal setting.

## 2021-04-10 NOTE — PROGRESS NOTES
cirrhosis, GERD, HTN who presents to [facility] on 4/8/2021 for acute alcohol intoxication. Patient was brought to the ED by his brother when he saw him in acute distress. Patient is well-known to staff members at [address] [place] [address] Dr had 5 encounters for acute alcohol withdrawal in the last year and a history of 2 ICU admissions for delirium tremens. Patient reports that he has been in and out of rehab a couple times in the past and currently lives with his mother. Patient reports that he usually drinks about 12-24 beers per day along with 2 bottles of hard liquor. Patient stated that his last drink was around 2:30 PM on the day of admission. Patient reports 4-5 bouts of emesis prior to admission and has continued to drink to avoid withdrawal.  At the time of admission patient reported abdominal pain, headache, numbness and tingling in his right arm radiating to the elbow patient is unsure if he had a seizure. Patient reports that he has not had any food to eat for the past 2 weeks and has been to the ICU    Patient vitals in the ED on arrival were T 98.6 RR 17  /95 SPO2 96% on room air. Laboratory values were significant for sodium 115 chloride 79 bicarb 18 anion gap 18 lactic acid 5.4 calcium 7.8 phosphorus 1.4 and moderate transaminitis with ALT//458 bilirubin elevated 5.6 direct bilirubin 4.4 lipase 55.9. Chest x-ray demonstrates no acute process. Nursing staff was unable to obtain IV access overnight and a central line was placed for electrolyte replacement      Subjective (past 24 hours): Complains of dry mouth, denies any active alcohol withdrawal symptoms at this time.       Medications:  Reviewed    Infusion Medications    sodium chloride 25 mL (04/09/21 1813)     Scheduled Medications    urea  15 g Oral Daily    potassium & sodium phosphates  1 packet Oral TID    thiamine (VITAMIN B1) IVPB  100 mg Intravenous Q24H    lisinopril  20 mg Oral Daily    calcium replacement protocol   Other RX Placeholder    PHENobarbital  64.8 mg Oral BID    Followed by   Perez Ndiaye ON 4/11/2021] PHENobarbital  32.4 mg Oral BID    sodium chloride flush  5-40 mL Intravenous 2 times per day    enoxaparin  40 mg Subcutaneous Daily    folic acid  1 mg Intramuscular Daily    metoprolol tartrate  50 mg Oral BID    nicotine  1 patch Transdermal Daily    pantoprazole  40 mg Oral QAM AC     PRN Meds: potassium chloride, magnesium sulfate, LORazepam **OR** LORazepam **OR** LORazepam **OR** LORazepam **OR** LORazepam **OR** LORazepam **OR** LORazepam **OR** LORazepam, sodium chloride flush, sodium chloride, promethazine **OR** ondansetron, polyethylene glycol, [Held by provider] acetaminophen **OR** [Held by provider] acetaminophen, ipratropium      Intake/Output Summary (Last 24 hours) at 4/10/2021 1554  Last data filed at 4/10/2021 1548  Gross per 24 hour   Intake 1177.3 ml   Output 650 ml   Net 527.3 ml       Diet:  DIET GENERAL; Daily Fluid Restriction: 1200 ml    Exam:  /74   Pulse 98   Temp 98.1 °F (36.7 °C) (Oral)   Resp 18   Ht 5' 11\" (1.803 m)   Wt 239 lb 12.8 oz (108.8 kg)   SpO2 91%   BMI 33.45 kg/m²     General appearance: Middle aged white male no acute distress  HEENT: Pupils equal, round, and reactive to light. Conjunctivae/corneas clear. Neck: Supple, with full range of motion. No jugular venous distention. Trachea midline. Respiratory: Mild wheezes. Normal respiratory effort. Clear to auscultation, bilaterally without Rales/Rhonchi. Cardiovascular: Regular rate and rhythm with normal S1/S2 without murmurs, rubs or gallops. Abdomen: Soft, non-tender, non-distended with normal bowel sounds. Musculoskeletal: passive and active ROM x 4 extremities. Skin: Skin color, texture, turgor normal.  No rashes or lesions. Neurologic:  Neurovascularly intact without any focal sensory/motor deficits.  Cranial nerves: II-XII intact, grossly non-focal.  Psychiatric: Alert and oriented, thought content appropriate, normal insight  Capillary Refill: Brisk,< 3 seconds   Peripheral Pulses: +2 palpable, equal bilaterally       Labs:   Recent Labs     04/08/21  1833   WBC 8.2   HGB 14.2   HCT 38.5*        Recent Labs     04/09/21  0626 04/09/21  1109 04/09/21  1109 04/09/21  2201 04/09/21  2201 04/10/21  0625 04/10/21  1055 04/10/21  1425   *  122* 119*   < > 123*   < > 123* 123* 121*   K 3.8 4.1  --  4.2  --  3.9  --   --    CL 90* 85*  --  88*  --  91*  --   --    CO2 17* 21*  --  21*  --  22*  --   --    BUN 10 11  --  14  --  13  --   --    CREATININE 0.5 0.5  --  0.5  --  0.5  --   --    CALCIUM 7.8* 7.1*  --  7.9*  --  8.1*  --   --    PHOS 1.5* 2.2*  --   --   --  2.3*  --   --     < > = values in this interval not displayed. Recent Labs     04/08/21  1833 04/09/21  0626 04/10/21  0625   * 404* 319*   * 228* 198*   BILIDIR 4.4*  --  6.2*   BILITOT 5.6* 5.7* 8.5*   ALKPHOS 241* 208* 214*     Recent Labs     04/08/21  2235   INR 1.18*     No results for input(s): CKTOTAL, TROPONINI in the last 72 hours. Microbiology:      Urinalysis:      Lab Results   Component Value Date    NITRU NEGATIVE 04/08/2021    WBCUA NONE SEEN 04/08/2021    BACTERIA NONE SEEN 04/08/2021    RBCUA 0-2 04/08/2021    BLOODU SMALL 04/08/2021    SPECGRAV 1.008 04/08/2021    GLUCOSEU NEGATIVE 09/23/2020       Radiology:  XR CHEST PORTABLE   Final Result   Impression:      Right central line without acute infiltrates      This document has been electronically signed by: Ramos Cui MD on    04/09/2021 02:43 AM      US LIVER   Final Result   1. Findings of cirrhosis and possibly hepatic steatosis. Hepatopedal portal venous flow. 2. Sludge in the gallbladder. No calculi seen. **This report has been created using voice recognition software. It may contain minor errors which are inherent in voice recognition technology. **      Final report electronically signed by  Mirian Ryan on 4/9/2021 8:30 AM      US GALLBLADDER RUQ   Final Result   1. Findings of cirrhosis and possibly hepatic steatosis. Hepatopedal portal venous flow. 2. Sludge in the gallbladder. No calculi seen. **This report has been created using voice recognition software. It may contain minor errors which are inherent in voice recognition technology. **      Final report electronically signed by Dr. Mirian Ryan on 4/9/2021 8:30 AM          DVT prophylaxis: [x] Lovenox                                 [] SCDs                                 [] SQ Heparin                                 [] Encourage ambulation           [] Already on Anticoagulation     Code Status: Full Code    PT/OT Eval Status: not indicated    Tele:   [x] yes             [] no    Active Hospital Problems    Diagnosis Date Noted    Lack of intravenous access [Z78.9]     Hyponatremia [E87.1] 04/08/2021    Liver failure without hepatic coma ([facility] [address] [K72.90] 12/14/2018       Electronically signed by Alonzo Lizarraga DO on 4/10/2021 at 3:54 PM

## 2021-04-10 NOTE — PROGRESS NOTES
RN [employer] message to Dr. Nohemy Hall:    \"Notification that patient's most recent Sodium level (drawn at 22:01) came back at 123. RN ordered to notify Nephrology of every sodium level per Dr. Leonid Cruz. Thank you. \"    RN received phone call following this message from Dr. Ysabel Perez. No new orders placed at this time and Dr. Ysabel Perez instructed RN to place a nursing communication order stating to call with sodium levels if they are > 4pts from previous level.

## 2021-04-10 NOTE — PROGRESS NOTES
Renal Progress Note    Assessment and Plan:    1. Hyponatremia stable  2. Hypertension primary  3. Diabetes mellitus type 2  4. COPD  5. Metabolic acidosis may be due to alcohol  6. Elevated liver enzymes may be due to alcohol liver disease  PLAN:  I discussed my thoughts with the patient. He understood. He had no questions. Labs reviewed. Serum sodium is stable. Medication reviewed.   Add urea sodium 15 g once a day  Continue serum sodium check every 4 hours  Labs in the morning  We will follow    Patient Active Problem List:     Hyperlipemia     Uncontrolled hypertension     Uncontrolled type 2 diabetes mellitus with hyperosmolarity without coma (HCC)     Alcohol withdrawal ([address]     Alcoholic hepatitis     COPD (chronic obstructive pulmonary disease) ([address]     Passive-dependent personality disorder (HCC)     Alcohol abuse     Type 2 diabetes mellitus without complication, without long-term current use of insulin (HCC)     Alcoholic liver failure (HCC)     Transaminitis     Hyperbilirubinemia     RUQ pain     Delirious     Alcoholic hepatitis without ascites     Alcoholic steatohepatitis     Hypophosphatemia     Bipolar 1 disorder (HCC)     Alcoholic gastritis without bleeding     Alcohol withdrawal syndrome with complication (HCC)     Alcohol dependence with physiological dependence, continuous (HCC)     Liver failure without hepatic coma (HCC)     Alcoholic cirrhosis ([address]     Alcohol intoxication in alcoholism with blood level over 0.3 with complication ([address]     Acute alcoholic intoxication with delirium ([address]     Acute alcoholic hepatitis     Alcohol ingestion     Alcohol withdrawal delirium (HCC)     Uncomplicated alcohol withdrawal ([address]     Alcohol use disorder, severe, dependence ([address]     Acute pancreatitis     Depression     Osteopoikilosis     Contusion of hand     Atypical chest pain     Alcoholic encephalopathy ([address]     Alcohol intoxication with delirium ([address]     Morbidly obese ([address] ETOH abuse     Chronic kidney disease, stage 5 (HCC)     Severe opioid dependence ([facility] [address]     Hyponatremia     Lack of intravenous access      Subjective:   Admit Date: 4/8/2021    Interval History:   Seen for hyponatremia  Awake and alert this morning  Denies any complaint  Blood pressure is good  Urine output is marginal          Medications:   Scheduled Meds:   urea  15 g Oral Daily    potassium & sodium phosphates  1 packet Oral TID    thiamine (VITAMIN B1) IVPB  100 mg Intravenous Q24H    lisinopril  20 mg Oral Daily    calcium replacement protocol   Other RX Placeholder    PHENobarbital  64.8 mg Oral BID    Followed by   Kamryn Bro ON 4/11/2021] PHENobarbital  32.4 mg Oral BID    sodium chloride flush  5-40 mL Intravenous 2 times per day    enoxaparin  40 mg Subcutaneous Daily    folic acid  1 mg Intramuscular Daily    metoprolol tartrate  50 mg Oral BID    nicotine  1 patch Transdermal Daily    pantoprazole  40 mg Oral QAM AC     Continuous Infusions:   sodium chloride 25 mL (04/09/21 1813)       CBC:   Recent Labs     04/08/21  1833   WBC 8.2   HGB 14.2        CMP:    Recent Labs     04/09/21  1109 04/09/21  1109 04/09/21  2201 04/10/21  0140 04/10/21  0625   *   < > 123* 122* 123*   K 4.1  --  4.2  --  3.9   CL 85*  --  88*  --  91*   CO2 21*  --  21*  --  22*   BUN 11  --  14  --  13   CREATININE 0.5  --  0.5  --  0.5   GLUCOSE 172*  --  99  --  92   CALCIUM 7.1*  --  7.9*  --  8.1*   LABGLOM >90  --  >90  --  >90    < > = values in this interval not displayed. Troponin: No results for input(s): TROPONINI in the last 72 hours. BNP: No results for input(s): BNP in the last 72 hours.   INR:   Recent Labs     04/08/21  2235   INR 1.18*     Lipids:   Recent Labs     04/08/21  1833   LIPASE 55.9*     Liver:   Recent Labs     04/10/21  0625   *   *   ALKPHOS 214*   PROT 5.4*   LABALBU 2.9*   BILITOT 8.5*     Iron:  No results for input(s): IRONS, FERRITIN in the last 72 hours.    Invalid input(s): LABIRONS  XR CHEST PORTABLE   Final Result   Impression:      Right central line without acute infiltrates      This document has been electronically signed by: Noni Paula MD on    04/09/2021 02:43 AM      US LIVER   Final Result   1. Findings of cirrhosis and possibly hepatic steatosis. Hepatopedal portal venous flow. 2. Sludge in the gallbladder. No calculi seen. **This report has been created using voice recognition software. It may contain minor errors which are inherent in voice recognition technology. **      Final report electronically signed by Dr. Uche Kirby on 4/9/2021 8:30 AM      [address]   Final Result   1. Findings of cirrhosis and possibly hepatic steatosis. Hepatopedal portal venous flow. 2. Sludge in the gallbladder. No calculi seen. **This report has been created using voice recognition software. It may contain minor errors which are inherent in voice recognition technology. **      Final report electronically signed by Dr. Uche Kirby on 4/9/2021 8:30 AM            Objective:   Vitals: /68   Pulse 83   Temp 98.4 °F (36.9 °C) (Oral)   Resp 16   Ht 5' 11\" (1.803 m)   Wt 239 lb 12.8 oz (108.8 kg)   SpO2 90%   BMI 33.45 kg/m²    Wt Readings from Last 3 Encounters:   04/10/21 239 lb 12.8 oz (108.8 kg)   03/29/21 257 lb (116.6 kg)   03/25/21 251 lb 3.2 oz (113.9 kg)      24HR INTAKE/OUTPUT:      Intake/Output Summary (Last 24 hours) at 4/10/2021 1144  Last data filed at 4/10/2021 5560  Gross per 24 hour   Intake 2429.88 ml   Output 350 ml   Net 2079.88 ml       Constitutional: Well-developed middle-aged gentleman alert, awake, no apparent distress   Skin:normal with no rash or lesions. HEENT:Pupils are reactive . Throat is clear . Oral mucosa is moist   Neck:supple with no thyromegaly or carotid bruit  Cardiovascular:  S1, S2 without murmur or rubs   Respiratory:  Clear to ausculation without wheezes, rhonchi or rales  Abdomen: +bs, soft, no tenderness to palpation and no palpable mass. No abdominal bruit   Ext: No LE edema  Musculoskeletal:Intact  Neuro:Alert and awake. Well oriented  with no focal deficit. Speech is normal      Electronically signed by Calderon Ying MD on 4/10/2021 at 11:44 AM  **This report has been created using voice recognition software. It maycontain minor  errors which are inherent in voice recognition technology. **

## 2021-04-10 NOTE — PLAN OF CARE
Problem:   Goal: Adequate urinary output  Outcome: Ongoing  Note: Monitor strict I&O and q4h sodium levels. 1200ml fluid restriction enforced. Problem: Sleep Pattern Disturbance:  Goal: Appears well-rested  Description: Appears well-rested  Outcome: Ongoing  Note: Phenobarbital given as scheduled. Dim lighting and room door closed to promote rest and sleep. Problem: Falls - Risk of:  Goal: Will remain free from falls  Description: Will remain free from falls  Outcome: Ongoing  Note: Bedside table and call light within reach. Bed alarm on. Gripper socks on. Patient educated on fall risk precautions and verbalizes understanding to call RN or [facility] when patient needed to get out bed. Falling star in place.

## 2021-04-11 LAB
ALBUMIN SERPL-MCNC: 2.9 G/DL (ref 3.5–5.1)
ALP BLD-CCNC: 226 U/L (ref 38–126)
ALT SERPL-CCNC: 183 U/L (ref 11–66)
ANION GAP SERPL CALCULATED.3IONS-SCNC: 8 MEQ/L (ref 8–16)
AST SERPL-CCNC: 272 U/L (ref 5–40)
BILIRUB SERPL-MCNC: 7.7 MG/DL (ref 0.3–1.2)
BUN BLDV-MCNC: 13 MG/DL (ref 7–22)
CALCIUM SERPL-MCNC: 8.2 MG/DL (ref 8.5–10.5)
CHLORIDE BLD-SCNC: 97 MEQ/L (ref 98–111)
CO2: 24 MEQ/L (ref 23–33)
CREAT SERPL-MCNC: 0.7 MG/DL (ref 0.4–1.2)
GFR SERPL CREATININE-BSD FRML MDRD: > 90 ML/MIN/1.73M2
GLUCOSE BLD-MCNC: 104 MG/DL (ref 70–108)
MAGNESIUM: 1.9 MG/DL (ref 1.6–2.4)
PHOSPHORUS: 2.4 MG/DL (ref 2.4–4.7)
POTASSIUM REFLEX MAGNESIUM: 3.6 MEQ/L (ref 3.5–5.2)
POTASSIUM SERPL-SCNC: 3.6 MEQ/L (ref 3.5–5.2)
SODIUM BLD-SCNC: 126 MEQ/L (ref 135–145)
SODIUM BLD-SCNC: 129 MEQ/L (ref 135–145)
SODIUM BLD-SCNC: 131 MEQ/L (ref 135–145)
SODIUM BLD-SCNC: 134 MEQ/L (ref 135–145)
TOTAL PROTEIN: 5.1 G/DL (ref 6.1–8)

## 2021-04-11 PROCEDURE — 6360000002 HC RX W HCPCS: Performed by: STUDENT IN AN ORGANIZED HEALTH CARE EDUCATION/TRAINING PROGRAM

## 2021-04-11 PROCEDURE — 2580000003 HC RX 258: Performed by: STUDENT IN AN ORGANIZED HEALTH CARE EDUCATION/TRAINING PROGRAM

## 2021-04-11 PROCEDURE — 84100 ASSAY OF PHOSPHORUS: CPT

## 2021-04-11 PROCEDURE — 84295 ASSAY OF SERUM SODIUM: CPT

## 2021-04-11 PROCEDURE — 6370000000 HC RX 637 (ALT 250 FOR IP): Performed by: STUDENT IN AN ORGANIZED HEALTH CARE EDUCATION/TRAINING PROGRAM

## 2021-04-11 PROCEDURE — 36415 COLL VENOUS BLD VENIPUNCTURE: CPT

## 2021-04-11 PROCEDURE — 6360000002 HC RX W HCPCS: Performed by: FAMILY MEDICINE

## 2021-04-11 PROCEDURE — 80053 COMPREHEN METABOLIC PANEL: CPT

## 2021-04-11 PROCEDURE — 2580000003 HC RX 258: Performed by: FAMILY MEDICINE

## 2021-04-11 PROCEDURE — 83735 ASSAY OF MAGNESIUM: CPT

## 2021-04-11 PROCEDURE — 2500000003 HC RX 250 WO HCPCS: Performed by: STUDENT IN AN ORGANIZED HEALTH CARE EDUCATION/TRAINING PROGRAM

## 2021-04-11 PROCEDURE — 99233 SBSQ HOSP IP/OBS HIGH 50: CPT | Performed by: INTERNAL MEDICINE

## 2021-04-11 PROCEDURE — 99232 SBSQ HOSP IP/OBS MODERATE 35: CPT | Performed by: INTERNAL MEDICINE

## 2021-04-11 PROCEDURE — 2060000000 HC ICU INTERMEDIATE R&B

## 2021-04-11 PROCEDURE — 6370000000 HC RX 637 (ALT 250 FOR IP): Performed by: INTERNAL MEDICINE

## 2021-04-11 RX ORDER — PHENOBARBITAL SODIUM 65 MG/ML
130 INJECTION INTRAMUSCULAR
Status: DISCONTINUED | OUTPATIENT
Start: 2021-04-11 | End: 2021-04-16 | Stop reason: HOSPADM

## 2021-04-11 RX ORDER — PHENOBARBITAL SODIUM 65 MG/ML
260 INJECTION INTRAMUSCULAR
Status: DISCONTINUED | OUTPATIENT
Start: 2021-04-11 | End: 2021-04-16 | Stop reason: HOSPADM

## 2021-04-11 RX ADMIN — ENOXAPARIN SODIUM 40 MG: 40 INJECTION SUBCUTANEOUS at 10:00

## 2021-04-11 RX ADMIN — SODIUM CHLORIDE, PRESERVATIVE FREE 10 ML: 5 INJECTION INTRAVENOUS at 20:25

## 2021-04-11 RX ADMIN — FOLIC ACID 1 MG: 5 INJECTION, SOLUTION INTRAMUSCULAR; INTRAVENOUS; SUBCUTANEOUS at 10:04

## 2021-04-11 RX ADMIN — LORAZEPAM 4 MG: 2 INJECTION INTRAMUSCULAR; INTRAVENOUS at 01:28

## 2021-04-11 RX ADMIN — PHENOBARBITAL SODIUM 260 MG: 65 INJECTION INTRAMUSCULAR; INTRAVENOUS at 06:48

## 2021-04-11 RX ADMIN — LORAZEPAM 4 MG: 2 INJECTION INTRAMUSCULAR; INTRAVENOUS at 00:16

## 2021-04-11 RX ADMIN — SODIUM CHLORIDE, PRESERVATIVE FREE 10 ML: 5 INJECTION INTRAVENOUS at 10:05

## 2021-04-11 RX ADMIN — METOPROLOL TARTRATE 50 MG: 50 TABLET, FILM COATED ORAL at 20:25

## 2021-04-11 RX ADMIN — LORAZEPAM 4 MG: 2 INJECTION INTRAMUSCULAR; INTRAVENOUS at 02:50

## 2021-04-11 RX ADMIN — PHENOBARBITAL SODIUM 260 MG: 65 INJECTION INTRAMUSCULAR; INTRAVENOUS at 10:01

## 2021-04-11 RX ADMIN — POTASSIUM & SODIUM PHOSPHATES POWDER PACK 280-160-250 MG 250 MG: 280-160-250 PACK at 20:25

## 2021-04-11 RX ADMIN — THIAMINE HYDROCHLORIDE 100 MG: 100 INJECTION, SOLUTION INTRAMUSCULAR; INTRAVENOUS at 00:36

## 2021-04-11 ASSESSMENT — PAIN SCALES - GENERAL: PAINLEVEL_OUTOF10: 0

## 2021-04-11 NOTE — PROGRESS NOTES
normal bowel sounds. Musculoskeletal: passive and active ROM x 4 extremities. Skin: Skin color, texture, turgor normal.  No rashes or lesions. Neurologic:  Neurovascularly intact without any focal sensory/motor deficits. Cranial nerves: II-XII intact, grossly non-focal.  Psychiatric: Alert and oriented, thought content appropriate, normal insight  Capillary Refill: Brisk,< 3 seconds   Peripheral Pulses: +2 palpable, equal bilaterally       Labs:   Recent Labs     04/08/21  1833   WBC 8.2   HGB 14.2   HCT 38.5*        Recent Labs     04/09/21  1109 04/09/21  1109 04/09/21  2201 04/09/21  2201 04/10/21  0625 04/10/21  0625 04/10/21  1955 04/11/21  0155 04/11/21  0550   *   < > 123*   < > 123*   < > 122* 126* 129*   K 4.1  --  4.2  --  3.9  --   --   --  3.6   CL 85*  --  88*  --  91*  --   --   --  97*   CO2 21*  --  21*  --  22*  --   --   --  24   BUN 11  --  14  --  13  --   --   --  13   CREATININE 0.5  --  0.5  --  0.5  --   --   --  0.7   CALCIUM 7.1*  --  7.9*  --  8.1*  --   --   --  8.2*   PHOS 2.2*  --   --   --  2.3*  --   --   --  2.4    < > = values in this interval not displayed. Recent Labs     04/08/21  1833 04/09/21  0626 04/10/21  0625 04/11/21  0550   * 404* 319* 272*   * 228* 198* 183*   BILIDIR 4.4*  --  6.2*  --    BILITOT 5.6* 5.7* 8.5* 7.7*   ALKPHOS 241* 208* 214* 226*     Recent Labs     04/08/21  2235   INR 1.18*     No results for input(s): Aldair Munoz in the last 72 hours.     Microbiology:      Urinalysis:      Lab Results   Component Value Date    NITRU NEGATIVE 04/08/2021    WBCUA NONE SEEN 04/08/2021    BACTERIA NONE SEEN 04/08/2021    RBCUA 0-2 04/08/2021    BLOODU SMALL 04/08/2021    SPECGRAV 1.008 04/08/2021    GLUCOSEU NEGATIVE 09/23/2020       Radiology:  XR CHEST PORTABLE   Final Result   Impression:      Right central line without acute infiltrates      This document has been electronically signed by: Nico Rothman MD on    04/09/2021 02:43 AM      US LIVER   Final Result   1. Findings of cirrhosis and possibly hepatic steatosis. Hepatopedal portal venous flow. 2. Sludge in the gallbladder. No calculi seen. **This report has been created using voice recognition software. It may contain minor errors which are inherent in voice recognition technology. **      Final report electronically signed by Dr. Uche Kirby on 4/9/2021 8:30 AM      [address]   Final Result   1. Findings of cirrhosis and possibly hepatic steatosis. Hepatopedal portal venous flow. 2. Sludge in the gallbladder. No calculi seen. **This report has been created using voice recognition software. It may contain minor errors which are inherent in voice recognition technology. **      Final report electronically signed by Dr. Uche Kirby on 4/9/2021 8:30 AM          DVT prophylaxis: [x] Lovenox                                 [] SCDs                                 [] SQ Heparin                                 [] Encourage ambulation           [] Already on Anticoagulation     Code Status: Full Code    PT/OT Eval Status: not indicated    Tele:   [x] yes             [] no    Active Hospital Problems    Diagnosis Date Noted    Lack of intravenous access [Z78.9]     Hyponatremia [E87.1] 04/08/2021    Liver failure without hepatic coma ([facility] [address] [K72.90] 12/14/2018       Electronically signed by Bradly León DO on 4/11/2021 at 7:24 AM

## 2021-04-11 NOTE — PLAN OF CARE
Problem: Pain Control  Goal: Maintain pain level at or below patient's acceptable level (or 5 if patient is unable to determine acceptable level)  Outcome: Ongoing  Flowsheets (Taken 4/11/2021 1921)  Patient's Stated Pain Goal: No pain  Note: Denies pain this shift. Goal: Improvement in pain related behaviors BP/HR WNL  Outcome: Ongoing  Note: Vitals stable. Problem: Neurological  Goal: Maximum potential motor/sensory/cognitive function  Outcome: Ongoing  Note: Patient is confused and in active withdraw. Problem: Cardiovascular  Goal: No DVT, peripheral vascular complications  Outcome: Ongoing  Note: No signs of DVT. Goal: Hemodynamic stability  Outcome: Ongoing  Note: Vitals stable. Goal: Anticoagulate/Hct stable  Outcome: Ongoing  Note: Lovenox QD     Problem: Respiratory  Goal: No pulmonary complications  Outcome: Ongoing  Note: Patient on room air. Goal: O2 Sat > 90%  Outcome: Ongoing  Note: Sats >90%     Problem: GI  Goal: No bowel complications  Outcome: Ongoing  Note: No bowel movement this shift. Problem:   Goal: Adequate urinary output  Outcome: Ongoing  Note: Adequate urinary output. Goal: No urinary complication  Outcome: Ongoing  Note: Incontinent while withdrawing. Problem: Nutrition  Goal: Optimal nutrition therapy  Outcome: Ongoing  Note: Eating without difficulty. Goal: Understanding of nutritional guidelines  Outcome: Completed     Problem: Skin Integrity/Risk  Goal: No skin breakdown during hospitalization  Outcome: Ongoing  Note: No skin breakdown. Problem: Musculor/Skeletal Functional Status  Goal: Absence of falls  Outcome: Ongoing  Note: No falls . Problem: Intellectual/Education/Knowledge Deficit  Goal: Teaching initiated upon admission  Outcome: Ongoing  Note: Too out of it for teaching during this shift.       Problem: Emotional/Psychosocial  Goal: Understanding of community resources  Outcome: Not Met This Shift     Problem: Discharge Planning:  Goal: Discharged to appropriate level of care  Description: Discharged to appropriate level of care  Outcome: Ongoing  Note: Plans to return to home at discharge. Problem: Fluid Volume - Deficit:  Goal: Absence of fluid volume deficit signs and symptoms  Description: Absence of fluid volume deficit signs and symptoms  Outcome: Ongoing  Note: Vitals stable      Problem: Sleep Pattern Disturbance:  Goal: Appears well-rested  Description: Appears well-rested  Outcome: Ongoing  Note: Sleeping most of the day. Problem: Skin Integrity:  Goal: Will show no infection signs and symptoms  Description: Will show no infection signs and symptoms  Outcome: Ongoing  Note: No signs of infection. Goal: Absence of new skin breakdown  Description: Absence of new skin breakdown  Outcome: Ongoing  Note: No new skin breakdown. Problem: Falls - Risk of:  Goal: Will remain free from falls  Description: Will remain free from falls  Outcome: Ongoing  Note: No falls this shift. Goal: Absence of physical injury  Description: Absence of physical injury  Outcome: Ongoing  Note: No physical injury. Care plan reviewed with patient. Patient verbalize understanding of the plan of care and contribute to goal setting.

## 2021-04-11 NOTE — PLAN OF CARE
Problem: Pain Control  Goal: Maintain pain level at or below patient's acceptable level (or 5 if patient is unable to determine acceptable level)  4/11/2021 0319 by Lona Saint, RN  Outcome: Met This Shift  4/10/2021 1855 by Anderson Olson RN  Outcome: Ongoing  Flowsheets (Taken 4/10/2021 1855)  Patient's Stated Pain Goal: No pain  Note: Denies pain  Goal: Improvement in pain related behaviors BP/HR WNL  4/11/2021 0319 by Lona Saint, RN  Outcome: Met This Shift  4/10/2021 1855 by Anderson Olson RN  Outcome: Ongoing     Problem: Cardiovascular  Goal: No DVT, peripheral vascular complications  2/21/5347 0319 by Lona Saint, RN  Outcome: Met This Shift  4/10/2021 1855 by Anderson Olson RN  Outcome: Ongoing  Note: No signs of DVT. Goal: Anticoagulate/Hct stable  4/11/2021 0319 by Lona Saint, RN  Outcome: Met This Shift  4/10/2021 1855 by Anderson Olson RN  Outcome: Ongoing  Note: H/H stable. Problem: Respiratory  Goal: No pulmonary complications  4/49/8175 0319 by Lona Saint, RN  Outcome: Met This Shift  4/10/2021 1855 by Anderson Olson RN  Outcome: Ongoing  Note: On room air. Goal: O2 Sat > 90%  4/11/2021 0319 by Lona Saint, RN  Outcome: Met This Shift  4/10/2021 1855 by Anderson Olson RN  Outcome: Ongoing  Note: Patient on room air. Problem: GI  Goal: No bowel complications  7/89/1405 0319 by Lona Saint, RN  Outcome: Met This Shift  4/10/2021 1855 by Anderson Olson RN  Outcome: Ongoing  Note: Bowels moved this shift. Problem:   Goal: Adequate urinary output  4/11/2021 0319 by Lona Saint, RN  Outcome: Met This Shift  4/10/2021 1855 by Anderson Olson RN  Outcome: Ongoing  Note: Urinating without difficulty. Goal: No urinary complication  7/03/5081 1224 by Lona Saint, RN  Outcome: Met This Shift  4/10/2021 1855 by Anderson Olson RN  Outcome: Ongoing  Note: Urinating without difficulty.       Problem: Nutrition  Goal: Optimal nutrition therapy  4/11/2021 0319 by Martin Coleman RN  Outcome: Met This Shift  4/10/2021 1855 by Maritza Laughlin RN  Outcome: Ongoing  Note: Eating without difficulty. Goal: Understanding of nutritional guidelines  4/11/2021 0319 by Martin Coleman RN  Outcome: Met This Shift  4/10/2021 1855 by Maritza Laughlin RN  Outcome: Ongoing  Note: Verbalizes nutritional guideline. Problem: Musculor/Skeletal Functional Status  Goal: Absence of falls  4/11/2021 0319 by Martin Coleman RN  Outcome: Met This Shift  4/10/2021 1855 by Maritza Laughlin RN  Outcome: Ongoing  Note: No falls this shift. Problem: Intellectual/Education/Knowledge Deficit  Goal: Teaching initiated upon admission  4/11/2021 0319 by Martin Coleman RN  Outcome: Met This Shift  4/10/2021 1855 by Maritza Laughlin RN  Outcome: Ongoing  Note: Education throughout shift. Problem: Fluid Volume - Deficit:  Goal: Absence of fluid volume deficit signs and symptoms  Description: Absence of fluid volume deficit signs and symptoms  4/11/2021 0319 by Martin Coleman RN  Outcome: Met This Shift  4/10/2021 1855 by Maritza Laughlin RN  Outcome: Ongoing  Note: Monitoring sodium levels every 4 hours. Problem: Skin Integrity:  Goal: Will show no infection signs and symptoms  Description: Will show no infection signs and symptoms  4/11/2021 0319 by Martin Coleman RN  Outcome: Met This Shift  4/10/2021 1855 by Maritza Laughlin RN  Outcome: Ongoing  Note: No skin infections signs and symptoms  Goal: Absence of new skin breakdown  Description: Absence of new skin breakdown  4/11/2021 0319 by Martin Coleman RN  Outcome: Met This Shift  4/10/2021 1855 by Maritza Laughlin RN  Outcome: Ongoing  Note: No new skin breakdown.       Problem: Falls - Risk of:  Goal: Will remain free from falls  Description: Will remain free from falls  4/11/2021 0319 by Martin Coleman RN  Outcome: Met This Shift  4/10/2021 1855 by Maritza Laughlin RN  Outcome: Ongoing  Note: No falls this shift. Goal: Absence of physical injury  Description: Absence of physical injury  4/11/2021 0319 by Bossman Kumar RN  Outcome: Met This Shift  4/10/2021 1855 by Beba Mcneil RN  Outcome: Ongoing     Problem: Neurological  Goal: Maximum potential motor/sensory/cognitive function  4/11/2021 0319 by Bossman Kumar RN  Outcome: Ongoing  4/10/2021 1855 by Beba Mcneil RN  Outcome: Ongoing  Note: Alert and oriented. Problem: Cardiovascular  Goal: Hemodynamic stability  4/11/2021 0319 by Bossman Kumar RN  Outcome: Ongoing  4/10/2021 1855 by Beba Mcneil RN  Outcome: Ongoing  Note: Blood pressure stable. Problem: Skin Integrity/Risk  Goal: No skin breakdown during hospitalization  4/11/2021 0319 by Bossman Kumar RN  Outcome: Ongoing  4/10/2021 1855 by Beba Mcneil RN  Outcome: Ongoing  Note: No new skin breakdown     Problem: Emotional/Psychosocial  Goal: Understanding of community resources  4/11/2021 0319 by Bossman Kumar RN  Outcome: Ongoing  4/10/2021 1855 by Beba Mcneil RN  Outcome: Ongoing  Note: Understands community resources. Problem: Discharge Planning:  Goal: Discharged to appropriate level of care  Description: Discharged to appropriate level of care  4/11/2021 0319 by Bossman Kumar RN  Outcome: Ongoing  4/10/2021 1855 by Beba Mcneil RN  Outcome: Ongoing  Note: Plans to return home at discharge. Problem: Sleep Pattern Disturbance:  Goal: Appears well-rested  Description: Appears well-rested  4/11/2021 0319 by Bossman Kumar RN  Outcome: Not Met This Shift  4/10/2021 1855 by Beba Mcneil RN  Outcome: Ongoing  Note: Resting comfortably.       Problem: Violence - Risk of, Self/Other-Directed:  Goal: Knowledge of developmental care interventions  Description: Absence of violence  4/10/2021 1855 by Beba Mcneil RN  Outcome: Completed

## 2021-04-11 NOTE — PROGRESS NOTES
Utilize [facility] alcohol withdrawal scale (Based on [place] Modified Alcohol Withdrawal Scale). Tabulate score based on classifications including Tremor, Sweating, Hallucination, Orientation, and Agitation. Tremor: 1  Sweatin  Hallucinations: 2  Orientation: 2  Agitation: 2  Total Score: 8  Action perform as described below     Tremor:  No tremor is 0 points. Tremor on movement is 1 point. Tremor at rest is 2 points. Sweating: No Sweat 0 points. Moist is 1 point. Drenching sweats is 2 points. Hallucinations: No present 0 points. Dissuadable is 1 point. Not dissuadable is 2 points. Orientation: Oriented 0 points. Vague/detached 1 point. Disoriented/no contact 2 points. Agitation: Calm 0 points. Anxious 1 point. Panicky 2 points. Check scale every 2 hours. Discontinue scoring with 4 consecutive scorings of 0. Scale 0: No phenobarbital given. Re-assess every 60 minutes as needed. Scale 1-3: Phenobarbital 130 mg IV over 3 minutes. Re-assess every 60 minutes as needed. May administer every 60 minutes to a maximum dose of phenobarbital 1040 mg in 24 hours! Scale 4-8: Phenobarbital 260 mg IV over 5 minutes. Re-assess every 60 minutes as needed. May administer every 60 minutes to a maximum dose of phenobarbital 1040mg in 24 hours! Scale 9-10: Transfer to ICU (if not already in ICU). Administer 10mg/kg phenobarbital IV over 60 minutes. Maximum dose phenobarbital is 1040mg in 24 hours!

## 2021-04-11 NOTE — PROGRESS NOTES
pancreatitis     Depression     Osteopoikilosis     Contusion of hand     Atypical chest pain     Alcoholic encephalopathy (HCC)     Alcohol intoxication with delirium ([facility] [address]     Morbidly obese (HCC)     ETOH abuse     Chronic kidney disease, stage 5 (HCC)     Severe opioid dependence ([facility]     Hyponatremia     Lack of intravenous access      Subjective:   Admit Date: 4/8/2021    Interval History:   Seen for hyponatremia  Awake but very agitated  Updated by the staff  Has been agitated since last night per staff  Blood pressure is good  Urine output is not completely documented          Medications:   Scheduled Meds:   potassium & sodium phosphates  1 packet Oral TID    thiamine (VITAMIN B1) IVPB  100 mg Intravenous Q24H    lisinopril  20 mg Oral Daily    calcium replacement protocol   Other RX Placeholder    sodium chloride flush  5-40 mL Intravenous 2 times per day    enoxaparin  40 mg Subcutaneous Daily    folic acid  1 mg Intramuscular Daily    metoprolol tartrate  50 mg Oral BID    nicotine  1 patch Transdermal Daily    pantoprazole  40 mg Oral QAM AC     Continuous Infusions:   sodium chloride 25 mL (04/09/21 1813)       CBC:   Recent Labs     04/08/21  1833   WBC 8.2   HGB 14.2        CMP:    Recent Labs     04/09/21  2201 04/09/21  2201 04/10/21  0625 04/10/21  0625 04/10/21  1955 04/11/21  0155 04/11/21  0550   *   < > 123*   < > 122* 126* 129*   K 4.2  --  3.9  --   --   --  3.6   CL 88*  --  91*  --   --   --  97*   CO2 21*  --  22*  --   --   --  24   BUN 14  --  13  --   --   --  13   CREATININE 0.5  --  0.5  --   --   --  0.7   GLUCOSE 99  --  92  --   --   --  104   CALCIUM 7.9*  --  8.1*  --   --   --  8.2*   LABGLOM >90  --  >90  --   --   --  >90    < > = values in this interval not displayed. Troponin: No results for input(s): TROPONINI in the last 72 hours. BNP: No results for input(s): BNP in the last 72 hours.   INR:   Recent Labs     04/08/21 2235   INR 1.18* Lipids:   Recent Labs     04/08/21  1833   LIPASE 55.9*     Liver:   Recent Labs     04/11/21  0550   *   *   ALKPHOS 226*   PROT 5.1*   LABALBU 2.9*   BILITOT 7.7*     Iron:  No results for input(s): IRONS, FERRITIN in the last 72 hours. Invalid input(s): LABIRONS  XR CHEST PORTABLE   Final Result   Impression:      Right central line without acute infiltrates      This document has been electronically signed by: Gamaliel Troy MD on    04/09/2021 02:43 AM      US LIVER   Final Result   1. Findings of cirrhosis and possibly hepatic steatosis. Hepatopedal portal venous flow. 2. Sludge in the gallbladder. No calculi seen. **This report has been created using voice recognition software. It may contain minor errors which are inherent in voice recognition technology. **      Final report electronically signed by Dr. Iram Ruiz on 4/9/2021 8:30 AM      [employer]   Final Result   1. Findings of cirrhosis and possibly hepatic steatosis. Hepatopedal portal venous flow. 2. Sludge in the gallbladder. No calculi seen. **This report has been created using voice recognition software. It may contain minor errors which are inherent in voice recognition technology. **      Final report electronically signed by Dr. Iram Ruiz on 4/9/2021 8:30 AM            Objective:   Vitals: BP (!) 120/58   Pulse 90   Temp 97.4 °F (36.3 °C) (Axillary)   Resp 20   Ht 5' 11\" (1.803 m)   Wt 239 lb 12.8 oz (108.8 kg)   SpO2 92%   BMI 33.45 kg/m²    Wt Readings from Last 3 Encounters:   04/10/21 239 lb 12.8 oz (108.8 kg)   03/29/21 257 lb (116.6 kg)   03/25/21 251 lb 3.2 oz (113.9 kg)      24HR INTAKE/OUTPUT:      Intake/Output Summary (Last 24 hours) at 4/11/2021 1005  Last data filed at 4/11/2021 0810  Gross per 24 hour   Intake 490 ml   Output 2250 ml   Net -1760 ml       Constitutional: Well-developed middle-aged gentleman alert, awake but very agitated in no distress  Skin:normal with no rash or lesions. HEENT:Pupils are reactive . Throat is clear . Oral mucosa is moist   Neck:supple with no thyromegaly or carotid bruit  Cardiovascular:  S1, S2 without murmur or rubs   Respiratory:  Clear to ausculation without wheezes, rhonchi or rales  Abdomen: +bs, soft, no tenderness to palpation and no palpable mass. No abdominal bruit   Ext: No LE edema  Musculoskeletal:Intact  Neuro: Awake and alert but agitated      Electronically signed by Marshal Casillas MD on 4/11/2021 at 10:05 AM  **This report has been created using voice recognition software. It maycontain minor  errors which are inherent in voice recognition technology. **

## 2021-04-11 NOTE — PROGRESS NOTES
Utilize [facility] alcohol withdrawal scale (Based on [place] Modified Alcohol Withdrawal Scale). Tabulate score based on classifications including Tremor, Sweating, Hallucination, Orientation, and Agitation. Tremor: 0  Sweatin  Hallucinations: 2  Orientation: 2  Agitation: 1  Total Score: 6  Action perform as described below     Tremor:  No tremor is 0 points. Tremor on movement is 1 point. Tremor at rest is 2 points. Sweating: No Sweat 0 points. Moist is 1 point. Drenching sweats is 2 points. Hallucinations: No present 0 points. Dissuadable is 1 point. Not dissuadable is 2 points. Orientation: Oriented 0 points. Vague/detached 1 point. Disoriented/no contact 2 points. Agitation: Calm 0 points. Anxious 1 point. Panicky 2 points. Check scale every 2 hours. Discontinue scoring with 4 consecutive scorings of 0. Scale 0: No phenobarbital given. Re-assess every 60 minutes as needed. Scale 1-3: Phenobarbital 130 mg IV over 3 minutes. Re-assess every 60 minutes as needed. May administer every 60 minutes to a maximum dose of phenobarbital 1040 mg in 24 hours! Scale 4-8: Phenobarbital 260 mg IV over 5 minutes. Re-assess every 60 minutes as needed. May administer every 60 minutes to a maximum dose of phenobarbital 1040mg in 24 hours! Scale 9-10: Transfer to ICU (if not already in ICU). Administer 10mg/kg phenobarbital IV over 60 minutes. Maximum dose phenobarbital is 1040mg in 24 hours!

## 2021-04-12 LAB
ABSOLUTE RETIC #: 92 THOU/MM3 (ref 20–115)
ALBUMIN SERPL-MCNC: 2.9 G/DL (ref 3.5–5.1)
ALP BLD-CCNC: 243 U/L (ref 38–126)
ALT SERPL-CCNC: 171 U/L (ref 11–66)
ANION GAP SERPL CALCULATED.3IONS-SCNC: 11 MEQ/L (ref 8–16)
AST SERPL-CCNC: 225 U/L (ref 5–40)
BILIRUB SERPL-MCNC: 5.5 MG/DL (ref 0.3–1.2)
BUN BLDV-MCNC: 7 MG/DL (ref 7–22)
CALCIUM SERPL-MCNC: 8.4 MG/DL (ref 8.5–10.5)
CHLORIDE BLD-SCNC: 102 MEQ/L (ref 98–111)
CO2: 25 MEQ/L (ref 23–33)
CREAT SERPL-MCNC: 0.6 MG/DL (ref 0.4–1.2)
ERYTHROCYTE [DISTWIDTH] IN BLOOD BY AUTOMATED COUNT: 15.5 % (ref 11.5–14.5)
ERYTHROCYTE [DISTWIDTH] IN BLOOD BY AUTOMATED COUNT: 52.6 FL (ref 35–45)
GFR SERPL CREATININE-BSD FRML MDRD: > 90 ML/MIN/1.73M2
GLUCOSE BLD-MCNC: 110 MG/DL (ref 70–108)
HCT VFR BLD CALC: 33.3 % (ref 42–52)
HEMOGLOBIN: 11.6 GM/DL (ref 14–18)
IMMATURE RETIC FRACT: 14.8 % (ref 2.3–13.4)
LD: 353 U/L (ref 100–190)
MAGNESIUM: 2 MG/DL (ref 1.6–2.4)
MCH RBC QN AUTO: 32.7 PG (ref 26–33)
MCHC RBC AUTO-ENTMCNC: 34.8 GM/DL (ref 32.2–35.5)
MCV RBC AUTO: 93.8 FL (ref 80–94)
PHOSPHORUS: 2.3 MG/DL (ref 2.4–4.7)
PLATELET # BLD: 71 THOU/MM3 (ref 130–400)
PMV BLD AUTO: 10.8 FL (ref 9.4–12.4)
POTASSIUM REFLEX MAGNESIUM: 3.9 MEQ/L (ref 3.5–5.2)
POTASSIUM SERPL-SCNC: 3.9 MEQ/L (ref 3.5–5.2)
RBC # BLD: 3.55 MILL/MM3 (ref 4.7–6.1)
RETIC HEMOGLOBIN: 41.5 PG (ref 28.2–35.7)
RETICULOCYTE ABSOLUTE COUNT: 2.5 % (ref 0.5–2)
REVIEWED BY: NORMAL
SMEAR REVIEW: NORMAL
SODIUM BLD-SCNC: 133 MEQ/L (ref 135–145)
SODIUM BLD-SCNC: 137 MEQ/L (ref 135–145)
SODIUM BLD-SCNC: 138 MEQ/L (ref 135–145)
SODIUM BLD-SCNC: 138 MEQ/L (ref 135–145)
TOTAL PROTEIN: 5.3 G/DL (ref 6.1–8)
WBC # BLD: 3.7 THOU/MM3 (ref 4.8–10.8)

## 2021-04-12 PROCEDURE — 2060000000 HC ICU INTERMEDIATE R&B

## 2021-04-12 PROCEDURE — 85046 RETICYTE/HGB CONCENTRATE: CPT

## 2021-04-12 PROCEDURE — 36415 COLL VENOUS BLD VENIPUNCTURE: CPT

## 2021-04-12 PROCEDURE — 84295 ASSAY OF SERUM SODIUM: CPT

## 2021-04-12 PROCEDURE — 6360000002 HC RX W HCPCS: Performed by: STUDENT IN AN ORGANIZED HEALTH CARE EDUCATION/TRAINING PROGRAM

## 2021-04-12 PROCEDURE — 83010 ASSAY OF HAPTOGLOBIN QUANT: CPT

## 2021-04-12 PROCEDURE — 80053 COMPREHEN METABOLIC PANEL: CPT

## 2021-04-12 PROCEDURE — 2500000003 HC RX 250 WO HCPCS: Performed by: STUDENT IN AN ORGANIZED HEALTH CARE EDUCATION/TRAINING PROGRAM

## 2021-04-12 PROCEDURE — 36592 COLLECT BLOOD FROM PICC: CPT

## 2021-04-12 PROCEDURE — 84100 ASSAY OF PHOSPHORUS: CPT

## 2021-04-12 PROCEDURE — 6360000002 HC RX W HCPCS: Performed by: FAMILY MEDICINE

## 2021-04-12 PROCEDURE — 2580000003 HC RX 258: Performed by: STUDENT IN AN ORGANIZED HEALTH CARE EDUCATION/TRAINING PROGRAM

## 2021-04-12 PROCEDURE — 99233 SBSQ HOSP IP/OBS HIGH 50: CPT | Performed by: INTERNAL MEDICINE

## 2021-04-12 PROCEDURE — 6370000000 HC RX 637 (ALT 250 FOR IP): Performed by: STUDENT IN AN ORGANIZED HEALTH CARE EDUCATION/TRAINING PROGRAM

## 2021-04-12 PROCEDURE — 83615 LACTATE (LD) (LDH) ENZYME: CPT

## 2021-04-12 PROCEDURE — 2580000003 HC RX 258: Performed by: FAMILY MEDICINE

## 2021-04-12 PROCEDURE — 85027 COMPLETE CBC AUTOMATED: CPT

## 2021-04-12 PROCEDURE — 6370000000 HC RX 637 (ALT 250 FOR IP): Performed by: INTERNAL MEDICINE

## 2021-04-12 PROCEDURE — 2580000003 HC RX 258: Performed by: INTERNAL MEDICINE

## 2021-04-12 PROCEDURE — 99232 SBSQ HOSP IP/OBS MODERATE 35: CPT | Performed by: INTERNAL MEDICINE

## 2021-04-12 PROCEDURE — 83735 ASSAY OF MAGNESIUM: CPT

## 2021-04-12 RX ORDER — DEXTROSE MONOHYDRATE 50 MG/ML
INJECTION, SOLUTION INTRAVENOUS CONTINUOUS
Status: ACTIVE | OUTPATIENT
Start: 2021-04-12 | End: 2021-04-13

## 2021-04-12 RX ADMIN — METOPROLOL TARTRATE 50 MG: 50 TABLET, FILM COATED ORAL at 19:32

## 2021-04-12 RX ADMIN — PANTOPRAZOLE SODIUM 40 MG: 40 TABLET, DELAYED RELEASE ORAL at 06:17

## 2021-04-12 RX ADMIN — POTASSIUM & SODIUM PHOSPHATES POWDER PACK 280-160-250 MG 250 MG: 280-160-250 PACK at 19:32

## 2021-04-12 RX ADMIN — PHENOBARBITAL SODIUM 130 MG: 65 INJECTION INTRAMUSCULAR at 22:33

## 2021-04-12 RX ADMIN — LISINOPRIL 20 MG: 20 TABLET ORAL at 08:36

## 2021-04-12 RX ADMIN — PHENOBARBITAL SODIUM 130 MG: 65 INJECTION INTRAMUSCULAR at 08:39

## 2021-04-12 RX ADMIN — PHENOBARBITAL SODIUM 130 MG: 65 INJECTION INTRAMUSCULAR at 00:13

## 2021-04-12 RX ADMIN — THIAMINE HYDROCHLORIDE 100 MG: 100 INJECTION, SOLUTION INTRAMUSCULAR; INTRAVENOUS at 00:26

## 2021-04-12 RX ADMIN — ENOXAPARIN SODIUM 40 MG: 40 INJECTION SUBCUTANEOUS at 08:36

## 2021-04-12 RX ADMIN — SODIUM CHLORIDE, PRESERVATIVE FREE 10 ML: 5 INJECTION INTRAVENOUS at 08:37

## 2021-04-12 RX ADMIN — FOLIC ACID 1 MG: 5 INJECTION, SOLUTION INTRAMUSCULAR; INTRAVENOUS; SUBCUTANEOUS at 08:36

## 2021-04-12 RX ADMIN — DEXTROSE MONOHYDRATE: 50 INJECTION, SOLUTION INTRAVENOUS at 13:01

## 2021-04-12 RX ADMIN — PHENOBARBITAL SODIUM 130 MG: 65 INJECTION INTRAMUSCULAR at 19:32

## 2021-04-12 RX ADMIN — DEXTROSE MONOHYDRATE: 50 INJECTION, SOLUTION INTRAVENOUS at 22:36

## 2021-04-12 RX ADMIN — SODIUM CHLORIDE, PRESERVATIVE FREE 10 ML: 5 INJECTION INTRAVENOUS at 19:32

## 2021-04-12 RX ADMIN — PHENOBARBITAL SODIUM 130 MG: 65 INJECTION INTRAMUSCULAR at 03:20

## 2021-04-12 RX ADMIN — POTASSIUM & SODIUM PHOSPHATES POWDER PACK 280-160-250 MG 250 MG: 280-160-250 PACK at 13:48

## 2021-04-12 RX ADMIN — POTASSIUM & SODIUM PHOSPHATES POWDER PACK 280-160-250 MG 250 MG: 280-160-250 PACK at 08:38

## 2021-04-12 RX ADMIN — METOPROLOL TARTRATE 50 MG: 50 TABLET, FILM COATED ORAL at 08:36

## 2021-04-12 RX ADMIN — PHENOBARBITAL SODIUM 130 MG: 65 INJECTION INTRAMUSCULAR at 06:26

## 2021-04-12 RX ADMIN — PHENOBARBITAL SODIUM 130 MG: 65 INJECTION INTRAMUSCULAR at 11:07

## 2021-04-12 ASSESSMENT — PAIN SCALES - GENERAL
PAINLEVEL_OUTOF10: 0
PAINLEVEL_OUTOF10: 0

## 2021-04-12 NOTE — PROGRESS NOTES
Comprehensive Nutrition Assessment    Type and Reason for Visit:  Initial, Positive Nutrition Screen(poor appetite/ intake, nausea/ vomiting)    Nutrition Recommendations/Plan:   ONS initiated: Glucerna once daily. Monitor blood glucose levels versus need for carbohydrate control diet modification, hx DM. Consider MVI, continue thiamine and folic acid supplementation as ordered. Nutrition Assessment:    Pt. nutritionally compromised AEB poor meal intake for 2 weeks prior to admission, alcohol abuse. At risk for further nutrition compromise r/t admit with hyponatremia, chronic alcohol abuse and underlying medical condition (hx COPD, DM, alcohol abuse, liver disease). Nutrition recommendations/interventions as per above. Malnutrition Assessment:  Malnutrition Status: At risk for malnutrition (Comment)    Context:  Acute Illness     Findings of the 6 clinical characteristics of malnutrition:  Energy Intake:  7 - 50% or less of estimated energy requirements for 5 or more days(no food intake x 2 weeks prior to admission, alcohol abuse)  Weight Loss:  No significant weight loss(2% loss over ~1 month)     Body Fat Loss:  Unable to assess     Muscle Mass Loss:  No significant muscle mass loss    Fluid Accumulation:  No significant fluid accumulation     Strength:  Not Performed    Estimated Daily Nutrient Needs:  Energy (kcal):  8853-5352 kcals (15-18); Weight Used for Energy Requirements:  (113kgm on 4/12)     Protein (g):   grams (1.2-1.5);  Weight Used for Protein Requirements:  Ideal(78kgm)          Nutrition Related Findings:  Patient restless, confused, sitter reports good intake of breakfast and 51-75% intake of lunch, no difficulty chewing/ swallowing noted, note report of vomiting x 3 days PTA, abdominal pain, no food intake x 2 weeks, drinks 12-24 beers per day and 2 little bottles of hard liquor; BM x 1 (5/17); folic acid, electrolyte replacement, thiamine, D5% at 100ml/ hour, phenobarbital; phosphorus 2.3, sodium 137, potassium 3.9, BUN 7, Creatinine 0.6, glucose 110, , , Ammonia (4/8) 27      Wounds:  (scattered abrasions)       Current Nutrition Therapies:    DIET GENERAL; Daily Fluid Restriction: 1200 ml  Dietary Nutrition Supplements: Diabetic Oral Supplement    Anthropometric Measures:  · Height: 5' 11\" (180.3 cm)  · Current Body Weight: 248 lb 6.4 oz (112.7 kg)(4/12; no edema)   · Admission Body Weight: 239 lb 12.8 oz (108.8 kg)(4/10; no edema)    · Usual Body Weight: (per EMR: 6/11/20 250# 3.2oz, 3/17/21 253# 11.2oz)     · Ideal Body Weight: 172 lbs;   · BMI: 34.7  · BMI Categories: Obese Class 1 (BMI 30.0-34. 9)       Nutrition Diagnosis:   · Inadequate oral intake related to inadequate protein-energy intake(alcohol withdrawal) as evidenced by poor intake prior to admission      Nutrition Interventions:   Food and/or Nutrient Delivery:  Continue Current Diet, Start Oral Nutrition Supplement  Nutrition Education/Counseling:  Education not appropriate   Coordination of Nutrition Care:  Continue to monitor while inpatient    Goals:  Patient will consume 75% or more of meals during LOS. Nutrition Monitoring and Evaluation:   Behavioral-Environmental Outcomes:  None Identified   Food/Nutrient Intake Outcomes:  Food and Nutrient Intake, Supplement Intake  Physical Signs/Symptoms Outcomes:  Biochemical Data, Nutrition Focused Physical Findings, Skin, Weight, Fluid Status or Edema     Discharge Planning:     Too soon to determine     Electronically signed by Karina Venegas RD, LD on 4/12/21 at 3:30 PM EDT    Contact: [phone]

## 2021-04-12 NOTE — PROGRESS NOTES
Utilize [facility] alcohol withdrawal scale (Based on [place] Modified Alcohol Withdrawal Scale). Tabulate score based on classifications including Tremor, Sweating, Hallucination, Orientation, and Agitation. Tremor: 0  Sweatin  Hallucinations: 0  Orientation: 1  Agitation: 1  Total Score: 2  Action perform as described below     Tremor:  No tremor is 0 points. Tremor on movement is 1 point. Tremor at rest is 2 points. Sweating: No Sweat 0 points. Moist is 1 point. Drenching sweats is 2 points. Hallucinations: No present 0 points. Dissuadable is 1 point. Not dissuadable is 2 points. Orientation: Oriented 0 points. Vague/detached 1 point. Disoriented/no contact 2 points. Agitation: Calm 0 points. Anxious 1 point. Panicky 2 points. Check scale every 2 hours. Discontinue scoring with 4 consecutive scorings of 0. Scale 0: No phenobarbital given. Re-assess every 60 minutes as needed. Scale 1-3: Phenobarbital 130 mg IV over 3 minutes. Re-assess every 60 minutes as needed. May administer every 60 minutes to a maximum dose of phenobarbital 1040 mg in 24 hours! Scale 4-8: Phenobarbital 260 mg IV over 5 minutes. Re-assess every 60 minutes as needed. May administer every 60 minutes to a maximum dose of phenobarbital 1040mg in 24 hours! Scale 9-10: Transfer to ICU (if not already in ICU). Administer 10mg/kg phenobarbital IV over 60 minutes. Maximum dose phenobarbital is 1040mg in 24 hours!

## 2021-04-12 NOTE — PLAN OF CARE
Problem: Pain Control  Goal: Maintain pain level at or below patient's acceptable level (or 5 if patient is unable to determine acceptable level)  4/12/2021 1726 by Mariah Storm RN  Outcome: Ongoing  Flowsheets (Taken 4/12/2021 1725)  Patient's Stated Pain Goal: No pain  Note: Pain Assessment: 0-10  Pain Level: 0   Patient's Stated Pain Goal: No pain   Is pain goal met at this time? Yes             Problem: Neurological  Goal: Maximum potential motor/sensory/cognitive function  4/12/2021 1726 by Mariah Storm RN  Outcome: Ongoing  Note: Patient is alert to name only. Problem: Cardiovascular  Goal: No DVT, peripheral vascular complications  8/55/6059 1726 by Mariah Storm RN  Outcome: Ongoing  Note: Patient has shown no signs of DVT this shift. On [place]. Problem: Cardiovascular  Goal: Hemodynamic stability  Outcome: Ongoing  Note: Vital signs remain within normal limits. Monitoring every 4 hours and as needed. Problem: Cardiovascular  Goal: Anticoagulate/Hct stable  Outcome: Ongoing  Note: Daily labs. Problem: Respiratory  Goal: No pulmonary complications  Outcome: Ongoing  Note: Patient remains on room air. Problem: Respiratory  Goal: O2 Sat > 90%  4/12/2021 1726 by Mariah Storm RN  Outcome: Ongoing  Note: Patient's oxygen saturation remains greater than 90% on room air. Problem: GI  Goal: No bowel complications  9/67/1722 1726 by Mariah Storm RN  Outcome: Ongoing  Note: Patient had a bowel movement this shift. Problem:   Goal: Adequate urinary output  4/12/2021 1726 by Mariah Storm RN  Outcome: Ongoing  Note: Patient has been voiding adequately. Problem: Nutrition  Goal: Optimal nutrition therapy  4/12/2021 1726 by Mariah Storm RN  Outcome: Ongoing  Note: Patient is eating % of meals.       Problem: Skin Integrity/Risk  Goal: No skin breakdown during hospitalization  4/12/2021 1726 by Mariah Storm RN  Outcome: Ongoing  Note: No new skin breakdown noted this shift. Turns self frequently. Problem: Musculor/Skeletal Functional Status  Goal: Absence of falls  4/12/2021 1726 by Wendy Coats RN  Outcome: Ongoing  Note: Patient remains on fall precautions. Falling star in place. Fall band on. Non-skid slippers on when ambulating. Side rails up x 2. Bed and chair alarm on. Call light within reach. Patient uses call light appropriately. Hourly rounding in place. Problem: Intellectual/Education/Knowledge Deficit  Goal: Teaching initiated upon admission  Outcome: Ongoing  Note: Unable to educate patient this shift. Problem: Discharge Planning:  Goal: Discharged to appropriate level of care  Description: Discharged to appropriate level of care  4/12/2021 1726 by Wendy Coats RN  Outcome: Ongoing  Note: Patient is planned to discharge home once medically stable. Problem: Fluid Volume - Deficit:  Goal: Absence of fluid volume deficit signs and symptoms  Description: Absence of fluid volume deficit signs and symptoms  4/12/2021 1726 by Wendy Caots RN  Outcome: Ongoing  Note: Patient has fluids running and voiding adequately. Problem: Sleep Pattern Disturbance:  Goal: Appears well-rested  Description: Appears well-rested  4/12/2021 1726 by Wendy Coats RN  Outcome: Ongoing  Note: Patient has not rested this shift. Problem: Skin Integrity:  Goal: Will show no infection signs and symptoms  Description: Will show no infection signs and symptoms  4/12/2021 1726 by Wendy Coats RN  Outcome: Ongoing  Note: Patient has remained afebrile. Problem: Skin Integrity:  Goal: Absence of new skin breakdown  Description: Absence of new skin breakdown  Outcome: Ongoing  Note: Patient has no new skin breakdown noted. Care plan reviewed with patient. Patient is unable to verbalize understanding of the plan of care and contribute to goal setting due to withdrawal symptoms.

## 2021-04-12 NOTE — PROGRESS NOTES
Utilize [facility] alcohol withdrawal scale (Based on [place] Modified Alcohol Withdrawal Scale). Tabulate score based on classifications including Tremor, Sweating, Hallucination, Orientation, and Agitation. Tremor: 0  Sweatin  Hallucinations:0  Orientation: 1  Agitation: 1  Total Score: 2  Action perform as described below     Tremor:  No tremor is 0 points. Tremor on movement is 1 point. Tremor at rest is 2 points. Sweating: No Sweat 0 points. Moist is 1 point. Drenching sweats is 2 points. Hallucinations: No present 0 points. Dissuadable is 1 point. Not dissuadable is 2 points. Orientation: Oriented 0 points. Vague/detached 1 point. Disoriented/no contact 2 points. Agitation: Calm 0 points. Anxious 1 point. Panicky 2 points. Check scale every 2 hours. Discontinue scoring with 4 consecutive scorings of 0. Scale 0: No phenobarbital given. Re-assess every 60 minutes as needed. Scale 1-3: Phenobarbital 130 mg IV over 3 minutes. Re-assess every 60 minutes as needed. May administer every 60 minutes to a maximum dose of phenobarbital 1040 mg in 24 hours! Scale 4-8: Phenobarbital 260 mg IV over 5 minutes. Re-assess every 60 minutes as needed. May administer every 60 minutes to a maximum dose of phenobarbital 1040mg in 24 hours! Scale 9-10: Transfer to ICU (if not already in ICU). Administer 10mg/kg phenobarbital IV over 60 minutes. Maximum dose phenobarbital is 1040mg in 24 hours!

## 2021-04-12 NOTE — CARE COORDINATION
4/12/21, 3:19 PM EDT    DISCHARGE ON GOING [facility] day: 4  Location: -21/021-A Reason for admit: Hyponatremia [E87.1]   Procedure:   US Liver / gallbladder:  Findings of cirrhosis and possibly hepatic steatosis. Hepatopedal portal venous flow. Sludge in the gallbladder. No calculi seen. Barriers to Discharge: sitter remains at bedside, alcohol W/D scale with Phenobarbitol, anxious at times, B/P controlled, Lisinipril, nephrology Na level 138, on Neutra phos, LFT elevated,  Phos 2.3, electrolyte replacement protocol, IVF X0Q at 019/IQ, folic acid, Lactulose, Thiamine IV daily, dietitian for ONS. PCP: NILAM Weiner CNP  Readmission Risk Score: 32%  Patient Goals/Plan/Treatment Preferences: from home with mother Ciara Kelly; follows with [facility] in [place]. Addiction services saw; patient has resource packet with detox information.

## 2021-04-12 NOTE — PROGRESS NOTES
Utilize [facility] alcohol withdrawal scale (Based on [place] Modified Alcohol Withdrawal Scale). Tabulate score based on classifications including Tremor, Sweating, Hallucination, Orientation, and Agitation. Tremor: 0  Sweatin  Hallucinations: 0  Orientation: 1  Agitation: 1  Total Score: 2  Action perform as described below     Tremor:  No tremor is 0 points. Tremor on movement is 1 point. Tremor at rest is 2 points. Sweating: No Sweat 0 points. Moist is 1 point. Drenching sweats is 2 points. Hallucinations: No present 0 points. Dissuadable is 1 point. Not dissuadable is 2 points. Orientation: Oriented 0 points. Vague/detached 1 point. Disoriented/no contact 2 points. Agitation: Calm 0 points. Anxious 1 point. Panicky 2 points. Check scale every 2 hours. Discontinue scoring with 4 consecutive scorings of 0. Scale 0: No phenobarbital given. Re-assess every 60 minutes as needed. Scale 1-3: Phenobarbital 130 mg IV over 3 minutes. Re-assess every 60 minutes as needed. May administer every 60 minutes to a maximum dose of phenobarbital 1040 mg in 24 hours! Scale 4-8: Phenobarbital 260 mg IV over 5 minutes. Re-assess every 60 minutes as needed. May administer every 60 minutes to a maximum dose of phenobarbital 1040mg in 24 hours! Scale 9-10: Transfer to ICU (if not already in ICU). Administer 10mg/kg phenobarbital IV over 60 minutes. Maximum dose phenobarbital is 1040mg in 24 hours!

## 2021-04-12 NOTE — PROGRESS NOTES
Utilize [facility] alcohol withdrawal scale (Based on [place] Modified Alcohol Withdrawal Scale). Tabulate score based on classifications including Tremor, Sweating, Hallucination, Orientation, and Agitation. Tremor: 0  Sweatin  Hallucinations: 0  Orientation:1  Agitation: 1  Total Score: 2  Action perform as described below     Tremor:  No tremor is 0 points. Tremor on movement is 1 point. Tremor at rest is 2 points. Sweating: No Sweat 0 points. Moist is 1 point. Drenching sweats is 2 points. Hallucinations: No present 0 points. Dissuadable is 1 point. Not dissuadable is 2 points. Orientation: Oriented 0 points. Vague/detached 1 point. Disoriented/no contact 2 points. Agitation: Calm 0 points. Anxious 1 point. Panicky 2 points. Check scale every 2 hours. Discontinue scoring with 4 consecutive scorings of 0. Scale 0: No phenobarbital given. Re-assess every 60 minutes as needed. Scale 1-3: Phenobarbital 130 mg IV over 3 minutes. Re-assess every 60 minutes as needed. May administer every 60 minutes to a maximum dose of phenobarbital 1040 mg in 24 hours! Scale 4-8: Phenobarbital 260 mg IV over 5 minutes. Re-assess every 60 minutes as needed. May administer every 60 minutes to a maximum dose of phenobarbital 1040mg in 24 hours! Scale 9-10: Transfer to ICU (if not already in ICU). Administer 10mg/kg phenobarbital IV over 60 minutes. Maximum dose phenobarbital is 1040mg in 24 hours!

## 2021-04-12 NOTE — PROGRESS NOTES
[facility]   Nephrology progress note  4/12/2021, 12:06 PM      Pt Name:    Hugo Zhang  MRN:     [id number]     [place]:    1975  Admit Date:    4/8/2021  6:00 PM  Primary Care Physician:  NILAM Aquino CNP   Room number  4K-21/021-A    Chief Complaint: Nephrology following for hyponatremia  Subjective:  Patient seen and examined  Under one-to-one observation  Very restless  Confused/very agitated        Objective:  24HR INTAKE/OUTPUT:      Intake/Output Summary (Last 24 hours) at 4/12/2021 1206  Last data filed at 4/12/2021 1136  Gross per 24 hour   Intake 1058.97 ml   Output 2500 ml   Net -1441.03 ml     I/O last 3 completed shifts: In: 859 [P.O.:750; I.V.:109]  Out: 2275 [Urine:2275]  I/O this shift:  In: 200 [P.O.:200]  Out: 800 [Urine:800]  Admission weight: 257 lb (116.6 kg)  Wt Readings from Last 3 Encounters:   04/12/21 248 lb 6.4 oz (112.7 kg)   03/29/21 257 lb (116.6 kg)   03/25/21 251 lb 3.2 oz (113.9 kg)     Body mass index is 34.64 kg/m².     Physical examination  VITALS:     Vitals:    04/11/21 2327 04/12/21 0306 04/12/21 0807 04/12/21 1136   BP: 118/64 129/61 124/70 110/69   Pulse: 85 97 99 92   Resp: 16 16 16 20   Temp: 98.1 °F (36.7 °C) 98.1 °F (36.7 °C) 98.2 °F (36.8 °C) 97.9 °F (36.6 °C)   TempSrc: Oral Oral Oral Oral   SpO2: 91% 94% 94% 93%   Weight:  248 lb 6.4 oz (112.7 kg)     Height:         General Appearance: Restless, confused, very agitated  Mouth/Throat: Oral mucosa somewhat dry  Neck: No JVD visibly noted  Lungs: no use of accessory muscles  GI: soft, non-tender, no guarding  Ext: No pitting edema noted    Lab Data  CBC:   Recent Labs     04/12/21  0600   WBC 3.7*   HGB 11.6*   HCT 33.3*   PLT 71*     BMP:  Recent Labs     04/10/21  0625 04/10/21  0625 04/11/21  0550 04/11/21  0550 04/11/21  1820 04/12/21  0105 04/12/21  0600   *   < > 129*   < > 134* 133* 138   K 3.9  --  3.6  3.6  --   --   --  3.9  3.9   CL 91*  --  97*  --   -- of hypertension. Patient is on lisinopril  5.   Alcohol withdrawal      Rolly Steiner MD  [facility]

## 2021-04-12 NOTE — PROGRESS NOTES
baseline. ipratropium PRN. Albuterol held per tachycardia     GERD: PPI    HLD: statin held per transaminitis    Primary HTN: resumed w/ parameters    Anion gap acidosis (resolved): 2/2 to lactic acidosis in context of chronic alcoholism. Improved with IVF    Expected discharge date:  TBD    Disposition:    [x] Home       [] TCU       [] Rehab       [] Psych       [] SNF       [] [facility]       [] Other-    Chief Complaint: [address] Course:   Sanjay Aguirre is a [id number] male with a past medical history significant for COPD, depression, alcohol abuse, alcoholic cirrhosis, GERD, HTN who presents to [facility] on 4/8/2021 for acute alcohol intoxication. Patient was brought to the ED by his brother when he saw him in acute distress. Patient is well-known to staff members at [address] [place] [address] Dr had 5 encounters for acute alcohol withdrawal in the last year and a history of 2 ICU admissions for delirium tremens. Patient reports that he has been in and out of rehab a couple times in the past and currently lives with his mother. Patient reports that he usually drinks about 12-24 beers per day along with 2 bottles of hard liquor. Patient stated that his last drink was around 2:30 PM on the day of admission. Patient reports 4-5 bouts of emesis prior to admission and has continued to drink to avoid withdrawal.  At the time of admission patient reported abdominal pain, headache, numbness and tingling in his right arm radiating to the elbow patient is unsure if he had a seizure. Patient reports that he has not had any food to eat for the past 2 weeks and has been to the ICU    Patient vitals in the ED on arrival were T 98.6 RR 17  /95 SPO2 96% on room air.   Laboratory values were significant for sodium 115 chloride 79 bicarb 18 anion gap 18 lactic acid 5.4 calcium 7.8 phosphorus 1.4 and moderate transaminitis with ALT//458 bilirubin elevated 5.6 Fluid Restriction: 1200 ml    Exam:  /69   Pulse 92   Temp 97.9 °F (36.6 °C) (Oral)   Resp 20   Ht 5' 11\" (1.803 m)   Wt 248 lb 6.4 oz (112.7 kg)   SpO2 93%   BMI 34.64 kg/m²     General appearance: Middle aged white male no acute distress  HEENT: Pupils equal, round, and reactive to light. Conjunctivae/corneas clear. Neck: Supple, with full range of motion. No jugular venous distention. Trachea midline. Respiratory: Mild wheezes. Normal respiratory effort. Clear to auscultation, bilaterally without Rales/Rhonchi. Cardiovascular: Regular rate and rhythm with normal S1/S2 without murmurs, rubs or gallops. Abdomen: Soft, non-tender, non-distended with normal bowel sounds. Musculoskeletal: passive and active ROM x 4 extremities. Skin: Skin color, texture, turgor normal.  No rashes or lesions. Neurologic:  Neurovascularly intact without any focal sensory/motor deficits. Cranial nerves: II-XII intact, grossly non-focal.  Psychiatric: Alert and oriented, thought content appropriate, normal insight  Capillary Refill: Brisk,< 3 seconds   Peripheral Pulses: +2 palpable, equal bilaterally       Labs:   Recent Labs     04/12/21  0600   WBC 3.7*   HGB 11.6*   HCT 33.3*   PLT 71*     Recent Labs     04/10/21  0625 04/10/21  0625 04/11/21  0550 04/11/21  0550 04/11/21  1820 04/12/21  0105 04/12/21  0600   *   < > 129*   < > 134* 133* 138   K 3.9  --  3.6  3.6  --   --   --  3.9  3.9   CL 91*  --  97*  --   --   --  102   CO2 22*  --  24  --   --   --  25   BUN 13  --  13  --   --   --  7   CREATININE 0.5  --  0.7  --   --   --  0.6   CALCIUM 8.1*  --  8.2*  --   --   --  8.4*   PHOS 2.3*  --  2.4  --   --   --  2.3*    < > = values in this interval not displayed. Recent Labs     04/10/21  0625 04/11/21  0550 04/12/21  0600   * 272* 225*   * 183* 171*   BILIDIR 6.2*  --   --    BILITOT 8.5* 7.7* 5.5*   ALKPHOS 214* 226* 243*     No results for input(s): INR in the last 72 hours.   No results for input(s): [employer] [employer] in the last 72 hours. Microbiology:      Urinalysis:      Lab Results   Component Value Date    NITRU NEGATIVE 04/08/2021    WBCUA NONE SEEN 04/08/2021    BACTERIA NONE SEEN 04/08/2021    RBCUA 0-2 04/08/2021    BLOODU SMALL 04/08/2021    SPECGRAV 1.008 04/08/2021    GLUCOSEU NEGATIVE 09/23/2020       Radiology:  XR CHEST PORTABLE   Final Result   Impression:      Right central line without acute infiltrates      This document has been electronically signed by: Candelaria Hernández MD on    04/09/2021 02:43 AM      US LIVER   Final Result   1. Findings of cirrhosis and possibly hepatic steatosis. Hepatopedal portal venous flow. 2. Sludge in the gallbladder. No calculi seen. **This report has been created using voice recognition software. It may contain minor errors which are inherent in voice recognition technology. **      Final report electronically signed by Dr. Charlene Elaine on 4/9/2021 8:30 AM      [address] [employer]   Final Result   1. Findings of cirrhosis and possibly hepatic steatosis. Hepatopedal portal venous flow. 2. Sludge in the gallbladder. No calculi seen. **This report has been created using voice recognition software. It may contain minor errors which are inherent in voice recognition technology. **      Final report electronically signed by Dr. Charlene Elaine on 4/9/2021 8:30 AM          DVT prophylaxis: [x] Lovenox                                 [] SCDs                                 [] SQ Heparin                                 [] Encourage ambulation           [] Already on Anticoagulation     Code Status: Full Code    PT/OT Eval Status: not indicated    Tele:   [x] yes             [] no    Active Hospital Problems    Diagnosis Date Noted    Lack of intravenous access [Z78.9]     Hyponatremia [E87.1] 04/08/2021    Liver failure without hepatic coma ([facility] [address] [K72.90] 12/14/2018       Electronically signed by Kristina Alfonso DO on 4/12/2021 at 1:27 PM

## 2021-04-12 NOTE — PROGRESS NOTES
Utilize [facility] alcohol withdrawal scale (Based on [place] Modified Alcohol Withdrawal Scale). Tabulate score based on classifications including Tremor, Sweating, Hallucination, Orientation, and Agitation. Tremor:0  Sweatin  Hallucinations: 1  Orientation: 1  Agitation: 1  Total Score: 3  Action perform as described below     Tremor:  No tremor is 0 points. Tremor on movement is 1 point. Tremor at rest is 2 points. Sweating: No Sweat 0 points. Moist is 1 point. Drenching sweats is 2 points. Hallucinations: No present 0 points. Dissuadable is 1 point. Not dissuadable is 2 points. Orientation: Oriented 0 points. Vague/detached 1 point. Disoriented/no contact 2 points. Agitation: Calm 0 points. Anxious 1 point. Panicky 2 points. Check scale every 2 hours. Discontinue scoring with 4 consecutive scorings of 0. Scale 0: No phenobarbital given. Re-assess every 60 minutes as needed. Scale 1-3: Phenobarbital 130 mg IV over 3 minutes. Re-assess every 60 minutes as needed. May administer every 60 minutes to a maximum dose of phenobarbital 1040 mg in 24 hours! Scale 4-8: Phenobarbital 260 mg IV over 5 minutes. Re-assess every 60 minutes as needed. May administer every 60 minutes to a maximum dose of phenobarbital 1040mg in 24 hours! Scale 9-10: Transfer to ICU (if not already in ICU). Administer 10mg/kg phenobarbital IV over 60 minutes. Maximum dose phenobarbital is 1040mg in 24 hours!

## 2021-04-12 NOTE — PLAN OF CARE
Problem: Nutrition  Goal: Optimal nutrition therapy  4/12/2021 1529 by Ivanna Rivera, BING, LD  Outcome: Ongoing   Nutrition Problem #1: Inadequate oral intake  Intervention: Food and/or Nutrient Delivery: Continue Current Diet, Start Oral Nutrition Supplement  Nutritional Goals: Patient will consume 75% or more of meals during LOS.

## 2021-04-12 NOTE — PROGRESS NOTES
Utilize [facility] alcohol withdrawal scale (Based on [place] Modified Alcohol Withdrawal Scale). Tabulate score based on classifications including Tremor, Sweating, Hallucination, Orientation, and Agitation. Tremor: 0  Sweatin  Hallucinations: 0  Orientation: 1  Agitation: 1  Total Score: 2  Action perform as described below     Tremor:  No tremor is 0 points. Tremor on movement is 1 point. Tremor at rest is 2 points. Sweating: No Sweat 0 points. Moist is 1 point. Drenching sweats is 2 points. Hallucinations: No present 0 points. Dissuadable is 1 point. Not dissuadable is 2 points. Orientation: Oriented 0 points. Vague/detached 1 point. Disoriented/no contact 2 points. Agitation: Calm 0 points. Anxious 1 point. Panicky 2 points. Check scale every 2 hours. Discontinue scoring with 4 consecutive scorings of 0. Scale 0: No phenobarbital given. Re-assess every 60 minutes as needed. Scale 1-3: Phenobarbital 130 mg IV over 3 minutes. Re-assess every 60 minutes as needed. May administer every 60 minutes to a maximum dose of phenobarbital 1040 mg in 24 hours! Scale 4-8: Phenobarbital 260 mg IV over 5 minutes. Re-assess every 60 minutes as needed. May administer every 60 minutes to a maximum dose of phenobarbital 1040mg in 24 hours! Scale 9-10: Transfer to ICU (if not already in ICU). Administer 10mg/kg phenobarbital IV over 60 minutes. Maximum dose phenobarbital is 1040mg in 24 hours!

## 2021-04-13 LAB
ANION GAP SERPL CALCULATED.3IONS-SCNC: 6 MEQ/L (ref 8–16)
BUN BLDV-MCNC: 5 MG/DL (ref 7–22)
CALCIUM SERPL-MCNC: 8.4 MG/DL (ref 8.5–10.5)
CHLORIDE BLD-SCNC: 104 MEQ/L (ref 98–111)
CO2: 28 MEQ/L (ref 23–33)
CREAT SERPL-MCNC: 0.7 MG/DL (ref 0.4–1.2)
ERYTHROCYTE [DISTWIDTH] IN BLOOD BY AUTOMATED COUNT: 16 % (ref 11.5–14.5)
ERYTHROCYTE [DISTWIDTH] IN BLOOD BY AUTOMATED COUNT: 55.8 FL (ref 35–45)
GFR SERPL CREATININE-BSD FRML MDRD: > 90 ML/MIN/1.73M2
GLUCOSE BLD-MCNC: 96 MG/DL (ref 70–108)
HCT VFR BLD CALC: 33.1 % (ref 42–52)
HEMOGLOBIN: 11.3 GM/DL (ref 14–18)
MAGNESIUM: 2 MG/DL (ref 1.6–2.4)
MCH RBC QN AUTO: 32.8 PG (ref 26–33)
MCHC RBC AUTO-ENTMCNC: 34.1 GM/DL (ref 32.2–35.5)
MCV RBC AUTO: 95.9 FL (ref 80–94)
PHOSPHORUS: 3.5 MG/DL (ref 2.4–4.7)
PLATELET # BLD: 76 THOU/MM3 (ref 130–400)
PMV BLD AUTO: 11.2 FL (ref 9.4–12.4)
POTASSIUM SERPL-SCNC: 3.5 MEQ/L (ref 3.5–5.2)
POTASSIUM SERPL-SCNC: 3.9 MEQ/L (ref 3.5–5.2)
RBC # BLD: 3.45 MILL/MM3 (ref 4.7–6.1)
SODIUM BLD-SCNC: 137 MEQ/L (ref 135–145)
SODIUM BLD-SCNC: 138 MEQ/L (ref 135–145)
SODIUM BLD-SCNC: 138 MEQ/L (ref 135–145)
WBC # BLD: 3.5 THOU/MM3 (ref 4.8–10.8)

## 2021-04-13 PROCEDURE — 6370000000 HC RX 637 (ALT 250 FOR IP): Performed by: STUDENT IN AN ORGANIZED HEALTH CARE EDUCATION/TRAINING PROGRAM

## 2021-04-13 PROCEDURE — 6360000002 HC RX W HCPCS: Performed by: FAMILY MEDICINE

## 2021-04-13 PROCEDURE — 2580000003 HC RX 258: Performed by: STUDENT IN AN ORGANIZED HEALTH CARE EDUCATION/TRAINING PROGRAM

## 2021-04-13 PROCEDURE — 84100 ASSAY OF PHOSPHORUS: CPT

## 2021-04-13 PROCEDURE — 2500000003 HC RX 250 WO HCPCS: Performed by: STUDENT IN AN ORGANIZED HEALTH CARE EDUCATION/TRAINING PROGRAM

## 2021-04-13 PROCEDURE — 6360000002 HC RX W HCPCS: Performed by: STUDENT IN AN ORGANIZED HEALTH CARE EDUCATION/TRAINING PROGRAM

## 2021-04-13 PROCEDURE — 85027 COMPLETE CBC AUTOMATED: CPT

## 2021-04-13 PROCEDURE — 36415 COLL VENOUS BLD VENIPUNCTURE: CPT

## 2021-04-13 PROCEDURE — 2060000000 HC ICU INTERMEDIATE R&B

## 2021-04-13 PROCEDURE — 99232 SBSQ HOSP IP/OBS MODERATE 35: CPT | Performed by: INTERNAL MEDICINE

## 2021-04-13 PROCEDURE — 2580000003 HC RX 258: Performed by: FAMILY MEDICINE

## 2021-04-13 PROCEDURE — 84132 ASSAY OF SERUM POTASSIUM: CPT

## 2021-04-13 PROCEDURE — 36592 COLLECT BLOOD FROM PICC: CPT

## 2021-04-13 PROCEDURE — 84295 ASSAY OF SERUM SODIUM: CPT

## 2021-04-13 PROCEDURE — 6370000000 HC RX 637 (ALT 250 FOR IP): Performed by: INTERNAL MEDICINE

## 2021-04-13 PROCEDURE — 94760 N-INVAS EAR/PLS OXIMETRY 1: CPT

## 2021-04-13 PROCEDURE — 83735 ASSAY OF MAGNESIUM: CPT

## 2021-04-13 PROCEDURE — 80048 BASIC METABOLIC PNL TOTAL CA: CPT

## 2021-04-13 RX ADMIN — POTASSIUM & SODIUM PHOSPHATES POWDER PACK 280-160-250 MG 250 MG: 280-160-250 PACK at 19:38

## 2021-04-13 RX ADMIN — METOPROLOL TARTRATE 50 MG: 50 TABLET, FILM COATED ORAL at 19:38

## 2021-04-13 RX ADMIN — PANTOPRAZOLE SODIUM 40 MG: 40 TABLET, DELAYED RELEASE ORAL at 09:09

## 2021-04-13 RX ADMIN — PHENOBARBITAL SODIUM 130 MG: 65 INJECTION INTRAMUSCULAR at 01:20

## 2021-04-13 RX ADMIN — THIAMINE HYDROCHLORIDE 100 MG: 100 INJECTION, SOLUTION INTRAMUSCULAR; INTRAVENOUS at 00:56

## 2021-04-13 RX ADMIN — ENOXAPARIN SODIUM 40 MG: 40 INJECTION SUBCUTANEOUS at 09:11

## 2021-04-13 RX ADMIN — SODIUM CHLORIDE, PRESERVATIVE FREE 10 ML: 5 INJECTION INTRAVENOUS at 19:39

## 2021-04-13 RX ADMIN — FOLIC ACID 1 MG: 5 INJECTION, SOLUTION INTRAMUSCULAR; INTRAVENOUS; SUBCUTANEOUS at 11:51

## 2021-04-13 RX ADMIN — SODIUM CHLORIDE, PRESERVATIVE FREE 10 ML: 5 INJECTION INTRAVENOUS at 09:13

## 2021-04-13 RX ADMIN — THIAMINE HYDROCHLORIDE 500 MG: 100 INJECTION, SOLUTION INTRAMUSCULAR; INTRAVENOUS at 21:13

## 2021-04-13 ASSESSMENT — PAIN SCALES - GENERAL
PAINLEVEL_OUTOF10: 0

## 2021-04-13 NOTE — FLOWSHEET NOTE
21 0139   Provider Notification   Reason for Communication Evaluate   Provider Name dr. Benigno May    Provider Notification Physician   Method of Communication Secure Message   Response See orders   Notification Time 0139     This RN contacted physician regarding order to contact if patient urinates greater than 200mls per hour, patient urinated about 700 and then drank it the tech had said because patient thought it was a beer.  Also dextrose 5% order was , okay to stop or continue?     0141Can stop

## 2021-04-13 NOTE — PROGRESS NOTES
pattern. Liver US shows cirrhosis w/o ascites GB w/ sludge. Holding acetaminophen     COPD: Not in exacerbation, No O2 at baseline. ipratropium PRN. Albuterol held per tachycardia     GERD: PPI    HLD: statin held per transaminitis    Primary HTN: resumed w/ parameters    Anion gap acidosis (resolved): 2/2 to lactic acidosis in context of chronic alcoholism. Improved with IVF    Chronic euvolemic hypo-osmolar hyponatremia (resolved): Secondary to beer potomania. Asymptomatic on arrival. Given 750cc NS in ED s/p D5W and DDAVP 2 mcg. Fluid restrict 1200cc liquid daily. Serial BMP Q6H. Goal for 6-8mEq over 24H. Monitor I&O. Seizure precautions. Nephrology following   4/10/21: Adding Phos-NAK TID and monitor Na (nephrology also has pt on daily urea)   4/12/21: Na overcorrected to 139 this AM now on D5W drip  4/13/21: Na WNL >24 hrs stopped IV dextrose   4/14/21: D/C fluid restriction    Expected discharge date:  TBD    Disposition:    [x] Home       [] TCU       [] Rehab       [] Psych       [] SNF       [] [facility]       [] Other-    Chief Complaint: [address] Course:   Maricel Mancera is a 43-year-old male with a past medical history significant for COPD, depression, alcohol abuse, alcoholic cirrhosis, GERD, HTN who presents to [facility] on 4/8/2021 for acute alcohol intoxication. Patient was brought to the ED by his brother when he saw him in acute distress. Patient is well-known to staff members at [address] [place] [address] Dr had 5 encounters for acute alcohol withdrawal in the last year and a history of 2 ICU admissions for delirium tremens. Patient reports that he has been in and out of rehab a couple times in the past and currently lives with his mother. Patient reports that he usually drinks about 12-24 beers per day along with 2 bottles of hard liquor. Patient stated that his last drink was around 2:30 PM on the day of admission.   Patient reports 4-5 bouts of emesis prior to admission and has continued to drink to avoid withdrawal.  At the time of admission patient reported abdominal pain, headache, numbness and tingling in his right arm radiating to the elbow patient is unsure if he had a seizure. Patient reports that he has not had any food to eat for the past 2 weeks and has been to the ICU    Patient vitals in the ED on arrival were T 98.6 RR 17  /95 SPO2 96% on room air. Laboratory values were significant for sodium 115 chloride 79 bicarb 18 anion gap 18 lactic acid 5.4 calcium 7.8 phosphorus 1.4 and moderate transaminitis with ALT//458 bilirubin elevated 5.6 direct bilirubin 4.4 lipase 55.9. Chest x-ray demonstrates no acute process. Nursing staff was unable to obtain IV access overnight and a central line was placed for electrolyte replacement      Subjective (past 24 hours):   Patient seen and evaluated at bedside this morning. Complains of dry mouth. Patient answering questions appropriately this morning. Improved following administration of IV thiamine. Cerebellar motion testing deficits on exam. Last GMAWS 1     12 point ROS otherwise negative    Medications:  Reviewed    Infusion Medications    sodium chloride 25 mL (04/09/21 1813)     Scheduled Medications    potassium & sodium phosphates  1 packet Oral TID    thiamine (VITAMIN B1) IVPB  100 mg Intravenous Q24H    [Held by provider] lisinopril  20 mg Oral Daily    calcium replacement protocol   Other RX Placeholder    sodium chloride flush  5-40 mL Intravenous 2 times per day    enoxaparin  40 mg Subcutaneous Daily    folic acid  1 mg Intramuscular Daily    metoprolol tartrate  50 mg Oral BID    nicotine  1 patch Transdermal Daily    pantoprazole  40 mg Oral QAM AC     PRN Meds:  PHENobarbital **OR** PHENobarbital **OR** PHENobarbital IVPB, potassium chloride, magnesium sulfate, sodium chloride flush, sodium chloride, promethazine **OR** ondansetron, polyethylene glycol, [Held by provider] acetaminophen **OR** [Held by provider] acetaminophen, ipratropium      Intake/Output Summary (Last 24 hours) at 4/13/2021 1330  Last data filed at 4/13/2021 1257  Gross per 24 hour   Intake 2398.79 ml   Output 3500 ml   Net -1101.21 ml       Diet:  DIET GENERAL; Daily Fluid Restriction: 1200 ml  Dietary Nutrition Supplements: Diabetic Oral Supplement    Exam:  /82   Pulse 83   Temp 97.9 °F (36.6 °C) (Oral)   Resp 16   Ht 5' 11\" (1.803 m)   Wt 246 lb 6.4 oz (111.8 kg)   SpO2 93%   BMI 34.37 kg/m²     General appearance: Middle aged white male no acute distress  HEENT: Pupils equal, round, and reactive to light. Conjunctivae/corneas clear. Neck: Supple, with full range of motion. No jugular venous distention. Trachea midline. Respiratory:  Normal respiratory effort. Clear to auscultation, bilaterally without Rales/Rhonchi. Cardiovascular: Regular rate and rhythm with normal S1/S2 without murmurs, rubs or gallops. Abdomen: Soft, non-tender, non-distended with normal bowel sounds. Musculoskeletal: passive and active ROM x 4 extremities. Skin: Skin color, texture, turgor normal.  No rashes or lesions. Neurologic: No gross deficits. Patient unable to ambulate at this time due to ataxia. Cerebellar motion testing deficits noted  Psychiatric: Alert and oriented to person and place.  No memory of previous day  Capillary Refill: Brisk,< 3 seconds   Peripheral Pulses: +2 palpable, equal bilaterally       Labs:   Recent Labs     04/12/21  0600 04/13/21  0529   WBC 3.7* 3.5*   HGB 11.6* 11.3*   HCT 33.3* 33.1*   PLT 71* 76*     Recent Labs     04/11/21  0550 04/11/21  0550 04/12/21  0600 04/12/21  0600 04/12/21  1741 04/13/21  0030 04/13/21  0529   *   < > 138   < > 138 138 138   K 3.6  3.6  --  3.9  3.9  --   --   --  3.5   CL 97*  --  102  --   --   --  104   CO2 24  --  25  --   --   --  28   BUN 13  --  7  --   --   --  5*   CREATININE 0.7  --  0.6  --   --   --  0.7   CALCIUM 8.2*  --  8.4*  --   --   --  8.4*   PHOS 2.4  --  2.3*  --   --   --  3.5    < > = values in this interval not displayed. Recent Labs     04/11/21  0550 04/12/21  0600   * 225*   * 171*   BILITOT 7.7* 5.5*   ALKPHOS 226* 243*     No results for input(s): INR in the last 72 hours. No results for input(s): Marletta Clayton in the last 72 hours. Microbiology:      Urinalysis:      Lab Results   Component Value Date    NITRU NEGATIVE 04/08/2021    WBCUA NONE SEEN 04/08/2021    BACTERIA NONE SEEN 04/08/2021    RBCUA 0-2 04/08/2021    BLOODU SMALL 04/08/2021    SPECGRAV 1.008 04/08/2021    GLUCOSEU NEGATIVE 09/23/2020       Radiology:  XR CHEST PORTABLE   Final Result   Impression:      Right central line without acute infiltrates      This document has been electronically signed by: Maycol Julien MD on    04/09/2021 02:43 AM      US LIVER   Final Result   1. Findings of cirrhosis and possibly hepatic steatosis. Hepatopedal portal venous flow. 2. Sludge in the gallbladder. No calculi seen. **This report has been created using voice recognition software. It may contain minor errors which are inherent in voice recognition technology. **      Final report electronically signed by Dr. Estella Bradley on 4/9/2021 8:30 AM      [address] [employer]   Final Result   1. Findings of cirrhosis and possibly hepatic steatosis. Hepatopedal portal venous flow. 2. Sludge in the gallbladder. No calculi seen. **This report has been created using voice recognition software. It may contain minor errors which are inherent in voice recognition technology. **      Final report electronically signed by Dr. Estella Bradley on 4/9/2021 8:30 AM          DVT prophylaxis: [x] Lovenox                                 [] SCDs                                 [] SQ Heparin                                 [] Encourage ambulation           [] Already on Anticoagulation     Code Status: Full Code    PT/OT Eval Status: not indicated    Tele:   [x] yes             [] no    Active Hospital Problems    Diagnosis Date Noted    Lack of intravenous access [Z78.9]     Hyponatremia [E87.1] 04/08/2021    [facility] [K72.90] 12/14/2018       Electronically signed by Travis Zaldivar DO on 4/13/2021 at 1:30 PM

## 2021-04-13 NOTE — PROGRESS NOTES
Patient's mother Aren Ramirez called and wanted an update. Able to provide HIPPA code. Update given, no further questions at this time.

## 2021-04-13 NOTE — PROGRESS NOTES
[facility]   Nephrology progress note  4/13/2021, 9:43 AM      Pt Name:    Vj Riojas  MRN:     [id number]     YOB: 1975  Admit Date:    4/8/2021  6:00 PM  Primary Care Physician:  NILAM Ruffin CNP   Room number  4K-21/021-A    Chief Complaint: Nephrology following for hyponatremia  Subjective:  Patient seen and examined  Under one-to-one observation  Events noted overnight  Sleeping    Objective:  24HR INTAKE/OUTPUT:      Intake/Output Summary (Last 24 hours) at 4/13/2021 0943  Last data filed at 4/13/2021 0437  Gross per 24 hour   Intake 2278.79 ml   Output 3950 ml   Net -1671.21 ml     I/O last 3 completed shifts: In: 2478.8 [P.O.:1140; I.V.:1338.8]  Out: 4350 [Urine:4350]  No intake/output data recorded. Admission weight: 257 lb (116.6 kg)  Wt Readings from Last 3 Encounters:   04/13/21 246 lb 6.4 oz (111.8 kg)   03/29/21 257 lb (116.6 kg)   03/25/21 251 lb 3.2 oz (113.9 kg)     Body mass index is 34.37 kg/m².     Physical examination  VITALS:     Vitals:    04/13/21 0435 04/13/21 0437 04/13/21 0531 04/13/21 0858   BP: 108/63   (!) 104/55   Pulse: 86   81   Resp: 16   16   Temp:  98 °F (36.7 °C)  97.6 °F (36.4 °C)   TempSrc:  Axillary  Oral   SpO2: 91%   95%   Weight:   246 lb 6.4 oz (111.8 kg)    Height:         General Appearance: Comfortable, sleeping at this time  Lungs: no use of accessory muscles  GI: soft, non-tender, no guarding  Ext: No pitting edema visible    Lab Data  CBC:   Recent Labs     04/12/21  0600 04/13/21  0529   WBC 3.7* 3.5*   HGB 11.6* 11.3*   HCT 33.3* 33.1*   PLT 71* 76*     BMP:  Recent Labs     04/11/21  0550 04/11/21  0550 04/12/21  0600 04/12/21  0600 04/12/21  1741 04/13/21  0030 04/13/21  0529   *   < > 138   < > 138 138 138   K 3.6  3.6  --  3.9  3.9  --   --   --  3.5   CL 97*  --  102  --   --   --  104   CO2 24  --  25  --   --   --  28   BUN 13  --  7  --   --   --  5*   CREATININE 0.7  --  0.6  --   --   --  0.7 GLUCOSE 104  --  110*  --   --   --  96   CALCIUM 8.2*  --  8.4*  --   --   --  8.4*   MG 1.9  --  2.0  --   --   --  2.0   PHOS 2.4  --  2.3*  --   --   --  3.5    < > = values in this interval not displayed. Hepatic:   Recent Labs     04/11/21  0550 04/12/21  0600   LABALBU 2.9* 2.9*   * 225*   * 171*   BILITOT 7.7* 5.5*   ALKPHOS 226* 243*         Meds:  Infusion:    sodium chloride 25 mL (04/09/21 1813)     Meds:    potassium & sodium phosphates  1 packet Oral TID    thiamine (VITAMIN B1) IVPB  100 mg Intravenous Q24H    lisinopril  20 mg Oral Daily    calcium replacement protocol   Other RX Placeholder    sodium chloride flush  5-40 mL Intravenous 2 times per day    enoxaparin  40 mg Subcutaneous Daily    folic acid  1 mg Intramuscular Daily    metoprolol tartrate  50 mg Oral BID    nicotine  1 patch Transdermal Daily    pantoprazole  40 mg Oral QAM AC     Meds prn: PHENobarbital **OR** PHENobarbital **OR** PHENobarbital IVPB, potassium chloride, magnesium sulfate, sodium chloride flush, sodium chloride, promethazine **OR** ondansetron, polyethylene glycol, [Held by provider] acetaminophen **OR** [Held by provider] acetaminophen, ipratropium       Impression and Plan:  1. Chronic hyponatremia secondary to beer potomania+ hydrochlorothiazide+ liver dysfunction possibly cirrhosis. Overall sodium level has corrected appropriately  Sodium is stable at 138  Will monitor  Has poor oral intake  And now at risk of hypernatremia    2. Hypophosphatemia. Secondary to alcohol abuse. On Neutra-Phos packets, recheck phosphorus again in a.m.  3.  Transaminitis/alcoholic hepatitis  4. History of hypertension. Patient is on lisinopril, blood pressure in low 100s. Will stop lisinopril  5. Alcohol withdrawal  6.   Agitation and confusion    Discussed with ANOOP Jaimes MD  [facility]

## 2021-04-13 NOTE — PROGRESS NOTES
Utilize [facility] alcohol withdrawal scale (Based on [place] Modified Alcohol Withdrawal Scale). Tabulate score based on classifications including Tremor, Sweating, Hallucination, Orientation, and Agitation. Tremor: 0  Sweatin  Hallucinations: 0  Orientation: 0  Agitation: 0  Total Score: 0  Action perform as described below     Tremor:  No tremor is 0 points. Tremor on movement is 1 point. Tremor at rest is 2 points. Sweating: No Sweat 0 points. Moist is 1 point. Drenching sweats is 2 points. Hallucinations: No present 0 points. Dissuadable is 1 point. Not dissuadable is 2 points. Orientation: Oriented 0 points. Vague/detached 1 point. Disoriented/no contact 2 points. Agitation: Calm 0 points. Anxious 1 point. Panicky 2 points. Check scale every 2 hours. Discontinue scoring with 4 consecutive scorings of 0. Scale 0: No phenobarbital given. Re-assess every 60 minutes as needed. Scale 1-3: Phenobarbital 130 mg IV over 3 minutes. Re-assess every 60 minutes as needed. May administer every 60 minutes to a maximum dose of phenobarbital 1040 mg in 24 hours! Scale 4-8: Phenobarbital 260 mg IV over 5 minutes. Re-assess every 60 minutes as needed. May administer every 60 minutes to a maximum dose of phenobarbital 1040mg in 24 hours! Scale 9-10: Transfer to ICU (if not already in ICU). Administer 10mg/kg phenobarbital IV over 60 minutes. Maximum dose phenobarbital is 1040mg in 24 hours!

## 2021-04-13 NOTE — PROGRESS NOTES
Utilize [facility] alcohol withdrawal scale (Based on [place] Modified Alcohol Withdrawal Scale). Tabulate score based on classifications including Tremor, Sweating, Hallucination, Orientation, and Agitation. Tremor: 0  Sweatin  Hallucinations: 0  Orientation: 1  Agitation: 1  Total Score: 2  Action perform as described below     Tremor:  No tremor is 0 points. Tremor on movement is 1 point. Tremor at rest is 2 points. Sweating: No Sweat 0 points. Moist is 1 point. Drenching sweats is 2 points. Hallucinations: No present 0 points. Dissuadable is 1 point. Not dissuadable is 2 points. Orientation: Oriented 0 points. Vague/detached 1 point. Disoriented/no contact 2 points. Agitation: Calm 0 points. Anxious 1 point. Panicky 2 points. Check scale every 2 hours. Discontinue scoring with 4 consecutive scorings of 0. Scale 0: No phenobarbital given. Re-assess every 60 minutes as needed. Scale 1-3: Phenobarbital 130 mg IV over 3 minutes. Re-assess every 60 minutes as needed. May administer every 60 minutes to a maximum dose of phenobarbital 1040 mg in 24 hours! Scale 4-8: Phenobarbital 260 mg IV over 5 minutes. Re-assess every 60 minutes as needed. May administer every 60 minutes to a maximum dose of phenobarbital 1040mg in 24 hours! Scale 9-10: Transfer to ICU (if not already in ICU). Administer 10mg/kg phenobarbital IV over 60 minutes. Maximum dose phenobarbital is 1040mg in 24 hours!

## 2021-04-13 NOTE — PLAN OF CARE
Non-skid slippers on when ambulating. Side rails up x 2. Bed and chair alarm on. Call light within reach. Patient uses call light appropriately. Hourly rounding in place. Problem: Intellectual/Education/Knowledge Deficit  Goal: Teaching initiated upon admission  Outcome: Ongoing  Note: Patient unable to educate due to confusion. Problem: Discharge Planning:  Goal: Discharged to appropriate level of care  Description: Discharged to appropriate level of care  Outcome: Ongoing  Note: Patient is from home. Plans are to return home once medically stable. Problem: Sleep Pattern Disturbance:  Goal: Appears well-rested  Description: Appears well-rested  Outcome: Ongoing  Note: Patient slept very well per night shift. Continues to nap throughout the day. Problem: Skin Integrity:  Goal: Will show no infection signs and symptoms  Description: Will show no infection signs and symptoms  Outcome: Ongoing  Note: Patient has remained afebrile. Problem: Skin Integrity:  Goal: Absence of new skin breakdown  Description: Absence of new skin breakdown  Outcome: Ongoing  Note: No new skin breakdown noted. Problem: Infection - Central Venous Catheter-Associated Bloodstream Infection:  Goal: Will show no infection signs and symptoms  Description: Will show no infection signs and symptoms  Outcome: Ongoing  Note: Patient has remained afebrile. PICC site remains free of redness.

## 2021-04-13 NOTE — PROGRESS NOTES
Utilize [facility] alcohol withdrawal scale (Based on [place] Modified Alcohol Withdrawal Scale). Tabulate score based on classifications including Tremor, Sweating, Hallucination, Orientation, and Agitation. Tremor: 0  Sweatin  Hallucinations: 0  Orientation: 1  Agitation: 1  Total Score: 2  Action perform as described below     Tremor:  No tremor is 0 points. Tremor on movement is 1 point. Tremor at rest is 2 points. Sweating: No Sweat 0 points. Moist is 1 point. Drenching sweats is 2 points. Hallucinations: No present 0 points. Dissuadable is 1 point. Not dissuadable is 2 points. Orientation: Oriented 0 points. Vague/detached 1 point. Disoriented/no contact 2 points. Agitation: Calm 0 points. Anxious 1 point. Panicky 2 points. Check scale every 2 hours. Discontinue scoring with 4 consecutive scorings of 0. Scale 0: No phenobarbital given. Re-assess every 60 minutes as needed. Scale 1-3: Phenobarbital 130 mg IV over 3 minutes. Re-assess every 60 minutes as needed. May administer every 60 minutes to a maximum dose of phenobarbital 1040 mg in 24 hours! Scale 4-8: Phenobarbital 260 mg IV over 5 minutes. Re-assess every 60 minutes as needed. May administer every 60 minutes to a maximum dose of phenobarbital 1040mg in 24 hours! Scale 9-10: Transfer to ICU (if not already in ICU). Administer 10mg/kg phenobarbital IV over 60 minutes. Maximum dose phenobarbital is 1040mg in 24 hours!

## 2021-04-13 NOTE — CARE COORDINATION
4/13/21, 11:14 AM EDT    [address] [employer] day: 5  Location: [facility]21/021-A Reason for admit: Hyponatremia [E87.1]   Procedure:   4/9 Liver US; Liver Cirrhosis  4/9 CVC  Barriers to Discharge:   Hyponatremia w history COPD, DM, Alcoholic Liver Cirrhosis, Seizures, Smoker, Bipolar. PLT 76; monitor. Nephrology following. SR Alcohol Withdrawal Scale treated w Phenobarbital continuedPCP: NILAM Busby CNP  Readmission Risk Score: 32%  Patient Goals/Plan/Treatment Preferences:   lives w mother Jolynn Morton; await therapy recommendations re: patient unsure if he wants [facility]) detox treatment center (see AVS); [address] in past (see AVS);  Addiction Services following

## 2021-04-13 NOTE — PROGRESS NOTES
Dr. Katerine Aguero and Dr. Shayne Balderrama at bedside with ultrasound to find placement for a peripheral IV. Unsuccessful attempts. Orders to keep CVC for now until able to find placement.

## 2021-04-14 LAB
ALBUMIN SERPL-MCNC: 2.9 G/DL (ref 3.5–5.1)
ALP BLD-CCNC: 261 U/L (ref 38–126)
ALT SERPL-CCNC: 139 U/L (ref 11–66)
ANION GAP SERPL CALCULATED.3IONS-SCNC: 8 MEQ/L (ref 8–16)
AST SERPL-CCNC: 144 U/L (ref 5–40)
BILIRUB SERPL-MCNC: 3.1 MG/DL (ref 0.3–1.2)
BUN BLDV-MCNC: 6 MG/DL (ref 7–22)
CALCIUM SERPL-MCNC: 8.5 MG/DL (ref 8.5–10.5)
CHLORIDE BLD-SCNC: 102 MEQ/L (ref 98–111)
CO2: 27 MEQ/L (ref 23–33)
CREAT SERPL-MCNC: 0.6 MG/DL (ref 0.4–1.2)
ERYTHROCYTE [DISTWIDTH] IN BLOOD BY AUTOMATED COUNT: 16.8 % (ref 11.5–14.5)
ERYTHROCYTE [DISTWIDTH] IN BLOOD BY AUTOMATED COUNT: 57 FL (ref 35–45)
GFR SERPL CREATININE-BSD FRML MDRD: > 90 ML/MIN/1.73M2
GLUCOSE BLD-MCNC: 113 MG/DL (ref 70–108)
HAPTOGLOBIN: 38 MG/DL (ref 30–200)
HCT VFR BLD CALC: 34.8 % (ref 42–52)
HEMOGLOBIN: 11.6 GM/DL (ref 14–18)
MAGNESIUM: 1.9 MG/DL (ref 1.6–2.4)
MCH RBC QN AUTO: 32 PG (ref 26–33)
MCHC RBC AUTO-ENTMCNC: 33.3 GM/DL (ref 32.2–35.5)
MCV RBC AUTO: 96.1 FL (ref 80–94)
PHOSPHORUS: 3.4 MG/DL (ref 2.4–4.7)
PLATELET # BLD: 91 THOU/MM3 (ref 130–400)
PMV BLD AUTO: 10.7 FL (ref 9.4–12.4)
POTASSIUM SERPL-SCNC: 3.9 MEQ/L (ref 3.5–5.2)
RBC # BLD: 3.62 MILL/MM3 (ref 4.7–6.1)
SCAN OF BLOOD SMEAR: NORMAL
SODIUM BLD-SCNC: 136 MEQ/L (ref 135–145)
SODIUM BLD-SCNC: 137 MEQ/L (ref 135–145)
SODIUM BLD-SCNC: 137 MEQ/L (ref 135–145)
TOTAL PROTEIN: 5.4 G/DL (ref 6.1–8)
WBC # BLD: 3.4 THOU/MM3 (ref 4.8–10.8)

## 2021-04-14 PROCEDURE — 83735 ASSAY OF MAGNESIUM: CPT

## 2021-04-14 PROCEDURE — 2580000003 HC RX 258: Performed by: STUDENT IN AN ORGANIZED HEALTH CARE EDUCATION/TRAINING PROGRAM

## 2021-04-14 PROCEDURE — 97530 THERAPEUTIC ACTIVITIES: CPT

## 2021-04-14 PROCEDURE — 6360000002 HC RX W HCPCS: Performed by: STUDENT IN AN ORGANIZED HEALTH CARE EDUCATION/TRAINING PROGRAM

## 2021-04-14 PROCEDURE — 85027 COMPLETE CBC AUTOMATED: CPT

## 2021-04-14 PROCEDURE — 84100 ASSAY OF PHOSPHORUS: CPT

## 2021-04-14 PROCEDURE — 80053 COMPREHEN METABOLIC PANEL: CPT

## 2021-04-14 PROCEDURE — 6370000000 HC RX 637 (ALT 250 FOR IP): Performed by: INTERNAL MEDICINE

## 2021-04-14 PROCEDURE — 99232 SBSQ HOSP IP/OBS MODERATE 35: CPT | Performed by: INTERNAL MEDICINE

## 2021-04-14 PROCEDURE — 97162 PT EVAL MOD COMPLEX 30 MIN: CPT

## 2021-04-14 PROCEDURE — 1200000003 HC TELEMETRY R&B

## 2021-04-14 PROCEDURE — 2500000003 HC RX 250 WO HCPCS: Performed by: STUDENT IN AN ORGANIZED HEALTH CARE EDUCATION/TRAINING PROGRAM

## 2021-04-14 PROCEDURE — 36415 COLL VENOUS BLD VENIPUNCTURE: CPT

## 2021-04-14 PROCEDURE — 84295 ASSAY OF SERUM SODIUM: CPT

## 2021-04-14 PROCEDURE — 6370000000 HC RX 637 (ALT 250 FOR IP): Performed by: STUDENT IN AN ORGANIZED HEALTH CARE EDUCATION/TRAINING PROGRAM

## 2021-04-14 PROCEDURE — 97166 OT EVAL MOD COMPLEX 45 MIN: CPT

## 2021-04-14 RX ADMIN — THIAMINE HYDROCHLORIDE 500 MG: 100 INJECTION, SOLUTION INTRAMUSCULAR; INTRAVENOUS at 11:23

## 2021-04-14 RX ADMIN — PANTOPRAZOLE SODIUM 40 MG: 40 TABLET, DELAYED RELEASE ORAL at 05:13

## 2021-04-14 RX ADMIN — POTASSIUM & SODIUM PHOSPHATES POWDER PACK 280-160-250 MG 250 MG: 280-160-250 PACK at 08:19

## 2021-04-14 RX ADMIN — SODIUM CHLORIDE, PRESERVATIVE FREE 10 ML: 5 INJECTION INTRAVENOUS at 08:20

## 2021-04-14 RX ADMIN — ENOXAPARIN SODIUM 40 MG: 40 INJECTION SUBCUTANEOUS at 08:19

## 2021-04-14 RX ADMIN — METOPROLOL TARTRATE 50 MG: 50 TABLET, FILM COATED ORAL at 20:57

## 2021-04-14 RX ADMIN — FOLIC ACID 1 MG: 5 INJECTION, SOLUTION INTRAMUSCULAR; INTRAVENOUS; SUBCUTANEOUS at 08:34

## 2021-04-14 RX ADMIN — THIAMINE HYDROCHLORIDE 500 MG: 100 INJECTION, SOLUTION INTRAMUSCULAR; INTRAVENOUS at 03:38

## 2021-04-14 RX ADMIN — THIAMINE HYDROCHLORIDE 500 MG: 100 INJECTION, SOLUTION INTRAMUSCULAR; INTRAVENOUS at 20:57

## 2021-04-14 RX ADMIN — SODIUM CHLORIDE, PRESERVATIVE FREE 10 ML: 5 INJECTION INTRAVENOUS at 20:57

## 2021-04-14 RX ADMIN — METOPROLOL TARTRATE 50 MG: 50 TABLET, FILM COATED ORAL at 08:19

## 2021-04-14 NOTE — CARE COORDINATION
Update: Full LTACH eval for rehab, IV Thiamine day 2/8 (precert), await reply; collaborated w Attending, Jose Antonio Hung, [address]  Electronically signed by Amalia Baldwin RN on 4/14/2021 at 8:38 AM

## 2021-04-14 NOTE — CARE COORDINATION
4/14/21, 10:52 AM EDT    DISCHARGE ON [place] day: 6  Location: [facility]21/021A Reason for admit: Hyponatremia [E87.1]   Procedure:   4/9 Liver US; Liver Cirrhosis  4/9 CVC  Barriers to Discharge:   Hyponatremia/Wernicke's Encephalopathy w history COPD, DM, Alcoholic Liver Cirrhosis, Seizures, Smoker, Bipolar. Elevated LFT's; monitor. Nephrology following. Client treated with SR Alcohol Withdrawal Scale; day 2/5 for IV Thiamine; continue to monitor  PCP: NILAM De Leon CNP  Readmission Risk Score: 28%  Patient Goals/Plan/Treatment Preferences:   lives w mother Primus Ao; sitter in room but plans to be down-graded to tele-sitter today; does not meet LTACH criteria; await therapy recommendations; patient considering [facility] detox treatment center (see AVS); [address] in past (see AVS);  Addiction Services has provided OP information in recent past; collaborated w Bruce Knott

## 2021-04-14 NOTE — PROGRESS NOTES
Hospitalist Progress Note    Patient:  [facility]      Unit/Bed:4K-21/021-A    YOB: 1975    MRN: [id number]       Acct: [de-identified]     PCP: NILAM Batse CNP    Date of Admission: 4/8/2021    Assessment/Plan:  Pancytopenia: Suspect related to cirrhosis and malnutrition. Thrombocytopenia, leukopenia, anemia noted on CBC on 4/12/21. Differential includes splenic sequestration vs dilutional (s/p D5W boluse). HIT unlikely (4Ts score 2). Initial concern for hemolysis given high LDH and elevated bilirubin, however no schistocytes present on smear and bili down-trending. Elevated reticulocytes, haptoglobin still pending. Trend CBC    Chronic EtOH abuse: Patient offered choice between continued alcohol on admission and undergoing withdrawal.  Discussed R/B/A. Patient chooses to withdraw at this time. PAWSS 9. Started prophylactic phenobarbital protocol with loading dose of phenobarbital given on 4/9/21. Added CIWA w/ PRN Ativan given previous hx of complicated withdraw  7/83: Requiring bedside sitter this AM as patient combative overnight receiving 2x 4mg Ativan overnight. CIWA d/c per night team in favor of PRN phenobarb GMAWS  4/13: Patient mistook full urinal for EtOH last night and drank a large portion of contents before being stopped by staff members. Will continue PRN phenobarb. Continued sitter at bedside  4/14: Started on empiric therapy for Wernicke encephalopathy at 500 mg thiamine every 8 hours. Will receive total 3 days therapy to be followed by 5 days of 250 mg thiamine every 8 hours for a total of 8 days therapy. Refeeding Syndrome: 2/2 malnutrition in context of chronic EtOH. Replace K, Mg, phosphorous, Ca per protocol. Monitor electrolytes   4/10/21: on TID Phos-NaK and monitoring     Transaminitis: Suspect chronic related to alcoholic liver disease. ALT//458 in hepatocellular pattern. Liver US shows cirrhosis w/o ascites GB w/ sludge.  Holding acetaminophen     COPD: Not in exacerbation, No O2 at baseline. ipratropium PRN. Albuterol held per tachycardia     GERD: PPI    HLD: statin held per transaminitis    Primary HTN: resumed w/ parameters    Anion gap acidosis (resolved): 2/2 to lactic acidosis in context of chronic alcoholism. Improved with IVF    Chronic euvolemic hypo-osmolar hyponatremia (resolved): Secondary to beer potomania. Asymptomatic on arrival. Given 750cc NS in ED s/p D5W and DDAVP 2 mcg. Fluid restrict 1200cc liquid daily. Serial BMP Q6H. Goal for 6-8mEq over 24H. Monitor I&O. Seizure precautions. Nephrology following   4/10/21: Adding Phos-NAK TID and monitor Na (nephrology also has pt on daily urea)   4/12/21: Na overcorrected to 139 this AM now on D5W drip  4/13/21: Na WNL >24 hrs stopped IV dextrose   4/14/21: Restarted normal diet     Expected discharge date:  TBD    Disposition: Discussed possible short stay at [address] [facility] [employer] for completion of IV thiamine for Wernicke encephalopathy. Patient plans for rehabilitation for alcohol dependence as outpatient. Chief Complaint: [address] Course:   Melchor Rodas is a 81-year-old male with a past medical history significant for COPD, depression, alcohol abuse, alcoholic cirrhosis, GERD, HTN who presents to [facility] on 4/8/2021 for acute alcohol intoxication. Patient was brought to the ED by his brother when he saw him in acute distress. Patient is well-known to staff members at [address] [place] [address] Dr had 5 encounters for acute alcohol withdrawal in the last year and a history of 2 ICU admissions for delirium tremens. Patient reports that he has been in and out of rehab a couple times in the past and currently lives with his mother. Patient reports that he usually drinks about 12-24 beers per day along with 2 bottles of hard liquor. Patient stated that his last drink was around 2:30 PM on the day of admission.   Patient reports 4-5 bouts of emesis prior to admission and has continued to drink to avoid withdrawal.  At the time of admission patient reported abdominal pain, headache, numbness and tingling in his right arm radiating to the elbow patient is unsure if he had a seizure. Patient reports that he has not had any food to eat for the past 2 weeks and has been to the ICU    Patient vitals in the ED on arrival were T 98.6 RR 17  /95 SPO2 96% on room air. Laboratory values were significant for sodium 115 chloride 79 bicarb 18 anion gap 18 lactic acid 5.4 calcium 7.8 phosphorus 1.4 and moderate transaminitis with ALT//458 bilirubin elevated 5.6 direct bilirubin 4.4 lipase 55.9. Chest x-ray demonstrates no acute process. Nursing staff was unable to obtain IV access overnight and a central line was placed for electrolyte replacement      Subjective (past 24 hours):   Patient seen and evaluated at bedside this morning. Patient alert and oriented to person and place. Has no recollection of yesterday. Answering questions appropriately at bedside following commands with significant clinical improvement on IV thiamine. We will continue current therapy with 500 mg every 8 hours for 3 days to be followed by 250 mg IV thiamine every 8 hours for 5 days. Plan for transfer off unit as patient no longer meets criteria for stepdown. Discussed possibility of transfer to [place] with case management for short-term therapy.   Pre-CERT pending      Medications:  Reviewed    Infusion Medications    sodium chloride 25 mL (04/09/21 1813)     Scheduled Medications    thiamine (VITAMIN B1) IVPB  500 mg Intravenous Q8H    potassium & sodium phosphates  1 packet Oral TID    [Held by provider] lisinopril  20 mg Oral Daily    calcium replacement protocol   Other RX Placeholder    sodium chloride flush  5-40 mL Intravenous 2 times per day    enoxaparin  40 mg Subcutaneous Daily    folic acid  1 mg Intramuscular Daily    metoprolol tartrate  50 mg Oral BID  nicotine  1 patch Transdermal Daily    pantoprazole  40 mg Oral QAM AC     PRN Meds: PHENobarbital **OR** PHENobarbital **OR** PHENobarbital IVPB, potassium chloride, magnesium sulfate, sodium chloride flush, sodium chloride, promethazine **OR** ondansetron, polyethylene glycol, [Held by provider] acetaminophen **OR** [Held by provider] acetaminophen, ipratropium      Intake/Output Summary (Last 24 hours) at 4/14/2021 0704  Last data filed at 4/14/2021 0418  Gross per 24 hour   Intake 1208.04 ml   Output 1250 ml   Net -41.96 ml       Diet:  DIET GENERAL; Daily Fluid Restriction: 1200 ml  Dietary Nutrition Supplements: Diabetic Oral Supplement    Exam:  /63   Pulse 89   Temp 98 °F (36.7 °C) (Oral)   Resp 17   Ht 5' 11\" (1.803 m)   Wt 246 lb 4.8 oz (111.7 kg)   SpO2 96%   BMI 34.35 kg/m²     General appearance: Middle aged white male no acute distress  HEENT: Pupils equal, round, and reactive to light. Conjunctivae/corneas clear. Neck: Supple, with full range of motion. No jugular venous distention. Trachea midline. Respiratory:  Normal respiratory effort. Clear to auscultation, bilaterally without Rales/Rhonchi. Cardiovascular: Regular rate and rhythm with normal S1/S2 without murmurs, rubs or gallops. Abdomen: Soft, non-tender, non-distended with normal bowel sounds. Musculoskeletal: passive and active ROM x 4 extremities. Skin: Skin color, texture, turgor normal.  No rashes or lesions. Neurologic: Deficit and cerebellar motion testing noted. No no gross deficits  Psychiatric: Patient alert and oriented to person and place. No memory of yesterday. Answers questions appropriately and follows commands.   Capillary Refill: Brisk,< 3 seconds   Peripheral Pulses: +2 palpable, equal bilaterally       Labs:   Recent Labs     04/12/21  0600 04/13/21  0529   WBC 3.7* 3.5*   HGB 11.6* 11.3*   HCT 33.3* 33.1*   PLT 71* 76*     Recent Labs     04/12/21  0600 04/12/21  0600 04/13/21  0529 04/13/21  1819 04/14/21  0010 04/14/21  0444      < > 138 137 136 137   K 3.9  3.9  --  3.5 3.9  --  3.9     --  104  --   --  102   CO2 25  --  28  --   --  27   BUN 7  --  5*  --   --  6*   CREATININE 0.6  --  0.7  --   --  0.6   CALCIUM 8.4*  --  8.4*  --   --  8.5   PHOS 2.3*  --  3.5  --   --  3.4    < > = values in this interval not displayed. Recent Labs     04/12/21  0600 04/14/21  0444   * 144*   * 139*   BILITOT 5.5* 3.1*   ALKPHOS 243* 261*     No results for input(s): INR in the last 72 hours. No results for input(s): Steph Anchors in the last 72 hours. Microbiology:      Urinalysis:      Lab Results   Component Value Date    NITRU NEGATIVE 04/08/2021    WBCUA NONE SEEN 04/08/2021    BACTERIA NONE SEEN 04/08/2021    RBCUA 0-2 04/08/2021    BLOODU SMALL 04/08/2021    SPECGRAV 1.008 04/08/2021    GLUCOSEU NEGATIVE 09/23/2020       Radiology:  XR CHEST PORTABLE   Final Result   Impression:      Right central line without acute infiltrates      This document has been electronically signed by: Aviva Balderas MD on    04/09/2021 02:43 AM      US LIVER   Final Result   1. Findings of cirrhosis and possibly hepatic steatosis. Hepatopedal portal venous flow. 2. Sludge in the gallbladder. No calculi seen. **This report has been created using voice recognition software. It may contain minor errors which are inherent in voice recognition technology. **      Final report electronically signed by Dr. Gaby Nunez on 4/9/2021 8:30 AM      [address]   Final Result   1. Findings of cirrhosis and possibly hepatic steatosis. Hepatopedal portal venous flow. 2. Sludge in the gallbladder. No calculi seen. **This report has been created using voice recognition software. It may contain minor errors which are inherent in voice recognition technology. **      Final report electronically signed by Dr. Gaby Nunez on 4/9/2021 8:30 AM          DVT prophylaxis: [x] Lovenox                                 [] SCDs                                 [] SQ Heparin                                 [] Encourage ambulation           [] Already on Anticoagulation     Code Status: Full Code    PT/OT Eval Status: not indicated    Tele:   [x] yes             [] no    Active Hospital Problems    Diagnosis Date Noted    Lack of intravenous access [Z78.9]     Hyponatremia [E87.1] 04/08/2021    Liver failure without hepatic coma ([address] [K72.90] 12/14/2018       Electronically signed by Kelsea Paula DO on 4/14/2021 at 7:04 AM

## 2021-04-14 NOTE — PROGRESS NOTES
[facility]   Nephrology progress note  4/14/2021, 9:33 AM      Pt Name:    Jacinta Snow  MRN:     [id number]     YOB: 1975  Admit Date:    4/8/2021  6:00 PM  Primary Care Physician:  NILAM Wilkins CNP   Room number  4K-21/021-A    Chief Complaint: Nephrology following for hyponatremia  Subjective:  Patient seen and examined  No chest pain  No shortness of breath  Awake and alert today    Objective:  24HR INTAKE/OUTPUT:      Intake/Output Summary (Last 24 hours) at 4/14/2021 0933  Last data filed at 4/14/2021 0830  Gross per 24 hour   Intake 1448.04 ml   Output 1250 ml   Net 198.04 ml     I/O last 3 completed shifts: In: 2841 [P.O.:970; I.V.:238]  Out: 1250 [Urine:1250]  I/O this shift:  In: 240 [P.O.:240]  Out: -   Admission weight: 257 lb (116.6 kg)  Wt Readings from Last 3 Encounters:   04/14/21 246 lb 4.8 oz (111.7 kg)   03/29/21 257 lb (116.6 kg)   03/25/21 251 lb 3.2 oz (113.9 kg)     Body mass index is 34.35 kg/m².     Physical examination  VITALS:     Vitals:    04/13/21 1912 04/13/21 2313 04/14/21 0324 04/14/21 0813   BP: 137/79 137/82 107/63 132/81   Pulse: 99 95 89 87   Resp: 17 18 17 18   Temp: 97.8 °F (36.6 °C) 98.1 °F (36.7 °C) 98 °F (36.7 °C) 97.8 °F (36.6 °C)   TempSrc: Oral Oral Oral Oral   SpO2: 97% 96% 96% 96%   Weight:   246 lb 4.8 oz (111.7 kg)    Height:         General Appearance: Comfortable, awake, alert and oriented  Lungs: no use of accessory muscles  GI: soft, non-tender, no guarding  Ext: No pitting edema visible  Oral: Dry oral mucous membranes    Lab Data  CBC:   Recent Labs     04/12/21  0600 04/13/21  0529   WBC 3.7* 3.5*   HGB 11.6* 11.3*   HCT 33.3* 33.1*   PLT 71* 76*     BMP:  Recent Labs     04/12/21  0600 04/12/21  0600 04/13/21  0529 04/13/21  1819 04/14/21  0010 04/14/21  0444      < > 138 137 136 137   K 3.9  3.9  --  3.5 3.9  --  3.9     --  104  --   --  102   CO2 25  --  28  --   --  27   BUN 7  --  5*  --   -- 6*   CREATININE 0.6  --  0.7  --   --  0.6   GLUCOSE 110*  --  96  --   --  113*   CALCIUM 8.4*  --  8.4*  --   --  8.5   MG 2.0  --  2.0  --   --  1.9   PHOS 2.3*  --  3.5  --   --  3.4    < > = values in this interval not displayed. Hepatic:   Recent Labs     04/12/21  0600 04/14/21  0444   LABALBU 2.9* 2.9*   * 144*   * 139*   BILITOT 5.5* 3.1*   ALKPHOS 243* 261*         Meds:  Infusion:    sodium chloride 25 mL (04/09/21 1813)     Meds:    thiamine (VITAMIN B1) IVPB  500 mg Intravenous Q8H    potassium & sodium phosphates  1 packet Oral TID    [Held by provider] lisinopril  20 mg Oral Daily    calcium replacement protocol   Other RX Placeholder    sodium chloride flush  5-40 mL Intravenous 2 times per day    enoxaparin  40 mg Subcutaneous Daily    folic acid  1 mg Intramuscular Daily    metoprolol tartrate  50 mg Oral BID    nicotine  1 patch Transdermal Daily    pantoprazole  40 mg Oral QAM AC     Meds prn: PHENobarbital **OR** PHENobarbital **OR** PHENobarbital IVPB, potassium chloride, magnesium sulfate, sodium chloride flush, sodium chloride, promethazine **OR** ondansetron, polyethylene glycol, [Held by provider] acetaminophen **OR** [Held by provider] acetaminophen, ipratropium       Impression and Plan:  1. Chronic hyponatremia secondary to beer potomania+ hydrochlorothiazide+ liver dysfunction possibly cirrhosis. Resolved. Sodium is stable. We will remove fluid restriction and monitor sodium at this time  Discussed with patient    2. Hypophosphatemia. Secondary to alcohol abuse and poor nutritional intake. Will stop Neutra-Phos packets and recheck phosphorus in a.m.  3.  Transaminitis/alcoholic hepatitis  4. History of hypertension. Patient is on lisinopril, blood pressure in low 100s. Continue to hold lisinopril  5. Alcohol withdrawal: Clinically improving  6.   Agitation and confusion: Improved/better    Discussed with ANOOP Barreto MD  [facility]

## 2021-04-14 NOTE — PLAN OF CARE
Problem: Pain Control  Goal: Maintain pain level at or below patient's acceptable level (or 5 if patient is unable to determine acceptable level)  4/13/2021 2237 by Melany Vance RN  Outcome: Ongoing  Flowsheets (Taken 4/13/2021 2234)  Patient's Stated Pain Goal: No pain  Note: Patient denies pain at this time     Problem: Neurological  Goal: Maximum potential motor/sensory/cognitive function  4/13/2021 2237 by Melany Vance RN  Outcome: Ongoing  Note: Patient alert to person, time and situation     Problem: Cardiovascular  Goal: No DVT, peripheral vascular complications  9/33/8930 2237 by Melany Vance RN  Outcome: Ongoing  Note: No signs of DVT     Problem: Respiratory  Goal: O2 Sat > 90%  4/13/2021 2237 by Melany Vance RN  Outcome: Ongoing  Note: Patient on room air spo2 greater than 90%     Problem:   Goal: Adequate urinary output  4/13/2021 2237 by Melany Vance RN  Outcome: Ongoing  Note: Patient urinating adequately      Problem: Skin Integrity/Risk  Goal: No skin breakdown during hospitalization  4/13/2021 2237 by Melany Vance RN  Outcome: Ongoing  Note: No new skin issues this shift      Problem: Musculor/Skeletal Functional Status  Goal: Absence of falls  4/13/2021 2237 by Melany Vance RN  Outcome: Ongoing  Note: No falls this shift      Problem: Discharge Planning:  Goal: Discharged to appropriate level of care  Description: Discharged to appropriate level of care  4/13/2021 2237 by Melany Vance RN  Outcome: Ongoing  Note: Plan to go home with mother.      Problem: Skin Integrity:  Goal: Will show no infection signs and symptoms  Description: Will show no infection signs and symptoms  4/13/2021 2237 by Melany Vance RN  Outcome: Ongoing  Note: No signs of infection      Problem: Skin Integrity:  Goal: Absence of new skin breakdown  Description: Absence of new skin breakdown  4/13/2021 2237 by Melany Vance RN  Outcome: Ongoing  Note: No new skin issues this shift      Problem: Infection - Central Venous Catheter-Associated Bloodstream Infection:  Goal: Will show no infection signs and symptoms  Description: Will show no infection signs and symptoms  4/13/2021 2237 by Jarrett Gilliam RN  Outcome: Ongoing  Note: No signs of infection    Care plan reviewed with patient. Patient  verbalize understanding of the plan of care and contribute to goal setting.

## 2021-04-14 NOTE — PROGRESS NOTES
Crutches(old pair crutches if ever needed)   Bathroom Shower/Tub: Tub/Shower unit  Bathroom Toilet: Handicap height  Bathroom Accessibility: Accessible     ADL Assistance: Independent  Homemaking Assistance: Independent  Homemaking Responsibilities: Yes  Ambulation Assistance: Independent  Transfer Assistance: Independent    Active : No  Occupation: Unemployed     VISION:Corrected    HEARING:  WFL    COGNITION: Slow Processing, Decreased Insight, Decreased Problem Solving and Decreased Safety Awareness    RANGE OF MOTION:  Bilateral Upper Extremity:  WNL    STRENGTH:  Bilateral Upper Extremity:  WFL    SENSATION:   WFL    ADL:   Grooming: Contact Guard Assistance and with increased time for completion. to complete hand hygiene. Pt required mod vcs for sequencing hand hygiene. Pt grabbed a towel and drenched it with water. When asked if he uses a wet towel at home pt reports no. Lower Extremity Dressing: Stand By Assistance and with set-up. to don B shoes and B socks. BALANCE:  Sitting Balance:  Stand By Assistance. Standing Balance: Contact Guard Assistance, Minimal Assistance, with verbal cues , with increased time for completion. BED MOBILITY:  Supine to Sit: Stand By Assistance    Sit to Supine: Stand By Assistance    Scooting: Stand By Assistance      TRANSFERS:  Sit to Stand:  [employer], with increased time for completion, cues for hand placement. Stand to Sit: [employer], with increased time for completion, cues for hand placement. FUNCTIONAL MOBILITY:  Assistive Device: Rolling Walker  Assist Level:  Minimal Assistance. Distance: To and from bathroom and ~100 feet  Slow pace, unsteady, 2 LOB requiring min A, ataxic. Activity Tolerance:  Patient tolerance of  treatment: good. Assessment:  Assessment: This 40year old male presents with hyponatremia. Pt is detoxing at this time.  Pt requires skilled OT intervention to increase indep and safety with all self cares, transfers, mobility, and IADLs to decrease fall risk and return to PLOF. Performance deficits / Impairments: Decreased functional mobility , Decreased ADL status, Decreased safe awareness, Decreased endurance, Decreased balance, Decreased high-level IADLs, Decreased strength  Prognosis: Good  REQUIRES OT FOLLOW UP: Yes    Treatment Initiated: Treatment and education initiated within context of evaluation. Evaluation time included review of current medical information, gathering information related to past medical, social and functional history, completion of standardized testing, formal and informal observation of tasks, assessment of data and development of plan of care and goals. Treatment time included skilled education and facilitation of tasks to increase safety and independence with ADL's for improved functional independence and quality of life. Discharge Recommendations:  Continue to assess pending progress, Home with Home health OT    Patient Education:  OT Education: OT Role, Plan of Care, ADL Adaptive Strategies, Transfer Training    Equipment Recommendations:  Equipment Needed: No    Plan:  Times per week: 5x  Current Treatment Recommendations: Strengthening, Balance Training, Functional Mobility Training, Endurance Training, Safety Education & Training, Self-Care / ADL, Patient/Caregiver Education & Training, Equipment Evaluation, Education, & procurement, Home Management Training. See long-term goal time frame for expected duration of plan of care. If no long-term goals established, a short length of stay is anticipated. Goals:  Patient goals : Go Home with mom  Short term goals  Time Frame for Short term goals: Until discharge  Short term goal 1: Pt will complete BUE strengthening exercises with min vcs for technique to increase indep and safety with all self cares.   Short term goal 2: Pt will complete standing tolerance x 4 minutes with 1 UE release and SBA to increase indep and endurance with all sinkside grooming. Short term goal 3: Pt will complete functional mobility to/from BR and HH distances with SBA and min vcs for safety to increase indep with self cares. Short term goal 4: Pt will complete BADL routine with SBA and min vcs fr safety to increase indep and endurance in home environment. Following session, patient left in safe position with all fall risk precautions in place.

## 2021-04-14 NOTE — PROGRESS NOTES
Stairs - Number of Steps: 2-3 steps  Home Equipment: Crutches(old pair crutches if ever needed)                   Ambulation Assistance: Independent  Transfer Assistance: Independent    Active : No          OBJECTIVE:  Range of Motion:  Bilateral Lower Extremity: WFL    Strength:  Bilateral Lower Extremity: grossly 4-/5    Balance:  Static Sitting Balance:  Supervision  Dynamic Sitting Balance: Supervision  Static Standing Balance: Stand By Assistance  Dynamic Standing Balance: Contact Guard Assistance    Bed Mobility:  Supine to Sit: Supervision, with head of bed raised, and use of bedrail    Transfers:  Sit to Stand: Stand By Assistance  Stand to Sit:Stand By Assistance    Ambulation:  Contact Guard Assistance  Distance: 15 ft and 240 ft  Surface: Level Tile  Device:holding onto IV pole   Gait Deviations:  Decreased Step Length Bilaterally, Decreased Gait Speed and Mild Path Deviations    Exercise:  Patient was guided in 1 set(s) 10 reps of exercise to both lower extremities. Ankle pumps, Seated marches and Long arc quads. Exercises were completed for increased independence with functional mobility. Functional Outcome Measures: Completed  AM-PAC Inpatient Mobility Raw Score : 19  AM-PAC Inpatient T-Scale Score : 45.44    ASSESSMENT:  Activity Tolerance:  Patient tolerance of  treatment: good. Treatment Initiated: Treatment and education initiated within context of evaluation. Evaluation time included review of current medical information, gathering information related to past medical, social and functional history, completion of standardized testing, formal and informal observation of tasks, assessment of data and development of plan of care and goals. Treatment time included skilled education and facilitation of tasks to increase safety and independence with functional mobility for improved independence and quality of life. Assessment:   Body structures, Functions, Activity limitations: Decreased functional mobility , Decreased endurance, Decreased balance, Decreased strength  Assessment: Pt is a 41 yo male that is deconditioned from lengthy hospital admission with decreased mobility. Pt would benefit from continued skilled PT to address strengthening, balance, endurance building, and functional mobility. Prognosis: Good    REQUIRES PT FOLLOW UP: Yes    Discharge Recommendations:  Discharge Recommendations: Continue to assess pending progress, Home with assist PRN, Patient would benefit from continued therapy after discharge    Patient Education:  PT Education: Goals, PT Role, Plan of Care, Home Exercise Program    Equipment Recommendations: Other: monitor for needs    Plan:  Times per week: 5x GM  Current Treatment Recommendations: Strengthening, Gait Training, Stair training, Balance Training, Functional Mobility Training, Endurance Training, Transfer Training, Safety Education & Training, Home Exercise Program, Patient/Caregiver Education & Training, Equipment Evaluation, Education, & procurement    Goals:  Patient goals : go home  Short term goals  Time Frame for Short term goals: at discharge  Short term goal 1: Pt to be Mod I for supine <> sit from flat bed without rail to get in/out of bed  Short term goal 2: Pt to be Mod I for sit <> stand to get up to ambulate  Short term goal 3: Pt to ambulate > 280 ft with no AD with Supervision for household and community distances  Short term goal 4: Pt to negotiate 2-3 steps with no rail with Supervision for home access  Long term goals  Time Frame for Long term goals : not set due to short ELOS    Following session, patient left in safe position with all fall risk precautions in place. Tosin Liu.  Jimmy [address] [place] [address]

## 2021-04-15 LAB
ANION GAP SERPL CALCULATED.3IONS-SCNC: 10 MEQ/L (ref 8–16)
BUN BLDV-MCNC: 7 MG/DL (ref 7–22)
CALCIUM SERPL-MCNC: 8.4 MG/DL (ref 8.5–10.5)
CHLORIDE BLD-SCNC: 104 MEQ/L (ref 98–111)
CO2: 26 MEQ/L (ref 23–33)
CREAT SERPL-MCNC: 0.8 MG/DL (ref 0.4–1.2)
GFR SERPL CREATININE-BSD FRML MDRD: > 90 ML/MIN/1.73M2
GLUCOSE BLD-MCNC: 96 MG/DL (ref 70–108)
MAGNESIUM: 1.9 MG/DL (ref 1.6–2.4)
PHOSPHORUS: 3.5 MG/DL (ref 2.4–4.7)
POTASSIUM SERPL-SCNC: 3.9 MEQ/L (ref 3.5–5.2)
SODIUM BLD-SCNC: 140 MEQ/L (ref 135–145)

## 2021-04-15 PROCEDURE — 6360000002 HC RX W HCPCS: Performed by: STUDENT IN AN ORGANIZED HEALTH CARE EDUCATION/TRAINING PROGRAM

## 2021-04-15 PROCEDURE — 97129 THER IVNTJ 1ST 15 MIN: CPT

## 2021-04-15 PROCEDURE — 2500000003 HC RX 250 WO HCPCS: Performed by: STUDENT IN AN ORGANIZED HEALTH CARE EDUCATION/TRAINING PROGRAM

## 2021-04-15 PROCEDURE — 97110 THERAPEUTIC EXERCISES: CPT

## 2021-04-15 PROCEDURE — 97116 GAIT TRAINING THERAPY: CPT

## 2021-04-15 PROCEDURE — 1200000003 HC TELEMETRY R&B

## 2021-04-15 PROCEDURE — 2580000003 HC RX 258: Performed by: STUDENT IN AN ORGANIZED HEALTH CARE EDUCATION/TRAINING PROGRAM

## 2021-04-15 PROCEDURE — 36415 COLL VENOUS BLD VENIPUNCTURE: CPT

## 2021-04-15 PROCEDURE — 99232 SBSQ HOSP IP/OBS MODERATE 35: CPT | Performed by: INTERNAL MEDICINE

## 2021-04-15 PROCEDURE — 83735 ASSAY OF MAGNESIUM: CPT

## 2021-04-15 PROCEDURE — 80048 BASIC METABOLIC PNL TOTAL CA: CPT

## 2021-04-15 PROCEDURE — 84100 ASSAY OF PHOSPHORUS: CPT

## 2021-04-15 PROCEDURE — 6370000000 HC RX 637 (ALT 250 FOR IP): Performed by: STUDENT IN AN ORGANIZED HEALTH CARE EDUCATION/TRAINING PROGRAM

## 2021-04-15 PROCEDURE — 92523 SPEECH SOUND LANG COMPREHEN: CPT

## 2021-04-15 RX ORDER — SODIUM CHLORIDE 9 MG/ML
25 INJECTION, SOLUTION INTRAVENOUS PRN
Status: DISCONTINUED | OUTPATIENT
Start: 2021-04-15 | End: 2021-04-16 | Stop reason: HOSPADM

## 2021-04-15 RX ORDER — SODIUM CHLORIDE 0.9 % (FLUSH) 0.9 %
5-40 SYRINGE (ML) INJECTION EVERY 12 HOURS SCHEDULED
Status: DISCONTINUED | OUTPATIENT
Start: 2021-04-15 | End: 2021-04-16 | Stop reason: HOSPADM

## 2021-04-15 RX ORDER — SODIUM CHLORIDE 0.9 % (FLUSH) 0.9 %
5-40 SYRINGE (ML) INJECTION PRN
Status: DISCONTINUED | OUTPATIENT
Start: 2021-04-15 | End: 2021-04-16 | Stop reason: HOSPADM

## 2021-04-15 RX ORDER — LIDOCAINE HYDROCHLORIDE 10 MG/ML
5 INJECTION, SOLUTION EPIDURAL; INFILTRATION; INTRACAUDAL; PERINEURAL ONCE
Status: DISCONTINUED | OUTPATIENT
Start: 2021-04-15 | End: 2021-04-16 | Stop reason: HOSPADM

## 2021-04-15 RX ADMIN — SODIUM CHLORIDE, PRESERVATIVE FREE 10 ML: 5 INJECTION INTRAVENOUS at 20:20

## 2021-04-15 RX ADMIN — PANTOPRAZOLE SODIUM 40 MG: 40 TABLET, DELAYED RELEASE ORAL at 05:49

## 2021-04-15 RX ADMIN — METOPROLOL TARTRATE 50 MG: 50 TABLET, FILM COATED ORAL at 08:33

## 2021-04-15 RX ADMIN — SODIUM CHLORIDE, PRESERVATIVE FREE 10 ML: 5 INJECTION INTRAVENOUS at 08:38

## 2021-04-15 RX ADMIN — ENOXAPARIN SODIUM 40 MG: 40 INJECTION SUBCUTANEOUS at 08:32

## 2021-04-15 RX ADMIN — THIAMINE HYDROCHLORIDE 500 MG: 100 INJECTION, SOLUTION INTRAMUSCULAR; INTRAVENOUS at 04:04

## 2021-04-15 RX ADMIN — THIAMINE HYDROCHLORIDE 500 MG: 100 INJECTION, SOLUTION INTRAMUSCULAR; INTRAVENOUS at 16:44

## 2021-04-15 RX ADMIN — METOPROLOL TARTRATE 50 MG: 50 TABLET, FILM COATED ORAL at 20:20

## 2021-04-15 RX ADMIN — FOLIC ACID 1 MG: 5 INJECTION, SOLUTION INTRAMUSCULAR; INTRAVENOUS; SUBCUTANEOUS at 09:40

## 2021-04-15 NOTE — PROGRESS NOTES
[facility]   Nephrology progress note  4/15/2021, 7:56 AM      Pt Name:    Ariana Schmitt  MRN:     [id number]     YOB: 1975  Admit Date:    4/8/2021  6:00 PM  Primary Care Physician:  NILAM Street CNP   Room number  4K-21/021-A    Chief Complaint: Nephrology following for hyponatremia  Subjective:  Patient seen and examined  Patient is more awake and alert  Doing better   comfortable      Objective:  24HR INTAKE/OUTPUT:      Intake/Output Summary (Last 24 hours) at 4/15/2021 0756  Last data filed at 4/15/2021 0400  Gross per 24 hour   Intake 2313.22 ml   Output 2700 ml   Net -386.78 ml     I/O last 3 completed shifts: In: 2313.2 [P.O.:1960; I.V.:353.2]  Out: 2700 [Urine:2700]  No intake/output data recorded. Admission weight: 257 lb (116.6 kg)  Wt Readings from Last 3 Encounters:   04/15/21 247 lb 11.2 oz (112.4 kg)   03/29/21 257 lb (116.6 kg)   03/25/21 251 lb 3.2 oz (113.9 kg)     Body mass index is 34.55 kg/m².     Physical examination  VITALS:     Vitals:    04/14/21 1455 04/14/21 2045 04/14/21 2330 04/15/21 0400   BP: 138/80 137/81 (!) 119/53 126/65   Pulse: 87 94 87 76   Resp: 16 16 16 16   Temp: 98.2 °F (36.8 °C) 97.9 °F (36.6 °C) 98.4 °F (36.9 °C) 97.9 °F (36.6 °C)   TempSrc: Oral Oral Oral Oral   SpO2: 97% 96% 96% 97%   Weight:    247 lb 11.2 oz (112.4 kg)   Height:    5' 11\" (1.803 m)     General Appearance: Comfortable, awake, alert and oriented  Lungs: no use of accessory muscles  GI: soft, non-tender, no guarding  Ext: No pitting edema visible  Oral: Dry oral mucous membranes    Lab Data  CBC:   Recent Labs     04/13/21 0529 04/14/21 0715   WBC 3.5* 3.4*   HGB 11.3* 11.6*   HCT 33.1* 34.8*   PLT 76* 91*     BMP:  Recent Labs     04/13/21 0529 04/13/21 1819 04/13/21 1819 04/14/21 0444 04/14/21  0715 04/15/21  0400    137   < > 137 137 140   K 3.5 3.9  --  3.9  --  3.9     --   --  102  --  104   CO2 28  --   --  27  --  26   BUN 5*  -- --  6*  --  7   CREATININE 0.7  --   --  0.6  --  0.8   GLUCOSE 96  --   --  113*  --  96   CALCIUM 8.4*  --   --  8.5  --  8.4*   MG 2.0  --   --  1.9  --  1.9   PHOS 3.5  --   --  3.4  --  3.5    < > = values in this interval not displayed. Hepatic:   Recent Labs     04/14/21  0444   LABALBU 2.9*   *   *   BILITOT 3.1*   ALKPHOS 261*         Meds:  Infusion:    sodium chloride 25 mL (04/09/21 1813)     Meds:    thiamine (VITAMIN B1) IVPB  500 mg Intravenous Q8H    [Held by provider] lisinopril  20 mg Oral Daily    calcium replacement protocol   Other RX Placeholder    sodium chloride flush  5-40 mL Intravenous 2 times per day    enoxaparin  40 mg Subcutaneous Daily    folic acid  1 mg Intramuscular Daily    metoprolol tartrate  50 mg Oral BID    nicotine  1 patch Transdermal Daily    pantoprazole  40 mg Oral QAM AC     Meds prn: PHENobarbital **OR** PHENobarbital **OR** PHENobarbital IVPB, potassium chloride, magnesium sulfate, sodium chloride flush, sodium chloride, promethazine **OR** ondansetron, polyethylene glycol, [Held by provider] acetaminophen **OR** [Held by provider] acetaminophen, ipratropium       Impression and Plan:  1. Hyponatremia secondary to beer potomania+ hydrochlorothiazide+ liver dysfunction possibly cirrhosis. Overall improved and stable sodium levels  Now at risk of hypernatremia due to recent NPO status but now starting to take oral liquids  Encouraged to increase fluid intake a bit more    2. Hypophosphatemia. Secondary to alcohol abuse and poor nutritional intake. 3.  Transaminitis/alcoholic hepatitis  4. History of hypertension. Lisinopril is currently on hold  5. Alcohol withdrawal: Clinically improving  6.   Agitation and confusion: Improved/better      Livan Barrientos MD  [facility]

## 2021-04-15 NOTE — PROGRESS NOTES
[address] ICU STEPDOWN TELEMETRY 4K  Speech - Language - Cognitive Evaluation    SLP Individual Minutes  Time In: 1100  Time Out: 1130  Minutes: 30  Timed Code Treatment Minutes: 10 Minutes       Date: 4/15/2021  Patient Name: Chikis Schumacher      CSN: [id number]   : 1975  (40 y.o.)  Gender: male   Referring Physician: Marine Matos DO  Diagnosis: Hyponatremia   Secondary Diagnosis: Cognitive impairment   Precautions: safety   History of Present Illness/Injury: Per chart review; Benedict Parnell is a 67-year-old male with a past medical history significant for COPD, depression, alcohol abuse, alcoholic cirrhosis, GERD, HTN who presents to [facility] on 2021 for acute alcohol intoxication. Patient was brought to the ED by his brother when he saw him in acute distress. Patient is well-known to staff members at [address] [place] [address] Dr had 5 encounters for acute alcohol withdrawal in the last year and a history of 2 ICU admissions for delirium tremens. Patient reports that he has been in and out of rehab a couple times in the past and currently lives with his mother. Patient reports that he usually drinks about 12-24 beers per day along with 2 bottles of hard liquor. Patient stated that his last drink was around 2:30 PM on the day of admission. Patient reports 4-5 bouts of emesis prior to admission and has continued to drink to avoid withdrawal.  At the time of admission patient reported abdominal pain, headache, numbness and tingling in his right arm radiating to the elbow patient is unsure if he had a seizure. Patient reports that he has not had any food to eat for the past 2 weeks and has been to the ICU.    Past Medical History:   Diagnosis Date    COPD [facility]chronic obstructive pulmonary disease) ([facility] [address] [facility]     Depression     Diabetes mellitus (HCC)     ETOH abuse     GERD (gastroesophageal reflux disease)     Hyperlipidemia     Hypertension  Liver disease     Seizures ([facility] [address]     etoh wdl       Pain: No pain reported. Subjective:  Patient is alert, upright in bed upon ST arrival. The pt is pleasant, slightly disoriented. 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 Hobbies: ATV   Vision Status: glasses   Hearing: WFL in quiet setting   Type of Home: House  Home Layout: One level, Work area in [address] [place] [address] Access: Stairs to enter without rails  Entrance Stairs - Number of Steps: 2-3 steps  Home Equipment: Crutches(old pair crutches if ever needed)    ORAL MOTOR:  Facial / Labial WFL    Lingual WFL    Dentition WFL    Velum WFL    Vocal Quality WFL    Sensation WFL    Cough WFL      SPEECH / VOICE:  Speech: Pt presented with mild flaccid dysarthria, but is reporting this is baseline d/t missing back molars     LANGUAGE:  Receptive:  Receptive language skills appear to be grossly intact for basic daily communication. Expressive:  Expressive language skills appear to be grossly intact for basic daily communication. COGNITION:  Macho Cognitive Assessment [facility]) version 7.1 completed. Pt scored 11/30. Normal is greater than or equal to 26/30.   Inclusion of +1 point given highest level of education achieved less than/equal to 12th grade or GED with limited-0 post-secondary schooling   Orientation: 5/6 indep   Immediate Recall: Trial 1: 4/5 indep, Trial 2: 5/5 indep   Short-Term Recall: 0/5 indep, 1/5 min A via verbal category cue, 1/5 max A via FO2  Divergent Namin WPM   Problem Solving: decreased safety   Reasoning: verbal: 1/2 indep   Sequencing: poor   Thought Organization: disorganized   Insight: poor   Attention: fair sustained and alternating, poor selective and divided  Math Computation: 0/3   Executive Functionin/5 on MOCA     SWALLOWING:  Current Diet: General solids with thin liquids   Not Tested         RECOMMENDATIONS/ASSESSMENT:  DIAGNOSTIC IMPRESSIONS:  Patient seen with RN permission on this date. Pt presented with a Severe cognitive linguistic impairment as supported by aforementioned data. Pt's deficits are in the domains of orientation to the date, mildly complex attention as he is easily distracted, tangential, and required redirections to complete tasks. Pt also presenting with deficits in STM (immediate/delayed/working memory), mildly complex problem solving, safety awareness, and organization skills. Receptive and Expressive Language skills are intact for basic daily communication of wants/needs. Mild dysarthria, which pt reported as baseline, and no dysphonia. Recommended 24 hour SUP upon d/c for 1:1 Assist with ADLs/IADLs, recommended NO driving, no return to work at this time d/t severity of aforementioned deficits. Rehabilitation Potential: good to fair     EDUCATION:  Learner: Patient  Education:  Reviewed results and recommendations of this evaluation, Reviewed diet and strategies, Reviewed ST goals and Plan of Care and Reviewed recommendations for follow-up  Evaluation of Education: Demonstrates with assistance and Needs further instruction    PLAN:  Skilled SLP intervention on acute care 3-5 x per week or until goals met and/or pt plateaus in function. Specific interventions for next session may include: cognitive tx. Jaquan Chapman PATIENT GOAL:    Return to prior level of function.     SHORT TERM GOALS:  Short-term Goals  Timeframe for Short-term Goals: 2 weeks  Goal 1: Patient will complete orientation and STM tasks w/ 2-3 units (immediate/delayed/working memory) with 60% accuracy givne MAX A to improve retention of personal and newly learned information  INTERVENTION:  Completed review of STM strategies using the acronym WRAP--Write it down, Repeat it, Associate it, and Pictures it.   -immediate recall of 2/4 min A, 2/4 max A  Goal 2: Patient will complete organizational naming and sequencing tasks (divergent, convergent, responsive, confrontational) with 70% accuracy given MOD A to improve processing speed and lexical retrieval.  Goal 3: Patient will complete mildly complex problem solving, safety, and visual/verbal reasoning tasks with 60% accuracy given MOD A to improve independence with ADLs/IADLs. INTERVENTION:  Completed comprehensive and extensive education re: d/c recommendations, safety awareness, and working to improve insight. Pt demonstrated poor verbal reasoning skills and is adamant his cognitive deficits are baseline, despite his increased difficulties with basic and mildly complex problem solving (compare/contrast, similarities, etc). *poor carry-over   *decreased reasoning and insight     LONG TERM GOALS:  No LTM Goals recommended, due to anticipated short ELOS. Chantelle Charles MA., CCC-SLP

## 2021-04-15 NOTE — PLAN OF CARE
Problem: Pain Control  Goal: Maintain pain level at or below patient's acceptable level (or 5 if patient is unable to determine acceptable level)  Outcome: Ongoing  Flowsheets (Taken 4/14/2021 2045)  Patient's Stated Pain Goal: No pain  Note: Pain Assessment: 0-10  Pain Level: 0   Patient's Stated Pain Goal: No pain   Is pain goal met at this time? Yes             Problem: Neurological  Goal: Maximum potential motor/sensory/cognitive function  Outcome: Ongoing  Note: Patient alert and oriented x4 this shift. Problem: Cardiovascular  Goal: No DVT, peripheral vascular complications  Outcome: Ongoing  Note:   Vitals:    04/14/21 0813 04/14/21 1050 04/14/21 1455 04/14/21 2045   BP: 132/81 133/68 138/80 137/81   Pulse: 87 79 87 94   Resp: 18 17 16 16   Temp: 97.8 °F (36.6 °C) 97.9 °F (36.6 °C) 98.2 °F (36.8 °C) 97.9 °F (36.6 °C)   TempSrc: Oral Oral Oral Oral   SpO2: 96% 96% 97% 96%   Weight:       Height:           Goal: Hemodynamic stability  Outcome: Ongoing  Goal: Anticoagulate/Hct stable  Outcome: Ongoing     Problem: Respiratory  Goal: No pulmonary complications  Outcome: Ongoing  Goal: O2 Sat > 90%  Outcome: Ongoing  Note: Pt able to main oxygen levels above 90 percent this shift. Problem: GI  Goal: No bowel complications  Outcome: Ongoing  Note: Patient having active bowel sounds x4 this shift. Problem:   Goal: Adequate urinary output  Outcome: Ongoing  Note: Pt voiding adequately this shift. Problem: Nutrition  Goal: Optimal nutrition therapy  Outcome: Ongoing  Note: Patient is tolerating general diet this shift. Problem: Skin Integrity/Risk  Goal: No skin breakdown during hospitalization  Outcome: Ongoing  Note: No new skin breakdown noted this shift.       Problem: Musculor/Skeletal Functional Status  Goal: Absence of falls  Outcome: Ongoing  Note: Patient has not fallen this shift. Call light and possessions within reach, bed alarm on. Problem: Intellectual/Education/Knowledge Deficit  Goal: Teaching initiated upon admission  Outcome: Ongoing  Note: Patient educated on thiamine and lopressor this shift. Problem: Discharge Planning:  Goal: Discharged to appropriate level of care  Description: Discharged to appropriate level of care  Outcome: Ongoing  Note: Patient plans to go home with mother at discharge. Problem: Sleep Pattern Disturbance:  Goal: Appears well-rested  Description: Appears well-rested  Outcome: Ongoing  Note: Patient resting well this shift. Problem: Skin Integrity:  Goal: Will show no infection signs and symptoms  Description: Will show no infection signs and symptoms  Outcome: Ongoing  Note: No signs/symptoms of infection this shift. Patient is afebrile. Goal: Absence of new skin breakdown  Description: Absence of new skin breakdown  Outcome: Ongoing  Note: No new skin breakdown noted this shift. Patient turned every 2 hours. Problem: Infection - Central Venous Catheter-Associated Bloodstream Infection:  Goal: Will show no infection signs and symptoms  Description: Will show no infection signs and symptoms  Outcome: Ongoing  Note: No signs/symptoms of infection this shift. Patient is afebrile. Care plan reviewed with patient. Patient verbalize understanding of the plan of care and contribute to goal setting.

## 2021-04-15 NOTE — PROGRESS NOTES
[facility]  INPATIENT PHYSICAL THERAPY  DAILY NOTE  [facility] ICU STEPDOWN TELEMETRY 4K - 4K-21/021-A    Time In: 3996  Time Out: 1221  Timed Code Treatment Minutes: 23 Minutes  Minutes: 23          Date: 4/15/2021  Patient Name: Cathi Rodriguez,  Gender:  male        MRN: [id number]  : 1975  (40 y.o.)     Referring Practitioner: Leigh Ann Zendejas DO  Diagnosis: Hyponatremia  Additional Pertinent Hx: Per ED note on 2021, pt \"is a 41 y.o. male who presents to the ED for evaluation of alcohol problem. Patient states he had his last drink a couple hours ago he notes he drinks 6 pack of tall boys. His brother is at the bedside states he is probably intoxicated. His brother states that he is driving his mother to the [facility]. Patient notes he was in the hospital recently for detox. He went approximately a week without drinking and then he started back up. He notes he does not take his medicines reliably. He currently notes abdominal bloating, he denies any nausea or vomiting, denies any diarrhea. Does note some blood in his stool. Denies any chest pain or shortness of breath, denies any lightheadedness. Has a past medical history of gastric reflux diabetes depression COPD and liver disease.   Has had seizures in the past with withdrawal.\"     Prior Level of Function:  Lives With: (mom)  Type of Home: House  Home Layout: One level, Work area in basement  Home Access: Stairs to enter without rails  Entrance Stairs - Number of Steps: 2-3 steps  Home Equipment: Crutches(old pair crutches if ever needed)   Bathroom Shower/Tub: Tub/Shower unit  Bathroom Toilet: Handicap height  Bathroom Accessibility: Accessible    ADL Assistance: Independent  Homemaking Assistance: Independent  Homemaking Responsibilities: Yes  Ambulation Assistance: Independent  Transfer Assistance: Independent  Active : No    Restrictions/Precautions:  Restrictions/Precautions: Fall Risk, General Precautions Training, Stair training, Balance Training, Functional Mobility Training, Endurance Training, Transfer Training, Safety Education & Training, Home Exercise Program, Patient/Caregiver Education & Training, Equipment Evaluation, Education, & procurement    Patient Education  Patient Education: Plan of Care, Gait, Verbal Exercise Instruction    Goals:  Patient goals : go home  Short term goals  Time Frame for Short term goals: at discharge  Short term goal 1: Pt to be Mod I for supine <> sit from flat bed without rail to get in/out of bed  Short term goal 2: Pt to be Mod I for sit <> stand to get up to ambulate  Short term goal 3: Pt to ambulate > 280 ft with no AD with Supervision for household and community distances  Short term goal 4: Pt to negotiate 2-3 steps with no rail with Supervision for home access  Long term goals  Time Frame for Long term goals : not set due to short ELOS    Following session, patient left in safe position with all fall risk precautions in place.

## 2021-04-15 NOTE — CARE COORDINATION
DISCHARGE/PLANNING EVALUATION  4/15/21, 2:43 PM EDT    Reason for Referral: possible discharge needs  Mental Status: Alert and orientated  Decision Making: self with family   Family/Social/Home Environment: Pt lives at home with his mother. He reported that once he starts working again he will be able to find a place of his own. He reported that he should get his drivers license back in October and he should be able to get on his feet and get back to working. SW reviewed possible need for home health at discharge. Pt reported that he does not need or want a home health and declined home health referral. SW reviewed resources on drug and alcohol rehab/treatment facilities. He reported that he was going to look into [facility] in [place]. SW asked if pt needed the phone number for [facility]. Pt reported that he had all the information he needed. Current Services including food security, transportation and housekeeping: self and mother  Current Equipment none  Payment [address]  Concerns or Barriers to Discharge: Patient reported that he is going to look into rehabilitation but concerns of whether or not he will follow through. Post acute provider list with quality measures, geographic area and applicable managed care information provided. Questions regarding selection process answered:Not at this time    Teach Back Method used with pt regarding care plan and needs. Patient verbalize understanding of the plan of care and contribute to goal setting. Patient goals, treatment preferences and discharge plan: Patient plans to be discharged home. He declined wanting any home health or additional resources on drug and alcohol rehabilitation/treatment facilities.     Electronically signed by JUAN CARLOS Garner on 4/15/2021 at 2:43 PM

## 2021-04-15 NOTE — PROGRESS NOTES
Hospitalist Progress Note    Patient:  Von Bell      Unit/Bed:4K-21/021-A    YOB: 1975    MRN: [id number]       Acct: [de-identified]     PCP: NILAM Carbone CNP    Date of Admission: 4/8/2021    Assessment/Plan:  Chronic EtOH abuse: Patient offered choice between continued alcohol on admission and undergoing withdrawal.  Discussed R/B/A. Patient chooses to withdraw at this time. PAWSS 9. Started prophylactic phenobarbital protocol with loading dose of phenobarbital given on 4/9/21. Added CIWA w/ PRN Ativan given previous hx of complicated withdraw  0/65: Requiring bedside sitter this AM as patient combative overnight receiving 2x 4mg Ativan overnight. CIWA d/c per night team in favor of PRN phenobarb GMAWS  4/13: Patient mistook full urinal for EtOH last night and drank a large portion of contents before being stopped by staff members. Will continue PRN phenobarb. Continued sitter at bedside. 4/14: 4/14: Started high dose IV thiamine for concern for Wernicke Encephalopathy overnight. Patient clinically improved following initial therapy. Cerebellar motion testing deficits noted. 4/15: Bedside sitter d/c overnight    Wernicke Encephalopathy: Patient meets > 2/4 Butch criteria with known dietary deficiency (refeeding syndrome), cerebellar dysfunction seen > 7days after last drink, and continued short term memory loss (patient has large gaps in short term memory). Will continue treatment with high dose thiamine. 4/15 Day 3 of 500mg thiamine Q8H will transition to 250mg thiamine Q8H tomorrow     Pancytopenia: Suspect related to cirrhosis and malnutrition. Thrombocytopenia, leukopenia, anemia noted on CBC on 4/12/21. Differential includes splenic sequestration vs dilutional (s/p D5W boluse). HIT unlikely (4Ts score 2). Initial concern for hemolysis given high LDH and elevated bilirubin, however no schistocytes present on smear and bili down-trending.  Elevated reticulocytes, haptoglobin still pending. Trend CBC  4/13: Platelets trending up. Schistocytes negative     Transaminitis: Suspect chronic related to alcoholic liver disease. ALT//458 in hepatocellular pattern. Liver US shows cirrhosis w/o ascites GB w/ sludge. Holding acetaminophen     COPD: Not in exacerbation, No O2 at baseline. ipratropium PRN. Albuterol held per tachycardia     GERD: PPI    HLD: statin held per transaminitis    Primary HTN: resumed w/ parameters    Anion gap acidosis (resolved): 2/2 to lactic acidosis in context of chronic alcoholism. Improved with IVF    Chronic euvolemic hypo-osmolar hyponatremia (resolved): Secondary to beer potomania. Asymptomatic on arrival. Given 750cc NS in ED s/p D5W and DDAVP 2 mcg. Fluid restrict 1200cc liquid daily. Serial BMP Q6H. Goal for 6-8mEq over 24H. Monitor I&O. Seizure precautions. Nephrology following   4/10/21: Adding Phos-NAK TID and monitor Na (nephrology also has pt on daily urea)   4/12/21: Na overcorrected to 139 this AM now on D5W drip  4/13/21: Na WNL >24 hrs stopped IV dextrose   4/14/21: D/C fluid restriction    Refeeding Syndrome (resolved): 2/2 malnutrition in context of chronic EtOH. Replace K, Mg, phosphorous, Ca per protocol. Monitor electrolytes   4/10/21: on TID Phos-NaK and monitoring  4/14/21: Electrolytes normalized normal diet resumed  4/15/21: Discontinued Ca,Mg, Phosphorus replacement protocol    Expected discharge date:  TBD    Disposition:    [x] Home       [] TCU       [] Rehab       [] Psych       [] SNF       [] [place]       [] Other-    Chief Complaint: [address] Course:   Marito Arango is a 80-year-old male with a past medical history significant for COPD, depression, alcohol abuse, alcoholic cirrhosis, GERD, HTN who presents to [facility] on 4/8/2021 for acute alcohol intoxication. Patient was brought to the ED by his brother when he saw him in acute distress.   Patient is well-known to staff members at [address] [place] [address] Dr had 5 encounters for acute alcohol withdrawal in the last year and a history of 2 ICU admissions for delirium tremens. Patient reports that he has been in and out of rehab a couple times in the past and currently lives with his mother. Patient reports that he usually drinks about 12-24 beers per day along with 2 bottles of hard liquor. Patient stated that his last drink was around 2:30 PM on the day of admission. Patient reports 4-5 bouts of emesis prior to admission and has continued to drink to avoid withdrawal.  At the time of admission patient reported abdominal pain, headache, numbness and tingling in his right arm radiating to the elbow patient is unsure if he had a seizure. Patient reports that he has not had any food to eat for the past 2 weeks and has been to the ICU    Patient vitals in the ED on arrival were T 98.6 RR 17  /95 SPO2 96% on room air. Laboratory values were significant for sodium 115 chloride 79 bicarb 18 anion gap 18 lactic acid 5.4 calcium 7.8 phosphorus 1.4 and moderate transaminitis with ALT//458 bilirubin elevated 5.6 direct bilirubin 4.4 lipase 55.9. Chest x-ray demonstrates no acute process. Nursing staff was unable to obtain IV access overnight and a central line was placed for electrolyte replacement    Patient seen and evaluated by nephrology for severe hypoosmolar hyponatremia. Patient sodium initially overcorrected in the ED following IV fluid resuscitation and required a slow gradual correction at 6 to 8 mEq/day following the administration of DDAVP and D5W. Patient continued to improve with a 1200 cc fluid restriction. And sodium normalized after 5 days. For acute alcohol withdrawal patient underwent prophylactic phenobarbital protocol and then required as needed phenobarbital for acute withdrawal symptoms and DTs.   There was initial concern that patient might require a Precedex drip and transfer SPECGRAV 1.008 04/08/2021    GLUCOSEU NEGATIVE 09/23/2020       Radiology:  XR CHEST PORTABLE   Final Result   Impression:      Right central line without acute infiltrates      This document has been electronically signed by: Esther Ye MD on    04/09/2021 02:43 AM      US LIVER   Final Result   1. Findings of cirrhosis and possibly hepatic steatosis. Hepatopedal portal venous flow. 2. Sludge in the gallbladder. No calculi seen. **This report has been created using voice recognition software. It may contain minor errors which are inherent in voice recognition technology. **      Final report electronically signed by Dr. Karl Sears on 4/9/2021 8:30 AM      Shana Hababak   Final Result   1. Findings of cirrhosis and possibly hepatic steatosis. Hepatopedal portal venous flow. 2. Sludge in the gallbladder. No calculi seen. **This report has been created using voice recognition software. It may contain minor errors which are inherent in voice recognition technology. **      Final report electronically signed by Dr. Karl Sears on 4/9/2021 8:30 AM          DVT prophylaxis: [x] Lovenox                                 [] SCDs                                 [] SQ Heparin                                 [] Encourage ambulation           [] Already on Anticoagulation     Code Status: Full Code    PT/OT Eval Status: not indicated    Tele:   [x] yes             [] no    Active Hospital Problems    Diagnosis Date Noted    Lack of intravenous access [Z78.9]     Hyponatremia [E87.1] 04/08/2021    Liver failure without hepatic coma ([facility] [address] [K72.90] 12/14/2018       Electronically signed by Omar Rueda DO on 4/15/2021 at 6:53 AM

## 2021-04-16 VITALS
OXYGEN SATURATION: 96 % | HEART RATE: 91 BPM | WEIGHT: 245.9 LBS | DIASTOLIC BLOOD PRESSURE: 80 MMHG | SYSTOLIC BLOOD PRESSURE: 144 MMHG | TEMPERATURE: 98.1 F | HEIGHT: 71 IN | BODY MASS INDEX: 34.43 KG/M2 | RESPIRATION RATE: 17 BRPM

## 2021-04-16 LAB
ALBUMIN SERPL-MCNC: 3.2 G/DL (ref 3.5–5.1)
ALP BLD-CCNC: 217 U/L (ref 38–126)
ALT SERPL-CCNC: 111 U/L (ref 11–66)
ANION GAP SERPL CALCULATED.3IONS-SCNC: 7 MEQ/L (ref 8–16)
AST SERPL-CCNC: 88 U/L (ref 5–40)
BILIRUB SERPL-MCNC: 2.3 MG/DL (ref 0.3–1.2)
BUN BLDV-MCNC: 6 MG/DL (ref 7–22)
CALCIUM SERPL-MCNC: 8.5 MG/DL (ref 8.5–10.5)
CHLORIDE BLD-SCNC: 102 MEQ/L (ref 98–111)
CO2: 27 MEQ/L (ref 23–33)
CREAT SERPL-MCNC: 0.8 MG/DL (ref 0.4–1.2)
ERYTHROCYTE [DISTWIDTH] IN BLOOD BY AUTOMATED COUNT: 16.7 % (ref 11.5–14.5)
ERYTHROCYTE [DISTWIDTH] IN BLOOD BY AUTOMATED COUNT: 58.7 FL (ref 35–45)
GFR SERPL CREATININE-BSD FRML MDRD: > 90 ML/MIN/1.73M2
GLUCOSE BLD-MCNC: 109 MG/DL (ref 70–108)
HCT VFR BLD CALC: 34.5 % (ref 42–52)
HEMOGLOBIN: 11.6 GM/DL (ref 14–18)
MCH RBC QN AUTO: 33.1 PG (ref 26–33)
MCHC RBC AUTO-ENTMCNC: 33.6 GM/DL (ref 32.2–35.5)
MCV RBC AUTO: 98.6 FL (ref 80–94)
PLATELET # BLD: 113 THOU/MM3 (ref 130–400)
PMV BLD AUTO: 10.3 FL (ref 9.4–12.4)
POTASSIUM SERPL-SCNC: 3.9 MEQ/L (ref 3.5–5.2)
RBC # BLD: 3.5 MILL/MM3 (ref 4.7–6.1)
SODIUM BLD-SCNC: 136 MEQ/L (ref 135–145)
TOTAL PROTEIN: 5.8 G/DL (ref 6.1–8)
WBC # BLD: 3.6 THOU/MM3 (ref 4.8–10.8)

## 2021-04-16 PROCEDURE — 6360000002 HC RX W HCPCS: Performed by: STUDENT IN AN ORGANIZED HEALTH CARE EDUCATION/TRAINING PROGRAM

## 2021-04-16 PROCEDURE — 99232 SBSQ HOSP IP/OBS MODERATE 35: CPT | Performed by: INTERNAL MEDICINE

## 2021-04-16 PROCEDURE — 2580000003 HC RX 258: Performed by: STUDENT IN AN ORGANIZED HEALTH CARE EDUCATION/TRAINING PROGRAM

## 2021-04-16 PROCEDURE — 6370000000 HC RX 637 (ALT 250 FOR IP): Performed by: STUDENT IN AN ORGANIZED HEALTH CARE EDUCATION/TRAINING PROGRAM

## 2021-04-16 PROCEDURE — 99239 HOSP IP/OBS DSCHRG MGMT >30: CPT | Performed by: INTERNAL MEDICINE

## 2021-04-16 PROCEDURE — 2500000003 HC RX 250 WO HCPCS: Performed by: STUDENT IN AN ORGANIZED HEALTH CARE EDUCATION/TRAINING PROGRAM

## 2021-04-16 PROCEDURE — 85027 COMPLETE CBC AUTOMATED: CPT

## 2021-04-16 PROCEDURE — 36415 COLL VENOUS BLD VENIPUNCTURE: CPT

## 2021-04-16 PROCEDURE — 80053 COMPREHEN METABOLIC PANEL: CPT

## 2021-04-16 RX ORDER — THIAMINE MONONITRATE (VIT B1) 100 MG
100 TABLET ORAL DAILY
Qty: 30 TABLET | Refills: 3 | Status: SHIPPED | OUTPATIENT
Start: 2021-04-20 | End: 2022-02-28

## 2021-04-16 RX ADMIN — THIAMINE HYDROCHLORIDE 500 MG: 100 INJECTION, SOLUTION INTRAMUSCULAR; INTRAVENOUS at 00:30

## 2021-04-16 RX ADMIN — SODIUM CHLORIDE, PRESERVATIVE FREE 10 ML: 5 INJECTION INTRAVENOUS at 09:43

## 2021-04-16 RX ADMIN — METOPROLOL TARTRATE 50 MG: 50 TABLET, FILM COATED ORAL at 09:44

## 2021-04-16 RX ADMIN — ENOXAPARIN SODIUM 40 MG: 40 INJECTION SUBCUTANEOUS at 09:44

## 2021-04-16 RX ADMIN — PANTOPRAZOLE SODIUM 40 MG: 40 TABLET, DELAYED RELEASE ORAL at 05:31

## 2021-04-16 RX ADMIN — FOLIC ACID 1 MG: 5 INJECTION, SOLUTION INTRAMUSCULAR; INTRAVENOUS; SUBCUTANEOUS at 09:44

## 2021-04-16 RX ADMIN — THIAMINE HYDROCHLORIDE 250 MG: 100 INJECTION, SOLUTION INTRAMUSCULAR; INTRAVENOUS at 10:05

## 2021-04-16 NOTE — DISCHARGE INSTR - DIET
sizes. Limit salt/sodium. Choose and prepare foods with little or no salt. Use pepper or Mrs. Dash for added flavor. Limit high sodium foods like canned soups, processed meats and cheeses, and salty snack foods. Limit beverages and food with added sugars (such as regular pop, sweetened iced tea, desserts and sweets). These foods are loaded with calories but provide very little nutrients. If you drink alcohol, drink in moderation. Limit alcohol intake to 2 drinks a day for men and 1 drink a day for women.  Check with your Doctor first.

## 2021-04-16 NOTE — PROGRESS NOTES
Secure message sent to Dr. Monik Angulo updating that pt will be discharged today- checking to see if he needs a follow up. Dr. Monik Angulo called back-  No need for follow up, will follow up with PCP. Attempted to call PCP office to set up appt- office closed at 11:30am.  Educated pt on importance of following up with PCP in 1 week.

## 2021-04-16 NOTE — DISCHARGE SUMMARY
Internal Medicine Discharge Summary      Patient Identification:   Johnny Chi   : 1975  MRN: [id number]   Account: [de-identified]      Patient's PCP: NILAM Mccarthy CNP    Admit Date: 2021     Discharge Date: 2021    Admitting Physician: Jennifer Barros MD     Discharge Physician: Aime Dewitt DO     Discharge Diagnoses: Active Hospital Problems    Diagnosis Date Noted    Lack of intravenous access [Z78.9]     Hyponatremia [E87.1] 2021    Liver failure without hepatic coma ([facility] [address] [K72.90] 2018       Wernicke Encephalopathy: Patient met > 2/4 Butch criteria with known dietary deficiency (refeeding syndrome), cerebellar dysfunction seen > 7days after last drink, and continued short term memory loss (patient has large gaps in short term memory). Will continue treatment with high dose thiamine. 4/15/21 Day 3 of 500mg thiamine Q8H will transition to 250mg thiamine Q8H tomorrow  21 Transitioned to 500mg PO thiamine. Will need to continue for 3 days on discharge    Chronic EtOH abuse: Patient offered choice between continued alcohol on admission and undergoing withdrawal.  Discussed R/B/A. Patient chooses to withdraw at this time. PAWSS 9. Started prophylactic phenobarbital protocol with loading dose of phenobarbital given on 21.  : Requiring bedside sitter this AM as patient combative overnight receiving 2x 4mg Ativan overnight. CIWA d/c per night team in favor of PRN phenobarb GMAWS  : Patient mistook full urinal for EtOH last night and drank a large portion of contents before being stopped by staff members. Continued PRN phenobarb. Continued sitter at bedside. : : Started high dose IV thiamine for concern for Wernicke Encephalopathy overnight. Patient clinically improved following initial therapy. Cerebellar motion testing deficits noted.   4/15: Bedside sitter d/c overnight  : No further behavioral disturbances with any daily progress note from day of discharge. Hospital Course:   Ashlee Galvez is a 15-year-old male with a past medical history significant for COPD, depression, alcohol abuse, alcoholic cirrhosis, GERD, HTN who presents to [facility] on 4/8/2021 for acute alcohol intoxication. Patient was brought to the ED by his brother when he saw him in acute distress. Patient is well-known to staff members at [address] [place] [address] Dr had 5 encounters for acute alcohol withdrawal in the last year and a history of 2 ICU admissions for delirium tremens. Patient reports that he has been in and out of rehab a couple times in the past and currently lives with his mother. Patient reports that he usually drinks about 12-24 beers per day along with 2 bottles of hard liquor. Patient stated that his last drink was around 2:30 PM on the day of admission. Patient reports 4-5 bouts of emesis prior to admission and has continued to drink to avoid withdrawal.  At the time of admission patient reported abdominal pain, headache, numbness and tingling in his right arm radiating to the elbow patient is unsure if he had a seizure. Patient reports that he has not had any food to eat for the past 2 weeks and has been to the ICU     Patient vitals in the ED on arrival were T 98.6 RR 17  /95 SPO2 96% on room air. Laboratory values were significant for sodium 115 chloride 79 bicarb 18 anion gap 18 lactic acid 5.4 calcium 7.8 phosphorus 1.4 and moderate transaminitis with ALT//458 bilirubin elevated 5.6 direct bilirubin 4.4 lipase 55.9. Chest x-ray demonstrates no acute process. Nursing staff was unable to obtain IV access overnight and a central line was placed for electrolyte replacement     Patient seen and evaluated by nephrology for severe hypoosmolar hyponatremia.   Patient sodium initially overcorrected in the ED following IV fluid resuscitation and required a slow gradual correction at 6 to 8 reactive to light. Conjunctivae/corneas clear. Neck: Supple, with full range of motion. No jugular venous distention. Trachea midline. Respiratory:  Normal respiratory effort. Clear to auscultation, bilaterally without Rales/Rhonchi. Cardiovascular: Regular rate and rhythm with normal S1/S2 without murmurs, rubs or gallops. Abdomen: Soft, non-tender, non-distended with normal bowel sounds. Musculoskeletal: passive and active ROM x 4 extremities. Skin: Skin color, texture, turgor normal.  No rashes or lesions. Neurologic: No seizures noted. Patient continues to have ataxia but is able to ambulate with walker. Cerebellar motion testing deficits no longer present  Psychiatric: Alert and oriented to person and place. Answers questions appropriately  Capillary Refill: Brisk,< 3 seconds   Peripheral Pulses: +2 palpable, equal bilaterally       Labs: For convenience and continuity at follow-up the following most recent labs are provided:      CBC:    Lab Results   Component Value Date    WBC 3.6 04/16/2021    HGB 11.6 04/16/2021    HCT 34.5 04/16/2021     04/16/2021       Renal:    Lab Results   Component Value Date     04/16/2021    K 3.9 04/16/2021    K 3.9 04/12/2021     04/16/2021    CO2 27 04/16/2021    BUN 6 04/16/2021    CREATININE 0.8 04/16/2021    CALCIUM 8.5 04/16/2021    PHOS 3.5 04/15/2021         Significant Diagnostic Studies    Radiology:   XR CHEST PORTABLE   Final Result   Impression:      Right central line without acute infiltrates      This document has been electronically signed by: Stu Cheng MD on    04/09/2021 02:43 AM      US LIVER   Final Result   1. Findings of cirrhosis and possibly hepatic steatosis. Hepatopedal portal venous flow. 2. Sludge in the gallbladder. No calculi seen. **This report has been created using voice recognition software. It may contain minor errors which are inherent in voice recognition technology. **      Final report electronically signed by Dr. Mirian Ryan on 4/9/2021 8:30 AM      [employer]   Final Result   1. Findings of cirrhosis and possibly hepatic steatosis. Hepatopedal portal venous flow. 2. Sludge in the gallbladder. No calculi seen. **This report has been created using voice recognition software. It may contain minor errors which are inherent in voice recognition technology. **      Final report electronically signed by Dr. Mirian Ryan on 4/9/2021 8:30 AM             Consults:     IP CONSULT TO SOCIAL WORK  IP CONSULT TO NEPHROLOGY  IP CONSULT TO ADDICTION SERVICES  IP CONSULT TO SOCIAL WORK    Disposition: Rehab  Condition at Discharge: Stable    Code Status:  Prior     Patient Instructions:    Discharge lab work: BMP 1 week  Activity: activity as tolerated  Diet: No diet orders on file      Follow-up visits:   NILAM De Leon - CNP  [address]  [address]  [phone]    Schedule an appointment as soon as possible for a visit in 1 week  Primary Care Physician, Please arrive 15 minutes prior to appointment time, Bring medications, photo ID and insurance card    [facility]  [address] [facility] [address]  [phone]      As needed    [facility]  [address] [facility] [address]  [phone]  Schedule an appointment as soon as possible for a visit  As needed    Jus Mackey  COVID-19 info: [employer]  Address: [address] [place] [address]  Hours: Open 24 hours                                                                           Phone: [phone]  Schedule an appointment as soon as possible for a visit  Phone: [phone]         Discharge Medications:      Carolyn Mosley Medication Instructions [id number]    Printed on:04/16/21 1725   Medication Information                      albuterol sulfate HFA (PROVENTIL HFA) 108 (90 Base) MCG/ACT inhaler  Inhale 2 puffs into the lungs every 4 hours as needed for

## 2021-04-16 NOTE — PROGRESS NOTES
Order received for CVC removal per Dr Mariola Stahl. Patient placed in bed. Transparent dressing removed. Skin accessed appears bruised at insertion site and suture sited. Sutures removed, area cleaned with chloro prep, bio patch applied, sterile gauze placed over bio patch, CVC removed with green tip intact, pressure held with sterile gauze for 5 minutes. New transparent dressing applied. Educated the patient on remaining in bed for one hour, dressing stays on for 24 hours, signs and symptoms of infection and notify primary nurse if any blood or oozing. Lay Rowley RN notified.

## 2021-04-16 NOTE — PROGRESS NOTES
Hospitalist Progress Note    Patient:  Remigio Kim      Unit/Bed:4K-21/021-A    YOB: 1975    MRN: [id number]       Acct: [de-identified]     PCP: , APRN - CNP    Date of Admission: 4/8/2021    Assessment/Plan:  Chronic EtOH abuse: Patient offered choice between continued alcohol on admission and undergoing withdrawal.  Discussed R/B/A. Patient chooses to withdraw at this time. PAWSS 9. Started prophylactic phenobarbital protocol with loading dose of phenobarbital given on 4/9/21. Added CIWA w/ PRN Ativan given previous hx of complicated withdraw  5/17: Requiring bedside sitter this AM as patient combative overnight receiving 2x 4mg Ativan overnight. CIWA d/c per night team in favor of PRN phenobarb GMAWS  4/13: Patient mistook full urinal for EtOH last night and drank a large portion of contents before being stopped by staff members. Will continue PRN phenobarb. Continued sitter at bedside. 4/14: 4/14: Started high dose IV thiamine for concern for Wernicke Encephalopathy overnight. Patient clinically improved following initial therapy. Cerebellar motion testing deficits noted. 4/15: Bedside sitter d/c overnight  4/16: No further behavioral disturbances    Wernicke Encephalopathy: Patient met > 2/4 Butch criteria with known dietary deficiency (refeeding syndrome), cerebellar dysfunction seen > 7days after last drink, and continued short term memory loss (patient has large gaps in short term memory). Will continue treatment with high dose thiamine. 4/15/21 Day 3 of 500mg thiamine Q8H will transition to 250mg thiamine Q8H tomorrow  4/16/21 Day 1 of 250mg thiamine Q24H will continue for 4 days will require continuation of PO thiamine after completion     Pancytopenia: Suspect related to cirrhosis and malnutrition. Thrombocytopenia, leukopenia, anemia noted on CBC on 4/12/21. Differential includes splenic sequestration vs dilutional (s/p D5W boluse). HIT unlikely (4Ts score 2). Initial concern for hemolysis given high LDH and elevated bilirubin, however no schistocytes present on smear and bili down-trending. Elevated reticulocytes, haptoglobin still pending. Trend CBC  4/13: Platelets trending up. Schistocytes negative  4/16: Platelets returned to baseline     Transaminitis: Suspect chronic related to alcoholic liver disease. ALT//458 in hepatocellular pattern. Liver US shows cirrhosis w/o ascites GB w/ sludge. Holding acetaminophen     COPD: Not in exacerbation, No O2 at baseline. ipratropium PRN. Albuterol held per tachycardia     GERD: PPI    HLD: statin held per transaminitis    Primary HTN: resumed w/ parameters    Anion gap acidosis (resolved): 2/2 to lactic acidosis in context of chronic alcoholism. Improved with IVF    Chronic euvolemic hypo-osmolar hyponatremia (resolved): Secondary to beer potomania. Asymptomatic on arrival. Given 750cc NS in ED s/p D5W and DDAVP 2 mcg. Fluid restrict 1200cc liquid daily. Serial BMP Q6H. Goal for 6-8mEq over 24H. Monitor I&O. Seizure precautions. Nephrology following   4/10/21: Adding Phos-NAK TID and monitor Na (nephrology also has pt on daily urea)   4/12/21: Na overcorrected to 139 this AM now on D5W drip  4/13/21: Na WNL >24 hrs stopped IV dextrose   4/14/21: D/C fluid restriction    Refeeding Syndrome (resolved): 2/2 malnutrition in context of chronic EtOH. Replace K, Mg, phosphorous, Ca per protocol.  Monitor electrolytes   4/10/21: on TID Phos-NaK and monitoring  4/14/21: Electrolytes normalized normal diet resumed  4/15/21: Discontinued Ca,Mg, Phosphorus replacement protocol    Expected discharge date:  TBD    Disposition:    [x] Home       [] TCU       [] Rehab       [] Psych       [] SNF       [] [place]       [] Other-    Chief Complaint: [address] Course:   Elena Manriquez is a 28-year-old male with a past medical history significant for COPD, depression, alcohol abuse, alcoholic cirrhosis, PATRICK, HTN who presents to [facility] on 4/8/2021 for acute alcohol intoxication. Patient was brought to the ED by his brother when he saw him in acute distress. Patient is well-known to staff members at [address] [place] [address] Dr had 5 encounters for acute alcohol withdrawal in the last year and a history of 2 ICU admissions for delirium tremens. Patient reports that he has been in and out of rehab a couple times in the past and currently lives with his mother. Patient reports that he usually drinks about 12-24 beers per day along with 2 bottles of hard liquor. Patient stated that his last drink was around 2:30 PM on the day of admission. Patient reports 4-5 bouts of emesis prior to admission and has continued to drink to avoid withdrawal.  At the time of admission patient reported abdominal pain, headache, numbness and tingling in his right arm radiating to the elbow patient is unsure if he had a seizure. Patient reports that he has not had any food to eat for the past 2 weeks and has been to the ICU    Patient vitals in the ED on arrival were T 98.6 RR 17  /95 SPO2 96% on room air. Laboratory values were significant for sodium 115 chloride 79 bicarb 18 anion gap 18 lactic acid 5.4 calcium 7.8 phosphorus 1.4 and moderate transaminitis with ALT//458 bilirubin elevated 5.6 direct bilirubin 4.4 lipase 55.9. Chest x-ray demonstrates no acute process. Nursing staff was unable to obtain IV access overnight and a central line was placed for electrolyte replacement    Patient seen and evaluated by nephrology for severe hypoosmolar hyponatremia. Patient sodium initially overcorrected in the ED following IV fluid resuscitation and required a slow gradual correction at 6 to 8 mEq/day following the administration of DDAVP and D5W. Patient continued to improve with a 1200 cc fluid restriction. And sodium normalized after 5 days.     For acute alcohol withdrawal patient underwent prophylactic phenobarbital protocol and then required as needed phenobarbital for acute withdrawal symptoms and DTs. There was initial concern that patient might require a Precedex drip and transfer to the ICU however patient was able to be managed conservatively on the progressive care unit. Patient continued to have cerebellar deficits on exam and an ataxic gait raising concern for Wernicke encephalopathy. Patient was started on high-dose thiamine daily for total of an 8-day course. Subjective (past 24 hours):   Patient seen and evaluated at bedside this morning. Patient answering questions appropriately this morning. Continued improvement following administration of IV thiamine. Improvement seen in previous cerebellar motion testing deficits on exam. Last GMAWS 0. Patient continues to have limited awareness of previous days conversation reviewed .     12 point ROS otherwise negative    Medications:  Reviewed    Infusion Medications    sodium chloride      sodium chloride 25 mL (04/09/21 1813)     Scheduled Medications    thiamine (VITAMIN B1) IVPB  250 mg Intravenous Q8H    lidocaine 1 % injection  5 mL Intradermal Once    sodium chloride flush  5-40 mL Intravenous 2 times per day    [Held by provider] lisinopril  20 mg Oral Daily    calcium replacement protocol   Other RX Placeholder    sodium chloride flush  5-40 mL Intravenous 2 times per day    enoxaparin  40 mg Subcutaneous Daily    folic acid  1 mg Intramuscular Daily    metoprolol tartrate  50 mg Oral BID    nicotine  1 patch Transdermal Daily    pantoprazole  40 mg Oral QAM AC     PRN Meds: sodium chloride flush, sodium chloride, PHENobarbital **OR** PHENobarbital **OR** PHENobarbital IVPB, potassium chloride, magnesium sulfate, sodium chloride flush, sodium chloride, promethazine **OR** ondansetron, polyethylene glycol, [Held by provider] acetaminophen **OR** [Held by provider] acetaminophen, ipratropium      Intake/Output Summary (Last 24 hours) at 4/16/2021 8367  Last data filed at 4/16/2021 0354  Gross per 24 hour   Intake 1387.83 ml   Output 2600 ml   Net -1212.17 ml       Diet:  Dietary Nutrition Supplements: Diabetic Oral Supplement  DIET GENERAL;    Exam:  BP (!) 140/73   Pulse 78   Temp 97.5 °F (36.4 °C) (Oral)   Resp 16   Ht 5' 11\" (1.803 m)   Wt 245 lb 14.4 oz (111.5 kg)   SpO2 96%   BMI 34.30 kg/m²     General appearance: Middle aged white male no acute distress  HEENT: Pupils equal, round, and reactive to light. Conjunctivae/corneas clear. Neck: Supple, with full range of motion. No jugular venous distention. Trachea midline. Respiratory:  Normal respiratory effort. Clear to auscultation, bilaterally without Rales/Rhonchi. Cardiovascular: Regular rate and rhythm with normal S1/S2 without murmurs, rubs or gallops. Abdomen: Soft, non-tender, non-distended with normal bowel sounds. Musculoskeletal: passive and active ROM x 4 extremities. Skin: Skin color, texture, turgor normal.  No rashes or lesions. Neurologic: No seizures noted. Patient unable to ambulate at this time due to ataxia. Cerebellar motion testing deficits no longer present  Psychiatric: Alert and oriented to person and place. Answers questions appropriately  Capillary Refill: Brisk,< 3 seconds   Peripheral Pulses: +2 palpable, equal bilaterally       Labs:   Recent Labs     04/14/21  0715 04/16/21  0400   WBC 3.4* 3.6*   HGB 11.6* 11.6*   HCT 34.8* 34.5*   PLT 91* 113*     Recent Labs     04/14/21  0444 04/14/21  0715 04/15/21  0400 04/16/21  0400    137 140 136   K 3.9  --  3.9 3.9     --  104 102   CO2 27  --  26 27   BUN 6*  --  7 6*   CREATININE 0.6  --  0.8 0.8   CALCIUM 8.5  --  8.4* 8.5   PHOS 3.4  --  3.5  --      Recent Labs     04/14/21  0444 04/16/21  0400   * 88*   * 111*   BILITOT 3.1* 2.3*   ALKPHOS 261* 217*     No results for input(s): INR in the last 72 hours.   No results for input(s): Ethelene Soulier in the

## 2021-04-16 NOTE — CARE COORDINATION
4/16/21, 1:44 PM EDT  Client refused [facility] as plans home w mother, has [facility]  detox center information he is considering  Patient goals/plan/ treatment preferences discussed by  and . Patient goals/plan/ treatment preferences reviewed with patient/ family. Patient/ family verbalize understanding of discharge plan and are in agreement with goal/plan/treatment preferences. Understanding was demonstrated using the teach back method. AVS provided by RN at time of discharge, which includes all necessary medical information pertaining to the patients current course of illness, treatment, post-discharge goals of care, and treatment preferences.

## 2021-04-16 NOTE — PROGRESS NOTES
· Ideal Body Weight: 172 lbs;     · BMI: 34.3  ·   · BMI Categories: Obese Class 1 (BMI 30.0-34. 9)       Nutrition Diagnosis:   · (Disordered eating pattern) related to psychological cause or life stress as evidenced by (pt consumes 12-24 beers/day, no food intake x 2 weeks PTA was noted)      Nutrition Interventions:   Food and/or Nutrient Delivery:  Continue Current Diet, Discontinue Oral Nutrition Supplement, Vitamin Supplement  Nutrition Education/Counseling:  Education completed(Discouraged ETOH intake & encouraged pt not to skip meals.)   Coordination of Nutrition Care:  Continue to monitor while inpatient    Goals:  Patient will consume 75% or more of meals during LOS.        Nutrition Monitoring and Evaluation:   Behavioral-Environmental Outcomes:  None Identified   Food/Nutrient Intake Outcomes:  Food and Nutrient Intake, Diet Advancement/Tolerance  Physical Signs/Symptoms Outcomes:  Biochemical Data, Nutrition Focused Physical Findings, Skin, Weight, Fluid Status or Edema     Discharge Planning:    Continue current diet     Electronically signed by Deni Peña RD, LD on 4/16/21 at 12:16 PM EDT    Contact: [phone]

## 2021-04-16 NOTE — PROGRESS NOTES
CLINICAL PHARMACY NOTE: MEDS TO [address] Select Patient?: Yes  Total # of Prescriptions Filled: 1   The following medications were delivered to the patient:   Thiamine Mononitrate 100mg  Total # of Interventions Completed: 2  Time Spent (min): 30    Additional Documentation:

## 2021-04-16 NOTE — PROGRESS NOTES
[facility]   Nephrology progress note  4/16/2021, 8:14 AM      Pt Name:    Mitch Harrison  MRN:     [id number]     YOB: 1975  Admit Date:    4/8/2021  6:00 PM  Primary Care Physician:  NILAM Rush CNP   Room number  8N-37/183-H    Chief Complaint: Nephrology following for hyponatremia  Subjective:  Patient seen and examined  Awake and alert  No acute distress  Comfortable      Objective:  24HR INTAKE/OUTPUT:      Intake/Output Summary (Last 24 hours) at 4/16/2021 0814  Last data filed at 4/16/2021 0354  Gross per 24 hour   Intake 1387.83 ml   Output 2600 ml   Net -1212.17 ml     I/O last 3 completed shifts: In: 1387.8 [P.O.:1130; I.V.:257.8]  Out: 2600 [Urine:2600]  No intake/output data recorded. Admission weight: 257 lb (116.6 kg)  Wt Readings from Last 3 Encounters:   04/16/21 245 lb 14.4 oz (111.5 kg)   03/29/21 257 lb (116.6 kg)   03/25/21 251 lb 3.2 oz (113.9 kg)     Body mass index is 34.3 kg/m².     Physical examination  VITALS:     Vitals:    04/15/21 1526 04/15/21 2015 04/15/21 2311 04/16/21 0317   BP: (!) 128/93 (!) 156/86 (!) 144/70 (!) 140/73   Pulse: 87 88 86 78   Resp: 16 16 17 16   Temp: 98.7 °F (37.1 °C) 97.4 °F (36.3 °C) 97.9 °F (36.6 °C) 97.5 °F (36.4 °C)   TempSrc: Oral Oral Oral Oral   SpO2: 95% 96% 97% 96%   Weight:    245 lb 14.4 oz (111.5 kg)   Height:         General Appearance: Comfortable, awake, alert and oriented  Lungs: no use of accessory muscles  GI: soft, non-tender, no guarding  Ext: No pitting edema visible  Oral: Dry oral mucous membranes    Lab Data  CBC:   Recent Labs     04/14/21  0715 04/16/21  0400   WBC 3.4* 3.6*   HGB 11.6* 11.6*   HCT 34.8* 34.5*   PLT 91* 113*     BMP:  Recent Labs     04/14/21  0444 04/14/21  0715 04/15/21  0400 04/16/21  0400    137 140 136   K 3.9  --  3.9 3.9     --  104 102   CO2 27  --  26 27   BUN 6*  --  7 6*   CREATININE 0.6  --  0.8 0.8   GLUCOSE 113*  --  96 109*   CALCIUM 8.5  --

## 2021-04-16 NOTE — PROGRESS NOTES
[address] [facility] PHYSICAL THERAPY  DAILY NOTE  STRZ ICU STEPDOWN TELEMETRY 4K - 4K-21/021-A    Time In: 8328  Time Out: 1341  Timed Code Treatment Minutes: 26 Minutes  Minutes: 26          Date: 2021  Patient Name: Vj Riojas,  Gender:  male        MRN: [id number]  : 1975  (40 y.o.)     Referring Practitioner: Colton Mcneil DO  Diagnosis: Hyponatremia  Additional Pertinent Hx: Per ED note on 2021, pt \"is a 41 y.o. male who presents to the ED for evaluation of alcohol problem. Patient states he had his last drink a couple hours ago he notes he drinks 6 pack of tall boys. His brother is at the bedside states he is probably intoxicated. His brother states that he is driving his mother to the [facility]. Patient notes he was in the hospital recently for detox. He went approximately a week without drinking and then he started back up. He notes he does not take his medicines reliably. He currently notes abdominal bloating, he denies any nausea or vomiting, denies any diarrhea. Does note some blood in his stool. Denies any chest pain or shortness of breath, denies any lightheadedness. Has a past medical history of gastric reflux diabetes depression COPD and liver disease.   Has had seizures in the past with withdrawal.\"     Prior Level of Function:  Lives With: (mom)  Type of Home: House  Home Layout: One level, Work area in basement  Home Access: Stairs to enter without rails  Entrance Stairs - Number of Steps: 2-3 steps  Home Equipment: Crutches(old pair crutches if ever needed)   Bathroom Shower/Tub: Tub/Shower unit  Bathroom Toilet: Handicap height  Bathroom Accessibility: Accessible    ADL Assistance: Independent  Homemaking Assistance: Independent  Homemaking Responsibilities: Yes  Ambulation Assistance: Independent  Transfer Assistance: Independent  Active : No    Restrictions/Precautions:  Restrictions/Precautions: Fall Risk, General Precautions

## 2021-04-16 NOTE — DISCHARGE INSTR - ACTIVITY
Up as tolerated Trilobed Flap Text: The defect edges were debeveled with a #15 scalpel blade.  Given the location of the defect and the proximity to free margins a trilobed flap was deemed most appropriate.  Using a sterile surgical marker, an appropriate trilobed flap drawn around the defect.    The area thus outlined was incised deep to adipose tissue with a #15 scalpel blade.  The skin margins were undermined to an appropriate distance in all directions utilizing iris scissors.

## 2021-04-16 NOTE — PLAN OF CARE
Problem: Nutrition  Goal: Optimal nutrition therapy  Outcome: Ongoing   Nutrition Problem #1: (Disordered eating pattern)  Intervention: Food and/or Nutrient Delivery: Continue Current Diet, Discontinue Oral Nutrition Supplement, Vitamin Supplement  Nutritional Goals: Patient will consume 75% or more of meals during LOS.

## 2021-04-16 NOTE — PROGRESS NOTES
CLINICAL PHARMACY: DISCHARGE MED RECONCILIATION/REVIEW    [facility] ([facility]) Select Patient?: Yes  Total # of Interventions Recommended: 0   -   Total # Interventions Accepted: 0  Intervention Severity:   - Level 1 Intervention Present?: No   - Level 2 #: 0   - Level 3 #: 0   Time Spent (min): 0    Gerard Pierre, PharmD  4/16/2021  2:17 PM

## 2021-04-17 ENCOUNTER — CARE COORDINATION (OUTPATIENT)
Dept: CASE MANAGEMENT | Age: 46
End: 2021-04-17

## 2021-04-17 NOTE — CARE COORDINATION
[facility] Transitions Initial Follow Up Call    Call within 2 business days of discharge: Yes    Patient: Nicky Mancia Patient : 1975   MRN: <[id number]>  Reason for Admission:   Discharge Date: 21 RARS: Readmission Risk Score: 30    Patient just getting up. Alert and oriented. Patient reports he was sleeping in. He didn't sleep well during the night. He denies sob, chills, fever, swelling, seizures, headache, nausea or vomiting. He does have some tingling in his right hand. Hasn't  eaten anything yet. Bladder and bowel has regular elimination. Has his medication. Was this an external facility discharge? No Discharge Facility: [facility]    Challenges to be reviewed by the provider   Additional needs identified to be addressed with provider No  none             Method of communication with provider : none    Discussed COVID-19 related testing which was not done at this time. Test results were NA. Patient informed of results, if available? NA    Advance Care Planning:   Does patient have an Advance Directive:  not on file. Was this a readmission? No  Patient stated reason for admission: alcoholism  Patients top risk factors for readmission: ineffective coping    Care Transition Nurse (CTN) contacted the patient by telephone to perform post hospital discharge assessment. Verified name and  with patient as identifiers. Provided introduction to self, and explanation of the CTN role. CTN reviewed discharge instructions, medical action plan and red flags with patient who verbalized understanding. Patient given an opportunity to ask questions and does not have any further questions or concerns at this time. Were discharge instructions available to patient? Yes. Reviewed appropriate site of care based on symptoms and resources available to patient including: PCP. The patient agrees to contact the PCP office for questions related to their healthcare.      Medication reconciliation services provided:  Obtained and reviewed discharge summary and/or continuity of care documents    Care Transitions 24 Hour Call    Do you have all of your prescriptions and are they filled?: No  Do you have support at home?: Alone  Are you an active caregiver in your home?: No  Care Transitions Interventions         Follow Up  Future Appointments   Date Time Provider Amanda Jerome   6/1/2021  1:30 PM NILAM Street - CNP [facility] [address] [facility]

## 2021-04-19 ENCOUNTER — TELEPHONE (OUTPATIENT)
Dept: FAMILY MEDICINE CLINIC | Age: 46
End: 2021-04-19

## 2021-04-19 NOTE — TELEPHONE ENCOUNTER
[facility] Transitions Initial Follow Up Call    Outreach made within 2 business days of discharge: Yes    Patient: Daljit Chatman Patient : 1975   MRN: [id number]  Reason for Admission: There are no discharge diagnoses documented for the most recent discharge. Discharge Date: 21       Spoke with: Patient    Discharge department/facility: Home    TCM Interactive Patient Contact:  Was patient able to fill all prescriptions: Yes  Was patient instructed to bring all medications to the follow-up visit: No  Is patient taking all medications as directed in the discharge summary? Yes  Does patient understand their discharge instructions: Yes  Does patient have questions or concerns that need addressed prior to 7-14 day follow up office visit: Yes, Pt feels when he was in the hospital he didn't feel he was cleaned dup well. He now has bumps all over the back of his legs. Feels he needs a cream or an ointment to help with itching.      Scheduled appointment with PCP within 7-14 days    Follow Up  Future Appointments   Date Time Provider Amanda Jerome   2021  1:30 PM NILAM Salgado - CNP [place], [place]

## 2021-04-21 ENCOUNTER — NURSE ONLY (OUTPATIENT)
Dept: LAB | Age: 46
End: 2021-04-21

## 2021-04-21 ENCOUNTER — OFFICE VISIT (OUTPATIENT)
Dept: FAMILY MEDICINE CLINIC | Age: 46
End: 2021-04-21
Payer: MEDICAID

## 2021-04-21 VITALS
WEIGHT: 246.6 LBS | HEIGHT: 71 IN | DIASTOLIC BLOOD PRESSURE: 76 MMHG | OXYGEN SATURATION: 98 % | BODY MASS INDEX: 34.52 KG/M2 | TEMPERATURE: 97.4 F | SYSTOLIC BLOOD PRESSURE: 134 MMHG | HEART RATE: 83 BPM | RESPIRATION RATE: 16 BRPM

## 2021-04-21 DIAGNOSIS — F31.9 BIPOLAR 1 DISORDER (HCC): ICD-10-CM

## 2021-04-21 DIAGNOSIS — E51.2 WERNICKE ENCEPHALOPATHY: Primary | ICD-10-CM

## 2021-04-21 DIAGNOSIS — F11.20 SEVERE OPIOID DEPENDENCE (HCC): ICD-10-CM

## 2021-04-21 DIAGNOSIS — G31.2 ALCOHOLIC ENCEPHALOPATHY (HCC): ICD-10-CM

## 2021-04-21 DIAGNOSIS — N18.5 CHRONIC KIDNEY DISEASE, STAGE 5 (HCC): ICD-10-CM

## 2021-04-21 DIAGNOSIS — K70.10 ALCOHOLIC HEPATITIS WITHOUT ASCITES: Chronic | ICD-10-CM

## 2021-04-21 PROCEDURE — 99215 OFFICE O/P EST HI 40 MIN: CPT | Performed by: NURSE PRACTITIONER

## 2021-04-21 PROCEDURE — 1111F DSCHRG MED/CURRENT MED MERGE: CPT | Performed by: NURSE PRACTITIONER

## 2021-04-21 RX ORDER — QUETIAPINE FUMARATE 50 MG/1
50 TABLET, FILM COATED ORAL NIGHTLY
Qty: 30 TABLET | Refills: 3 | Status: SHIPPED | OUTPATIENT
Start: 2021-04-21 | End: 2022-02-28

## 2021-04-21 ASSESSMENT — ENCOUNTER SYMPTOMS
GASTROINTESTINAL NEGATIVE: 1
RESPIRATORY NEGATIVE: 1
EYES NEGATIVE: 1

## 2021-04-21 NOTE — PROGRESS NOTES
Post-Discharge Transitional Care Management Services or Hospital Follow Up      Boneta Dandy   YOB: 1975    Date of Office Visit:  4/21/2021  Date of Hospital Admission: 4/8/21  Date of Hospital Discharge: 4/16/21  Readmission Risk Score(high >=14%.  Medium >=10%):Readmission Risk Score: 30      Care management risk score Rising risk (score 2-5) and Complex Care (Scores >=6): 11     Non face to face  following discharge, date last encounter closed (first attempt may have been earlier): 4/19/2021 10:11 AM 4/19/2021 10:11 AM    Call initiated 2 business days of discharge: Yes     Patient Active Problem List   Diagnosis    Hyperlipemia    Uncontrolled hypertension    Uncontrolled type 2 diabetes mellitus with hyperosmolarity without coma ([address]    Alcohol withdrawal ([address]    Alcoholic hepatitis    COPD (chronic obstructive pulmonary disease) ([address]    Passive-dependent personality disorder ([address]    Alcohol abuse    Type 2 diabetes mellitus without complication, without long-term current use of insulin (HCC)    Alcoholic liver failure (HCC)    Transaminitis    Hyperbilirubinemia    RUQ pain    Delirious    Alcoholic hepatitis without ascites    Alcoholic steatohepatitis    Hypophosphatemia    Bipolar 1 disorder (HCC)    Alcoholic gastritis without bleeding    Alcohol withdrawal syndrome with complication (HCC)    Alcohol dependence with physiological dependence, continuous ([address]    Liver failure without hepatic coma (HCC)    Alcoholic cirrhosis ([address]    Alcohol intoxication in alcoholism with blood level over 0.3 with complication ([address]    Acute alcoholic intoxication with delirium ([address]    Acute alcoholic hepatitis    Alcohol ingestion    Alcohol withdrawal delirium (HCC)    Uncomplicated alcohol withdrawal ([address]    Alcohol use disorder, severe, dependence ([address]    Acute pancreatitis    Depression    Osteopoikilosis    Contusion of hand    Atypical chest pain    Alcoholic encephalopathy (HCC)    Alcohol intoxication with delirium ([facility]    Morbidly obese ([facility]    ETOH abuse    Chronic kidney disease, stage 5 (HCC)    Severe opioid dependence ([facility]    Hyponatremia    Lack of intravenous access       No Known Allergies    Medications listed as ordered at the time of discharge from hospital   Prashant, [address] Medication Instructions JOSE:    Printed on:04/21/21 4215   Medication Information                      albuterol sulfate HFA (PROVENTIL HFA) 108 (90 Base) MCG/ACT inhaler  Inhale 2 puffs into the lungs every 4 hours as needed for Wheezing or Shortness of Breath (Space out to every 6 hours as symptoms improve) Space out to every 6 hours as symptoms improve. blood glucose test strips (ASCENSIA AUTODISC VI;ONE TOUCH ULTRA TEST VI) strip  Test as needed. Dispense One Touch verio Test Strips. Dx: Type 2 diabetes (426.22)             folic acid (FOLVITE) 1 MG tablet  Take 1 tablet by mouth daily             glucose monitoring kit (FREESTYLE) monitoring kit  Glucometer with test strips and lancets.   Check BS once daily  #100 strips and lancets             lisinopril-hydroCHLOROthiazide (PRINZIDE;ZESTORETIC) 20-25 MG per tablet  Take 1 tablet by mouth daily             metoprolol tartrate (LOPRESSOR) 50 MG tablet  Take 1 tablet by mouth 2 times daily             pantoprazole (PROTONIX) 40 MG tablet  Take 1 tablet by mouth daily             potassium chloride (KLOR-CON M) 20 MEQ extended release tablet  Take 1 tablet by mouth daily             QUEtiapine (SEROQUEL) 50 MG tablet  Take 1 tablet by mouth nightly             vitamin B-1 (THIAMINE) 100 MG tablet  Take 1 tablet by mouth daily                   Medications marked \"taking\" at this time  Outpatient Medications Marked as Taking for the 4/21/21 encounter (Office Visit) with Jonothan Fleischer, NILAM - CNP   Medication Sig Dispense Refill    QUEtiapine (SEROQUEL) 50 MG tablet Take 1 tablet by mouth nightly 30 tablet 3    vitamin B-1 (THIAMINE) 100 MG tablet Take 1 tablet by mouth daily 30 tablet 3    pantoprazole (PROTONIX) 40 MG tablet Take 1 tablet by mouth daily 30 tablet 0    potassium chloride (KLOR-CON M) 20 MEQ extended release tablet Take 1 tablet by mouth daily 90 tablet 1    metoprolol tartrate (LOPRESSOR) 50 MG tablet Take 1 tablet by mouth 2 times daily 60 tablet 5    lisinopril-hydroCHLOROthiazide (PRINZIDE;ZESTORETIC) 20-25 MG per tablet Take 1 tablet by mouth daily 30 tablet 5    blood glucose test strips (ASCENSIA AUTODISC VI;ONE TOUCH ULTRA TEST VI) strip Test as needed. Dispense One Touch verio Test Strips. Dx: Type 2 diabetes (250.00) 60 strip 11    folic acid (FOLVITE) 1 MG tablet Take 1 tablet by mouth daily 30 tablet 5    albuterol sulfate HFA (PROVENTIL HFA) 108 (90 Base) MCG/ACT inhaler Inhale 2 puffs into the lungs every 4 hours as needed for Wheezing or Shortness of Breath (Space out to every 6 hours as symptoms improve) Space out to every 6 hours as symptoms improve. 1 Inhaler 0    glucose monitoring kit (FREESTYLE) monitoring kit Glucometer with test strips and lancets. Check BS once daily  #100 strips and lancets 1 kit 5        Medications patient taking as of now reconciled against medications ordered at time of hospital discharge: Yes    Chief Complaint   Patient presents with    Follow-Up from Hospital    Rash     rt side stomach, back, back of legs since hospital stay        HPI  Pt had exacerbation of alcoholism that lead to liver/kidney/encephalpathy which required hospital admission  Inpatient course: Discharge summary reviewed- see chart. Interval history/Current status: pt reports is able to think clearly now. Feels better. Does complain of not being able to sleep, anxious, mind racing     Review of Systems   Constitutional: Negative. HENT: Negative. Eyes: Negative. Respiratory: Negative. Cardiovascular: Negative.     Gastrointestinal: opioid dependence ([facility]  Discussed need for AA and fu with dr aleena Mak    3. Wernicke encephalopathy  At this time stable  - AL DISCHARGE MEDS RECONCILED W/ CURRENT OUTPATIENT MED LIST    4. Alcoholic hepatitis without ascites  Will monitor  - AL DISCHARGE MEDS RECONCILED W/ CURRENT OUTPATIENT MED LIST    5. Bipolar 1 disorder (HCC)  Add low dose seroquel at hs  - QUEtiapine (SEROQUEL) 50 MG tablet; Take 1 tablet by mouth nightly  Dispense: 30 tablet; Refill: 3  - AL DISCHARGE MEDS RECONCILED W/ CURRENT OUTPATIENT MED LIST    6. Alcoholic encephalopathy ([facility]  See labs  - Comprehensive Metabolic Panel; Future  - CBC Auto Differential; Future  - AL DISCHARGE MEDS RECONCILED W/ CURRENT OUTPATIENT MED LIST    Had a long honest talk with patient. Each time he has a flare he is getting closer to killing himself. He needs to have active treatment such as AA to have success.       Medical Decision Making: high complexity

## 2021-04-22 LAB
ALBUMIN SERPL-MCNC: 4.2 G/DL (ref 3.5–5.1)
ALP BLD-CCNC: 188 U/L (ref 38–126)
ALT SERPL-CCNC: 82 U/L (ref 11–66)
ANION GAP SERPL CALCULATED.3IONS-SCNC: 14 MEQ/L (ref 8–16)
AST SERPL-CCNC: 60 U/L (ref 5–40)
BASOPHILS # BLD: 1.5 %
BASOPHILS ABSOLUTE: 0.1 THOU/MM3 (ref 0–0.1)
BILIRUB SERPL-MCNC: 1.6 MG/DL (ref 0.3–1.2)
BUN BLDV-MCNC: 12 MG/DL (ref 7–22)
CALCIUM SERPL-MCNC: 9.7 MG/DL (ref 8.5–10.5)
CHLORIDE BLD-SCNC: 104 MEQ/L (ref 98–111)
CO2: 21 MEQ/L (ref 23–33)
CREAT SERPL-MCNC: 0.7 MG/DL (ref 0.4–1.2)
EOSINOPHIL # BLD: 0.3 %
EOSINOPHILS ABSOLUTE: 0 THOU/MM3 (ref 0–0.4)
ERYTHROCYTE [DISTWIDTH] IN BLOOD BY AUTOMATED COUNT: 16.2 % (ref 11.5–14.5)
ERYTHROCYTE [DISTWIDTH] IN BLOOD BY AUTOMATED COUNT: 59.7 FL (ref 35–45)
GFR SERPL CREATININE-BSD FRML MDRD: > 90 ML/MIN/1.73M2
GLUCOSE BLD-MCNC: 99 MG/DL (ref 70–108)
HCT VFR BLD CALC: 41.4 % (ref 42–52)
HEMOGLOBIN: 13.7 GM/DL (ref 14–18)
IMMATURE GRANS (ABS): 0.04 THOU/MM3 (ref 0–0.07)
IMMATURE GRANULOCYTES: 0.6 %
LYMPHOCYTES # BLD: 18.6 %
LYMPHOCYTES ABSOLUTE: 1.3 THOU/MM3 (ref 1–4.8)
MCH RBC QN AUTO: 33.4 PG (ref 26–33)
MCHC RBC AUTO-ENTMCNC: 33.1 GM/DL (ref 32.2–35.5)
MCV RBC AUTO: 101 FL (ref 80–94)
MONOCYTES # BLD: 9.3 %
MONOCYTES ABSOLUTE: 0.6 THOU/MM3 (ref 0.4–1.3)
NUCLEATED RED BLOOD CELLS: 0 /100 WBC
PLATELET # BLD: 181 THOU/MM3 (ref 130–400)
PMV BLD AUTO: 11.9 FL (ref 9.4–12.4)
POTASSIUM SERPL-SCNC: 4.4 MEQ/L (ref 3.5–5.2)
RBC # BLD: 4.1 MILL/MM3 (ref 4.7–6.1)
SEG NEUTROPHILS: 69.7 %
SEGMENTED NEUTROPHILS ABSOLUTE COUNT: 4.8 THOU/MM3 (ref 1.8–7.7)
SODIUM BLD-SCNC: 139 MEQ/L (ref 135–145)
TOTAL PROTEIN: 7.2 G/DL (ref 6.1–8)
WBC # BLD: 6.9 THOU/MM3 (ref 4.8–10.8)

## 2021-04-27 ENCOUNTER — CARE COORDINATION (OUTPATIENT)
Dept: CARE COORDINATION | Age: 46
End: 2021-04-27

## 2021-04-27 NOTE — CARE COORDINATION
Attempted to reach patient for continued Care Coordination follow up and education re: the management of his DM, COPD, and recent hospitalizations for alcohol use. Initial call was answered and then promptly ended. ACM attempted to reach patient again, and patient was unavailable so generic voicemail message was left asking patient to please return call to my direct number.   Alla Starr RN Ambulatory Care Manager

## 2021-05-03 ENCOUNTER — CARE COORDINATION (OUTPATIENT)
Dept: CARE COORDINATION | Age: 46
End: 2021-05-03

## 2021-05-03 NOTE — CARE COORDINATION
Attempted to reach patient for continued Care Coordination f/u and education re: the management of his DM, COPD, alcohol abuse, and healthcare needs. Patient's mother answered and brought phone to patient. Patient hung up on introduction of self. ACM attempted to return call to patient and he remained unavailable. Patient missed PCP appointment scheduled for earlier today. Will continue to work to f/u with patient in the future.

## 2021-05-06 ENCOUNTER — HOSPITAL ENCOUNTER (INPATIENT)
Age: 46
LOS: 3 days | Discharge: HOME | End: 2021-05-10
Attending: FAMILY MEDICINE | Admitting: FAMILY MEDICINE
Payer: MEDICAID

## 2021-05-06 DIAGNOSIS — E87.1 HYPONATREMIA: ICD-10-CM

## 2021-05-06 DIAGNOSIS — R74.01 TRANSAMINITIS: ICD-10-CM

## 2021-05-06 DIAGNOSIS — F10.929 ACUTE ALCOHOLIC INTOXICATION WITH COMPLICATION (HCC): Primary | ICD-10-CM

## 2021-05-06 LAB
ABO: NORMAL
ACETAMINOPHEN LEVEL: < 5 UG/ML (ref 0–20)
ALBUMIN SERPL-MCNC: 3.9 G/DL (ref 3.5–5.1)
ALP BLD-CCNC: 220 U/L (ref 38–126)
ALT SERPL-CCNC: 97 U/L (ref 11–66)
ANION GAP SERPL CALCULATED.3IONS-SCNC: 20 MEQ/L (ref 8–16)
ANTIBODY SCREEN: NORMAL
AST SERPL-CCNC: 148 U/L (ref 5–40)
BASOPHILS # BLD: 0.2 %
BASOPHILS ABSOLUTE: 0 THOU/MM3 (ref 0–0.1)
BILIRUB SERPL-MCNC: 3.4 MG/DL (ref 0.3–1.2)
BILIRUBIN DIRECT: 2.1 MG/DL (ref 0–0.3)
BUN BLDV-MCNC: 14 MG/DL (ref 7–22)
CALCIUM SERPL-MCNC: 8.8 MG/DL (ref 8.5–10.5)
CHLORIDE BLD-SCNC: 80 MEQ/L (ref 98–111)
CO2: 17 MEQ/L (ref 23–33)
CREAT SERPL-MCNC: 0.5 MG/DL (ref 0.4–1.2)
EOSINOPHIL # BLD: 0 %
EOSINOPHILS ABSOLUTE: 0 THOU/MM3 (ref 0–0.4)
ERYTHROCYTE [DISTWIDTH] IN BLOOD BY AUTOMATED COUNT: 14.8 % (ref 11.5–14.5)
ERYTHROCYTE [DISTWIDTH] IN BLOOD BY AUTOMATED COUNT: 50.2 FL (ref 35–45)
ETHYL ALCOHOL, SERUM: 0.3 %
GFR SERPL CREATININE-BSD FRML MDRD: > 90 ML/MIN/1.73M2
GLUCOSE BLD-MCNC: 71 MG/DL (ref 70–108)
HCT VFR BLD CALC: 39.2 % (ref 42–52)
HEMOGLOBIN: 14.7 GM/DL (ref 14–18)
IMMATURE GRANS (ABS): 0.04 THOU/MM3 (ref 0–0.07)
IMMATURE GRANULOCYTES: 0.4 %
LYMPHOCYTES # BLD: 16.3 %
LYMPHOCYTES ABSOLUTE: 1.7 THOU/MM3 (ref 1–4.8)
MCH RBC QN AUTO: 34.5 PG (ref 26–33)
MCHC RBC AUTO-ENTMCNC: 37.5 GM/DL (ref 32.2–35.5)
MCV RBC AUTO: 92 FL (ref 80–94)
MONOCYTES # BLD: 7.6 %
MONOCYTES ABSOLUTE: 0.8 THOU/MM3 (ref 0.4–1.3)
NUCLEATED RED BLOOD CELLS: 0 /100 WBC
OSMOLALITY CALCULATION: 235.6 MOSMOL/KG (ref 275–300)
PLATELET # BLD: 287 THOU/MM3 (ref 130–400)
PMV BLD AUTO: 9.1 FL (ref 9.4–12.4)
POTASSIUM SERPL-SCNC: 3.9 MEQ/L (ref 3.5–5.2)
RBC # BLD: 4.26 MILL/MM3 (ref 4.7–6.1)
REASON FOR REJECTION: NORMAL
REASON FOR REJECTION: NORMAL
REJECTED TEST: NORMAL
REJECTED TEST: NORMAL
RH FACTOR: NORMAL
SALICYLATE, SERUM: < 0.3 MG/DL (ref 2–10)
SEG NEUTROPHILS: 75.5 %
SEGMENTED NEUTROPHILS ABSOLUTE COUNT: 7.9 THOU/MM3 (ref 1.8–7.7)
SODIUM BLD-SCNC: 117 MEQ/L (ref 135–145)
TOTAL PROTEIN: 7.1 G/DL (ref 6.1–8)
TSH SERPL DL<=0.05 MIU/L-ACNC: 0.59 UIU/ML (ref 0.4–4.2)
WBC # BLD: 10.5 THOU/MM3 (ref 4.8–10.8)

## 2021-05-06 PROCEDURE — 80074 ACUTE HEPATITIS PANEL: CPT

## 2021-05-06 PROCEDURE — 6360000002 HC RX W HCPCS: Performed by: NURSE PRACTITIONER

## 2021-05-06 PROCEDURE — 2580000003 HC RX 258: Performed by: NURSE PRACTITIONER

## 2021-05-06 PROCEDURE — 85025 COMPLETE CBC W/AUTO DIFF WBC: CPT

## 2021-05-06 PROCEDURE — 36415 COLL VENOUS BLD VENIPUNCTURE: CPT

## 2021-05-06 PROCEDURE — 80053 COMPREHEN METABOLIC PANEL: CPT

## 2021-05-06 PROCEDURE — 86850 RBC ANTIBODY SCREEN: CPT

## 2021-05-06 PROCEDURE — 82077 ASSAY SPEC XCP UR&BREATH IA: CPT

## 2021-05-06 PROCEDURE — 82248 BILIRUBIN DIRECT: CPT

## 2021-05-06 PROCEDURE — 99285 EMERGENCY DEPT VISIT HI MDM: CPT

## 2021-05-06 PROCEDURE — 96374 THER/PROPH/DIAG INJ IV PUSH: CPT

## 2021-05-06 PROCEDURE — 80179 DRUG ASSAY SALICYLATE: CPT

## 2021-05-06 PROCEDURE — 86900 BLOOD TYPING SEROLOGIC ABO: CPT

## 2021-05-06 PROCEDURE — 84443 ASSAY THYROID STIM HORMONE: CPT

## 2021-05-06 PROCEDURE — 80143 DRUG ASSAY ACETAMINOPHEN: CPT

## 2021-05-06 PROCEDURE — 86901 BLOOD TYPING SEROLOGIC RH(D): CPT

## 2021-05-06 PROCEDURE — 84295 ASSAY OF SERUM SODIUM: CPT

## 2021-05-06 RX ORDER — LORAZEPAM 2 MG/ML
1 INJECTION INTRAMUSCULAR ONCE
Status: COMPLETED | OUTPATIENT
Start: 2021-05-06 | End: 2021-05-06

## 2021-05-06 RX ORDER — LANOLIN ALCOHOL/MO/W.PET/CERES
50 CREAM (GRAM) TOPICAL DAILY
Status: DISCONTINUED | OUTPATIENT
Start: 2021-05-07 | End: 2021-05-07

## 2021-05-06 RX ORDER — 0.9 % SODIUM CHLORIDE 0.9 %
1000 INTRAVENOUS SOLUTION INTRAVENOUS ONCE
Status: COMPLETED | OUTPATIENT
Start: 2021-05-06 | End: 2021-05-06

## 2021-05-06 RX ORDER — FOLIC ACID 1 MG/1
1 TABLET ORAL DAILY
Status: DISCONTINUED | OUTPATIENT
Start: 2021-05-07 | End: 2021-05-07

## 2021-05-06 RX ADMIN — SODIUM CHLORIDE 1000 ML: 9 INJECTION, SOLUTION INTRAVENOUS at 20:00

## 2021-05-06 RX ADMIN — LORAZEPAM 1 MG: 2 INJECTION INTRAMUSCULAR; INTRAVENOUS at 22:44

## 2021-05-06 ASSESSMENT — ENCOUNTER SYMPTOMS
VOMITING: 1
DIARRHEA: 1
ABDOMINAL PAIN: 1
SHORTNESS OF BREATH: 0
NAUSEA: 1
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
## 2021-05-06 NOTE — ED PROVIDER NOTES
[facility] Emergency Department    CHIEF COMPLAINT       Chief Complaint   Patient presents with    Alcohol Problem    Hematuria       Nurses Notes reviewed and I agree except as noted in the HPI. HISTORY OF PRESENT ILLNESS    Liz Peterson is a 40 y.o. male with a hx of alcohol abuse complicated by cirrhosis and Wernicke encephalopathy who presents to the ED for evaluation of alcohol withdrawal. Pt reports drinking 30 beers from this morning upon awakening until about 1-2 PM. Shortly after that had 4-5 episodes of non-bloody emesis and 10+ episodes of watery, non-bloody diarrhea. Now c/o palpitations and \"feeling shaky\". Pt states that current sx feel consistent with his typical EtOH withdrawal sx. He has had seizures and visual/auditory hallucinations with withdrawal in the past but denies any today. Has also been hospitalized for EtOH withdrawal in the past and has gone to a rehab facility before. Last period of sobriety was approximately 1 year ago (per pt). Also endorses intermittent RLQ abdominal pain x  3 weeks that occasionally radiates to the right flank. + dark colored urine but denies any gross blood. Denies dizziness, syncope, SOB, and any other complaints. HPI was provided by the patient and past medical records. REVIEW OF SYSTEMS     Review of Systems   Constitutional: Negative for chills, fatigue and fever. HENT: Negative for congestion, ear discharge, ear pain, postnasal drip and rhinorrhea. Eyes: Negative for visual disturbance. Respiratory: Negative for cough, chest tightness and shortness of breath. Cardiovascular: Positive for palpitations. Negative for chest pain. Gastrointestinal: Positive for abdominal pain, diarrhea, nausea and vomiting. Genitourinary: Positive for flank pain. Negative for difficulty urinating, dysuria, enuresis and hematuria. (+) dark-colored urine    Musculoskeletal: Negative for back pain and joint swelling.    Skin: Negative for diabetes mellitus without complication, without long-term current use of insulin ([facility])      folic acid (FOLVITE) 1 MG tablet Take 1 tablet by mouth daily  Qty: 30 tablet, Refills: 5    Associated Diagnoses: Bipolar 1 disorder ([facility])      albuterol sulfate HFA (PROVENTIL HFA) 108 (90 Base) MCG/ACT inhaler Inhale 2 puffs into the lungs every 4 hours as needed for Wheezing or Shortness of Breath (Space out to every 6 hours as symptoms improve) Space out to every 6 hours as symptoms improve. Qty: 1 Inhaler, Refills: 0      glucose monitoring kit (FREESTYLE) monitoring kit Glucometer with test strips and lancets. Check BS once daily  #100 strips and lancets  Qty: 1 kit, Refills: 5    Associated Diagnoses: Type 2 diabetes mellitus without complication, without long-term current use of insulin ([facility])             ALLERGIES     has No Known Allergies. FAMILY HISTORY     He indicated that his mother is alive. He indicated that his father is . He indicated that his brother is alive. He indicated that the status of his paternal uncle is unknown.   family history includes Alcohol Abuse in his father and paternal uncle; Arthritis in his mother; Cancer in his father; Depression in his mother; Diabetes in his mother; Heart Disease in his mother; High Blood Pressure in his brother, father, and mother; High Cholesterol in his mother.     SOCIAL HISTORY       Social History     Socioeconomic History    Marital status: Single     Spouse name: Not on file    Number of children: 0    Years of education: 15    Highest education level: Not on file   Occupational History     Employer: [address] Needs    Financial resource strain: Not hard at all   Naye-Katja insecurity     Worry: Never true     Inability: Never true    Transportation needs     Medical: No     Non-medical: Yes   Tobacco Use    Smoking status: Current Every Day Smoker     Packs/day: 1.00     Years: 27.00     Pack years: 27.00     Types: E-Cigarettes, Cigarettes    Smokeless tobacco: Former User    Tobacco comment: vape   Substance and Sexual Activity    Alcohol use: Not Currently     Alcohol/week: 140.0 standard drinks     Types: 140 Cans of beer per week     Frequency: Patient refused     Binge frequency: Patient refused     Comment: 12 pk beer & 6 hard lemonade daily -last 8:30 3/16/21    Drug use: No    Sexual activity: Yes   Lifestyle    Physical activity     Days per week: 0 days     Minutes per session: 0 min    Stress: Only a little   Relationships    Social connections     Talks on phone: More than three times a week     Gets together: More than three times a week     Attends Sikhism service: Never     Active member of club or organization: Yes     Attends meetings of clubs or organizations: More than 4 times per year     Relationship status: Never     Intimate partner violence     Fear of current or ex partner: Not on file     Emotionally abused: Not on file     Physically abused: Not on file     Forced sexual activity: Not on file   Other Topics Concern    Not on file   Social History Narrative    Patient with history of etoh abuse but stated he is currently not drinking. Patient is receiving medication to assist him with staying sober and is attending routine AA meetings via [employer]        PHYSICAL [address] [place] [address] Street:  height is 5' 11\" (1.803 m) and weight is 250 lb 6.4 oz (113.6 kg). His oral temperature is 98.2 °F (36.8 °C). His blood pressure is 128/72 and his pulse is 96. His respiration is 18 and oxygen saturation is 94%. Physical Exam  Vitals signs and nursing note reviewed. Constitutional:       Appearance: He is well-developed. He is ill-appearing. He is not diaphoretic. Comments: Tremulous. HENT:      Head: Normocephalic and atraumatic. Mouth/Throat:      Mouth: Mucous membranes are moist.      Pharynx: Oropharyngeal exudate present. Comments: Dried dark-red blood noted to lips. Eyes:      General:         Right eye: No discharge. Left eye: No discharge. Conjunctiva/sclera: Conjunctivae normal.   Neck:      Musculoskeletal: Normal range of motion. Trachea: No tracheal deviation. Cardiovascular:      Rate and Rhythm: Regular rhythm. Tachycardia present. Heart sounds: Normal heart sounds. No murmur. No gallop. Comments: Normal capillary refill  Pulmonary:      Effort: Pulmonary effort is normal. No respiratory distress. Breath sounds: Normal breath sounds. No stridor. Abdominal:      General: Bowel sounds are normal.      Palpations: Abdomen is soft. Musculoskeletal: Normal range of motion. General: No tenderness or deformity. Skin:     General: Skin is warm. Coloration: Skin is not pale. Findings: No erythema or rash. Neurological:      Mental Status: He is alert and oriented to person, place, and time. Cranial Nerves: No cranial nerve deficit. Psychiatric:         Behavior: Behavior normal.         DIFFERENTIAL DIAGNOSIS:   ETOH withdrawal, intoxication, electrolyte derangement. DIAGNOSTIC RESULTS     EKG: All EKG's are interpreted by the Emergency Department Physician who eithersigns or Co-signs this chart in the absence of a cardiologist.        RADIOLOGY: non-plainfilm images(s) such as CT, Ultrasound and MRI are read by the radiologist.  Plain radiographic images are visualized and preliminarily interpreted by the emergency physician unless otherwise stated below.   No orders to display         LABS:   Labs Reviewed   CBC WITH AUTO DIFFERENTIAL - Abnormal; Notable for the following components:       Result Value    RBC 4.26 (*)     Hematocrit 39.2 (*)     MCH 34.5 (*)     MCHC 37.5 (*)     RDW-CV 14.8 (*)     RDW-SD 50.2 (*)     MPV 9.1 (*)     Segs Absolute 7.9 (*)     All other components within normal limits   SALICYLATE LEVEL - Abnormal; Notable for the following components:    Salicylate, Serum < 0.3 (*) components within normal limits   COMPREHENSIVE METABOLIC PANEL - Abnormal; Notable for the following components:    Sodium 126 (*)     Potassium 3.0 (*)     Chloride 92 (*)     Calcium 8.3 (*)      (*)     Alkaline Phosphatase 163 (*)     Total Protein 5.7 (*)     Albumin 3.4 (*)     Total Bilirubin 4.9 (*)     ALT 85 (*)     All other components within normal limits   CBC - Abnormal; Notable for the following components:    WBC 3.6 (*)     RBC 3.71 (*)     Hemoglobin 12.8 (*)     Hematocrit 35.8 (*)     MCV 96.5 (*)     MCH 34.5 (*)     MCHC 35.8 (*)     RDW-CV 15.3 (*)     RDW-SD 54.6 (*)     All other components within normal limits   SODIUM - Abnormal; Notable for the following components:    Sodium 152 (*)     All other components within normal limits   SODIUM - Abnormal; Notable for the following components:    Sodium 129 (*)     All other components within normal limits   SODIUM - Abnormal; Notable for the following components:    Sodium 128 (*)     All other components within normal limits   SODIUM - Abnormal; Notable for the following components:    Sodium 130 (*)     All other components within normal limits   COMPREHENSIVE METABOLIC PANEL W/ REFLEX TO MG FOR LOW K - Abnormal; Notable for the following components:    Glucose 122 (*)     CO2 22 (*)      (*)     Alkaline Phosphatase 253 (*)     Total Protein 5.7 (*)     Albumin 3.2 (*)     Total Bilirubin 1.9 (*)      (*)     All other components within normal limits   ACETAMINOPHEN LEVEL   ETHANOL   TSH WITHOUT REFLEX   URINE DRUG SCREEN   URINE RT REFLEX TO CULTURE   SPECIMEN REJECTION   SPECIMEN REJECTION   GLOMERULAR FILTRATION RATE, ESTIMATED   SODIUM, URINE, RANDOM   HEPATITIS PANEL, ACUTE   CHLORIDE, URINE, RANDOM   URIC ACID   MAGNESIUM   PHOSPHORUS   ANION GAP   GLOMERULAR FILTRATION RATE, ESTIMATED   PROTIME-INR   PHOSPHORUS   ANION GAP   GLOMERULAR FILTRATION RATE, ESTIMATED   TYPE AND SCREEN       EMERGENCY DEPARTMENT COURSE:   Vitals:    Vitals:    05/08/21 2247 05/09/21 0421 05/09/21 0800 05/09/21 1115   BP: 116/67 107/65 126/68 128/72   Pulse: 125 99 96 96   Resp: 20 18 16 18   Temp: 98.1 °F (36.7 °C) 98.1 °F (36.7 °C) 98.4 °F (36.9 °C) 98.2 °F (36.8 °C)   TempSrc: Oral Oral Oral Oral   SpO2: 94% 93% 92% 94%   Weight:       Height:             MDM                         MDM    Patient was seen in the ER for ETOH intoxication/withdrawal.  Appropriate labs and imaging are ordered and reviewed. Patient is found to be tachy and shaky. Given IVF and ativan. Labs show a significant hyponatremia. ETOH is 0.30. I reviewed case with hospitalist, DR. Kendrick. She will admit.       Medications   folic acid (FOLVITE) tablet 1 mg (1 mg Oral Given 5/9/21 0819)   lisinopril (PRINIVIL;ZESTRIL) tablet 20 mg (20 mg Oral Given 5/9/21 0819)   metoprolol tartrate (LOPRESSOR) tablet 50 mg (50 mg Oral Given 5/9/21 0819)   QUEtiapine (SEROQUEL) tablet 50 mg (50 mg Oral Given 5/8/21 2027)   pantoprazole (PROTONIX) tablet 40 mg (40 mg Oral Given 5/9/21 0819)   sodium chloride flush 0.9 % injection 10 mL (10 mLs Intravenous Given 5/9/21 0819)   sodium chloride flush 0.9 % injection 10 mL (has no administration in time range)   0.9 % sodium chloride infusion (has no administration in time range)   acetaminophen (TYLENOL) tablet 650 mg ( Oral Held by provider 5/8/21 1640)     Or   acetaminophen (TYLENOL) suppository 650 mg ( Rectal Held by provider 5/8/21 1640)   enoxaparin (LOVENOX) injection 40 mg (40 mg Subcutaneous Given 5/9/21 0818)   thiamine tablet 100 mg (100 mg Oral Given 5/9/21 0819)   multivitamin 1 tablet (1 tablet Oral Given 5/9/21 0819)   ipratropium (ATROVENT) 0.02 % nebulizer solution 0.5 mg (0.5 mg Nebulization Given 5/7/21 0841)   PHENobarbital (LUMINAL) 300.95 mg in sodium chloride 0.9 % 100 mL IVPB (0 mg Intravenous Stopped 5/8/21 0115)     Followed by   PHENobarbital (LUMINAL) tablet 64.8 mg (64.8 mg Oral Given 5/8/21 2028) Followed by   PHENobarbital (LUMINAL) tablet 32.4 mg (32.4 mg Oral Given 5/9/21 0819)   potassium chloride (KLOR-CON M) extended release tablet 40 mEq ( Oral See Alternative 5/8/21 1147)     Or   potassium bicarb-citric acid (EFFER-K) effervescent tablet 40 mEq ( Oral See Alternative 5/8/21 1147)     Or   potassium chloride 10 mEq/100 mL IVPB (Peripheral Line) (10 mEq Intravenous New Bag 5/8/21 1147)   0.9 % sodium chloride bolus (0 mLs Intravenous Stopped 5/6/21 2100)   LORazepam (ATIVAN) injection 1 mg (1 mg Intravenous Given 5/6/21 2244)   dextrose 5% bolus 1,000 mL (0 mLs Intravenous Stopped 5/7/21 0254)   desmopressin (DDAVP) injection 2 mcg (2 mcg Intravenous Given 5/7/21 0035)   dextrose 5 % 1,000 mL infusion ( Intravenous Stopped 5/7/21 0545)         Patient was seen independently by myself. The patient's final impression and disposition and plan was determined by myself. Strict return precautions and follow up instructions were discussed with the patient prior to discharge, with which the patient agrees. Physical assessment findings, diagnostic testing(s) if applicable, and vital signs reviewed with patient/patient representative. Questions answered. Medications asdirected, including OTC medications for supportive care. Education provided on medications. Differential diagnosis(s) discussed with patient/patient representative. Home care/self care instructions reviewed withpatient/patient representative. Patient is to follow-up with family care provider in 2-3 days if no improvement. Patient is to go to the emergency department if symptoms worsen. Patient/patient representative isaware of care plan, questions answered, verbalizes understanding and is in agreement. CRITICAL CARE:   None    CONSULTS:  Hospitalist    PROCEDURES:  None    FINAL IMPRESSION     1. Acute alcoholic intoxication with complication ([facility] [address]    2. Hyponatremia    3.  Transaminitis          DISPOSITION/PLAN

## 2021-05-06 NOTE — ED NOTES
Pt up in bed with blankets over bottom half. Patient updated on plan of care at this time and expresses no concerns regarding treatment. Patient VSS. Respirations are regular and unlabored, patient is alert and oriented x4. Patient bed rails up x2 and call light within reach. Will continue to monitor.        Ruddy Hoang RN  05/06/21 1970

## 2021-05-07 LAB
AMPHETAMINE+METHAMPHETAMINE URINE SCREEN: NEGATIVE
BARBITURATE QUANTITATIVE URINE: NEGATIVE
BENZODIAZEPINE QUANTITATIVE URINE: NEGATIVE
BILIRUBIN URINE: NEGATIVE
BLOOD, URINE: NEGATIVE
CANNABINOID QUANTITATIVE URINE: NEGATIVE
CHARACTER, URINE: CLEAR
CHLORIDE, URINE: < 20 MEQ/L
COCAINE METABOLITE QUANTITATIVE URINE: NEGATIVE
COLOR: YELLOW
GLUCOSE URINE: NEGATIVE MG/DL
HAV IGM SER IA-ACNC: NEGATIVE
HEPATITIS B CORE IGM ANTIBODY: NEGATIVE
HEPATITIS B SURFACE ANTIGEN: NEGATIVE
HEPATITIS C ANTIBODY: NEGATIVE
KETONES, URINE: NEGATIVE
LEUKOCYTE ESTERASE, URINE: NEGATIVE
NITRITE, URINE: NEGATIVE
OPIATES, URINE: NEGATIVE
OSMOLALITY URINE: 170 MOSMOL/KG (ref 250–750)
OXYCODONE: NEGATIVE
PH UA: 6 (ref 5–9)
PHENCYCLIDINE QUANTITATIVE URINE: NEGATIVE
PROTEIN UA: NEGATIVE
SODIUM BLD-SCNC: 120 MEQ/L (ref 135–145)
SODIUM BLD-SCNC: 121 MEQ/L (ref 135–145)
SODIUM BLD-SCNC: 123 MEQ/L (ref 135–145)
SODIUM BLD-SCNC: 124 MEQ/L (ref 135–145)
SODIUM BLD-SCNC: 125 MEQ/L (ref 135–145)
SODIUM BLD-SCNC: 126 MEQ/L (ref 135–145)
SODIUM BLD-SCNC: 127 MEQ/L (ref 135–145)
SODIUM URINE: < 20 MEQ/L
SPECIFIC GRAVITY, URINE: 1 (ref 1–1.03)
URIC ACID: 6.8 MG/DL (ref 3.7–7)
UROBILINOGEN, URINE: 0.2 EU/DL (ref 0–1)

## 2021-05-07 PROCEDURE — 84550 ASSAY OF BLOOD/URIC ACID: CPT

## 2021-05-07 PROCEDURE — 2580000003 HC RX 258: Performed by: INTERNAL MEDICINE

## 2021-05-07 PROCEDURE — 82436 ASSAY OF URINE CHLORIDE: CPT

## 2021-05-07 PROCEDURE — 94640 AIRWAY INHALATION TREATMENT: CPT

## 2021-05-07 PROCEDURE — 2060000000 HC ICU INTERMEDIATE R&B

## 2021-05-07 PROCEDURE — 2580000003 HC RX 258: Performed by: FAMILY MEDICINE

## 2021-05-07 PROCEDURE — 99223 1ST HOSP IP/OBS HIGH 75: CPT | Performed by: FAMILY MEDICINE

## 2021-05-07 PROCEDURE — 99255 IP/OBS CONSLTJ NEW/EST HI 80: CPT | Performed by: INTERNAL MEDICINE

## 2021-05-07 PROCEDURE — 6360000002 HC RX W HCPCS: Performed by: INTERNAL MEDICINE

## 2021-05-07 PROCEDURE — 36415 COLL VENOUS BLD VENIPUNCTURE: CPT

## 2021-05-07 PROCEDURE — 84300 ASSAY OF URINE SODIUM: CPT

## 2021-05-07 PROCEDURE — 81003 URINALYSIS AUTO W/O SCOPE: CPT

## 2021-05-07 PROCEDURE — 80307 DRUG TEST PRSMV CHEM ANLYZR: CPT

## 2021-05-07 PROCEDURE — 6360000002 HC RX W HCPCS: Performed by: FAMILY MEDICINE

## 2021-05-07 PROCEDURE — 6370000000 HC RX 637 (ALT 250 FOR IP): Performed by: FAMILY MEDICINE

## 2021-05-07 PROCEDURE — 84295 ASSAY OF SERUM SODIUM: CPT

## 2021-05-07 PROCEDURE — 94760 N-INVAS EAR/PLS OXIMETRY 1: CPT

## 2021-05-07 PROCEDURE — 83935 ASSAY OF URINE OSMOLALITY: CPT

## 2021-05-07 RX ORDER — SODIUM CHLORIDE 0.9 % (FLUSH) 0.9 %
10 SYRINGE (ML) INJECTION EVERY 12 HOURS SCHEDULED
Status: DISCONTINUED | OUTPATIENT
Start: 2021-05-07 | End: 2021-05-10 | Stop reason: HOSPADM

## 2021-05-07 RX ORDER — LISINOPRIL 20 MG/1
20 TABLET ORAL DAILY
Status: DISCONTINUED | OUTPATIENT
Start: 2021-05-07 | End: 2021-05-10 | Stop reason: HOSPADM

## 2021-05-07 RX ORDER — SODIUM CHLORIDE 0.9 % (FLUSH) 0.9 %
10 SYRINGE (ML) INJECTION PRN
Status: DISCONTINUED | OUTPATIENT
Start: 2021-05-07 | End: 2021-05-10 | Stop reason: HOSPADM

## 2021-05-07 RX ORDER — PHENOBARBITAL SODIUM 65 MG/ML
130 INJECTION INTRAMUSCULAR
Status: DISCONTINUED | OUTPATIENT
Start: 2021-05-07 | End: 2021-05-07

## 2021-05-07 RX ORDER — ACETAMINOPHEN 325 MG/1
650 TABLET ORAL EVERY 6 HOURS PRN
Status: DISCONTINUED | OUTPATIENT
Start: 2021-05-07 | End: 2021-05-10 | Stop reason: HOSPADM

## 2021-05-07 RX ORDER — PHENOBARBITAL 32.4 MG/1
32.4 TABLET ORAL 2 TIMES DAILY
Status: COMPLETED | OUTPATIENT
Start: 2021-05-09 | End: 2021-05-10

## 2021-05-07 RX ORDER — SODIUM CHLORIDE 9 MG/ML
25 INJECTION, SOLUTION INTRAVENOUS PRN
Status: DISCONTINUED | OUTPATIENT
Start: 2021-05-07 | End: 2021-05-10 | Stop reason: HOSPADM

## 2021-05-07 RX ORDER — DEXTROSE MONOHYDRATE 50 MG/ML
INJECTION, SOLUTION INTRAVENOUS CONTINUOUS
Status: DISCONTINUED | OUTPATIENT
Start: 2021-05-07 | End: 2021-05-07

## 2021-05-07 RX ORDER — METOPROLOL TARTRATE 50 MG/1
50 TABLET, FILM COATED ORAL 2 TIMES DAILY
Status: DISCONTINUED | OUTPATIENT
Start: 2021-05-07 | End: 2021-05-10 | Stop reason: HOSPADM

## 2021-05-07 RX ORDER — PANTOPRAZOLE SODIUM 40 MG/1
40 TABLET, DELAYED RELEASE ORAL DAILY
Status: DISCONTINUED | OUTPATIENT
Start: 2021-05-07 | End: 2021-05-10 | Stop reason: HOSPADM

## 2021-05-07 RX ORDER — FOLIC ACID 1 MG/1
1 TABLET ORAL DAILY
Status: DISCONTINUED | OUTPATIENT
Start: 2021-05-07 | End: 2021-05-10 | Stop reason: HOSPADM

## 2021-05-07 RX ORDER — PHENOBARBITAL 32.4 MG/1
64.8 TABLET ORAL 2 TIMES DAILY
Status: COMPLETED | OUTPATIENT
Start: 2021-05-08 | End: 2021-05-08

## 2021-05-07 RX ORDER — DESMOPRESSIN ACETATE 4 UG/ML
2 INJECTION, SOLUTION INTRAVENOUS; SUBCUTANEOUS ONCE
Status: COMPLETED | OUTPATIENT
Start: 2021-05-07 | End: 2021-05-07

## 2021-05-07 RX ORDER — THIAMINE HYDROCHLORIDE 100 MG/ML
100 INJECTION, SOLUTION INTRAMUSCULAR; INTRAVENOUS DAILY
Status: DISCONTINUED | OUTPATIENT
Start: 2021-05-07 | End: 2021-05-07 | Stop reason: ALTCHOICE

## 2021-05-07 RX ORDER — PHENOBARBITAL SODIUM 65 MG/ML
260 INJECTION INTRAMUSCULAR
Status: DISCONTINUED | OUTPATIENT
Start: 2021-05-07 | End: 2021-05-07

## 2021-05-07 RX ORDER — QUETIAPINE FUMARATE 25 MG/1
50 TABLET, FILM COATED ORAL NIGHTLY
Status: DISCONTINUED | OUTPATIENT
Start: 2021-05-07 | End: 2021-05-10 | Stop reason: HOSPADM

## 2021-05-07 RX ORDER — MULTIVITAMIN WITH IRON
1 TABLET ORAL DAILY
Status: DISCONTINUED | OUTPATIENT
Start: 2021-05-07 | End: 2021-05-10 | Stop reason: HOSPADM

## 2021-05-07 RX ORDER — ACETAMINOPHEN 650 MG/1
650 SUPPOSITORY RECTAL EVERY 6 HOURS PRN
Status: DISCONTINUED | OUTPATIENT
Start: 2021-05-07 | End: 2021-05-10 | Stop reason: HOSPADM

## 2021-05-07 RX ORDER — GAUZE BANDAGE 2" X 2"
100 BANDAGE TOPICAL DAILY
Status: DISCONTINUED | OUTPATIENT
Start: 2021-05-07 | End: 2021-05-07

## 2021-05-07 RX ORDER — LANOLIN ALCOHOL/MO/W.PET/CERES
100 CREAM (GRAM) TOPICAL DAILY
Status: DISCONTINUED | OUTPATIENT
Start: 2021-05-07 | End: 2021-05-10 | Stop reason: HOSPADM

## 2021-05-07 RX ADMIN — PHENOBARBITAL SODIUM 260 MG: 65 INJECTION INTRAMUSCULAR at 02:14

## 2021-05-07 RX ADMIN — PHENOBARBITAL SODIUM 300.95 MG: 65 INJECTION INTRAMUSCULAR at 20:47

## 2021-05-07 RX ADMIN — DESMOPRESSIN ACETATE 2 MCG: 4 SOLUTION INTRAVENOUS at 00:35

## 2021-05-07 RX ADMIN — LISINOPRIL 20 MG: 20 TABLET ORAL at 09:11

## 2021-05-07 RX ADMIN — ENOXAPARIN SODIUM 40 MG: 40 INJECTION SUBCUTANEOUS at 09:11

## 2021-05-07 RX ADMIN — PHENOBARBITAL SODIUM 130 MG: 65 INJECTION INTRAMUSCULAR at 04:47

## 2021-05-07 RX ADMIN — PANTOPRAZOLE SODIUM 40 MG: 40 TABLET, DELAYED RELEASE ORAL at 09:11

## 2021-05-07 RX ADMIN — FOLIC ACID 1 MG: 1 TABLET ORAL at 09:11

## 2021-05-07 RX ADMIN — DEXTROSE MONOHYDRATE: 50 INJECTION, SOLUTION INTRAVENOUS at 07:42

## 2021-05-07 RX ADMIN — QUETIAPINE FUMARATE 50 MG: 25 TABLET ORAL at 20:47

## 2021-05-07 RX ADMIN — DEXTROSE MONOHYDRATE: 50 INJECTION, SOLUTION INTRAVENOUS at 03:12

## 2021-05-07 RX ADMIN — Medication 100 MG: at 09:11

## 2021-05-07 RX ADMIN — SODIUM CHLORIDE, PRESERVATIVE FREE 10 ML: 5 INJECTION INTRAVENOUS at 19:52

## 2021-05-07 RX ADMIN — METOPROLOL TARTRATE 50 MG: 50 TABLET, FILM COATED ORAL at 19:51

## 2021-05-07 RX ADMIN — IPRATROPIUM BROMIDE 0.5 MG: 0.5 SOLUTION RESPIRATORY (INHALATION) at 08:41

## 2021-05-07 RX ADMIN — Medication 1 TABLET: at 09:11

## 2021-05-07 RX ADMIN — METOPROLOL TARTRATE 50 MG: 50 TABLET, FILM COATED ORAL at 09:11

## 2021-05-07 RX ADMIN — PHENOBARBITAL SODIUM 300.95 MG: 65 INJECTION INTRAMUSCULAR at 17:41

## 2021-05-07 RX ADMIN — DEXTROSE MONOHYDRATE 1000 ML: 50 INJECTION, SOLUTION INTRAVENOUS at 00:57

## 2021-05-07 ASSESSMENT — PAIN SCALES - GENERAL
PAINLEVEL_OUTOF10: 0
PAINLEVEL_OUTOF10: 0

## 2021-05-07 NOTE — PLAN OF CARE
Hospitalist Update Note    Patient seen at bedside. History of withdrawal with seizures. Heavy drinking last week. Denies any hallucinations or tremor now, but given time from last drink and high risk will start pheno taper. Nephrology following for sodium levels - still being monitored closely, stable after morning overcorrection. Tachycardia improved. CMP and CBC in am. q3h NaSerge Jones  5/7/2021  6:44 PM

## 2021-05-07 NOTE — ED NOTES
Pt up in bed with family at the bedside. Patient updated on plan of care and medicated per MAR. Patient VSS. Respirations are regular and unlabored, patient is alert and oriented x4. Patient bed rails up x2 and call light within reach. Will continue to monitor.        Ronald Weston RN  05/06/21 9222

## 2021-05-07 NOTE — CONSULTS
Brief Intervention and Referral to Treatment Summary    Patient was provided PHQ-9, AUDIT and DAST Screening:      PHQ-9 Score: 4  AUDIT Score:  25  DAST Score:  0    Patients substance use is considered     Low Risk/Healthy  Moderate Risk  Harmful  Dependent X    Patients depression is considered:     Minimal  Mild  X  Moderate  Moderately Severe  Severe    Brief Education Was Provided    Patient was receptive        Brief Intervention Is Provided (Only for AUDIT or DAST)     Patient reports readiness to decrease and/or stop use and a plan was discussed       Recommendations/Referrals for Brief and/or Specialized Treatment Provided to Patient   Pt alert, oriented, reported a history of alcohol dependence. Pt reportedly drank alcohol occasionally until a week ago when he started drinking alcohol daily again, 10-20 beers daily, no drug use, mild depression. Pt plan to attend AA meetings and look into rehab at [facility], contact information given by Clinician.  Pt receptive to additional resources given (OP MH/AOD treatment, inpatient rehab, [facility])

## 2021-05-07 NOTE — ED NOTES
Patient urinal emptied at this time. Patient provided new urinal. Patient VSS. Respirations are regular and unlabored, patient is alert and oriented x4. Patient bed rails up x2 and call light within reach. Will continue to monitor.        Yanique Tapia RN  05/06/21 2043

## 2021-05-07 NOTE — ED NOTES
Pt resting in bed. VS reassessed. RR reg. Lab at bedside to draw sodium.  Will continue to monitor      Lv Whitman RN  05/07/21 9074

## 2021-05-07 NOTE — ED NOTES
Pt resting in bed with eyes closed and RR reg. Pt woke up to verbal stimuli. Pt CIWA scale a 2. Pt then closed eyes again right away. No acute distress/withdraw symptoms noted.       Magaly Drew RN  05/07/21 7929

## 2021-05-07 NOTE — CARE COORDINATION
5/7/21, 11:49 AM EDT    DISCHARGE PLANNING EVALUATION  Consult defered. KARLIE SW will address rehab concerns.

## 2021-05-07 NOTE — ED NOTES
Patient transported to  [id number]  by wheelchair in stable condition. Patient monitored on cardiac telemetry. IV  line is patent.         Feliz Barrera, EMT-P  05/07/21 5137

## 2021-05-07 NOTE — PLAN OF CARE
Problem: Falls - Risk of:  Goal: Will remain free from falls  Description: Will remain free from falls  Outcome: Met This Shift  Note: Patient remained free from falls this shift. Bed is in low position with alarm on and siderails up x2. Patient ambulates with one assist. Education given on use of call light and patient voiced understanding. Patient uses call light appropriately. Call light and beside table within reach. Arm band and falling star in place. Goal: Absence of physical injury  Description: Absence of physical injury  Outcome: Met This Shift  Note: Patient remained free from falls this shift. Bed is in low position with alarm on and siderails up x2. Patient ambulates with one assist. Education given on use of call light and patient voiced understanding. Patient uses call light appropriately. Call light and beside table within reach. Arm band and falling star in place. Patient remains in seizure precautions as well. Problem: Skin Integrity:  Goal: Will show no infection signs and symptoms  Description: Will show no infection signs and symptoms  Outcome: Met This Shift  Note: No signs of new skin breakdown noted with each assessment this shift. Patient able to turn self in bed. Goal: Absence of new skin breakdown  Description: Absence of new skin breakdown  Outcome: Met This Shift  Note: No signs of new skin breakdown noted with each assessment this shift. Patient able to turn self in bed. Problem: Safety:  Goal: Free from accidental physical injury  Description: Free from accidental physical injury  Outcome: Met This Shift  Note: Pt remains free from accidental injury this shift. Pt remains in fall and seizure precautions. Goal: Ability to remain free from injury will improve  Description: Ability to remain free from injury will improve  Outcome: Met This Shift  Note: Pt remains free from accidental injury this shift. Pt remains in fall and seizure precautions.       Problem: Discharge Planning:  Goal: Patients continuum of care needs are met  Description: Patients continuum of care needs are met  Outcome: Met This Shift  Note: Pt from home with friend. Plans to return there upon discharge. Plan of Care discussed with patient and family. They verbalized understanding.

## 2021-05-07 NOTE — CONSULTS
[facility]    [address], [address]  [facility]  5/7/2021 9:07 AM    Pt Name:    Suzi Bruce  MRN:     [id number]   [id number]  YOB: 1975  Admit Date:    5/6/2021  6:27 PM  Primary Care Physician:  NILAM Cote - CNP    CSN Number:   [id number]    Reason for Consult:  Hyponatremia  Requesting provider:  Dr. Odilon Bullard    History:   The patient is a 40 y.o. white male with history of severe alcohol abuse, hypertension, dyslipidemia, smoking who was admitted from the emergency room where he presented with complaints of nausea, nonbloody emesis as well as diarrhea. No alleviating or aggravating factors. Patient reported feeling shaky and also reported palpitations. He admitted to alcohol abuse. He reports drinking 20-30 beers sometimes on regular basis. Patient was recently in the hospital with severe hyponatremia. Apparently patient was given a bolus of normal saline. His sodium level went up to 127. Nephrology was consulted for further evaluation management. Case was discussed with the ER staff last night. Overcorrection was managed with the DDAVP and D5W bolus. Patient was seen and examined earlier this morning. He is awake and alert. Not in any acute distress. Reports some nausea but denies any chest pain or any palpitations at this time. Denies any dysuria. He is awake and alert at this time. Currently receiving D5W infusion. Urine output noted.     Past Medical History:  Past Medical History:   Diagnosis Date    [facility]     Depression     Diabetes mellitus ([facility] [facility]     ETOH abuse     GERD (gastroesophageal reflux disease)     Hyperlipidemia     Hypertension     Liver disease     Seizures (HCC)     etoh wdl       Past Surgical History:  Past Surgical History:   Procedure Laterality Date    ENDOSCOPY, COLON, DIAGNOSTIC         Family History:  Family History   Problem Relation Age of Onset    High Blood Pressure Father     Cancer Father         Lung Cancer    Alcohol Abuse Father     High Blood Pressure Brother     Diabetes Mother     Heart Disease Mother         Stent Placement    Arthritis Mother     Depression Mother     High Blood Pressure Mother     High Cholesterol Mother     Alcohol Abuse Paternal Uncle        Social History:  Social History     Socioeconomic History    Marital status: Single     Spouse name: Not on file    Number of children: 0    Years of education: 15    Highest education level: Not on file   Occupational History     Employer: [address] Needs    Financial resource strain: Not hard at all   Naye-Katja insecurity     Worry: Never true     Inability: Never true    Transportation needs     Medical: No     Non-medical: Yes   Tobacco Use    Smoking status: Current Every Day Smoker     Packs/day: 1.00     Years: 27.00     Pack years: 27.00     Types: E-Cigarettes, Cigarettes    Smokeless tobacco: Former User    Tobacco comment: vape   Substance and Sexual Activity    Alcohol use: Not Currently     Alcohol/week: 140.0 standard drinks     Types: 140 Cans of beer per week     Frequency: Patient refused     Binge frequency: Patient refused     Comment: 12 pk beer & 6 hard lemonade daily -last 8:30 3/16/21    Drug use: No    Sexual activity: Yes   Lifestyle    Physical activity     Days per week: 0 days     Minutes per session: 0 min    Stress:  Only a little   Relationships    Social connections     Talks on phone: More than three times a week     Gets together: More than three times a week     Attends [employer] service: Never     Active member of club or organization: Yes     Attends meetings of clubs or organizations: More than 4 times per year     Relationship status: Never     Intimate partner violence     Fear of current or ex partner: Not on file     Emotionally abused: Not on file     Physically abused: Not on vomiting and diarrhea. Head: Negative for headaches  Eyes: Negative for blurry vision or discharge  Ears: Negative for ear pain or hearing changes  Nose: Negative for runny nose or epistaxis  Respiratory: Negative for shortness of breath. Negative for cough or sputum production. Negative for hemoptysis  Cardiovascular: Negative for chest pain  GI: Positive for nausea, vomiting and diarrhea. Negative for hematochezia and melena  : Negative for discharge, dysuria  Skin: Negative for rash  Musculoskeletal: Negative for joint pain, moves all ext  Neuro: Negative for numbness or tingling, negative for slurred speech    All other review of systems were reviewed and negative    Current Meds:  Infusion:    sodium chloride      dextrose 50 mL/hr at 05/07/21 1204     Meds:    folic acid  1 mg Oral Daily    lisinopril  20 mg Oral Daily    metoprolol tartrate  50 mg Oral BID    QUEtiapine  50 mg Oral Nightly    pantoprazole  40 mg Oral Daily    sodium chloride flush  10 mL Intravenous 2 times per day    enoxaparin  40 mg Subcutaneous Daily    thiamine  100 mg Oral Daily    multivitamin  1 tablet Oral Daily     Meds prn: PHENobarbital **OR** PHENobarbital **OR** PHENobarbital IVPB, sodium chloride flush, sodium chloride, acetaminophen **OR** acetaminophen, ipratropium     Allergies/Intolerances: ALLERGIES: Patient has no known allergies. 24HR INTAKE/OUTPUT:      Intake/Output Summary (Last 24 hours) at 5/7/2021 0907  Last data filed at 5/7/2021 0254  Gross per 24 hour   Intake 1950 ml   Output --   Net 1950 ml     I/O last 3 completed shifts: In: 1950 [IV Piggyback:1950]  Out: -   No intake/output data recorded. Admission weight: 245 lb (111.1 kg)  Wt Readings from Last 3 Encounters:   05/07/21 245 lb (111.1 kg)   04/21/21 246 lb 9.6 oz (111.9 kg)   04/16/21 245 lb 14.4 oz (111.5 kg)     Body mass index is 34.17 kg/m².     Physical Examination:  VITALS:   Vitals:    05/07/21 9214 05/07/21 1574 05/07/21 0840 05/07/21 0845   BP: (!) 149/71 133/63  (!) 141/80   Pulse: 114 115  113   Resp: 16 16 16 16   Temp:  98.6 °F (37 °C)  98.5 °F (36.9 °C)   TempSrc:  Oral  Oral   SpO2: 93%  91% 94%   Weight:       Height:         Weight:   Wt Readings from Last 3 Encounters:   05/07/21 245 lb (111.1 kg)   04/21/21 246 lb 9.6 oz (111.9 kg)   04/16/21 245 lb 14.4 oz (111.5 kg)     Constitutional and General Appearance: alert and cooperative with exam, appears comfortable, no distress, not diaphoretic  Eyes: no icteric sclera in left eye or right eye,  no pallor conjunctiva in left or right eye, no discharge seen from left eye or right eye  Ears and Nose: normal external appearance of left and right ear. Normal external appearance of nose. No active drainage from nose. Oral: moist oral mucus membranes  Neck: No jugular venous distention  Lungs: Air entry B/L, no crackles or rales, no use of accessory muscles or labored breathing  Heart:  S1, S2  Extremities: no pitting LE edema, no tenderness  GI: soft, non-tender, no guarding, no distention  Skin: no rash seen on exposed extremities  Musculo: moves all extremities  Neuro: no slurred speech, no facial drooping  Psychiatric: Normal mood and affect, Not agitated    Lab Data  CBC:   Recent Labs     05/06/21  1846   WBC 10.5   HGB 14.7   HCT 39.2*        BMP:  Recent Labs     05/06/21 2013 05/06/21 2013 05/07/21  0223 05/07/21  0543 05/07/21  0734   *   < > 126* 123* 125*   K 3.9  --   --   --   --    CL 80*  --   --   --   --    CO2 17*  --   --   --   --    BUN 14  --   --   --   --    CREATININE 0.5  --   --   --   --    GLUCOSE 71  --   --   --   --    CALCIUM 8.8  --   --   --   --     < > = values in this interval not displayed. PTH: @PTH@  TSH:   Recent Labs     05/06/21  1846   TSH 0.589     HgBa1c: No results for input(s): LABA1C in the last 72 hours.   Hepatic:   Recent Labs     05/06/21 2013   LABALBU 3.9   *   ALT 97*   BILITOT 3.4*   ALKPHOS 220* Additional Labs: Urine sodium less than 20, urine osmolality 126, urine chloride less than 20  UA no blood, no protein    Old labs reviewed  Patient was recently in the hospital about a month ago with sodium level of 115    Impression and Plan:  1. Severe hyponatremia. Sodium level was 117 on admission. Hyponatremia is secondary to beer potomania type physiology with concurrent alcohol ketoacidosis. Patient given a bolus of normal saline in ER. He subsequently overcorrected. Overcorrection was managed with the D5W and DDAVP. Gave additional bolus of D5W 1 L overnight. Isotonic saline was stopped. Latest sodium at this time is 120. We will aim for very slow correction. Aim is not to correct by more than 6 to 8 mEq in first 24 hours. Monitor his urine output closely. We will continue to monitor sodium levels every 2-3 hours for now and adjust as needed. Monitor urine output closely. If patient goes in to polyuria/free water diuresis then may require further doses of DDAVP and/or D5W as needed to prevent overcorrection. I also discussed with patient his severity of hyponatremia and his underlying risk factors. Patient is at very high risk of having severe complications with recurrent hyponatremia. Case was discussed in detail with the patient. He was strongly advised alcohol cessation. 2. History of severe alcohol abuse. Monitor for withdrawal  3. Probable alcoholic ketoacidosis  4. Alcoholic hepatitis  5. History of hypertension. Would not recommend thiazide diuretics in this patient  6. Chronic tobacco abuse    Thank you for the consult. Please feel free to call me if you have any questions.    Case discussed with nursing staff  Case discussed with the ER staff last night    Aidan Betancourt MD  [facility]

## 2021-05-07 NOTE — ED NOTES
Called Dr. Puja Walden. She stated to still give phenobarbital. This RN verbalized understanding. Pt tolerated medication. No distress noted.  Will continue to monitor       Dimitri Mckeon RN  05/07/21 2219

## 2021-05-07 NOTE — CARE COORDINATION
5/7/21, 7:08 AM EDT  DISCHARGE PLANNING EVALUATION:    [address]       Admitted: 5/6/2021/ [facility] day: 0   Location: -04/004-A Reason for admit: Hyponatremia [E87.1]   PMH:  has a past medical history of COPD (chronic obstructive pulmonary disease) ([facility] [address] Depression, Diabetes mellitus ([facility] [address] ETOH abuse, GERD (gastroesophageal reflux disease), Hyperlipidemia, Hypertension, Liver disease, and Seizures ([facility] [address] Procedure: none  Barriers to Discharge:  From ED, Na 117 on admission, Nephrology consult, neuro checks, Addiction Service Consult, seizure precautions, Lovenox, Phenobarbital protocol. PCP: NILAM Fullre CNP  Readmission Risk Score: 24%    Patient Goals/Plan/Treatment Preferences: Met with Laura Valenzuela. He currently lives at home with his mother. Plan is to return home at discharge. He denies need for DME and declines HH. Transportation/Food Security/Housekeeping Addressed:  No issues identified.

## 2021-05-07 NOTE — ED NOTES
ED nurse-to-nurse bedside report    Chief Complaint   Patient presents with    Alcohol Problem    Hematuria      LOC: alert and orientated to name, place, date  Vital signs   Vitals:    05/06/21 2304 05/07/21 0031 05/07/21 0045 05/07/21 0057   BP: (!) 147/59  (!) 146/71    Pulse: 123  135 135   Resp: 13  15    Temp:       TempSrc:       SpO2: 96%  95%    Weight:  245 lb (111.1 kg)     Height:  5' 11\" (1.803 m)        Pain:    Pain Interventions: NA  Pain Goal: NA  Oxygen: No    Current needs required RA   Telemetry: Yes  LDAs:   Peripheral IV 05/06/21 Right Hand (Active)   Site Assessment Dry;Clean; Intact 05/06/21 1847   Line Status Blood return noted;Normal saline locked; Flushed 05/06/21 1847   Dressing Status Clean;Dry; Intact 05/06/21 1847       Peripheral IV 05/07/21 Left Hand (Active)   Site Assessment Clean;Dry; Intact 05/07/21 0044   Line Status Blood return noted;Normal saline locked; Flushed 05/07/21 0044   Dressing Status Clean;Dry; Intact 05/07/21 0044       Peripheral IV 05/07/21 Left Forearm (Active)   Site Assessment Clean; Intact;Dry 05/07/21 0053   Line Status Normal saline locked;Brisk blood return 05/07/21 0053   Dressing Status Clean; Intact;Dry 05/07/21 0053   Dressing Intervention New 05/07/21 0053     Continuous Infusions:   Mobility: Independent  Tapia Fall Risk Score:    Fall Risk 12/22/2020 1/17/2020   2 or more falls in past year? no no   Fall with injury in past year? no no     Fall Interventions: Side rails and call light  Report given to: Demetrio Houston, [address] [place] [address] Drive, RN  05/07/21 0125

## 2021-05-07 NOTE — H&P
History & Physical       Patient: Hugo Zhang  YOB: 1975    MRN: [id number]     Acct: [de-identified]    PCP: NILAM Aquino CNP    Date of Admission: 5/6/2021    Date of Service: Patient seen / examined on 05/07/21 and admitted to inpatient with expected LOS greater than two midnights due to medical therapy. ASSESSMENT / PLAN:    Severe hypotonic euvolemic hyponatremia  Suspect chronic / last known Na 139 on 4/21/21. Recurrent issue. Secondary to beer potomania +/- extrarenal losses, HCTZ use. Presenting Na 117 with overcorrection to 127 in the ED following 1L NS administration. Mentation appropriate. No seizures. Admit to stepdown unit. 1L D5W bolus and 2 mcg DDAVP administered with improvement of Na to 126. Contacted Dr. Ileana Mak / additional 500 cc D5W bolus administered with repeat Na 123. Trend Na Q2H. Urine studies, uric acid pending. 0.9 NS discontinued. Encourage oral intake / 1200 cc fluid restriction. Monitor I/O. Nephrology following. Acute alcohol withdrawal / chronic alcoholism / recent diagnosis of Wernicke encephalopathy  EtOH 0.30 on arrival. High risk for complicated JUDE per PAWSS. Phenobarbital protocol initiated. Routine neurologic checks. Maintain seizure precautions. Thiamine, folate, daily MTV resumed. BMP/Mg/Phos daily. Monitor closely for refeeding syndrome. CM/SW, Addiction Services consulted.  on cessation. HAGMA  Secondary to alcoholic ketoacidosis. Anticipate improvement with hydration. Will trend. Alcoholic hepatitis / chronic transaminitis  Cirrhosis, possible hepatic steatosis noted on recent liver US 4/9/21. Viral hepatitis panel negative. Recommend GI referral at discharge to establish care. GERD  PPI resumed. Primary HTN  Mildly hypertensive on arrival. HCTZ held for hyponatremia. BB, ACE-I resumed with hold parameters. Monitor BP. Chronic tobacco abuse  Cessation counseling. Offer NRT.     COPD (no baseline supplemental oxygen requirement)  Without exacerbation. Atrovent as needed (tachycardic). Depression / ?bipolar disorder  Seroquel resumed. May benefit from initiation of SSRI if not tried previously. Chief Complaint: alcohol problem    History of Present Illness:  39 y/o M PMHx chronic alcoholism, hyponatremia, alcoholic hepatitis, chronic tobacco abuse, HTN, COPD (no baseline supplemental oxygen requirement) and GERD -- presents to [facility] with a chief complaint of alcohol problem. History obtained from the patient. Patient presents to [facility] with complaints of alcohol abuse and concern for acute withdrawal / drank 30 x 16 oz Bud Lights around 1500 this afternoon. Subsequently experienced recurrent NBNB emesis and several episodes of watery diarrhea, tremors and palpitations. No fevers/chills, headache, lightheadedness/dizziness, chest pain, shortness of breath or loss of consciousness. Endorse previous hx hallucinations / withdrawal seizures / DTs. Has attended rehab in the past / states he remains interested in getting help and quitting because alcohol \"will kill [him] if [he] doesn't. \" Patient reportedly felt motivated to quit upon most recent discharge from [facility], but became tempted to have \"one 24 oz\" and proceeded to keep drinking. No BZD use. Patient was recently hospitalized at [facility] for this issue / diagnosed with Wernicke encephalopathy and discharged on high-dose thiamine. He was also found to have severe hyponatremia secondary to beer potomania, HCTZ use and possible liver disease, which required D5W and DDAVP administration for early overcorrection due to IVF administration and subsequent free water diuresis. Of note --  - Patient reports PCP recently initiated PO therapy for bipolar disorder  - Patient reported intermittent RLQ pain to ED provider with occasional radiation to the R flank + dark-colored urine / no idania blood. Denies abdominal pain upon my interview.     ED course: afebrile, hypertensive (150s/90s), tachycardic (110s-130s), RR nml with SpO2 97% on RA. Workup remarkable for: Na 117 (BG 71), K 3.9, Cl 80, CO2: 17 (AG 20), ALT 97, , AlkP 220, Tbili 3.4 (direct 2.1), EtOH 0.30. SOsm 235. UA negative. No EKG performed. Received 1L NS bolus in the ED with prompt overcorrection of Na to 127. Hospitalist service contacted for admission. Please see A&P for additional details. Past Medical History:    Diagnosis Date    COPD (chronic obstructive pulmonary disease) ([facility] [address]     Depression     Chronic ETOH abuse     GERD (gastroesophageal reflux disease)     Hyperlipidemia     Hypertension     Liver disease     Seizures (HCC)     Alcohol withdrawal   Bipolar disorder  Wernicke's encephalopathy    Past Surgical History:    Procedure Laterality Date    ENDOSCOPY, COLON, DIAGNOSTIC        Medications Prior to Admission:  Medication Sig    QUEtiapine (SEROQUEL) 50 MG tablet Take 1 tablet by mouth nightly    vitamin B-1 (THIAMINE) 100 MG tablet Take 1 tablet by mouth daily    pantoprazole (PROTONIX) 40 MG tablet Take 1 tablet by mouth daily    potassium chloride (KLOR-CON M) 20 MEQ extended release tablet Take 1 tablet by mouth daily    metoprolol tartrate (LOPRESSOR) 50 MG tablet Take 1 tablet by mouth 2 times daily    lisinopril-hydroCHLOROthiazide (PRINZIDE;ZESTORETIC) 20-25 MG per tablet Take 1 tablet by mouth daily    blood glucose test strips (ASCENSIA AUTODISC VI;ONE TOUCH ULTRA TEST VI) strip Test as needed. Dispense One Touch verio Test Strips. Dx: Type 2 diabetes (236.01)    folic acid (FOLVITE) 1 MG tablet Take 1 tablet by mouth daily    albuterol sulfate HFA (PROVENTIL HFA) 108 (90 Base) MCG/ACT inhaler Inhale 2 puffs into the lungs every 4 hours as needed for Wheezing or Shortness of Breath (Space out to every 6 hours as symptoms improve) Space out to every 6 hours as symptoms improve.     glucose monitoring kit (FREESTYLE) monitoring kit Glucometer with test strips and lancets. Check BS once daily  #100 strips and lancets     Allergies:   Patient has no known allergies. Social History:  Socioeconomic History    Marital status: Single   Tobacco Use    Smoking status: Current Every Day Smoker     Packs/day: 1.00     Years: 27.00     Pack years: 27.00     Types: E-Cigarettes, Cigarettes    Smokeless tobacco: Former User    Tobacco comment: vape   Substance and Sexual Activity    Alcohol use: Yes - see above    Drug use: No     Family History:   Problem Relation Age of Onset    High Blood Pressure Father     Cancer Father         Lung Cancer    Alcohol Abuse Father     High Blood Pressure Brother     Diabetes Mother     Heart Disease Mother         Stent Placement    Arthritis Mother     Depression Mother     High Blood Pressure Mother     High Cholesterol Mother     Alcohol Abuse Paternal Uncle      REVIEW OF SYSTEMS:  A 14-point ROS was obtained and negative, with the exception of pertinent positives as stated in the HPI. PHYSICAL EXAM:  Vitals:    05/07/21 0057 05/07/21 0148 05/07/21 0319 05/07/21 0406   BP:  (!) 148/72 (!) 157/68 (!) 155/69   Pulse: 135 130 130 122   Resp:   13    Temp:       TempSrc:       SpO2:   98%    Weight:       Height:         General appearance: Alert / non-toxic-appearing  male. Pleasant. Cooperative. NAD. HEENT: Normocephalic / atraumatic. EOM intact. Mild scleral icterus. Neck: Supple. No JVD. Respiratory: Unlabored on RA. CTAB. No wheezes / rales / rhonchi. Cardiovascular: Tachycardic rate. Regular rhythm. Normal S1/S2. No murmurs / rubs / gallops. Abdomen: Soft / non-tender / non-distended. BS present. Musculoskeletal: No cyanosis or edema. Skin: Warm / dry. Poor turgor. No pallor / diaphoresis. No jaundice. Neurologic: A/O x 3. Speech normal. Billye Ebbing is delayed, but appropriate. Strength / sensation intact bilaterally. Tremulous. Psychiatric: Depressed affect.  Thought content / judgment / insight appear appropriate. Peripheral pulses: Normal bilaterally.     Labs:   Results for orders placed or performed during the hospital encounter of 05/06/21   Acetaminophen Level   Result Value Ref Range    Acetaminophen Level < 5.0 0.0 - 20.0 ug/mL   CBC Auto Differential   Result Value Ref Range    WBC 10.5 4.8 - 10.8 thou/mm3    RBC 4.26 (L) 4.70 - 6.10 mill/mm3    Hemoglobin 14.7 14.0 - 18.0 gm/dl    Hematocrit 39.2 (L) 42.0 - 52.0 %    MCV 92.0 80.0 - 94.0 fL    MCH 34.5 (H) 26.0 - 33.0 pg    MCHC 37.5 (H) 32.2 - 35.5 gm/dl    RDW-CV 14.8 (H) 11.5 - 14.5 %    RDW-SD 50.2 (H) 35.0 - 45.0 fL    Platelets 608 553 - 749 thou/mm3    MPV 9.1 (L) 9.4 - 12.4 fL    Seg Neutrophils 75.5 %    Lymphocytes 16.3 %    Monocytes 7.6 %    Eosinophils 0.0 %    Basophils 0.2 %    Immature Granulocytes 0.4 %    Segs Absolute 7.9 (H) 1 - 7 thou/mm3    Lymphocytes Absolute 1.7 1.0 - 4.8 thou/mm3    Monocytes Absolute 0.8 0.4 - 1.3 thou/mm3    Eosinophils Absolute 0.0 0.0 - 0.4 thou/mm3    Basophils Absolute 0.0 0.0 - 0.1 thou/mm3    Immature Grans (Abs) 0.04 0.00 - 0.07 thou/mm3    nRBC 0 /100 wbc   Ethanol   Result Value Ref Range    ETHYL ALCOHOL, SERUM 2.12 0.82 %   Salicylate   Result Value Ref Range    Salicylate, Serum < 0.3 (L) 2.0 - 10.0 mg/dL   TSH without Reflex   Result Value Ref Range    TSH 0.589 0.400 - 4.200 uIU/mL   Urine Drug Screen   Result Value Ref Range    AMPHETAMINE+METHAMPHETAMINE URINE SCREEN Negative NEGATIVE    Barbiturate Quant, Ur Negative NEGATIVE    Benzodiazepine Quant, Ur Negative NEGATIVE    Cannabinoid Quant, Ur Negative NEGATIVE    Cocaine Metab Quant, Ur Negative NEGATIVE    Opiates, Urine Negative NEGATIVE    Oxycodone Negative NEGATIVE    PCP Quant, Ur Negative NEGATIVE   Urinalysis Reflex to Culture    Specimen: Urine, clean catch   Result Value Ref Range    Glucose, Ur NEGATIVE NEGATIVE mg/dl    Bilirubin Urine NEGATIVE NEGATIVE    Ketones, Urine NEGATIVE NEGATIVE    Specific Gravity, Urine 1.005 1.002 - 1.030    Blood, Urine NEGATIVE NEGATIVE    pH, UA 6.0 5.0 - 9.0    Protein, UA NEGATIVE NEGATIVE    Urobilinogen, Urine 0.2 0.0 - 1.0 eu/dl    Nitrite, Urine NEGATIVE NEGATIVE    Leukocyte Esterase, Urine NEGATIVE NEGATIVE    Color, UA YELLOW STRAW-YELLOW    Character, Urine CLEAR CLEAR-SL CLOUD   SPECIMEN REJECTION   Result Value Ref Range    Rejected Test BMP, HEPPA     Reason for Rejection see below    Basic Metabolic Panel   Result Value Ref Range    Sodium 117 (LL) 135 - 145 meq/L    Potassium 3.9 3.5 - 5.2 meq/L    Chloride 80 (L) 98 - 111 meq/L    CO2 17 (L) 23 - 33 meq/L    Glucose 71 70 - 108 mg/dL    BUN 14 7 - 22 mg/dL    CREATININE 0.5 0.4 - 1.2 mg/dL    Calcium 8.8 8.5 - 10.5 mg/dL   Hepatic Function Panel   Result Value Ref Range    Albumin 3.9 3.5 - 5.1 g/dL    Total Bilirubin 3.4 (H) 0.3 - 1.2 mg/dL    Bilirubin, Direct 2.1 (H) 0.0 - 0.3 mg/dL    Alkaline Phosphatase 220 (H) 38 - 126 U/L     (H) 5 - 40 U/L    ALT 97 (H) 11 - 66 U/L    Total Protein 7.1 6.1 - 8.0 g/dL   SPECIMEN REJECTION   Result Value Ref Range    Rejected Test CT+S     Reason for Rejection see below    Anion Gap   Result Value Ref Range    Anion Gap 20.0 (H) 8.0 - 16.0 meq/L   Glomerular Filtration Rate, Estimated   Result Value Ref Range    Est, Glom Filt Rate >90 ml/min/1.73m2   Osmolality   Result Value Ref Range    Osmolality Calc 235.6 (L) 275.0 - 300.0 mOsmol/kg   Sodium, urine, random   Result Value Ref Range    Sodium, Ur < 20 meq/l   Hepatitis Panel, Acute   Result Value Ref Range    Hepatitis B Surface Ag Negative     Hep A IgM Negative     Hep B Core Ab, IgM Negative     Hepatitis C Ab Negative    Sodium   Result Value Ref Range    Sodium 127 (L) 135 - 145 meq/L   Chloride, Random Urine   Result Value Ref Range    Chloride, Urine < 20.0 meq/l   Osmolality, urine   Result Value Ref Range    Osmolality, Ur 170 (L) 250 - 750 mosmol/kg   Uric acid   Result Value Ref Range    Uric Acid 6.8 3.7 - 7.0 mg/dL   Sodium   Result Value Ref Range    Sodium 126 (L) 135 - 145 meq/L   TYPE AND SCREEN   Result Value Ref Range    ABO B     Rh Factor POS     Antibody Screen NEG      EKG / Radiology: none performed    CODE STATUS: FULL    Thank you NILAM Busby CNP for the opportunity to be involved in this patient's care.     Electronically signed by Melissa Rose MD on 5/7/2021

## 2021-05-07 NOTE — ED NOTES
Pt up in bed watching tv with family at the bedside on their phone. Patient urinal emptied at this time. Patient VSS. Respirations are regular and unlabored, patient is alert and oriented x4. Patient bed rails up x2 and call light within reach. Will continue to monitor.        Mark Bailey RN  05/06/21 [id number]

## 2021-05-07 NOTE — ED NOTES
Patient lying in bed at this time and discussing Phenobarbital at this time. Patient respirations are regular and unlabored. Patient appears to be in no current distress. Patient VSS. Call light is within reach. Patient expresses no needs at this time.        Oleksandr Wiggins RN  05/07/21 4274

## 2021-05-08 LAB
ALBUMIN SERPL-MCNC: 3.4 G/DL (ref 3.5–5.1)
ALP BLD-CCNC: 163 U/L (ref 38–126)
ALT SERPL-CCNC: 85 U/L (ref 11–66)
ANION GAP SERPL CALCULATED.3IONS-SCNC: 11 MEQ/L (ref 8–16)
AST SERPL-CCNC: 123 U/L (ref 5–40)
BILIRUB SERPL-MCNC: 4.9 MG/DL (ref 0.3–1.2)
BUN BLDV-MCNC: 8 MG/DL (ref 7–22)
CALCIUM SERPL-MCNC: 8.3 MG/DL (ref 8.5–10.5)
CHLORIDE BLD-SCNC: 92 MEQ/L (ref 98–111)
CO2: 23 MEQ/L (ref 23–33)
CREAT SERPL-MCNC: 0.7 MG/DL (ref 0.4–1.2)
ERYTHROCYTE [DISTWIDTH] IN BLOOD BY AUTOMATED COUNT: 15.3 % (ref 11.5–14.5)
ERYTHROCYTE [DISTWIDTH] IN BLOOD BY AUTOMATED COUNT: 54.6 FL (ref 35–45)
GFR SERPL CREATININE-BSD FRML MDRD: > 90 ML/MIN/1.73M2
GLUCOSE BLD-MCNC: 91 MG/DL (ref 70–108)
HCT VFR BLD CALC: 35.8 % (ref 42–52)
HEMOGLOBIN: 12.8 GM/DL (ref 14–18)
INR BLD: 1.03 (ref 0.85–1.13)
MAGNESIUM: 1.9 MG/DL (ref 1.6–2.4)
MCH RBC QN AUTO: 34.5 PG (ref 26–33)
MCHC RBC AUTO-ENTMCNC: 35.8 GM/DL (ref 32.2–35.5)
MCV RBC AUTO: 96.5 FL (ref 80–94)
PHOSPHORUS: 2.7 MG/DL (ref 2.4–4.7)
PLATELET # BLD: 135 THOU/MM3 (ref 130–400)
PMV BLD AUTO: 9.4 FL (ref 9.4–12.4)
POTASSIUM SERPL-SCNC: 3 MEQ/L (ref 3.5–5.2)
RBC # BLD: 3.71 MILL/MM3 (ref 4.7–6.1)
SODIUM BLD-SCNC: 126 MEQ/L (ref 135–145)
SODIUM BLD-SCNC: 128 MEQ/L (ref 135–145)
SODIUM BLD-SCNC: 129 MEQ/L (ref 135–145)
SODIUM BLD-SCNC: 130 MEQ/L (ref 135–145)
SODIUM BLD-SCNC: 152 MEQ/L (ref 135–145)
TOTAL PROTEIN: 5.7 G/DL (ref 6.1–8)
WBC # BLD: 3.6 THOU/MM3 (ref 4.8–10.8)

## 2021-05-08 PROCEDURE — 99233 SBSQ HOSP IP/OBS HIGH 50: CPT | Performed by: INTERNAL MEDICINE

## 2021-05-08 PROCEDURE — 2060000000 HC ICU INTERMEDIATE R&B

## 2021-05-08 PROCEDURE — 2580000003 HC RX 258: Performed by: FAMILY MEDICINE

## 2021-05-08 PROCEDURE — 85027 COMPLETE CBC AUTOMATED: CPT

## 2021-05-08 PROCEDURE — 84295 ASSAY OF SERUM SODIUM: CPT

## 2021-05-08 PROCEDURE — 6360000002 HC RX W HCPCS: Performed by: PHYSICIAN ASSISTANT

## 2021-05-08 PROCEDURE — 2580000003 HC RX 258: Performed by: INTERNAL MEDICINE

## 2021-05-08 PROCEDURE — 84100 ASSAY OF PHOSPHORUS: CPT

## 2021-05-08 PROCEDURE — 6370000000 HC RX 637 (ALT 250 FOR IP): Performed by: INTERNAL MEDICINE

## 2021-05-08 PROCEDURE — 6360000002 HC RX W HCPCS: Performed by: FAMILY MEDICINE

## 2021-05-08 PROCEDURE — 6370000000 HC RX 637 (ALT 250 FOR IP): Performed by: FAMILY MEDICINE

## 2021-05-08 PROCEDURE — 99232 SBSQ HOSP IP/OBS MODERATE 35: CPT | Performed by: INTERNAL MEDICINE

## 2021-05-08 PROCEDURE — 80053 COMPREHEN METABOLIC PANEL: CPT

## 2021-05-08 PROCEDURE — 83735 ASSAY OF MAGNESIUM: CPT

## 2021-05-08 PROCEDURE — 36415 COLL VENOUS BLD VENIPUNCTURE: CPT

## 2021-05-08 PROCEDURE — 85610 PROTHROMBIN TIME: CPT

## 2021-05-08 PROCEDURE — 6360000002 HC RX W HCPCS: Performed by: INTERNAL MEDICINE

## 2021-05-08 RX ORDER — POTASSIUM CHLORIDE 20 MEQ/1
40 TABLET, EXTENDED RELEASE ORAL PRN
Status: DISCONTINUED | OUTPATIENT
Start: 2021-05-08 | End: 2021-05-10 | Stop reason: HOSPADM

## 2021-05-08 RX ORDER — POTASSIUM CHLORIDE 7.45 MG/ML
10 INJECTION INTRAVENOUS PRN
Status: DISCONTINUED | OUTPATIENT
Start: 2021-05-08 | End: 2021-05-10 | Stop reason: HOSPADM

## 2021-05-08 RX ADMIN — SODIUM CHLORIDE, PRESERVATIVE FREE 10 ML: 5 INJECTION INTRAVENOUS at 08:09

## 2021-05-08 RX ADMIN — METOPROLOL TARTRATE 50 MG: 50 TABLET, FILM COATED ORAL at 08:09

## 2021-05-08 RX ADMIN — POTASSIUM CHLORIDE 10 MEQ: 7.46 INJECTION, SOLUTION INTRAVENOUS at 07:18

## 2021-05-08 RX ADMIN — POTASSIUM CHLORIDE 10 MEQ: 7.46 INJECTION, SOLUTION INTRAVENOUS at 06:06

## 2021-05-08 RX ADMIN — POTASSIUM CHLORIDE 10 MEQ: 7.46 INJECTION, SOLUTION INTRAVENOUS at 10:28

## 2021-05-08 RX ADMIN — ENOXAPARIN SODIUM 40 MG: 40 INJECTION SUBCUTANEOUS at 08:09

## 2021-05-08 RX ADMIN — POTASSIUM CHLORIDE 10 MEQ: 7.46 INJECTION, SOLUTION INTRAVENOUS at 09:07

## 2021-05-08 RX ADMIN — FOLIC ACID 1 MG: 1 TABLET ORAL at 08:09

## 2021-05-08 RX ADMIN — POTASSIUM CHLORIDE 10 MEQ: 7.46 INJECTION, SOLUTION INTRAVENOUS at 11:47

## 2021-05-08 RX ADMIN — Medication 1 TABLET: at 08:09

## 2021-05-08 RX ADMIN — PHENOBARBITAL 64.8 MG: 32.4 TABLET ORAL at 20:28

## 2021-05-08 RX ADMIN — Medication 100 MG: at 08:09

## 2021-05-08 RX ADMIN — LISINOPRIL 20 MG: 20 TABLET ORAL at 08:09

## 2021-05-08 RX ADMIN — PHENOBARBITAL 64.8 MG: 32.4 TABLET ORAL at 08:09

## 2021-05-08 RX ADMIN — PHENOBARBITAL SODIUM 300.95 MG: 65 INJECTION INTRAMUSCULAR at 00:55

## 2021-05-08 RX ADMIN — PANTOPRAZOLE SODIUM 40 MG: 40 TABLET, DELAYED RELEASE ORAL at 08:09

## 2021-05-08 RX ADMIN — SODIUM CHLORIDE, PRESERVATIVE FREE 10 ML: 5 INJECTION INTRAVENOUS at 20:28

## 2021-05-08 RX ADMIN — QUETIAPINE FUMARATE 50 MG: 25 TABLET ORAL at 20:27

## 2021-05-08 ASSESSMENT — PAIN SCALES - GENERAL
PAINLEVEL_OUTOF10: 0

## 2021-05-08 NOTE — PROGRESS NOTES
950-Na resulted as 152 (up from 126). Dr. John Avalos on unit and notified. Stat redraw ordered and Dr. John Avalos called Lab and asked them to rerun lab on a different machine to verify results. Perfect Serve message sent to Dr. Shreya Eckert to update on lab result. Dr. Shreya Eckert called back and asked to have lab reran and update him with results.

## 2021-05-08 NOTE — PROGRESS NOTES
Hospitalist Progress Note    Patient:  Suzi Adjutant      Unit/Bed:4A-04/004-A    YOB: 1975    MRN: [id number]       Acct: [de-identified]     PCP: NILAM Cote CNP    Date of Admission: 5/6/2021      Assessment/Plan:  Active Hospital Problems    Diagnosis Date Noted    Hyponatremia [E87.1] 04/08/2021       Severe hyponatremia: Level 117 on arrival.  Suspect secondary to beer Poto kyaw and possible hypovolemia and hydrochlorothiazide use. Patient received fluids in the ED and overcorrected to 127 (this argues against SIADH). Alcohol level on arrival was 0.3. Normal TSH. -Nephrology consulted and patient was managed with D5W after overcorrection and also given DDAVP. -Patient leveled off down to 120 roughly and has slowly been improving over the last 24 hours.  -Holding hydrochlorothiazide  -Management per nephrology, goal no more than 6-8mg/dL per day, however another closer to normal can be more liberal.    Hypokalemia: replcaement protocol, mag is wnl. Alcoholic Hepatitis / Transaminitis: With mixed pattern of mildly elevated alk phos and AST ALT elevation which is chronic, also bilirubin elevated which is acute on chronic. Suspect related to recent heavy etoh use. AST and ALT are improving, bilirubin is lagging, which can happen in alcohol induced liver injury.  -Continue to monitor and if no longer improving consider liver ultrasound  -Counseling on alcohol cessation  -Avoid hepatotoxins  -dark urine due to urobilinogen and not blood  -check INR to calculate Maddrey score to determine if he would benefit from steroids    Alcohol intoxication with intent to detox: History of DTs. Patient reports history of seizure as well. -Pheno taper  -Thiamine, folate, multivitamin  -addiction services, SW consult.   -counseling  -consider Vivitrol or naltrexone? Hypertension: Continue beta-blocker and ACE. Hydrochlorothiazide on hold.     Chronic tobacco abuse: Cessation counseling. Offer NRT.     COPD (no baseline supplemental oxygen requirement): Without exacerbation. Atrovent as needed (tachycardic).     Depression / ?bipolar disorder: Seroquel resumed. Expected discharge date:  2-3 days    Disposition: pending         [] Home       [] TCU       [] Rehab       [] Psych       [] SNF       [] Abdoul       [] Other-    --------------------------------------------    Chief Complaint: Alcohol intoxication    Hospital Course: Per HPI, \"41 y/o M PMHx chronic alcoholism, hyponatremia, alcoholic hepatitis, chronic tobacco abuse, HTN, COPD (no baseline supplemental oxygen requirement) and GERD -- presents to [address] [place] [address] with a chief complaint of alcohol problem. History obtained from the patient.     Patient presents to [address] [place] [address] with complaints of alcohol abuse and concern for acute withdrawal / drank 30 x 16 oz Bud Lights around 1500 this afternoon. Subsequently experienced recurrent NBNB emesis and several episodes of watery diarrhea, tremors and palpitations. No fevers/chills, headache, lightheadedness/dizziness, chest pain, shortness of breath or loss of consciousness. Endorse previous hx hallucinations / withdrawal seizures / DTs. Has attended rehab in the past / states he remains interested in getting help and quitting because alcohol \"will kill [him] if [he] doesn't. \" Patient reportedly felt motivated to quit upon most recent discharge from [address] [place] [address], but became tempted to have \"one 24 oz\" and proceeded to keep drinking. No BZD use. \"    Subjective (past 24 hours): Patient seen at bedside, states he feels somewhat sleepy however denies any hallucinations, seizure-like activity, tremors, nausea or vomiting. He is eating okay and does note dark urine but otherwise no other major issues.       Medications:  Reviewed    Infusion Medications    sodium chloride       Scheduled Medications    folic acid  1 mg Oral Daily    lisinopril  20 mg Oral Daily    metoprolol tartrate  50 mg Oral BID    QUEtiapine  50 mg Oral Nightly    pantoprazole  40 mg Oral Daily    sodium chloride flush  10 mL Intravenous 2 times per day    enoxaparin  40 mg Subcutaneous Daily    thiamine  100 mg Oral Daily    multivitamin  1 tablet Oral Daily    PHENobarbital  64.8 mg Oral BID    Followed by   Kamryn Bro ON 5/9/2021] PHENobarbital  32.4 mg Oral BID     PRN Meds: potassium chloride **OR** potassium alternative oral replacement **OR** potassium chloride, sodium chloride flush, sodium chloride, acetaminophen **OR** acetaminophen, ipratropium      Intake/Output Summary (Last 24 hours) at 5/8/2021 1636  Last data filed at 5/8/2021 1253  Gross per 24 hour   Intake 2283.39 ml   Output 3250 ml   Net -966.61 ml     Weight change: 5 lb 6.4 oz (2.449 kg)      Exam:  /72   Pulse 86   Temp 98.2 °F (36.8 °C) (Oral)   Resp 18   Ht 5' 11\" (1.803 m)   Wt 250 lb 6.4 oz (113.6 kg)   SpO2 94%   BMI 34.92 kg/m²     General appearance: No apparent distress, well developed, appears stated age. Overweight  Eyes:  Pupils equal, round, and reactive to light. Conjunctivae/corneas clear. HENT: Head normal in appearance. External nares normal.  Oral mucosa moist without lesions. Hearing grossly intact. Neck: Supple, with full range of motion. Trachea midline. No gross JVD appreciated. Respiratory:  Normal respiratory effort. Clear to auscultation, bilaterally without rales or wheezes or rhonchi. Cardiovascular: Normal rate, regular rhythm with normal S1/S2 without murmurs. No lower extremity edema. Abdomen: Soft, non-tender, non-distended with normal bowel sounds. Protuberant  Musculoskeletal: There is no joint swelling or tenderness. Normal tone. No abnormal movements. Skin: Warm and dry. No rashes or lesions. Neurologic:  No focal sensory/motor deficits in the upper and lower extremities. Cranial nerves:  grossly non-focal 2-12. Psychiatric: Alert and oriented, normal insight and though content.    Capillary Refill: Brisk,< 3 seconds. Peripheral Pulses: +2 palpable, equal bilaterally. Labs:   Recent Labs     05/06/21  1846 05/08/21  0327   WBC 10.5 3.6*   HGB 14.7 12.8*   HCT 39.2* 35.8*    135     Recent Labs     05/06/21 2013 05/06/21 2013 05/08/21  0327 05/08/21  0903 05/08/21  1027   *   < > 126* 152* 129*   K 3.9  --  3.0*  --   --    CL 80*  --  92*  --   --    CO2 17*  --  23  --   --    BUN 14  --  8  --   --    CREATININE 0.5  --  0.7  --   --    CALCIUM 8.8  --  8.3*  --   --    PHOS  --   --  2.7  --   --     < > = values in this interval not displayed. Recent Labs     05/06/21 2013 05/08/21 0327   * 123*   ALT 97* 85*   BILIDIR 2.1*  --    BILITOT 3.4* 4.9*   ALKPHOS 220* 163*     No results for input(s): INR in the last 72 hours. No results for input(s): Ashley Napoleon in the last 72 hours.     Microbiology:      Urinalysis:      Lab Results   Component Value Date    NITRU NEGATIVE 05/07/2021    WBCUA NONE SEEN 04/08/2021    BACTERIA NONE SEEN 04/08/2021    RBCUA 0-2 04/08/2021    BLOODU NEGATIVE 05/07/2021    SPECGRAV 1.008 04/08/2021    GLUCOSEU NEGATIVE 05/07/2021       Radiology:  No orders to display       DVT prophylaxis: [x] Lovenox                                  [] SCDs                                 [] SQ Heparin                                 [] Encourage ambulation           [] Already on Anticoagulation     Code Status: Full Code    PT/OT Eval Status: tbd    Diet:   DIET GENERAL;    Fluids: na    Tele:   [x] yes             [] no      Electronically signed by Alejandro Santos DO on 5/8/2021 at 4:36 PM

## 2021-05-08 NOTE — PLAN OF CARE
Problem: Skin Integrity:  Goal: Will show no infection signs and symptoms  Description: Will show no infection signs and symptoms  5/7/2021 1408 by Malachi Huerta RN  Outcome: Met This Shift  Note: No signs of new skin breakdown noted with each assessment this shift. Patient able to turn self in bed. Problem: Safety:  Goal: Ability to remain free from injury will improve  Description: Ability to remain free from injury will improve  5/7/2021 1408 by Malachi Huerta RN  Outcome: Met This Shift  Note: Pt remains free from accidental injury this shift. Pt remains in fall and seizure precautions. Problem: Falls - Risk of:  Goal: Will remain free from falls  Description: Will remain free from falls  5/7/2021 2221 by Mauricio Driver RN  Outcome: Ongoing  Note: Patient remains free from falls this shift. Fall precautions in place with bed/chair exit alarmed. Fall sign posted and fall armband in place. Nonskid footwear used with transferring. Educated patient to use call light when in need of staff assistance with transferring, ambulating, and other activities of daily living. Patient appropriately uses call light this shift. 5/7/2021 1408 by Malachi Huerta RN  Outcome: Met This Shift  Note: Patient remained free from falls this shift. Bed is in low position with alarm on and siderails up x2. Patient ambulates with one assist. Education given on use of call light and patient voiced understanding. Patient uses call light appropriately. Call light and beside table within reach. Arm band and falling star in place. Goal: Absence of physical injury  Description: Absence of physical injury  5/7/2021 2221 by Mauricio Driver RN  Outcome: Ongoing  Note: Hourly rounding in place to anticipate patient needs, such as assistance with pain, toileting, or repositioning, in order to decrease risk for injuries such as falls.      5/7/2021 1408 by Malachi Huerta RN  Outcome: Met This Shift  Note: Patient remained free from falls this shift. Bed is in low position with alarm on and siderails up x2. Patient ambulates with one assist. Education given on use of call light and patient voiced understanding. Patient uses call light appropriately. Call light and beside table within reach. Arm band and falling star in place. Patient remains in seizure precautions as well. Problem: Skin Integrity:  Goal: Absence of new skin breakdown  Description: Absence of new skin breakdown  5/7/2021 2221 by Nico Harris RN  Outcome: Ongoing  Note: Patient's skin remains intact this shift with no new signs of impaired skin integrity noted. Patient turned and repositioned every 2 hours. Special mattress in place to decrease pressure on high risk areas. 5/7/2021 1408 by Herman Russell RN  Outcome: Met This Shift  Note: No signs of new skin breakdown noted with each assessment this shift. Patient able to turn self in bed. Problem: Safety:  Goal: Free from accidental physical injury  Description: Free from accidental physical injury  5/7/2021 2221 by Nico Harris RN  Outcome: Ongoing  Note: Seizure precautions in place. 5/7/2021 1408 by Herman Russell RN  Outcome: Met This Shift  Note: Pt remains free from accidental injury this shift. Pt remains in fall and seizure precautions. Problem: Discharge Planning:  Goal: Patients continuum of care needs are met  Description: Patients continuum of care needs are met  5/7/2021 2221 by Nico Harris RN  Outcome: Ongoing  Note: Patient is from home with support and would like to discharge there once medically stable. 5/7/2021 1408 by Herman Russell RN  Outcome: Met This Shift  Note: Pt from home with friend. Plans to return there upon discharge. Care plan reviewed with patient. Patient verbalized understanding of the plan of care and contributed to goal setting.

## 2021-05-08 NOTE — PLAN OF CARE
Problem: Falls - Risk of:  Goal: Will remain free from falls  Description: Will remain free from falls  5/8/2021 0920 by Marysol Ruggiero RN  Outcome: Met This Shift  Note: Patient remained free from falls this shift. Bed is in low position with alarm on and siderails up x2. Patient ambulates with one assist. Education given on use of call light and patient voiced understanding. Patient uses call light appropriately. Call light and beside table within reach. Arm band and falling star in place. 5/7/2021 2221 by Rona Norton RN  Outcome: Ongoing  Note: Patient remains free from falls this shift. Fall precautions in place with bed/chair exit alarmed. Fall sign posted and fall armband in place. Nonskid footwear used with transferring. Educated patient to use call light when in need of staff assistance with transferring, ambulating, and other activities of daily living. Patient appropriately uses call light this shift. Goal: Absence of physical injury  Description: Absence of physical injury  5/8/2021 0920 by Marysol Ruggiero RN  Outcome: Met This Shift  Note: Patient remained free from falls this shift. Bed is in low position with alarm on and siderails up x2. Patient ambulates with one assist. Education given on use of call light and patient voiced understanding. Patient uses call light appropriately. Call light and beside table within reach. Arm band and falling star in place. 5/7/2021 2221 by Rona Norton RN  Outcome: Ongoing  Note: Hourly rounding in place to anticipate patient needs, such as assistance with pain, toileting, or repositioning, in order to decrease risk for injuries such as falls. Problem: Skin Integrity:  Goal: Will show no infection signs and symptoms  Description: Will show no infection signs and symptoms  Outcome: Met This Shift  Note: Pt remains free from s/s of infection this shift.   Goal: Absence of new skin breakdown  Description: Absence of new skin breakdown  5/8/2021 0920 by Bj Ngo RN  Outcome: Met This Shift  Note: No signs of new skin breakdown noted with each assessment this shift. Pt able to reposition self without difficulty this shift. 5/7/2021 2221 by Babita Hernandez RN  Outcome: Ongoing  Note: Patient's skin remains intact this shift with no new signs of impaired skin integrity noted. Patient turned and repositioned every 2 hours. Special mattress in place to decrease pressure on high risk areas. Problem: Safety:  Goal: Free from accidental physical injury  Description: Free from accidental physical injury  5/8/2021 0920 by Bj Ngo RN  Outcome: Met This Shift  Note: Patient remained free from falls this shift. Bed is in low position with alarm on and siderails up x2. Patient ambulates with one assist. Education given on use of call light and patient voiced understanding. Patient uses call light appropriately. Call light and beside table within reach. Arm band and falling star in place. 5/7/2021 2221 by Babita Hernandez RN  Outcome: Ongoing  Note: Seizure precautions in place. Goal: Ability to remain free from injury will improve  Description: Ability to remain free from injury will improve  Outcome: Met This Shift  Note: Patient remained free from falls this shift. Bed is in low position with alarm on and siderails up x2. Patient ambulates with one assist. Education given on use of call light and patient voiced understanding. Patient uses call light appropriately. Call light and beside table within reach. Arm band and falling star in place. Patient remains free from seizure activity this shift. Pt remains in seizure precautions. Problem: Discharge Planning:  Goal: Patients continuum of care needs are met  Description: Patients continuum of care needs are met  5/8/2021 0920 by Bj Ngo RN  Outcome: Met This Shift  Note: Pt from home with friend. Plans to return there upon discharge.    5/7/2021 2221 by Julio César Nation RN  Outcome: Ongoing  Note: Patient is from home with support and would like to discharge there once medically stable. Plan of Care discussed with patient and family. They verbalized understanding.

## 2021-05-08 NOTE — PROGRESS NOTES
Renal Progress Note    Assessment and Plan:    1. Hyponatremia improving. 2.  Alcohol misuse  3. Diabetes mellitus type 2   4. Macrocytic anemia  5.   Hypertension  PLAN:   Series of lab results reviewed  Serum sodium is improved and stable  Medications reviewed  No changes  Labs in the morning  Change serum sodium check to every 6 hours from every 3 hours  We will continue to follow    Patient Active Problem List:     Hyperlipemia     Uncontrolled hypertension     Uncontrolled type 2 diabetes mellitus with hyperosmolarity without coma (HCC)     Alcohol withdrawal ([address]     Alcoholic hepatitis     COPD (chronic obstructive pulmonary disease) ([address]     Passive-dependent personality disorder (HCC)     Alcohol abuse     Type 2 diabetes mellitus without complication, without long-term current use of insulin (HCC)     Alcoholic liver failure (HCC)     Transaminitis     Hyperbilirubinemia     RUQ pain     Delirious     Alcoholic hepatitis without ascites     Alcoholic steatohepatitis     Hypophosphatemia     Bipolar 1 disorder (HCC)     Alcoholic gastritis without bleeding     Alcohol withdrawal syndrome with complication (HCC)     Alcohol dependence with physiological dependence, continuous (HCC)     Liver failure without hepatic coma (HCC)     Alcoholic cirrhosis ([address]     Alcohol intoxication in alcoholism with blood level over 0.3 with complication ([address]     Acute alcoholic intoxication with complication ([address]     Acute alcoholic hepatitis     Alcohol ingestion     Alcohol withdrawal delirium (HCC)     Uncomplicated alcohol withdrawal ([address]     Alcohol use disorder, severe, dependence ([address]     Acute pancreatitis     Depression     Osteopoikilosis     Contusion of hand     Atypical chest pain     Alcoholic encephalopathy (HCC)     Alcohol intoxication with delirium ([address]     Morbidly obese (HCC)     ETOH abuse     Chronic kidney disease, stage 5 (HCC)     Severe opioid dependence ([address]     Hyponatremia     Lack of intravenous access      Subjective:   Admit Date: 5/6/2021    Interval History:   Seen for hyponatremia  Very awake and alert this morning  No complaint  Updated by the staff  No issues of concern  Blood pressure is good  Urine output is not completely documented      Medications:   Scheduled Meds:   folic acid  1 mg Oral Daily    lisinopril  20 mg Oral Daily    metoprolol tartrate  50 mg Oral BID    QUEtiapine  50 mg Oral Nightly    pantoprazole  40 mg Oral Daily    sodium chloride flush  10 mL Intravenous 2 times per day    enoxaparin  40 mg Subcutaneous Daily    thiamine  100 mg Oral Daily    multivitamin  1 tablet Oral Daily    PHENobarbital  64.8 mg Oral BID    Followed by   Miladis Garcia ON 5/9/2021] PHENobarbital  32.4 mg Oral BID     Continuous Infusions:   sodium chloride         CBC:   Recent Labs     05/06/21  1846 05/08/21  0327   WBC 10.5 3.6*   HGB 14.7 12.8*    135     CMP:    Recent Labs     05/06/21 2013 05/06/21 2013 05/07/21  2304 05/08/21  0327 05/08/21  0903   *   < > 123* 126* 152*   K 3.9  --   --  3.0*  --    CL 80*  --   --  92*  --    CO2 17*  --   --  23  --    BUN 14  --   --  8  --    CREATININE 0.5  --   --  0.7  --    GLUCOSE 71  --   --  91  --    CALCIUM 8.8  --   --  8.3*  --    LABGLOM >90  --   --  >90  --     < > = values in this interval not displayed. Troponin: No results for input(s): TROPONINI in the last 72 hours. BNP: No results for input(s): BNP in the last 72 hours. INR: No results for input(s): INR in the last 72 hours. Lipids: No results for input(s): CHOL, LDLDIRECT, TRIG, HDL, AMYLASE, LIPASE in the last 72 hours. Liver:   Recent Labs     05/08/21 0327   *   ALT 85*   ALKPHOS 163*   PROT 5.7*   LABALBU 3.4*   BILITOT 4.9*     Iron:  No results for input(s): IRONS, FERRITIN in the last 72 hours.     Invalid input(s): LABIRONS  No orders to display         Objective:   Vitals: /69   Pulse 93   Temp 98.2 °F (36.8 °C) (Oral) Resp 16   Ht 5' 11\" (1.803 m)   Wt 250 lb 6.4 oz (113.6 kg)   SpO2 93%   BMI 34.92 kg/m²    Wt Readings from Last 3 Encounters:   05/08/21 250 lb 6.4 oz (113.6 kg)   04/21/21 246 lb 9.6 oz (111.9 kg)   04/16/21 245 lb 14.4 oz (111.5 kg)      24HR INTAKE/OUTPUT:      Intake/Output Summary (Last 24 hours) at 5/8/2021 0954  Last data filed at 5/8/2021 8485  Gross per 24 hour   Intake 1341.49 ml   Output 2325 ml   Net -983.51 ml       Constitutional: Well-developed middle-aged gentleman alert, awake, no apparent distress   Skin:normal with no rash or lesions. HEENT:Pupils are reactive . Throat is clear . Oral mucosa is moist   Neck:supple with no  carotid bruit  Cardiovascular:  S1, S2 without murmur or rubs   Respiratory:  Clear to ausculation without wheezes, rhonchi or rales. Abdomen: +bs, soft, no tenderness to palpation and no palpable mass. No abdominal bruit   Ext: No LE edema  Musculoskeletal:Intact  Neuro:Alert and awake. Well oriented  with no focal deficit. Speech is normal      Electronically signed by Shade Buchanan MD on 5/8/2021 at 9:54 AM  **This report has been created using voice recognition software. It maycontain minor  errors which are inherent in voice recognition technology. **

## 2021-05-09 LAB
ALBUMIN SERPL-MCNC: 3.2 G/DL (ref 3.5–5.1)
ALP BLD-CCNC: 253 U/L (ref 38–126)
ALT SERPL-CCNC: 110 U/L (ref 11–66)
ANION GAP SERPL CALCULATED.3IONS-SCNC: 11 MEQ/L (ref 8–16)
AST SERPL-CCNC: 161 U/L (ref 5–40)
BILIRUB SERPL-MCNC: 1.9 MG/DL (ref 0.3–1.2)
BUN BLDV-MCNC: 9 MG/DL (ref 7–22)
CALCIUM SERPL-MCNC: 8.6 MG/DL (ref 8.5–10.5)
CHLORIDE BLD-SCNC: 102 MEQ/L (ref 98–111)
CO2: 22 MEQ/L (ref 23–33)
CREAT SERPL-MCNC: 0.6 MG/DL (ref 0.4–1.2)
GFR SERPL CREATININE-BSD FRML MDRD: > 90 ML/MIN/1.73M2
GLUCOSE BLD-MCNC: 122 MG/DL (ref 70–108)
PHOSPHORUS: 3.1 MG/DL (ref 2.4–4.7)
POTASSIUM REFLEX MAGNESIUM: 3.7 MEQ/L (ref 3.5–5.2)
SODIUM BLD-SCNC: 135 MEQ/L (ref 135–145)
TOTAL PROTEIN: 5.7 G/DL (ref 6.1–8)

## 2021-05-09 PROCEDURE — 84100 ASSAY OF PHOSPHORUS: CPT

## 2021-05-09 PROCEDURE — 6370000000 HC RX 637 (ALT 250 FOR IP): Performed by: INTERNAL MEDICINE

## 2021-05-09 PROCEDURE — 36415 COLL VENOUS BLD VENIPUNCTURE: CPT

## 2021-05-09 PROCEDURE — 6370000000 HC RX 637 (ALT 250 FOR IP): Performed by: FAMILY MEDICINE

## 2021-05-09 PROCEDURE — 99232 SBSQ HOSP IP/OBS MODERATE 35: CPT | Performed by: INTERNAL MEDICINE

## 2021-05-09 PROCEDURE — 2580000003 HC RX 258: Performed by: FAMILY MEDICINE

## 2021-05-09 PROCEDURE — 99232 SBSQ HOSP IP/OBS MODERATE 35: CPT | Performed by: NURSE PRACTITIONER

## 2021-05-09 PROCEDURE — 6360000002 HC RX W HCPCS: Performed by: FAMILY MEDICINE

## 2021-05-09 PROCEDURE — 2060000000 HC ICU INTERMEDIATE R&B

## 2021-05-09 PROCEDURE — 80053 COMPREHEN METABOLIC PANEL: CPT

## 2021-05-09 RX ADMIN — SODIUM CHLORIDE, PRESERVATIVE FREE 10 ML: 5 INJECTION INTRAVENOUS at 08:19

## 2021-05-09 RX ADMIN — PHENOBARBITAL 32.4 MG: 32.4 TABLET ORAL at 20:25

## 2021-05-09 RX ADMIN — FOLIC ACID 1 MG: 1 TABLET ORAL at 08:19

## 2021-05-09 RX ADMIN — Medication 1 TABLET: at 08:19

## 2021-05-09 RX ADMIN — Medication 100 MG: at 08:19

## 2021-05-09 RX ADMIN — LISINOPRIL 20 MG: 20 TABLET ORAL at 08:19

## 2021-05-09 RX ADMIN — PANTOPRAZOLE SODIUM 40 MG: 40 TABLET, DELAYED RELEASE ORAL at 08:19

## 2021-05-09 RX ADMIN — QUETIAPINE FUMARATE 50 MG: 25 TABLET ORAL at 20:25

## 2021-05-09 RX ADMIN — METOPROLOL TARTRATE 50 MG: 50 TABLET, FILM COATED ORAL at 08:19

## 2021-05-09 RX ADMIN — PHENOBARBITAL 32.4 MG: 32.4 TABLET ORAL at 08:19

## 2021-05-09 RX ADMIN — ENOXAPARIN SODIUM 40 MG: 40 INJECTION SUBCUTANEOUS at 08:18

## 2021-05-09 RX ADMIN — SODIUM CHLORIDE, PRESERVATIVE FREE 10 ML: 5 INJECTION INTRAVENOUS at 20:03

## 2021-05-09 ASSESSMENT — ENCOUNTER SYMPTOMS
COUGH: 0
CHEST TIGHTNESS: 0
RHINORRHEA: 0
BACK PAIN: 0

## 2021-05-09 ASSESSMENT — PAIN SCALES - GENERAL
PAINLEVEL_OUTOF10: 0

## 2021-05-09 NOTE — PLAN OF CARE
Problem: Falls - Risk of:  Goal: Will remain free from falls  Description: Will remain free from falls  Outcome: Met This Shift  Note: Patient remained free from falls this shift. Bed is in low position with alarm on and siderails up x2. Patient ambulates with one assist. Education given on use of call light and patient voiced understanding. Patient uses call light appropriately. Call light and beside table within reach. Arm band and falling star in place. Goal: Absence of physical injury  Description: Absence of physical injury  Outcome: Met This Shift  Note: Patient remained free from falls this shift. Bed is in low position with alarm on and siderails up x2. Patient ambulates with one assist. Education given on use of call light and patient voiced understanding. Patient uses call light appropriately. Call light and beside table within reach. Arm band and falling star in place. Problem: Skin Integrity:  Goal: Will show no infection signs and symptoms  Description: Will show no infection signs and symptoms  Outcome: Met This Shift  Note: No s/s of infection noted this shift. Goal: Absence of new skin breakdown  Description: Absence of new skin breakdown  Outcome: Met This Shift  Note: No new skin breakdown noted with each assessment this shift. Pt able to turn self without difficulty. Problem: Safety:  Goal: Free from accidental physical injury  Description: Free from accidental physical injury  5/9/2021 1414 by Alexandro Rogers RN  Outcome: Met This Shift  Note: Patient remained free from falls this shift. Bed is in low position with alarm on and siderails up x2. Patient ambulates with one assist. Education given on use of call light and patient voiced understanding. Patient uses call light appropriately. Call light and beside table within reach. Arm band and falling star in place.      5/9/2021 1355 by Alexandro Rogers RN  Outcome: Met This Shift  Goal: Ability to remain free from injury will improve  Description: Ability to remain free from injury will improve  5/9/2021 1414 by Oivdio Marie RN  Outcome: Met This Shift  Note: Patient remained free from falls this shift. Bed is in low position with alarm on and siderails up x2. Patient ambulates with one assist. Education given on use of call light and patient voiced understanding. Patient uses call light appropriately. Call light and beside table within reach. Arm band and falling star in place. 5/9/2021 1355 by Ovidio Marie RN  Outcome: Met This Shift     Problem: Discharge Planning:  Goal: Patients continuum of care needs are met  Description: Patients continuum of care needs are met  5/9/2021 1414 by Ovidio Marie RN  Outcome: Met This Shift  Note: Pt from home with friend. Plans to return there upon discharge. 5/9/2021 1355 by Ovidio Marie RN  Outcome: Met This Shift   Plan of Care discussed with patient and family. They verbalized understanding.

## 2021-05-09 NOTE — PROGRESS NOTES
Renal Progress Note    Assessment and Plan:    1. Hyponatremia with serum sodium much improved today  2. Alcohol dependence  3. Diabetes mellitus type 2   4. Macrocytic anemia  5. Hypertension  6. Hypoalbuminemia  7.   Elevated liver enzymes  PLAN:   Lab results reviewed   Serum sodium is normal this morning  Medications reviewed  No changes  Labs in the morning  Discontinue every 6 hours serum sodium check  We will continue to follow    Patient Active Problem List:     Hyperlipemia     Uncontrolled hypertension     Uncontrolled type 2 diabetes mellitus with hyperosmolarity without coma (HCC)     Alcohol withdrawal ([address]     Alcoholic hepatitis     COPD (chronic obstructive pulmonary disease) ([address]     Passive-dependent personality disorder (HCC)     Alcohol abuse     Type 2 diabetes mellitus without complication, without long-term current use of insulin (HCC)     Alcoholic liver failure (HCC)     Transaminitis     Hyperbilirubinemia     RUQ pain     Delirious     Alcoholic hepatitis without ascites     Alcoholic steatohepatitis     Hypophosphatemia     Bipolar 1 disorder (HCC)     Alcoholic gastritis without bleeding     Alcohol withdrawal syndrome with complication (HCC)     Alcohol dependence with physiological dependence, continuous (HCC)     Liver failure without hepatic coma (HCC)     Alcoholic cirrhosis ([address]     Alcohol intoxication in alcoholism with blood level over 0.3 with complication ([address]     Acute alcoholic intoxication with complication ([address]     Acute alcoholic hepatitis     Alcohol ingestion     Alcohol withdrawal delirium (HCC)     Uncomplicated alcohol withdrawal ([address]     Alcohol use disorder, severe, dependence ([address]     Acute pancreatitis     Depression     Osteopoikilosis     Contusion of hand     Atypical chest pain     Alcoholic encephalopathy ([address]     Alcohol intoxication with delirium ([address]     Morbidly obese (HCC)     ETOH abuse     Chronic kidney disease, stage 5 (HCC)     Severe Pulse 99   Temp 98.1 °F (36.7 °C) (Oral)   Resp 18   Ht 5' 11\" (1.803 m)   Wt 250 lb 6.4 oz (113.6 kg)   SpO2 93%   BMI 34.92 kg/m²    Wt Readings from Last 3 Encounters:   05/08/21 250 lb 6.4 oz (113.6 kg)   04/21/21 246 lb 9.6 oz (111.9 kg)   04/16/21 245 lb 14.4 oz (111.5 kg)      24HR INTAKE/OUTPUT:      Intake/Output Summary (Last 24 hours) at 5/9/2021 8320  Last data filed at 5/9/2021 0421  Gross per 24 hour   Intake 1804.39 ml   Output 3475 ml   Net -1670.61 ml       Constitutional: Well-developed middle-aged gentleman comfortably asleep, no apparent distress   Skin:normal with no rash or lesions. HEENT: Head is normal.Oral mucosa is moist   Neck:supple with no  carotid bruit  Cardiovascular:  S1, S2 without murmur or rubs   Respiratory:  Clear to ausculation without wheezes, rhonchi or rales in the anterior. Abdomen: Soft. Good bowel sounds. Ext: No LE edema  Musculoskeletal:Intact  Neuro: Deferred      Electronically signed by Michi Brasher MD on 5/9/2021 at 8:08 AM  **This report has been created using voice recognition software. It maycontain minor  errors which are inherent in voice recognition technology. **

## 2021-05-09 NOTE — PROGRESS NOTES
Hospitalist Progress Note    Patient:  Miko Gip      Unit/Bed:4A-04/004-A    YOB: 1975    MRN: [id number]       Acct: [de-identified]     PCP: NILAM Potter CNP    Date of Admission: 5/6/2021    Assessment/Plan:    1. Severe hyponatremia--level was 117 on arrival, suspect this is likely secondary to beer [place] along with hypovolemia and hydrochlorothiazide; sodium at 135 this morning, appreciate nephrology input  2. Acute alcohol intoxication with BAL 0.3 with acute withdrawal--on phenobarbital protocol started 5/7, on folic acid, multivitamin, thiamine; no active withdrawal seen  3. Hypokalemia--improved at 3.7, monitor; medium was 1.9 on 5/8  4. Alcoholic hepatitis--bilirubin has trended down nicely however ALT and AST are trending up  5. Essential hypertension, uncontrolled--lisinopril, Lopressor, monitor  6. Chronic tobacco abuse  7. COPD--stable, on room air    Expected discharge date: Possibly 5/10    Disposition:    [x] Home       [] TCU       [] Rehab       [] Psych       [] SNF       [] [place]       [] Other-    Chief Complaint: Alcohol intoxication    Hospital Course: Per progress note dated 5/8: \"Per HPI, \"41 y/o M PMHx chronic alcoholism, hyponatremia, alcoholic hepatitis, chronic tobacco abuse, HTN, COPD (no baseline supplemental oxygen requirement) and GERD -- presents to [facility] with a chief complaint of alcohol problem. History obtained from the patient.     Patient presents to [facility] with complaints of alcohol abuse and concern for acute withdrawal / drank 30 x 16 oz Bud Lights around 1500 this afternoon. Subsequently experienced recurrent NBNB emesis and several episodes of watery diarrhea, tremors and palpitations. No fevers/chills, headache, lightheadedness/dizziness, chest pain, shortness of breath or loss of consciousness. Endorse previous hx hallucinations / withdrawal seizures / DTs.  Has attended rehab in the past / states he remains interested in getting help and quitting because alcohol \"will kill [him] if [he] doesn't. \" Patient reportedly felt motivated to quit upon most recent discharge from [address] [place] [address], but became tempted to have \"one 24 oz\" and proceeded to keep drinking. No BZD use. \"     5/9--> sodium at 135, hemodynamically stable    Subjective (past 24 hours): Patient is calm/cooperative and relates to feeling much better, no active signs of withdrawal, interacts well      Medications:  Reviewed    Infusion Medications    sodium chloride       Scheduled Medications    folic acid  1 mg Oral Daily    lisinopril  20 mg Oral Daily    metoprolol tartrate  50 mg Oral BID    QUEtiapine  50 mg Oral Nightly    pantoprazole  40 mg Oral Daily    sodium chloride flush  10 mL Intravenous 2 times per day    enoxaparin  40 mg Subcutaneous Daily    thiamine  100 mg Oral Daily    multivitamin  1 tablet Oral Daily    PHENobarbital  32.4 mg Oral BID     PRN Meds: potassium chloride **OR** potassium alternative oral replacement **OR** potassium chloride, sodium chloride flush, sodium chloride, [Held by provider] acetaminophen **OR** [Held by provider] acetaminophen, ipratropium      Intake/Output Summary (Last 24 hours) at 5/9/2021 0821  Last data filed at 5/9/2021 0421  Gross per 24 hour   Intake 1804.39 ml   Output 3475 ml   Net -1670.61 ml       Diet:  DIET GENERAL;    Exam:  /68   Pulse 96   Temp 98.4 °F (36.9 °C) (Oral)   Resp 16   Ht 5' 11\" (1.803 m)   Wt 250 lb 6.4 oz (113.6 kg)   SpO2 92%   BMI 34.92 kg/m²     General appearance: No apparent distress, appears stated age and cooperative. No arm tremoring noted  HEENT: Pupils equal, round, and reactive to light. Conjunctivae/corneas clear. Neck: Supple, with full range of motion. No jugular venous distention. Trachea midline. Respiratory:  Normal respiratory effort. Clear to auscultation, bilaterally without Rales/Wheezes/Rhonchi.   Cardiovascular: Regular rate and rhythm with normal S1/S2 without murmurs, rubs or gallops. Abdomen: Soft, non-tender, non-distended with normal bowel sounds. Musculoskeletal: passive and active ROM x 4 extremities. Skin: Skin color, texture, turgor normal.    Neurologic:  Neurovascularly intact without any focal sensory/motor deficits. Cranial nerves: II-XII intact, grossly non-focal.  Psychiatric: Alert and oriented, thought content appropriate  Capillary Refill: Brisk,< 3 seconds   Peripheral Pulses: +2 palpable, equal bilaterally       Labs:   Recent Labs     05/06/21 1846 05/08/21 0327   WBC 10.5 3.6*   HGB 14.7 12.8*   HCT 39.2* 35.8*    135     Recent Labs     05/06/21 2013 05/06/21 2013 05/08/21 0327 05/08/21 0327 05/08/21  1632 05/08/21 2231 05/09/21 0413   *   < > 126*   < > 128* 130* 135   K 3.9  --  3.0*  --   --   --  3.7   CL 80*  --  92*  --   --   --  102   CO2 17*  --  23  --   --   --  22*   BUN 14  --  8  --   --   --  9   CREATININE 0.5  --  0.7  --   --   --  0.6   CALCIUM 8.8  --  8.3*  --   --   --  8.6   PHOS  --   --  2.7  --   --   --  3.1    < > = values in this interval not displayed. Recent Labs     05/06/21 2013 05/08/21 0327 05/09/21 0413   * 123* 161*   ALT 97* 85* 110*   BILIDIR 2.1*  --   --    BILITOT 3.4* 4.9* 1.9*   ALKPHOS 220* 163* 253*     Recent Labs     05/08/21  1806   INR 1.03     Microbiology:    None    Urinalysis:      Lab Results   Component Value Date    NITRU NEGATIVE 05/07/2021    WBCUA NONE SEEN 04/08/2021    BACTERIA NONE SEEN 04/08/2021    RBCUA 0-2 04/08/2021    BLOODU NEGATIVE 05/07/2021    SPECGRAV 1.008 04/08/2021    GLUCOSEU NEGATIVE 05/07/2021       Radiology:  No results found.     DVT prophylaxis: [x] Lovenox                                 [] SCDs                                 [] SQ Heparin                                 [] Encourage ambulation           [] Already on Anticoagulation     Code Status: Full Code    Tele:   [x] yes SR HR 96             [] no    Active Hospital Problems    Diagnosis Date Noted    Hyponatremia [E87.1] 04/08/2021       Electronically signed by NILAM Zee CNP on 5/9/2021 at 8:21 AM

## 2021-05-10 VITALS
WEIGHT: 250.4 LBS | HEIGHT: 71 IN | BODY MASS INDEX: 35.06 KG/M2 | DIASTOLIC BLOOD PRESSURE: 76 MMHG | TEMPERATURE: 98.3 F | HEART RATE: 97 BPM | OXYGEN SATURATION: 98 % | SYSTOLIC BLOOD PRESSURE: 127 MMHG | RESPIRATION RATE: 18 BRPM

## 2021-05-10 LAB
ANION GAP SERPL CALCULATED.3IONS-SCNC: 10 MEQ/L (ref 8–16)
BUN BLDV-MCNC: 7 MG/DL (ref 7–22)
CALCIUM SERPL-MCNC: 8.6 MG/DL (ref 8.5–10.5)
CHLORIDE BLD-SCNC: 103 MEQ/L (ref 98–111)
CO2: 22 MEQ/L (ref 23–33)
CREAT SERPL-MCNC: 0.7 MG/DL (ref 0.4–1.2)
GFR SERPL CREATININE-BSD FRML MDRD: > 90 ML/MIN/1.73M2
GLUCOSE BLD-MCNC: 107 MG/DL (ref 70–108)
MAGNESIUM: 1.8 MG/DL (ref 1.6–2.4)
PHOSPHORUS: 3.5 MG/DL (ref 2.4–4.7)
POTASSIUM REFLEX MAGNESIUM: 3.5 MEQ/L (ref 3.5–5.2)
SODIUM BLD-SCNC: 135 MEQ/L (ref 135–145)

## 2021-05-10 PROCEDURE — 80048 BASIC METABOLIC PNL TOTAL CA: CPT

## 2021-05-10 PROCEDURE — 6370000000 HC RX 637 (ALT 250 FOR IP): Performed by: INTERNAL MEDICINE

## 2021-05-10 PROCEDURE — 6360000002 HC RX W HCPCS: Performed by: FAMILY MEDICINE

## 2021-05-10 PROCEDURE — 6370000000 HC RX 637 (ALT 250 FOR IP): Performed by: FAMILY MEDICINE

## 2021-05-10 PROCEDURE — 36415 COLL VENOUS BLD VENIPUNCTURE: CPT

## 2021-05-10 PROCEDURE — 83735 ASSAY OF MAGNESIUM: CPT

## 2021-05-10 PROCEDURE — 99239 HOSP IP/OBS DSCHRG MGMT >30: CPT | Performed by: INTERNAL MEDICINE

## 2021-05-10 PROCEDURE — 99232 SBSQ HOSP IP/OBS MODERATE 35: CPT | Performed by: INTERNAL MEDICINE

## 2021-05-10 PROCEDURE — 84100 ASSAY OF PHOSPHORUS: CPT

## 2021-05-10 RX ORDER — MULTIVITAMIN WITH IRON
1 TABLET ORAL DAILY
Refills: 0 | COMMUNITY
Start: 2021-05-10 | End: 2022-02-28

## 2021-05-10 RX ORDER — LISINOPRIL 20 MG/1
20 TABLET ORAL DAILY
Qty: 30 TABLET | Refills: 3 | Status: SHIPPED | OUTPATIENT
Start: 2021-05-10 | End: 2022-02-28 | Stop reason: SDUPTHER

## 2021-05-10 RX ADMIN — METOPROLOL TARTRATE 50 MG: 50 TABLET, FILM COATED ORAL at 08:22

## 2021-05-10 RX ADMIN — ENOXAPARIN SODIUM 40 MG: 40 INJECTION SUBCUTANEOUS at 08:22

## 2021-05-10 RX ADMIN — PANTOPRAZOLE SODIUM 40 MG: 40 TABLET, DELAYED RELEASE ORAL at 08:22

## 2021-05-10 RX ADMIN — Medication 1 TABLET: at 08:22

## 2021-05-10 RX ADMIN — LISINOPRIL 20 MG: 20 TABLET ORAL at 08:22

## 2021-05-10 RX ADMIN — Medication 100 MG: at 08:22

## 2021-05-10 RX ADMIN — FOLIC ACID 1 MG: 1 TABLET ORAL at 08:22

## 2021-05-10 RX ADMIN — PHENOBARBITAL 32.4 MG: 32.4 TABLET ORAL at 08:22

## 2021-05-10 ASSESSMENT — PAIN SCALES - GENERAL: PAINLEVEL_OUTOF10: 0

## 2021-05-10 NOTE — DISCHARGE SUMMARY
WORK    Disposition: Home  Condition at Discharge: Stable    Code Status:  Full Code     Patient Instructions:    Discharge lab work: Activity: activity as tolerated  Diet: DIET GENERAL;      Follow-up visits:   NILAM Orosco CNP  [address]  [id number]    On 5/13/2021  @ 11:00         Discharge Medications:      Mace Score   Home Medication Instructions NVN:[id number]    Printed on:05/10/21 5176   Medication Information                      albuterol sulfate HFA (PROVENTIL HFA) 108 (90 Base) MCG/ACT inhaler  Inhale 2 puffs into the lungs every 4 hours as needed for Wheezing or Shortness of Breath (Space out to every 6 hours as symptoms improve) Space out to every 6 hours as symptoms improve. blood glucose test strips (ASCENSIA AUTODISC VI;ONE TOUCH ULTRA TEST VI) strip  Test as needed. Dispense One Touch verio Test Strips. Dx: Type 2 diabetes (565.50)             folic acid (FOLVITE) 1 MG tablet  Take 1 tablet by mouth daily             glucose monitoring kit (FREESTYLE) monitoring kit  Glucometer with test strips and lancets. Check BS once daily  #100 strips and lancets             lisinopril (PRINIVIL;ZESTRIL) 20 MG tablet  Take 1 tablet by mouth daily             metoprolol tartrate (LOPRESSOR) 50 MG tablet  Take 1 tablet by mouth 2 times daily             Multiple Vitamin (MULTIVITAMIN) TABS tablet  Take 1 tablet by mouth daily             pantoprazole (PROTONIX) 40 MG tablet  Take 1 tablet by mouth daily             QUEtiapine (SEROQUEL) 50 MG tablet  Take 1 tablet by mouth nightly             vitamin B-1 (THIAMINE) 100 MG tablet  Take 1 tablet by mouth daily                 Time Spent on discharge is more than 45 minutes in the examination, evaluation, counseling and review of medications and discharge plan. Signed: Thank you NILAM Orosco CNP for the opportunity to be involved in this patient's care.     Electronically signed by Jaison Romero MD on 5/10/2021 at 5:20 PM

## 2021-05-10 NOTE — PROGRESS NOTES
CLINICAL PHARMACY: DISCHARGE MED RECONCILIATION/REVIEW    [facility]) Select Patient?: Yes  Total # of Interventions Recommended: 0   -   Total # Interventions Accepted: 0  Intervention Severity:   - Level 1 Intervention Present?: No   - Level 2 #: 0   - Level 3 #: 0   Time Spent (min): 15    Additional Documentation:    Silvio George, PharmD   5/10/2021, 8:24 AM

## 2021-05-10 NOTE — CARE COORDINATION
5/10/21, 9:00 AM EDT    Patient goals/plan/ treatment preferences discussed by  and . Patient goals/plan/ treatment preferences reviewed with patient/ family. Patient/ family verbalize understanding of discharge plan and are in agreement with goal/plan/treatment preferences. Understanding was demonstrated using the teach back method. AVS provided by RN at time of discharge, which includes all necessary medical information pertaining to the patients current course of illness, treatment, post-discharge goals of care, and treatment preferences. Pt to be discharged to home. Denies needs or services.

## 2021-05-10 NOTE — PROGRESS NOTES
CLINICAL PHARMACY NOTE: MEDS TO [address] Select Patient?: Yes  Total # of Prescriptions Filled: 1   The following medications were delivered to the patient:  Lisinopril 20mg  Total # of Interventions Completed: 2  Time Spent (min): 30    Additional Documentation:

## 2021-05-10 NOTE — PROGRESS NOTES
Discharge teaching and instructions for diagnosis/procedure of alcohol withdrawal and hyponatremia completed with patient using teachback method. AVS reviewed. Printed prescriptions given to patient. Patient voiced understanding regarding prescriptions, follow up appointments, and care of self at home.  Discharged ambulatory to  home with support per family

## 2021-05-11 ENCOUNTER — CARE COORDINATION (OUTPATIENT)
Dept: CASE MANAGEMENT | Age: 46
End: 2021-05-11

## 2021-05-11 ENCOUNTER — TELEPHONE (OUTPATIENT)
Dept: FAMILY MEDICINE CLINIC | Age: 46
End: 2021-05-11

## 2021-05-11 DIAGNOSIS — F10.10 ALCOHOL ABUSE: Primary | ICD-10-CM

## 2021-05-11 NOTE — CARE COORDINATION
[facility] [address] Transitions Initial Follow Up Call    Call within 2 business days of discharge: Yes    Patient: Suzi Adjutant Patient : 1975   MRN: [id number]  Reason for Admission: Alcohol withdrawl  Discharge Date: 5/10/21 RARS: Readmission Risk Score: 34      Last Discharge [facility]       Complaint Diagnosis Description Type Department Provider    21 Alcohol Problem; Hematuria Acute alcoholic intoxication with complication ([facility] [address] . .. ED to Hosp-Admission (Discharged) (ADMITTED) Marii Forde MD; Abby Soriano. .. Transitions of Care Initial Call    Was this an external facility discharge? No Discharge Facility: N/A    Challenges to be reviewed by the provider   Additional needs identified to be addressed with provider No  none             Method of communication with provider : none      Advance Care Planning:   Does patient have an Advance Directive:  reviewed and current. Was this a readmission? No  Patient stated reason for admission:  Alcohol withdrawl  Patients top risk factors for readmission: level of motivation and utilization of services    Care Transition Nurse (CTN) contacted the patient by telephone to perform post hospital discharge assessment. Verified name and  with patient as identifiers. Provided introduction to self, and explanation of the CTN role. CTN reviewed discharge instructions, medical action plan and red flags with patient who verbalized understanding. Patient given an opportunity to ask questions and does not have any further questions or concerns at this time. Were discharge instructions available to patient? Yes. Reviewed appropriate site of care based on symptoms and resources available to patient including: PCP and Condition related references. The patient agrees to contact the PCP office for questions related to their healthcare.      Medication reconciliation was performed with patient, who verbalizes understanding of administration of home medications. Advised obtaining a 90-day supply of all daily and as-needed medications. Covid Risk Education     Educated patient about risk for severe COVID-19 due to risk factors according to CDC guidelines. CTN reviewed discharge instructions, medical action plan and red flag symptoms patient who verbalized understanding. Discussed COVID vaccination status Yes. Education provided on COVID-19 vaccination as appropriate. Discussed exposure protocols and quarantine with CDC Guidelines. Patient was given an opportunity to verbalize any questions and concerns and agrees to contact CTN or health care provider for questions related to their healthcare. Reviewed and educated patient on any new and changed medications related to discharge diagnosis     Was patient discharged with a pulse oximeter? No Discussed and confirmed pulse oximeter discharge instructions and when to notify provider or seek emergency care. For patients discharged due to monoclonal antibody infusion or pulse ox at discharge; information provided for remote patient monitoring, and agrees to enroll for follow up No .  Patient's preferred e-mail:     Patient's preferred phone number:    Based on remote monitoring alert triggers, patient will be contacted by nurse care manager for worsening symptoms. CTN provided contact information. Plan for follow-up call in 3-5 days based on severity of symptoms and risk factors.          Non-face-to-face services provided:  Obtained and reviewed discharge summary and/or continuity of care documents  Education of patient/family/caregiver/guardian to support self-management-AA meetings, rehab, follow up, pt declined assistance from CTN with this    Care Transitions 24 Hour Call    Do you have any ongoing symptoms?: Yes  Patient-reported symptoms: Fatigue  Do you have a copy of your discharge instructions?: Yes  Do you have all of your prescriptions and are they filled?: Yes  Have you been contacted by a [address]?: No  Have you scheduled your follow up appointment?: Yes  How are you going to get to your appointment?: Car - drive self  Were you discharged with any Home Care or Post Acute Services: No  Do you have support at home?: Alone  Do you feel like you have everything you need to keep you well at home?: Yes  Are you an active caregiver in your home?: No  Care Transitions Interventions    Social Work: Declined           Follow Up  Future Appointments   Date Time Provider Amanda Jerome   5/13/2021 11:00 AM NILAM Young CNP  [facility]   6/1/2021  1:30 PM [facility]     Pt was resting when I called states did not sleep well last night, no particular reason. Discussed follow up appt, discussed AA meetings, does not attend daily, he thinks they have them a few times a week around him, did not sound confident in that answer. Attempted to discuss rehab, next plan of action for alcohol addiction, pt states he needs to get some rest today and will then worry about that. Pt is offered social service consult, declines, pt is offered CTN phone number for further needs before next follow up call, agreeable and states will reach out as needed.       Cadence Mancilla RN

## 2021-05-13 ENCOUNTER — OFFICE VISIT (OUTPATIENT)
Dept: FAMILY MEDICINE CLINIC | Age: 46
End: 2021-05-13
Payer: MEDICAID

## 2021-05-13 VITALS
HEIGHT: 71 IN | WEIGHT: 241 LBS | BODY MASS INDEX: 33.74 KG/M2 | RESPIRATION RATE: 20 BRPM | SYSTOLIC BLOOD PRESSURE: 122 MMHG | DIASTOLIC BLOOD PRESSURE: 78 MMHG | HEART RATE: 76 BPM | OXYGEN SATURATION: 97 %

## 2021-05-13 DIAGNOSIS — F10.20 ALCOHOL USE DISORDER, SEVERE, DEPENDENCE (HCC): ICD-10-CM

## 2021-05-13 DIAGNOSIS — G31.2 ALCOHOLIC ENCEPHALOPATHY (HCC): Primary | ICD-10-CM

## 2021-05-13 DIAGNOSIS — E11.9 TYPE 2 DIABETES MELLITUS WITHOUT COMPLICATION, WITHOUT LONG-TERM CURRENT USE OF INSULIN (HCC): ICD-10-CM

## 2021-05-13 DIAGNOSIS — I10 ESSENTIAL HYPERTENSION: ICD-10-CM

## 2021-05-13 PROBLEM — N18.5 CHRONIC KIDNEY DISEASE, STAGE 5 (HCC): Status: RESOLVED | Noted: 2021-03-25 | Resolved: 2021-05-13

## 2021-05-13 PROBLEM — F11.20 SEVERE OPIOID DEPENDENCE (HCC): Status: RESOLVED | Noted: 2021-03-25 | Resolved: 2021-05-13

## 2021-05-13 PROCEDURE — 99215 OFFICE O/P EST HI 40 MIN: CPT | Performed by: NURSE PRACTITIONER

## 2021-05-13 PROCEDURE — 1111F DSCHRG MED/CURRENT MED MERGE: CPT | Performed by: NURSE PRACTITIONER

## 2021-05-13 ASSESSMENT — ENCOUNTER SYMPTOMS
EYES NEGATIVE: 1
RESPIRATORY NEGATIVE: 1
GASTROINTESTINAL NEGATIVE: 1

## 2021-05-13 NOTE — PROGRESS NOTES
Post-Discharge Transitional Care Management Services or Hospital Follow Up      Derl Grams   YOB: 1975    Date of Office Visit:  5/13/2021  Date of Hospital Admission: 5/6/21  Date of Hospital Discharge: 5/10/21  Readmission Risk Score(high >=14%.  Medium >=10%):Readmission Risk Score: 34      Care management risk score Rising risk (score 2-5) and Complex Care (Scores >=6): 10     Non face to face  following discharge, date last encounter closed (first attempt may have been earlier): 5/11/2021  2:34 PM 5/11/2021  2:34 PM    Call initiated 2 business days of discharge: Yes     Patient Active Problem List   Diagnosis    Hyperlipemia    Uncontrolled hypertension    Uncontrolled type 2 diabetes mellitus with hyperosmolarity without coma ([address]    Alcohol withdrawal ([address]    Alcoholic hepatitis    COPD (chronic obstructive pulmonary disease) ([address]    Passive-dependent personality disorder ([address]    Alcohol abuse    Type 2 diabetes mellitus without complication, without long-term current use of insulin (HCC)    Alcoholic liver failure (HCC)    Transaminitis    Hyperbilirubinemia    RUQ pain    Delirious    Alcoholic hepatitis without ascites    Alcoholic steatohepatitis    Hypophosphatemia    Bipolar 1 disorder (HCC)    Alcoholic gastritis without bleeding    Alcohol withdrawal syndrome with complication (HCC)    Alcohol dependence with physiological dependence, continuous ([address]    Liver failure without hepatic coma (HCC)    Alcoholic cirrhosis ([address]    Alcohol intoxication in alcoholism with blood level over 0.3 with complication ([address]    Acute alcoholic intoxication with complication ([address]    Acute alcoholic hepatitis    Alcohol ingestion    Alcohol withdrawal delirium (HCC)    Uncomplicated alcohol withdrawal ([address]    Alcohol use disorder, severe, dependence ([address]    Acute pancreatitis    Depression    Osteopoikilosis    Contusion of hand    Atypical chest pain MEDS RECONCILED W/ CURRENT OUTPATIENT MED LIST    3. Essential hypertension  Stable cont meds  - [facility] DISCHARGE MEDS RECONCILED W/ CURRENT OUTPATIENT MED LIST    4. Alcohol use disorder, severe, dependence ([address]  Had long discussion that recently his exacerbations have resulted in much more severe illness than previous.   Need for abstinence as he is becoming much more high risk  - [facility] DISCHARGE MEDS RECONCILED W/ CURRENT OUTPATIENT MED LIST    Fu in 2 weeks    Medical Decision Making: high complexity

## 2021-05-18 ENCOUNTER — CARE COORDINATION (OUTPATIENT)
Dept: CASE MANAGEMENT | Age: 46
End: 2021-05-18

## 2021-05-18 NOTE — CARE COORDINATION
Care Transitions Outreach Attempt    Call within 2 business days of discharge: Yes   Attempted to reach patient for transitions of care follow up. Unable to reach patient. LVM; reviewed PCP notes from f/u office visit appt    NEXT CALL: Did patient find alcohol addiction support? Rehab ? AA meetings ? Patient: Johnny Chi Patient : 1975 MRN: [id number]    Last Discharge [address] [address]       Complaint Diagnosis Description Type Department Provider    21 Alcohol Problem; Hematuria Acute alcoholic intoxication with complication ([facility] . .. ED to Hosp-Admission (Discharged) (ADMITTED) Izzy Barnes MD; Abby Soriano. ..         Noted following upcoming appointments from discharge chart review:   [facility] follow up appointment(s):   Future Appointments   Date Time Provider Amanda Jerome   2021  1:30 PM NILAM Mccarthy - CNP [facility] - Angela Kent RN  Care Transitions Nurse

## 2021-05-25 ENCOUNTER — CARE COORDINATION (OUTPATIENT)
Dept: CASE MANAGEMENT | Age: 46
End: 2021-05-25

## 2021-05-25 NOTE — CARE COORDINATION
[facility] Transitions Follow Up Call    2021    Patient: Suzanna Hallman  Patient : 1975   MRN: [id number]  Reason for Admission:  Alcohol Withdrawl  Discharge Date: 5/10/21 RARS: Readmission Risk Score: 29         Spoke with: Emileeus Valentin, states he is doing well, everything is going \"fine\". Denies changes in medications. I asked patient if he found a treatment program or support group for his alcohol addiction and states no I will work on that [employer] I get off \"base\". I questioned to hear if I heard him right and patient said NEVERMIND and hung up on me. It appears he is in a ACM episode I will collaborate with ACM and sign off CTN episode. Care Transitions Subsequent and Final Call    Subsequent and Final Calls  Do you have any ongoing symptoms?: No  Have your medications changed?: No  Do you have any questions related to your medications?: No  Do you currently have any active services?: No  Do you have any needs or concerns that I can assist you with?: No  Identified Barriers: Lack of Education, Fear of Failure, Lack of Motivation  Care Transitions Interventions    Social Work: Declined    Other Interventions:            Follow Up  Future Appointments   Date Time Provider Amanda Jerome   2021  1:30 PM NILAM Lara - CNP [facility] - Surya Bradley RN

## 2021-05-27 ENCOUNTER — CARE COORDINATION (OUTPATIENT)
Dept: CARE COORDINATION | Age: 46
End: 2021-05-27

## 2021-05-27 NOTE — CARE COORDINATION
Ambulatory Care Coordination Note  5/27/2021  CM Risk Score: 10  Charlson 10 Year Mortality Risk Score: 100%     ACC: Kenn Tillman RN    Summary Note: Patient was called to re-establish Care Coordination s/p recent CTC episode for assistance with the management of his DM, COPD, and healthcare needs. Patient reported he is doing \"ok. \"  Patient shared he has not had any alcohol recently. Patient reported he has been trying to work outside in the yard with the nicer weather and continues to attend virtual [employer] meetings every week end and one x during the week. Patient shared he does not have a local sponsor but does receive a daily text message from a member of [employer] and he can reach out to him if needed. Patient reported he attended [employer] in [place] in the past but is unable to attend locally at this time d/t being unable to drive. Patient stated he is scheduled to get his license back in Oct.  Per  SW no virtual options for [employer] at this time. Patient declined any additional alcohol treatment resources at this time and he was instructed to call with any changes. Patient stated he is scheduled to see his  tomorrow and he believes this will be one of or the last time. Support and encouragement provided to patient t/o our all. Patient denied any other questions or concerns. Med Rec was completed. Patient is scheduled to see PCP on 6/1 and he denied any transportation concerns for his appointment. [facility] educated patient on signs/symptoms to call and report as well as importance of following up as directed. Patient denied any other questions, concerns, or needs and he was encouraged to call with any that may develop.             Actions:   - Reviewed signs/symptoms  - Monitored for recent alcohol use  - Monitored for interest in additional alcohol treatment resources  - Encouraged patient to continue to be engaged in [employer] and contact with [facility] contact  - Reviewed upcoming PCP appointment information and monitored for transportation needs  - Monitored for additional needs         Plan of Care:   - Continue with Care Coordination f/u calls for assistance with the management of his DM, COPD, and healthcare needs  - Complete DM education - In Process   - Complete COPD education - In Process  - Monitor for ongoing alcohol abuse resource needs - In Process  - Review availability of same day appointments, urgent care/walk in clinic options, and after hours on call [address] information - Completed  - Encourage Flu/Pneumonia vaccine information   - A1C 5.7% (June 2020)  - Monitor for additional needs and readiness to discharge from [address] Coordination Interventions    Program Enrollment: Complex Care  Referral from Primary Care Provider: No  Suggested Interventions and [address]: Declined (Comment: Prev. followed with [facility] )  Diabetes Education: Completed (Comment: Dec. 2020)  Disease Specific Clinic: Declined (Comment: Jan. 2021)  Medication Assistance Program: Declined (Comment: denied any need - Dec. 2020)  Medi Set or Pill Pack: Declined  Pharmacist: Declined (Comment: Dec. 2020)  Registered Dietician: Declined (Comment: Jan. 2021)  Other Services: Completed (Comment: Follows with  sarah and attends [employer])  Transportation Support: Declined  Zone Management Tools: Completed (Comment: Jan. 2021)         Goals Addressed                 This Visit's Progress       Care Coordination     Conditions and Symptoms   Improving     I will schedule office visits, as directed by my provider. I will keep my appointment or reschedule if I have to cancel. I will notify my provider of any barriers to my plan of care. I will notify my provider of any symptoms that indicate a worsening of my condition.     Barriers: lack of Assessment    Meal Planning: Avoidance of concentrated sweets   How often do you test your blood sugar?: Daily (Comment: monitors BS approx 1 x a week )   Do you have barriers with adherence to non-pharmacologic self-management interventions?  (Nutrition/Exercise/Self-Monitoring): No   Have you ever had to go to the ED for symptoms of low blood sugar?: No       No patient-reported symptoms   Do you have hyperglycemia symptoms?: No   Do you have hypoglycemia symptoms?: No   Blood Sugar Trends: No Change      ,   COPD Assessment    Does the patient understand envrionmental exposure?: Yes  Is the patient able to verbalize Rescue vs. Long Acting medications?: Yes  Does the patient have a nebulizer?: No  Does the patient use a space with inhaled medications?: No     No patient-reported symptoms         Symptoms:     Symptom course: stable  Increase use of rapid acting/rescue inhaled medications?: No  Change in chronic cough?: No/At Baseline  Change in sputum?: No/At Baseline     ,   General Assessment    Do you have any symptoms that are causing concern?: Yes  Progression since Onset: Gradually Improving  Reported Symptoms: Other (Comment: alcohol abuse)      and Care Coordination Episodes    Type: Amb Care Coordination  Episode: Complex Care  Noted: 12/22/2020  Comments: list referral

## 2021-06-01 ENCOUNTER — OFFICE VISIT (OUTPATIENT)
Dept: FAMILY MEDICINE CLINIC | Age: 46
End: 2021-06-01
Payer: MEDICAID

## 2021-06-01 VITALS
OXYGEN SATURATION: 98 % | SYSTOLIC BLOOD PRESSURE: 128 MMHG | TEMPERATURE: 97.6 F | BODY MASS INDEX: 34.19 KG/M2 | RESPIRATION RATE: 20 BRPM | WEIGHT: 244.2 LBS | HEIGHT: 71 IN | HEART RATE: 73 BPM | DIASTOLIC BLOOD PRESSURE: 82 MMHG

## 2021-06-01 DIAGNOSIS — F31.9 BIPOLAR 1 DISORDER (HCC): ICD-10-CM

## 2021-06-01 DIAGNOSIS — E11.9 TYPE 2 DIABETES MELLITUS WITHOUT COMPLICATION, WITHOUT LONG-TERM CURRENT USE OF INSULIN (HCC): Primary | ICD-10-CM

## 2021-06-01 DIAGNOSIS — K70.30 ALCOHOLIC CIRRHOSIS OF LIVER WITHOUT ASCITES (HCC): ICD-10-CM

## 2021-06-01 DIAGNOSIS — S39.011A STRAIN OF ABDOMINAL MUSCLE, INITIAL ENCOUNTER: ICD-10-CM

## 2021-06-01 DIAGNOSIS — I10 ESSENTIAL HYPERTENSION: ICD-10-CM

## 2021-06-01 PROCEDURE — 4004F PT TOBACCO SCREEN RCVD TLK: CPT | Performed by: NURSE PRACTITIONER

## 2021-06-01 PROCEDURE — G8427 DOCREV CUR MEDS BY ELIG CLIN: HCPCS | Performed by: NURSE PRACTITIONER

## 2021-06-01 PROCEDURE — 2022F DILAT RTA XM EVC RTNOPTHY: CPT | Performed by: NURSE PRACTITIONER

## 2021-06-01 PROCEDURE — G8417 CALC BMI ABV UP PARAM F/U: HCPCS | Performed by: NURSE PRACTITIONER

## 2021-06-01 PROCEDURE — 99214 OFFICE O/P EST MOD 30 MIN: CPT | Performed by: NURSE PRACTITIONER

## 2021-06-01 PROCEDURE — 3046F HEMOGLOBIN A1C LEVEL >9.0%: CPT | Performed by: NURSE PRACTITIONER

## 2021-06-01 PROCEDURE — 1111F DSCHRG MED/CURRENT MED MERGE: CPT | Performed by: NURSE PRACTITIONER

## 2021-06-01 RX ORDER — FOLIC ACID 1 MG/1
1 TABLET ORAL DAILY
Qty: 30 TABLET | Refills: 5 | Status: SHIPPED | OUTPATIENT
Start: 2021-06-01 | End: 2022-02-28

## 2021-06-01 ASSESSMENT — ENCOUNTER SYMPTOMS
RESPIRATORY NEGATIVE: 1
ABDOMINAL PAIN: 1
EYES NEGATIVE: 1

## 2021-06-01 NOTE — PROGRESS NOTES
Depression     [facility]     ETOH abuse     GERD (gastroesophageal reflux disease)     Hyperlipidemia     Hypertension     Liver disease     Seizures ([facility]     etoh wdl      Past Surgical History:   Procedure Laterality Date    ENDOSCOPY, COLON, DIAGNOSTIC       Family History   Problem Relation Age of Onset    High Blood Pressure Father     Cancer Father         Lung Cancer    Alcohol Abuse Father     High Blood Pressure Brother     Diabetes Mother     Heart Disease Mother         Stent Placement    Arthritis Mother     Depression Mother     High Blood Pressure Mother     High Cholesterol Mother     Alcohol Abuse Paternal Uncle      Social History     Tobacco Use    Smoking status: Current Every Day Smoker     Packs/day: 1.00     Years: 27.00     Pack years: 27.00     Types: E-Cigarettes, Cigarettes    Smokeless tobacco: Former User    Tobacco comment: vape   Substance Use Topics    Alcohol use: Not Currently     Alcohol/week: 140.0 standard drinks     Types: 140 Cans of beer per week     Comment: 12 pk beer & 6 hard lemonade daily -last 8:30 3/16/21      Current Outpatient Medications   Medication Sig Dispense Refill    folic acid (FOLVITE) 1 MG tablet Take 1 tablet by mouth daily 30 tablet 5    lisinopril (PRINIVIL;ZESTRIL) 20 MG tablet Take 1 tablet by mouth daily 30 tablet 3    Multiple Vitamin (MULTIVITAMIN) TABS tablet Take 1 tablet by mouth daily  0    QUEtiapine (SEROQUEL) 50 MG tablet Take 1 tablet by mouth nightly 30 tablet 3    vitamin B-1 (THIAMINE) 100 MG tablet Take 1 tablet by mouth daily 30 tablet 3    pantoprazole (PROTONIX) 40 MG tablet Take 1 tablet by mouth daily 30 tablet 0    metoprolol tartrate (LOPRESSOR) 50 MG tablet Take 1 tablet by mouth 2 times daily 60 tablet 5    blood glucose test strips (ASCENSIA AUTODISC VI;ONE TOUCH ULTRA TEST VI) strip Test as needed. Dispense One Touch verio Test Strips.   Dx: Type 2 diabetes (250.00) 60 strip 11    albuterol sulfate HFA (PROVENTIL HFA) 108 (90 Base) MCG/ACT inhaler Inhale 2 puffs into the lungs every 4 hours as needed for Wheezing or Shortness of Breath (Space out to every 6 hours as symptoms improve) Space out to every 6 hours as symptoms improve. 1 Inhaler 0    glucose monitoring kit (FREESTYLE) monitoring kit Glucometer with test strips and lancets. Check BS once daily  #100 strips and lancets 1 kit 5     No current facility-administered medications for this visit. No Known Allergies  Health Maintenance   Topic Date Due    Hepatitis A vaccine (1 of 2 - Risk 2-dose series) Never done    Pneumococcal 0-64 years Vaccine (1 of 2 - PPSV23) Never done    Diabetic retinal exam  Never done    COVID-19 Vaccine (1) Never done    Hepatitis B vaccine (1 of 3 - Risk 3-dose series) Never done    DTaP/Tdap/Td vaccine (1 - Tdap) Never done    Diabetic microalbuminuria test  10/04/2019    Diabetic foot exam  06/11/2021    A1C test (Diabetic or Prediabetic)  06/23/2021    Lipid screen  06/23/2021    Flu vaccine (Season Ended) 09/01/2021    Potassium monitoring  05/10/2022    Creatinine monitoring  05/10/2022    Hepatitis C screen  Completed    Hib vaccine  Aged Out    Meningococcal (ACWY) vaccine  Aged Out    HIV screen  Discontinued       Subjective:     Review of Systems   Constitutional: Negative. HENT: Negative. Eyes: Negative. Respiratory: Negative. Cardiovascular: Negative. Gastrointestinal: Positive for abdominal pain. Musculoskeletal: Negative. Skin: Negative. Neurological: Negative. Objective:     Vitals:    06/01/21 1329   BP: 128/82   Site: Left Upper Arm   Position: Sitting   Cuff Size: Large Adult   Pulse: 73   Resp: 20   Temp: 97.6 °F (36.4 °C)   TempSrc: Temporal   SpO2: 98%   Weight: 244 lb 3.2 oz (110.8 kg)   Height: 5' 11\" (1.803 m)       Physical Exam  Constitutional:       Appearance: He is well-developed. HENT:      Head: Normocephalic.       Right Ear: Tympanic membrane and external ear normal.      Left Ear: Tympanic membrane and external ear normal.      Nose: Nose normal.   Cardiovascular:      Rate and Rhythm: Normal rate and regular rhythm. Heart sounds: Normal heart sounds. No murmur heard. No friction rub. No gallop. Pulmonary:      Effort: Pulmonary effort is normal.      Breath sounds: Normal breath sounds. No wheezing or rales. Abdominal:      General: Bowel sounds are normal.      Palpations: Abdomen is soft. Tenderness: There is no abdominal tenderness. There is no guarding. Musculoskeletal:         General: Normal range of motion. Cervical back: Normal range of motion and neck supple. Lymphadenopathy:      Cervical: No cervical adenopathy. Skin:     General: Skin is warm. Neurological:      Mental Status: He is alert and oriented to person, place, and time. Deep Tendon Reflexes: Reflexes are normal and symmetric. Assessment:      Diagnosis Orders   1. Type 2 diabetes mellitus without complication, without long-term current use of insulin ([facility]     2. Bipolar 1 disorder (HCC)  folic acid (FOLVITE) 1 MG tablet   3. Essential hypertension     4. Alcoholic cirrhosis of liver without ascites (HCC)     5. Strain of abdominal muscle, initial encounter         Plan:      Return in about 3 months (around 9/1/2021). No orders of the defined types were placed in this encounter. Orders Placed This Encounter   Medications    folic acid (FOLVITE) 1 MG tablet     Sig: Take 1 tablet by mouth daily     Dispense:  30 tablet     Refill:  5      See orders  Cont meds  Fu in 3months    Patient given educational materials - seepatient instructions. Discussed use, benefit, and side effects of prescribed medications. All patient questions answered. Pt voiced understanding. Patient agreed withtreatment plan. Follow up as directed.      Electronically signed by NILAM Carbone CNP on 6/1/2021 at 5:24 PM

## 2021-06-09 ENCOUNTER — CARE COORDINATION (OUTPATIENT)
Dept: CARE COORDINATION | Age: 46
End: 2021-06-09

## 2021-06-09 NOTE — CARE COORDINATION
Ambulatory Care Coordination Note  6/9/2021  CM Risk Score: 10  Charlson 10 Year Mortality Risk Score: 100%     ACC: Inder Cates RN    Summary Note: Patient was called for continued Care Coordination follow up and education re: the management of his DM, COPD, and healthcare needs. Patient was not available at the time of my call, but I was able to speak with his mother/HIP[employer] contact, Baljeet Colmenares. Baljeet Colmenares shared patient has been doing well in recent weeks and she denied any current concerns or complaints. Baljeet Colmenares was encouraged to call or have patient call with any needs, questions, or concerns, and she verbalized understanding.            Actions:   - Monitored for current concerns/needs          Plan of Care:   - Continue with Care Coordination f/u calls for assistance with the management of his DM, COPD, and healthcare needs  - Complete DM education - In Process   - Complete COPD education - In Process  - Monitor for ongoing alcohol abuse resource needs - In Process  - Review availability of same day appointments, urgent care/walk in clinic options, and after hours on call [address] information - Completed  - Encourage Flu/Pneumonia vaccine information   - A1C 5.7% (June 2020)  - If patient remains stable will plan to discharge from Care Coordination in the near future     Care Coordination Interventions    Program Enrollment: Complex Care  Referral from Primary Care Provider: No  Suggested Interventions and [facility]: Declined (Comment: Prev. followed with [facility] )  Diabetes Education: Completed (Comment: Dec. 2020)  Disease Specific Clinic: Declined (Comment: Jan. 2021)  Medication Assistance Program: Declined (Comment: denied any need - Dec. 2020)  Medi Set or Pill Pack: Declined  Pharmacist: Declined (Comment: Dec. 2020)  Registered Dietician: [facility] (Comment: Jan. 2021)  Other Services: Completed (Comment: Follows with Prob[employer] sarah and attends [employer])  Transportation Support: Declined  Zone Management Tools: Completed (Comment: Jan. 2021)         Goals Addressed    None         Prior to Admission medications    Medication Sig Start Date End Date Taking? Authorizing Provider   folic acid (FOLVITE) 1 MG tablet Take 1 tablet by mouth daily 6/1/21   NILAM Sams CNP   lisinopril (PRINIVIL;ZESTRIL) 20 MG tablet Take 1 tablet by mouth daily 5/10/21   Airam Monk MD   Multiple Vitamin (MULTIVITAMIN) TABS tablet Take 1 tablet by mouth daily 5/10/21   Airam Monk MD   QUEtiapine (SEROQUEL) 50 MG tablet Take 1 tablet by mouth nightly 4/21/21   NILAM Busby CNP   vitamin B-1 (THIAMINE) 100 MG tablet Take 1 tablet by mouth daily 4/20/21   Rhett Ro MD   pantoprazole (PROTONIX) 40 MG tablet Take 1 tablet by mouth daily 3/21/21   Rhett Ro MD   metoprolol tartrate (LOPRESSOR) 50 MG tablet Take 1 tablet by mouth 2 times daily 9/29/20   NILAM Busby CNP   blood glucose test strips (ASCENSIA AUTODISC VI;ONE TOUCH ULTRA TEST VI) strip Test as needed. Dispense One Touch verio Test Strips. Dx: Type 2 diabetes (250.00) 6/11/20   NILAM Busby CNP   albuterol sulfate HFA (PROVENTIL HFA) 108 (90 Base) MCG/ACT inhaler Inhale 2 puffs into the lungs every 4 hours as needed for Wheezing or Shortness of Breath (Space out to every 6 hours as symptoms improve) Space out to every 6 hours as symptoms improve. 9/6/19   Geovani Yee MD   glucose monitoring kit (FREESTYLE) monitoring kit Glucometer with test strips and lancets.   Check BS once daily  #100 strips and lancets 10/4/18   NILAM Sams CNP       Future Appointments   Date Time Provider Amanda Jerome   9/1/2021  2:00 PM [facility]     ,   Diabetes Assessment    Meal Planning: Avoidance of concentrated sweets   How often do you test your blood sugar?: Daily (Comment: monitors BS approx 1 x

## 2021-06-22 DIAGNOSIS — E11.9 TYPE 2 DIABETES MELLITUS WITHOUT COMPLICATION, WITHOUT LONG-TERM CURRENT USE OF INSULIN (HCC): ICD-10-CM

## 2021-06-22 NOTE — TELEPHONE ENCOUNTER
Pt called stating he stopped his Metformin around Oct 2020 due to sugars being good    Pt now wants to start Metformin again bc he is going back to [facility] soon. He states the last time he went there his sugars went high due to foods there are high carb foods    Pt states he checks his sugars occasionally and have been around 90's    RA [employer] entered    Call patient back [phone]          Also, Kevin Irwin from [facility] called here and spoke with Ching Brice. He would like to speak with you to see what your expectations of patient at the center are going to be.   Kevin Irwin did not want to leave any details with Ching Brice, he requested to talk with [address] [place] [phone] Ext [phone]

## 2021-06-23 ENCOUNTER — CARE COORDINATION (OUTPATIENT)
Dept: CARE COORDINATION | Age: 46
End: 2021-06-23

## 2021-06-23 NOTE — CARE COORDINATION
Attempted to reach patient for continued Care Coordination follow up and education re: the management of his DM, COPD, and healthcare needs. Patient was unavailable at the time of my call, and generic voicemail message was left asking patient to please return call to my direct number.   Edie Dawkins RN Ambulatory Care Manager

## 2021-06-29 ENCOUNTER — CARE COORDINATION (OUTPATIENT)
Dept: CARE COORDINATION | Age: 46
End: 2021-06-29

## 2021-06-29 NOTE — CARE COORDINATION
Attempted to reach patient for continued Care Coordination follow up and education re: the management of his DM, COPD, and healthcare needs. Patient was unavailable at the time of my call, and generic voicemail message was left asking patient to please return call to my direct number. Follow up My Chart message also sent to patient. Will continue to attempt to f/u with patient in the near future.   Kemar Novak, RN Ambulatory Care Manager

## 2021-07-07 ENCOUNTER — CARE COORDINATION (OUTPATIENT)
Dept: CARE COORDINATION | Age: 46
End: 2021-07-07

## 2021-07-07 NOTE — CARE COORDINATION
Attempted to reach patient for continued Care Coordination follow up and education re: the management of his DM, COPD, and healthcare needs. Patient was not available at the time of my call and generic voicemail message left asking patient to please return call to my direct number. Patient was stable during recent interaction and he is aware of the resources available to him so will plan to discharge from Care Coordination at this time.

## 2022-01-26 NOTE — FLOWSHEET NOTE
09/23/20 2132   Provider Notification   Reason for Communication Evaluate   Provider Name Jenna Castillo   Provider Notification Advance Practice Clinician (CNS, NP, CNM, CRNA, PA)   Method of Communication Secure Message   Response Waiting for response   Notification Time 2130     Notified ETOH is 0.02 January 27, 2022      Rocco Robertson  [address] [facility] [address]        Dear ,    We are writing to inform you of your test results.    Labs look great!  Normal kidney function.  Prostate cancer screen is normal.  Normal electrolytes, blood sugar, and kidney function.    Resulted Orders   Lipid panel reflex to direct LDL Fasting   Result Value Ref Range    Cholesterol 199 <=199 mg/dL    Triglycerides 89 <=149 mg/dL    Direct Measure HDL 63 >=40 mg/dL      Comment:      HDL Cholesterol Reference Range:     0-2 years:   No reference ranges established for patients under 2 years old  at [facility] for lipid analytes.    2-8 years:  Greater than 45 mg/dL     18 years and older:   Female: Greater than or equal to 50 mg/dL   Male:   Greater than or equal to 40 mg/dL    LDL Cholesterol Calculated 118 <=129 mg/dL    Patient Fasting > 8hrs? Yes    PSA, screen   Result Value Ref Range    Prostate Specific Antigen Screen 0.38 0.00 - 3.50 ug/L    Narrative    Assay Method is Abbott Prostate-Specific Antigen (PSA)  Standard-WHO 1st International (90:10)   Basic metabolic panel   Result Value Ref Range    Sodium 140 136 - 145 mmol/L    Potassium 4.2 3.5 - 5.0 mmol/L    Chloride 101 98 - 107 mmol/L    Carbon Dioxide (CO2) 27 22 - 31 mmol/L    Anion Gap 12 5 - 18 mmol/L    Urea Nitrogen 10 8 - 22 mg/dL    Creatinine 1.00 0.70 - 1.30 mg/dL    Calcium 9.4 8.5 - 10.5 mg/dL    Glucose 96 70 - 125 mg/dL    GFR Estimate 90 >60 mL/min/1.73m2      Comment:      Effective December 21, 2021 eGFRcr in adults is calculated using the 2021 CKD-EPI creatinine equation which includes age and gender (Denzel et al., NEJM, DOI: 10.1056/ZLZSyl2030364)       If you have any questions or concerns, please call the clinic at the number listed above.       Sincerely,      Abdi Dennis NP

## 2022-02-10 ENCOUNTER — HOSPITAL ENCOUNTER (EMERGENCY)
Age: 47
Discharge: HOME | End: 2022-02-10
Payer: MEDICAID

## 2022-02-10 VITALS
HEIGHT: 71 IN | HEART RATE: 83 BPM | RESPIRATION RATE: 20 BRPM | TEMPERATURE: 96.7 F | BODY MASS INDEX: 37.1 KG/M2 | OXYGEN SATURATION: 96 % | SYSTOLIC BLOOD PRESSURE: 149 MMHG | WEIGHT: 265 LBS | DIASTOLIC BLOOD PRESSURE: 81 MMHG

## 2022-02-10 DIAGNOSIS — J40 BRONCHITIS: Primary | ICD-10-CM

## 2022-02-10 LAB — SARS-COV-2, NAA: NOT  DETECTED

## 2022-02-10 PROCEDURE — 87635 SARS-COV-2 COVID-19 AMP PRB: CPT

## 2022-02-10 PROCEDURE — 99213 OFFICE O/P EST LOW 20 MIN: CPT

## 2022-02-10 PROCEDURE — 99213 OFFICE O/P EST LOW 20 MIN: CPT | Performed by: NURSE PRACTITIONER

## 2022-02-10 RX ORDER — ALBUTEROL SULFATE 90 UG/1
2 AEROSOL, METERED RESPIRATORY (INHALATION) 4 TIMES DAILY PRN
Qty: 18 G | Refills: 0 | Status: ON HOLD | OUTPATIENT
Start: 2022-02-10 | End: 2022-07-25 | Stop reason: SDUPTHER

## 2022-02-10 RX ORDER — AMOXICILLIN AND CLAVULANATE POTASSIUM 875; 125 MG/1; MG/1
1 TABLET, FILM COATED ORAL 2 TIMES DAILY
Qty: 14 TABLET | Refills: 0 | Status: SHIPPED | OUTPATIENT
Start: 2022-02-10 | End: 2022-02-17

## 2022-02-10 RX ORDER — PREDNISONE 20 MG/1
20 TABLET ORAL 2 TIMES DAILY
Qty: 10 TABLET | Refills: 0 | Status: SHIPPED | OUTPATIENT
Start: 2022-02-10 | End: 2022-02-15

## 2022-02-10 RX ORDER — BENZONATATE 200 MG/1
200 CAPSULE ORAL 3 TIMES DAILY PRN
Qty: 30 CAPSULE | Refills: 0 | Status: SHIPPED | OUTPATIENT
Start: 2022-02-10 | End: 2022-02-17

## 2022-02-10 ASSESSMENT — ENCOUNTER SYMPTOMS
SORE THROAT: 0
WHEEZING: 1
NAUSEA: 0
DIARRHEA: 0
COUGH: 1
SHORTNESS OF BREATH: 1
CHEST TIGHTNESS: 0
VOMITING: 0
RHINORRHEA: 1

## 2022-02-10 NOTE — ED PROVIDER NOTES
[facility]  Urgent Care Encounter       CHIEF COMPLAINT       Chief Complaint   Patient presents with    Cough    Wheezing    Nasal Congestion       Nurses Notes reviewed and I agree except as noted in the HPI. HISTORY OF PRESENT ILLNESS   Hema Michelle is a 40 y.o. male who presents to the [facility] urgent care for evaluation of concerns for COVID. He reports his symptoms started roughly 2 nights ago. He does report that he works at [employer] and has been exposed to several coworkers with [employer] [facility] [employer] [facility]. He does report that he has been vaccinated for COVID-19. He also reports that he lives at [facility] and they recommended him be tested for Covid. He reports his symptoms as nasal congestion, rhinorrhea, postnasal drainage, cough with wheezing, chest congestion, dyspnea, generalized body aches, and headache. He denies fever or chills. Denies pharyngitis, nausea, vomiting and diarrhea. Denies loss of taste or smell or night sweats. He does report a history of COPD. The history is provided by the patient. No  was used. REVIEW OF SYSTEMS     Review of Systems   Constitutional: Positive for fatigue. Negative for activity change, appetite change, chills and fever. HENT: Positive for congestion and rhinorrhea. Negative for ear discharge, ear pain and sore throat. Respiratory: Positive for cough, shortness of breath and wheezing. Negative for chest tightness. Cardiovascular: Negative for chest pain. Gastrointestinal: Negative for diarrhea, nausea and vomiting. Genitourinary: Negative for dysuria. Musculoskeletal: Positive for myalgias. Skin: Negative for rash. Allergic/Immunologic: Negative for environmental allergies and food allergies. Neurological: Positive for headaches. Negative for dizziness.        PAST MEDICAL HISTORY         Diagnosis Date    COPD (chronic obstructive pulmonary disease) [facility]     Depression  Diabetes mellitus [facility]     ETOH abuse     GERD (gastroesophageal reflux disease)     Hyperlipidemia     Hypertension     Liver disease     [facility]     etoh wdl       SURGICALHISTORY     Patient  has a past surgical history that includes Endoscopy, colon, diagnostic. CURRENT MEDICATIONS       Discharge Medication List as of 2/10/2022 12:37 PM      CONTINUE these medications which have NOT CHANGED    Details   metFORMIN (GLUCOPHAGE) 1000 MG tablet Take 1 tablet by mouth 2 times daily (with meals), Disp-60 tablet, [id number]      folic acid (FOLVITE) 1 MG tablet Take 1 tablet by mouth daily, Disp-30 tablet, R-5Normal      lisinopril (PRINIVIL;ZESTRIL) 20 MG tablet Take 1 tablet by mouth daily, Disp-30 tablet, R-3Normal      Multiple Vitamin (MULTIVITAMIN) TABS tablet Take 1 tablet by mouth daily, R-0OTC      QUEtiapine (SEROQUEL) 50 MG tablet Take 1 tablet by mouth nightly, Disp-30 tablet, R-3Normal      vitamin B-1 (THIAMINE) 100 MG tablet Take 1 tablet by mouth daily, Disp-30 tablet, R-3Normal      pantoprazole (PROTONIX) 40 MG tablet Take 1 tablet by mouth daily, Disp-30 tablet, R-0Normal      metoprolol tartrate (LOPRESSOR) 50 MG tablet Take 1 tablet by mouth 2 times daily, Disp-60 tablet, R-5Normal      blood glucose test strips (ASCENSIA AUTODISC VI;ONE TOUCH ULTRA TEST VI) strip Disp-60 strip, R-11, NormalTest as needed. Dispense One Touch verio Test Strips. Dx: Type 2 diabetes (250.00)      glucose monitoring kit (FREESTYLE) monitoring kit Disp-1 kit, R-5, NormalGlucometer with test strips and lancets. Check BS once daily  #100 strips and lancets             ALLERGIES     Patient is has No Known Allergies. Patients There is no immunization history for the selected administration types on file for this patient. FAMILY HISTORY     Patient's family history includes Alcohol Abuse in his father and paternal uncle;  Arthritis in his mother; Cancer in his father; Depression in his mother; Diabetes in his mother; Heart Disease in his mother; High Blood Pressure in his brother, father, and mother; High Cholesterol in his mother. SOCIAL HISTORY     Patient  reports that he has been smoking e-cigarettes and cigarettes. He has a 27.00 pack-year smoking history. He has quit using smokeless tobacco. He reports previous alcohol use of about 140.0 standard drinks of alcohol per week. He reports that he does not use drugs. PHYSICAL EXAM     ED TRIAGE VITALS  BP: (!) 149/81, Temp: 96.7 °F (35.9 °C), Pulse: 83, Resp: 20, SpO2: 96 %,Estimated body mass index is 36.96 kg/m² as calculated from the following:    Height as of this encounter: 5' 11\" (1.803 m). Weight as of this encounter: 265 lb (120.2 kg). ,No LMP for male patient. Physical Exam  Vitals and nursing note reviewed. Constitutional:       General: He is not in acute distress. Appearance: Normal appearance. He is not ill-appearing, toxic-appearing or diaphoretic. HENT:      Head: Normocephalic. Right Ear: Ear canal and external ear normal.      Left Ear: Ear canal and external ear normal.      Nose: Nose normal. No congestion or rhinorrhea. Mouth/Throat:      Mouth: Mucous membranes are moist.      Pharynx: Oropharynx is clear. No oropharyngeal exudate or posterior oropharyngeal erythema. Cardiovascular:      Rate and Rhythm: Normal rate. Pulses: Normal pulses. Pulmonary:      Effort: Pulmonary effort is normal. No respiratory distress. Breath sounds: No stridor. Wheezing present. No rhonchi. Abdominal:      General: Abdomen is flat. Bowel sounds are normal.      Palpations: Abdomen is soft. Musculoskeletal:         General: No swelling or tenderness. Normal range of motion. Cervical back: Normal range of motion. Neurological:      General: No focal deficit present. Mental Status: He is alert and oriented to person, place, and time.    Psychiatric:         Mood and Affect: Mood normal. Behavior: Behavior normal.         DIAGNOSTIC RESULTS     Labs:  Results for orders placed or performed during the hospital encounter of 02/10/22   COVID-19, Rapid   Result Value Ref Range    SARS-CoV-2, RTAVIS NOT  DETECTED NOT DETECTED       IMAGING:    No orders to display         EKG: None      URGENT CARE COURSE:     Vitals:    02/10/22 1210   BP: (!) 149/81   Pulse: 83   Resp: 20   Temp: 96.7 °F (35.9 °C)   TempSrc: Tympanic   SpO2: 96%   Weight: 265 lb (120.2 kg)   Height: 5' 11\" (1.803 m)       Medications - No data to display         PROCEDURES:  None    FINAL IMPRESSION      1. Bronchitis          DISPOSITION/ PLAN     Patient seen and evaluated for cough. A rapid Covid test was obtained and negative. Assessment consistent with likely acute bronchitis. He is provided a prescription for Augmentin, prednisone, Tessalon Perles, and albuterol inhaler. He is instructed to follow-up with his PCP in 3 to 5 days nursing symptom for his instructed to use over-the-counter Tylenol and Motrin for pain or fever. He is agreeable to the above plan and denies questions or concerns at this time.       PATIENT REFERRED TO:  Veronica Santillan, NILAM - CNP  [address] / [facility] [id number]      DISCHARGE MEDICATIONS:  Discharge Medication List as of 2/10/2022 12:37 PM      START taking these medications    Details   amoxicillin-clavulanate (AUGMENTIN) 875-125 MG per tablet Take 1 tablet by mouth 2 times daily for 7 days, Disp-14 tablet, R-0Normal      predniSONE (DELTASONE) 20 MG tablet Take 1 tablet by mouth 2 times daily for 5 days, Disp-10 tablet, R-0Normal      benzonatate (TESSALON) 200 MG capsule Take 1 capsule by mouth 3 times daily as needed for Cough, Disp-30 capsule, R-0Normal      albuterol sulfate HFA (VENTOLIN HFA) 108 (90 Base) MCG/ACT inhaler Inhale 2 puffs into the lungs 4 times daily as needed for Wheezing, Disp-18 g, R-0Normal             Discharge Medication List as of 2/10/2022 12:37 PM Discharge Medication List as of 2/10/2022 12:37 PM          Dewane Habermann, APRN - CNP    (Please note that portions of this note were completed with a voice recognition program. Efforts were made to edit the dictations but occasionally words are mis-transcribed.)           Dewane Habermann, APRN - CNP  02/10/22 1241

## 2022-02-28 ENCOUNTER — OFFICE VISIT (OUTPATIENT)
Dept: FAMILY MEDICINE CLINIC | Age: 47
End: 2022-02-28
Payer: MEDICAID

## 2022-02-28 VITALS
HEART RATE: 75 BPM | SYSTOLIC BLOOD PRESSURE: 140 MMHG | RESPIRATION RATE: 17 BRPM | OXYGEN SATURATION: 98 % | BODY MASS INDEX: 35.98 KG/M2 | TEMPERATURE: 98.8 F | WEIGHT: 257 LBS | HEIGHT: 71 IN | DIASTOLIC BLOOD PRESSURE: 90 MMHG

## 2022-02-28 DIAGNOSIS — N52.9 ERECTILE DYSFUNCTION, UNSPECIFIED ERECTILE DYSFUNCTION TYPE: ICD-10-CM

## 2022-02-28 DIAGNOSIS — F31.9 BIPOLAR 1 DISORDER (HCC): ICD-10-CM

## 2022-02-28 DIAGNOSIS — I10 HYPERTENSION, UNSPECIFIED TYPE: ICD-10-CM

## 2022-02-28 DIAGNOSIS — Q78.8 OSTEOPOIKILOSIS: ICD-10-CM

## 2022-02-28 DIAGNOSIS — J43.1 PANLOBULAR EMPHYSEMA (HCC): ICD-10-CM

## 2022-02-28 DIAGNOSIS — E11.9 TYPE 2 DIABETES MELLITUS WITHOUT COMPLICATION, WITHOUT LONG-TERM CURRENT USE OF INSULIN (HCC): ICD-10-CM

## 2022-02-28 DIAGNOSIS — E78.5 HYPERLIPIDEMIA, UNSPECIFIED HYPERLIPIDEMIA TYPE: Primary | ICD-10-CM

## 2022-02-28 PROBLEM — K72.90 LIVER FAILURE WITHOUT HEPATIC COMA (HCC): Status: RESOLVED | Noted: 2018-12-14 | Resolved: 2022-02-28

## 2022-02-28 LAB — HBA1C MFR BLD: 5.6 % (ref 4.3–5.7)

## 2022-02-28 PROCEDURE — G8427 DOCREV CUR MEDS BY ELIG CLIN: HCPCS | Performed by: NURSE PRACTITIONER

## 2022-02-28 PROCEDURE — 4004F PT TOBACCO SCREEN RCVD TLK: CPT | Performed by: NURSE PRACTITIONER

## 2022-02-28 PROCEDURE — 99214 OFFICE O/P EST MOD 30 MIN: CPT | Performed by: NURSE PRACTITIONER

## 2022-02-28 PROCEDURE — G8417 CALC BMI ABV UP PARAM F/U: HCPCS | Performed by: NURSE PRACTITIONER

## 2022-02-28 PROCEDURE — 3046F HEMOGLOBIN A1C LEVEL >9.0%: CPT | Performed by: NURSE PRACTITIONER

## 2022-02-28 PROCEDURE — G8484 FLU IMMUNIZE NO ADMIN: HCPCS | Performed by: NURSE PRACTITIONER

## 2022-02-28 PROCEDURE — 2022F DILAT RTA XM EVC RTNOPTHY: CPT | Performed by: NURSE PRACTITIONER

## 2022-02-28 PROCEDURE — 3023F SPIROM DOC REV: CPT | Performed by: NURSE PRACTITIONER

## 2022-02-28 RX ORDER — METOPROLOL TARTRATE 50 MG/1
50 TABLET, FILM COATED ORAL DAILY
Qty: 30 TABLET | Refills: 5 | Status: ON HOLD | OUTPATIENT
Start: 2022-02-28 | End: 2022-07-25 | Stop reason: SDUPTHER

## 2022-02-28 RX ORDER — SILDENAFIL 100 MG/1
100 TABLET, FILM COATED ORAL DAILY PRN
Qty: 10 TABLET | Refills: 5 | Status: ON HOLD | OUTPATIENT
Start: 2022-02-28 | End: 2022-07-25 | Stop reason: SDUPTHER

## 2022-02-28 RX ORDER — LISINOPRIL 20 MG/1
20 TABLET ORAL DAILY
Qty: 30 TABLET | Refills: 3 | Status: ON HOLD | OUTPATIENT
Start: 2022-02-28 | End: 2022-07-25 | Stop reason: SDUPTHER

## 2022-02-28 SDOH — ECONOMIC STABILITY: FOOD INSECURITY: WITHIN THE PAST 12 MONTHS, YOU WORRIED THAT YOUR FOOD WOULD RUN OUT BEFORE YOU GOT MONEY TO BUY MORE.: NEVER TRUE

## 2022-02-28 SDOH — ECONOMIC STABILITY: FOOD INSECURITY: WITHIN THE PAST 12 MONTHS, THE FOOD YOU BOUGHT JUST DIDN'T LAST AND YOU DIDN'T HAVE MONEY TO GET MORE.: NEVER TRUE

## 2022-02-28 ASSESSMENT — PATIENT HEALTH QUESTIONNAIRE - PHQ9
7. TROUBLE CONCENTRATING ON THINGS, SUCH AS READING THE NEWSPAPER OR WATCHING TELEVISION: 0
3. TROUBLE FALLING OR STAYING ASLEEP: 0
5. POOR APPETITE OR OVEREATING: 0
1. LITTLE INTEREST OR PLEASURE IN DOING THINGS: 0
4. FEELING TIRED OR HAVING LITTLE ENERGY: 0
SUM OF ALL RESPONSES TO PHQ9 QUESTIONS 1 & 2: 0
SUM OF ALL RESPONSES TO PHQ QUESTIONS 1-9: 0
9. THOUGHTS THAT YOU WOULD BE BETTER OFF DEAD, OR OF HURTING YOURSELF: 0
6. FEELING BAD ABOUT YOURSELF - OR THAT YOU ARE A FAILURE OR HAVE LET YOURSELF OR YOUR FAMILY DOWN: 0
SUM OF ALL RESPONSES TO PHQ QUESTIONS 1-9: 0
10. IF YOU CHECKED OFF ANY PROBLEMS, HOW DIFFICULT HAVE THESE PROBLEMS MADE IT FOR YOU TO DO YOUR WORK, TAKE CARE OF THINGS AT HOME, OR GET ALONG WITH OTHER PEOPLE: 0
8. MOVING OR SPEAKING SO SLOWLY THAT OTHER PEOPLE COULD HAVE NOTICED. OR THE OPPOSITE, BEING SO FIGETY OR RESTLESS THAT YOU HAVE BEEN MOVING AROUND A LOT MORE THAN USUAL: 0
SUM OF ALL RESPONSES TO PHQ QUESTIONS 1-9: 0
2. FEELING DOWN, DEPRESSED OR HOPELESS: 0
SUM OF ALL RESPONSES TO PHQ QUESTIONS 1-9: 0

## 2022-02-28 ASSESSMENT — ENCOUNTER SYMPTOMS
RESPIRATORY NEGATIVE: 1
GASTROINTESTINAL NEGATIVE: 1
EYES NEGATIVE: 1

## 2022-02-28 ASSESSMENT — SOCIAL DETERMINANTS OF HEALTH (SDOH): HOW HARD IS IT FOR YOU TO PAY FOR THE VERY BASICS LIKE FOOD, HOUSING, MEDICAL CARE, AND HEATING?: NOT HARD AT ALL

## 2022-02-28 NOTE — PROGRESS NOTES
Cathi Rodriguez is a 40 y.o. male whopresents today for :  Chief Complaint   Patient presents with    Diabetes Care Management       HPI:     HPI  Pt here for fu. Pt has not been in for a while. He was in in patient rehab for 3months. For alcohol abuse and then was in [facility] outpatient program.     has been sober for 8 months. 4months left until his program is completed.     Patient Active Problem List   Diagnosis    Hyperlipemia    Uncontrolled hypertension    Alcohol withdrawal (HCC)    Alcoholic hepatitis    COPD (chronic obstructive pulmonary disease) ([address]    Passive-dependent personality disorder ([address]    Alcohol abuse    Type 2 diabetes mellitus without complication, without long-term current use of insulin (HCC)    Alcoholic liver failure (HCC)    Transaminitis    Hyperbilirubinemia    RUQ pain    Delirious    Alcoholic hepatitis without ascites    Alcoholic steatohepatitis    Hypophosphatemia    Bipolar 1 disorder (HCC)    Alcoholic gastritis without bleeding    Alcohol withdrawal syndrome with complication (HCC)    Alcohol dependence with physiological dependence, continuous (HCC)    Alcoholic cirrhosis ([address]    Alcohol intoxication in alcoholism with blood level over 0.3 with complication ([address]    Acute alcoholic intoxication with complication ([address]    Acute alcoholic hepatitis    Alcohol ingestion    Alcohol withdrawal delirium (HCC)    Uncomplicated alcohol withdrawal ([address]    Alcohol use disorder, severe, dependence ([address]    Acute pancreatitis    Depression    Osteopoikilosis    Contusion of hand    Atypical chest pain    Alcoholic encephalopathy ([address]    Alcohol intoxication with delirium ([address]    Morbidly obese ([address]    ETOH abuse    Hyponatremia    Lack of intravenous access        Past Medical History:   Diagnosis Date    COPD (chronic obstructive pulmonary disease) ([address]     Depression     Diabetes mellitus ([address]     ETOH abuse  GERD (gastroesophageal reflux disease)     Hyperlipidemia     Hypertension     Liver disease     Seizures ([facility]     etoh wdl      Past Surgical History:   Procedure Laterality Date    ENDOSCOPY, COLON, DIAGNOSTIC       Family History   Problem Relation Age of Onset    High Blood Pressure Father     Cancer Father         Lung Cancer    Alcohol Abuse Father     High Blood Pressure Brother     Diabetes Mother     Heart Disease Mother         Stent Placement    Arthritis Mother     Depression Mother     High Blood Pressure Mother     High Cholesterol Mother     Alcohol Abuse Paternal Uncle      Social History     Tobacco Use    Smoking status: Current Every Day Smoker     Packs/day: 1.00     Years: 27.00     Pack years: 27.00     Types: E-Cigarettes, Cigarettes    Smokeless tobacco: Former User    Tobacco comment: vape   Substance Use Topics    Alcohol use: Not Currently     Alcohol/week: 140.0 standard drinks     Types: 140 Cans of beer per week     Comment: 12 pk beer & 6 hard lemonade daily -last 8:30 3/16/21      Current Outpatient Medications   Medication Sig Dispense Refill    lisinopril (PRINIVIL;ZESTRIL) 20 MG tablet Take 1 tablet by mouth daily 30 tablet 3    metoprolol tartrate (LOPRESSOR) 50 MG tablet Take 1 tablet by mouth daily 30 tablet 5    sildenafil (VIAGRA) 100 MG tablet Take 1 tablet by mouth daily as needed for Erectile Dysfunction 10 tablet 5    albuterol sulfate HFA (VENTOLIN HFA) 108 (90 Base) MCG/ACT inhaler Inhale 2 puffs into the lungs 4 times daily as needed for Wheezing 18 g 0    blood glucose test strips (ASCENSIA AUTODISC VI;ONE TOUCH ULTRA TEST VI) strip Test as needed. Dispense One Touch verio Test Strips. Dx: Type 2 diabetes (250.00) 60 strip 11    glucose monitoring kit (FREESTYLE) monitoring kit Glucometer with test strips and lancets. Check BS once daily  #100 strips and lancets 1 kit 5     No current facility-administered medications for this visit. No Known Allergies  Health Maintenance   Topic Date Due    Hepatitis A vaccine (1 of 2 - Risk 2-dose series) Never done    COVID-19 Vaccine (1) Never done    Pneumococcal 0-64 years Vaccine (1 of 2 - PPSV23) Never done    Depression Monitoring  Never done    Hepatitis B vaccine (1 of 3 - Risk 3-dose series) Never done    DTaP/Tdap/Td vaccine (1 - Tdap) Never done    Diabetic microalbuminuria test  10/04/2019    Colorectal Cancer Screen  04/10/2021    Diabetic foot exam  06/11/2021    A1C test (Diabetic or Prediabetic)  06/23/2021    Lipid screen  06/23/2021    Flu vaccine (1) Never done    Potassium monitoring  05/10/2022    Creatinine monitoring  05/10/2022    Diabetic retinal exam  10/19/2022    Hepatitis C screen  Completed    Hib vaccine  Aged Out    Meningococcal (ACWY) vaccine  Aged Out    HIV screen  Discontinued       Subjective:     Review of Systems   Constitutional: Negative. HENT: Negative. Eyes: Negative. Respiratory: Negative. Cardiovascular: Negative. Gastrointestinal: Negative. Musculoskeletal: Negative. Skin: Negative. Neurological: Negative. Objective:     Vitals:    02/28/22 1427   BP: (!) 140/90   Site: Left Upper Arm   Position: Sitting   Cuff Size: Large Adult   Pulse: 75   Resp: 17   Temp: 98.8 °F (37.1 °C)   TempSrc: Temporal   SpO2: 98%   Weight: 257 lb (116.6 kg)   Height: 5' 11\" (1.803 m)       Physical Exam  Constitutional:       Appearance: He is well-developed. HENT:      Head: Normocephalic. Right Ear: Tympanic membrane and external ear normal.      Left Ear: Tympanic membrane and external ear normal.      Nose: Nose normal.   Cardiovascular:      Rate and Rhythm: Normal rate and regular rhythm. Heart sounds: Normal heart sounds. No murmur heard. No friction rub. No gallop. Pulmonary:      Effort: Pulmonary effort is normal.      Breath sounds: Normal breath sounds. No wheezing or rales.    Abdominal:      General: Bowel sounds are normal.      Palpations: Abdomen is soft. Tenderness: There is no abdominal tenderness. There is no guarding. Musculoskeletal:         General: Normal range of motion. Cervical back: Normal range of motion and neck supple. Lymphadenopathy:      Cervical: No cervical adenopathy. Skin:     General: Skin is warm. Neurological:      Mental Status: He is alert and oriented to person, place, and time. Deep Tendon Reflexes: Reflexes are normal and symmetric. Assessment:      Diagnosis Orders   1. Hyperlipidemia, unspecified hyperlipidemia type  Lipid Panel   2. Type 2 diabetes mellitus without complication, without long-term current use of insulin ([facility])  Lipid Panel    Comprehensive Metabolic Panel    CBC with Auto Differential   3. Bipolar 1 disorder ([facility] [address] [facility]     4. Osteopoikilosis     5. Panlobular emphysema ([facility]     6. Hypertension, unspecified type  lisinopril (PRINIVIL;ZESTRIL) 20 MG tablet    metoprolol tartrate (LOPRESSOR) 50 MG tablet    Comprehensive Metabolic Panel    CBC with Auto Differential   7. Erectile dysfunction, unspecified erectile dysfunction type  sildenafil (VIAGRA) 100 MG tablet       Plan:      Return in about 3 months (around 5/28/2022).        Orders Placed This Encounter   Procedures    Lipid Panel     Standing Status:   Future     Standing Expiration Date:   2/28/2023     Order Specific Question:   Is Patient Fasting?/# of Hours     Answer:   12    Comprehensive Metabolic Panel     Standing Status:   Future     Standing Expiration Date:   2/28/2023    CBC with Auto Differential     Standing Status:   Future     Standing Expiration Date:   2/28/2023     Orders Placed This Encounter   Medications    lisinopril (PRINIVIL;ZESTRIL) 20 MG tablet     Sig: Take 1 tablet by mouth daily     Dispense:  30 tablet     Refill:  3    metoprolol tartrate (LOPRESSOR) 50 MG tablet     Sig: Take 1 tablet by mouth daily     Dispense:  30 tablet     Refill:  5    sildenafil (VIAGRA) 100 MG tablet     Sig: Take 1 tablet by mouth daily as needed for Erectile Dysfunction     Dispense:  10 tablet     Refill:  5      I am thrilled with his sobriety. Praised his commitment  Ok to stop metformin  Cont meds  Fu in 3months  Get labs done    Patient given educational materials - seepatient instructions. Discussed use, benefit, and side effects of prescribed medications. All patient questions answered. Pt voiced understanding. Patient agreed withtreatment plan. Follow up as directed.      Electronically signed by NILAM Collins CNP on 2/28/2022 at 5:11 PM

## 2022-07-19 ENCOUNTER — TELEPHONE (OUTPATIENT)
Dept: FAMILY MEDICINE CLINIC | Age: 47
End: 2022-07-19

## 2022-07-19 RX ORDER — NALTREXONE HYDROCHLORIDE 50 MG/1
50 TABLET, FILM COATED ORAL DAILY
Qty: 30 TABLET | Refills: 0 | Status: ON HOLD | OUTPATIENT
Start: 2022-07-19 | End: 2022-07-25 | Stop reason: SDUPTHER

## 2022-07-19 NOTE — TELEPHONE ENCOUNTER
Patient called stating he was in a detox/rehab center for alcohol for over a year. Patient recently got his own place and started drinking again about 2 weeks ago. Patient states he can not drive to come in for an appointment. Patient is asking if there is any type of medication that can be sent to [facility] to help him. Patient stated he needs something to help him, he has to stop.     Patient is requesting a call back today

## 2022-07-21 ENCOUNTER — APPOINTMENT (OUTPATIENT)
Dept: ULTRASOUND IMAGING | Age: 47
End: 2022-07-21
Payer: MEDICAID

## 2022-07-21 ENCOUNTER — APPOINTMENT (OUTPATIENT)
Dept: GENERAL RADIOLOGY | Age: 47
End: 2022-07-21
Payer: MEDICAID

## 2022-07-21 ENCOUNTER — HOSPITAL ENCOUNTER (INPATIENT)
Age: 47
LOS: 4 days | Discharge: HOME | End: 2022-07-25
Attending: EMERGENCY MEDICINE | Admitting: PHYSICIAN ASSISTANT
Payer: MEDICAID

## 2022-07-21 DIAGNOSIS — E11.9 TYPE 2 DIABETES MELLITUS WITHOUT COMPLICATION, WITHOUT LONG-TERM CURRENT USE OF INSULIN (HCC): ICD-10-CM

## 2022-07-21 DIAGNOSIS — I10 HYPERTENSION, UNSPECIFIED TYPE: ICD-10-CM

## 2022-07-21 DIAGNOSIS — N52.9 ERECTILE DYSFUNCTION, UNSPECIFIED ERECTILE DYSFUNCTION TYPE: ICD-10-CM

## 2022-07-21 DIAGNOSIS — R07.9 CHEST PAIN, UNSPECIFIED TYPE: ICD-10-CM

## 2022-07-21 DIAGNOSIS — F10.931 ALCOHOL WITHDRAWAL DELIRIUM, ACUTE, HYPERACTIVE (HCC): Primary | ICD-10-CM

## 2022-07-21 DIAGNOSIS — J44.1 COPD WITH ACUTE EXACERBATION (HCC): ICD-10-CM

## 2022-07-21 PROBLEM — F10.930 ALCOHOL WITHDRAWAL SYNDROME, UNCOMPLICATED (HCC): Status: ACTIVE | Noted: 2022-07-21

## 2022-07-21 LAB
ALBUMIN SERPL-MCNC: 3.8 G/DL (ref 3.5–5.1)
ALP BLD-CCNC: 189 U/L (ref 38–126)
ALT SERPL-CCNC: 120 U/L (ref 11–66)
ANION GAP SERPL CALCULATED.3IONS-SCNC: 16 MEQ/L (ref 8–16)
APTT: 35.2 SECONDS (ref 22–38)
AST SERPL-CCNC: 132 U/L (ref 5–40)
BASOPHILS # BLD: 0.4 %
BASOPHILS ABSOLUTE: 0 THOU/MM3 (ref 0–0.1)
BILIRUB SERPL-MCNC: 1.1 MG/DL (ref 0.3–1.2)
BILIRUBIN DIRECT: 0.5 MG/DL (ref 0–0.3)
BUN BLDV-MCNC: 10 MG/DL (ref 7–22)
CALCIUM SERPL-MCNC: 8.9 MG/DL (ref 8.5–10.5)
CHLORIDE BLD-SCNC: 102 MEQ/L (ref 98–111)
CO2: 22 MEQ/L (ref 23–33)
CREAT SERPL-MCNC: 0.5 MG/DL (ref 0.4–1.2)
EKG ATRIAL RATE: 121 BPM
EKG P AXIS: 53 DEGREES
EKG P-R INTERVAL: 130 MS
EKG Q-T INTERVAL: 310 MS
EKG QRS DURATION: 88 MS
EKG QTC CALCULATION (BAZETT): 440 MS
EKG R AXIS: 27 DEGREES
EKG T AXIS: 48 DEGREES
EKG VENTRICULAR RATE: 121 BPM
EOSINOPHIL # BLD: 0 %
EOSINOPHILS ABSOLUTE: 0 THOU/MM3 (ref 0–0.4)
ERYTHROCYTE [DISTWIDTH] IN BLOOD BY AUTOMATED COUNT: 14.7 % (ref 11.5–14.5)
ERYTHROCYTE [DISTWIDTH] IN BLOOD BY AUTOMATED COUNT: 49.4 FL (ref 35–45)
ETHYL ALCOHOL, SERUM: 0.23 %
GFR SERPL CREATININE-BSD FRML MDRD: > 90 ML/MIN/1.73M2
GLUCOSE BLD-MCNC: 74 MG/DL (ref 70–108)
HCT VFR BLD CALC: 43.8 % (ref 42–52)
HEMOGLOBIN: 15.3 GM/DL (ref 14–18)
IMMATURE GRANS (ABS): 0.03 THOU/MM3 (ref 0–0.07)
IMMATURE GRANULOCYTES: 0.4 %
INR BLD: 1.01 (ref 0.85–1.13)
LYMPHOCYTES # BLD: 16.3 %
LYMPHOCYTES ABSOLUTE: 1.3 THOU/MM3 (ref 1–4.8)
MCH RBC QN AUTO: 32.7 PG (ref 26–33)
MCHC RBC AUTO-ENTMCNC: 34.9 GM/DL (ref 32.2–35.5)
MCV RBC AUTO: 93.6 FL (ref 80–94)
MONOCYTES # BLD: 6.4 %
MONOCYTES ABSOLUTE: 0.5 THOU/MM3 (ref 0.4–1.3)
NUCLEATED RED BLOOD CELLS: 0 /100 WBC
OSMOLALITY CALCULATION: 277.1 MOSMOL/KG (ref 275–300)
PLATELET # BLD: 138 THOU/MM3 (ref 130–400)
PMV BLD AUTO: 9 FL (ref 9.4–12.4)
POTASSIUM REFLEX MAGNESIUM: 4.1 MEQ/L (ref 3.5–5.2)
RBC # BLD: 4.68 MILL/MM3 (ref 4.7–6.1)
SEG NEUTROPHILS: 76.5 %
SEGMENTED NEUTROPHILS ABSOLUTE COUNT: 6.3 THOU/MM3 (ref 1.8–7.7)
SODIUM BLD-SCNC: 140 MEQ/L (ref 135–145)
TOTAL PROTEIN: 7 G/DL (ref 6.1–8)
TROPONIN T: < 0.01 NG/ML
TROPONIN T: < 0.01 NG/ML
WBC # BLD: 8.2 THOU/MM3 (ref 4.8–10.8)

## 2022-07-21 PROCEDURE — 93010 ELECTROCARDIOGRAM REPORT: CPT | Performed by: INTERNAL MEDICINE

## 2022-07-21 PROCEDURE — 6360000002 HC RX W HCPCS: Performed by: EMERGENCY MEDICINE

## 2022-07-21 PROCEDURE — 36415 COLL VENOUS BLD VENIPUNCTURE: CPT

## 2022-07-21 PROCEDURE — 85730 THROMBOPLASTIN TIME PARTIAL: CPT

## 2022-07-21 PROCEDURE — 85610 PROTHROMBIN TIME: CPT

## 2022-07-21 PROCEDURE — 80076 HEPATIC FUNCTION PANEL: CPT

## 2022-07-21 PROCEDURE — 85025 COMPLETE CBC W/AUTO DIFF WBC: CPT

## 2022-07-21 PROCEDURE — 99223 1ST HOSP IP/OBS HIGH 75: CPT | Performed by: PHYSICIAN ASSISTANT

## 2022-07-21 PROCEDURE — 96360 HYDRATION IV INFUSION INIT: CPT

## 2022-07-21 PROCEDURE — 2500000003 HC RX 250 WO HCPCS: Performed by: PHYSICIAN ASSISTANT

## 2022-07-21 PROCEDURE — 99285 EMERGENCY DEPT VISIT HI MDM: CPT

## 2022-07-21 PROCEDURE — 2060000000 HC ICU INTERMEDIATE R&B

## 2022-07-21 PROCEDURE — 71045 X-RAY EXAM CHEST 1 VIEW: CPT

## 2022-07-21 PROCEDURE — 84484 ASSAY OF TROPONIN QUANT: CPT

## 2022-07-21 PROCEDURE — 94640 AIRWAY INHALATION TREATMENT: CPT

## 2022-07-21 PROCEDURE — 2580000003 HC RX 258: Performed by: PHYSICIAN ASSISTANT

## 2022-07-21 PROCEDURE — 6370000000 HC RX 637 (ALT 250 FOR IP): Performed by: EMERGENCY MEDICINE

## 2022-07-21 PROCEDURE — 93005 ELECTROCARDIOGRAM TRACING: CPT | Performed by: EMERGENCY MEDICINE

## 2022-07-21 PROCEDURE — 80048 BASIC METABOLIC PNL TOTAL CA: CPT

## 2022-07-21 PROCEDURE — 2580000003 HC RX 258: Performed by: EMERGENCY MEDICINE

## 2022-07-21 PROCEDURE — 76705 ECHO EXAM OF ABDOMEN: CPT

## 2022-07-21 PROCEDURE — 82077 ASSAY SPEC XCP UR&BREATH IA: CPT

## 2022-07-21 PROCEDURE — 6360000002 HC RX W HCPCS: Performed by: PHYSICIAN ASSISTANT

## 2022-07-21 RX ORDER — ONDANSETRON 4 MG/1
4 TABLET, ORALLY DISINTEGRATING ORAL EVERY 8 HOURS PRN
Status: DISCONTINUED | OUTPATIENT
Start: 2022-07-21 | End: 2022-07-25 | Stop reason: HOSPADM

## 2022-07-21 RX ORDER — 0.9 % SODIUM CHLORIDE 0.9 %
1000 INTRAVENOUS SOLUTION INTRAVENOUS ONCE
Status: COMPLETED | OUTPATIENT
Start: 2022-07-21 | End: 2022-07-21

## 2022-07-21 RX ORDER — MAGNESIUM SULFATE IN WATER 40 MG/ML
2000 INJECTION, SOLUTION INTRAVENOUS PRN
Status: DISCONTINUED | OUTPATIENT
Start: 2022-07-21 | End: 2022-07-25 | Stop reason: HOSPADM

## 2022-07-21 RX ORDER — POTASSIUM CHLORIDE 7.45 MG/ML
10 INJECTION INTRAVENOUS PRN
Status: DISCONTINUED | OUTPATIENT
Start: 2022-07-21 | End: 2022-07-25 | Stop reason: HOSPADM

## 2022-07-21 RX ORDER — POLYETHYLENE GLYCOL 3350 17 G/17G
17 POWDER, FOR SOLUTION ORAL DAILY PRN
Status: DISCONTINUED | OUTPATIENT
Start: 2022-07-21 | End: 2022-07-25 | Stop reason: HOSPADM

## 2022-07-21 RX ORDER — SODIUM CHLORIDE 9 MG/ML
INJECTION, SOLUTION INTRAVENOUS CONTINUOUS
Status: DISCONTINUED | OUTPATIENT
Start: 2022-07-21 | End: 2022-07-24

## 2022-07-21 RX ORDER — ONDANSETRON 2 MG/ML
4 INJECTION INTRAMUSCULAR; INTRAVENOUS EVERY 6 HOURS PRN
Status: DISCONTINUED | OUTPATIENT
Start: 2022-07-21 | End: 2022-07-25 | Stop reason: HOSPADM

## 2022-07-21 RX ORDER — SODIUM CHLORIDE 0.9 % (FLUSH) 0.9 %
10 SYRINGE (ML) INJECTION EVERY 12 HOURS SCHEDULED
Status: DISCONTINUED | OUTPATIENT
Start: 2022-07-21 | End: 2022-07-25 | Stop reason: HOSPADM

## 2022-07-21 RX ORDER — LISINOPRIL 20 MG/1
20 TABLET ORAL DAILY
Status: DISCONTINUED | OUTPATIENT
Start: 2022-07-22 | End: 2022-07-25 | Stop reason: HOSPADM

## 2022-07-21 RX ORDER — SODIUM CHLORIDE 0.9 % (FLUSH) 0.9 %
10 SYRINGE (ML) INJECTION PRN
Status: DISCONTINUED | OUTPATIENT
Start: 2022-07-21 | End: 2022-07-25 | Stop reason: HOSPADM

## 2022-07-21 RX ORDER — HYDRALAZINE HYDROCHLORIDE 20 MG/ML
5 INJECTION INTRAMUSCULAR; INTRAVENOUS EVERY 6 HOURS PRN
Status: DISCONTINUED | OUTPATIENT
Start: 2022-07-21 | End: 2022-07-25 | Stop reason: HOSPADM

## 2022-07-21 RX ORDER — ACETAMINOPHEN 325 MG/1
650 TABLET ORAL EVERY 6 HOURS PRN
Status: DISCONTINUED | OUTPATIENT
Start: 2022-07-21 | End: 2022-07-25 | Stop reason: HOSPADM

## 2022-07-21 RX ORDER — ASPIRIN 81 MG/1
324 TABLET, CHEWABLE ORAL ONCE
Status: COMPLETED | OUTPATIENT
Start: 2022-07-21 | End: 2022-07-21

## 2022-07-21 RX ORDER — PHENOBARBITAL SODIUM 65 MG/ML
750 INJECTION INTRAMUSCULAR ONCE
Status: DISCONTINUED | OUTPATIENT
Start: 2022-07-21 | End: 2022-07-21 | Stop reason: SDUPTHER

## 2022-07-21 RX ORDER — ENOXAPARIN SODIUM 100 MG/ML
30 INJECTION SUBCUTANEOUS 2 TIMES DAILY
Status: DISCONTINUED | OUTPATIENT
Start: 2022-07-21 | End: 2022-07-25 | Stop reason: HOSPADM

## 2022-07-21 RX ORDER — PHENOBARBITAL 32.4 MG/1
64.8 TABLET ORAL 2 TIMES DAILY
Status: COMPLETED | OUTPATIENT
Start: 2022-07-22 | End: 2022-07-23

## 2022-07-21 RX ORDER — LANOLIN ALCOHOL/MO/W.PET/CERES
100 CREAM (GRAM) TOPICAL DAILY
Status: DISCONTINUED | OUTPATIENT
Start: 2022-07-22 | End: 2022-07-22 | Stop reason: SDUPTHER

## 2022-07-21 RX ORDER — SODIUM CHLORIDE 9 MG/ML
INJECTION, SOLUTION INTRAVENOUS PRN
Status: DISCONTINUED | OUTPATIENT
Start: 2022-07-21 | End: 2022-07-25 | Stop reason: HOSPADM

## 2022-07-21 RX ORDER — METOPROLOL TARTRATE 50 MG/1
50 TABLET, FILM COATED ORAL DAILY
Status: DISCONTINUED | OUTPATIENT
Start: 2022-07-22 | End: 2022-07-25 | Stop reason: HOSPADM

## 2022-07-21 RX ORDER — ACETAMINOPHEN 650 MG/1
650 SUPPOSITORY RECTAL EVERY 6 HOURS PRN
Status: DISCONTINUED | OUTPATIENT
Start: 2022-07-21 | End: 2022-07-25 | Stop reason: HOSPADM

## 2022-07-21 RX ORDER — PHENOBARBITAL 32.4 MG/1
32.4 TABLET ORAL 2 TIMES DAILY
Status: COMPLETED | OUTPATIENT
Start: 2022-07-23 | End: 2022-07-24

## 2022-07-21 RX ORDER — MULTIVITAMIN WITH IRON
1 TABLET ORAL DAILY
Status: DISCONTINUED | OUTPATIENT
Start: 2022-07-22 | End: 2022-07-25 | Stop reason: HOSPADM

## 2022-07-21 RX ORDER — PREDNISONE 20 MG/1
60 TABLET ORAL ONCE
Status: COMPLETED | OUTPATIENT
Start: 2022-07-21 | End: 2022-07-21

## 2022-07-21 RX ORDER — ALBUTEROL SULFATE 90 UG/1
2 AEROSOL, METERED RESPIRATORY (INHALATION) 4 TIMES DAILY PRN
Status: DISCONTINUED | OUTPATIENT
Start: 2022-07-21 | End: 2022-07-25 | Stop reason: HOSPADM

## 2022-07-21 RX ORDER — LANOLIN ALCOHOL/MO/W.PET/CERES
100 CREAM (GRAM) TOPICAL DAILY
Status: DISCONTINUED | OUTPATIENT
Start: 2022-07-21 | End: 2022-07-25 | Stop reason: HOSPADM

## 2022-07-21 RX ORDER — IPRATROPIUM BROMIDE AND ALBUTEROL SULFATE 2.5; .5 MG/3ML; MG/3ML
1 SOLUTION RESPIRATORY (INHALATION) ONCE
Status: COMPLETED | OUTPATIENT
Start: 2022-07-21 | End: 2022-07-21

## 2022-07-21 RX ORDER — POTASSIUM CHLORIDE 20 MEQ/1
40 TABLET, EXTENDED RELEASE ORAL PRN
Status: DISCONTINUED | OUTPATIENT
Start: 2022-07-21 | End: 2022-07-25 | Stop reason: HOSPADM

## 2022-07-21 RX ORDER — FOLIC ACID 1 MG/1
1 TABLET ORAL DAILY
Status: DISCONTINUED | OUTPATIENT
Start: 2022-07-22 | End: 2022-07-25 | Stop reason: HOSPADM

## 2022-07-21 RX ADMIN — THIAMINE HYDROCHLORIDE: 100 INJECTION, SOLUTION INTRAMUSCULAR; INTRAVENOUS at 21:35

## 2022-07-21 RX ADMIN — ASPIRIN 324 MG: 81 TABLET, CHEWABLE ORAL at 18:23

## 2022-07-21 RX ADMIN — PHENOBARBITAL SODIUM 300.95 MG: 65 INJECTION INTRAMUSCULAR at 23:02

## 2022-07-21 RX ADMIN — PHENOBARBITAL SODIUM 752.7 MG: 65 INJECTION INTRAMUSCULAR at 19:14

## 2022-07-21 RX ADMIN — SODIUM CHLORIDE 1000 ML: 9 INJECTION, SOLUTION INTRAVENOUS at 16:59

## 2022-07-21 RX ADMIN — IPRATROPIUM BROMIDE AND ALBUTEROL SULFATE 1 AMPULE: .5; 3 SOLUTION RESPIRATORY (INHALATION) at 16:08

## 2022-07-21 RX ADMIN — Medication 100 MG: at 17:05

## 2022-07-21 RX ADMIN — ENOXAPARIN SODIUM 30 MG: 100 INJECTION SUBCUTANEOUS at 22:38

## 2022-07-21 RX ADMIN — SODIUM CHLORIDE: 9 INJECTION, SOLUTION INTRAVENOUS at 21:42

## 2022-07-21 RX ADMIN — PREDNISONE 60 MG: 20 TABLET ORAL at 17:05

## 2022-07-21 RX ADMIN — ALBUTEROL SULFATE 7.5 MG: 2.5 SOLUTION RESPIRATORY (INHALATION) at 16:18

## 2022-07-21 ASSESSMENT — ENCOUNTER SYMPTOMS
DIARRHEA: 0
NAUSEA: 0
CONSTIPATION: 0
EYE PAIN: 0
SINUS PRESSURE: 0
SINUS PAIN: 0
CHEST TIGHTNESS: 1
COUGH: 0
BACK PAIN: 0
ABDOMINAL PAIN: 0
SHORTNESS OF BREATH: 1

## 2022-07-21 ASSESSMENT — PAIN - FUNCTIONAL ASSESSMENT: PAIN_FUNCTIONAL_ASSESSMENT: 0-10

## 2022-07-21 ASSESSMENT — PAIN SCALES - GENERAL: PAINLEVEL_OUTOF10: 6

## 2022-07-21 ASSESSMENT — PAIN DESCRIPTION - LOCATION: LOCATION: BACK

## 2022-07-21 ASSESSMENT — PAIN DESCRIPTION - PAIN TYPE: TYPE: ACUTE PAIN

## 2022-07-21 ASSESSMENT — PAIN DESCRIPTION - ORIENTATION: ORIENTATION: LOWER

## 2022-07-21 NOTE — Clinical Note
Discharge Plan[de-identified] Other/[facility])   Telemetry/Cardiac Monitoring Required?: Yes

## 2022-07-21 NOTE — ED PROVIDER NOTES
[place] ENCOUNTER          Pt Name: Stephanie Funez  MRN: [id number]  [place] 1975  Date of evaluation: 7/21/2022  Physician: Flor Dixon MD, Kelsie De Jesus [place]    CHIEF COMPLAINT       Chief Complaint   Patient presents with    Chest Pain    Addiction Problem     History obtained from chart review, the patient, and counseling from. HISTORY OF PRESENT ILLNESS    HPI  Stephanie Funez is a 56 y.o. male who presents to the emergency department for evaluation of chest pain and possible alcohol withdrawal.  Patient states he has been recently on treatment for chronic alcohol abuse and is currently accompanied here by a counselor from [employer].  States for the last 2 weeks or so has been drinking a case of beer every day, last drink was at 12 noon today. Last night he states he was having severe anxiety and hallucinations but got better after drinking beer. States that for the last week or so has had intermittent pressure type left-sided chest pain associated with shortness of breath. The patient has no other acute complaints at this time. REVIEW OF SYSTEMS   Review of Systems   Constitutional:  Negative for chills, fever and unexpected weight change. HENT:  Negative for congestion, ear pain, nosebleeds, sinus pressure and sinus pain. Eyes:  Negative for pain. Respiratory:  Positive for chest tightness and shortness of breath. Negative for cough. Cardiovascular:  Positive for chest pain. Gastrointestinal:  Negative for abdominal pain, constipation, diarrhea and nausea. Endocrine: Negative for polyuria. Genitourinary:  Negative for dysuria and flank pain. Musculoskeletal:  Negative for arthralgias, back pain, myalgias and neck pain. Skin:  Negative for rash. Neurological:  Negative for weakness and headaches. All other systems reviewed and are negative.       PAST MEDICAL AND SURGICAL HISTORY Past Medical History:   Diagnosis Date    COPD (chronic obstructive pulmonary disease) ([facility] [address]     Depression     Diabetes mellitus (HCC)     ETOH abuse     GERD (gastroesophageal reflux disease)     Hyperlipidemia     Hypertension     Liver disease     Seizures ([facility]     etoh wdl   Delirium tremens  Multiple previous episodes of alcohol withdrawal    Past Surgical History:   Procedure Laterality Date    ENDOSCOPY, COLON, DIAGNOSTIC           MEDICATIONS     Current Facility-Administered Medications:     0.9 % sodium chloride bolus, 1,000 mL, IntraVENous, Once, Lillie Wesis MD, Last Rate: 1,000 mL/hr at 07/21/22 1659, 1,000 mL at 07/21/22 1659    thiamine tablet 100 mg, 100 mg, Oral, Daily, Lillie Weiss MD, 100 mg at 07/21/22 1705    PHENobarbital (LUMINAL) injection 750 mg, 750 mg, IntraVENous, Once, Lillie Weiss MD    aspirin chewable tablet 324 mg, 324 mg, Oral, Once, Lillie Weiss MD    Current Outpatient Medications:     naltrexone (DEPADE) 50 MG tablet, Take 1 tablet by mouth in the morning., Disp: 30 tablet, Rfl: 0    lisinopril (PRINIVIL;ZESTRIL) 20 MG tablet, Take 1 tablet by mouth daily, Disp: 30 tablet, Rfl: 3    metoprolol tartrate (LOPRESSOR) 50 MG tablet, Take 1 tablet by mouth daily, Disp: 30 tablet, Rfl: 5    sildenafil (VIAGRA) 100 MG tablet, Take 1 tablet by mouth daily as needed for Erectile Dysfunction, Disp: 10 tablet, Rfl: 5    albuterol sulfate HFA (VENTOLIN HFA) 108 (90 Base) MCG/ACT inhaler, Inhale 2 puffs into the lungs 4 times daily as needed for Wheezing, Disp: 18 g, Rfl: 0    blood glucose test strips (ASCENSIA AUTODISC VI;ONE TOUCH ULTRA TEST VI) strip, Test as needed. Dispense One Touch verio Test Strips. Dx: Type 2 diabetes (250.00), Disp: 60 strip, Rfl: 11    glucose monitoring kit (FREESTYLE) monitoring kit, Glucometer with test strips and lancets.   Check BS once daily  #100 strips and lancets, Disp: 1 kit, Rfl: 5      SOCIAL HISTORY     Social History     Social History Narrative    Patient with history of etoh abuse but stated he is currently not drinking. Patient is receiving medication to assist him with staying sober and is attending routine AA meetings via zoom      Social History     Tobacco Use    Smoking status: Every Day     Packs/day: 1.00     Years: 27.00     Pack years: 27.00     Types: E-Cigarettes, Cigarettes    Smokeless tobacco: Former    Tobacco comments:     vape   Vaping Use    Vaping Use: Every day    Start date: 5/6/2015    Substances: Nicotine, Flavoring    Devices: Pre-filled or refillable cartridge   Substance Use Topics    Alcohol use: Not Currently     Alcohol/week: 140.0 standard drinks     Types: 140 Cans of beer per week     Comment: 12 pk beer & 6 hard lemonade daily -last 8:30 3/16/21    Drug use: No   Longtime smoker, started smoking at 16years old, smokes about 1 pack a day. ALLERGIES   No Known Allergies      FAMILY HISTORY     Family History   Problem Relation Age of Onset    High Blood Pressure Father     Cancer Father         Lung Cancer    Alcohol Abuse Father     High Blood Pressure Brother     Diabetes Mother     Heart Disease Mother         Stent Placement    Arthritis Mother     Depression Mother     High Blood Pressure Mother     High Cholesterol Mother     Alcohol Abuse Paternal Uncle          PREVIOUS RECORDS   Previous records reviewed:   Seen in the ED on February 10, 2022 for bronchitis, he has followed up with family medicine 2 times after that . PHYSICAL EXAM     ED Triage Vitals [07/21/22 1446]   BP Temp Temp Source Heart Rate Resp SpO2 Height Weight   (!) 159/109 98.6 °F (37 °C) Oral (!) 119 10 96 % 5' 11\" (1.803 m) 235 lb (106.6 kg)     Initial vital signs and nursing assessment reviewed and abnormal from tachycardia, hypertension . Body mass index is 32.78 kg/m². Pulsoximetry is  staying around 90 to 93% when speaking, borderline low  per my interpretation.     Additional Vital Signs:  Vitals:    07/21/22 1708   BP: (!) 166/83   Pulse: (!) 131   Resp: 14   Temp:    SpO2: 95%       Physical Exam  Vitals and nursing note reviewed. Constitutional:       General: He is not in acute distress. Appearance: He is well-developed. Comments: Alcohol on breath, no slurred speech   HENT:      Head: Normocephalic and atraumatic. Right Ear: External ear normal.      Left Ear: External ear normal.      Nose: Nose normal.      Mouth/Throat:      Mouth: Mucous membranes are dry. Eyes:      Conjunctiva/sclera: Conjunctivae normal.      Pupils: Pupils are equal, round, and reactive to light. Cardiovascular:      Rate and Rhythm: Regular rhythm. Tachycardia present. Heart sounds: Normal heart sounds. No murmur heard. No friction rub. No gallop. Pulmonary:      Effort: Pulmonary effort is normal. No respiratory distress. Breath sounds: No stridor. Wheezing (Bilateral, diffuse) present. No rales. Abdominal:      General: Abdomen is flat. Palpations: Abdomen is soft. Tenderness: There is no abdominal tenderness. Musculoskeletal:      Cervical back: Neck supple. Right lower leg: Edema present. Left lower leg: Edema present. Comments: Trace, bilateral, symmetric lower extremity edema to mid calfs. Skin:     General: Skin is warm and dry. Neurological:      Mental Status: He is alert and oriented to person, place, and time. Psychiatric:         Behavior: Behavior normal.           MEDICAL DECISION MAKING   Initial Assessment:   Acute COPD exacerbation  Mild alcohol withdrawal, patient had recent alcohol intake, possible more severe withdrawal last night. GMAWS 1 at the moment.   Undifferentiated chest pain  Rule out ACS  Plan:   IV line, labs  IV fluids  Medication  Observation in the ED while awaiting results  Depending on results and clinical progression, will decide between admission for alcohol withdrawal versus discharge with resources for detox. Patient will be able to return to [facility] for additional treatment once he is considered detoxed. ED RESULTS   Laboratory results:  Labs Reviewed   CBC WITH AUTO DIFFERENTIAL - Abnormal; Notable for the following components:       Result Value    RBC 4.68 (*)     RDW-CV 14.7 (*)     RDW-SD 49.4 (*)     MPV 9.0 (*)     All other components within normal limits   BASIC METABOLIC PANEL W/ REFLEX TO MG FOR LOW K - Abnormal; Notable for the following components:    CO2 22 (*)     All other components within normal limits   HEPATIC FUNCTION PANEL - Abnormal; Notable for the following components:    Bilirubin, Direct 0.5 (*)     Alkaline Phosphatase 189 (*)      (*)      (*)     All other components within normal limits   TROPONIN   PROTIME-INR   APTT   ETHANOL   ANION GAP   OSMOLALITY   GLOMERULAR FILTRATION RATE, ESTIMATED   TROPONIN       Radiologic studies results:  XR CHEST PORTABLE   Final Result   Normal mobile chest.            **This report has been created using voice recognition software. It may contain minor errors which are inherent in voice recognition technology. **      Final report electronically signed by Dr. Ev Zarco on 7/21/2022 3:18 PM                ED COURSE   ED Medications administered this visit:   Medications   0.9 % sodium chloride bolus (1,000 mLs IntraVENous New Bag 7/21/22 1654)   thiamine tablet 100 mg (100 mg Oral Given 7/21/22 1705)   PHENobarbital (LUMINAL) injection 750 mg (has no administration in time range)   aspirin chewable tablet 324 mg (has no administration in time range)   ipratropium-albuterol (DUONEB) nebulizer solution 1 ampule (1 ampule Inhalation Given 7/21/22 1608)   albuterol (PROVENTIL) nebulizer solution 7.5 mg (7.5 mg Nebulization Given 7/21/22 1618)   predniSONE (DELTASONE) tablet 60 mg (60 mg Oral Given 7/21/22 1705)       ED Course as of 07/21/22 1730   Thu Jul 21, 2022   1504 GMAWS score 1. [DT]   1721 Ideal body weight 75 kg [DT]   1723 GMAWS 5. We will start treatment and admit. [DT]   1728 HEART score 2 [DT]      ED Course User Index  [DT] Ismael Boyd MD       MEDICATION CHANGES     New Prescriptions    No medications on file         FINAL DISPOSITION     Final diagnoses:   Alcohol withdrawal delirium, acute, hyperactive ([address]   Chest pain, unspecified type   COPD with acute exacerbation ([address]     Condition: condition: fair  Dispo: Admit to med/surg floor      This transcription was electronically signed. It was dictated by use of voice recognition software and electronically transcribed. The transcription may contain errors not detected in proofreading.        Ismael Boyd MD  07/21/22 9040

## 2022-07-21 NOTE — H&P
Hospitalist History & Physical    Patient:  Clent Most    Unit/Bed:4K-07/007-A  YOB: 1975  MRN: [id number]   Acct: [de-identified]   PCP: NILAM Owen - CNP  Code Status: Prior    Date of Service: Pt seen/examined on 07/21/22 and admitted to Inpatient with expected LOS greater than two midnights due to medical therapy. Chief Complaint: chest pain, alcohol detox    Assessment/Plan:    Alcohol dependence with acute withdrawal: Start phenobarb per protocol. Banana bag x1, MTV, folic acid, thiamine to follow. Consult addiction services. Seizure precautions  Note, patient reports hx severe DTs, seizure, hallucinations. Will have low threshold to transfer to ICU if symptoms are not controlled with loading dose of phenobarb. Chest pain: Troponin negative x2, no acute ischemic changes on EKG. Heart score 3. Will repeat EKG in AM. Monitor on tele. Consider echo and stress test when more stable from acute alcohol withdrawal.     Sinus tachycardia: Likely due to alcohol withdrawal.  Continue IV fluids, check lactic acid. Treatment as above. Essential HTN: Resume home lisinopril, metoprolol. Uncontrolled, anxiety likely contributing. Resume home meds, prn hydralazine. Montior and adjust accordingly. Elevated LFTs: reported h/o liver disease d/t #1. Will check hepatitis panel, liver US. Obesity: BMI 32.78      History of Present Illness:  Maria M Gomes is a 56 y.o. male with PMHx of COPD, HTN, ETOH abuse, DMII, who presented to [facility] with chief complaint of chest pain, alcohol detox. Patient states that he had sudden onset chest pain yesterday. He describes it as an ache in the left side of his chest. Associated with tremors and tingling in bilateral upper extremities. No associated SOB, lightheadedness, palpitations, diaphoresis. Symptoms resolved with alcohol intake.  Patient states that he had been in rehab and clear for a year, started drinking again approx 5 months ago. Patient admits to drinking two cases of beer daily, which would be 48 beers per day. Last drink at noon today. He states that previously has been admitted to the ICU for alcohol withdrawal. He states that he gets hallucinations and becomes violent, has had severe DTs and seizures. Patient currently has severe tremors, anxiety. No chest pain at this time. Patient will be admitted to stepdown for further management. Review of Systems: Pertinent positives as noted in the HPI. All other systems reviewed and negative. Past Medical History:        Diagnosis Date    COPD (chronic obstructive pulmonary disease) ([facility] [address]     Depression     Diabetes mellitus (HCC)     ETOH abuse     GERD (gastroesophageal reflux disease)     Hyperlipidemia     Hypertension     Liver disease     Seizures ([facility] [address]     etoh wdl       Past Surgical History:        Procedure Laterality Date    ENDOSCOPY, COLON, DIAGNOSTIC         Home Medications:   No current facility-administered medications on file prior to encounter. Current Outpatient Medications on File Prior to Encounter   Medication Sig Dispense Refill    naltrexone (DEPADE) 50 MG tablet Take 1 tablet by mouth in the morning. 30 tablet 0    lisinopril (PRINIVIL;ZESTRIL) 20 MG tablet Take 1 tablet by mouth daily 30 tablet 3    metoprolol tartrate (LOPRESSOR) 50 MG tablet Take 1 tablet by mouth daily 30 tablet 5    sildenafil (VIAGRA) 100 MG tablet Take 1 tablet by mouth daily as needed for Erectile Dysfunction 10 tablet 5    albuterol sulfate HFA (VENTOLIN HFA) 108 (90 Base) MCG/ACT inhaler Inhale 2 puffs into the lungs 4 times daily as needed for Wheezing 18 g 0    blood glucose test strips (ASCENSIA AUTODISC VI;ONE TOUCH ULTRA TEST VI) strip Test as needed. Dispense One Touch verio Test Strips. Dx: Type 2 diabetes (250.00) 60 strip 11    glucose monitoring kit (FREESTYLE) monitoring kit Glucometer with test strips and lancets.   Check BS once daily  #100 strips and lancets 1 kit 5       Allergies:    Patient has no known allergies. Social History:    reports that he has been smoking e-cigarettes and cigarettes. He has a 27.00 pack-year smoking history. He has quit using smokeless tobacco. He reports that he does not currently use alcohol after a past usage of about 140.0 standard drinks per week. He reports that he does not use drugs. Family History:       Problem Relation Age of Onset    High Blood Pressure Father     Cancer Father         Lung Cancer    Alcohol Abuse Father     High Blood Pressure Brother     Diabetes Mother     Heart Disease Mother         Stent Placement    Arthritis Mother     Depression Mother     High Blood Pressure Mother     High Cholesterol Mother     Alcohol Abuse Paternal Uncle        Diet:  No diet orders on file      Physical Exam:  BP (!) 173/82   Pulse (!) 128   Temp 98.6 °F (37 °C) (Oral)   Resp 14   Ht 5' 11\" (1.803 m)   Wt 235 lb (106.6 kg)   SpO2 96%   BMI 32.78 kg/m²   General:   Pleasant male. Appears anxious. NAD. HEENT:  normocephalic and atraumatic. No scleral icterus. PERR. Neck: supple. No JVD. No thyromegaly. Lungs: clear to auscultation. No retractions  Cardiac: Tachycardic without murmur. Abdomen: soft. Nontender. Bowel sounds positive. Extremities:  No clubbing, cyanosis, or edema x 4. Tremors. Vasculature: capillary refill < 3 seconds. Palpable LE pulses bilaterally. Skin:  warm and dry. No rashes or lesions. Psych:  Alert and oriented x3. Affect appropriate  Lymph:  No supraclavicular adenopathy. Neurologic:  No focal deficit. No seizures.     Data: (All radiographs, tracings, PFTs, and imaging are personally viewed and interpreted unless otherwise noted)  Labs:   Recent Labs     07/21/22  1523   WBC 8.2   HGB 15.3   HCT 43.8        Recent Labs     07/21/22  1523      K 4.1      CO2 22*   BUN 10   CREATININE 0.5   CALCIUM 8.9     Recent Labs     07/21/22  1523   *   * BILIDIR 0.5*   BILITOT 1.1   ALKPHOS 189*     Recent Labs     07/21/22  1523   INR 1.01     No results for input(s): CKTOTAL, TROPONINI in the last 72 hours. Urinalysis:   Lab Results   Component Value Date/Time    NITRU NEGATIVE 05/07/2021 02:20 AM    WBCUA NONE SEEN 04/08/2021 07:30 PM    BACTERIA NONE SEEN 04/08/2021 07:30 PM    RBCUA 0-2 04/08/2021 07:30 PM    BLOODU NEGATIVE 05/07/2021 02:20 AM    SPECGRAV 1.008 04/08/2021 07:30 PM    GLUCOSEU NEGATIVE 05/07/2021 02:20 AM       EKG:  sinus tachycardia, RSR in V1. Radiology:  XR CHEST PORTABLE   Final Result   Normal mobile chest.            **This report has been created using voice recognition software. It may contain minor errors which are inherent in voice recognition technology. **      Final report electronically signed by Dr. Luba Alvarado on 7/21/2022 3:18 PM        XR CHEST PORTABLE    Result Date: 7/21/2022  PROCEDURE: XR CHEST PORTABLE CLINICAL INFORMATION: Chest pain COMPARISON: 4/19/2021 TECHNIQUE: A single mobile view of the chest was obtained. Normal mobile chest. **This report has been created using voice recognition software. It may contain minor errors which are inherent in voice recognition technology. ** Final report electronically signed by Dr. Luba Alvarado on 7/21/2022 3:18 PM        Tele:   [x] yes             [] no      Thank you NILAM Busby CNP for the opportunity to be involved in this patient's care.     Electronically signed by Devi Muñiz PA-C on 7/21/2022 at 8:00 PM

## 2022-07-22 PROBLEM — K76.0 HEPATIC STEATOSIS: Status: ACTIVE | Noted: 2022-07-22

## 2022-07-22 PROBLEM — D53.9 MACROCYTIC ANEMIA: Status: ACTIVE | Noted: 2022-07-22

## 2022-07-22 PROBLEM — D69.6 THROMBOCYTOPENIA (HCC): Status: ACTIVE | Noted: 2022-07-22

## 2022-07-22 PROBLEM — R79.89 ELEVATED LFTS: Status: ACTIVE | Noted: 2022-07-22

## 2022-07-22 PROBLEM — J44.9 COPD WITHOUT EXACERBATION (HCC): Status: ACTIVE | Noted: 2022-07-22

## 2022-07-22 PROBLEM — R07.9 CHEST PAIN: Status: ACTIVE | Noted: 2022-07-22

## 2022-07-22 LAB
ALBUMIN SERPL-MCNC: 3.3 G/DL (ref 3.5–5.1)
ALP BLD-CCNC: 144 U/L (ref 38–126)
ALT SERPL-CCNC: 94 U/L (ref 11–66)
ANION GAP SERPL CALCULATED.3IONS-SCNC: 10 MEQ/L (ref 8–16)
AST SERPL-CCNC: 89 U/L (ref 5–40)
AVERAGE GLUCOSE: 96 MG/DL (ref 70–126)
BILIRUB SERPL-MCNC: 1.1 MG/DL (ref 0.3–1.2)
BUN BLDV-MCNC: 12 MG/DL (ref 7–22)
CALCIUM IONIZED: 1.04 MMOL/L (ref 1.12–1.32)
CALCIUM SERPL-MCNC: 7.9 MG/DL (ref 8.5–10.5)
CHLORIDE BLD-SCNC: 103 MEQ/L (ref 98–111)
CO2: 24 MEQ/L (ref 23–33)
CREAT SERPL-MCNC: 0.5 MG/DL (ref 0.4–1.2)
EKG ATRIAL RATE: 78 BPM
EKG P AXIS: 55 DEGREES
EKG P-R INTERVAL: 108 MS
EKG Q-T INTERVAL: 400 MS
EKG QRS DURATION: 92 MS
EKG QTC CALCULATION (BAZETT): 456 MS
EKG R AXIS: 38 DEGREES
EKG T AXIS: 48 DEGREES
EKG VENTRICULAR RATE: 78 BPM
ERYTHROCYTE [DISTWIDTH] IN BLOOD BY AUTOMATED COUNT: 14.8 % (ref 11.5–14.5)
ERYTHROCYTE [DISTWIDTH] IN BLOOD BY AUTOMATED COUNT: 51.8 FL (ref 35–45)
GFR SERPL CREATININE-BSD FRML MDRD: > 90 ML/MIN/1.73M2
GLUCOSE BLD-MCNC: 102 MG/DL (ref 70–108)
GLUCOSE BLD-MCNC: 130 MG/DL (ref 70–108)
GLUCOSE BLD-MCNC: 77 MG/DL (ref 70–108)
GLUCOSE BLD-MCNC: 79 MG/DL (ref 70–108)
GLUCOSE BLD-MCNC: 88 MG/DL (ref 70–108)
HAV IGM SER IA-ACNC: NEGATIVE
HBA1C MFR BLD: 5.2 % (ref 4.4–6.4)
HCT VFR BLD CALC: 38.8 % (ref 42–52)
HEMOGLOBIN: 13.2 GM/DL (ref 14–18)
HEPATITIS B CORE IGM ANTIBODY: NEGATIVE
HEPATITIS B SURFACE ANTIGEN: NEGATIVE
HEPATITIS C ANTIBODY: NEGATIVE
LACTIC ACID: 1.5 MMOL/L (ref 0.5–2)
LV EF: 60 %
LVEF MODALITY: NORMAL
MAGNESIUM: 2.2 MG/DL (ref 1.6–2.4)
MCH RBC QN AUTO: 32.7 PG (ref 26–33)
MCHC RBC AUTO-ENTMCNC: 34 GM/DL (ref 32.2–35.5)
MCV RBC AUTO: 96 FL (ref 80–94)
PHOSPHORUS: 2.5 MG/DL (ref 2.4–4.7)
PLATELET # BLD: 100 THOU/MM3 (ref 130–400)
PMV BLD AUTO: 9.5 FL (ref 9.4–12.4)
POTASSIUM REFLEX MAGNESIUM: 4.3 MEQ/L (ref 3.5–5.2)
RBC # BLD: 4.04 MILL/MM3 (ref 4.7–6.1)
SODIUM BLD-SCNC: 137 MEQ/L (ref 135–145)
TOTAL PROTEIN: 6.2 G/DL (ref 6.1–8)
WBC # BLD: 5.9 THOU/MM3 (ref 4.8–10.8)

## 2022-07-22 PROCEDURE — 84100 ASSAY OF PHOSPHORUS: CPT

## 2022-07-22 PROCEDURE — 93005 ELECTROCARDIOGRAM TRACING: CPT | Performed by: FAMILY MEDICINE

## 2022-07-22 PROCEDURE — 80053 COMPREHEN METABOLIC PANEL: CPT

## 2022-07-22 PROCEDURE — 99233 SBSQ HOSP IP/OBS HIGH 50: CPT | Performed by: FAMILY MEDICINE

## 2022-07-22 PROCEDURE — 6370000000 HC RX 637 (ALT 250 FOR IP)

## 2022-07-22 PROCEDURE — 82330 ASSAY OF CALCIUM: CPT

## 2022-07-22 PROCEDURE — 83735 ASSAY OF MAGNESIUM: CPT

## 2022-07-22 PROCEDURE — 6370000000 HC RX 637 (ALT 250 FOR IP): Performed by: PHYSICIAN ASSISTANT

## 2022-07-22 PROCEDURE — 93010 ELECTROCARDIOGRAM REPORT: CPT | Performed by: INTERNAL MEDICINE

## 2022-07-22 PROCEDURE — 85027 COMPLETE CBC AUTOMATED: CPT

## 2022-07-22 PROCEDURE — 2580000003 HC RX 258: Performed by: PHYSICIAN ASSISTANT

## 2022-07-22 PROCEDURE — 36415 COLL VENOUS BLD VENIPUNCTURE: CPT

## 2022-07-22 PROCEDURE — 83036 HEMOGLOBIN GLYCOSYLATED A1C: CPT

## 2022-07-22 PROCEDURE — 94640 AIRWAY INHALATION TREATMENT: CPT

## 2022-07-22 PROCEDURE — 94761 N-INVAS EAR/PLS OXIMETRY MLT: CPT

## 2022-07-22 PROCEDURE — 6360000002 HC RX W HCPCS: Performed by: PHYSICIAN ASSISTANT

## 2022-07-22 PROCEDURE — 2060000000 HC ICU INTERMEDIATE R&B

## 2022-07-22 PROCEDURE — 80074 ACUTE HEPATITIS PANEL: CPT

## 2022-07-22 PROCEDURE — 6370000000 HC RX 637 (ALT 250 FOR IP): Performed by: EMERGENCY MEDICINE

## 2022-07-22 PROCEDURE — 93306 TTE W/DOPPLER COMPLETE: CPT

## 2022-07-22 PROCEDURE — 83605 ASSAY OF LACTIC ACID: CPT

## 2022-07-22 PROCEDURE — 2500000003 HC RX 250 WO HCPCS

## 2022-07-22 PROCEDURE — 6370000000 HC RX 637 (ALT 250 FOR IP): Performed by: STUDENT IN AN ORGANIZED HEALTH CARE EDUCATION/TRAINING PROGRAM

## 2022-07-22 PROCEDURE — 82948 REAGENT STRIP/BLOOD GLUCOSE: CPT

## 2022-07-22 RX ORDER — GUAIFENESIN 600 MG/1
600 TABLET, EXTENDED RELEASE ORAL 2 TIMES DAILY
Status: DISCONTINUED | OUTPATIENT
Start: 2022-07-22 | End: 2022-07-25 | Stop reason: HOSPADM

## 2022-07-22 RX ORDER — NICOTINE 21 MG/24HR
1 PATCH, TRANSDERMAL 24 HOURS TRANSDERMAL EVERY 24 HOURS
Status: DISCONTINUED | OUTPATIENT
Start: 2022-07-22 | End: 2022-07-25 | Stop reason: HOSPADM

## 2022-07-22 RX ORDER — INSULIN LISPRO 100 [IU]/ML
0-4 INJECTION, SOLUTION INTRAVENOUS; SUBCUTANEOUS NIGHTLY
Status: DISCONTINUED | OUTPATIENT
Start: 2022-07-22 | End: 2022-07-25 | Stop reason: HOSPADM

## 2022-07-22 RX ORDER — INSULIN LISPRO 100 [IU]/ML
0-4 INJECTION, SOLUTION INTRAVENOUS; SUBCUTANEOUS
Status: DISCONTINUED | OUTPATIENT
Start: 2022-07-22 | End: 2022-07-25 | Stop reason: HOSPADM

## 2022-07-22 RX ORDER — DEXTROSE MONOHYDRATE 100 MG/ML
INJECTION, SOLUTION INTRAVENOUS CONTINUOUS PRN
Status: DISCONTINUED | OUTPATIENT
Start: 2022-07-22 | End: 2022-07-25 | Stop reason: HOSPADM

## 2022-07-22 RX ADMIN — PHENOBARBITAL SODIUM 300.95 MG: 65 INJECTION INTRAMUSCULAR at 06:25

## 2022-07-22 RX ADMIN — ENOXAPARIN SODIUM 30 MG: 100 INJECTION SUBCUTANEOUS at 21:33

## 2022-07-22 RX ADMIN — SODIUM CHLORIDE, PRESERVATIVE FREE 10 ML: 5 INJECTION INTRAVENOUS at 21:41

## 2022-07-22 RX ADMIN — PHENOBARBITAL SODIUM 300.95 MG: 65 INJECTION INTRAMUSCULAR at 03:01

## 2022-07-22 RX ADMIN — Medication 1 TABLET: at 09:01

## 2022-07-22 RX ADMIN — GUAIFENESIN 600 MG: 600 TABLET, EXTENDED RELEASE ORAL at 21:33

## 2022-07-22 RX ADMIN — Medication 100 MG: at 09:00

## 2022-07-22 RX ADMIN — SODIUM CHLORIDE: 9 INJECTION, SOLUTION INTRAVENOUS at 16:12

## 2022-07-22 RX ADMIN — MOMETASONE FUROATE AND FORMOTEROL FUMARATE DIHYDRATE 2 PUFF: 100; 5 AEROSOL RESPIRATORY (INHALATION) at 19:32

## 2022-07-22 RX ADMIN — MOMETASONE FUROATE AND FORMOTEROL FUMARATE DIHYDRATE 2 PUFF: 100; 5 AEROSOL RESPIRATORY (INHALATION) at 13:58

## 2022-07-22 RX ADMIN — MICONAZOLE NITRATE: 2 POWDER TOPICAL at 21:33

## 2022-07-22 RX ADMIN — FOLIC ACID 1 MG: 1 TABLET ORAL at 09:00

## 2022-07-22 RX ADMIN — LISINOPRIL 20 MG: 20 TABLET ORAL at 09:01

## 2022-07-22 RX ADMIN — METOPROLOL TARTRATE 50 MG: 50 TABLET, FILM COATED ORAL at 03:07

## 2022-07-22 RX ADMIN — ENOXAPARIN SODIUM 30 MG: 100 INJECTION SUBCUTANEOUS at 09:01

## 2022-07-22 NOTE — FLOWSHEET NOTE
07/22/22 1831   Treatment Team Notification   Reason for Communication Evaluate   Team Member Name Dr. Hermila Alvarado Role Resident   Method of Communication Secure Message   Response Waiting for response   Notification Time 1831     Worsening redness in left armpit that now has a dark red ring around the entire outside. Dr. Per Borrego came to floor to assess and asked me to erica around the outside to monitor for spreading.

## 2022-07-22 NOTE — FLOWSHEET NOTE
07/22/22 1002   Safe Environment   Safety Measures Other (comment)  (virtual safety round complete)   Virtual nurse rounds, pt did not respond to audio, so camera turned on to visually check safety of patient. pt sleeping, resp unlabored. Call light within reach. Will continue to monitor.

## 2022-07-22 NOTE — PROGRESS NOTES
Utilize [facility] alcohol withdrawal scale (Based on [place] Modified Alcohol Withdrawal Scale). Tabulate score based on classifications including Tremor, Sweating, Hallucination, Orientation, and Agitation. Tremor: 2   Sweatin  Hallucinations: 0  Orientation: 0  Agitation: 2  Total Score: 5  Action perform as described below     Tremor:  No tremor is 0 points. Tremor on movement is 1 point. Tremor at rest is 2 points. Sweating: No Sweat 0 points. Moist is 1 point. Drenching sweats is 2 points. Hallucinations: No present 0 points. Dissuadable is 1 point. Not dissuadable is 2 points. Orientation: Oriented 0 points. Vague/detached 1 point. Disoriented/no contact 2 points. Agitation: Calm 0 points. Anxious 1 point. Panicky 2 points. Check scale every 2 hours. Discontinue scoring with 4 consecutive scorings of 0. Scale 0: No phenobarbital given. Re-assess every 60 minutes as needed. Scale 1-3: Phenobarbital 130 mg IV over 3 minutes. Re-assess every 60 minutes as needed. May administer every 60 minutes to a maximum dose of phenobarbital 1040 mg in 24 hours! Scale 4-8: Phenobarbital 260 mg IV over 5 minutes. Re-assess every 60 minutes as needed. May administer every 60 minutes to a maximum dose of phenobarbital 1040mg in 24 hours! Scale 9-10: Transfer to ICU (if not already in ICU). Administer 10mg/kg phenobarbital IV over 60 minutes. Maximum dose phenobarbital is 1040mg in 24 hours!

## 2022-07-22 NOTE — FLOWSHEET NOTE
Pt was alone and could not tlalk much but wanted prayer to cope and he was anointed. 07/22/22 1709   Encounter Summary   Encounter Overview/Reason  Initial Encounter   Service Provided For: Patient   Referral/Consult From: [address] [place] [address] Family members   Last Encounter  07/22/22  (Anointed)   Complexity of Encounter Low   Spiritual/Emotional needs   Type Spiritual Support   Rituals, Rites and Sacraments   Type Anointing   Assessment/Intervention/Outcome   Assessment Calm; Hopeful

## 2022-07-22 NOTE — PROGRESS NOTES
Utilize [facility] alcohol withdrawal scale (Based on [place] Modified Alcohol Withdrawal Scale). Tabulate score based on classifications including Tremor, Sweating, Hallucination, Orientation, and Agitation. Tremor: 0  Sweatin  Hallucinations: 0  Orientation: 0  Agitation: 1  Total Score: 1  Action perform as described below     Tremor:  No tremor is 0 points. Tremor on movement is 1 point. Tremor at rest is 2 points. Sweating: No Sweat 0 points. Moist is 1 point. Drenching sweats is 2 points. Hallucinations: No present 0 points. Dissuadable is 1 point. Not dissuadable is 2 points. Orientation: Oriented 0 points. Vague/detached 1 point. Disoriented/no contact 2 points. Agitation: Calm 0 points. Anxious 1 point. Panicky 2 points. Check scale every 2 hours. Discontinue scoring with 4 consecutive scorings of 0. Scale 0: No phenobarbital given. Re-assess every 60 minutes as needed. Scale 1-3: Phenobarbital 130 mg IV over 3 minutes. Re-assess every 60 minutes as needed. May administer every 60 minutes to a maximum dose of phenobarbital 1040 mg in 24 hours! Scale 4-8: Phenobarbital 260 mg IV over 5 minutes. Re-assess every 60 minutes as needed. May administer every 60 minutes to a maximum dose of phenobarbital 1040mg in 24 hours! Scale 9-10: Transfer to ICU (if not already in ICU). Administer 10mg/kg phenobarbital IV over 60 minutes. Maximum dose phenobarbital is 1040mg in 24 hours! Utilize [facility] alcohol withdrawal scale (Based on Ari Modified Alcohol Withdrawal Scale). Tabulate score based on classifications including Tremor, Sweating, Hallucination, Orientation, and Agitation. Patient maxed out on medication, Dr Gail Pruitt aware.

## 2022-07-22 NOTE — FLOWSHEET NOTE
07/22/22 1420   Safe Environment   Safety Measures Other (comment)  (virtual safety round complete)   This Virtual nurse called into patient room to perform safety check. Pt stated he does not want camera on but okay to call in and verbally check on patient. Pt denies pain or needs at this time. Will continue to monitor.

## 2022-07-22 NOTE — PLAN OF CARE
Problem: Discharge Planning  Goal: Discharge to home or other facility with appropriate resources  Outcome: Progressing  Flowsheets (Taken 7/22/2022 1739)  Discharge to home or other facility with appropriate resources:   Identify barriers to discharge with patient and caregiver   Identify discharge learning needs (meds, wound care, etc)   Arrange for needed discharge resources and transportation as appropriate     Problem: Pain  Goal: Verbalizes/displays adequate comfort level or baseline comfort level  Outcome: Progressing  Flowsheets (Taken 7/22/2022 1739)  Verbalizes/displays adequate comfort level or baseline comfort level:   Encourage patient to monitor pain and request assistance   Assess pain using appropriate pain scale   Implement non-pharmacological measures as appropriate and evaluate response  Note: Pain goal zero      Problem: Safety - Adult  Goal: Free from fall injury  Outcome: Progressing  Note: Hourly rounding, call light within reach, bed alarm, seizure precautions      Problem: Neurosensory - Adult  Goal: Absence of seizures  Outcome: Progressing  Flowsheets (Taken 7/22/2022 1739)  Absence of seizures:   Monitor for seizure activity.   If seizure occurs, document type and location of movements and any associated apnea   If seizure occurs, turn head to side and suction secretions as needed   Support airway/breathing, administer oxygen as needed     Problem: Neurosensory - Adult  Goal: Remains free of injury related to seizures activity  Outcome: Progressing  Flowsheets (Taken 7/22/2022 1739)  Remains free of injury related to seizure activity:   Maintain airway, patient safety  and administer oxygen as ordered   Monitor patient for seizure activity, document and report duration and description of seizure to Licensed Independent Practitioner   If seizure occurs, turn patient to side and suction secretions as needed   Seizure pads on all 4 side rails   Instruct patient/family to call for assistance with activity based on assessment   Instruct patient/family to notify RN of any seizure activity     Problem: Neurosensory - Adult  Goal: Achieves maximal functionality and self care  Outcome: Progressing  Flowsheets (Taken 7/22/2022 1739)  Achieves maximal functionality and self care:   Monitor swallowing and airway patency with patient fatigue and changes in neurological status   Encourage and assist patient to increase activity and self care with guidance from physical therapy/occupational therapy     Problem: Respiratory - Adult  Goal: Achieves optimal ventilation and oxygenation  Outcome: Progressing  Flowsheets (Taken 7/22/2022 1739)  Achieves optimal ventilation and oxygenation:   Assess for changes in respiratory status   Assess for changes in mentation and behavior   Oxygen supplementation based on oxygen saturation or arterial blood gases     Problem: Metabolic/Fluid and Electrolytes - Adult  Goal: Electrolytes maintained within normal limits  Outcome: Progressing  Flowsheets (Taken 7/22/2022 1739)  Electrolytes maintained within normal limits:   Monitor labs and assess patient for signs and symptoms of electrolyte imbalances   Administer electrolyte replacement as ordered   Monitor response to electrolyte replacements, including repeat lab results as appropriate     Problem: Metabolic/Fluid and Electrolytes - Adult  Goal: Glucose maintained within prescribed range  Outcome: Progressing  Flowsheets (Taken 7/22/2022 1739)  Glucose maintained within prescribed range:   Monitor blood glucose as ordered   Assess for signs and symptoms of hyperglycemia and hypoglycemia   Assess barriers to adequate nutritional intake and initiate nutrition consult as needed   Administer ordered medications to maintain glucose within target range     Problem: Hematologic - Adult  Goal: Maintains hematologic stability  Outcome: Progressing  Flowsheets (Taken 7/22/2022 1739)  Maintains hematologic stability:   Assess for signs and symptoms of bleeding or hemorrhage   Monitor labs for bleeding or clotting disorders     Problem: Cardiovascular - Adult  Goal: Maintains optimal cardiac output and hemodynamic stability  Outcome: Progressing  Flowsheets (Taken 7/22/2022 1739)  Maintains optimal cardiac output and hemodynamic stability:   Monitor blood pressure and heart rate   Monitor urine output and notify Licensed Independent Practitioner for values outside of normal range     Problem: Skin/Tissue Integrity - Adult  Goal: Incisions, wounds, or drain sites healing without S/S of infection  Outcome: Progressing  Flowsheets (Taken 7/22/2022 1739)  Incisions, Wounds, or Drain Sites Healing Without Sign and Symptoms of Infection:   ADMISSION and DAILY: Assess and document risk factors for pressure ulcer development   Initiate pressure ulcer prevention bundle as indicated     Problem: Musculoskeletal - Adult  Goal: Return mobility to safest level of function  Outcome: Progressing  Flowsheets (Taken 7/22/2022 1739)  Return Mobility to Safest Level of Function:   Assess patient stability and activity tolerance for standing, transferring and ambulating with or without assistive devices   Ensure adequate protection for wounds/incisions during mobilization     Problem: Gastrointestinal - Adult  Goal: Maintains adequate nutritional intake  Outcome: Progressing  Flowsheets (Taken 7/22/2022 1739)  Maintains adequate nutritional intake:   Monitor percentage of each meal consumed   Identify factors contributing to decreased intake, treat as appropriate   Monitor intake and output, weight and lab values     Problem: Genitourinary - Adult  Goal: Absence of urinary retention  Outcome: Progressing  Flowsheets (Taken 7/22/2022 1739)  Absence of urinary retention:   Assess patients ability to void and empty bladder   Monitor intake/output and perform bladder scan as needed     Problem: Infection - Adult  Goal: Absence of infection at discharge  Outcome: Progressing  Flowsheets (Taken 7/22/2022 1739)  Absence of infection at discharge:   Assess and monitor for signs and symptoms of infection   Monitor lab/diagnostic results   Monitor all insertion sites i.e., indwelling lines, tubes and drains   Administer medications as ordered     Problem: Chronic Conditions and Co-morbidities  Goal: Patient's chronic conditions and co-morbidity symptoms are monitored and maintained or improved  Outcome: Progressing  Flowsheets (Taken 7/22/2022 1739)  Care Plan - Patient's Chronic Conditions and Co-Morbidity Symptoms are Monitored and Maintained or Improved: Monitor and assess patient's chronic conditions and comorbid symptoms for stability, deterioration, or improvement   Care plan reviewed with patient. Patient verbalize understanding of the plan of care and contribute to goal setting.

## 2022-07-22 NOTE — FLOWSHEET NOTE
07/22/22 3303   Safe Environment   Safety Measures Other (comment)  (virtual safety round complete)   Virtual nurse rounds, patient responded to audio but declines use of camera to be turned on. Denies pain or needs at this time. Will continue to monitor.

## 2022-07-22 NOTE — PROGRESS NOTES
Utilize [facility] alcohol withdrawal scale (Based on [place] Modified Alcohol Withdrawal Scale). Tabulate score based on classifications including Tremor, Sweating, Hallucination, Orientation, and Agitation. Tremor: 1  Sweatin  Hallucinations: 0  Orientation: 0  Agitation: 1  Total Score: 3  Action perform as described below     Tremor:  No tremor is 0 points. Tremor on movement is 1 point. Tremor at rest is 2 points. Sweating: No Sweat 0 points. Moist is 1 point. Drenching sweats is 2 points. Hallucinations: No present 0 points. Dissuadable is 1 point. Not dissuadable is 2 points. Orientation: Oriented 0 points. Vague/detached 1 point. Disoriented/no contact 2 points. Agitation: Calm 0 points. Anxious 1 point. Panicky 2 points. Check scale every 2 hours. Discontinue scoring with 4 consecutive scorings of 0. Scale 0: No phenobarbital given. Re-assess every 60 minutes as needed. Scale 1-3: Phenobarbital 130 mg IV over 3 minutes. Re-assess every 60 minutes as needed. May administer every 60 minutes to a maximum dose of phenobarbital 1040 mg in 24 hours! Scale 4-8: Phenobarbital 260 mg IV over 5 minutes. Re-assess every 60 minutes as needed. May administer every 60 minutes to a maximum dose of phenobarbital 1040mg in 24 hours! Scale 9-10: Transfer to ICU (if not already in ICU). Administer 10mg/kg phenobarbital IV over 60 minutes. Maximum dose phenobarbital is 1040mg in 24 hours!

## 2022-07-22 NOTE — PROGRESS NOTES
Internal Medicine Resident Progress Note    Patient:  Rubi Hurst    YOB: 1975  Unit/Bed:4-07/007-A  MRN: [id number]    Acct: [de-identified]   PCP: NILAM Espinosa CNP    Date of Admission: 7/21/2022      Assessment/Plan:    Chest pain:   - CP for last week, worsened overnight, pain radiates to left arm. Felt lightheadeded, photosensitivte, and anxiousness. Symptoms cleared upon drinking alcohol.   - EKG showed sinus tachy in ED, and troponin negative x2.   - Heart score 3.   - Repeat EKG in AM. Monitor on tele. Alcohol dependence with acute withdrawal:  - PAWS score of 8  - Was started on phenobarbital, given 1600 mg in the ED. On floors pt was anxious, worried, has minor paranoia. - Pt states he has hx of severe DTs, seizure, hallucinations on past alcohol withdrawals. - A Banana bag x1, MTV, folic acid, thiamine was given. - Seizure precautions placed. - Signout to ICU in case pt [facility] alcohol withdrawal scale status changes. Score of 8/10, HR of 120, or , pt can be transferred. Sinus tachycardia 2/2 alcohol withdrawal:   - Started on IV fluids and given phenobarbital.     Transaminates 2/2 chronic alcohol abuse:  - ALP of 189, , AST of 132. - US showed intrinsic liver disease and/or hepatic steatosis. Wernicke Encephalopathy:  - Noted in previous admission, had short-term memory loss. Does not exhibit classical symptoms of memory loss, ataxia or nystagmus.   - Noted in chart. - A Banana bag x1, MTV, folic acid, thiamine was given. Moisture-induced rash:  - Pt complained of rash on arm that started this morning, worsened throughout the day. Ring-encircling rash with foul smell and slight burn to touch.   - Image noted in chart.  - Started on miconazole powder, advised to wash with soap and water, and keep dry. COPD   - GOLD score unknown, no PFT noted in chart  - Has not used his COPD meds in over a month.    - Prolonged wheezing on physical exam  - Given albuterol and duonebs, and oral prednisone in the ED.   - PRN albuterol added and guaifenesin. Essential HTN:   - Elevated BP of 154/88.   - Resumed home meds of lisinopril and metoprolol. Hyperlipidemia:   - Advice to f/u with PCP to start statins. Chronic ETOH abuse:  - Was in rehab for over a year and abstinence from alcohol, has had multiple hospitalizations for alcohol withdrawal, and was admitted to ICU for withdrawals last year. - Drinks 2-3 cases of beer daily.   - ETOH of 0.23 on admission. Last drink hours before coming in. Insulin-Independent DM II:   - No meds listed. Sugars have been low since admission. Hypoglycemic protocol in place. Obesity:   - BMI 35.22 noted in chart. Expected discharge date:  TBD    Disposition:   [x] Home  [] TCU  [] Rehab  [] Psych  [] SNF  [] [place]  [] Other-    ===================================================================      Chief Complaint: Chest Pain    Hospital Course:     56 YOM w/ a PMHx of COPD, HTN, HLD, GERD, chronic ETOH abuse, IIDMII, Wernicke encephalopathy who presented to the ED for chest pain and alcohol detox symptoms. Has been having chest pain for the last week, that radiates to left side and hand. Worsened last night and pain is intermediate, in the middle of the chest and comes and goes. There was also tremors and tingling on left arm. Patient also complains of nausea, vomiting and cough producing phlegm, tremors, hallucinations, and lightheadedness. Patient states that symptoms resolved after drinking alcohol. No SOB, lightheadedness, and palpitations present. Is a smoker. Was abstienent from alcohol for over a year, and 5 weeks ago visited a friend and went back to drinking 2-3 cases of beer daily. Drank hours before coming to hospital. In the past stated he gets hallucinations and violent. Told us that on his last hospital visit, was placed in ICU.      In the ED phenobarbital  was given 1600 mg. Troponins x2 negative. Lactic acid is 1.5. Elevated LFTs. Hepatitis negative. Ethyl alcohol of 0.23. Elevated BP, HR and afebrile. EKG showed sinus tachycardia, RSR in V1. On the floor pt had complaints of minor hallucinations, chest pain symptoms have subsided. Stated he was anxious and still had tremors. Had a PAWS score of 8. Later in the day pt was slighlty tremors, anxious, stated he was having minor hallucinations but was still orientated x3. If symptoms worsened, informed to ICU about possible transfer. Subjective (past 24 hours):      Pt stated he is anxious, worried, has minor paranoia, and stated he knew that ICU is a likely destination. ROS: reviewed complete ROS unchanged unless otherwise stated in hospital course/subjective portion.        Medications:  Reviewed    Infusion Medications    dextrose      sodium chloride      sodium chloride 100 mL/hr at 07/22/22 1612     Scheduled Medications    insulin lispro  0-4 Units SubCUTAneous TID WC    insulin lispro  0-4 Units SubCUTAneous Nightly    mometasone-formoterol  2 puff Inhalation BID    nicotine  1 patch TransDERmal Q24H    thiamine  100 mg Oral Daily    lisinopril  20 mg Oral Daily    metoprolol tartrate  50 mg Oral Daily    sodium chloride flush  10 mL IntraVENous 2 times per day    enoxaparin  30 mg SubCUTAneous BID    PHENobarbital  64.8 mg Oral BID    Followed by    Melanie Hawk ON 7/23/2022] PHENobarbital  32.4 mg Oral BID    multivitamin  1 tablet Oral Daily    folic acid  1 mg Oral Daily     PRN Meds: glucose, dextrose bolus **OR** dextrose bolus, glucagon (rDNA), dextrose, albuterol sulfate HFA, sodium chloride flush, sodium chloride, ondansetron **OR** ondansetron, polyethylene glycol, acetaminophen **OR** acetaminophen, potassium chloride **OR** potassium alternative oral replacement **OR** potassium chloride, magnesium sulfate, hydrALAZINE        Intake/Output Summary (Last 24 hours) at 7/22/2022 [address] filed at 7/22/2022 1532  Gross per 24 hour   Intake 355 ml   Output 1525 ml   Net -1170 ml       Exam:  BP (!) 154/88   Pulse (!) 105   Temp 98.4 °F (36.9 °C) (Oral)   Resp 17   Ht 5' 11\" (1.803 m)   Wt 252 lb 8 oz (114.5 kg)   SpO2 92%   BMI 35.22 kg/m²     Physical Exam  Constitutional:       Appearance: He is obese. Cardiovascular:      Rate and Rhythm: Regular rhythm. Tachycardia present. Pulses: Normal pulses. Heart sounds: Normal heart sounds. Pulmonary:      Effort: Respiratory distress present. Breath sounds: Wheezing present. Abdominal:      Tenderness: There is abdominal tenderness. Skin:     General: Skin is warm and dry. Capillary Refill: Capillary refill takes 2 to 3 seconds. Coloration: Skin is not jaundiced. Findings: No bruising. Neurological:      Mental Status: He is alert. He is disoriented. Labs:   Recent Labs     07/21/22  1523 07/22/22  0445   WBC 8.2 5.9   HGB 15.3 13.2*   HCT 43.8 38.8*    100*     Recent Labs     07/21/22  1523 07/22/22  0445 07/22/22  0918    137  --    K 4.1 4.3  --     103  --    CO2 22* 24  --    BUN 10 12  --    CREATININE 0.5 0.5  --    CALCIUM 8.9 7.9*  --    PHOS  --   --  2.5     Recent Labs     07/21/22  1523 07/22/22  0445   * 89*   * 94*   BILIDIR 0.5*  --    BILITOT 1.1 1.1   ALKPHOS 189* 144*     Recent Labs     07/21/22  1523   INR 1.01     No results for input(s): Patrisha Meigs in the last 72 hours. No results for input(s): PROCAL in the last 72 hours.    Lab Results   Component Value Date/Time    NITRU NEGATIVE 05/07/2021 02:20 AM    WBCUA NONE SEEN 04/08/2021 07:30 PM    BACTERIA NONE SEEN 04/08/2021 07:30 PM    RBCUA 0-2 04/08/2021 07:30 PM    BLOODU NEGATIVE 05/07/2021 02:20 AM    SPECGRAV 1.008 04/08/2021 07:30 PM    GLUCOSEU NEGATIVE 05/07/2021 02:20 AM       Radiology (48 hours):  XR CHEST PORTABLE    Result Date: 7/21/2022  Normal mobile chest. **This report has been created using voice recognition software. It may contain minor errors which are inherent in voice recognition technology. ** Final report electronically signed by Dr. Kaushik Clifford on 7/21/2022 3:18 PM    [id number] organ? LIVER    Result Date: 7/22/2022  Impression: 1. Abnormal echotexture in the liver, similar to prior study, could be intrinsic liver disease and/or hepatic steatosis. 2. No cholelithiasis. This document has been electronically signed by: Destini Valdivia MD on 07/22/2022 12:22 AM       DVT prophylaxis:    [x] Lovenox  [] SCDs  [] SQ Heparin  [] Encourage ambulation   [] Already on Anticoagulation       Diet: ADULT DIET;  Regular  Code Status: Full Code  PT/OT:  No  Tele: Yes   IVF: Yes    Electronically signed by Missy Ugalde MD on 7/22/2022 at 5:33 PM    Case was discussed with Attending, Dr. Otis Morales

## 2022-07-22 NOTE — PROGRESS NOTES
Utilize [facility] alcohol withdrawal scale (Based on [place] Modified Alcohol Withdrawal Scale). Tabulate score based on classifications including Tremor, Sweating, Hallucination, Orientation, and Agitation. Tremor: 0  Sweatin  Hallucinations: 0  Orientation: 0  Agitation: 1  Total Score: 1  Action perform as described below     Tremor:  No tremor is 0 points. Tremor on movement is 1 point. Tremor at rest is 2 points. Sweating: No Sweat 0 points. Moist is 1 point. Drenching sweats is 2 points. Hallucinations: No present 0 points. Dissuadable is 1 point. Not dissuadable is 2 points. Orientation: Oriented 0 points. Vague/detached 1 point. Disoriented/no contact 2 points. Agitation: Calm 0 points. Anxious 1 point. Panicky 2 points. Check scale every 2 hours. Discontinue scoring with 4 consecutive scorings of 0. Scale 0: No phenobarbital given. Re-assess every 60 minutes as needed. Scale 1-3: Phenobarbital 130 mg IV over 3 minutes. Re-assess every 60 minutes as needed. May administer every 60 minutes to a maximum dose of phenobarbital 1040 mg in 24 hours! Scale 4-8: Phenobarbital 260 mg IV over 5 minutes. Re-assess every 60 minutes as needed. May administer every 60 minutes to a maximum dose of phenobarbital 1040mg in 24 hours! Scale 9-10: Transfer to ICU (if not already in ICU). Administer 10mg/kg phenobarbital IV over 60 minutes. Maximum dose phenobarbital is 1040mg in 24 hours!

## 2022-07-22 NOTE — PROGRESS NOTES
Utilize [facility] alcohol withdrawal scale (Based on [place] Modified Alcohol Withdrawal Scale). Tabulate score based on classifications including Tremor, Sweating, Hallucination, Orientation, and Agitation. Tremor: 1  Sweatin  Hallucinations: 0  Orientation: 0  Agitation: 2  Total Score: 4  Action perform as described below     Tremor:  No tremor is 0 points. Tremor on movement is 1 point. Tremor at rest is 2 points. Sweating: No Sweat 0 points. Moist is 1 point. Drenching sweats is 2 points. Hallucinations: No present 0 points. Dissuadable is 1 point. Not dissuadable is 2 points. Orientation: Oriented 0 points. Vague/detached 1 point. Disoriented/no contact 2 points. Agitation: Calm 0 points. Anxious 1 point. Panicky 2 points. Check scale every 2 hours. Discontinue scoring with 4 consecutive scorings of 0. Scale 0: No phenobarbital given. Re-assess every 60 minutes as needed. Scale 1-3: Phenobarbital 130 mg IV over 3 minutes. Re-assess every 60 minutes as needed. May administer every 60 minutes to a maximum dose of phenobarbital 1040 mg in 24 hours! Scale 4-8: Phenobarbital 260 mg IV over 5 minutes. Re-assess every 60 minutes as needed. May administer every 60 minutes to a maximum dose of phenobarbital 1040mg in 24 hours! Scale 9-10: Transfer to ICU (if not already in ICU). Administer 10mg/kg phenobarbital IV over 60 minutes. Maximum dose phenobarbital is 1040mg in 24 hours!

## 2022-07-22 NOTE — PROGRESS NOTES
Hospitalist Progress Note    Patient:  Nolan Melgar      Unit/Bed:4K-07/007-A    YOB: 1975    MRN: [id number]       Acct: [de-identified]     PCP: NILAM Martinez - CNP    Date of Admission: 7/21/2022    Assessment/Plan:    Alcohol dependence with acute withdrawal: Given banana bag x1, MTV, folic acid, thiamine. Started phenobarb protocol.    -consult addiction services   -maxed out on phenobarb; D/C'd      *Note, patient reports hx severe DTs, seizure, hallucinations. Will have low  threshold to transfer to ICU if symptoms are not controlled with loading dose of phenobarb. Chest pain, unspecified: Pt reported sudden left-sided chest pain that resolved after alcohol consumption. Troponin negative x2. No acute ischemic changes on EKG. Repeat EKG showed normal sinus rhythm with short MN interval.     -echo results pending    -continue to monitor tele     Sinus tachycardia: likely due to alcohol withdrawal.    -continue IV fluids   -continue metoprolol    -continue to monitor tele    Essential HTN: unmanaged. Patient does not normally monitor home BP or take medications regularly.    -continue home Lisinopril, metoprolol, and prn hydralazine     Elevated LFTs: likely due to h/o liver disease d/t #1.  and  on admission. Trending downward. Hepatitis panel negative. Abdominal US showed abnormal liver echotexture that could be hepatic steatosis or intrinsic disease.    -continue to monitor LFTs    COPD: Uses albuterol inhaler prn. Wheezes heard bilaterally, endorsed productive cough.     -currently on 1L NC   -DuoNebs as needed    -guaifenesin recommended     Obesity: BMI 35.22     T2DM: not on a current regimen    -check A1c        Expected discharge date:  TBD    Disposition:    [] Home       [] TCU       [] Rehab       [] Psych       [] SNF       [] [facility]       [] Other-    Chief Complaint: chest pain, alcohol detox    Hospital Course: Patient is a 56 y.o. male with PMHx of COPD, HTN, ETOH abuse, T2DM, and obesity who presented to [facility] ED on 7/21 with sudden onset of left-sided chest pain and tightness. He also endorsed tremors and tingling on the dorsum of both hands. He denied SUB, lightheadedness, palpitations, and diaphoresis. The night prior he had anxiety and hallucinations. His symptoms resolved after alcohol intake. His last drink was 7/21 @1200. Recently, he completed a 1-year recovery program. About 4-5 days ago, he met up with a friend who offered him a beer and he has relapsed. He started drinking \"a case or more\" per day. He also started smoking more than usual with 2-2.5 packs per day. He has a prior history of alcohol withdrawal and being admitted to the ICU, endorsing that he gets hallucinations, becomes violent, has seizures and delirium tremens. Additionally, patient reports noncompliance with home medications. 7/22: Abdominal ultrasound showed Abnormal echotexture in the liver, similar to prior study, could be intrinsic liver disease and/or hepatic steatosis and no cholelithiasis. AUDIT score is 37 and CIWA score is 15. Maxed out on phenobarbital.       Subjective (past 24 hours): Patient reports feeling anxious and aggravated this morning, but cooperative. He endorses lack of appetite, leg weakness, photosensitivity, congestion, and productive cough. He denies fever, chills, chest pain, SOB, abdominal pain, N/V/C/D, numbness/tingling in extremities,  sx, auditory/visual hallucinations, or tremors. He expresses no issues, questions, or concerns at this time. ROS (12 point review of systems completed. Pertinent positives noted. Otherwise ROS is negative).     Medications:  Reviewed    Infusion Medications    dextrose      sodium chloride      sodium chloride 100 mL/hr at 07/21/22 9881     Scheduled Medications    insulin lispro  0-4 Units SubCUTAneous TID WC    insulin lispro  0-4 Units SubCUTAneous Nightly    thiamine  100 mg Oral Daily lisinopril  20 mg Oral Daily    metoprolol tartrate  50 mg Oral Daily    sodium chloride flush  10 mL IntraVENous 2 times per day    enoxaparin  30 mg SubCUTAneous BID    PHENobarbital  64.8 mg Oral BID    Followed by    Cora Vaughn ON 7/23/2022] PHENobarbital  32.4 mg Oral BID    multivitamin  1 tablet Oral Daily    folic acid  1 mg Oral Daily    thiamine  100 mg Oral Daily     PRN Meds: glucose, dextrose bolus **OR** dextrose bolus, glucagon (rDNA), dextrose, albuterol sulfate HFA, sodium chloride flush, sodium chloride, ondansetron **OR** ondansetron, polyethylene glycol, acetaminophen **OR** acetaminophen, potassium chloride **OR** potassium alternative oral replacement **OR** potassium chloride, magnesium sulfate, hydrALAZINE      Intake/Output Summary (Last 24 hours) at 7/22/2022 7127  Last data filed at 7/22/2022 1789  Gross per 24 hour   Intake --   Output 400 ml   Net -400 ml       Diet:  ADULT DIET; Regular    Exam:  BP (!) 167/93   Pulse 85   Temp 98.1 °F (36.7 °C) (Oral)   Resp 14   Ht 5' 11\" (1.803 m)   Wt 252 lb 8 oz (114.5 kg)   SpO2 95%   BMI 35.22 kg/m²     General appearance: No apparent distress, appears stated age and cooperative. HEENT: Pupils equal, round, and reactive to light. Conjunctivae/corneas clear. Neck: Supple, with full range of motion. No jugular venous distention. Trachea midline. Respiratory:  Normal respiratory effort. No Rales/Rhonchi. Wheezes bilaterally. Productive cough. Cardiovascular: Regular rate and rhythm with normal S1/S2 without murmurs, rubs or gallops. Abdomen: Soft, non-tender, non-distended with normal bowel sounds. Musculoskeletal: passive and active ROM x 4 extremities. Skin: Skin color, texture, turgor normal.  No rashes or lesions. Neurologic:  Neurovascularly intact without any focal sensory/motor deficits.  Cranial nerves: II-XII intact, grossly non-focal.  Psychiatric: Alert and oriented, thought content appropriate, normal insight  Capillary Refill: Brisk,< 3 seconds   Peripheral Pulses: +2 palpable, equal bilaterally       Labs:   Recent Labs     07/21/22  1523 07/22/22  0445   WBC 8.2 5.9   HGB 15.3 13.2*   HCT 43.8 38.8*    100*     Recent Labs     07/21/22  1523 07/22/22  0445    137   K 4.1 4.3    103   CO2 22* 24   BUN 10 12   CREATININE 0.5 0.5   CALCIUM 8.9 7.9*     Recent Labs     07/21/22  1523 07/22/22  0445   * 89*   * 94*   BILIDIR 0.5*  --    BILITOT 1.1 1.1   ALKPHOS 189* 144*     Recent Labs     07/21/22  1523   INR 1.01     No results for input(s): CKTOTAL, TROPONINI in the last 72 hours. Microbiology:      Urinalysis:      Lab Results   Component Value Date/Time    NITRU NEGATIVE 05/07/2021 02:20 AM    WBCUA NONE SEEN 04/08/2021 07:30 PM    BACTERIA NONE SEEN 04/08/2021 07:30 PM    RBCUA 0-2 04/08/2021 07:30 PM    BLOODU NEGATIVE 05/07/2021 02:20 AM    SPECGRAV 1.008 04/08/2021 07:30 PM    GLUCOSEU NEGATIVE 05/07/2021 02:20 AM       Radiology:  [id number] organ? LIVER   Final Result   Impression:   1. Abnormal echotexture in the liver, similar to prior study, could be    intrinsic liver disease and/or hepatic steatosis. 2. No cholelithiasis. This document has been electronically signed by: Lacey Frey MD on    07/22/2022 12:22 AM      XR CHEST PORTABLE   Final Result   Normal mobile chest.            **This report has been created using voice recognition software. It may contain minor errors which are inherent in voice recognition technology. **      Final report electronically signed by Dr. Cecy George on 7/21/2022 3:18 PM          DVT prophylaxis: [x] Lovenox                                 [] SCDs                                 [] SQ Heparin                                 [] Encourage ambulation           [] Already on Anticoagulation     Code Status: Full Code    PT/OT Eval Status: N/A    Tele:   [x] yes             [] no    Electronically signed by Poppy Craven on 7/22/2022 at 9:26 AM

## 2022-07-22 NOTE — PROGRESS NOTES
Utilize [facility] alcohol withdrawal scale (Based on [place] Modified Alcohol Withdrawal Scale). Tabulate score based on classifications including Tremor, Sweating, Hallucination, Orientation, and Agitation. Tremor: 1   Sweatin  Hallucinations: 0  Orientation: 0  Agitation: 1  Total Score: 3  Action perform as described below     Tremor:  No tremor is 0 points. Tremor on movement is 1 point. Tremor at rest is 2 points. Sweating: No Sweat 0 points. Moist is 1 point. Drenching sweats is 2 points. Hallucinations: No present 0 points. Dissuadable is 1 point. Not dissuadable is 2 points. Orientation: Oriented 0 points. Vague/detached 1 point. Disoriented/no contact 2 points. Agitation: Calm 0 points. Anxious 1 point. Panicky 2 points. Check scale every 2 hours. Discontinue scoring with 4 consecutive scorings of 0. Scale 0: No phenobarbital given. Re-assess every 60 minutes as needed. Scale 1-3: Phenobarbital 130 mg IV over 3 minutes. Re-assess every 60 minutes as needed. May administer every 60 minutes to a maximum dose of phenobarbital 1040 mg in 24 hours! Scale 4-8: Phenobarbital 260 mg IV over 5 minutes. Re-assess every 60 minutes as needed. May administer every 60 minutes to a maximum dose of phenobarbital 1040mg in 24 hours! Scale 9-10: Transfer to ICU (if not already in ICU). Administer 10mg/kg phenobarbital IV over 60 minutes. Maximum dose phenobarbital is 1040mg in 24 hours!

## 2022-07-22 NOTE — PROGRESS NOTES
Utilize [facility] alcohol withdrawal scale (Based on [place] Modified Alcohol Withdrawal Scale). Tabulate score based on classifications including Tremor, Sweating, Hallucination, Orientation, and Agitation. Tremor: 0  Sweatin  Hallucinations: 0  Orientation: 0  Agitation: 0  Total Score: 1  Action perform as described below     Tremor:  No tremor is 0 points. Tremor on movement is 1 point. Tremor at rest is 2 points. Sweating: No Sweat 0 points. Moist is 1 point. Drenching sweats is 2 points. Hallucinations: No present 0 points. Dissuadable is 1 point. Not dissuadable is 2 points. Orientation: Oriented 0 points. Vague/detached 1 point. Disoriented/no contact 2 points. Agitation: Calm 0 points. Anxious 1 point. Panicky 2 points. Check scale every 2 hours. Discontinue scoring with 4 consecutive scorings of 0. Scale 0: No phenobarbital given. Re-assess every 60 minutes as needed. Scale 1-3: Phenobarbital 130 mg IV over 3 minutes. Re-assess every 60 minutes as needed. May administer every 60 minutes to a maximum dose of phenobarbital 1040 mg in 24 hours! Scale 4-8: Phenobarbital 260 mg IV over 5 minutes. Re-assess every 60 minutes as needed. May administer every 60 minutes to a maximum dose of phenobarbital 1040mg in 24 hours! Scale 9-10: Transfer to ICU (if not already in ICU). Administer 10mg/kg phenobarbital IV over 60 minutes. Maximum dose phenobarbital is 1040mg in 24 hours!

## 2022-07-22 NOTE — CARE COORDINATION
7/22/22, 11:22 AM EDT  DISCHARGE PLANNING EVALUATION:    Sarita Cerda       Admitted: 7/21/2022/ 7[address] [place] [address] day: 1   [facility] Reason for admit: COPD with acute exacerbation ([facility] [address] [J44.1]  Alcohol withdrawal syndrome, uncomplicated ([facility] [address] [T65.392]  Alcohol withdrawal delirium, acute, hyperactive ([facility] [address] [F10.231]  Chest pain, unspecified type [R07.9]   PMH:  has a past medical history of COPD (chronic obstructive pulmonary disease) ([facility] [address] Depression, Diabetes mellitus ([facility] [address] ETOH abuse, GERD (gastroesophageal reflux disease), Hyperlipidemia, Hypertension, Liver disease, and Seizures ([facility] [address] Barriers to Discharge:    Alcohol Withdrawal  (+) ETOH 0.23, elevated LFT/s    IV Phenobarbital, SR Alcohol Withdrawal Scale  PCP: NILAM Slater CNP  Readmission Risk Score: 9.2%  Patient's Healthcare Decision Maker: Legal Next of Kin    Patient Goals/Plan/Treatment Preferences: plans home alone or [facility] f/u as OP, just completed [employer] program; Addiction Services following  Transportation/Food Security/Housekeeping Addressed:  No issues identified. 7/22/22, 11:24 AM EDT    Patient goals/plan/ treatment preferences discussed by  and . Patient goals/plan/ treatment preferences reviewed with patient/ family. Patient/ family verbalize understanding of discharge plan and are in agreement with goal/plan/treatment preferences. Understanding was demonstrated using the teach back method. AVS provided by RN at time of discharge, which includes all necessary medical information pertaining to the patients current course of illness, treatment, post-discharge goals of care, and treatment preferences.      Services At/After Discharge: None

## 2022-07-23 LAB
ALBUMIN SERPL-MCNC: 3 G/DL (ref 3.5–5.1)
ALP BLD-CCNC: 146 U/L (ref 38–126)
ALT SERPL-CCNC: 100 U/L (ref 11–66)
ANION GAP SERPL CALCULATED.3IONS-SCNC: 10 MEQ/L (ref 8–16)
AST SERPL-CCNC: 104 U/L (ref 5–40)
BASOPHILS # BLD: 0.6 %
BASOPHILS ABSOLUTE: 0 THOU/MM3 (ref 0–0.1)
BILIRUB SERPL-MCNC: 1.7 MG/DL (ref 0.3–1.2)
BUN BLDV-MCNC: 14 MG/DL (ref 7–22)
CALCIUM IONIZED: 0.98 MMOL/L (ref 1.12–1.32)
CALCIUM SERPL-MCNC: 7.8 MG/DL (ref 8.5–10.5)
CHLORIDE BLD-SCNC: 103 MEQ/L (ref 98–111)
CO2: 22 MEQ/L (ref 23–33)
CREAT SERPL-MCNC: 0.5 MG/DL (ref 0.4–1.2)
EOSINOPHIL # BLD: 0 %
EOSINOPHILS ABSOLUTE: 0 THOU/MM3 (ref 0–0.4)
ERYTHROCYTE [DISTWIDTH] IN BLOOD BY AUTOMATED COUNT: 14.9 % (ref 11.5–14.5)
ERYTHROCYTE [DISTWIDTH] IN BLOOD BY AUTOMATED COUNT: 52.9 FL (ref 35–45)
GFR SERPL CREATININE-BSD FRML MDRD: > 90 ML/MIN/1.73M2
GLUCOSE BLD-MCNC: 82 MG/DL (ref 70–108)
GLUCOSE BLD-MCNC: 85 MG/DL (ref 70–108)
GLUCOSE BLD-MCNC: 87 MG/DL (ref 70–108)
GLUCOSE BLD-MCNC: 90 MG/DL (ref 70–108)
GLUCOSE BLD-MCNC: 93 MG/DL (ref 70–108)
HCT VFR BLD CALC: 39.5 % (ref 42–52)
HEMOGLOBIN: 13.3 GM/DL (ref 14–18)
IMMATURE GRANS (ABS): 0.01 THOU/MM3 (ref 0–0.07)
IMMATURE GRANULOCYTES: 0.3 %
LYMPHOCYTES # BLD: 23.8 %
LYMPHOCYTES ABSOLUTE: 0.8 THOU/MM3 (ref 1–4.8)
MAGNESIUM: 2 MG/DL (ref 1.6–2.4)
MCH RBC QN AUTO: 32.8 PG (ref 26–33)
MCHC RBC AUTO-ENTMCNC: 33.7 GM/DL (ref 32.2–35.5)
MCV RBC AUTO: 97.5 FL (ref 80–94)
MONOCYTES # BLD: 13 %
MONOCYTES ABSOLUTE: 0.5 THOU/MM3 (ref 0.4–1.3)
NUCLEATED RED BLOOD CELLS: 0 /100 WBC
PHOSPHORUS: 3.1 MG/DL (ref 2.4–4.7)
PLATELET # BLD: 87 THOU/MM3 (ref 130–400)
PLATELET ESTIMATE: ABNORMAL
PMV BLD AUTO: 9.6 FL (ref 9.4–12.4)
POTASSIUM SERPL-SCNC: 3.9 MEQ/L (ref 3.5–5.2)
RBC # BLD: 4.05 MILL/MM3 (ref 4.7–6.1)
SCAN OF BLOOD SMEAR: NORMAL
SEG NEUTROPHILS: 62.3 %
SEGMENTED NEUTROPHILS ABSOLUTE COUNT: 2.2 THOU/MM3 (ref 1.8–7.7)
SODIUM BLD-SCNC: 135 MEQ/L (ref 135–145)
T4 FREE: 1.24 NG/DL (ref 0.93–1.76)
TOTAL PROTEIN: 5.8 G/DL (ref 6.1–8)
TSH SERPL DL<=0.05 MIU/L-ACNC: 4.4 UIU/ML (ref 0.4–4.2)
WBC # BLD: 3.5 THOU/MM3 (ref 4.8–10.8)

## 2022-07-23 PROCEDURE — 99232 SBSQ HOSP IP/OBS MODERATE 35: CPT | Performed by: FAMILY MEDICINE

## 2022-07-23 PROCEDURE — 2580000003 HC RX 258: Performed by: PHYSICIAN ASSISTANT

## 2022-07-23 PROCEDURE — 6370000000 HC RX 637 (ALT 250 FOR IP)

## 2022-07-23 PROCEDURE — 6360000002 HC RX W HCPCS: Performed by: FAMILY MEDICINE

## 2022-07-23 PROCEDURE — 83735 ASSAY OF MAGNESIUM: CPT

## 2022-07-23 PROCEDURE — 2500000003 HC RX 250 WO HCPCS

## 2022-07-23 PROCEDURE — 6370000000 HC RX 637 (ALT 250 FOR IP): Performed by: EMERGENCY MEDICINE

## 2022-07-23 PROCEDURE — 36415 COLL VENOUS BLD VENIPUNCTURE: CPT

## 2022-07-23 PROCEDURE — 82330 ASSAY OF CALCIUM: CPT

## 2022-07-23 PROCEDURE — 2060000000 HC ICU INTERMEDIATE R&B

## 2022-07-23 PROCEDURE — 6370000000 HC RX 637 (ALT 250 FOR IP): Performed by: PHYSICIAN ASSISTANT

## 2022-07-23 PROCEDURE — 82948 REAGENT STRIP/BLOOD GLUCOSE: CPT

## 2022-07-23 PROCEDURE — 6360000002 HC RX W HCPCS: Performed by: PHYSICIAN ASSISTANT

## 2022-07-23 PROCEDURE — 80053 COMPREHEN METABOLIC PANEL: CPT

## 2022-07-23 PROCEDURE — 84100 ASSAY OF PHOSPHORUS: CPT

## 2022-07-23 PROCEDURE — 85025 COMPLETE CBC W/AUTO DIFF WBC: CPT

## 2022-07-23 PROCEDURE — 84443 ASSAY THYROID STIM HORMONE: CPT

## 2022-07-23 PROCEDURE — 84439 ASSAY OF FREE THYROXINE: CPT

## 2022-07-23 RX ORDER — CALCIUM GLUCONATE 20 MG/ML
2000 INJECTION, SOLUTION INTRAVENOUS ONCE
Status: COMPLETED | OUTPATIENT
Start: 2022-07-23 | End: 2022-07-23

## 2022-07-23 RX ADMIN — GUAIFENESIN 600 MG: 600 TABLET, EXTENDED RELEASE ORAL at 20:48

## 2022-07-23 RX ADMIN — Medication 100 MG: at 09:24

## 2022-07-23 RX ADMIN — PHENOBARBITAL 64.8 MG: 32.4 TABLET ORAL at 09:23

## 2022-07-23 RX ADMIN — ENOXAPARIN SODIUM 30 MG: 100 INJECTION SUBCUTANEOUS at 09:23

## 2022-07-23 RX ADMIN — Medication 1 TABLET: at 09:23

## 2022-07-23 RX ADMIN — ENOXAPARIN SODIUM 30 MG: 100 INJECTION SUBCUTANEOUS at 20:48

## 2022-07-23 RX ADMIN — FOLIC ACID 1 MG: 1 TABLET ORAL at 09:23

## 2022-07-23 RX ADMIN — SODIUM CHLORIDE, PRESERVATIVE FREE 10 ML: 5 INJECTION INTRAVENOUS at 09:24

## 2022-07-23 RX ADMIN — SODIUM CHLORIDE: 9 INJECTION, SOLUTION INTRAVENOUS at 17:06

## 2022-07-23 RX ADMIN — MICONAZOLE NITRATE: 2 POWDER TOPICAL at 20:48

## 2022-07-23 RX ADMIN — PHENOBARBITAL 32.4 MG: 32.4 TABLET ORAL at 20:48

## 2022-07-23 RX ADMIN — GUAIFENESIN 600 MG: 600 TABLET, EXTENDED RELEASE ORAL at 09:23

## 2022-07-23 RX ADMIN — METOPROLOL TARTRATE 50 MG: 50 TABLET, FILM COATED ORAL at 09:23

## 2022-07-23 RX ADMIN — CALCIUM GLUCONATE 2000 MG: 20 INJECTION, SOLUTION INTRAVENOUS at 11:42

## 2022-07-23 RX ADMIN — MICONAZOLE NITRATE: 2 POWDER TOPICAL at 09:24

## 2022-07-23 RX ADMIN — LISINOPRIL 20 MG: 20 TABLET ORAL at 09:23

## 2022-07-23 RX ADMIN — PHENOBARBITAL 64.8 MG: 32.4 TABLET ORAL at 00:12

## 2022-07-23 ASSESSMENT — LIFESTYLE VARIABLES
HOW OFTEN DO YOU HAVE A DRINK CONTAINING ALCOHOL: 4 OR MORE TIMES A WEEK
HOW MANY STANDARD DRINKS CONTAINING ALCOHOL DO YOU HAVE ON A TYPICAL DAY: 10 OR MORE

## 2022-07-23 NOTE — PROGRESS NOTES
Brief Intervention and Referral to Treatment Summary    Patient was provided PHQ-9, AUDIT-C and DAST Screening:      PHQ-9 Score: 0  AUDIT-C Score:  12  DAST Score:  0    Patients substance use is considered     Low Risk/Healthy  Moderate Risk  Harmful  x  Dependent    Patients depression is considered:     Minimal  x  Mild   Moderate  Moderately Severe  Severe    Brief Education Was Provided    Patient was receptive  x  Patient was not receptive      Brief Intervention Is Provided (Only for AUDIT-C or DAST)     Patient reports readiness to decrease and/or stop use and a plan was discussed   x  Patient denies readiness to decrease and/or stop use and a plan was not discussed      Recommendations/Referrals for Brief and/or Specialized Treatment Provided to Patient  Patient reports he was in treatment for the past year with BRYNN Ames in [facility]. Patient reports he relapsed about 2 weeks ago after ending treatment about one month ago. Patient reports he has already talked with BRYNN Ames and can return for treatment. Patient refused AoD packet at this time.

## 2022-07-23 NOTE — PROGRESS NOTES
Utilize [facility] alcohol withdrawal scale (Based on [place] Modified Alcohol Withdrawal Scale). Tabulate score based on classifications including Tremor, Sweating, Hallucination, Orientation, and Agitation. Tremor: 0  Sweatin  Hallucinations: 1  Orientation: 0  Agitation: 0  Total Score: 1  Action perform as described below     Tremor:  No tremor is 0 points. Tremor on movement is 1 point. Tremor at rest is 2 points. Sweating: No Sweat 0 points. Moist is 1 point. Drenching sweats is 2 points. Hallucinations: No present 0 points. Dissuadable is 1 point. Not dissuadable is 2 points. Orientation: Oriented 0 points. Vague/detached 1 point. Disoriented/no contact 2 points. Agitation: Calm 0 points. Anxious 1 point. Panicky 2 points. Check scale every 2 hours. Discontinue scoring with 4 consecutive scorings of 0. Scale 0: No phenobarbital given. Re-assess every 60 minutes as needed. Scale 1-3: Phenobarbital 130 mg IV over 3 minutes. Re-assess every 60 minutes as needed. May administer every 60 minutes to a maximum dose of phenobarbital 1040 mg in 24 hours! Scale 4-8: Phenobarbital 260 mg IV over 5 minutes. Re-assess every 60 minutes as needed. May administer every 60 minutes to a maximum dose of phenobarbital 1040mg in 24 hours! Scale 9-10: Transfer to ICU (if not already in ICU). Administer 10mg/kg phenobarbital IV over 60 minutes. Maximum dose phenobarbital is 1040mg in 24 hours!

## 2022-07-23 NOTE — PROGRESS NOTES
Utilize [facility] alcohol withdrawal scale (Based on [place] Modified Alcohol Withdrawal Scale). Tabulate score based on classifications including Tremor, Sweating, Hallucination, Orientation, and Agitation. Tremor: 0  Sweatin  Hallucinations: 0  Orientation: 0  Agitation: 0  Total Score: 2  Action perform as described below     Tremor:  No tremor is 0 points. Tremor on movement is 1 point. Tremor at rest is 2 points. Sweating: No Sweat 0 points. Moist is 1 point. Drenching sweats is 2 points. Hallucinations: No present 0 points. Dissuadable is 1 point. Not dissuadable is 2 points. Orientation: Oriented 0 points. Vague/detached 1 point. Disoriented/no contact 2 points. Agitation: Calm 0 points. Anxious 1 point. Panicky 2 points. Check scale every 2 hours. Discontinue scoring with 4 consecutive scorings of 0. Scale 0: No phenobarbital given. Re-assess every 60 minutes as needed. Scale 1-3: Phenobarbital 130 mg IV over 3 minutes. Re-assess every 60 minutes as needed. May administer every 60 minutes to a maximum dose of phenobarbital 1040 mg in 24 hours! Scale 4-8: Phenobarbital 260 mg IV over 5 minutes. Re-assess every 60 minutes as needed. May administer every 60 minutes to a maximum dose of phenobarbital 1040mg in 24 hours! Scale 9-10: Transfer to ICU (if not already in ICU). Administer 10mg/kg phenobarbital IV over 60 minutes. Maximum dose phenobarbital is 1040mg in 24 hours!

## 2022-07-23 NOTE — PROGRESS NOTES
Utilize [facility] alcohol withdrawal scale (Based on [place] Modified Alcohol Withdrawal Scale). Tabulate score based on classifications including Tremor, Sweating, Hallucination, Orientation, and Agitation. Tremor: 0  Sweatin  Hallucinations: 0  Orientation: 0  Agitation: 0  Total Score: 0  Action perform as described below     Tremor:  No tremor is 0 points. Tremor on movement is 1 point. Tremor at rest is 2 points. Sweating: No Sweat 0 points. Moist is 1 point. Drenching sweats is 2 points. Hallucinations: No present 0 points. Dissuadable is 1 point. Not dissuadable is 2 points. Orientation: Oriented 0 points. Vague/detached 1 point. Disoriented/no contact 2 points. Agitation: Calm 0 points. Anxious 1 point. Panicky 2 points. Check scale every 2 hours. Discontinue scoring with 4 consecutive scorings of 0. Scale 0: No phenobarbital given. Re-assess every 60 minutes as needed. Scale 1-3: Phenobarbital 130 mg IV over 3 minutes. Re-assess every 60 minutes as needed. May administer every 60 minutes to a maximum dose of phenobarbital 1040 mg in 24 hours! Scale 4-8: Phenobarbital 260 mg IV over 5 minutes. Re-assess every 60 minutes as needed. May administer every 60 minutes to a maximum dose of phenobarbital 1040mg in 24 hours! Scale 9-10: Transfer to ICU (if not already in ICU). Administer 10mg/kg phenobarbital IV over 60 minutes. Maximum dose phenobarbital is 1040mg in 24 hours!

## 2022-07-23 NOTE — PROGRESS NOTES
Utilize [facility] alcohol withdrawal scale (Based on [place] Modified Alcohol Withdrawal Scale). Tabulate score based on classifications including Tremor, Sweating, Hallucination, Orientation, and Agitation. Tremor: 0  Sweatin  Hallucinations: 0  Orientation: 0  Agitation: 0  Total Score: 0    Action perform as described below     Tremor:  No tremor is 0 points. Tremor on movement is 1 point. Tremor at rest is 2 points. Sweating: No Sweat 0 points. Moist is 1 point. Drenching sweats is 2 points. Hallucinations: No present 0 points. Dissuadable is 1 point. Not dissuadable is 2 points. Orientation: Oriented 0 points. Vague/detached 1 point. Disoriented/no contact 2 points. Agitation: Calm 0 points. Anxious 1 point. Panicky 2 points. Check scale every 2 hours. Discontinue scoring with 4 consecutive scorings of 0. Scale 0: No phenobarbital given. Re-assess every 60 minutes as needed. Scale 1-3: Phenobarbital 130 mg IV over 3 minutes. Re-assess every 60 minutes as needed. May administer every 60 minutes to a maximum dose of phenobarbital 1040 mg in 24 hours! Scale 4-8: Phenobarbital 260 mg IV over 5 minutes. Re-assess every 60 minutes as needed. May administer every 60 minutes to a maximum dose of phenobarbital 1040mg in 24 hours! Scale 9-10: Transfer to ICU (if not already in ICU). Administer 10mg/kg phenobarbital IV over 60 minutes. Maximum dose phenobarbital is 1040mg in 24 hours!

## 2022-07-23 NOTE — PROGRESS NOTES
Utilize [facility] alcohol withdrawal scale (Based on [place] Modified Alcohol Withdrawal Scale). Tabulate score based on classifications including Tremor, Sweating, Hallucination, Orientation, and Agitation. Tremor: 0  Sweatin  Hallucinations: 0  Orientation: 0  Agitation: 0  Total Score: 1  Action perform as described below     Tremor:  No tremor is 0 points. Tremor on movement is 1 point. Tremor at rest is 2 points. Sweating: No Sweat 0 points. Moist is 1 point. Drenching sweats is 2 points. Hallucinations: No present 0 points. Dissuadable is 1 point. Not dissuadable is 2 points. Orientation: Oriented 0 points. Vague/detached 1 point. Disoriented/no contact 2 points. Agitation: Calm 0 points. Anxious 1 point. Panicky 2 points. Check scale every 2 hours. Discontinue scoring with 4 consecutive scorings of 0. Scale 0: No phenobarbital given. Re-assess every 60 minutes as needed. Scale 1-3: Phenobarbital 130 mg IV over 3 minutes. Re-assess every 60 minutes as needed. May administer every 60 minutes to a maximum dose of phenobarbital 1040 mg in 24 hours! Scale 4-8: Phenobarbital 260 mg IV over 5 minutes. Re-assess every 60 minutes as needed. May administer every 60 minutes to a maximum dose of phenobarbital 1040mg in 24 hours! Scale 9-10: Transfer to ICU (if not already in ICU). Administer 10mg/kg phenobarbital IV over 60 minutes. Maximum dose phenobarbital is 1040mg in 24 hours!

## 2022-07-23 NOTE — PROGRESS NOTES
Utilize [facility] alcohol withdrawal scale (Based on [place] Modified Alcohol Withdrawal Scale). Tabulate score based on classifications including Tremor, Sweating, Hallucination, Orientation, and Agitation. Tremor: 0  Sweatin  Hallucinations: 0  Orientation: 0  Agitation: 0  Total Score: 0  Action perform as described below     Tremor:  No tremor is 0 points. Tremor on movement is 1 point. Tremor at rest is 2 points. Sweating: No Sweat 0 points. Moist is 1 point. Drenching sweats is 2 points. Hallucinations: No present 0 points. Dissuadable is 1 point. Not dissuadable is 2 points. Orientation: Oriented 0 points. Vague/detached 1 point. Disoriented/no contact 2 points. Agitation: Calm 0 points. Anxious 1 point. Panicky 2 points. Check scale every 2 hours. Discontinue scoring with 4 consecutive scorings of 0. Scale 0: No phenobarbital given. Re-assess every 60 minutes as needed. Scale 1-3: Phenobarbital 130 mg IV over 3 minutes. Re-assess every 60 minutes as needed. May administer every 60 minutes to a maximum dose of phenobarbital 1040 mg in 24 hours! Scale 4-8: Phenobarbital 260 mg IV over 5 minutes. Re-assess every 60 minutes as needed. May administer every 60 minutes to a maximum dose of phenobarbital 1040mg in 24 hours! Scale 9-10: Transfer to ICU (if not already in ICU). Administer 10mg/kg phenobarbital IV over 60 minutes. Maximum dose phenobarbital is 1040mg in 24 hours!

## 2022-07-23 NOTE — PLAN OF CARE
Problem: Discharge Planning  Goal: Discharge to home or other facility with appropriate resources  7/23/2022 0230 by Monty Samson RN  Outcome: Progressing     Problem: Pain  Goal: Verbalizes/displays adequate comfort level or baseline comfort level  7/23/2022 0230 by Monty Samson RN  Outcome: Progressing     Problem: Safety - Adult  Goal: Free from fall injury  7/23/2022 0230 by Monty Samson RN  Outcome: Progressing     Problem: Neurosensory - Adult  Goal: Achieves stable or improved neurological status  Outcome: Progressing

## 2022-07-23 NOTE — PROGRESS NOTES
Hospitalist Progress Note      Patient:  Monalisa Ewing      Unit/Bed:4K-07/007-A    YOB: 1975    MRN: [id number]       Acct: [de-identified]     PCP: NILAM Belcher CNP    Date of Admission: 7/21/2022    Date/Time of Evaluation:  7/23/2022 at 8:21 AM    Assessment/Plan:    Atypical Chest pain:   - On admission -> \"CP for last week, worsening  pain radiates to left arm. Felt lightheadeded, photosensitivte, and anxiousness. Symptoms cleared upon drinking alcohol\"  - EKG showed sinus tachy in ED, and troponin negative x2. on admission 7/21  - Heart score 3 --> consider stress test once stable from ETOH withdrawal   -- echo 7/22/22 = EF 60%, LV fxn normal, no valve abn  -- ?GI related with ETOH use - cont PPI  - no RF for PE, no hypoxia  - ?anxiety - ?need vistaril prn     Alcohol dependence with acute withdrawal with delirium -- POA:  - PAWS score of 8 on admission --> CIWA scores 0-1 since 7/22  - cont phenobarb taper protocol -- In ER 7/21 given total 1600 mg ->  On floor pt was anxious, worried, paranoid with hx of severe DTs, seizure, hallucinations on past alcohol withdrawals requiring ICU admissions. - A Banana bag x1 -- cont MVI, folic acid, thiamine  - Seizure precautions placed. - addiction services consulted     Sinus tachycardia 2/2 alcohol withdrawal:  - Started on IV fluids and given phenobarbital -- improving  - Echo 7/22/22 normal EF, no WMA  - TFT 7/23 WNL     Elevated LFT, hyperbilirubinemia, hypoalbuminemia 2/2 chronic alcohol abuse:  - LFT improved from prior 5/2021 but up again 7/23 -- cont trend  - US liver 7/22 showed intrinsic liver disease and/or hepatic steatosis.    - hepatitis panel 7/22 (-)     Thrombocytopenia  - trending down to 87 on 7/23/2022 from 138 on admission   - due to ETOH use and BM suppression  - no signs of bleeding - monitor  - has had similar trend in past in 4/2021    Macrocytic anemia  - stable in 13's on 7/22 and 7/23 but down from 15.3 on 7/21 - prior 12.8 in 5/2021  -- no signs of bleeding - likely due to ETOH and BM suppresion  - consider further w/u if trends down further  - cont PPI    Leukopenia  - trend down 3.5 on 7/23/2022 from normal 7/21 - 7/22 -- similar trend in past 5/2021  - likely due to BM suppression from ETOH use    Hx ? ? Wernicke Encephalopathy:  - Noted in previous admission, had short-term memory loss. Does not exhibit classical symptoms of memory loss, ataxia or nystagmus.  - Noted in chart. - A Banana bag x1, MTV, folic acid, thiamine was given. Moisture-induced rash:  - Pt complained of rash on arm that started this morning, worsened throughout the day. Ring-encircling rash with foul smell and slight burn to touch.  - Image noted in chart.  - Started on miconazole powder, advised to wash with soap and water, and keep dry. COPD   - GOLD score unknown, no PFT noted in chart  - Has not used his COPD meds in over a month - ?add symbicort  - wheezing 7/22 and improving 7/23  - Given albuterol and duonebs, and oral prednisone in the ED.  - PRN albuterol, guaifenesin. - CXR (-) on admission  - smoking cessation counseling     Essential HTN:   - Cont home lisinopril 20 mg daily and metoprolol 50 mg bid  - monitor and adjust prn     Hyperlipidemia:  - Advice to f/u with PCP to start statins. Chronic ETOH abuse:  - Was in rehab/[facility] for over a year and abstinence from alcohol, has had multiple hospitalizations for alcohol withdrawal, and was admitted to ICU for withdrawals 2021   - Drinks 2-3 cases of beer daily.  - ETOH of 0.23 on admission. Last drink hours before coming in on 7/21     Type 2 DM:  - No meds listed. Sugars have been low since admission.   - Hypoglycemic protocol in place.   - A1C 7/22/22 = 5.2  - diet modification     Obesity:   - Body mass index is 37.24 kg/m².    - lifestyle modification education     Tobacco abuse   - cessation counseling - monitor need for patch        Chief Complaint: chest pain, f/c.  Nadia po but poor appetite yet. On RA. Afebrile. Ambulating without difficulty. PMH, SURGICAL HX, FH, SOCIAL HX reviewed and updated as needed. Medications:  Reviewed    Infusion Medications    dextrose      sodium chloride      sodium chloride 100 mL/hr at 07/22/22 1612     Scheduled Medications    insulin lispro  0-4 Units SubCUTAneous TID WC    insulin lispro  0-4 Units SubCUTAneous Nightly    mometasone-formoterol  2 puff Inhalation BID    nicotine  1 patch TransDERmal Q24H    miconazole   Topical BID    guaiFENesin  600 mg Oral BID    thiamine  100 mg Oral Daily    lisinopril  20 mg Oral Daily    metoprolol tartrate  50 mg Oral Daily    sodium chloride flush  10 mL IntraVENous 2 times per day    enoxaparin  30 mg SubCUTAneous BID    PHENobarbital  64.8 mg Oral BID    Followed by    PHENobarbital  32.4 mg Oral BID    multivitamin  1 tablet Oral Daily    folic acid  1 mg Oral Daily     PRN Meds: glucose, dextrose bolus **OR** dextrose bolus, glucagon (rDNA), dextrose, albuterol sulfate HFA, sodium chloride flush, sodium chloride, ondansetron **OR** ondansetron, polyethylene glycol, acetaminophen **OR** acetaminophen, potassium chloride **OR** potassium alternative oral replacement **OR** potassium chloride, magnesium sulfate, hydrALAZINE      Intake/Output Summary (Last 24 hours) at 7/23/2022 2916  Last data filed at 7/23/2022 4090  Gross per 24 hour   Intake 1055 ml   Output 3075 ml   Net -2020 ml       Diet:  ADULT DIET; Regular    Exam:  /84   Pulse 85   Temp 98.6 °F (37 °C) (Oral)   Resp 17   Ht 5' 11\" (1.803 m)   Wt 267 lb (121.1 kg)   SpO2 91%   BMI 37.24 kg/m²     General appearance: No apparent distress, appears older than stated age and cooperative. Oriented x 3. HEENT: Pupils equal, round, and reactive to light. Conjunctivae/corneas clear. MMM. Neck: Supple, with full range of motion. Trachea midline. Respiratory:  Normal respiratory effort.  Diminished and Clear to auscultation, bilaterally without Rales/Wheezes/Rhonchi. No respiratory distress or accessory muscle use. Cardiovascular: Regular rate and rhythm with normal S1/S2 without murmurs, rubs or gallops. No JVD. Abdomen: Soft, non-tender, non-distended with normal bowel sounds. No rebound or guarding  Musculoskeletal: No clubbing, cyanosis or edema bilaterally. Full range of motion without deformity. No calf tenderness palpation  Skin: Skin color, texture, turgor normal.  No rashes or lesions. Neurologic:  Neurovascularly intact without any focal sensory/motor deficits.  Cranial nerves: II-XII intact, grossly non-focal.  Psychiatric: Alert and oriented, thought content appropriate, normal insight  Capillary Refill: Brisk,< 3 seconds   Peripheral Pulses: +2 palpable, equal bilaterally       All labs reviewed and interpreted by me:  Labs:   Recent Labs     07/21/22  1523 07/22/22 0445 07/23/22 0458   WBC 8.2 5.9 3.5*   HGB 15.3 13.2* 13.3*   HCT 43.8 38.8* 39.5*    100* 87*     Recent Labs     07/21/22  1523 07/22/22  0445 07/22/22  0918 07/23/22  0458    137  --  135   K 4.1 4.3  --  3.9    103  --  103   CO2 22* 24  --  22*   BUN 10 12  --  14   CREATININE 0.5 0.5  --  0.5   CALCIUM 8.9 7.9*  --  7.8*   PHOS  --   --  2.5 3.1     Recent Labs     07/21/22  1523 07/22/22 0445 07/23/22  0458   * 89* 104*   * 94* 100*   BILIDIR 0.5*  --   --    BILITOT 1.1 1.1 1.7*   ALKPHOS 189* 144* 146*     Recent Labs     07/21/22  1523   INR 1.01     Recent Labs     07/21/22  1523 07/21/22  1723   TROPONINT < 0.010 < 0.010       Urinalysis:      Lab Results   Component Value Date/Time    NITRU NEGATIVE 05/07/2021 02:20 AM    WBCUA NONE SEEN 04/08/2021 07:30 PM    BACTERIA NONE SEEN 04/08/2021 07:30 PM    RBCUA 0-2 04/08/2021 07:30 PM    BLOODU NEGATIVE 05/07/2021 02:20 AM    SPECGRAV 1.008 04/08/2021 07:30 PM    GLUCOSEU NEGATIVE 05/07/2021 02:20 AM       All radiology images and reports reviewed and interpreted by me:  Radiology:  [address] [place],[address] organ? LIVER   Final Result   Impression:   1. Abnormal echotexture in the liver, similar to prior study, could be    intrinsic liver disease and/or hepatic steatosis. 2. No cholelithiasis. This document has been electronically signed by: Leeann Raza MD on    07/22/2022 12:22 AM      XR CHEST PORTABLE   Final Result   Normal mobile chest.            **This report has been created using voice recognition software. It may contain minor errors which are inherent in voice recognition technology. **      Final report electronically signed by Dr. Laurann Severe on 7/21/2022 3:18 PM          Diet: ADULT DIET;  Regular    Danielle: no    Microbiology:  none    Tele review last 24 hrs:  SR, no arrhythmias    DVT prophylaxis: [x] Lovenox                                 [] SCDs                                 [] SQ Heparin                                 [] Encourage ambulation           [] Already on Anticoagulation     Disposition:    [x] Home vs rehab facility       [] TCU       [] Rehab       [] Psych       [] SNF       [] [facility]       [] Other-    Code Status: Full Code    PT/OT Eval Status: monitor for need      Electronically signed by Kevin Chavez MD on 7/23/2022 at 8:21 AM

## 2022-07-23 NOTE — PROGRESS NOTES
Utilize [facility] alcohol withdrawal scale (Based on [place] Modified Alcohol Withdrawal Scale). Tabulate score based on classifications including Tremor, Sweating, Hallucination, Orientation, and Agitation. Tremor: 0  Sweatin  Hallucinations: 0  Orientation: 0  Agitation: 0  Total Score: 0  Action perform as described below     Tremor:  No tremor is 0 points. Tremor on movement is 1 point. Tremor at rest is 2 points. Sweating: No Sweat 0 points. Moist is 1 point. Drenching sweats is 2 points. Hallucinations: No present 0 points. Dissuadable is 1 point. Not dissuadable is 2 points. Orientation: Oriented 0 points. Vague/detached 1 point. Disoriented/no contact 2 points. Agitation: Calm 0 points. Anxious 1 point. Panicky 2 points. Check scale every 2 hours. Discontinue scoring with 4 consecutive scorings of 0. Scale 0: No phenobarbital given. Re-assess every 60 minutes as needed. Scale 1-3: Phenobarbital 130 mg IV over 3 minutes. Re-assess every 60 minutes as needed. May administer every 60 minutes to a maximum dose of phenobarbital 1040 mg in 24 hours! Scale 4-8: Phenobarbital 260 mg IV over 5 minutes. Re-assess every 60 minutes as needed. May administer every 60 minutes to a maximum dose of phenobarbital 1040mg in 24 hours! Scale 9-10: Transfer to ICU (if not already in ICU). Administer 10mg/kg phenobarbital IV over 60 minutes. Maximum dose phenobarbital is 1040mg in 24 hours!

## 2022-07-23 NOTE — PROGRESS NOTES
Utilize [facility] alcohol withdrawal scale (Based on [place] Modified Alcohol Withdrawal Scale). Tabulate score based on classifications including Tremor, Sweating, Hallucination, Orientation, and Agitation. Tremor: 0  Sweatin  Hallucinations: 0  Orientation: 0  Agitation: 0  Total Score: 0  Action perform as described below     Tremor:  No tremor is 0 points. Tremor on movement is 1 point. Tremor at rest is 2 points. Sweating: No Sweat 0 points. Moist is 1 point. Drenching sweats is 2 points. Hallucinations: No present 0 points. Dissuadable is 1 point. Not dissuadable is 2 points. Orientation: Oriented 0 points. Vague/detached 1 point. Disoriented/no contact 2 points. Agitation: Calm 0 points. Anxious 1 point. Panicky 2 points. Check scale every 2 hours. Discontinue scoring with 4 consecutive scorings of 0. Scale 0: No phenobarbital given. Re-assess every 60 minutes as needed. Scale 1-3: Phenobarbital 130 mg IV over 3 minutes. Re-assess every 60 minutes as needed. May administer every 60 minutes to a maximum dose of phenobarbital 1040 mg in 24 hours! Scale 4-8: Phenobarbital 260 mg IV over 5 minutes. Re-assess every 60 minutes as needed. May administer every 60 minutes to a maximum dose of phenobarbital 1040mg in 24 hours! Scale 9-10: Transfer to ICU (if not already in ICU). Administer 10mg/kg phenobarbital IV over 60 minutes. Maximum dose phenobarbital is 1040mg in 24 hours!

## 2022-07-23 NOTE — PROGRESS NOTES
Utilize [facility] alcohol withdrawal scale (Based on [place] Modified Alcohol Withdrawal Scale). Tabulate score based on classifications including Tremor, Sweating, Hallucination, Orientation, and Agitation. Tremor: 0  Sweatin  Hallucinations: 0  Orientation: 0  Agitation: 0  Total Score: 1  Action perform as described below     Tremor:  No tremor is 0 points. Tremor on movement is 1 point. Tremor at rest is 2 points. Sweating: No Sweat 0 points. Moist is 1 point. Drenching sweats is 2 points. Hallucinations: No present 0 points. Dissuadable is 1 point. Not dissuadable is 2 points. Orientation: Oriented 0 points. Vague/detached 1 point. Disoriented/no contact 2 points. Agitation: Calm 0 points. Anxious 1 point. Panicky 2 points. Check scale every 2 hours. Discontinue scoring with 4 consecutive scorings of 0. Scale 0: No phenobarbital given. Re-assess every 60 minutes as needed. Scale 1-3: Phenobarbital 130 mg IV over 3 minutes. Re-assess every 60 minutes as needed. May administer every 60 minutes to a maximum dose of phenobarbital 1040 mg in 24 hours! Scale 4-8: Phenobarbital 260 mg IV over 5 minutes. Re-assess every 60 minutes as needed. May administer every 60 minutes to a maximum dose of phenobarbital 1040mg in 24 hours! Scale 9-10: Transfer to ICU (if not already in ICU). Administer 10mg/kg phenobarbital IV over 60 minutes. Maximum dose phenobarbital is 1040mg in 24 hours!

## 2022-07-23 NOTE — PROGRESS NOTES
Utilize [facility] alcohol withdrawal scale (Based on [place] Modified Alcohol Withdrawal Scale). Tabulate score based on classifications including Tremor, Sweating, Hallucination, Orientation, and Agitation. Tremor: 0  Sweatin  Hallucinations: 0  Orientation: 0  Agitation: 0  Total Score: 1  Action perform as described below     Tremor:  No tremor is 0 points. Tremor on movement is 1 point. Tremor at rest is 2 points. Sweating: No Sweat 0 points. Moist is 1 point. Drenching sweats is 2 points. Hallucinations: No present 0 points. Dissuadable is 1 point. Not dissuadable is 2 points. Orientation: Oriented 0 points. Vague/detached 1 point. Disoriented/no contact 2 points. Agitation: Calm 0 points. Anxious 1 point. Panicky 2 points. Check scale every 2 hours. Discontinue scoring with 4 consecutive scorings of 0. Scale 0: No phenobarbital given. Re-assess every 60 minutes as needed. Scale 1-3: Phenobarbital 130 mg IV over 3 minutes. Re-assess every 60 minutes as needed. May administer every 60 minutes to a maximum dose of phenobarbital 1040 mg in 24 hours! Scale 4-8: Phenobarbital 260 mg IV over 5 minutes. Re-assess every 60 minutes as needed. May administer every 60 minutes to a maximum dose of phenobarbital 1040mg in 24 hours! Scale 9-10: Transfer to ICU (if not already in ICU). Administer 10mg/kg phenobarbital IV over 60 minutes. Maximum dose phenobarbital is 1040mg in 24 hours!

## 2022-07-23 NOTE — RT PROTOCOL NOTE
RT Inhaler-Nebulizer Bronchodilator Protocol Note    There is a bronchodilator order in the chart from a provider indicating to follow the RT Bronchodilator Protocol and there is an Initiate RT Inhaler-Nebulizer Bronchodilator Protocol order as well (see protocol at bottom of note). CXR Findings:  XR CHEST PORTABLE    Result Date: 7/21/2022  Normal mobile chest. **This report has been created using voice recognition software. It may contain minor errors which are inherent in voice recognition technology. ** Final report electronically signed by Dr. Worley Dakin on 7/21/2022 3:18 PM      The findings from the last RT Protocol Assessment were as follows:   History Pulmonary Disease: Chronic pulmonary disease  Respiratory Pattern: Regular pattern and RR 12-20 bpm  Breath Sounds: Clear breath sounds  Cough: Strong, spontaneous, non-productive  Indication for Bronchodilator Therapy: On home bronchodilators  Bronchodilator Assessment Score: 2    Aerosolized bronchodilator medication orders have been revised according to the RT Inhaler-Nebulizer Bronchodilator Protocol below. Respiratory Therapist to perform RT Therapy Protocol Assessment initially then follow the protocol. Repeat RT Therapy Protocol Assessment PRN for score 0-3 or on second treatment, BID, and PRN for scores above 3. No Indications - adjust the frequency to every 6 hours PRN wheezing or bronchospasm, if no treatments needed after 48 hours then discontinue using Per Protocol order mode. If indication present, adjust the RT bronchodilator orders based on the Bronchodilator Assessment Score as indicated below.   Use Inhaler orders unless patient has one or more of the following: on home nebulizer, not able to hold breath for 10 seconds, is not alert and oriented, cannot activate and use MDI correctly, or respiratory rate 25 breaths per minute or more, then use the equivalent nebulizer order(s) with same Frequency and PRN reasons based on the

## 2022-07-23 NOTE — PROGRESS NOTES
07/23/22 0755   RT Protocol   History Pulmonary Disease 2   Respiratory pattern 0   Breath sounds 0   Cough 0   Indications for Bronchodilator Therapy On home bronchodilators   Bronchodilator Assessment Score 2 ED MD

## 2022-07-24 PROBLEM — F41.9 ANXIETY DISORDER: Status: ACTIVE | Noted: 2022-07-24

## 2022-07-24 PROBLEM — D72.819 LEUKOPENIA: Status: ACTIVE | Noted: 2022-07-24

## 2022-07-24 PROBLEM — E87.20 METABOLIC ACIDOSIS: Status: ACTIVE | Noted: 2022-07-24

## 2022-07-24 LAB
ALBUMIN SERPL-MCNC: 3.2 G/DL (ref 3.5–5.1)
ALP BLD-CCNC: 159 U/L (ref 38–126)
ALT SERPL-CCNC: 112 U/L (ref 11–66)
ANION GAP SERPL CALCULATED.3IONS-SCNC: 17 MEQ/L (ref 8–16)
AST SERPL-CCNC: 106 U/L (ref 5–40)
BASOPHILS # BLD: 0.6 %
BASOPHILS ABSOLUTE: 0 THOU/MM3 (ref 0–0.1)
BILIRUB SERPL-MCNC: 1.4 MG/DL (ref 0.3–1.2)
BUN BLDV-MCNC: 13 MG/DL (ref 7–22)
CALCIUM IONIZED: 1.06 MMOL/L (ref 1.12–1.32)
CALCIUM SERPL-MCNC: 8.4 MG/DL (ref 8.5–10.5)
CHLORIDE BLD-SCNC: 111 MEQ/L (ref 98–111)
CO2: 18 MEQ/L (ref 23–33)
CREAT SERPL-MCNC: 0.6 MG/DL (ref 0.4–1.2)
EOSINOPHIL # BLD: 0 %
EOSINOPHILS ABSOLUTE: 0 THOU/MM3 (ref 0–0.4)
ERYTHROCYTE [DISTWIDTH] IN BLOOD BY AUTOMATED COUNT: 14.7 % (ref 11.5–14.5)
ERYTHROCYTE [DISTWIDTH] IN BLOOD BY AUTOMATED COUNT: 52.1 FL (ref 35–45)
GFR SERPL CREATININE-BSD FRML MDRD: > 90 ML/MIN/1.73M2
GLUCOSE BLD-MCNC: 101 MG/DL (ref 70–108)
GLUCOSE BLD-MCNC: 106 MG/DL (ref 70–108)
GLUCOSE BLD-MCNC: 79 MG/DL (ref 70–108)
GLUCOSE BLD-MCNC: 98 MG/DL (ref 70–108)
GLUCOSE BLD-MCNC: 99 MG/DL (ref 70–108)
HCT VFR BLD CALC: 38.5 % (ref 42–52)
HEMOGLOBIN: 13.1 GM/DL (ref 14–18)
IMMATURE GRANS (ABS): 0.01 THOU/MM3 (ref 0–0.07)
IMMATURE GRANULOCYTES: 0.3 %
LYMPHOCYTES # BLD: 22.4 %
LYMPHOCYTES ABSOLUTE: 0.7 THOU/MM3 (ref 1–4.8)
MAGNESIUM: 1.9 MG/DL (ref 1.6–2.4)
MCH RBC QN AUTO: 33 PG (ref 26–33)
MCHC RBC AUTO-ENTMCNC: 34 GM/DL (ref 32.2–35.5)
MCV RBC AUTO: 97 FL (ref 80–94)
MONOCYTES # BLD: 14.1 %
MONOCYTES ABSOLUTE: 0.5 THOU/MM3 (ref 0.4–1.3)
NUCLEATED RED BLOOD CELLS: 0 /100 WBC
PHOSPHORUS: 4 MG/DL (ref 2.4–4.7)
PLATELET # BLD: 83 THOU/MM3 (ref 130–400)
PMV BLD AUTO: 10.1 FL (ref 9.4–12.4)
POTASSIUM SERPL-SCNC: 4.1 MEQ/L (ref 3.5–5.2)
RBC # BLD: 3.97 MILL/MM3 (ref 4.7–6.1)
SEG NEUTROPHILS: 62.6 %
SEGMENTED NEUTROPHILS ABSOLUTE COUNT: 2.1 THOU/MM3 (ref 1.8–7.7)
SODIUM BLD-SCNC: 146 MEQ/L (ref 135–145)
TOTAL PROTEIN: 6.3 G/DL (ref 6.1–8)
WBC # BLD: 3.3 THOU/MM3 (ref 4.8–10.8)

## 2022-07-24 PROCEDURE — 82948 REAGENT STRIP/BLOOD GLUCOSE: CPT

## 2022-07-24 PROCEDURE — 83735 ASSAY OF MAGNESIUM: CPT

## 2022-07-24 PROCEDURE — 82330 ASSAY OF CALCIUM: CPT

## 2022-07-24 PROCEDURE — 6360000002 HC RX W HCPCS: Performed by: PHYSICIAN ASSISTANT

## 2022-07-24 PROCEDURE — 99232 SBSQ HOSP IP/OBS MODERATE 35: CPT | Performed by: FAMILY MEDICINE

## 2022-07-24 PROCEDURE — 85025 COMPLETE CBC W/AUTO DIFF WBC: CPT

## 2022-07-24 PROCEDURE — 80053 COMPREHEN METABOLIC PANEL: CPT

## 2022-07-24 PROCEDURE — 94640 AIRWAY INHALATION TREATMENT: CPT

## 2022-07-24 PROCEDURE — 2060000000 HC ICU INTERMEDIATE R&B

## 2022-07-24 PROCEDURE — 6370000000 HC RX 637 (ALT 250 FOR IP)

## 2022-07-24 PROCEDURE — 36415 COLL VENOUS BLD VENIPUNCTURE: CPT

## 2022-07-24 PROCEDURE — 6370000000 HC RX 637 (ALT 250 FOR IP): Performed by: PHYSICIAN ASSISTANT

## 2022-07-24 PROCEDURE — 6370000000 HC RX 637 (ALT 250 FOR IP): Performed by: EMERGENCY MEDICINE

## 2022-07-24 PROCEDURE — 2580000003 HC RX 258: Performed by: PHYSICIAN ASSISTANT

## 2022-07-24 PROCEDURE — 84100 ASSAY OF PHOSPHORUS: CPT

## 2022-07-24 RX ORDER — HYDROXYZINE HYDROCHLORIDE 50 MG/ML
50 INJECTION, SOLUTION INTRAMUSCULAR EVERY 6 HOURS PRN
Status: DISCONTINUED | OUTPATIENT
Start: 2022-07-24 | End: 2022-07-25 | Stop reason: HOSPADM

## 2022-07-24 RX ADMIN — METOPROLOL TARTRATE 50 MG: 50 TABLET, FILM COATED ORAL at 10:06

## 2022-07-24 RX ADMIN — PHENOBARBITAL 32.4 MG: 32.4 TABLET ORAL at 10:06

## 2022-07-24 RX ADMIN — SODIUM CHLORIDE, PRESERVATIVE FREE 10 ML: 5 INJECTION INTRAVENOUS at 10:08

## 2022-07-24 RX ADMIN — SODIUM CHLORIDE: 9 INJECTION, SOLUTION INTRAVENOUS at 03:16

## 2022-07-24 RX ADMIN — MOMETASONE FUROATE AND FORMOTEROL FUMARATE DIHYDRATE 2 PUFF: 100; 5 AEROSOL RESPIRATORY (INHALATION) at 17:21

## 2022-07-24 RX ADMIN — SODIUM CHLORIDE, PRESERVATIVE FREE 10 ML: 5 INJECTION INTRAVENOUS at 20:27

## 2022-07-24 RX ADMIN — MICONAZOLE NITRATE: 2 POWDER TOPICAL at 10:07

## 2022-07-24 RX ADMIN — GUAIFENESIN 600 MG: 600 TABLET, EXTENDED RELEASE ORAL at 20:26

## 2022-07-24 RX ADMIN — Medication 1 TABLET: at 10:06

## 2022-07-24 RX ADMIN — ENOXAPARIN SODIUM 30 MG: 100 INJECTION SUBCUTANEOUS at 10:06

## 2022-07-24 RX ADMIN — PHENOBARBITAL 32.4 MG: 32.4 TABLET ORAL at 20:26

## 2022-07-24 RX ADMIN — ENOXAPARIN SODIUM 30 MG: 100 INJECTION SUBCUTANEOUS at 20:27

## 2022-07-24 RX ADMIN — Medication 100 MG: at 10:06

## 2022-07-24 RX ADMIN — MOMETASONE FUROATE AND FORMOTEROL FUMARATE DIHYDRATE 2 PUFF: 100; 5 AEROSOL RESPIRATORY (INHALATION) at 08:16

## 2022-07-24 RX ADMIN — GUAIFENESIN 600 MG: 600 TABLET, EXTENDED RELEASE ORAL at 10:06

## 2022-07-24 RX ADMIN — FOLIC ACID 1 MG: 1 TABLET ORAL at 10:06

## 2022-07-24 RX ADMIN — LISINOPRIL 20 MG: 20 TABLET ORAL at 10:06

## 2022-07-24 RX ADMIN — MICONAZOLE NITRATE: 2 POWDER TOPICAL at 20:27

## 2022-07-24 ASSESSMENT — PAIN SCALES - GENERAL
PAINLEVEL_OUTOF10: 0
PAINLEVEL_OUTOF10: 0

## 2022-07-24 NOTE — PROGRESS NOTES
Records do not show that Jolanta Massing has had a Pulmonary Function Test (PFT). PFTs are required for confirmation of diagnosis and GOLD grading used for nonpharmacological therapy recommendations. Please schedule Jolanta Massing for PFTs once stable.

## 2022-07-24 NOTE — PROGRESS NOTES
Utilize [facility] alcohol withdrawal scale (Based on [place] Modified Alcohol Withdrawal Scale). Tabulate score based on classifications including Tremor, Sweating, Hallucination, Orientation, and Agitation. Tremor: 0  Sweatin  Hallucinations: 0  Orientation: 0  Agitation: 0  Total Score: 0  Action perform as described below     Tremor:  No tremor is 0 points. Tremor on movement is 1 point. Tremor at rest is 2 points. Sweating: No Sweat 0 points. Moist is 1 point. Drenching sweats is 2 points. Hallucinations: No present 0 points. Dissuadable is 1 point. Not dissuadable is 2 points. Orientation: Oriented 0 points. Vague/detached 1 point. Disoriented/no contact 2 points. Agitation: Calm 0 points. Anxious 1 point. Panicky 2 points. Check scale every 2 hours. Discontinue scoring with 4 consecutive scorings of 0. Scale 0: No phenobarbital given. Re-assess every 60 minutes as needed. Scale 1-3: Phenobarbital 130 mg IV over 3 minutes. Re-assess every 60 minutes as needed. May administer every 60 minutes to a maximum dose of phenobarbital 1040 mg in 24 hours! Scale 4-8: Phenobarbital 260 mg IV over 5 minutes. Re-assess every 60 minutes as needed. May administer every 60 minutes to a maximum dose of phenobarbital 1040mg in 24 hours! Scale 9-10: Transfer to ICU (if not already in ICU). Administer 10mg/kg phenobarbital IV over 60 minutes. Maximum dose phenobarbital is 1040mg in 24 hours!

## 2022-07-24 NOTE — PROGRESS NOTES
Utilize [facility] alcohol withdrawal scale (Based on [place] Modified Alcohol Withdrawal Scale). Tabulate score based on classifications including Tremor, Sweating, Hallucination, Orientation, and Agitation. Tremor: 0  Sweatin  Hallucinations: 0  Orientation: 0  Agitation: 0  Total Score: 0  Action perform as described below     Tremor:  No tremor is 0 points. Tremor on movement is 1 point. Tremor at rest is 2 points. Sweating: No Sweat 0 points. Moist is 1 point. Drenching sweats is 2 points. Hallucinations: No present 0 points. Dissuadable is 1 point. Not dissuadable is 2 points. Orientation: Oriented 0 points. Vague/detached 1 point. Disoriented/no contact 2 points. Agitation: Calm 0 points. Anxious 1 point. Panicky 2 points. Check scale every 2 hours. Discontinue scoring with 4 consecutive scorings of 0. Scale 0: No phenobarbital given. Re-assess every 60 minutes as needed. Scale 1-3: Phenobarbital 130 mg IV over 3 minutes. Re-assess every 60 minutes as needed. May administer every 60 minutes to a maximum dose of phenobarbital 1040 mg in 24 hours! Scale 4-8: Phenobarbital 260 mg IV over 5 minutes. Re-assess every 60 minutes as needed. May administer every 60 minutes to a maximum dose of phenobarbital 1040mg in 24 hours! Scale 9-10: Transfer to ICU (if not already in ICU). Administer 10mg/kg phenobarbital IV over 60 minutes. Maximum dose phenobarbital is 1040mg in 24 hours!

## 2022-07-24 NOTE — PROGRESS NOTES
Utilize [facility] alcohol withdrawal scale (Based on [place] Modified Alcohol Withdrawal Scale). Tabulate score based on classifications including Tremor, Sweating, Hallucination, Orientation, and Agitation. Tremor: 0  Sweatin  Hallucinations: 0  Orientation: 0  Agitation: 0  Total Score: 0  Action perform as described below     Tremor:  No tremor is 0 points. Tremor on movement is 1 point. Tremor at rest is 2 points. Sweating: No Sweat 0 points. Moist is 1 point. Drenching sweats is 2 points. Hallucinations: No present 0 points. Dissuadable is 1 point. Not dissuadable is 2 points. Orientation: Oriented 0 points. Vague/detached 1 point. Disoriented/no contact 2 points. Agitation: Calm 0 points. Anxious 1 point. Panicky 2 points. Check scale every 2 hours. Discontinue scoring with 4 consecutive scorings of 0. Scale 0: No phenobarbital given. Re-assess every 60 minutes as needed. Scale 1-3: Phenobarbital 130 mg IV over 3 minutes. Re-assess every 60 minutes as needed. May administer every 60 minutes to a maximum dose of phenobarbital 1040 mg in 24 hours! Scale 4-8: Phenobarbital 260 mg IV over 5 minutes. Re-assess every 60 minutes as needed. May administer every 60 minutes to a maximum dose of phenobarbital 1040mg in 24 hours! Scale 9-10: Transfer to ICU (if not already in ICU). Administer 10mg/kg phenobarbital IV over 60 minutes. Maximum dose phenobarbital is 1040mg in 24 hours!

## 2022-07-24 NOTE — PROGRESS NOTES
Utilize [facility] alcohol withdrawal scale (Based on [place] Modified Alcohol Withdrawal Scale). Tabulate score based on classifications including Tremor, Sweating, Hallucination, Orientation, and Agitation. Tremor: 0  Sweatin  Hallucinations: 0  Orientation: 0  Agitation: 1  Total Score: 1  Action perform as described below     Tremor:  No tremor is 0 points. Tremor on movement is 1 point. Tremor at rest is 2 points. Sweating: No Sweat 0 points. Moist is 1 point. Drenching sweats is 2 points. Hallucinations: No present 0 points. Dissuadable is 1 point. Not dissuadable is 2 points. Orientation: Oriented 0 points. Vague/detached 1 point. Disoriented/no contact 2 points. Agitation: Calm 0 points. Anxious 1 point. Panicky 2 points. Check scale every 2 hours. Discontinue scoring with 4 consecutive scorings of 0. Scale 0: No phenobarbital given. Re-assess every 60 minutes as needed. Scale 1-3: Phenobarbital 130 mg IV over 3 minutes. Re-assess every 60 minutes as needed. May administer every 60 minutes to a maximum dose of phenobarbital 1040 mg in 24 hours! Scale 4-8: Phenobarbital 260 mg IV over 5 minutes. Re-assess every 60 minutes as needed. May administer every 60 minutes to a maximum dose of phenobarbital 1040mg in 24 hours! Scale 9-10: Transfer to ICU (if not already in ICU). Administer 10mg/kg phenobarbital IV over 60 minutes. Maximum dose phenobarbital is 1040mg in 24 hours!

## 2022-07-24 NOTE — PROGRESS NOTES
Internal Medicine Resident Progress Note    Patient:  Ovidio Wells    YOB: 1975  Unit/Bed:4K-07/007-A  MRN: [id number]    Acct: [de-identified]   PCP: NILAM Pereyra CNP    Date of Admission: 7/21/2022    Assessment/Plan:    Atypical Left-sided Chest pain: Resolved  - CP for last week, worsened overnight, pain radiates to left arm. Felt lightheadeded, photosensitivte, and anxiousness. Symptoms cleared upon drinking alcohol.   - EKG showed sinus tachy in ED, and troponin negative x2.   - Heart score 3.   - ECHO shows EF of 60%. Overall LV function is normal.   - Tele monitoring. Alcohol dependence with acute withdrawal & Delirum on POA:  - PAWS score of 8 and CIWA score of 0-1  - Was started on phenobarbital, given 1600 mg in the ED. On floors pt was anxious, worried, has minor paranoia. - Pt states he has hx of severe DTs, seizure, hallucinations on past alcohol withdrawals. - A Banana bag x1, MTV, folic acid, thiamine was given. -  CIWA score of 0-1 on 7/24  - Seizure precautions placed. - Addiction services consulted  - Signout to ICU in case pt [facility] alcohol withdrawal scale status changes. Score of 8/10, HR of 120, or , pt can be transferred. - Started on Vistraril to decrease anxiety. Sinus tachycardia 2/2 alcohol withdrawal:   - Started on IV fluids and given phenobarbital.   - Echo 7/22/22 normal EF, no WMA    Elevated LFT, hyperbilirubinemia, hypoalbuminemia 2/2 chronic alcohol abuse:  - LFT improved from prior 5/2021 but up again 7/23 -- cont trend  - US 7/22 showed intrinsic liver disease and/or hepatic steatosis. - negative hepatitis panel 7/22      Wernicke Encephalopathy:  - Noted in previous admission, had short-term memory loss. Does not exhibit classical symptoms of memory loss, ataxia or nystagmus.   - Noted in chart. - A Banana bag x1, MTV, folic acid, thiamine was given.     Moisture-induced rash:  - Pt complained of rash on arm that started this morning, worsened throughout the day. Ring-encircling rash with foul smell and slight burn to touch.   - Image noted in chart.  - Started on miconazole powder, advised to wash with soap and water, and keep dry. COPD   - GOLD score unknown, no PFT noted in chart  - Has not used his COPD meds in over a month. - Prolonged wheezing on physical exam  - Given albuterol and duonebs, and oral prednisone in the ED.   - CXR (-) on admission  - smoking cessation counseling  - PRN albuterol added and guaifenesin. Essential HTN:   - Resumed home meds of lisinopril and metoprolol.  - monitor and adjust prn     Hyperlipidemia:   - Advice to f/u with PCP to start statins. Thrombocytopenia 2/2 ETOH abuse :  - drop from admission but trends this way on prior admissions also   - no signs of bleeding  - daily CBC monitor     Macrocytic anemia 2/2  ETOH & IVF:  - Hgb 15.23 on admission  -- no signs of bleeding - daily CBC monitoring     Tobacco abuse  - cessation counseling   - started on nicotine patch     Elevated LFT 2/2 ETOH:    - RUQ u/s c/w hepatic steatosis/intrinsic disease, similar to previous study in 2021.    - negative Hepatitis panel on 7/22/22  - Monitor/trend LFT. Chronic ETOH abuse:  - Was in rehab for over a year and abstinence from alcohol, has had multiple hospitalizations for alcohol withdrawal, and was admitted to ICU for withdrawals last year. - Drinks 2-3 cases of beer daily.   - ETOH of 0.23 on admission. Last drink hours before coming in. Insulin-Independent DM II:   - No meds listed. Sugars have been low since admission. Hypoglycemic protocol in place. Obesity:   - BMI 35.22 noted in chart.          Expected discharge date:  TBD    Disposition:   [x] Home  [] TCU  [] Rehab  [] Psych  [] SNF  [] [facility]  [] Other-    ===================================================================      Chief Complaint: Chest Pain    Hospital Course:     56 YOM w/ a PMHx of COPD, HTN, HLD, GERD, chronic ETOH abuse, IIDMII, Wernicke encephalopathy who presented to the ED for chest pain and alcohol detox symptoms. Has been having chest pain for the last week, that radiates to left side and hand. Worsened last night and pain is intermediate, in the middle of the chest and comes and goes. There was also tremors and tingling on left arm. Patient also complains of nausea, vomiting and cough producing phlegm, tremors, hallucinations, and lightheadedness. Patient states that symptoms resolved after drinking alcohol. No SOB, lightheadedness, and palpitations present. Is a smoker. Was abstienent from alcohol for over a year, and 5 weeks ago visited a friend and went back to drinking 2-3 cases of beer daily. Drank hours before coming to hospital. In the past stated he gets hallucinations and violent. Told us that on his last hospital visit, was placed in ICU. In the ED phenobarbital  was given 1600 mg. Troponins x2 negative. Lactic acid is 1.5. Elevated LFTs. Hepatitis negative. Ethyl alcohol of 0.23. Elevated BP, HR and afebrile. EKG showed sinus tachycardia, RSR in V1. On the floor pt had complaints of minor hallucinations, chest pain symptoms have subsided. Stated he was anxious and still had tremors. Had a PAWS score of 8. Later in the day pt was slighlty tremors, anxious, stated he was having minor hallucinations but was still orientated x3. If symptoms worsened, informed to ICU about possible transfer. 7/23: pt states he is very tired - no current shakes, diaphoresis, denies hallucinations. CIWA scores 0-1 last 24 hrs and no prn phenobarb needed. Calm, cooperative. No further chest pain. No sob. Denies abd pain, n/v.  Denies f/c.  Nadia po but poor appetite yet. On RA. Afebrile. Ambulating without difficulty. 7/24: CIWA score of 0-1 in last 24 hrs. No CP,SOB, N&V. No shakes or tremors. Is slightly anxious.      Subjective (past 24 hours):      Pt stated he is still anxious, but no CP. Says he is depressed and is focusing on getting out of here. ROS: reviewed complete ROS unchanged unless otherwise stated in hospital course/subjective portion. Medications:  Reviewed    Infusion Medications    dextrose      sodium chloride       Scheduled Medications    calcium replacement protocol   Other RX Placeholder    insulin lispro  0-4 Units SubCUTAneous TID WC    insulin lispro  0-4 Units SubCUTAneous Nightly    mometasone-formoterol  2 puff Inhalation BID    nicotine  1 patch TransDERmal Q24H    miconazole   Topical BID    guaiFENesin  600 mg Oral BID    thiamine  100 mg Oral Daily    lisinopril  20 mg Oral Daily    metoprolol tartrate  50 mg Oral Daily    sodium chloride flush  10 mL IntraVENous 2 times per day    enoxaparin  30 mg SubCUTAneous BID    PHENobarbital  32.4 mg Oral BID    multivitamin  1 tablet Oral Daily    folic acid  1 mg Oral Daily     PRN Meds: hydrOXYzine, glucose, dextrose bolus **OR** dextrose bolus, glucagon (rDNA), dextrose, albuterol sulfate HFA, sodium chloride flush, sodium chloride, ondansetron **OR** ondansetron, polyethylene glycol, acetaminophen **OR** acetaminophen, potassium chloride **OR** potassium alternative oral replacement **OR** potassium chloride, magnesium sulfate, hydrALAZINE        Intake/Output Summary (Last 24 hours) at 7/24/2022 1529  Last data filed at 7/24/2022 1453  Gross per 24 hour   Intake 4559.89 ml   Output 4350 ml   Net 209.89 ml         Exam:  BP (!) 148/87   Pulse 94   Temp 98.3 °F (36.8 °C) (Oral)   Resp 18   Ht 5' 11\" (1.803 m)   Wt 267 lb (121.1 kg)   SpO2 96%   BMI 37.24 kg/m²     Physical Exam  Constitutional:       Appearance: He is obese. Cardiovascular:      Rate and Rhythm: Regular rhythm. Tachycardia present. Pulses: Normal pulses. Heart sounds: Normal heart sounds. Pulmonary:      Effort: Respiratory distress present. Breath sounds: Wheezing present.    Musculoskeletal: General: Swelling and deformity present. Skin:     General: Skin is warm and dry. Capillary Refill: Capillary refill takes 2 to 3 seconds. Coloration: Skin is not jaundiced. Findings: No bruising. Neurological:      Mental Status: He is alert. He is disoriented. Psychiatric:         Mood and Affect: Mood normal.         Thought Content: Thought content normal.          Labs:   Recent Labs     07/22/22 0445 07/23/22 0458 07/24/22  0903   WBC 5.9 3.5* 3.3*   HGB 13.2* 13.3* 13.1*   HCT 38.8* 39.5* 38.5*   * 87* 83*       Recent Labs     07/22/22 0445 07/22/22  0918 07/23/22 0458 07/24/22  0354     --  135 146*   K 4.3  --  3.9 4.1     --  103 111   CO2 24  --  22* 18*   BUN 12  --  14 13   CREATININE 0.5  --  0.5 0.6   CALCIUM 7.9*  --  7.8* 8.4*   PHOS  --  2.5 3.1 4.0       Recent Labs     07/22/22 0445 07/23/22 0458 07/24/22  0354   AST 89* 104* 106*   ALT 94* 100* 112*   BILITOT 1.1 1.7* 1.4*   ALKPHOS 144* 146* 159*       No results for input(s): INR in the last 72 hours. No results for input(s): Kristie Fuchs in the last 72 hours. No results for input(s): PROCAL in the last 72 hours. Lab Results   Component Value Date/Time    NITRU NEGATIVE 05/07/2021 02:20 AM    WBCUA NONE SEEN 04/08/2021 07:30 PM    BACTERIA NONE SEEN 04/08/2021 07:30 PM    RBCUA 0-2 04/08/2021 07:30 PM    BLOODU NEGATIVE 05/07/2021 02:20 AM    SPECGRAV 1.008 04/08/2021 07:30 PM    GLUCOSEU NEGATIVE 05/07/2021 02:20 AM       Radiology (48 hours):  XR CHEST PORTABLE    Result Date: 7/21/2022  Normal mobile chest. **This report has been created using voice recognition software. It may contain minor errors which are inherent in voice recognition technology. ** Final report electronically signed by Dr. Laurann Severe on 7/21/2022 3:18 PM    [address] organ? LIVER    Result Date: 7/22/2022  Impression: 1.  Abnormal echotexture in the liver, similar to prior study, could be intrinsic liver disease and/or hepatic steatosis. 2. No cholelithiasis. This document has been electronically signed by: Marilyn Beasley MD on 07/22/2022 12:22 AM       DVT prophylaxis:    [x] Lovenox  [] SCDs  [] SQ Heparin  [] Encourage ambulation   [] Already on Anticoagulation       Diet: ADULT DIET;  Regular  Code Status: Full Code  PT/OT:  No  Tele: Yes   IVF: Yes    Electronically signed by June Wilkinson MD on 7/24/2022 at 3:29 PM    Case was discussed with Attending, Dr. Tashia Sears

## 2022-07-24 NOTE — PROGRESS NOTES
Utilize [facility] alcohol withdrawal scale (Based on [place] Modified Alcohol Withdrawal Scale). Tabulate score based on classifications including Tremor, Sweating, Hallucination, Orientation, and Agitation. Tremor: 0  Sweatin  Hallucinations: 0  Orientation: 0  Agitation: 1  Total Score: 1    Action perform as described below     Tremor:  No tremor is 0 points. Tremor on movement is 1 point. Tremor at rest is 2 points. Sweating: No Sweat 0 points. Moist is 1 point. Drenching sweats is 2 points. Hallucinations: No present 0 points. Dissuadable is 1 point. Not dissuadable is 2 points. Orientation: Oriented 0 points. Vague/detached 1 point. Disoriented/no contact 2 points. Agitation: Calm 0 points. Anxious 1 point. Panicky 2 points. Check scale every 2 hours. Discontinue scoring with 4 consecutive scorings of 0. Scale 0: No phenobarbital given. Re-assess every 60 minutes as needed. Scale 1-3: Phenobarbital 130 mg IV over 3 minutes. Re-assess every 60 minutes as needed. May administer every 60 minutes to a maximum dose of phenobarbital 1040 mg in 24 hours! Scale 4-8: Phenobarbital 260 mg IV over 5 minutes. Re-assess every 60 minutes as needed. May administer every 60 minutes to a maximum dose of phenobarbital 1040mg in 24 hours! Scale 9-10: Transfer to ICU (if not already in ICU). Administer 10mg/kg phenobarbital IV over 60 minutes. Maximum dose phenobarbital is 1040mg in 24 hours!

## 2022-07-25 VITALS
WEIGHT: 252 LBS | TEMPERATURE: 99.1 F | DIASTOLIC BLOOD PRESSURE: 71 MMHG | RESPIRATION RATE: 18 BRPM | SYSTOLIC BLOOD PRESSURE: 135 MMHG | HEART RATE: 80 BPM | BODY MASS INDEX: 35.28 KG/M2 | HEIGHT: 71 IN | OXYGEN SATURATION: 94 %

## 2022-07-25 LAB
ALBUMIN SERPL-MCNC: 3.2 G/DL (ref 3.5–5.1)
ALP BLD-CCNC: 158 U/L (ref 38–126)
ALT SERPL-CCNC: 97 U/L (ref 11–66)
ANION GAP SERPL CALCULATED.3IONS-SCNC: 9 MEQ/L (ref 8–16)
AST SERPL-CCNC: 75 U/L (ref 5–40)
BILIRUB SERPL-MCNC: 0.9 MG/DL (ref 0.3–1.2)
BUN BLDV-MCNC: 11 MG/DL (ref 7–22)
CALCIUM SERPL-MCNC: 8.6 MG/DL (ref 8.5–10.5)
CHLORIDE BLD-SCNC: 107 MEQ/L (ref 98–111)
CO2: 22 MEQ/L (ref 23–33)
CREAT SERPL-MCNC: 0.5 MG/DL (ref 0.4–1.2)
ERYTHROCYTE [DISTWIDTH] IN BLOOD BY AUTOMATED COUNT: 14.8 % (ref 11.5–14.5)
ERYTHROCYTE [DISTWIDTH] IN BLOOD BY AUTOMATED COUNT: 53.3 FL (ref 35–45)
GFR SERPL CREATININE-BSD FRML MDRD: > 90 ML/MIN/1.73M2
GLUCOSE BLD-MCNC: 101 MG/DL (ref 70–108)
GLUCOSE BLD-MCNC: 115 MG/DL (ref 70–108)
GLUCOSE BLD-MCNC: 127 MG/DL (ref 70–108)
GLUCOSE BLD-MCNC: 89 MG/DL (ref 70–108)
HCT VFR BLD CALC: 39.9 % (ref 42–52)
HEMOGLOBIN: 13.6 GM/DL (ref 14–18)
MAGNESIUM: 2 MG/DL (ref 1.6–2.4)
MCH RBC QN AUTO: 33.7 PG (ref 26–33)
MCHC RBC AUTO-ENTMCNC: 34.1 GM/DL (ref 32.2–35.5)
MCV RBC AUTO: 98.8 FL (ref 80–94)
PHOSPHORUS: 3.9 MG/DL (ref 2.4–4.7)
PLATELET # BLD: 86 THOU/MM3 (ref 130–400)
PMV BLD AUTO: 9.9 FL (ref 9.4–12.4)
POTASSIUM SERPL-SCNC: 3.7 MEQ/L (ref 3.5–5.2)
RBC # BLD: 4.04 MILL/MM3 (ref 4.7–6.1)
SODIUM BLD-SCNC: 138 MEQ/L (ref 135–145)
TOTAL PROTEIN: 6.1 G/DL (ref 6.1–8)
WBC # BLD: 3.8 THOU/MM3 (ref 4.8–10.8)

## 2022-07-25 PROCEDURE — 6370000000 HC RX 637 (ALT 250 FOR IP): Performed by: EMERGENCY MEDICINE

## 2022-07-25 PROCEDURE — 2580000003 HC RX 258: Performed by: PHYSICIAN ASSISTANT

## 2022-07-25 PROCEDURE — 99239 HOSP IP/OBS DSCHRG MGMT >30: CPT | Performed by: FAMILY MEDICINE

## 2022-07-25 PROCEDURE — 80053 COMPREHEN METABOLIC PANEL: CPT

## 2022-07-25 PROCEDURE — 84100 ASSAY OF PHOSPHORUS: CPT

## 2022-07-25 PROCEDURE — 6360000002 HC RX W HCPCS: Performed by: PHYSICIAN ASSISTANT

## 2022-07-25 PROCEDURE — 82948 REAGENT STRIP/BLOOD GLUCOSE: CPT

## 2022-07-25 PROCEDURE — 6370000000 HC RX 637 (ALT 250 FOR IP)

## 2022-07-25 PROCEDURE — 94640 AIRWAY INHALATION TREATMENT: CPT

## 2022-07-25 PROCEDURE — 83735 ASSAY OF MAGNESIUM: CPT

## 2022-07-25 PROCEDURE — 36415 COLL VENOUS BLD VENIPUNCTURE: CPT

## 2022-07-25 PROCEDURE — 6370000000 HC RX 637 (ALT 250 FOR IP): Performed by: PHYSICIAN ASSISTANT

## 2022-07-25 PROCEDURE — 85027 COMPLETE CBC AUTOMATED: CPT

## 2022-07-25 RX ORDER — METOPROLOL TARTRATE 50 MG/1
50 TABLET, FILM COATED ORAL DAILY
Qty: 30 TABLET | Refills: 1 | Status: SHIPPED | OUTPATIENT
Start: 2022-07-25

## 2022-07-25 RX ORDER — LISINOPRIL 20 MG/1
20 TABLET ORAL DAILY
Qty: 30 TABLET | Refills: 1 | Status: SHIPPED | OUTPATIENT
Start: 2022-07-25

## 2022-07-25 RX ORDER — ALBUTEROL SULFATE 90 UG/1
2 AEROSOL, METERED RESPIRATORY (INHALATION) 4 TIMES DAILY PRN
Qty: 18 G | Refills: 0 | Status: SHIPPED | OUTPATIENT
Start: 2022-07-25 | End: 2022-08-04

## 2022-07-25 RX ORDER — NALTREXONE HYDROCHLORIDE 50 MG/1
50 TABLET, FILM COATED ORAL DAILY
Qty: 30 TABLET | Refills: 0 | Status: SHIPPED | OUTPATIENT
Start: 2022-07-25 | End: 2022-08-04

## 2022-07-25 RX ORDER — SILDENAFIL 100 MG/1
100 TABLET, FILM COATED ORAL DAILY PRN
Qty: 10 TABLET | Refills: 1 | Status: SHIPPED | OUTPATIENT
Start: 2022-07-25

## 2022-07-25 RX ADMIN — LISINOPRIL 20 MG: 20 TABLET ORAL at 08:13

## 2022-07-25 RX ADMIN — Medication 100 MG: at 08:13

## 2022-07-25 RX ADMIN — METOPROLOL TARTRATE 50 MG: 50 TABLET, FILM COATED ORAL at 08:13

## 2022-07-25 RX ADMIN — Medication 1 TABLET: at 08:13

## 2022-07-25 RX ADMIN — GUAIFENESIN 600 MG: 600 TABLET, EXTENDED RELEASE ORAL at 08:13

## 2022-07-25 RX ADMIN — ENOXAPARIN SODIUM 30 MG: 100 INJECTION SUBCUTANEOUS at 08:10

## 2022-07-25 RX ADMIN — MOMETASONE FUROATE AND FORMOTEROL FUMARATE DIHYDRATE 2 PUFF: 100; 5 AEROSOL RESPIRATORY (INHALATION) at 07:35

## 2022-07-25 RX ADMIN — SODIUM CHLORIDE, PRESERVATIVE FREE 10 ML: 5 INJECTION INTRAVENOUS at 08:11

## 2022-07-25 RX ADMIN — FOLIC ACID 1 MG: 1 TABLET ORAL at 08:13

## 2022-07-25 ASSESSMENT — PAIN DESCRIPTION - LOCATION: LOCATION: ABDOMEN

## 2022-07-25 ASSESSMENT — PAIN SCALES - GENERAL: PAINLEVEL_OUTOF10: 0

## 2022-07-25 NOTE — PROGRESS NOTES
1624: patient's brother on way to transport patient home. 1713: RN went over all discharge instructions with patient. RN answered all questions with no further questions at this time. Patient discharged with all personal belongings, discharge instructions, and prescription medications. RN informed patient on making follow-up appointments with providers. Patient acknowledged RN. Patient discharged home alone.

## 2022-07-25 NOTE — PROGRESS NOTES
Utilize [facility] alcohol withdrawal scale (Based on [place] Modified Alcohol Withdrawal Scale). Tabulate score based on classifications including Tremor, Sweating, Hallucination, Orientation, and Agitation. Tremor: 0  Sweatin  Hallucinations: 0  Orientation: 0  Agitation: 0  Total Score: 0  Action perform as described below     Tremor:  No tremor is 0 points. Tremor on movement is 1 point. Tremor at rest is 2 points. Sweating: No Sweat 0 points. Moist is 1 point. Drenching sweats is 2 points. Hallucinations: No present 0 points. Dissuadable is 1 point. Not dissuadable is 2 points. Orientation: Oriented 0 points. Vague/detached 1 point. Disoriented/no contact 2 points. Agitation: Calm 0 points. Anxious 1 point. Panicky 2 points. Check scale every 2 hours. Discontinue scoring with 4 consecutive scorings of 0. Scale 0: No phenobarbital given. Re-assess every 60 minutes as needed. Scale 1-3: Phenobarbital 130 mg IV over 3 minutes. Re-assess every 60 minutes as needed. May administer every 60 minutes to a maximum dose of phenobarbital 1040 mg in 24 hours! Scale 4-8: Phenobarbital 260 mg IV over 5 minutes. Re-assess every 60 minutes as needed. May administer every 60 minutes to a maximum dose of phenobarbital 1040mg in 24 hours! Scale 9-10: Transfer to ICU (if not already in ICU). Administer 10mg/kg phenobarbital IV over 60 minutes. Maximum dose phenobarbital is 1040mg in 24 hours!

## 2022-07-25 NOTE — PROGRESS NOTES
Utilize [facility] alcohol withdrawal scale (Based on [place] Modified Alcohol Withdrawal Scale). Tabulate score based on classifications including Tremor, Sweating, Hallucination, Orientation, and Agitation. Tremor: 0  Sweatin  Hallucinations: 0  Orientation: 0  Agitation: 0  Total Score: 1  Action perform as described below     Tremor:  No tremor is 0 points. Tremor on movement is 1 point. Tremor at rest is 2 points. Sweating: No Sweat 0 points. Moist is 1 point. Drenching sweats is 2 points. Hallucinations: No present 0 points. Dissuadable is 1 point. Not dissuadable is 2 points. Orientation: Oriented 0 points. Vague/detached 1 point. Disoriented/no contact 2 points. Agitation: Calm 0 points. Anxious 1 point. Panicky 2 points. Check scale every 2 hours. Discontinue scoring with 4 consecutive scorings of 0. Scale 0: No phenobarbital given. Re-assess every 60 minutes as needed. Scale 1-3: Phenobarbital 130 mg IV over 3 minutes. Re-assess every 60 minutes as needed. May administer every 60 minutes to a maximum dose of phenobarbital 1040 mg in 24 hours! Scale 4-8: Phenobarbital 260 mg IV over 5 minutes. Re-assess every 60 minutes as needed. May administer every 60 minutes to a maximum dose of phenobarbital 1040mg in 24 hours! Scale 9-10: Transfer to ICU (if not already in ICU). Administer 10mg/kg phenobarbital IV over 60 minutes. Maximum dose phenobarbital is 1040mg in 24 hours!

## 2022-07-25 NOTE — PLAN OF CARE
Problem: Discharge Planning  Goal: Discharge to home or other facility with appropriate resources  7/25/2022 1144 by Monalisa Cruz RN  Outcome: Progressing  Flowsheets (Taken 7/25/2022 0807)  Discharge to home or other facility with appropriate resources:   Identify barriers to discharge with patient and caregiver   Arrange for needed discharge resources and transportation as appropriate   Identify discharge learning needs (meds, wound care, etc)   Refer to discharge planning if patient needs post-hospital services based on physician order or complex needs related to functional status, cognitive ability or social support system  Note: Patient from home alone. Possible discharge for today. Problem: Pain  Goal: Verbalizes/displays adequate comfort level or baseline comfort level  7/25/2022 1144 by Monalisa Cruz RN  Outcome: Progressing  Flowsheets (Taken 7/25/2022 1144)  Verbalizes/displays adequate comfort level or baseline comfort level:   Encourage patient to monitor pain and request assistance   Assess pain using appropriate pain scale   Implement non-pharmacological measures as appropriate and evaluate response  Note: Pain Assessment: None - Denies Pain  Pain Level: 0       Is pain goal met at this time? Yes      Non-pharmacological interventions include rest and repositioning. Problem: Safety - Adult  Goal: Free from fall injury  7/25/2022 1144 by Monalisa Cruz RN  Outcome: Progressing  Flowsheets  Taken 7/25/2022 1144  Free From Fall Injury: Instruct family/caregiver on patient safety  Taken 7/25/2022 1018  Free From Fall Injury: Instruct family/caregiver on patient safety  Note: No falls noted this shift. Fall risk assessment completed. Hourly rounding performed. Bed locked in lowest position, bed alarm on, call light and personal items within reach, and fall sign posted. Patient ambulates to the bathroom with staff assistance nearby.      Problem: Neurosensory - Adult  Goal: Achieves stable or improved neurological status  7/25/2022 1144 by Cristina Vela, RN  Outcome: Progressing  Flowsheets (Taken 7/25/2022 0807)  Achieves stable or improved neurological status:   Assess for and report changes in neurological status   Maintain blood pressure and fluid volume within ordered parameters to optimize cerebral perfusion and minimize risk of hemorrhage   Monitor temperature, glucose, and sodium. Initiate appropriate interventions as ordered  Note: Alert and oriented x4. Goal: Absence of seizures  Outcome: Progressing  Flowsheets (Taken 7/25/2022 0807)  Absence of seizures:   Monitor for seizure activity. If seizure occurs, document type and location of movements and any associated apnea   If seizure occurs, turn head to side and suction secretions as needed   Support airway/breathing, administer oxygen as needed  Note: No signs or symptoms of seizure activity. Seizure precautions in place.    Goal: Remains free of injury related to seizures activity  Outcome: Progressing  Flowsheets (Taken 7/25/2022 0807)  Remains free of injury related to seizure activity:   Maintain airway, patient safety  and administer oxygen as ordered   Monitor patient for seizure activity, document and report duration and description of seizure to Licensed Independent Practitioner   If seizure occurs, turn patient to side and suction secretions as needed   Reorient patient post seizure   Seizure pads on all 4 side rails  Goal: Achieves maximal functionality and self care  Outcome: Progressing  Flowsheets (Taken 7/25/2022 0807)  Achieves maximal functionality and self care:   Monitor swallowing and airway patency with patient fatigue and changes in neurological status   Encourage and assist patient to increase activity and self care with guidance from physical therapy/occupational therapy     Problem: Respiratory - Adult  Goal: Achieves optimal ventilation and oxygenation  7/25/2022 1144 by Cristina Vela, RN  Outcome: Progressing  Flowsheets (Taken 7/25/2022 0807)  Achieves optimal ventilation and oxygenation:   Assess for changes in respiratory status   Assess for changes in mentation and behavior   Initiate smoking cessation protocol as indicated   Assess the need for suctioning and aspirate as needed  Note: Lung sounds are clear and diminished. O2 saturation remains greater than 90% on room air. Denies any SOB with rest or exertion. Problem: Metabolic/Fluid and Electrolytes - Adult  Goal: Electrolytes maintained within normal limits  Outcome: Progressing  Flowsheets (Taken 7/25/2022 0807)  Electrolytes maintained within normal limits:   Monitor labs and assess patient for signs and symptoms of electrolyte imbalances   Administer electrolyte replacement as ordered   Monitor response to electrolyte replacements, including repeat lab results as appropriate  Note: Lab work monitored daily. Goal: Hemodynamic stability and optimal renal function maintained  Outcome: Progressing  Flowsheets (Taken 7/25/2022 0807)  Hemodynamic stability and optimal renal function maintained:   Monitor labs and assess for signs and symptoms of volume excess or deficit   Monitor intake, output and patient weight   Monitor urine specific gravity, serum osmolarity and serum sodium as indicated or ordered   Encourage oral intake as appropriate  Note: Lab work monitored today. Goal: Glucose maintained within prescribed range  Outcome: Progressing  Flowsheets (Taken 7/25/2022 0807)  Glucose maintained within prescribed range:   Monitor blood glucose as ordered   Administer ordered medications to maintain glucose within target range   Assess for signs and symptoms of hyperglycemia and hypoglycemia   Assess barriers to adequate nutritional intake and initiate nutrition consult as needed   Instruct patient on self management of diabetes and initiate consult as needed  Note: Blood glucose monitored AC & HS. No sliding scale insulin needed.       Problem: Hematologic - Adult  Goal: Maintains hematologic stability  Outcome: Progressing  Flowsheets (Taken 7/25/2022 0807)  Maintains hematologic stability:   Assess for signs and symptoms of bleeding or hemorrhage   Monitor labs for bleeding or clotting disorders   Administer blood products/factors as ordered  Note: Daily lab work monitored. Problem: Cardiovascular - Adult  Goal: Maintains optimal cardiac output and hemodynamic stability  Outcome: Progressing  Flowsheets (Taken 7/22/2022 1739 by Minerva Naylor RN)  Maintains optimal cardiac output and hemodynamic stability:   Monitor blood pressure and heart rate   Monitor urine output and notify Licensed Independent Practitioner for values outside of normal range  Note: Continuous cardiac monitor in place. Vital signs assessed every 4 hours.    Vitals:    07/24/22 2316 07/25/22 0315 07/25/22 0735 07/25/22 0807   BP: (!) 148/79 130/78  131/79   Pulse: 92 84  77   Resp: 18 13  16   Temp: 98.6 °F (37 °C) 98.2 °F (36.8 °C)  98.2 °F (36.8 °C)   TempSrc: Oral Oral  Oral   SpO2: 93% 92% 95% 91%   Weight:  252 lb (114.3 kg)     Height:         Problem: Skin/Tissue Integrity - Adult  Goal: Incisions, wounds, or drain sites healing without S/S of infection  Outcome: Progressing  Flowsheets  Taken 7/25/2022 1018  Incisions, Wounds, or Drain Sites Healing Without Sign and Symptoms of Infection: TWICE DAILY: Assess and document skin integrity  Taken 7/25/2022 0807  Incisions, Wounds, or Drain Sites Healing Without Sign and Symptoms of Infection: TWICE DAILY: Assess and document skin integrity  Goal: Oral mucous membranes remain intact  Outcome: Progressing     Problem: Musculoskeletal - Adult  Goal: Return mobility to safest level of function  Outcome: Progressing  Flowsheets (Taken 7/22/2022 1739 by Minerva Naylor RN)  Return Mobility to Safest Level of Function:   Assess patient stability and activity tolerance for standing, transferring and ambulating with or without assistive devices   Ensure adequate protection for wounds/incisions during mobilization  Note: Patient ambulates independently throughout the room with nursing staff. Goal: Maintain proper alignment of affected body part  Outcome: Progressing  Flowsheets (Taken 7/25/2022 0807)  Maintain proper alignment of affected body part: Instruct and reinforce with patient and family use of appropriate assistive device and precautions (e.g. spinal or hip dislocation precautions)     Problem: Gastrointestinal - Adult  Goal: Minimal or absence of nausea and vomiting  Outcome: Progressing  Flowsheets (Taken 7/25/2022 0807)  Minimal or absence of nausea and vomiting:   Administer ordered antiemetic medications as needed   Provide nonpharmacologic comfort measures as appropriate   Advance diet as tolerated, if ordered  Note: BM noted today. Bowel sounds are active. Denies any nausea or emesis. Goal: Maintains or returns to baseline bowel function  Outcome: Progressing  Flowsheets (Taken 7/25/2022 0807)  Maintains or returns to baseline bowel function:   Assess bowel function   Encourage oral fluids to ensure adequate hydration   Administer IV fluids as ordered to ensure adequate hydration   Administer ordered medications as needed   Encourage mobilization and activity  Goal: Maintains adequate nutritional intake  Outcome: Progressing  Flowsheets (Taken 7/25/2022 0807)  Maintains adequate nutritional intake:   Monitor percentage of each meal consumed   Assist with meals as needed   Monitor intake and output, weight and lab values  Goal: Establish and maintain optimal ostomy function  Outcome: Progressing     Problem: Genitourinary - Adult  Goal: Absence of urinary retention  Outcome: Progressing  Flowsheets (Taken 7/25/2022 0807)  Absence of urinary retention:   Assess patients ability to void and empty bladder   Monitor intake/output and perform bladder scan as needed  Note: Adequate urinary output noted.       Problem: Infection - Adult  Goal: Absence of infection at discharge  Outcome: Progressing  Goal: Absence of infection during hospitalization  Outcome: Progressing  Flowsheets (Taken 7/25/2022 0807)  Absence of infection during hospitalization:   Assess and monitor for signs and symptoms of infection   Monitor lab/diagnostic results   Monitor all insertion sites i.e., indwelling lines, tubes and drains   Administer medications as ordered   Instruct and encourage patient and family to use good hand hygiene technique   Identify and instruct in appropriate isolation precautions for identified infection/condition  Note: No signs of infection. Afebrile. Goal: Absence of fever/infection during anticipated neutropenic period  Outcome: Progressing     Problem: Chronic Conditions and Co-morbidities  Goal: Patient's chronic conditions and co-morbidity symptoms are monitored and maintained or improved  Outcome: Progressing  Flowsheets (Taken 7/25/2022 0807)  Care Plan - Patient's Chronic Conditions and Co-Morbidity Symptoms are Monitored and Maintained or Improved:   Monitor and assess patient's chronic conditions and comorbid symptoms for stability, deterioration, or improvement   Collaborate with multidisciplinary team to address chronic and comorbid conditions and prevent exacerbation or deterioration  Note: History of alcohol abuse with relapse. Phenobarbital medication and scale completed. No signs or symptoms of withdrawal. Seizure precautions in place. Problem: Skin/Tissue Integrity  Goal: Absence of new skin breakdown  Description: 1. Monitor for areas of redness and/or skin breakdown  2. Assess vascular access sites hourly  3. Every 4-6 hours minimum:  Change oxygen saturation probe site  4. Every 4-6 hours:  If on nasal continuous positive airway pressure, respiratory therapy assess nares and determine need for appliance change or resting period. Outcome: Progressing  Note: No new skin lesions noted this shift. Patient encouraged to reposition every two hours. Patient repositions self. Skin assessments completed and ongoing. Problem: Nutrition Deficit:  Goal: Optimize nutritional status  7/25/2022 1144 by Heath Schwartz RN  Outcome: Progressing  Flowsheets (Taken 7/25/2022 1116 by Hung Fay RD, LD)  Nutrient intake appropriate for improving, restoring, or maintaining nutritional needs:   Assess nutritional status and recommend course of action   Monitor oral intake, labs, and treatment plans   Recommend appropriate diets, oral nutritional supplements, and vitamin/mineral supplements  Note: Patient on a regular diet and tolerating well. Denies nausea or emesis. Care plan reviewed with patient. Patient verbalize understanding of the plan of care and contribute to goal setting.        Electronically signed by Heath Schwartz RN on 7/25/2022 at 11:54 AM 120

## 2022-07-25 NOTE — PLAN OF CARE
Problem: Respiratory - Adult  Goal: Achieves optimal ventilation and oxygenation  Outcome: Progressing   Breath sounds clear, continues on room air, dulera bid.

## 2022-07-25 NOTE — FLOWSHEET NOTE
07/25/22 1359   Safe Environment   Safety Measures Other (comment)  (virtual safety round complete)   Virtual RN rounds, patient refused camera but permitted audio. No needs or concerns at this time. Will continue to monitor.

## 2022-07-25 NOTE — PLAN OF CARE
Problem: Discharge Planning  Goal: Discharge to home or other facility with appropriate resources  Outcome: Progressing     Problem: Safety - Adult  Goal: Free from fall injury  Outcome: Progressing  Flowsheets (Taken 7/24/2022 1130 by Garfield Paris RN)  Free From Fall Injury: Instruct family/caregiver on patient safety     Problem: Neurosensory - Adult  Goal: Achieves stable or improved neurological status  Outcome: Progressing     Problem: Pain  Goal: Verbalizes/displays adequate comfort level or baseline comfort level  Flowsheets (Taken 7/24/2022 2245)  Verbalizes/displays adequate comfort level or baseline comfort level: Encourage patient to monitor pain and request assistance

## 2022-07-25 NOTE — PROGRESS NOTES
Utilize [facility] alcohol withdrawal scale (Based on [place] Modified Alcohol Withdrawal Scale). Tabulate score based on classifications including Tremor, Sweating, Hallucination, Orientation, and Agitation. Tremor: 0  Sweatin  Hallucinations:   Orientation: 0  Agitation: 0  Total Score: 0  Action perform as described below     Tremor:  No tremor is 0 points. Tremor on movement is 1 point. Tremor at rest is 2 points. Sweating: No Sweat 0 points. Moist is 1 point. Drenching sweats is 2 points. Hallucinations: No present 0 points. Dissuadable is 1 point. Not dissuadable is 2 points. Orientation: Oriented 0 points. Vague/detached 1 point. Disoriented/no contact 2 points. Agitation: Calm 0 points. Anxious 1 point. Panicky 2 points. Check scale every 2 hours. Discontinue scoring with 4 consecutive scorings of 0. Scale 0: No phenobarbital given. Re-assess every 60 minutes as needed. Scale 1-3: Phenobarbital 130 mg IV over 3 minutes. Re-assess every 60 minutes as needed. May administer every 60 minutes to a maximum dose of phenobarbital 1040 mg in 24 hours! Scale 4-8: Phenobarbital 260 mg IV over 5 minutes. Re-assess every 60 minutes as needed. May administer every 60 minutes to a maximum dose of phenobarbital 1040mg in 24 hours! Scale 9-10: Transfer to ICU (if not already in ICU). Administer 10mg/kg phenobarbital IV over 60 minutes. Maximum dose phenobarbital is 1040mg in 24 hours!

## 2022-07-25 NOTE — FLOWSHEET NOTE
07/25/22 1034   Safe Environment   Safety Measures Other (comment)  (virtual safety round complete)   Pt refuses camera during safety round but allows audio. Pt denies pain or needs at this time. Will continue to monitor.

## 2022-07-25 NOTE — DISCHARGE SUMMARY
Internal Medicine Resident Discharge Summary      Patient Identification:   Eugenia Sullivan   : 1975  MRN: [id number]   Account: [de-identified]      Patient's PCP: NILAM Shepherd CNP    Admit Date: 2022     Discharge Date:   22      Admitting Physician: Claudetta Piano, PA-C     Discharge Physician: Jody Patrick MD       Hospital Course:      56 YOM w/ a medical history of COPD, HTN, HLD, GERD, chronic ETOH abuse, IIDMII, Wernicke encephalopathy admitted to [address] [place] [address] on 2022 for chest pain and alcohol detox symptoms. Has been having chest pain for the last week, that radiates to left side and hand. Worsened last night and pain is intermediate, in the middle of the chest and comes and goes. There was also tremors and tingling on left arm. Patient also complains of nausea, vomiting and cough producing phlegm, tremors, hallucinations, and lightheadedness. Patient states that symptoms resolved after drinking alcohol. No SOB, lightheadedness, and palpitations present. Is a smoker. Was abstienent from alcohol for over a year, and 5 weeks ago visited a friend and went back to drinking 2-3 cases of beer daily. Drank hours before coming to hospital. In the past stated he gets hallucinations and violent. Told us that on his last hospital visit, was placed in ICU. In the ED phenobarbital  was given 1600 mg. Troponins x2 negative. Lactic acid is 1.5. Elevated LFTs. Hepatitis negative. Ethyl alcohol of 0.23. Elevated BP, HR and afebrile. EKG showed sinus tachycardia, RSR in V1. On the floor pt had complaints of minor hallucinations, chest pain symptoms have subsided. Stated he was anxious and still had tremors. Had a PAWS score of 8. Later in the day pt was slighlty tremors, anxious, stated he was having minor hallucinations but was still orientated x3. If symptoms worsened, informed to ICU about possible transfer.       : pt states he is very tired - no current shakes, diaphoresis, denies hallucinations. CIWA scores 0-1 last 24 hrs and no prn phenobarb needed. Calm, cooperative. No further chest pain. No sob. Denies abd pain, n/v.  Denies f/c.  Nadia po but poor appetite yet. On RA. Afebrile. Ambulating without difficulty. 7/24: CIWA score of 0-1 in last 24 hrs. No CP,SOB, N&V. No shakes or tremors. Is slightly anxious. 7/25: CIWA score of 0-1 in last 24 hrs. No CP,SOB, N&V. No shakes or tremors. Anxiousness is gone. Phenobarbital tapering finished. Stable. Discharged. Discharge Diagnoses:    Atypical Left-sided Chest pain: Resolved  - CP for last week, worsened overnight, pain radiates to left arm. Felt lightheadeded, photosensitivte, and anxiousness. Symptoms cleared upon drinking alcohol.  - EKG showed sinus tachy in ED, and troponin negative x2.  - ECHO shows EF of 60%. Overall LV function is normal.      Alcohol dependence with acute withdrawal & Delirum on POA:  - PAWS score of 8 and CIWA score of 0-1  - Was started on phenobarbital, given 1600 mg in the ED. On floors pt was anxious, worried, has minor paranoia. - Pt states he has hx of severe DTs, seizure, hallucinations on past alcohol withdrawals. - A Banana bag x1, MTV, folic acid, thiamine was given. -  CIWA score of 0-1 since 7/24.   - Started on Vistraril to decrease anxiety. Moisture-induced rash:  - Pt complained of rash on arm that started this morning, worsened throughout the day. Ring-encircling rash with foul smell and slight burn to touch.  - Image noted in chart.  - Started on miconazole powder, advised to wash with soap and water, and keep dry. COPD   - GOLD score unknown, no PFT noted in chart  - Has not used his COPD meds in over a month.   - Prolonged wheezing on physical exam    Sinus tachycardia 2/2 alcohol withdrawal:     Elevated LFT, hyperbilirubinemia, hypoalbuminemia 2/2 chronic alcohol abuse:     Wernicke Encephalopathy:     Essential HTN: Resumed home Judgment: Judgment normal.           Labs: For convenience and continuity at follow-up the following most recent labs are provided:    CBC:    Lab Results   Component Value Date/Time    WBC 3.8 07/25/2022 04:44 AM    HGB 13.6 07/25/2022 04:44 AM    HCT 39.9 07/25/2022 04:44 AM    PLT 86 07/25/2022 04:44 AM       Renal:    Lab Results   Component Value Date/Time     07/25/2022 04:44 AM    K 3.7 07/25/2022 04:44 AM    K 4.3 07/22/2022 04:45 AM     07/25/2022 04:44 AM    CO2 22 07/25/2022 04:44 AM    BUN 11 07/25/2022 04:44 AM    CREATININE 0.5 07/25/2022 04:44 AM    CALCIUM 8.6 07/25/2022 04:44 AM    PHOS 3.9 07/25/2022 04:44 AM         Significant Diagnostic Studies    Radiology:   [address] [place],[address] organ? LIVER   Final Result   Impression:   1. Abnormal echotexture in the liver, similar to prior study, could be    intrinsic liver disease and/or hepatic steatosis. 2. No cholelithiasis. This document has been electronically signed by: Whit Hdz MD on    07/22/2022 12:22 AM      XR CHEST PORTABLE   Final Result   Normal mobile chest.            **This report has been created using voice recognition software. It may contain minor errors which are inherent in voice recognition technology. **      Final report electronically signed by Dr. Juan Ramon Brock on 7/21/2022 3:18 PM             Consults:     IP CONSULT TO ADDICTION SERVICES    Disposition: Home  Condition at Discharge: Stable    Code Status:  Full Code     Patient Instructions:    Discharge lab work: Activity: activity as tolerated  Diet: ADULT DIET; Regular  ADULT ORAL NUTRITION SUPPLEMENT; Lunch, Dinner; Wound Healing Oral Supplement      Follow-up visits:   No follow-up provider specified. Discharge Medications:        Medication List        START taking these medications      miconazole 2 % powder  Commonly known as: MICOTIN  Apply topically 2 times daily.             CONTINUE taking these medications      albuterol sulfate  (90 Base) MCG/ACT inhaler  Commonly known as: Ventolin HFA  Inhale 2 puffs into the lungs 4 times daily as needed for Wheezing     blood glucose test strips strip  Commonly known as: ASCENSIA AUTODISC VI;ONE TOUCH ULTRA TEST VI  Test as needed. Dispense One Touch verio Test Strips. Dx: Type 2 diabetes (250.00)     glucose monitoring kit  Glucometer with test strips and lancets. Check BS once daily  #100 strips and lancets     lisinopril 20 MG tablet  Commonly known as: PRINIVIL;ZESTRIL  Take 1 tablet by mouth in the morning. metoprolol tartrate 50 MG tablet  Commonly known as: LOPRESSOR  Take 1 tablet by mouth in the morning. naltrexone 50 MG tablet  Commonly known as: DEPADE  Take 1 tablet by mouth in the morning. sildenafil 100 MG tablet  Commonly known as: VIAGRA  Take 1 tablet by mouth daily as needed for Erectile Dysfunction               Where to Get Your Medications        These medications were sent to [address] [place] [address] , [address], [address] [place] [address] [address] [address]      Phone: [phone]   albuterol sulfate  (90 Base) MCG/ACT inhaler  blood glucose test strips strip  lisinopril 20 MG tablet  metoprolol tartrate 50 MG tablet  miconazole 2 % powder  naltrexone 50 MG tablet  sildenafil 100 MG tablet              Time Spent on discharge is 25 minutes in the examination, evaluation, counseling and review of medications and discharge plan. Thank you NILAM Garay CNP for the opportunity to be involved in this patient's care.       Signed:    Electronically signed by Saniya Salguero MD on 7/25/22 at 1:53 PM EDT     Case was discussed with Attending, Dr. Sen Bob

## 2022-07-25 NOTE — CARE COORDINATION
7/25/22, 11:30 AM EDT    Patient goals/plan/ treatment preferences discussed by  and . Patient goals/plan/ treatment preferences reviewed with patient/ family. Patient/ family verbalize understanding of discharge plan and are in agreement with goal/plan/treatment preferences. Understanding was demonstrated using the teach back method. AVS provided by RN at time of discharge, which includes all necessary medical information pertaining to the patients current course of illness, treatment, post-discharge goals of care, and treatment preferences.      Services At/After Discharge: [employer] home alone or [facility] f/u as OP when medically cleared; client recently completed [employer]; Addiction Services following

## 2022-07-25 NOTE — DISCHARGE INSTRUCTIONS
miconazole topical  Pronunciation: my CON kobe husain  Brand:  Azolen, Fabi Antifungal, Critic-Aid Clear AF, Cruex Prescription Strength, DermaFungal, Dermagran AF, Desenex Foot, Desenex Jock Itch, Fungoid, Lotrimin AF, Micaderm, Micatin, Micro-Guard, Mitrazol, NuZole, Cassie-Clear, Remedy, Secura Antifungal, Soothe & Cool Inzo, Tetterine, Triple Paste AF, Zeasorb-AF DryingGel  What is the most important information I should know about miconazole topical?  Follow all directions on your medicine label and package. Tell each of your healthcare providers about all your medical conditions, allergies, and allmedicines you use. What is miconazole topical?  Miconazole topical is an antifungal medication. Miconazole topical preventsfungus from growing on your skin. Miconazole topical (for the skin) is used to treat skin infections such as athlete's foot, jock itch, ringworm, tinea versicolor (a fungus that discolorsthe skin), and yeast infections of the skin. Miconazole topical may also be used for purposes not listed in this medicationguide. What should I discuss with my healthcare provider before using miconazole topical?  You should not use this medicine if you are allergic to miconazole. Ask a doctor or pharmacist if it is safe for you to use this medicine if youhave other medical conditions, especially:  if you are using a blood thinner such as warfarin, Coumadin, Jantoven. It is not known whether miconazole topical will harm an unborn baby. Do not use this medicine without a doctor's advice if you are pregnant. It is not known whether miconazole topical passes into breast milk or if it could harm a nursing baby. Do not use this medicine without a doctor's advice if you are breast-feeding a baby. How should I use miconazole topical?  Use exactly as directed on the label, or as prescribed by your doctor. Do notuse in larger or smaller amounts or for longer than recommended. Do not take by mouth.  Miconazole topical is for use only on the skin. Do notuse this medicine on open wounds. Miconazole topical is not for use in the vagina or rectum. Wash your hands before and after using this medication, unless you are treatinga skin condition on your hands. Clean and dry the affected area. Apply the cream, lotion, spray, or powder onceor twice daily as directed for 2 to 4 weeks. Do not cover the treated skin area unless your doctor tells you to. A light cotton-gauze dressing may be used toprotect clothing. Use this medication for the full prescribed length of time. Your symptoms may improve before the infection is completely cleared. Skipping doses may also increase your risk of further infection that is resistant to antifungalmedicine. Call your doctor if the infection does not clear up in 2 weeks (or 4 weeks forathlete's foot), or if it appears to get worse. Store at room temperature away from moisture and heat. Keep the tube tightlyclosed when not in use. What happens if I miss a dose? Apply the missed dose as soon as you remember. Skip the missed dose if it is almost time for your next scheduled dose. Do not use extra medicine to make up the missed dose. What happens if I overdose? Seek emergency medical attention or call the Poison Help line at [phone]. What should I avoid while using miconazole topical?  Avoid getting this medication in your eyes, nose, or mouth. Avoid wearing tight-fitting, synthetic clothing that doesn't allow air circulation. Wear loose-fitting clothing made of cotton and other naturalfibers until the infection is healed. What are the possible side effects of miconazole topical?  Get emergency medical help if you have signs of an allergic reaction: hives; difficult breathing; swelling of your face, lips, tongue, or throat. Stop using miconazole topical and call your doctor at once if you have:  severe blistering, redness, or irritation of treated skin.   Common side effects may include:  itching, peeling, or dry skin. Although the risk of serious side effects is low when miconazole topical is applied to the skin, side effects can occur if the medicine is absorbed intoyour bloodstream, including:  dry mouth, sore tongue, tooth pain, red or swollen gums;  altered sense of taste;  nausea, diarrhea; or  headache. This is not a complete list of side effects and others may occur. Call your doctor for medical advice about side effects. You may report side effects toFDA at 7-387-YGG-2053. What other drugs will affect miconazole topical?  It is not likely that other drugs you take orally or inject will have an effect on topically applied miconazole. But many drugs can interact with each other. Tell each of your health care providers about all medicines you use, includingprescription and over-the-counter medicines, vitamins, and herbal products. Where can I get more information? Your pharmacist can provide more information about miconazole topical.  Remember, keep this and all other medicines out of the reach of children, never share your medicines with others, and use this medication only for the indication prescribed. Every effort has been made to ensure that the information provided by [address] is accurate, up-to-date, and complete, but no guarantee is made to that effect. Drug information contained herein may be time sensitive. [employer] information has been compiled for use by healthcare practitioners and consumers in the [place] and therefore [employer] does not warrant that uses outside of the [place] are appropriate, unless specifically indicated otherwise. [facility]'s drug information does not endorse drugs, diagnose patients or recommend therapy.  [facility] [employer] ([employer])s drug information is an informational resource designed to assist licensed healthcare practitioners in caring for their patients and/or to serve consumers viewing this service as a supplement to, and not a substitute for, the expertise, skill, knowledge and judgment of healthcare practitioners. The absence of a warning for a given drug or drug combination in no way should be construed to indicate that the drug or drug combination is safe, effective or appropriate for any given patient. [facility] does not assume any responsibility for any aspect of healthcare administered with the aid of information [facility] provides. The information contained herein is not intended to cover all possible uses, directions, precautions, warnings, drug interactions, allergic reactions, or adverse effects. If you have questions about the drugs you are taking, check with yourdoctor, nurse or pharmacist.  Copyright 3070-2035 [facility] [address] ZUNIGA. Version: 4.02. Revision date: 9/13/2016. Care instructions adapted under license by [facility] ([facility]). If you have questions about a medical condition or this instruction, always ask your healthcare professional. [place] [address] any warranty or liability for your use of this information.

## 2022-07-25 NOTE — PROGRESS NOTES
Comprehensive Nutrition Assessment    Type and Reason for Visit:   Positive nutrition screen wound    Nutrition Recommendations/Plan:   Consider Carb Controlled diet . Hx DM noted. Continue MVI, Thiamine, Folvite  Send Darrion BID. Malnutrition Assessment:  Malnutrition Status: At risk for malnutrition (Comment) (07/25/22 1104)    Context:  Chronic Illness     Findings of the 6 clinical characteristics of malnutrition:  Energy Intake:  No significant decrease in energy intake  Weight Loss:  No significant weight loss     Body Fat Loss:  No significant body fat loss     Muscle Mass Loss:  No significant muscle mass loss    Fluid Accumulation:  Mild Extremities   Strength:  Not Performed    Nutrition Assessment:     Pt. nutritionally compromised AEB wounds. At risk for further compromise related to: admit with  Atypical Chest Pain, COPD with Acute Exacerbation, ETOH Withdrawal,Elevated LFT, Hyperbilirubinemia, Hypoalbuminemia, Macrocytic Anemia , Short Term Memory Loss, and underlying medical condition (DM, HLD, HTN, Liver Disease). Nutrition Related Findings:    Pt. Report/Treatments/Miscellaneous: Pt seen, mentions good appetite, denies difficulty chewing /swallowing foods. Mentions he was just at alcohol rehab, reduced energy drink consumption to 2-16oz ones/day, stopped drinking ETOH for a week, then began to consume beer \"all day long\" again PTA. He would not specify how much beer/day he consumes. GI Status: BM x 1 (7/24)  Pertinent Labs: (7/25) Alk Phos 158, ALT 97, AST 75  Pertinent Meds: Folvite, Humalog, MVI, Thiamine, Zofran, Glycolax       Wound Type: Stage II (tibial rt; posterior)       Current Nutrition Intake & Therapies:    Average Meal Intake: %  Average Supplements Intake:  (new)  ADULT DIET; Regular  ADULT ORAL NUTRITION SUPPLEMENT; Lunch, Dinner;  Wound Healing Oral Supplement    Anthropometric Measures:  Height: 5' 11\" (180.3 cm)  Ideal Body Weight (IBW): 172 lbs (78 kg) Admission Body Weight: 252 lb 8 oz (114.5 kg) ((7/21) no edema)  Current Body Weight: 252 lb (114.3 kg) ((7/25) bedscale),   IBW. Current BMI (kg/m2): 35.2  Usual Body Weight:  (per EMR: (2/28) 257#, (2/10) 265#, (6/1/21) 244# 3.2oz, (5/13/21) 241#)                       BMI Categories: Obese Class 2 (BMI 35.0 -39.9)    Estimated Daily Nutrient Needs:  Energy Requirements Based On: Kcal/kg     Energy (kcal/day): 1715-2057kcals (15-18kcals/kgm)  Weight Used for Protein Requirements: Ideal  Protein (g/day): 94 grams as liver function tolerates (1.2 grams protein/kgm IBW)       Nutrition Diagnosis:    (Disordered eating pattern) related to  (alcohol dependence) as evidenced by  (diet hx pt mentions he consumes beer all day long, just recently left alcohol rehab per pt)    Nutrition Interventions:   Food and/or Nutrient Delivery: Continue Current Diet, Vitamin Supplement, Start Oral Nutrition Supplement  Nutrition Education/Counseling: Education initiated (Discouraged alcohol use & energy drinks. Encouraged lean protein chcoie with meals to aid with wound healing.)  Coordination of Nutrition Care: Continue to monitor while inpatient       Goals:     Goals: PO intake 75% or greater, by next RD assessment       Nutrition Monitoring and Evaluation:   Behavioral-Environmental Outcomes: Other (Comment) (alcohol dependent)  Food/Nutrient Intake Outcomes: Diet Advancement/Tolerance, Food and Nutrient Intake, Vitamin/Mineral Intake  Physical Signs/Symptoms Outcomes: GI Status, Fluid Status or Edema, Hemodynamic Status, Nutrition Focused Physical Findings, Skin, Weight    Discharge Planning:     Too soon to determine     Mini Wilkinson RD, LD  Contact: [phone]

## 2022-07-26 ENCOUNTER — TELEPHONE (OUTPATIENT)
Dept: FAMILY MEDICINE CLINIC | Age: 47
End: 2022-07-26

## 2022-07-26 NOTE — TELEPHONE ENCOUNTER
[facility] Transitions Initial Follow Up Call    Outreach made within 2 business days of discharge: Yes    Patient: Carlos Roland Patient : 1975   MRN: [id number]  Reason for Admission: There are no discharge diagnoses documented for the most recent discharge. Discharge Date: 22       Spoke with: patient     Discharge department/facility:  [facility] Interactive Patient Contact:  Was patient able to fill all prescriptions: Yes  Was patient instructed to bring all medications to the follow-up visit: Yes  Is patient taking all medications as directed in the discharge summary? Yes  Does patient understand their discharge instructions: Yes  Does patient have questions or concerns that need addressed prior to 7-14 day follow up office visit: no    Scheduled appointment with PCP within 7-14 days    Follow Up  No future appointments.     Pepe Wilkinson LPN

## 2022-08-04 ENCOUNTER — OFFICE VISIT (OUTPATIENT)
Dept: FAMILY MEDICINE CLINIC | Age: 47
End: 2022-08-04
Payer: MEDICAID

## 2022-08-04 VITALS
TEMPERATURE: 97 F | WEIGHT: 246 LBS | HEART RATE: 77 BPM | BODY MASS INDEX: 34.31 KG/M2 | OXYGEN SATURATION: 98 % | RESPIRATION RATE: 16 BRPM | SYSTOLIC BLOOD PRESSURE: 120 MMHG | DIASTOLIC BLOOD PRESSURE: 80 MMHG

## 2022-08-04 DIAGNOSIS — K70.30 ALCOHOLIC CIRRHOSIS OF LIVER WITHOUT ASCITES (HCC): ICD-10-CM

## 2022-08-04 DIAGNOSIS — Z09 HOSPITAL DISCHARGE FOLLOW-UP: ICD-10-CM

## 2022-08-04 DIAGNOSIS — F10.20 ALCOHOL USE DISORDER, SEVERE, DEPENDENCE (HCC): ICD-10-CM

## 2022-08-04 DIAGNOSIS — E11.9 TYPE 2 DIABETES MELLITUS WITHOUT COMPLICATION, WITHOUT LONG-TERM CURRENT USE OF INSULIN (HCC): Primary | ICD-10-CM

## 2022-08-04 DIAGNOSIS — E11.8 TYPE 2 DIABETES MELLITUS WITH UNSPECIFIED COMPLICATIONS (HCC): ICD-10-CM

## 2022-08-04 PROCEDURE — 99215 OFFICE O/P EST HI 40 MIN: CPT | Performed by: NURSE PRACTITIONER

## 2022-08-04 PROCEDURE — 1111F DSCHRG MED/CURRENT MED MERGE: CPT | Performed by: NURSE PRACTITIONER

## 2022-08-04 ASSESSMENT — ENCOUNTER SYMPTOMS
RESPIRATORY NEGATIVE: 1
GASTROINTESTINAL NEGATIVE: 1
EYES NEGATIVE: 1
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
## 2022-08-23 ENCOUNTER — HOSPITAL ENCOUNTER (INPATIENT)
Age: 47
LOS: 3 days | Discharge: HOME | End: 2022-08-26
Attending: EMERGENCY MEDICINE | Admitting: PEDIATRICS
Payer: MEDICAID

## 2022-08-23 DIAGNOSIS — F10.939 ALCOHOL WITHDRAWAL SYNDROME WITH COMPLICATION (HCC): Primary | ICD-10-CM

## 2022-08-23 LAB
AMPHETAMINE+METHAMPHETAMINE URINE SCREEN: NEGATIVE
ANION GAP SERPL CALCULATED.3IONS-SCNC: 16 MEQ/L (ref 8–16)
BARBITURATE QUANTITATIVE URINE: NEGATIVE
BASOPHILS # BLD: 0.6 %
BASOPHILS ABSOLUTE: 0 THOU/MM3 (ref 0–0.1)
BENZODIAZEPINE QUANTITATIVE URINE: NEGATIVE
BUN BLDV-MCNC: 9 MG/DL (ref 7–22)
CALCIUM SERPL-MCNC: 8.9 MG/DL (ref 8.5–10.5)
CANNABINOID QUANTITATIVE URINE: NEGATIVE
CHLORIDE BLD-SCNC: 102 MEQ/L (ref 98–111)
CO2: 23 MEQ/L (ref 23–33)
COCAINE METABOLITE QUANTITATIVE URINE: NEGATIVE
CREAT SERPL-MCNC: 0.8 MG/DL (ref 0.4–1.2)
EKG ATRIAL RATE: 117 BPM
EKG P AXIS: 60 DEGREES
EKG P-R INTERVAL: 122 MS
EKG Q-T INTERVAL: 322 MS
EKG QRS DURATION: 90 MS
EKG QTC CALCULATION (BAZETT): 449 MS
EKG R AXIS: 42 DEGREES
EKG T AXIS: 66 DEGREES
EKG VENTRICULAR RATE: 117 BPM
EOSINOPHIL # BLD: 0 %
EOSINOPHILS ABSOLUTE: 0 THOU/MM3 (ref 0–0.4)
ERYTHROCYTE [DISTWIDTH] IN BLOOD BY AUTOMATED COUNT: 14.6 % (ref 11.5–14.5)
ERYTHROCYTE [DISTWIDTH] IN BLOOD BY AUTOMATED COUNT: 51 FL (ref 35–45)
ETHYL ALCOHOL, SERUM: 0.26 %
GFR SERPL CREATININE-BSD FRML MDRD: > 90 ML/MIN/1.73M2
GLUCOSE BLD-MCNC: 108 MG/DL (ref 70–108)
HCT VFR BLD CALC: 46.5 % (ref 42–52)
HEMOGLOBIN: 16.8 GM/DL (ref 14–18)
IMMATURE GRANS (ABS): 0.01 THOU/MM3 (ref 0–0.07)
IMMATURE GRANULOCYTES: 0.1 %
LYMPHOCYTES # BLD: 22.2 %
LYMPHOCYTES ABSOLUTE: 1.8 THOU/MM3 (ref 1–4.8)
MCH RBC QN AUTO: 34.1 PG (ref 26–33)
MCHC RBC AUTO-ENTMCNC: 36.1 GM/DL (ref 32.2–35.5)
MCV RBC AUTO: 94.5 FL (ref 80–94)
MONOCYTES # BLD: 6.8 %
MONOCYTES ABSOLUTE: 0.6 THOU/MM3 (ref 0.4–1.3)
NUCLEATED RED BLOOD CELLS: 0 /100 WBC
OPIATES, URINE: NEGATIVE
OSMOLALITY CALCULATION: 280.5 MOSMOL/KG (ref 275–300)
OXYCODONE: NEGATIVE
PHENCYCLIDINE QUANTITATIVE URINE: NEGATIVE
PLATELET # BLD: 158 THOU/MM3 (ref 130–400)
PMV BLD AUTO: 9.1 FL (ref 9.4–12.4)
POTASSIUM REFLEX MAGNESIUM: 4 MEQ/L (ref 3.5–5.2)
PRO-BNP: 42.2 PG/ML (ref 0–450)
RBC # BLD: 4.92 MILL/MM3 (ref 4.7–6.1)
SEG NEUTROPHILS: 70.3 %
SEGMENTED NEUTROPHILS ABSOLUTE COUNT: 5.8 THOU/MM3 (ref 1.8–7.7)
SODIUM BLD-SCNC: 141 MEQ/L (ref 135–145)
WBC # BLD: 8.2 THOU/MM3 (ref 4.8–10.8)

## 2022-08-23 PROCEDURE — 80048 BASIC METABOLIC PNL TOTAL CA: CPT

## 2022-08-23 PROCEDURE — 82247 BILIRUBIN TOTAL: CPT

## 2022-08-23 PROCEDURE — 2580000003 HC RX 258: Performed by: EMERGENCY MEDICINE

## 2022-08-23 PROCEDURE — 93005 ELECTROCARDIOGRAM TRACING: CPT | Performed by: EMERGENCY MEDICINE

## 2022-08-23 PROCEDURE — 2060000000 HC ICU INTERMEDIATE R&B

## 2022-08-23 PROCEDURE — 80307 DRUG TEST PRSMV CHEM ANLYZR: CPT

## 2022-08-23 PROCEDURE — 96374 THER/PROPH/DIAG INJ IV PUSH: CPT

## 2022-08-23 PROCEDURE — 82077 ASSAY SPEC XCP UR&BREATH IA: CPT

## 2022-08-23 PROCEDURE — 83880 ASSAY OF NATRIURETIC PEPTIDE: CPT

## 2022-08-23 PROCEDURE — 82040 ASSAY OF SERUM ALBUMIN: CPT

## 2022-08-23 PROCEDURE — 93010 ELECTROCARDIOGRAM REPORT: CPT | Performed by: INTERNAL MEDICINE

## 2022-08-23 PROCEDURE — 99285 EMERGENCY DEPT VISIT HI MDM: CPT

## 2022-08-23 PROCEDURE — 96361 HYDRATE IV INFUSION ADD-ON: CPT

## 2022-08-23 PROCEDURE — 85025 COMPLETE CBC W/AUTO DIFF WBC: CPT

## 2022-08-23 PROCEDURE — 83735 ASSAY OF MAGNESIUM: CPT

## 2022-08-23 PROCEDURE — 83690 ASSAY OF LIPASE: CPT

## 2022-08-23 PROCEDURE — 84460 ALANINE AMINO (ALT) (SGPT): CPT

## 2022-08-23 PROCEDURE — 84075 ASSAY ALKALINE PHOSPHATASE: CPT

## 2022-08-23 PROCEDURE — 99223 1ST HOSP IP/OBS HIGH 75: CPT | Performed by: PEDIATRICS

## 2022-08-23 PROCEDURE — 84450 TRANSFERASE (AST) (SGOT): CPT

## 2022-08-23 PROCEDURE — 6360000002 HC RX W HCPCS: Performed by: EMERGENCY MEDICINE

## 2022-08-23 PROCEDURE — 84155 ASSAY OF PROTEIN SERUM: CPT

## 2022-08-23 RX ORDER — 0.9 % SODIUM CHLORIDE 0.9 %
2000 INTRAVENOUS SOLUTION INTRAVENOUS ONCE
Status: COMPLETED | OUTPATIENT
Start: 2022-08-23 | End: 2022-08-23

## 2022-08-23 RX ORDER — PHENOBARBITAL SODIUM 65 MG/ML
260 INJECTION INTRAMUSCULAR ONCE
Status: COMPLETED | OUTPATIENT
Start: 2022-08-23 | End: 2022-08-23

## 2022-08-23 RX ADMIN — THIAMINE HYDROCHLORIDE 200 MG: 100 INJECTION, SOLUTION INTRAMUSCULAR; INTRAVENOUS at 23:56

## 2022-08-23 RX ADMIN — SODIUM CHLORIDE 2000 ML: 9 INJECTION, SOLUTION INTRAVENOUS at 20:59

## 2022-08-23 RX ADMIN — PHENOBARBITAL SODIUM 260 MG: 65 INJECTION INTRAMUSCULAR at 21:00

## 2022-08-24 LAB
ALBUMIN SERPL-MCNC: 3.3 G/DL (ref 3.5–5.1)
ALBUMIN SERPL-MCNC: 4 G/DL (ref 3.5–5.1)
ALP BLD-CCNC: 198 U/L (ref 38–126)
ALP BLD-CCNC: 258 U/L (ref 38–126)
ALT SERPL-CCNC: 108 U/L (ref 11–66)
ALT SERPL-CCNC: 87 U/L (ref 11–66)
AST SERPL-CCNC: 118 U/L (ref 5–40)
AST SERPL-CCNC: 133 U/L (ref 5–40)
BILIRUB SERPL-MCNC: 0.6 MG/DL (ref 0.3–1.2)
BILIRUB SERPL-MCNC: 0.8 MG/DL (ref 0.3–1.2)
BILIRUBIN DIRECT: < 0.2 MG/DL (ref 0–0.3)
GLUCOSE BLD-MCNC: 109 MG/DL (ref 70–108)
GLUCOSE BLD-MCNC: 111 MG/DL (ref 70–108)
GLUCOSE BLD-MCNC: 113 MG/DL (ref 70–108)
LIPASE: 108.7 U/L (ref 5.6–51.3)
MAGNESIUM: 1.9 MG/DL (ref 1.6–2.4)
MAGNESIUM: 2 MG/DL (ref 1.6–2.4)
TOTAL PROTEIN: 6.2 G/DL (ref 6.1–8)
TOTAL PROTEIN: 7.4 G/DL (ref 6.1–8)

## 2022-08-24 PROCEDURE — 2060000000 HC ICU INTERMEDIATE R&B

## 2022-08-24 PROCEDURE — 6360000002 HC RX W HCPCS: Performed by: PEDIATRICS

## 2022-08-24 PROCEDURE — 6370000000 HC RX 637 (ALT 250 FOR IP): Performed by: PEDIATRICS

## 2022-08-24 PROCEDURE — 6360000002 HC RX W HCPCS: Performed by: PSYCHIATRY & NEUROLOGY

## 2022-08-24 PROCEDURE — 82948 REAGENT STRIP/BLOOD GLUCOSE: CPT

## 2022-08-24 PROCEDURE — 36415 COLL VENOUS BLD VENIPUNCTURE: CPT

## 2022-08-24 PROCEDURE — 99232 SBSQ HOSP IP/OBS MODERATE 35: CPT | Performed by: INTERNAL MEDICINE

## 2022-08-24 PROCEDURE — 83735 ASSAY OF MAGNESIUM: CPT

## 2022-08-24 PROCEDURE — 2580000003 HC RX 258: Performed by: PEDIATRICS

## 2022-08-24 PROCEDURE — 6360000002 HC RX W HCPCS

## 2022-08-24 PROCEDURE — 80076 HEPATIC FUNCTION PANEL: CPT

## 2022-08-24 RX ORDER — PHENOBARBITAL SODIUM 65 MG/ML
260 INJECTION INTRAMUSCULAR
Status: DISCONTINUED | OUTPATIENT
Start: 2022-08-24 | End: 2022-08-26

## 2022-08-24 RX ORDER — METOPROLOL TARTRATE 50 MG/1
50 TABLET, FILM COATED ORAL DAILY
Status: DISCONTINUED | OUTPATIENT
Start: 2022-08-24 | End: 2022-08-26 | Stop reason: HOSPADM

## 2022-08-24 RX ORDER — POLYETHYLENE GLYCOL 3350 17 G/17G
17 POWDER, FOR SOLUTION ORAL DAILY PRN
Status: DISCONTINUED | OUTPATIENT
Start: 2022-08-24 | End: 2022-08-26 | Stop reason: HOSPADM

## 2022-08-24 RX ORDER — NICOTINE 21 MG/24HR
1 PATCH, TRANSDERMAL 24 HOURS TRANSDERMAL DAILY PRN
Status: DISCONTINUED | OUTPATIENT
Start: 2022-08-24 | End: 2022-08-26 | Stop reason: HOSPADM

## 2022-08-24 RX ORDER — ACETAMINOPHEN 325 MG/1
650 TABLET ORAL EVERY 6 HOURS PRN
Status: DISCONTINUED | OUTPATIENT
Start: 2022-08-24 | End: 2022-08-26 | Stop reason: HOSPADM

## 2022-08-24 RX ORDER — LANOLIN ALCOHOL/MO/W.PET/CERES
100 CREAM (GRAM) TOPICAL DAILY
Status: DISCONTINUED | OUTPATIENT
Start: 2022-08-24 | End: 2022-08-26 | Stop reason: HOSPADM

## 2022-08-24 RX ORDER — ALBUTEROL SULFATE 90 UG/1
2 AEROSOL, METERED RESPIRATORY (INHALATION) EVERY 6 HOURS PRN
COMMUNITY

## 2022-08-24 RX ORDER — DEXTROSE MONOHYDRATE 100 MG/ML
INJECTION, SOLUTION INTRAVENOUS CONTINUOUS PRN
Status: DISCONTINUED | OUTPATIENT
Start: 2022-08-24 | End: 2022-08-26 | Stop reason: HOSPADM

## 2022-08-24 RX ORDER — ENOXAPARIN SODIUM 100 MG/ML
30 INJECTION SUBCUTANEOUS EVERY 12 HOURS
Status: DISCONTINUED | OUTPATIENT
Start: 2022-08-24 | End: 2022-08-26 | Stop reason: HOSPADM

## 2022-08-24 RX ORDER — PHENOBARBITAL SODIUM 65 MG/ML
130 INJECTION INTRAMUSCULAR
Status: DISCONTINUED | OUTPATIENT
Start: 2022-08-24 | End: 2022-08-26

## 2022-08-24 RX ORDER — ONDANSETRON 2 MG/ML
4 INJECTION INTRAMUSCULAR; INTRAVENOUS EVERY 6 HOURS PRN
Status: DISCONTINUED | OUTPATIENT
Start: 2022-08-24 | End: 2022-08-26 | Stop reason: HOSPADM

## 2022-08-24 RX ORDER — POTASSIUM CHLORIDE 20 MEQ/1
40 TABLET, EXTENDED RELEASE ORAL PRN
Status: DISCONTINUED | OUTPATIENT
Start: 2022-08-24 | End: 2022-08-26 | Stop reason: HOSPADM

## 2022-08-24 RX ORDER — SODIUM CHLORIDE 0.9 % (FLUSH) 0.9 %
5-40 SYRINGE (ML) INJECTION EVERY 12 HOURS SCHEDULED
Status: DISCONTINUED | OUTPATIENT
Start: 2022-08-24 | End: 2022-08-26 | Stop reason: HOSPADM

## 2022-08-24 RX ORDER — ONDANSETRON 4 MG/1
4 TABLET, ORALLY DISINTEGRATING ORAL EVERY 8 HOURS PRN
Status: DISCONTINUED | OUTPATIENT
Start: 2022-08-24 | End: 2022-08-26 | Stop reason: HOSPADM

## 2022-08-24 RX ORDER — LORAZEPAM 2 MG/1
2 TABLET ORAL
Status: DISCONTINUED | OUTPATIENT
Start: 2022-08-24 | End: 2022-08-24

## 2022-08-24 RX ORDER — LISINOPRIL 20 MG/1
20 TABLET ORAL DAILY
Status: DISCONTINUED | OUTPATIENT
Start: 2022-08-24 | End: 2022-08-26 | Stop reason: HOSPADM

## 2022-08-24 RX ORDER — LORAZEPAM 2 MG/1
4 TABLET ORAL
Status: DISCONTINUED | OUTPATIENT
Start: 2022-08-24 | End: 2022-08-24

## 2022-08-24 RX ORDER — ENOXAPARIN SODIUM 100 MG/ML
30 INJECTION SUBCUTANEOUS 2 TIMES DAILY
Status: DISCONTINUED | OUTPATIENT
Start: 2022-08-24 | End: 2022-08-24

## 2022-08-24 RX ORDER — SODIUM CHLORIDE 0.9 % (FLUSH) 0.9 %
5-40 SYRINGE (ML) INJECTION PRN
Status: DISCONTINUED | OUTPATIENT
Start: 2022-08-24 | End: 2022-08-26 | Stop reason: HOSPADM

## 2022-08-24 RX ORDER — MULTIVITAMIN WITH IRON
1 TABLET ORAL DAILY
Status: DISCONTINUED | OUTPATIENT
Start: 2022-08-24 | End: 2022-08-26 | Stop reason: HOSPADM

## 2022-08-24 RX ORDER — LORAZEPAM 2 MG/ML
1 INJECTION INTRAMUSCULAR
Status: DISCONTINUED | OUTPATIENT
Start: 2022-08-24 | End: 2022-08-24

## 2022-08-24 RX ORDER — LORAZEPAM 2 MG/ML
4 INJECTION INTRAMUSCULAR
Status: DISCONTINUED | OUTPATIENT
Start: 2022-08-24 | End: 2022-08-24

## 2022-08-24 RX ORDER — POTASSIUM CHLORIDE 7.45 MG/ML
10 INJECTION INTRAVENOUS PRN
Status: DISCONTINUED | OUTPATIENT
Start: 2022-08-24 | End: 2022-08-26 | Stop reason: HOSPADM

## 2022-08-24 RX ORDER — ACETAMINOPHEN 650 MG/1
650 SUPPOSITORY RECTAL EVERY 6 HOURS PRN
Status: DISCONTINUED | OUTPATIENT
Start: 2022-08-24 | End: 2022-08-26 | Stop reason: HOSPADM

## 2022-08-24 RX ORDER — SODIUM CHLORIDE 9 MG/ML
INJECTION, SOLUTION INTRAVENOUS PRN
Status: DISCONTINUED | OUTPATIENT
Start: 2022-08-24 | End: 2022-08-26 | Stop reason: HOSPADM

## 2022-08-24 RX ORDER — LORAZEPAM 1 MG/1
1 TABLET ORAL
Status: DISCONTINUED | OUTPATIENT
Start: 2022-08-24 | End: 2022-08-24

## 2022-08-24 RX ORDER — LORAZEPAM 2 MG/ML
2 INJECTION INTRAMUSCULAR
Status: DISCONTINUED | OUTPATIENT
Start: 2022-08-24 | End: 2022-08-24

## 2022-08-24 RX ORDER — MAGNESIUM SULFATE IN WATER 40 MG/ML
2000 INJECTION, SOLUTION INTRAVENOUS PRN
Status: DISCONTINUED | OUTPATIENT
Start: 2022-08-24 | End: 2022-08-26 | Stop reason: HOSPADM

## 2022-08-24 RX ORDER — MAGNESIUM HYDROXIDE/ALUMINUM HYDROXICE/SIMETHICONE 120; 1200; 1200 MG/30ML; MG/30ML; MG/30ML
30 SUSPENSION ORAL EVERY 6 HOURS PRN
Status: DISCONTINUED | OUTPATIENT
Start: 2022-08-24 | End: 2022-08-26 | Stop reason: HOSPADM

## 2022-08-24 RX ORDER — LORAZEPAM 2 MG/ML
3 INJECTION INTRAMUSCULAR
Status: DISCONTINUED | OUTPATIENT
Start: 2022-08-24 | End: 2022-08-24

## 2022-08-24 RX ADMIN — Medication 1 TABLET: at 09:18

## 2022-08-24 RX ADMIN — ENOXAPARIN SODIUM 30 MG: 100 INJECTION SUBCUTANEOUS at 20:38

## 2022-08-24 RX ADMIN — LORAZEPAM 1 MG: 2 INJECTION INTRAMUSCULAR; INTRAVENOUS at 03:19

## 2022-08-24 RX ADMIN — PHENOBARBITAL SODIUM 130 MG: 65 INJECTION INTRAMUSCULAR at 11:28

## 2022-08-24 RX ADMIN — PHENOBARBITAL SODIUM 130 MG: 65 INJECTION INTRAMUSCULAR at 16:47

## 2022-08-24 RX ADMIN — LORAZEPAM 2 MG: 2 INJECTION INTRAMUSCULAR; INTRAVENOUS at 00:46

## 2022-08-24 RX ADMIN — SODIUM CHLORIDE, PRESERVATIVE FREE 10 ML: 5 INJECTION INTRAVENOUS at 20:39

## 2022-08-24 RX ADMIN — ENOXAPARIN SODIUM 30 MG: 100 INJECTION SUBCUTANEOUS at 09:18

## 2022-08-24 RX ADMIN — PHENOBARBITAL SODIUM 130 MG: 65 INJECTION INTRAMUSCULAR at 20:38

## 2022-08-24 RX ADMIN — METOPROLOL TARTRATE 50 MG: 50 TABLET, FILM COATED ORAL at 09:18

## 2022-08-24 RX ADMIN — SODIUM CHLORIDE, PRESERVATIVE FREE 10 ML: 5 INJECTION INTRAVENOUS at 11:36

## 2022-08-24 RX ADMIN — LISINOPRIL 20 MG: 20 TABLET ORAL at 09:18

## 2022-08-24 RX ADMIN — PHENOBARBITAL SODIUM 130 MG: 65 INJECTION INTRAMUSCULAR at 23:57

## 2022-08-24 RX ADMIN — Medication 100 MG: at 09:18

## 2022-08-24 ASSESSMENT — PAIN SCALES - GENERAL: PAINLEVEL_OUTOF10: 0

## 2022-08-24 NOTE — CARE COORDINATION
8/24/22, 12:39 PM EDT  DISCHARGE PLANNING EVALUATION:    [address]       Admitted: 8/23/2022/ [facility] day: 1   Location: [facility]22/022-A Reason for admit: Alcohol withdrawal syndrome, uncomplicated ([facility] [A92.643]  Alcohol withdrawal syndrome with complication ([facility] [B11.415]   PMH:  has a past medical history of COPD (chronic obstructive pulmonary disease) ([facility] Depression, Diabetes mellitus ([facility] ETOH abuse, GERD (gastroesophageal reflux disease), Hyperlipidemia, Hypertension, Liver disease, and Seizures ([facility] Barriers to Discharge:    Alcohol Withdrawal  History: COPD, Alcohol Use  IV Phenobarbital/SR Alcohol Withdrawal Scale  Elevated Lipase/LFT/s  (+) ETOH 0.26  PCP: NILAM Martinez CNP  Readmission Risk Score: 13%  Patient's Healthcare Decision Maker: Legal Next of Kin    Patient Goals/Plan/Treatment Preferences: denied needs as plans home alone independently as PTA when medically cleared; [employer] in past (has their information)  Transportation/Food Security/Housekeeping Addressed:  No issues identified.

## 2022-08-24 NOTE — ED NOTES
ED to inpatient nurses report    Chief Complaint   Patient presents with    Alcohol Problem      Present to ED from home  LOC: alert and orientated to name, place, date  Vital signs   Vitals:    08/23/22 2002 08/23/22 2111 08/23/22 2215 08/23/22 2330   BP: (!) 150/82 (!) 161/97 (!) 155/82 (!) 143/81   Pulse: (!) 115 (!) 112 (!) 106 (!) 102   Resp:  19 18 12   Temp:       TempSrc:       SpO2:  95% 96% 95%   Weight:       Height:          Oxygen Baseline none    Current needs required none Bipap/Cpap No  LDAs:   Peripheral IV 08/23/22 Left Wrist (Active)   Site Assessment Clean, dry & intact 08/23/22 2010   Line Status Blood return noted; Flushed;Specimen collected 08/23/22 2010   Dressing Status New dressing applied 08/23/22 2010     Mobility: Independent  Pending ED orders: none  Present condition: stable      C-SSRS Risk of Suicide: No Risk  Swallow Screening    Preferred Language: English     Electronically signed by Nia Patel RN on 8/24/2022 at 12:11 AM       Nia Patel RN  08/24/22 0011

## 2022-08-24 NOTE — H&P
Hospitalist History and Physical          Patient: Juan Pablo Soto  : 1975  MRN: [id number]     Acct: [de-identified]    PCP: NILAM Wall CNP  Date of Admission: 2022  Date of Service: Pt seen/examined on 22  and Admitted to Inpatient with expected LOS greater than two midnights due to medical therapy. Hospital Problems             Last Modified POA    * (Principal) Alcohol withdrawal syndrome, uncomplicated ([facility]  Yes       Assessment and Plan:    Alcohol abuse and withdrawal:  Last reported alcoholic drink was around 2:30 PM on the day of admission 2022. Reports alcohol withdrawal symptoms starting within a day of cessation. Keep patient in the stepdown unit with telemetry monitoring. Alcohol withdrawal protocol with fall/seizure/aspiration precautions. Multivitamins, thiamine, and folic acid. Behavioral health consultation for continued outpatient follow-up after discharge. Check magnesium level and correct electrolytes. Check hepatic profile in a.m. Negative urine drug screen. Hypertension:  Uncontrolled secondary to alcohol withdrawal and noncompliance with medications. Resume home dose metoprolol and lisinopril. Hyperglycemia/hypoglycemia:  Jacqueline Dow has a diagnosis of diabetes mellitus despite documentation of records. Patient not on any antihyperglycemic medications. History of hypoglycemia on previous admissions. Last hemoglobin A1c 5.2 on 2022. Check Accu-Cheks before every meal and at bedtime. Sliding scale insulin coverage at low-dose. Tobacco abuse disorder:  Smoking cessation counseling. Nicotine patches as needed. History of COPD:  Controlled at this time. Continue as needed bronchodilators. DVT prophylaxis:  Subcu Lovenox. Fluids/electrolytes/nutrition:  Inpatient rehab x12 months 2 L of IV fluid normal saline bolus from ER. Saline lock IV fluids. Electrolytes within normal limits. Check magnesium.   Regular Stable vital signs with no tremors or shakiness or signs of withdrawal from alcohol. Past Medical History:        Diagnosis Date    COPD (chronic obstructive pulmonary disease) ([facility]     Depression     Diabetes mellitus (HCC)     ETOH abuse     GERD (gastroesophageal reflux disease)     Hyperlipidemia     Hypertension     Liver disease     Seizures ([facility] [address]     etoh wdl       Past Surgical History:        Procedure Laterality Date    ENDOSCOPY, COLON, DIAGNOSTIC         Medications Prior to Admission:   Prior to Admission medications    Medication Sig Start Date End Date Taking? Authorizing Provider   albuterol sulfate HFA (VENTOLIN HFA) 108 (90 Base) MCG/ACT inhaler Inhale 2 puffs into the lungs every 6 hours as needed for Wheezing or Shortness of Breath   Yes Historical Provider, MD   lisinopril (PRINIVIL;ZESTRIL) 20 MG tablet Take 1 tablet by mouth in the morning. 7/25/22   Que Bills MD   metoprolol tartrate (LOPRESSOR) 50 MG tablet Take 1 tablet by mouth in the morning. 7/25/22   Que Bills MD   sildenafil (VIAGRA) 100 MG tablet Take 1 tablet by mouth daily as needed for Erectile Dysfunction 7/25/22   Que Bills MD   miconazole (MICOTIN) 2 % powder Apply topically 2 times daily. Patient not taking: No sig reported 7/25/22   Que Bills MD   blood glucose test strips (ASCENSIA AUTODISC VI;ONE TOUCH ULTRA TEST VI) strip Test as needed. Dispense One Touch verio Test Strips. Dx: Type 2 diabetes (250.00) 7/25/22   Que Bills MD   glucose monitoring kit (FREESTYLE) monitoring kit Glucometer with test strips and lancets. Check BS once daily  #100 strips and lancets  Patient not taking: Reported on 8/24/2022 10/4/18   Prudence Lobe, APRN - CNP       Allergies:  Patient has no known allergies. Social History:    The patient currently lives independently. .   Tobacco use:   reports that he has been smoking e-cigarettes and cigarettes. He has a 81.00 pack-year smoking history.  He has quit using smokeless tobacco.  Alcohol use:   reports that he does not currently use alcohol after a past usage of about 140.0 standard drinks per week. Drug use:  reports no history of drug use. Family History:   as follows:      Problem Relation Age of Onset    High Blood Pressure Father     Cancer Father         Lung Cancer    Alcohol Abuse Father     High Blood Pressure Brother     Diabetes Mother     Heart Disease Mother         Stent Placement    Arthritis Mother     Depression Mother     High Blood Pressure Mother     High Cholesterol Mother     Alcohol Abuse Paternal Uncle        Review of Systems:   Pertinent positives and negatives as noted in the HPI. Otherwise complete ROS negative. Physical Exam:    /74   Pulse 99   Temp 97.8 °F (36.6 °C) (Oral)   Resp 16   Ht 5' 11\" (1.803 m)   Wt 242 lb 14.4 oz (110.2 kg)   SpO2 94%   BMI 33.88 kg/m²       General appearance: No apparent distress, appears stated age. Eyes:  Pupils equal, round, and reactive to light. Conjunctivae/corneas clear. HENT: Head normal in appearance. External nares normal.  Oral mucosa moist without lesions. Hearing grossly intact. Neck: Supple, with full range of motion. Trachea midline. No gross JVD appreciated. Respiratory:  Normal respiratory effort. Clear to auscultation, bilaterally without rales or wheezes or rhonchi. Cardiovascular: Normal rate, regular rhythm with normal S1/S2 without murmurs. No lower extremity edema. Abdomen: Soft, non-tender, non-distended with normal bowel sounds. Musculoskeletal: No joint swelling or tenderness. Normal tone. No abnormal movements. Skin: Warm and dry. No rashes or lesions. Neurologic:  No focal sensory/motor deficits in the upper and lower extremities. Cranial nerves:  grossly non-focal 2-12. Psychiatric: Alert and oriented, normal insight and thought content. Capillary Refill: Brisk,< 3 seconds. Peripheral Pulses: +2 palpable, equal bilaterally. Labs:     Recent Labs     08/23/22 2010   WBC 8.2   HGB 16.8   HCT 46.5        Recent Labs     08/23/22 2010      K 4.0      CO2 23   BUN 9   CREATININE 0.8   CALCIUM 8.9     No results for input(s): AST, ALT, BILIDIR, BILITOT, ALKPHOS in the last 72 hours. No results for input(s): INR in the last 72 hours. No results for input(s): Katie Kaska in the last 72 hours. Lab Results   Component Value Date/Time    NITRU NEGATIVE 05/07/2021 02:20 AM    WBCUA NONE SEEN 04/08/2021 07:30 PM    BACTERIA NONE SEEN 04/08/2021 07:30 PM    RBCUA 0-2 04/08/2021 07:30 PM    BLOODU NEGATIVE 05/07/2021 02:20 AM    SPECGRAV 1.008 04/08/2021 07:30 PM    GLUCOSEU NEGATIVE 05/07/2021 02:20 AM         Radiology:     No orders to display          EKG:  I have reviewed the EKG with the following interpretation: Sinus tachycardia with no acute ischemic changes. Diet: ADULT DIET; Regular  DVT prophylaxis: Subcu Lovenox. Code Status: Full Code  Disposition: admit to inpatient stepdown with telemetry. Thank you NILAM Lakhani CNP for the opportunity to be involved in this patient's care.     Electronically signed by Rachael Li MD on 8/24/2022 at 2:16 AM.

## 2022-08-24 NOTE — PROGRESS NOTES
Utilize [facility] alcohol withdrawal scale (Based on [place] Modified Alcohol Withdrawal Scale). Tabulate score based on classifications including Tremor, Sweating, Hallucination, Orientation, and Agitation. Tremor: 0  Sweatin  Hallucinations: 0  Orientation: 1  Agitation: 1  Total Score: 2  Action perform as described below     Tremor:  No tremor is 0 points. Tremor on movement is 1 point. Tremor at rest is 2 points. Sweating: No Sweat 0 points. Moist is 1 point. Drenching sweats is 2 points. Hallucinations: No present 0 points. Dissuadable is 1 point. Not dissuadable is 2 points. Orientation: Oriented 0 points. Vague/detached 1 point. Disoriented/no contact 2 points. Agitation: Calm 0 points. Anxious 1 point. Panicky 2 points. Check scale every 2 hours. Discontinue scoring with 4 consecutive scorings of 0. Scale 0: No phenobarbital given. Re-assess every 60 minutes as needed. Scale 1-3: Phenobarbital 130 mg IV over 3 minutes. Re-assess every 60 minutes as needed. May administer every 60 minutes to a maximum dose of phenobarbital 1040 mg in 24 hours! Scale 4-8: Phenobarbital 260 mg IV over 5 minutes. Re-assess every 60 minutes as needed. May administer every 60 minutes to a maximum dose of phenobarbital 1040mg in 24 hours! Scale 9-10: Transfer to ICU (if not already in ICU). Administer 10mg/kg phenobarbital IV over 60 minutes. Maximum dose phenobarbital is 1040mg in 24 hours!

## 2022-08-24 NOTE — ED TRIAGE NOTES
Pt to ER with complaints of an alcohol problem. Pt states he has history of alcohol addiction and has recently relapsed again. He reports he has been drinking about a 30 pack of beer a day with shots of fireball whiskey. He reports his last drink was today at 1400. EKG and lab work completed upon arrival. TRUPTIWA assessed.

## 2022-08-24 NOTE — PROGRESS NOTES
Utilize [facility] alcohol withdrawal scale (Based on [place] Modified Alcohol Withdrawal Scale). Tabulate score based on classifications including Tremor, Sweating, Hallucination, Orientation, and Agitation. Tremor: 1  Sweatin  Hallucinations: 0  Orientation: 1  Agitation: 0  Total Score: 2  Action perform as described below     Tremor:  No tremor is 0 points. Tremor on movement is 1 point. Tremor at rest is 2 points. Sweating: No Sweat 0 points. Moist is 1 point. Drenching sweats is 2 points. Hallucinations: No present 0 points. Dissuadable is 1 point. Not dissuadable is 2 points. Orientation: Oriented 0 points. Vague/detached 1 point. Disoriented/no contact 2 points. Agitation: Calm 0 points. Anxious 1 point. Panicky 2 points. Check scale every 2 hours. Discontinue scoring with 4 consecutive scorings of 0. Scale 0: No phenobarbital given. Re-assess every 60 minutes as needed. Scale 1-3: Phenobarbital 130 mg IV over 3 minutes. Re-assess every 60 minutes as needed. May administer every 60 minutes to a maximum dose of phenobarbital 1040 mg in 24 hours! Scale 4-8: Phenobarbital 260 mg IV over 5 minutes. Re-assess every 60 minutes as needed. May administer every 60 minutes to a maximum dose of phenobarbital 1040mg in 24 hours! Scale 9-10: Transfer to ICU (if not already in ICU). Administer 10mg/kg phenobarbital IV over 60 minutes. Maximum dose phenobarbital is 1040mg in 24 hours!

## 2022-08-24 NOTE — PROGRESS NOTES
Pt admitted to  4K22 in a wheelchair. Complaints: alcohol withdrawal. No IV fluid infusing. IV site free of s/s of infection or infiltration. Vital signs obtained. Assessment and data collection initiated. Two nurse skin assessment performed by Aubrie DAVALOS and Broderick Hernandez RN. Oriented to room. Policies and procedures for 4K explained. All questions answered with no further questions at this time. Fall prevention and safety brochure discussed with patient. Bed alarm on. Call light in reach. Would you like your Primary Care Physician notified? No  The best day to schedule a follow up Dr appointment is:  Any day in the morning.

## 2022-08-24 NOTE — PLAN OF CARE
Problem: Discharge Planning  Goal: Discharge to home or other facility with appropriate resources  Outcome: Progressing  Flowsheets  Taken 8/24/2022 0252 by Alyssia Jackson RN  Discharge to home or other facility with appropriate resources:   Identify barriers to discharge with patient and caregiver   Arrange for needed discharge resources and transportation as appropriate   Identify discharge learning needs (meds, wound care, etc)  Taken 8/24/2022 0116 by Alexandria Ambrosio RN  Discharge to home or other facility with appropriate resources: Arrange for needed discharge resources and transportation as appropriate  Taken 8/24/2022 0023 by Alyssia Jackson RN  Discharge to home or other facility with appropriate resources: Identify barriers to discharge with patient and caregiver     Problem: Safety - Adult  Goal: Free from fall injury  Outcome: Progressing  Flowsheets (Taken 8/24/2022 0252)  Free From Fall Injury: Instruct family/caregiver on patient safety     Problem: ABCDS Injury Assessment  Goal: Absence of physical injury  Outcome: Progressing  Flowsheets (Taken 8/24/2022 0252)  Absence of Physical Injury: Implement safety measures based on patient assessment     Problem: Pain  Goal: Verbalizes/displays adequate comfort level or baseline comfort level  Outcome: Progressing  Flowsheets (Taken 8/24/2022 0252)  Verbalizes/displays adequate comfort level or baseline comfort level:   Encourage patient to monitor pain and request assistance   Assess pain using appropriate pain scale   Administer analgesics based on type and severity of pain and evaluate response   Implement non-pharmacological measures as appropriate and evaluate response     Problem: Skin/Tissue Integrity  Goal: Absence of new skin breakdown  Description: 1. Monitor for areas of redness and/or skin breakdown  2. Assess vascular access sites hourly  3. Every 4-6 hours minimum:  Change oxygen saturation probe site  4.   Every 4-6 hours:  If on nasal continuous positive airway pressure, respiratory therapy assess nares and determine need for appliance change or resting period. Outcome: Progressing  Note: No s/s of new skin breakdown. Will continue to monitor. Problem: Neurosensory - Adult  Goal: Absence of seizures  Outcome: Progressing  Flowsheets (Taken 8/24/2022 0023)  Absence of seizures:   Monitor for seizure activity. If seizure occurs, document type and location of movements and any associated apnea   If seizure occurs, turn head to side and suction secretions as needed   Administer anticonvulsants as ordered   Support airway/breathing, administer oxygen as needed     Problem: Neurosensory - Adult  Goal: Achieves maximal functionality and self care  Outcome: Progressing  Flowsheets (Taken 8/24/2022 0023)  Achieves maximal functionality and self care: Monitor swallowing and airway patency with patient fatigue and changes in neurological status     Problem: Chronic Conditions and Co-morbidities  Goal: Patient's chronic conditions and co-morbidity symptoms are monitored and maintained or improved  Outcome: Progressing  Flowsheets (Taken 8/24/2022 0023)  Care Plan - Patient's Chronic Conditions and Co-Morbidity Symptoms are Monitored and Maintained or Improved:   Monitor and assess patient's chronic conditions and comorbid symptoms for stability, deterioration, or improvement   Collaborate with multidisciplinary team to address chronic and comorbid conditions and prevent exacerbation or deterioration    Care plan reviewed with patient. Patient verbalized understanding of the plan of care and contribute to goal setting.

## 2022-08-24 NOTE — PROGRESS NOTES
Pt could not share nor talk much but only wanted prayer. He was anointed. 08/24/22 1716   Encounter Summary   Encounter Overview/Reason  Initial Encounter   Service Provided For: Patient   Referral/Consult From: [facility]   Last Encounter  08/24/22  (Anointed)   Complexity of Encounter Low   Spiritual/Emotional needs   Type Spiritual Support   Rituals, Rites and Sacraments   Type Anointing   Assessment/Intervention/Outcome   Assessment Calm; Hopeful

## 2022-08-24 NOTE — PROGRESS NOTES
Internal Medicine Resident Progress Note    Patient:  Bubba Winston    YOB: 1975  Unit/Bed:4K-22/022-A  MRN: [id number]    Acct: [de-identified]   PCP: Treasure Habermann, APRN - CNP    Date of Admission: 8/23/2022      Assessment/Plan:  Alcohol abuse and withdrawal  + Patient states he drank 30 beers daily prior to admission, with his last drink 2:30 PM on 8/23  + Urine drug screen on 8/23 negative  -Telemetry ordered on 8/23  -Multivitamins, thiamine, folic acid ordered, started on 8/24  -Follow-up with behavioral health consultation after discharge    Hypertension  -Continue lisinopril 20 mg daily  -Continue Lopressor 50 mg daily    NIDDM 2  + Last A1c 5.2 on 7/22/2022, not currently on antihyperglycemic medications  -Check Accu-Cheks before every meal and at bedtime  -Sliding scale insulin ordered    Tobacco use disorder  + Patient was counseled with smoking cessation  -As needed nicotine patches    History of COPD  + Patient has no records of spirometry, PFT  -Follow-up with PCP    Expected discharge date:  8/26    Disposition:   [x] Home  [] TCU  [] Rehab  [] Psych  [] SNF  [] [facility]  [] Other-    ===================================================================      Chief Complaint: Alcohol withdrawal    Hospital Course:     Per HPI \" Bubba Winston is a 56 y.o. male with PMHx of alcohol abuse, hypertension, hyperlipidemia, alcohol withdrawal seizures, and GERD, who presented to our emergency room here at [facility] on 8/23/2022 complaining of heavy alcohol consumption for the last 2 weeks drinking about 30 over the 12 ounce cans of beer daily on top of whiskey fireball's in between to the point where he was no longer able to carry on with his normal life he says and therefore decided to come to the emergency room for help. Patient has just recently completed a 1 year inpatient detox program this last July 2022 where he was sober for the entire year. However, after the program he rejoined his old friends and started drinking again. He relapsed badly the last 2 weeks he says where he was binge drinking pretty much daily. He has a known history of bad alcohol withdrawal requiring ICU admission with severe symptoms that include alcohol withdrawal seizures or delirium tremens. He was concerned about going into alcohol withdrawal so he came in seeking medical attention. His last reported alcohol drink was around 2 PM today. Patient currently denies any other symptoms related to chest pain or difficulty in breathing. He is no longer interested in inpatient detox but would want to be seen by behavioral health for outpatient referral for group therapy. He does also smoke tobacco between 2-1/2 packs/day. Denies using any other illicit substances. ED course: In the emergency room patient's urine drug screen was negative. Mildly hypertensive with systolic blood pressures in the 150s but otherwise normal vital signs. His laboratory work-up was unremarkable. At the time of evaluation patient was laying in bed comfortably no acute distress. Stable vital signs with no tremors or shakiness or signs of withdrawal from alcohol. \"     On 8/24, as needed phenobarbital based on EtOH scale ordered. Thiamine discontinued. Subjective (past 24 hours):     Patient was in mild acute distress, found laying in his bed covered in his blankets. Patient admitted to anxiety, aggravation, loss of appetite, productive cough with clear sputum. Patient denied chest pain, fever, chills, nausea, vomiting, diarrhea. ROS: reviewed complete ROS unchanged unless otherwise stated in hospital course/subjective portion.        Medications:  Reviewed    Infusion Medications    sodium chloride       Scheduled Medications    lisinopril  20 mg Oral Daily    metoprolol tartrate  50 mg Oral Daily    sodium chloride flush  5-40 mL IntraVENous 2 times per day    thiamine  100 mg Oral Daily    multivitamin  1 tablet Oral Daily    enoxaparin  30 mg SubCUTAneous BID     PRN Meds: sodium chloride flush, sodium chloride, ondansetron **OR** ondansetron, polyethylene glycol, acetaminophen **OR** acetaminophen, potassium chloride **OR** potassium alternative oral replacement **OR** potassium chloride, magnesium sulfate, aluminum & magnesium hydroxide-simethicone, LORazepam **OR** LORazepam **OR** LORazepam **OR** LORazepam **OR** LORazepam **OR** LORazepam **OR** LORazepam **OR** LORazepam, nicotine        Intake/Output Summary (Last 24 hours) at 8/24/2022 0748  Last data filed at 8/24/2022 0322  Gross per 24 hour   Intake 300.05 ml   Output 400 ml   Net -99.95 ml       Exam:  BP (!) 132/58   Pulse (!) 104   Temp 98.3 °F (36.8 °C) (Oral)   Resp 18   Ht 5' 11\" (1.803 m)   Wt 242 lb 14.4 oz (110.2 kg)   SpO2 96%   BMI 33.88 kg/m²     General: Mild distress, appears stated age. Patient appears obese  Eyes:  PERRL. Conjunctivae/corneas clear. HENT: Head normal appearing. Nares normal. Oral mucosa moist.  Hearing intact. Neck: Supple, with full range of motion. Trachea midline. No gross JVD appreciated. Respiratory:  Normal effort. On auscultation, without rales or rhonchi. Wheezes appreciated in left and right lung fields  Cardiovascular: Normal rate, regular rhythm with normal S1/S2 without murmurs. No lower extremity edema. Abdomen: Soft, non-tender, non-distended with normal bowel sounds. Musculoskeletal: No joint swelling or tenderness. Normal tone. No abnormal movements. Skin: Warm and dry. No rashes or lesions. Neurologic:  No focal sensory/motor deficits in the upper or lower extremities. Cranial nerves:  grossly non-focal 2-12. Psychiatric: Alert and oriented, normal insight and thought content. Capillary Refill: Brisk,< 3 seconds. Peripheral Pulses: +2 palpable, equal bilaterally.        Labs:   Recent Labs     08/23/22 2010   WBC 8.2   HGB 16.8   HCT 46.5    Recent Labs     08/23/22 2010      K 4.0      CO2 23   BUN 9   CREATININE 0.8   CALCIUM 8.9     Recent Labs     08/24/22  0423   *   ALT 87*   BILIDIR <0.2   BILITOT 0.6   ALKPHOS 198*     No results for input(s): INR in the last 72 hours. No results for input(s): Gustabo Lan in the last 72 hours. No results for input(s): PROCAL in the last 72 hours. Lab Results   Component Value Date/Time    NITRU NEGATIVE 05/07/2021 02:20 AM    WBCUA NONE SEEN 04/08/2021 07:30 PM    BACTERIA NONE SEEN 04/08/2021 07:30 PM    RBCUA 0-2 04/08/2021 07:30 PM    BLOODU NEGATIVE 05/07/2021 02:20 AM    SPECGRAV 1.008 04/08/2021 07:30 PM    GLUCOSEU NEGATIVE 05/07/2021 02:20 AM       Radiology (48 hours):  No results found. DVT prophylaxis:    [x] Lovenox  [] SCDs  [] SQ Heparin  [] Encourage ambulation   [] Already on Anticoagulation       Diet: ADULT DIET;  Regular  Code Status: Full Code  PT/OT: Not needed  Tele: Continuous  IVF: None    Electronically signed by Lloyd Valdez DO on 8/24/2022 at 7:48 AM    Case was discussed with Attending, Dr. Sue Zee

## 2022-08-24 NOTE — CONSULTS
Brief Intervention and Referral to Treatment Summary    Patient was provided PHQ-9, AUDIT-C and DAST Screening:      PHQ-9 Score: 0  AUDIT-C Score: 12    DAST Score: 0    Patients substance use is considered     Dependent    Patients depression is considered:     Minimal    Brief Education Was Provided    Patient was receptive    Brief Intervention Is Provided (Only for AUDIT-C or DAST)     Patient reports readiness to decrease and/or stop use and a plan was discussed       Recommendations/Referrals for Brief and/or Specialized Treatment Provided to Patient     Patient is receptive to resources, AOD packet provided.

## 2022-08-24 NOTE — ED PROVIDER NOTES
[address] ENCOUNTER      PATIENT NAME: Eugenia Sullivan  MRN: [id number]  : 1975  MINA: 2022  PROVIDER: Yusef Pearce MD      CHIEF COMPLAINT       Chief Complaint   Patient presents with    Alcohol Problem       Patient is seen and evaluated in a timely fashion. Nurses Notes are reviewed and I agree except as noted in the HPI. HISTORY OF PRESENT ILLNESS    Eugenia Sullivan is a 56 y.o. male who presents to Emergency Department with Alcohol Problem     PMH medical history of EtOH abuse, EtOH withdrawal, and DT.  Multiple admission for EtOH withdrawal in . Last ETOH (beer and several shots of fireball) use was 2:30 PM today. He feels anxious and is tachycardic. No fever or chills. No chest pain. No SOB. No N/V. No SI. This HPI was provided by patient. REVIEW OF SYSTEMS   Ten-point review of systems is negative except those documented in above HPI including constitutional, HEENT, respiratory, cardiovascular, gastrointestinal, genitourinary, musculoskeletal, skin, neurological, hematological and behavioral.      PAST MEDICAL HISTORY    has a past medical history of COPD (chronic obstructive pulmonary disease) ([address] Depression, Diabetes mellitus ([address] ETOH abuse, GERD (gastroesophageal reflux disease), Hyperlipidemia, Hypertension, Liver disease, and Seizures ([address] SURGICAL HISTORY      has a past surgical history that includes Endoscopy, colon, diagnostic. CURRENT MEDICATIONS       Previous Medications    BLOOD GLUCOSE TEST STRIPS (ASCENSIA AUTODISC VI;ONE TOUCH ULTRA TEST VI) STRIP    Test as needed. Dispense One Touch verio Test Strips. Dx: Type 2 diabetes (250.00)    GLUCOSE MONITORING KIT (FREESTYLE) MONITORING KIT    Glucometer with test strips and lancets. Check BS once daily  #100 strips and lancets    LISINOPRIL (PRINIVIL;ZESTRIL) 20 MG TABLET    Take 1 tablet by mouth in the morning.     METOPROLOL TARTRATE (LOPRESSOR) 50 MG TABLET    Take 1 tablet by mouth in the morning. MICONAZOLE (MICOTIN) 2 % POWDER    Apply topically 2 times daily. SILDENAFIL (VIAGRA) 100 MG TABLET    Take 1 tablet by mouth daily as needed for Erectile Dysfunction       ALLERGIES     has No Known Allergies. FAMILY HISTORY     He indicated that his mother is alive. He indicated that his father is . He indicated that his brother is alive. He indicated that the status of his paternal uncle is unknown.   family history includes Alcohol Abuse in his father and paternal uncle; Arthritis in his mother; Cancer in his father; Depression in his mother; Diabetes in his mother; Heart Disease in his mother; High Blood Pressure in his brother, father, and mother; High Cholesterol in his mother. SOCIAL HISTORY      reports that he has been smoking e-cigarettes and cigarettes. He has a 81.00 pack-year smoking history. He has quit using smokeless tobacco. He reports that he does not currently use alcohol after a past usage of about 140.0 standard drinks per week. He reports that he does not use drugs. PHYSICAL EXAM      height is 5' 11\" (1.803 m) and weight is 240 lb (108.9 kg). His oral temperature is 98.2 °F (36.8 °C). His blood pressure is 161/97 (abnormal) and his pulse is 112 (abnormal). His respiration is 19 and oxygen saturation is 95%. Physical Exam  Vitals and nursing note reviewed. Constitutional:       Appearance: He is well-developed. He is not diaphoretic. HENT:      Head: Normocephalic and atraumatic. Nose: Nose normal.   Eyes:      General: No scleral icterus. Right eye: No discharge. Left eye: No discharge. Conjunctiva/sclera: Conjunctivae normal.      Pupils: Pupils are equal, round, and reactive to light. Neck:      Vascular: No JVD. Trachea: No tracheal deviation. Cardiovascular:      Rate and Rhythm: Regular rhythm. Tachycardia present. Heart sounds: Normal heart sounds. No murmur heard. No friction rub. No gallop. Pulmonary:      Effort: Pulmonary effort is normal. No respiratory distress. Breath sounds: Normal breath sounds. No stridor. No wheezing or rales. Chest:      Chest wall: No tenderness. Abdominal:      General: Bowel sounds are normal. There is no distension. Palpations: Abdomen is soft. There is no mass. Tenderness: There is no abdominal tenderness. There is no guarding or rebound. Hernia: No hernia is present. Musculoskeletal:         General: No tenderness or deformity. Cervical back: Normal range of motion and neck supple. Lymphadenopathy:      Cervical: No cervical adenopathy. Skin:     General: Skin is warm and dry. Capillary Refill: Capillary refill takes less than 2 seconds. Coloration: Skin is not pale. Findings: No erythema or rash. Neurological:      Mental Status: He is alert and oriented to person, place, and time. Cranial Nerves: No cranial nerve deficit. Sensory: No sensory deficit. Motor: No abnormal muscle tone. Coordination: Coordination normal.      Deep Tendon Reflexes: Reflexes normal.   Psychiatric:         Thought Content: Thought content normal.         Judgment: Judgment normal.      Comments: Anxious. ANCILLARY TEST RESULTS   EKG:    Interpreted by me  Sinus tachycardia,  bpm, TX interval 122 ms, QRS duration 90 ms,  ms, no ST elevation or acute T wave    LAB RESULTS:  Results for orders placed or performed during the hospital encounter of 43/98/06   Basic Metabolic Panel w/ Reflex to MG   Result Value Ref Range    Sodium 141 135 - 145 meq/L    Potassium reflex Magnesium 4.0 3.5 - 5.2 meq/L    Chloride 102 98 - 111 meq/L    CO2 23 23 - 33 meq/L    Glucose 108 70 - 108 mg/dL    BUN 9 7 - 22 mg/dL    Creatinine 0.8 0.4 - 1.2 mg/dL    Calcium 8.9 8.5 - 10.5 mg/dL   Brain Natriuretic Peptide   Result Value Ref Range    Pro-BNP 42.2 0.0 - 450.0 pg/mL   CBC with Auto Differential Result Value Ref Range    WBC 8.2 4.8 - 10.8 thou/mm3    RBC 4.92 4.70 - 6.10 mill/mm3    Hemoglobin 16.8 14.0 - 18.0 gm/dl    Hematocrit 46.5 42.0 - 52.0 %    MCV 94.5 (H) 80.0 - 94.0 fL    MCH 34.1 (H) 26.0 - 33.0 pg    MCHC 36.1 (H) 32.2 - 35.5 gm/dl    RDW-CV 14.6 (H) 11.5 - 14.5 %    RDW-SD 51.0 (H) 35.0 - 45.0 fL    Platelets 874 114 - 429 thou/mm3    MPV 9.1 (L) 9.4 - 12.4 fL    Seg Neutrophils 70.3 %    Lymphocytes 22.2 %    Monocytes 6.8 %    Eosinophils 0.0 %    Basophils 0.6 %    Immature Granulocytes 0.1 %    Segs Absolute 5.8 1.8 - 7.7 thou/mm3    Lymphocytes Absolute 1.8 1.0 - 4.8 thou/mm3    Monocytes Absolute 0.6 0.4 - 1.3 thou/mm3    Eosinophils Absolute 0.0 0.0 - 0.4 thou/mm3    Basophils Absolute 0.0 0.0 - 0.1 thou/mm3    Immature Grans (Abs) 0.01 0.00 - 0.07 thou/mm3    nRBC 0 /100 wbc   Ethanol   Result Value Ref Range    ETHYL ALCOHOL, SERUM 0.26 0.00 %   Anion Gap   Result Value Ref Range    Anion Gap 16.0 8.0 - 16.0 meq/L   Osmolality   Result Value Ref Range    Osmolality Calc 280.5 275.0 - 300.0 mOsmol/kg   Glomerular Filtration Rate, Estimated   Result Value Ref Range    Est, Glom Filt Rate >90 ml/min/1.73m2   EKG 12 Lead   Result Value Ref Range    Ventricular Rate 117 BPM    Atrial Rate 117 BPM    P-R Interval 122 ms    QRS Duration 90 ms    Q-T Interval 322 ms    QTc Calculation (Bazett) 449 ms    P Axis 60 degrees    R Axis 42 degrees    T Axis 66 degrees       RADIOLOGY REPORTS  No orders to display       MEDICAL DEDISION MAKING (MDM) AND ED COURSE   Patient is seen and evaluated at 8:25 PM EDT. DDx: EtOH withdrawal, EtOH abuse, dehydration, electrolyte derangement  Plan: large bore IV, normal saline bolus, IV thiamine, labs, IV phenobarbital (PAWS score is 8). Admit. High risk patient for ETOH withdrawal and DT.   ETOH level 0.26. Discussed and admitted to Hospitalist service.      Consult  Hospitalist    Procedures  None    Medications   0.9 % sodium chloride bolus (has no administration in time range)   thiamine (B-1) 200 mg in sodium chloride 0.9 % 100 mL IVPB (has no administration in time range)   PHENobarbital (LUMINAL) injection 260 mg (has no administration in time range)       Vitals:    08/23/22 1940 08/23/22 2002   BP: (!) 159/92 (!) 150/82   Pulse: (!) 121 (!) 115   Resp: 22    Temp: 98.2 °F (36.8 °C)    TempSrc: Oral    SpO2: 95%    Weight: 240 lb (108.9 kg)    Height: 5' 11\" (1.803 m)        FINAL IMPRESSION AND DISPOSITION      1. Alcohol withdrawal syndrome with complication ([facility]        DISPOSITION Decision To Admit 08/23/2022 08:31:36 PM        PATIENT REFERRED TO:  No follow-up provider specified.   DISCHARGE MEDICATIONS:  New Prescriptions    No medications on file     (Please note that portions of this note were completed with a voice recognition program.  Efforts were made to edit the dictations but occasionally words aremis-transcribed.)  MD Sachin Suarez MD  08/23/22 3560

## 2022-08-24 NOTE — PROGRESS NOTES
Utilize [facility] alcohol withdrawal scale (Based on [place] Modified Alcohol Withdrawal Scale). Tabulate score based on classifications including Tremor, Sweating, Hallucination, Orientation, and Agitation. Tremor: 0  Sweatin  Hallucinations: 0  Orientation: 0  Agitation: 0  Total Score: 0  Action perform as described below     Tremor:  No tremor is 0 points. Tremor on movement is 1 point. Tremor at rest is 2 points. Sweating: No Sweat 0 points. Moist is 1 point. Drenching sweats is 2 points. Hallucinations: No present 0 points. Dissuadable is 1 point. Not dissuadable is 2 points. Orientation: Oriented 0 points. Vague/detached 1 point. Disoriented/no contact 2 points. Agitation: Calm 0 points. Anxious 1 point. Panicky 2 points. Check scale every 2 hours. Discontinue scoring with 4 consecutive scorings of 0. Scale 0: No phenobarbital given. Re-assess every 60 minutes as needed. Scale 1-3: Phenobarbital 130 mg IV over 3 minutes. Re-assess every 60 minutes as needed. May administer every 60 minutes to a maximum dose of phenobarbital 1040 mg in 24 hours! Scale 4-8: Phenobarbital 260 mg IV over 5 minutes. Re-assess every 60 minutes as needed. May administer every 60 minutes to a maximum dose of phenobarbital 1040mg in 24 hours! Scale 9-10: Transfer to ICU (if not already in ICU). Administer 10mg/kg phenobarbital IV over 60 minutes. Maximum dose phenobarbital is 1040mg in 24 hours!

## 2022-08-25 LAB
ANION GAP SERPL CALCULATED.3IONS-SCNC: 10 MEQ/L (ref 8–16)
BUN BLDV-MCNC: 10 MG/DL (ref 7–22)
CALCIUM SERPL-MCNC: 8.5 MG/DL (ref 8.5–10.5)
CHLORIDE BLD-SCNC: 103 MEQ/L (ref 98–111)
CO2: 24 MEQ/L (ref 23–33)
CREAT SERPL-MCNC: 0.5 MG/DL (ref 0.4–1.2)
ERYTHROCYTE [DISTWIDTH] IN BLOOD BY AUTOMATED COUNT: 14.2 % (ref 11.5–14.5)
ERYTHROCYTE [DISTWIDTH] IN BLOOD BY AUTOMATED COUNT: 50.8 FL (ref 35–45)
GFR SERPL CREATININE-BSD FRML MDRD: > 90 ML/MIN/1.73M2
GLUCOSE BLD-MCNC: 105 MG/DL (ref 70–108)
GLUCOSE BLD-MCNC: 114 MG/DL (ref 70–108)
GLUCOSE BLD-MCNC: 125 MG/DL (ref 70–108)
GLUCOSE BLD-MCNC: 85 MG/DL (ref 70–108)
GLUCOSE BLD-MCNC: 97 MG/DL (ref 70–108)
HCT VFR BLD CALC: 43.4 % (ref 42–52)
HEMOGLOBIN: 14.8 GM/DL (ref 14–18)
MCH RBC QN AUTO: 33.2 PG (ref 26–33)
MCHC RBC AUTO-ENTMCNC: 34.1 GM/DL (ref 32.2–35.5)
MCV RBC AUTO: 97.3 FL (ref 80–94)
PLATELET # BLD: 100 THOU/MM3 (ref 130–400)
PMV BLD AUTO: 9.5 FL (ref 9.4–12.4)
POTASSIUM SERPL-SCNC: 3.6 MEQ/L (ref 3.5–5.2)
RBC # BLD: 4.46 MILL/MM3 (ref 4.7–6.1)
REVIEWED BY: NORMAL
SMEAR REVIEW: NORMAL
SODIUM BLD-SCNC: 137 MEQ/L (ref 135–145)
WBC # BLD: 5.4 THOU/MM3 (ref 4.8–10.8)

## 2022-08-25 PROCEDURE — 6360000002 HC RX W HCPCS: Performed by: PSYCHIATRY & NEUROLOGY

## 2022-08-25 PROCEDURE — 2060000000 HC ICU INTERMEDIATE R&B

## 2022-08-25 PROCEDURE — 85027 COMPLETE CBC AUTOMATED: CPT

## 2022-08-25 PROCEDURE — 82948 REAGENT STRIP/BLOOD GLUCOSE: CPT

## 2022-08-25 PROCEDURE — 6370000000 HC RX 637 (ALT 250 FOR IP): Performed by: PEDIATRICS

## 2022-08-25 PROCEDURE — 99232 SBSQ HOSP IP/OBS MODERATE 35: CPT | Performed by: INTERNAL MEDICINE

## 2022-08-25 PROCEDURE — 2580000003 HC RX 258: Performed by: PEDIATRICS

## 2022-08-25 PROCEDURE — 6370000000 HC RX 637 (ALT 250 FOR IP): Performed by: STUDENT IN AN ORGANIZED HEALTH CARE EDUCATION/TRAINING PROGRAM

## 2022-08-25 PROCEDURE — 80048 BASIC METABOLIC PNL TOTAL CA: CPT

## 2022-08-25 PROCEDURE — 6370000000 HC RX 637 (ALT 250 FOR IP)

## 2022-08-25 PROCEDURE — 36415 COLL VENOUS BLD VENIPUNCTURE: CPT

## 2022-08-25 RX ORDER — GUAIFENESIN 600 MG/1
600 TABLET, EXTENDED RELEASE ORAL 2 TIMES DAILY
Status: DISCONTINUED | OUTPATIENT
Start: 2022-08-25 | End: 2022-08-26 | Stop reason: HOSPADM

## 2022-08-25 RX ORDER — NALTREXONE HYDROCHLORIDE 50 MG/1
50 TABLET, FILM COATED ORAL DAILY
COMMUNITY
Start: 2022-08-15

## 2022-08-25 RX ORDER — FOLIC ACID 1 MG/1
1 TABLET ORAL DAILY
Status: DISCONTINUED | OUTPATIENT
Start: 2022-08-25 | End: 2022-08-26 | Stop reason: HOSPADM

## 2022-08-25 RX ADMIN — SODIUM CHLORIDE, PRESERVATIVE FREE 10 ML: 5 INJECTION INTRAVENOUS at 19:57

## 2022-08-25 RX ADMIN — GUAIFENESIN 600 MG: 600 TABLET, EXTENDED RELEASE ORAL at 22:34

## 2022-08-25 RX ADMIN — LISINOPRIL 20 MG: 20 TABLET ORAL at 08:53

## 2022-08-25 RX ADMIN — METOPROLOL TARTRATE 50 MG: 50 TABLET, FILM COATED ORAL at 08:53

## 2022-08-25 RX ADMIN — SODIUM CHLORIDE, PRESERVATIVE FREE 10 ML: 5 INJECTION INTRAVENOUS at 08:52

## 2022-08-25 RX ADMIN — Medication 100 MG: at 08:53

## 2022-08-25 RX ADMIN — ENOXAPARIN SODIUM 30 MG: 100 INJECTION SUBCUTANEOUS at 08:53

## 2022-08-25 RX ADMIN — ENOXAPARIN SODIUM 30 MG: 100 INJECTION SUBCUTANEOUS at 21:11

## 2022-08-25 RX ADMIN — Medication 1 TABLET: at 08:53

## 2022-08-25 RX ADMIN — FOLIC ACID 1 MG: 1 TABLET ORAL at 12:14

## 2022-08-25 ASSESSMENT — PAIN SCALES - GENERAL
PAINLEVEL_OUTOF10: 0

## 2022-08-25 NOTE — PROGRESS NOTES
Utilize [facility] alcohol withdrawal scale (Based on [place] Modified Alcohol Withdrawal Scale). Tabulate score based on classifications including Tremor, Sweating, Hallucination, Orientation, and Agitation. Tremor: 0  Sweatin  Hallucinations: 0  Orientation: 0  Agitation: 0  Total Score: 0  Action perform as described below     Tremor:  No tremor is 0 points. Tremor on movement is 1 point. Tremor at rest is 2 points. Sweating: No Sweat 0 points. Moist is 1 point. Drenching sweats is 2 points. Hallucinations: No present 0 points. Dissuadable is 1 point. Not dissuadable is 2 points. Orientation: Oriented 0 points. Vague/detached 1 point. Disoriented/no contact 2 points. Agitation: Calm 0 points. Anxious 1 point. Panicky 2 points. Check scale every 2 hours. Discontinue scoring with 4 consecutive scorings of 0. Scale 0: No phenobarbital given. Re-assess every 60 minutes as needed. Scale 1-3: Phenobarbital 130 mg IV over 3 minutes. Re-assess every 60 minutes as needed. May administer every 60 minutes to a maximum dose of phenobarbital 1040 mg in 24 hours! Scale 4-8: Phenobarbital 260 mg IV over 5 minutes. Re-assess every 60 minutes as needed. May administer every 60 minutes to a maximum dose of phenobarbital 1040mg in 24 hours! Scale 9-10: Transfer to ICU (if not already in ICU). Administer 10mg/kg phenobarbital IV over 60 minutes. Maximum dose phenobarbital is 1040mg in 24 hours!

## 2022-08-25 NOTE — PROGRESS NOTES
Utilize [facility] alcohol withdrawal scale (Based on [place] Modified Alcohol Withdrawal Scale). Tabulate score based on classifications including Tremor, Sweating, Hallucination, Orientation, and Agitation. Tremor: 0  Sweatin  Hallucinations: 0  Orientation: 0  Agitation: 1  Total Score: 1   Action perform as described below     Tremor:  No tremor is 0 points. Tremor on movement is 1 point. Tremor at rest is 2 points. Sweating: No Sweat 0 points. Moist is 1 point. Drenching sweats is 2 points. Hallucinations: No present 0 points. Dissuadable is 1 point. Not dissuadable is 2 points. Orientation: Oriented 0 points. Vague/detached 1 point. Disoriented/no contact 2 points. Agitation: Calm 0 points. Anxious 1 point. Panicky 2 points. Check scale every 2 hours. Discontinue scoring with 4 consecutive scorings of 0. Scale 0: No phenobarbital given. Re-assess every 60 minutes as needed. Scale 1-3: Phenobarbital 130 mg IV over 3 minutes. Re-assess every 60 minutes as needed. May administer every 60 minutes to a maximum dose of phenobarbital 1040 mg in 24 hours! Scale 4-8: Phenobarbital 260 mg IV over 5 minutes. Re-assess every 60 minutes as needed. May administer every 60 minutes to a maximum dose of phenobarbital 1040mg in 24 hours! Scale 9-10: Transfer to ICU (if not already in ICU). Administer 10mg/kg phenobarbital IV over 60 minutes.   Maximum dose phenobarbital is 1040mg in 24 hours! 0

## 2022-08-25 NOTE — PROGRESS NOTES
Comprehensive Nutrition Assessment    Type and Reason for Visit:  Initial, Positive Nutrition Screen    Nutrition Recommendations/Plan:   Continue Multivitamin, Folic Acid and Thiamine daily d/t hx ETOH abuse. ONS: Started [id number] BID. Continue current diet. Malnutrition Assessment:  Malnutrition Status: At risk for malnutrition (Comment) (08/25/22 1329)    Context:  Acute Illness     Findings of the 6 clinical characteristics of malnutrition:  Energy Intake:  No significant decrease in energy intake  Weight Loss:  No significant weight loss (-4% weight loss in one month per EMR)     Body Fat Loss:  No significant body fat loss     Muscle Mass Loss:  No significant muscle mass loss    Fluid Accumulation:  No significant fluid accumulation     Strength:  Not Performed    Nutrition Assessment:Pt. nutritionally compromised AEB wounds. At risk for further nutrition compromise r/t admit with alcohol abuse and withdrawal, increased nutrient needs for wound healing and underlying medical condition (Hx: COPD, Depression, DM, ETOH abuse, GERD, HTN, HLD, Liver disease, seizures, tobacco abuse). Nutrition Related Findings:    Pt. Report/Treatments/Miscellaneous: Pt seen- he reports not eating much of meals over the past 2 weeks PTA d/t drinking beer, but states his appetite and intake of meals has been good since admit and has been consuming foods high in protein to aid in wound healing; pt amenable to try [id number] BID. GI Status: Pt denies N/V. No BM since admit. Pertinent Labs: 8/25/22: Na 137, K+ 3.6, Mg 1.9, POC Glucose 114, Glucose 85  Pertinent Meds: Folic Acid, Thiamine, Multivitamin Wound Type: Pressure Injury, Stage II (on right tibial)       Current Nutrition Intake & Therapies:    Average Meal Intake: %  Average Supplements Intake:  (initiated today)  ADULT DIET;  Regular  ADULT ORAL NUTRITION SUPPLEMENT; Dinner, Lunch; Protein Modular    Anthropometric Measures:  Height: 5' 11\"

## 2022-08-25 NOTE — PROGRESS NOTES
Utilize [facility] alcohol withdrawal scale (Based on [place] Modified Alcohol Withdrawal Scale). Tabulate score based on classifications including Tremor, Sweating, Hallucination, Orientation, and Agitation. Tremor: 0  Sweatin  Hallucinations: 0  Orientation: 0  Agitation: 0  Total Score: 0  Action perform as described below     Tremor:  No tremor is 0 points. Tremor on movement is 1 point. Tremor at rest is 2 points. Sweating: No Sweat 0 points. Moist is 1 point. Drenching sweats is 2 points. Hallucinations: No present 0 points. Dissuadable is 1 point. Not dissuadable is 2 points. Orientation: Oriented 0 points. Vague/detached 1 point. Disoriented/no contact 2 points. Agitation: Calm 0 points. Anxious 1 point. Panicky 2 points. Check scale every 2 hours. Discontinue scoring with 4 consecutive scorings of 0. Scale 0: No phenobarbital given. Re-assess every 60 minutes as needed. Scale 1-3: Phenobarbital 130 mg IV over 3 minutes. Re-assess every 60 minutes as needed. May administer every 60 minutes to a maximum dose of phenobarbital 1040 mg in 24 hours! Scale 4-8: Phenobarbital 260 mg IV over 5 minutes. Re-assess every 60 minutes as needed. May administer every 60 minutes to a maximum dose of phenobarbital 1040mg in 24 hours! Scale 9-10: Transfer to ICU (if not already in ICU). Administer 10mg/kg phenobarbital IV over 60 minutes. Maximum dose phenobarbital is 1040mg in 24 hours!

## 2022-08-25 NOTE — PROGRESS NOTES
Internal Medicine Resident Progress Note    Patient:  Nancy Grace    YOB: 1975  Unit/Bed:4K-22/022-A  MRN: [id number]    Acct: [de-identified]   PCP: NILAM Jerry - CNP    Date of Admission: 8/23/2022      Assessment/Plan:  Alcohol abuse and withdrawal  + Patient states he drank 30 beers daily prior to admission, with his last drink 2:30 PM on 8/23  + Urine drug screen on 8/23 negative  -Telemetry ordered on 8/23  -Multivitamins, thiamine, folic acid ordered, started on 8/24  -Follow-up with behavioral health consultation after discharge    Hypertension  -Continue lisinopril 20 mg daily  -Continue Lopressor 50 mg daily    NIDDM 2  + Last A1c 5.2 on 7/22/2022, not currently on antihyperglycemic medications  -Check Accu-Cheks before every meal and at bedtime  -Sliding scale insulin ordered    Tobacco use disorder  + Patient was counseled with smoking cessation  -As needed nicotine patches    History of COPD  + Patient has no records of spirometry, PFT  -Follow-up with PCP    Expected discharge date:  8/26    Disposition:   [x] Home  [] TCU  [] Rehab  [] Psych  [] SNF  [] [facility]  [] Other-    ===================================================================      Chief Complaint: Alcohol withdrawal    Hospital Course:     Per HPI \" Nancy Grace is a 56 y.o. male with PMHx of alcohol abuse, hypertension, hyperlipidemia, alcohol withdrawal seizures, and GERD, who presented to our emergency room here at [facility] on 8/23/2022 complaining of heavy alcohol consumption for the last 2 weeks drinking about 30 over the 12 ounce cans of beer daily on top of whiskey fireball's in between to the point where he was no longer able to carry on with his normal life he says and therefore decided to come to the emergency room for help. Patient has just recently completed a 1 year inpatient detox program this last July 2022 where he was sober for the entire year. However, after the program he rejoined his old friends and started drinking again. He relapsed badly the last 2 weeks he says where he was binge drinking pretty much daily. He has a known history of bad alcohol withdrawal requiring ICU admission with severe symptoms that include alcohol withdrawal seizures or delirium tremens. He was concerned about going into alcohol withdrawal so he came in seeking medical attention. His last reported alcohol drink was around 2 PM today. Patient currently denies any other symptoms related to chest pain or difficulty in breathing. He is no longer interested in inpatient detox but would want to be seen by behavioral health for outpatient referral for group therapy. He does also smoke tobacco between 2-1/2 packs/day. Denies using any other illicit substances. ED course: In the emergency room patient's urine drug screen was negative. Mildly hypertensive with systolic blood pressures in the 150s but otherwise normal vital signs. His laboratory work-up was unremarkable. At the time of evaluation patient was laying in bed comfortably no acute distress. Stable vital signs with no tremors or shakiness or signs of withdrawal from alcohol. \"     On 8/24, as needed phenobarbital based on EtOH scale ordered. Thiamine discontinued. Subjective (past 24 hours):     Patient was in no acute distress, found laying in his bed covered in his blankets. Patient admitted to productive cough with clear sputum. Patient denied chest pain, fever, chills, nausea, vomiting, diarrhea, loss of appetite, anxiety, aggravation. ROS: reviewed complete ROS unchanged unless otherwise stated in hospital course/subjective portion.        Medications:  Reviewed    Infusion Medications    sodium chloride      dextrose       Scheduled Medications    folic acid  1 mg Oral Daily    lisinopril  20 mg Oral Daily    metoprolol tartrate  50 mg Oral Daily    sodium chloride flush  5-40 mL 08/23/22 2010 08/25/22  0342   WBC 8.2 5.4   HGB 16.8 14.8   HCT 46.5 43.4    100*     Recent Labs     08/23/22 2010 08/25/22  0342    137   K 4.0 3.6    103   CO2 23 24   BUN 9 10   CREATININE 0.8 0.5   CALCIUM 8.9 8.5     Recent Labs     08/23/22 2010 08/24/22  0423   * 118*   * 87*   BILIDIR  --  <0.2   BILITOT 0.8 0.6   ALKPHOS 258* 198*     No results for input(s): INR in the last 72 hours. No results for input(s): Kaleigh Dross in the last 72 hours. No results for input(s): PROCAL in the last 72 hours. Lab Results   Component Value Date/Time    NITRU NEGATIVE 05/07/2021 02:20 AM    WBCUA NONE SEEN 04/08/2021 07:30 PM    BACTERIA NONE SEEN 04/08/2021 07:30 PM    RBCUA 0-2 04/08/2021 07:30 PM    BLOODU NEGATIVE 05/07/2021 02:20 AM    SPECGRAV 1.008 04/08/2021 07:30 PM    GLUCOSEU NEGATIVE 05/07/2021 02:20 AM       Radiology (48 hours):  No results found. DVT prophylaxis:    [x] Lovenox  [] SCDs  [] SQ Heparin  [] Encourage ambulation   [] Already on Anticoagulation       Diet: ADULT DIET;  Regular  ADULT ORAL NUTRITION SUPPLEMENT; Dinner, Lunch; Protein Modular  Code Status: Full Code  PT/OT: Not needed  Tele: Continuous  IVF: None    Electronically signed by Enriqueta Petersen DO on 8/25/2022 at 1:47 PM    Case was discussed with Attending, Dr. Drake Wall

## 2022-08-25 NOTE — PLAN OF CARE
Problem: ABCDS Injury Assessment  Goal: Absence of physical injury  8/25/2022 1415 by Duke Deal RN  Outcome: Progressing  8/25/2022 1414 by Duke Deal RN  Outcome: Progressing  8/25/2022 0111 by Chelsey Hunt RN  Outcome: Progressing  Flowsheets (Taken 8/24/2022 2253)  Absence of Physical Injury: Implement safety measures based on patient assessment     Problem: Pain  Goal: Verbalizes/displays adequate comfort level or baseline comfort level  8/25/2022 1415 by Duke Deal RN  Outcome: Progressing  8/25/2022 1414 by Duke Deal RN  Outcome: Progressing  8/25/2022 0111 by Chelsey Hunt RN  Outcome: Progressing  Flowsheets (Taken 8/24/2022 0252 by Eliel Lutz RN)  Verbalizes/displays adequate comfort level or baseline comfort level:   Encourage patient to monitor pain and request assistance   Assess pain using appropriate pain scale   Administer analgesics based on type and severity of pain and evaluate response   Implement non-pharmacological measures as appropriate and evaluate response     Problem: Skin/Tissue Integrity  Goal: Absence of new skin breakdown  Description: 1. Monitor for areas of redness and/or skin breakdown  2. Assess vascular access sites hourly  3. Every 4-6 hours minimum:  Change oxygen saturation probe site  4. Every 4-6 hours:  If on nasal continuous positive airway pressure, respiratory therapy assess nares and determine need for appliance change or resting period. 8/25/2022 1415 by Duke Deal RN  Outcome: Progressing  8/25/2022 1414 by Duke Deal RN  Outcome: Progressing  8/25/2022 0111 by Chelsey Hunt RN  Outcome: Progressing  Note: No new skin breakdown noted. Will continue to monitor throughout the shift.      Problem: Neurosensory - Adult  Goal: Absence of seizures  8/25/2022 1415 by Duke Deal RN  Outcome: Progressing  8/25/2022 1414 by Duke Deal RN  Outcome: Progressing  Flowsheets (Taken 8/25/2022 0730)  Absence of seizures:   Monitor for seizure activity. If seizure occurs, document type and location of movements and any associated apnea   If seizure occurs, turn head to side and suction secretions as needed  8/25/2022 0111 by Shreya Johnson RN  Outcome: Progressing  Flowsheets (Taken 8/24/2022 2025)  Absence of seizures:   Monitor for seizure activity.   If seizure occurs, document type and location of movements and any associated apnea   If seizure occurs, turn head to side and suction secretions as needed   Support airway/breathing, administer oxygen as needed     Problem: Neurosensory - Adult  Goal: Achieves maximal functionality and self care  8/25/2022 1415 by Bria Dumont RN  Outcome: Progressing  8/25/2022 1414 by Bria Dumont RN  Outcome: Progressing  8/25/2022 0111 by Shreya Johnson RN  Outcome: Progressing  Flowsheets (Taken 8/24/2022 2025)  Achieves maximal functionality and self care:   Monitor swallowing and airway patency with patient fatigue and changes in neurological status   Encourage and assist patient to increase activity and self care with guidance from physical therapy/occupational therapy     Problem: Chronic Conditions and Co-morbidities  Goal: Patient's chronic conditions and co-morbidity symptoms are monitored and maintained or improved  8/25/2022 1415 by Bria Dumont RN  Outcome: Progressing  8/25/2022 1414 by Bria Dumont RN  Outcome: Progressing  8/25/2022 0111 by Shreya Johnson RN  Outcome: Progressing  Flowsheets (Taken 8/24/2022 2025)  Care Plan - Patient's Chronic Conditions and Co-Morbidity Symptoms are Monitored and Maintained or Improved:   Monitor and assess patient's chronic conditions and comorbid symptoms for stability, deterioration, or improvement   Collaborate with multidisciplinary team to address chronic and comorbid conditions and prevent exacerbation or deterioration     Problem: Nutrition Deficit:  Goal: Optimize nutritional status  8/25/2022 1415 by Luca Flash Simental RN  Outcome: Progressing  8/25/2022 1414 by Osvaldo Chaves RN  Outcome: Progressing  Flowsheets (Taken 8/25/2022 1414)  Nutrient intake appropriate for improving, restoring, or maintaining nutritional needs:   Assess nutritional status and recommend course of action   Recommend appropriate diets, oral nutritional supplements, and vitamin/mineral supplements    Care plan reviewed with patient. Patient verbalize understanding of the plan of care and contribute to goal setting.

## 2022-08-25 NOTE — PROGRESS NOTES
Utilize [facility] alcohol withdrawal scale (Based on [place] Modified Alcohol Withdrawal Scale). Tabulate score based on classifications including Tremor, Sweating, Hallucination, Orientation, and Agitation. Tremor: 0  Sweatin  Hallucinations: 0  Orientation: 0  Agitation: 0  Total Score: 0  Action perform as described below     Tremor:  No tremor is 0 points. Tremor on movement is 1 point. Tremor at rest is 2 points. Sweating: No Sweat 0 points. Moist is 1 point. Drenching sweats is 2 points. Hallucinations: No present 0 points. Dissuadable is 1 point. Not dissuadable is 2 points. Orientation: Oriented 0 points. Vague/detached 1 point. Disoriented/no contact 2 points. Agitation: Calm 0 points. Anxious 1 point. Panicky 2 points. Check scale every 2 hours. Discontinue scoring with 4 consecutive scorings of 0. Scale 0: No phenobarbital given. Re-assess every 60 minutes as needed. Scale 1-3: Phenobarbital 130 mg IV over 3 minutes. Re-assess every 60 minutes as needed. May administer every 60 minutes to a maximum dose of phenobarbital 1040 mg in 24 hours! Scale 4-8: Phenobarbital 260 mg IV over 5 minutes. Re-assess every 60 minutes as needed. May administer every 60 minutes to a maximum dose of phenobarbital 1040mg in 24 hours! Scale 9-10: Transfer to ICU (if not already in ICU). Administer 10mg/kg phenobarbital IV over 60 minutes. Maximum dose phenobarbital is 1040mg in 24 hours!

## 2022-08-25 NOTE — PROGRESS NOTES
Utilize [facility] alcohol withdrawal scale (Based on [place] Modified Alcohol Withdrawal Scale). Tabulate score based on classifications including Tremor, Sweating, Hallucination, Orientation, and Agitation. Tremor: 0  Sweatin  Hallucinations: 0  Orientation: 0  Agitation: 0  Total Score: 0  Action perform as described below     Tremor:  No tremor is 0 points. Tremor on movement is 1 point. Tremor at rest is 2 points. Sweating: No Sweat 0 points. Moist is 1 point. Drenching sweats is 2 points. Hallucinations: No present 0 points. Dissuadable is 1 point. Not dissuadable is 2 points. Orientation: Oriented 0 points. Vague/detached 1 point. Disoriented/no contact 2 points. Agitation: Calm 0 points. Anxious 1 point. Panicky 2 points. Check scale every 2 hours. Discontinue scoring with 4 consecutive scorings of 0. Scale 0: No phenobarbital given. Re-assess every 60 minutes as needed. Scale 1-3: Phenobarbital 130 mg IV over 3 minutes. Re-assess every 60 minutes as needed. May administer every 60 minutes to a maximum dose of phenobarbital 1040 mg in 24 hours! Scale 4-8: Phenobarbital 260 mg IV over 5 minutes. Re-assess every 60 minutes as needed. May administer every 60 minutes to a maximum dose of phenobarbital 1040mg in 24 hours! Scale 9-10: Transfer to ICU (if not already in ICU). Administer 10mg/kg phenobarbital IV over 60 minutes. Maximum dose phenobarbital is 1040mg in 24 hours!

## 2022-08-25 NOTE — PLAN OF CARE
Problem: Discharge Planning  Goal: Discharge to home or other facility with appropriate resources  Outcome: Progressing  Flowsheets (Taken 8/24/2022 2025)  Discharge to home or other facility with appropriate resources:   Identify barriers to discharge with patient and caregiver   Arrange for needed discharge resources and transportation as appropriate   Identify discharge learning needs (meds, wound care, etc)   Refer to discharge planning if patient needs post-hospital services based on physician order or complex needs related to functional status, cognitive ability or social support system     Problem: Safety - Adult  Goal: Free from fall injury  Outcome: Progressing  Flowsheets (Taken 8/24/2022 2253)  Free From Fall Injury: Instruct family/caregiver on patient safety     Problem: ABCDS Injury Assessment  Goal: Absence of physical injury  Outcome: Progressing  Flowsheets (Taken 8/24/2022 2253)  Absence of Physical Injury: Implement safety measures based on patient assessment     Problem: Pain  Goal: Verbalizes/displays adequate comfort level or baseline comfort level  Outcome: Progressing  Flowsheets (Taken 8/24/2022 0252 by Gerlene Runner, RN)  Verbalizes/displays adequate comfort level or baseline comfort level:   Encourage patient to monitor pain and request assistance   Assess pain using appropriate pain scale   Administer analgesics based on type and severity of pain and evaluate response   Implement non-pharmacological measures as appropriate and evaluate response     Problem: Skin/Tissue Integrity  Goal: Absence of new skin breakdown  Description: 1. Monitor for areas of redness and/or skin breakdown  2. Assess vascular access sites hourly  3. Every 4-6 hours minimum:  Change oxygen saturation probe site  4. Every 4-6 hours:  If on nasal continuous positive airway pressure, respiratory therapy assess nares and determine need for appliance change or resting period.   Outcome: Progressing  Note: No new skin breakdown noted. Will continue to monitor throughout the shift. Problem: Neurosensory - Adult  Goal: Absence of seizures  Outcome: Progressing  Flowsheets (Taken 8/24/2022 2025)  Absence of seizures:   Monitor for seizure activity. If seizure occurs, document type and location of movements and any associated apnea   If seizure occurs, turn head to side and suction secretions as needed   Support airway/breathing, administer oxygen as needed     Problem: Neurosensory - Adult  Goal: Achieves maximal functionality and self care  Outcome: Progressing  Flowsheets (Taken 8/24/2022 2025)  Achieves maximal functionality and self care:   Monitor swallowing and airway patency with patient fatigue and changes in neurological status   Encourage and assist patient to increase activity and self care with guidance from physical therapy/occupational therapy     Problem: Chronic Conditions and Co-morbidities  Goal: Patient's chronic conditions and co-morbidity symptoms are monitored and maintained or improved  Outcome: Progressing  Flowsheets (Taken 8/24/2022 2025)  Care Plan - Patient's Chronic Conditions and Co-Morbidity Symptoms are Monitored and Maintained or Improved:   Monitor and assess patient's chronic conditions and comorbid symptoms for stability, deterioration, or improvement   Collaborate with multidisciplinary team to address chronic and comorbid conditions and prevent exacerbation or deterioration      Patient participated in plan of care and contributed to goal setting.

## 2022-08-26 VITALS
HEIGHT: 71 IN | OXYGEN SATURATION: 94 % | WEIGHT: 242.9 LBS | SYSTOLIC BLOOD PRESSURE: 140 MMHG | HEART RATE: 74 BPM | BODY MASS INDEX: 34.01 KG/M2 | RESPIRATION RATE: 14 BRPM | TEMPERATURE: 98 F | DIASTOLIC BLOOD PRESSURE: 85 MMHG

## 2022-08-26 LAB
ANION GAP SERPL CALCULATED.3IONS-SCNC: 9 MEQ/L (ref 8–16)
BUN BLDV-MCNC: 13 MG/DL (ref 7–22)
CALCIUM SERPL-MCNC: 8.5 MG/DL (ref 8.5–10.5)
CHLORIDE BLD-SCNC: 102 MEQ/L (ref 98–111)
CO2: 23 MEQ/L (ref 23–33)
CREAT SERPL-MCNC: 0.5 MG/DL (ref 0.4–1.2)
ERYTHROCYTE [DISTWIDTH] IN BLOOD BY AUTOMATED COUNT: 14.7 % (ref 11.5–14.5)
ERYTHROCYTE [DISTWIDTH] IN BLOOD BY AUTOMATED COUNT: 53.6 FL (ref 35–45)
GFR SERPL CREATININE-BSD FRML MDRD: > 90 ML/MIN/1.73M2
GLUCOSE BLD-MCNC: 102 MG/DL (ref 70–108)
GLUCOSE BLD-MCNC: 102 MG/DL (ref 70–108)
GLUCOSE BLD-MCNC: 116 MG/DL (ref 70–108)
HCT VFR BLD CALC: 43.2 % (ref 42–52)
HEMOGLOBIN: 14.8 GM/DL (ref 14–18)
MCH RBC QN AUTO: 34 PG (ref 26–33)
MCHC RBC AUTO-ENTMCNC: 34.3 GM/DL (ref 32.2–35.5)
MCV RBC AUTO: 99.3 FL (ref 80–94)
PLATELET # BLD: 83 THOU/MM3 (ref 130–400)
PMV BLD AUTO: 9.9 FL (ref 9.4–12.4)
POTASSIUM SERPL-SCNC: 3.7 MEQ/L (ref 3.5–5.2)
RBC # BLD: 4.35 MILL/MM3 (ref 4.7–6.1)
SODIUM BLD-SCNC: 134 MEQ/L (ref 135–145)
WBC # BLD: 5 THOU/MM3 (ref 4.8–10.8)

## 2022-08-26 PROCEDURE — 36415 COLL VENOUS BLD VENIPUNCTURE: CPT

## 2022-08-26 PROCEDURE — 99238 HOSP IP/OBS DSCHRG MGMT 30/<: CPT | Performed by: INTERNAL MEDICINE

## 2022-08-26 PROCEDURE — 2580000003 HC RX 258: Performed by: PEDIATRICS

## 2022-08-26 PROCEDURE — 80048 BASIC METABOLIC PNL TOTAL CA: CPT

## 2022-08-26 PROCEDURE — 82948 REAGENT STRIP/BLOOD GLUCOSE: CPT

## 2022-08-26 PROCEDURE — 6370000000 HC RX 637 (ALT 250 FOR IP): Performed by: PEDIATRICS

## 2022-08-26 PROCEDURE — 6370000000 HC RX 637 (ALT 250 FOR IP): Performed by: STUDENT IN AN ORGANIZED HEALTH CARE EDUCATION/TRAINING PROGRAM

## 2022-08-26 PROCEDURE — 6370000000 HC RX 637 (ALT 250 FOR IP)

## 2022-08-26 PROCEDURE — 85027 COMPLETE CBC AUTOMATED: CPT

## 2022-08-26 RX ORDER — FOLIC ACID 1 MG/1
1 TABLET ORAL DAILY
Qty: 30 TABLET | Refills: 0 | Status: SHIPPED | OUTPATIENT
Start: 2022-08-26

## 2022-08-26 RX ORDER — THIAMINE MONONITRATE (VIT B1) 100 MG
100 TABLET ORAL DAILY
Qty: 30 TABLET | Refills: 0 | Status: SHIPPED | OUTPATIENT
Start: 2022-08-26

## 2022-08-26 RX ADMIN — Medication 100 MG: at 08:57

## 2022-08-26 RX ADMIN — GUAIFENESIN 600 MG: 600 TABLET, EXTENDED RELEASE ORAL at 08:57

## 2022-08-26 RX ADMIN — METOPROLOL TARTRATE 50 MG: 50 TABLET, FILM COATED ORAL at 08:57

## 2022-08-26 RX ADMIN — SODIUM CHLORIDE, PRESERVATIVE FREE 10 ML: 5 INJECTION INTRAVENOUS at 08:58

## 2022-08-26 RX ADMIN — Medication 1 TABLET: at 08:57

## 2022-08-26 RX ADMIN — FOLIC ACID 1 MG: 1 TABLET ORAL at 08:57

## 2022-08-26 RX ADMIN — LISINOPRIL 20 MG: 20 TABLET ORAL at 08:57

## 2022-08-26 ASSESSMENT — PAIN SCALES - GENERAL
PAINLEVEL_OUTOF10: 0
PAINLEVEL_OUTOF10: 0

## 2022-08-26 NOTE — PLAN OF CARE
Problem: Discharge Planning  Goal: Discharge to home or other facility with appropriate resources  8/26/2022 1400 by Ken Haque RN  Outcome: Completed  8/26/2022 0224 by Jie Madison RN  Outcome: Progressing  Flowsheets (Taken 8/25/2022 2015)  Discharge to home or other facility with appropriate resources:   Identify barriers to discharge with patient and caregiver   Arrange for needed discharge resources and transportation as appropriate     Problem: Safety - Adult  Goal: Free from fall injury  8/26/2022 1400 by Ken Haque RN  Outcome: Completed  Flowsheets (Taken 8/26/2022 0227 by Jie Madison RN)  Free From Fall Injury: Instruct family/caregiver on patient safety  8/26/2022 0224 by Jie Madison RN  Outcome: Progressing  Flowsheets (Taken 8/24/2022 2253 by Anthony Trevizo RN)  Free From Fall Injury: Instruct family/caregiver on patient safety     Problem: ABCDS Injury Assessment  Goal: Absence of physical injury  8/26/2022 1400 by Ken Haque RN  Outcome: Completed  Flowsheets (Taken 8/26/2022 0227 by Jie Madison RN)  Absence of Physical Injury: Implement safety measures based on patient assessment  8/26/2022 0224 by Jie Madison RN  Outcome: Progressing  Flowsheets (Taken 8/26/2022 0224)  Absence of Physical Injury: Implement safety measures based on patient assessment     Problem: Pain  Goal: Verbalizes/displays adequate comfort level or baseline comfort level  8/26/2022 1400 by Ken Haque RN  Outcome: Completed  8/26/2022 0224 by Jie Madison RN  Outcome: Progressing  Flowsheets (Taken 8/26/2022 0224)  Verbalizes/displays adequate comfort level or baseline comfort level:   Encourage patient to monitor pain and request assistance   Assess pain using appropriate pain scale   Administer analgesics based on type and severity of pain and evaluate response   Implement non-pharmacological measures as appropriate and evaluate response   Notify Licensed Independent Practitioner if interventions unsuccessful or patient reports new pain     Problem: Skin/Tissue Integrity  Goal: Absence of new skin breakdown  Description: 1. Monitor for areas of redness and/or skin breakdown  2. Assess vascular access sites hourly  3. Every 4-6 hours minimum:  Change oxygen saturation probe site  4. Every 4-6 hours:  If on nasal continuous positive airway pressure, respiratory therapy assess nares and determine need for appliance change or resting period. 8/26/2022 1400 by Galo Garces RN  Outcome: Completed  8/26/2022 0224 by Scott Muñoz RN  Outcome: Progressing  Note: No new evidence of skin breakdown     Problem: Neurosensory - Adult  Goal: Absence of seizures  8/26/2022 1400 by Galo Garces RN  Outcome: Completed  8/26/2022 0224 by Scott Muñoz RN  Outcome: Progressing  Flowsheets (Taken 8/25/2022 2015)  Absence of seizures:   Monitor for seizure activity.   If seizure occurs, document type and location of movements and any associated apnea   If seizure occurs, turn head to side and suction secretions as needed   Administer anticonvulsants as ordered   Support airway/breathing, administer oxygen as needed  Goal: Achieves maximal functionality and self care  8/26/2022 1400 by Galo Garces RN  Outcome: Completed  8/26/2022 0224 by Scott Muñoz RN  Outcome: Progressing  Flowsheets (Taken 8/25/2022 2015)  Achieves maximal functionality and self care:   Monitor swallowing and airway patency with patient fatigue and changes in neurological status   Encourage and assist patient to increase activity and self care with guidance from physical therapy/occupational therapy     Problem: Chronic Conditions and Co-morbidities  Goal: Patient's chronic conditions and co-morbidity symptoms are monitored and maintained or improved  8/26/2022 1400 by Galo Garces RN  Outcome: Completed  8/26/2022 0224 by Scott Muñoz RN  Outcome: Progressing  Flowsheets (Taken 8/25/2022 2015)  Care Plan - Patient's Chronic Conditions and Co-Morbidity Symptoms are Monitored and Maintained or Improved: Monitor and assess patient's chronic conditions and comorbid symptoms for stability, deterioration, or improvement     Problem: Nutrition Deficit:  Goal: Optimize nutritional status  8/26/2022 1400 by Kourtney Billings RN  Outcome: Completed  8/26/2022 0224 by Laxmi Figueroa RN  Outcome: Progressing  Flowsheets (Taken 8/26/2022 0224)  Nutrient intake appropriate for improving, restoring, or maintaining nutritional needs:   Assess nutritional status and recommend course of action   Monitor oral intake, labs, and treatment plans   Recommend appropriate diets, oral nutritional supplements, and vitamin/mineral supplements   Provide specific nutrition education to patient or family as appropriate   Care plan reviewed with patient. Patient verbalizes understanding of the plan of care and contributes to goal setting.

## 2022-08-26 NOTE — DISCHARGE SUMMARY
does also smoke tobacco between 2-1/2 packs/day. Denies using any other illicit substances. ED course: In the emergency room patient's urine drug screen was negative. Mildly hypertensive with systolic blood pressures in the 150s but otherwise normal vital signs. His laboratory work-up was unremarkable. At the time of evaluation patient was laying in bed comfortably no acute distress. Stable vital signs with no tremors or shakiness or signs of withdrawal from alcohol. \"      On 8/24, as needed phenobarbital based on EtOH scale ordered. Thiamine discontinued. Patient discharged home on 8/26. Discharge Diagnoses:  Alcohol abuse and withdrawal  + Patient states he drank 30 beers daily prior to admission, with his last drink 2:30 PM on 8/23  + Urine drug screen on 8/23 negative  -Start 50 mg of naltrexone on discharge  -Follow-up with behavioral health consultation after discharge  Hypertension  -Continue lisinopril 20 mg daily  -Continue Lopressor 50 mg daily  NIDDM 2  + Last A1c 5.2 on 7/22/2022, not currently on antihyperglycemic medications  Tobacco use disorder  + Patient was counseled with smoking cessation  History of COPD  + Patient has no records of spirometry, PFT  -Follow-up with PCP for PFT or spirometry    The patient was seen and examined on day of discharge and this discharge summary is in conjunction with any daily progress note from day of discharge. The patient is discharged in stable condition.      Exam:     Vitals:  Vitals:    08/25/22 2000 08/25/22 2306 08/26/22 0359 08/26/22 0845   BP:  130/77 111/72 128/87   Pulse: 92 85 80 89   Resp: 18 18 18 20   Temp:  98.8 °F (37.1 °C) 98 °F (36.7 °C) 97.8 °F (36.6 °C)   TempSrc:  Oral Oral Oral   SpO2:  94% 95% 96%   Weight:       Height:         Weight: Weight: 242 lb 14.4 oz (110.2 kg)     24 hour intake/output:  Intake/Output Summary (Last 24 hours) at 8/26/2022 0913  Last data filed at 8/26/2022 0843  Gross per 24 hour   Intake 960 ml Output 2750 ml   Net -1790 ml       General: Mild distress, appears stated age. Patient appears obese  Eyes:  PERRL. Conjunctivae/corneas clear. HENT: Head normal appearing. Nares normal. Oral mucosa moist.  Hearing intact. Neck: Supple, with full range of motion. Trachea midline. No gross JVD appreciated. Respiratory:  Normal effort. On auscultation, without rales or rhonchi. Wheezes appreciated in left and right lung fields  Cardiovascular: Normal rate, regular rhythm with normal S1/S2 without murmurs. No lower extremity edema. Abdomen: Soft, non-tender, non-distended with normal bowel sounds. Musculoskeletal: No joint swelling or tenderness. Normal tone. No abnormal movements. Skin: Warm and dry. No rashes or lesions. Neurologic:  No focal sensory/motor deficits in the upper or lower extremities. Cranial nerves:  grossly non-focal 2-12. Psychiatric: Alert and oriented, normal insight and thought content. Capillary Refill: Brisk,< 3 seconds. Peripheral Pulses: +2 palpable, equal bilaterally. Labs:  For convenience and continuity at follow-up the following most recent labs are provided:    CBC:    Lab Results   Component Value Date/Time    WBC 5.0 08/26/2022 05:16 AM    HGB 14.8 08/26/2022 05:16 AM    HCT 43.2 08/26/2022 05:16 AM    PLT 83 08/26/2022 05:16 AM       Renal:    Lab Results   Component Value Date/Time     08/26/2022 05:16 AM    K 3.7 08/26/2022 05:16 AM    K 4.0 08/23/2022 08:10 PM     08/26/2022 05:16 AM    CO2 23 08/26/2022 05:16 AM    BUN 13 08/26/2022 05:16 AM    CREATININE 0.5 08/26/2022 05:16 AM    CALCIUM 8.5 08/26/2022 05:16 AM    PHOS 3.9 07/25/2022 04:44 AM         Significant Diagnostic Studies    Radiology:   No orders to display          Consults:     IP CONSULT TO SOCIAL WORK  IP CONSULT TO ADDICTION SERVICES    Disposition: Home  Condition at Discharge: Stable    Code Status:  Full Code     Patient Instructions:    Discharge lab work: None  Activity: activity as tolerated  Diet: ADULT DIET; Regular  ADULT ORAL NUTRITION SUPPLEMENT; Dinner, Lunch; Protein Modular      Follow-up visits:   No follow-up provider specified. Discharge Medications:        Medication List        CONTINUE taking these medications      glucose monitoring kit  Glucometer with test strips and lancets. Check BS once daily  #100 strips and lancets            ASK your doctor about these medications      blood glucose test strips strip  Commonly known as: ASCENSIA AUTODISC VI;ONE TOUCH ULTRA TEST VI  Test as needed. Dispense One Touch verio Test Strips. Dx: Type 2 diabetes (250.00)     lisinopril 20 MG tablet  Commonly known as: PRINIVIL;ZESTRIL  Take 1 tablet by mouth in the morning. metoprolol tartrate 50 MG tablet  Commonly known as: LOPRESSOR  Take 1 tablet by mouth in the morning. miconazole 2 % powder  Commonly known as: MICOTIN  Apply topically 2 times daily. naltrexone 50 MG tablet  Commonly known as: DEPADE     sildenafil 100 MG tablet  Commonly known as: VIAGRA  Take 1 tablet by mouth daily as needed for Erectile Dysfunction     Ventolin  (90 Base) MCG/ACT inhaler  Generic drug: albuterol sulfate HFA                   Time Spent on discharge is 25 minutes in the examination, evaluation, counseling and review of medications and discharge plan. Thank you NILAM Lopes - SILVINO for the opportunity to be involved in this patient's care.       Signed:    Electronically signed by Sendy Godinez DO on 8/26/22 at 9:13 AM EDT     Case was discussed with Attending,  [facility]

## 2022-08-26 NOTE — PLAN OF CARE
Progressing  8/25/2022 1414 by Logan Valdivia RN  Outcome: Progressing     Problem: Skin/Tissue Integrity  Goal: Absence of new skin breakdown  Description: 1. Monitor for areas of redness and/or skin breakdown  2. Assess vascular access sites hourly  3. Every 4-6 hours minimum:  Change oxygen saturation probe site  4. Every 4-6 hours:  If on nasal continuous positive airway pressure, respiratory therapy assess nares and determine need for appliance change or resting period. 8/26/2022 0224 by Agueda Baum RN  Outcome: Progressing  Note: No new evidence of skin breakdown  8/25/2022 1415 by Logan Valdivia RN  Outcome: Progressing  8/25/2022 1414 by Logan Valdivia RN  Outcome: Progressing     Problem: Neurosensory - Adult  Goal: Absence of seizures  8/26/2022 0224 by Agueda Baum RN  Outcome: Progressing  Flowsheets (Taken 8/25/2022 2015)  Absence of seizures:   Monitor for seizure activity. If seizure occurs, document type and location of movements and any associated apnea   If seizure occurs, turn head to side and suction secretions as needed   Administer anticonvulsants as ordered   Support airway/breathing, administer oxygen as needed  8/25/2022 1415 by Logan Valdivia RN  Outcome: Progressing  8/25/2022 1414 by Logan Valdivia RN  Outcome: Progressing  Flowsheets (Taken 8/25/2022 0730)  Absence of seizures:   Monitor for seizure activity.   If seizure occurs, document type and location of movements and any associated apnea   If seizure occurs, turn head to side and suction secretions as needed  Goal: Achieves maximal functionality and self care  8/26/2022 0224 by Agueda Baum RN  Outcome: Progressing  Flowsheets (Taken 8/25/2022 2015)  Achieves maximal functionality and self care:   Monitor swallowing and airway patency with patient fatigue and changes in neurological status   Encourage and assist patient to increase activity and self care with guidance from physical therapy/occupational therapy  8/25/2022 1415 by Getachew Erwin RN  Outcome: Progressing  8/25/2022 1414 by Getachew Erwin RN  Outcome: Progressing     Problem: Chronic Conditions and Co-morbidities  Goal: Patient's chronic conditions and co-morbidity symptoms are monitored and maintained or improved  8/26/2022 0224 by Jie Madison RN  Outcome: Progressing  Flowsheets (Taken 8/25/2022 2015)  Care Plan - Patient's Chronic Conditions and Co-Morbidity Symptoms are Monitored and Maintained or Improved: Monitor and assess patient's chronic conditions and comorbid symptoms for stability, deterioration, or improvement  8/25/2022 1415 by Getachew Erwin RN  Outcome: Progressing  8/25/2022 1414 by Getachew Erwin RN  Outcome: Progressing     Problem: Nutrition Deficit:  Goal: Optimize nutritional status  8/26/2022 0224 by Jie Madison RN  Outcome: Progressing  Flowsheets (Taken 8/26/2022 0224)  Nutrient intake appropriate for improving, restoring, or maintaining nutritional needs:   Assess nutritional status and recommend course of action   Monitor oral intake, labs, and treatment plans   Recommend appropriate diets, oral nutritional supplements, and vitamin/mineral supplements   Provide specific nutrition education to patient or family as appropriate  8/25/2022 1415 by Getachew Erwin RN  Outcome: Progressing  8/25/2022 1414 by Getachew Erwin RN  Outcome: Progressing  Flowsheets (Taken 8/25/2022 1414)  Nutrient intake appropriate for improving, restoring, or maintaining nutritional needs:   Assess nutritional status and recommend course of action   Recommend appropriate diets, oral nutritional supplements, and vitamin/mineral supplements  Care plan reviewed with patient. Patient verbalizes understanding of the care plan and contributed to goal setting.

## 2022-08-26 NOTE — PROGRESS NOTES
08/26/22 1112 - 55 y/o male admitted 8/23 with SOB and chest pain r/t active ETOH abuse. Withdrawal protocol implemented at admission, discontinued 8/25. Pt. A&Ox4. Speech appropriate and clear. PEERL. Pupil size from 3mm to 2mm. Upper and lower extremity skin cool, pink and dry with sensation present. No numbness or tingling reported in extremities. Hand grasps strong and equal bilaterally. Active ROM noted in all extremities. Arm drift negative. Capillary refill <3secs bilaterally. Pedal push and pull strong and equal. Dorsalis pedis and posterior tibialis +2 bilaterally. Lung sounds clear throughout upper lobes, diminished throughout lower lobes. Consistent with pt's hx of smoking and COPD. Pt currently on room air. Chest movements symmetrical and breathing unlabored. Abdomen round, soft and non tender to palpitation. Bowel sounds active x4 quadrants. Pt's states last bowel movement was 8/25. Pt using urinal and voiding without difficulty. Output: 400 cc Input:480 cc Pt. Up ad raghav and currently not at risk for skin breakdown. PIV in right forearm capped, clean, dry and intact. Vitals signs assessed. POCT glucose checked. Pt reports no pain at this time. No additional needs stated. Call light and personal belongings within reach.  --[address]  Lian Galeanar, [facility] Student Nurses

## 2022-08-26 NOTE — PROGRESS NOTES
08/26/2022 1300 PIV in rt. Forearm removed and telemetry discontinued per primary RN. Pt showered independently. Pt wearing own clothes. Awaiting discharge orders.  No needs stated at this time, call light and belongings in reach.   -[place] [facility] Student Nurses

## 2022-08-26 NOTE — CARE COORDINATION
8/26/22, 2:08 PM EDT    Patient goals/plan/ treatment preferences discussed by  and . Patient goals/plan/ treatment preferences reviewed with patient/ family. Patient/ family verbalize understanding of discharge plan and are in agreement with goal/plan/treatment preferences. Understanding was demonstrated using the teach back method. AVS provided by RN at time of discharge, which includes all necessary medical information pertaining to the patients current course of illness, treatment, post-discharge goals of care, and treatment preferences.      Services At/After Discharge: None

## 2022-08-29 ENCOUNTER — TELEPHONE (OUTPATIENT)
Dept: FAMILY MEDICINE CLINIC | Age: 47
End: 2022-08-29

## 2022-08-29 NOTE — TELEPHONE ENCOUNTER
[facility] Transitions Initial Follow Up Call    Outreach made within 2 business days of discharge: Yes    Patient: Stephanie Funez Patient : 1975   MRN: [id number]  Reason for Admission: There are no discharge diagnoses documented for the most recent discharge. Discharge Date: 22       Spoke with: patient     Discharge department/facility: [facility] Interactive Patient Contact:  Was patient able to fill all prescriptions: Yes  Was patient instructed to bring all medications to the follow-up visit: Yes  Is patient taking all medications as directed in the discharge summary?  Yes  Does patient understand their discharge instructions: Yes  Does patient have questions or concerns that need addressed prior to 7-14 day follow up office visit: no  Pt does not want to make a hospital follow up at this time     Scheduled appointment with PCP within 7-14 days    Follow Up  Future Appointments   Date Time Provider Amanda Jerome   2022 10:00 AM Nancy [address] - Arnol Jones LPN

## 2022-09-19 ENCOUNTER — HOSPITAL ENCOUNTER (EMERGENCY)
Age: 47
Discharge: HOME | End: 2022-09-19
Payer: MEDICAID

## 2022-09-19 VITALS
RESPIRATION RATE: 28 BRPM | DIASTOLIC BLOOD PRESSURE: 102 MMHG | WEIGHT: 240 LBS | HEART RATE: 110 BPM | SYSTOLIC BLOOD PRESSURE: 159 MMHG | BODY MASS INDEX: 33.6 KG/M2 | OXYGEN SATURATION: 96 % | TEMPERATURE: 98.4 F | HEIGHT: 71 IN

## 2022-09-19 DIAGNOSIS — F10.10 ALCOHOL ABUSE: Primary | ICD-10-CM

## 2022-09-19 DIAGNOSIS — I10 CHRONIC HYPERTENSION: ICD-10-CM

## 2022-09-19 DIAGNOSIS — R74.01 TRANSAMINITIS: ICD-10-CM

## 2022-09-19 LAB
ALBUMIN SERPL-MCNC: 3.9 G/DL (ref 3.5–5.1)
ALP BLD-CCNC: 230 U/L (ref 38–126)
ALT SERPL-CCNC: 121 U/L (ref 11–66)
ANION GAP SERPL CALCULATED.3IONS-SCNC: 16 MEQ/L (ref 8–16)
AST SERPL-CCNC: 172 U/L (ref 5–40)
BACTERIA: ABNORMAL /HPF
BASOPHILS # BLD: 0.5 %
BASOPHILS ABSOLUTE: 0 THOU/MM3 (ref 0–0.1)
BILIRUB SERPL-MCNC: 1 MG/DL (ref 0.3–1.2)
BILIRUBIN DIRECT: 0.5 MG/DL (ref 0–0.3)
BILIRUBIN URINE: NEGATIVE
BLOOD, URINE: NEGATIVE
BUN BLDV-MCNC: 17 MG/DL (ref 7–22)
CALCIUM SERPL-MCNC: 8.8 MG/DL (ref 8.5–10.5)
CASTS 2: ABNORMAL /LPF
CASTS UA: ABNORMAL /LPF
CHARACTER, URINE: CLEAR
CHLORIDE BLD-SCNC: 101 MEQ/L (ref 98–111)
CO2: 21 MEQ/L (ref 23–33)
COLOR: YELLOW
CREAT SERPL-MCNC: 0.6 MG/DL (ref 0.4–1.2)
CRYSTALS, UA: ABNORMAL
EOSINOPHIL # BLD: 2.4 %
EOSINOPHILS ABSOLUTE: 0.2 THOU/MM3 (ref 0–0.4)
EPITHELIAL CELLS, UA: ABNORMAL /HPF
ERYTHROCYTE [DISTWIDTH] IN BLOOD BY AUTOMATED COUNT: 13.6 % (ref 11.5–14.5)
ERYTHROCYTE [DISTWIDTH] IN BLOOD BY AUTOMATED COUNT: 48.7 FL (ref 35–45)
ETHYL ALCOHOL, SERUM: 0.31 %
GFR SERPL CREATININE-BSD FRML MDRD: > 90 ML/MIN/1.73M2
GLUCOSE BLD-MCNC: 98 MG/DL (ref 70–108)
GLUCOSE URINE: NEGATIVE MG/DL
HCT VFR BLD CALC: 49.1 % (ref 42–52)
HEMOGLOBIN: 17.6 GM/DL (ref 14–18)
IMMATURE GRANS (ABS): 0.02 THOU/MM3 (ref 0–0.07)
IMMATURE GRANULOCYTES: 0.3 %
KETONES, URINE: NEGATIVE
LEUKOCYTE ESTERASE, URINE: NEGATIVE
LYMPHOCYTES # BLD: 30.6 %
LYMPHOCYTES ABSOLUTE: 2.4 THOU/MM3 (ref 1–4.8)
MCH RBC QN AUTO: 34.4 PG (ref 26–33)
MCHC RBC AUTO-ENTMCNC: 35.8 GM/DL (ref 32.2–35.5)
MCV RBC AUTO: 95.9 FL (ref 80–94)
MISCELLANEOUS 2: ABNORMAL
MONOCYTES # BLD: 7.4 %
MONOCYTES ABSOLUTE: 0.6 THOU/MM3 (ref 0.4–1.3)
NITRITE, URINE: NEGATIVE
NUCLEATED RED BLOOD CELLS: 0 /100 WBC
OSMOLALITY CALCULATION: 277.2 MOSMOL/KG (ref 275–300)
PH UA: 5.5 (ref 5–9)
PLATELET # BLD: 221 THOU/MM3 (ref 130–400)
PMV BLD AUTO: 8.8 FL (ref 9.4–12.4)
POTASSIUM SERPL-SCNC: 4.4 MEQ/L (ref 3.5–5.2)
PROTEIN UA: 30
RBC # BLD: 5.12 MILL/MM3 (ref 4.7–6.1)
RBC URINE: ABNORMAL /HPF
RENAL EPITHELIAL, UA: ABNORMAL
SEG NEUTROPHILS: 58.8 %
SEGMENTED NEUTROPHILS ABSOLUTE COUNT: 4.6 THOU/MM3 (ref 1.8–7.7)
SODIUM BLD-SCNC: 138 MEQ/L (ref 135–145)
SPECIFIC GRAVITY, URINE: 1.01 (ref 1–1.03)
TOTAL PROTEIN: 6.6 G/DL (ref 6.1–8)
UROBILINOGEN, URINE: 1 EU/DL (ref 0–1)
WBC # BLD: 7.9 THOU/MM3 (ref 4.8–10.8)
WBC UA: ABNORMAL /HPF
YEAST: ABNORMAL

## 2022-09-19 PROCEDURE — 82248 BILIRUBIN DIRECT: CPT

## 2022-09-19 PROCEDURE — 36415 COLL VENOUS BLD VENIPUNCTURE: CPT

## 2022-09-19 PROCEDURE — 99283 EMERGENCY DEPT VISIT LOW MDM: CPT

## 2022-09-19 PROCEDURE — 80053 COMPREHEN METABOLIC PANEL: CPT

## 2022-09-19 PROCEDURE — 82077 ASSAY SPEC XCP UR&BREATH IA: CPT

## 2022-09-19 PROCEDURE — 85025 COMPLETE CBC W/AUTO DIFF WBC: CPT

## 2022-09-19 PROCEDURE — 81001 URINALYSIS AUTO W/SCOPE: CPT

## 2022-09-19 PROCEDURE — 6370000000 HC RX 637 (ALT 250 FOR IP): Performed by: NURSE PRACTITIONER

## 2022-09-19 RX ORDER — METOPROLOL TARTRATE 50 MG/1
50 TABLET, FILM COATED ORAL ONCE
Status: COMPLETED | OUTPATIENT
Start: 2022-09-19 | End: 2022-09-19

## 2022-09-19 RX ADMIN — METOPROLOL TARTRATE 50 MG: 50 TABLET, FILM COATED ORAL at 22:50

## 2022-09-19 ASSESSMENT — PAIN - FUNCTIONAL ASSESSMENT: PAIN_FUNCTIONAL_ASSESSMENT: NONE - DENIES PAIN

## 2022-09-19 NOTE — ED TRIAGE NOTES
Pt to er. Pt states wants etoh rehab and detox. States was in rehab but relapsed. States last drink was at 1500 today. States drank beer and fireball today.

## 2022-09-20 ASSESSMENT — ENCOUNTER SYMPTOMS
EYE REDNESS: 0
VOMITING: 0
COUGH: 0
ABDOMINAL PAIN: 0
CHEST TIGHTNESS: 0
BACK PAIN: 0
RHINORRHEA: 0
NAUSEA: 0

## 2022-09-20 NOTE — ED NOTES
RN gave pt some ice water at this time. Pt is resting in cot with respirations even and unlabored. Pt voices no other concern or need at this time. Call light is within reach.       Lilly Simon RN  09/19/22 [id number]

## 2022-09-21 NOTE — ED PROVIDER NOTES
[facility] Emergency Department    CHIEF COMPLAINT       Chief Complaint   Patient presents with    Alcohol Intoxication     Wants detox       Nurses Notes reviewed and I agree except as noted in the HPI. HISTORY OF PRESENT ILLNESS    Jerrell howe 56 y.o. male who presents to the ED for evaluation of alcohol detox. The patient states he was clean for many months until about 2 weeks ago. He has been drinking all day every day x 2 weeks. He is here requesting detox. Denies SI/HI. HPI was provided by the patient    REVIEW OF SYSTEMS     Review of Systems   Constitutional:  Negative for chills, fatigue and fever. HENT:  Negative for congestion, ear discharge, ear pain, postnasal drip and rhinorrhea. Eyes:  Negative for redness. Respiratory:  Negative for cough and chest tightness. Cardiovascular:  Negative for chest pain and leg swelling. Gastrointestinal:  Negative for abdominal pain, nausea and vomiting. Genitourinary:  Negative for difficulty urinating, dysuria, enuresis, flank pain and hematuria. Musculoskeletal:  Negative for back pain and joint swelling. Skin:  Negative for rash. Neurological:  Negative for dizziness, light-headedness, numbness and headaches. Psychiatric/Behavioral:  Positive for behavioral problems. Negative for agitation and confusion. All other systems negative except as noted. PAST MEDICAL HISTORY     Past Medical History:   Diagnosis Date    COPD (chronic obstructive pulmonary disease) ([facility] [address]     Depression     Diabetes mellitus (HCC)     ETOH abuse     GERD (gastroesophageal reflux disease)     Hyperlipidemia     Hypertension     Liver disease     Seizures ([facility] [address]     etoh wdl       SURGICALHISTORY      has a past surgical history that includes Endoscopy, colon, diagnostic.     CURRENT MEDICATIONS       Discharge Medication List as of 9/19/2022 10:43 PM        CONTINUE these medications which have NOT CHANGED    Details   vitamin B-1 (THIAMINE) 100 MG tablet Take 1 tablet by mouth daily, Disp-30 tablet, Q-3ATXRFI      folic acid (FOLVITE) 1 MG tablet Take 1 tablet by mouth daily, Disp-30 tablet, R-0Normal      naltrexone (DEPADE) 50 MG tablet Take 50 mg by mouth dailyHistorical Med      albuterol sulfate HFA (VENTOLIN HFA) 108 (90 Base) MCG/ACT inhaler Inhale 2 puffs into the lungs every 6 hours as needed for Wheezing or Shortness of BreathHistorical Med      lisinopril (PRINIVIL;ZESTRIL) 20 MG tablet Take 1 tablet by mouth in the morning., Disp-30 tablet, R-1Normal      metoprolol tartrate (LOPRESSOR) 50 MG tablet Take 1 tablet by mouth in the morning., Disp-30 tablet, R-1Normal      sildenafil (VIAGRA) 100 MG tablet Take 1 tablet by mouth daily as needed for Erectile Dysfunction, Disp-10 tablet, R-1Normal      miconazole (MICOTIN) 2 % powder Apply topically 2 times daily. , Disp-45 g, R-1, Normal      blood glucose test strips (ASCENSIA AUTODISC VI;ONE TOUCH ULTRA TEST VI) strip Disp-60 strip, R-1, NormalTest as needed. Dispense One Touch verio Test Strips. Dx: Type 2 diabetes (250.00)      glucose monitoring kit (FREESTYLE) monitoring kit Disp-1 kit, R-5, NormalGlucometer with test strips and lancets. Check BS once daily  #100 strips and lancets             ALLERGIES     has No Known Allergies. FAMILY HISTORY     He indicated that his mother is alive. He indicated that his father is . He indicated that his brother is alive. He indicated that the status of his paternal uncle is unknown.   family history includes Alcohol Abuse in his father and paternal uncle; Arthritis in his mother; Cancer in his father; Depression in his mother; Diabetes in his mother; Heart Disease in his mother; High Blood Pressure in his brother, father, and mother; High Cholesterol in his mother.     SOCIAL HISTORY       Social History     Socioeconomic History    Marital status: Single     Spouse name: Not on file    Number of children: 0    Years of education: 12    Highest education level: Not on file   Occupational History     Employer: [employer]   Tobacco Use    Smoking status: Every Day     Packs/day: 3.00     Years: 27.00     Pack years: 81.00     Types: E-Cigarettes, Cigarettes    Smokeless tobacco: Former    Tobacco comments:     vape   Vaping Use    Vaping Use: Every day    Start date: 5/6/2015    Substances: Nicotine, Flavoring    Devices: Pre-filled or refillable cartridge   Substance and Sexual Activity    Alcohol use: Not Currently     Alcohol/week: 140.0 standard drinks     Types: 140 Cans of beer per week     Comment: 30 pack per day for past two weeks and fireball whiskey last drink 1400 8/23/22    Drug use: No    Sexual activity: Yes   Other Topics Concern    Not on file   Social History Narrative    Patient with history of etoh abuse but stated he is currently not drinking. Patient is receiving medication to assist him with staying sober and is attending routine AA meetings via zoom      Social Determinants of Health     Financial Resource Strain: Low Risk     Difficulty of Paying Living Expenses: Not hard at all   Food Insecurity: No Food Insecurity    Worried About Running Out of Food in the Last Year: Never true    Ran Out of Food in the Last Year: Never true   Transportation Needs: Not on file   Physical Activity: Not on file   Stress: Not on file   Social Connections: Not on file   Intimate Partner Violence: Not on file   Housing Stability: Not on file       PHYSICAL EXAM     INITIAL VITALS:  height is 5' 11\" (1.803 m) and weight is 240 lb (108.9 kg). His oral temperature is 98.4 °F (36.9 °C). His blood pressure is 159/102 (abnormal) and his pulse is 110 (abnormal). His respiration is 28 and oxygen saturation is 96%. Physical Exam  Constitutional:       Appearance: Normal appearance. He is well-developed. He is not ill-appearing. HENT:      Head: Normocephalic and atraumatic.       Nose: Nose normal.      Mouth/Throat: Mouth: Mucous membranes are moist.      Pharynx: Oropharynx is clear. Eyes:      Conjunctiva/sclera: Conjunctivae normal.   Cardiovascular:      Rate and Rhythm: Normal rate. Pulses: Normal pulses. Pulmonary:      Effort: Pulmonary effort is normal.   Abdominal:      General: There is distension. Palpations: Abdomen is soft. Musculoskeletal:         General: Normal range of motion. Cervical back: Normal range of motion. Skin:     General: Skin is warm and dry. Capillary Refill: Capillary refill takes less than 2 seconds. Neurological:      General: No focal deficit present. Mental Status: He is alert and oriented to person, place, and time. Psychiatric:      Comments: irritable       DIFFERENTIAL DIAGNOSIS:   ETOH intox, withdrawal, detox. DIAGNOSTIC RESULTS     EKG: All EKG's are interpreted by the Emergency Department Physician who eithersigns or Co-signs this chart in the absence of a cardiologist.        RADIOLOGY: non-plainfilm images(s) such as CT, Ultrasound and MRI are read by the radiologist.  Plain radiographic images are visualized and preliminarily interpreted by the emergency physician unless otherwise stated below.   No orders to display         LABS:   Labs Reviewed   CBC WITH AUTO DIFFERENTIAL - Abnormal; Notable for the following components:       Result Value    MCV 95.9 (*)     MCH 34.4 (*)     MCHC 35.8 (*)     RDW-SD 48.7 (*)     MPV 8.8 (*)     All other components within normal limits   BASIC METABOLIC PANEL - Abnormal; Notable for the following components:    CO2 21 (*)     All other components within normal limits   HEPATIC FUNCTION PANEL - Abnormal; Notable for the following components:    Bilirubin, Direct 0.5 (*)     Alkaline Phosphatase 230 (*)      (*)      (*)     All other components within normal limits   URINE WITH REFLEXED MICRO - Abnormal; Notable for the following components:    Protein, UA 30 (*)     All other components within normal limits   ETHANOL   ANION GAP   GLOMERULAR FILTRATION RATE, ESTIMATED   OSMOLALITY       EMERGENCY DEPARTMENT COURSE:   Vitals:    Vitals:    09/19/22 2005 09/19/22 2055 09/19/22 2139 09/19/22 2250   BP: (!) 126/104 (!) 171/115 (!) 159/102 (!) 159/102   Pulse: (!) 117 (!) 125 (!) 110 (!) 110   Resp: 17 22 28    Temp:       TempSrc:       SpO2: 94% 94% 96%    Weight: 240 lb (108.9 kg)      Height: 5' 11\" (1.803 m)                                         Internal Administration   First Dose      Second Dose           Last COVID Lab SARS-CoV-2, TRAVIS (no units)   Date Value   02/10/2022 NOT  DETECTED            MDM  Was seen evaluate in the emergency room for alcohol detox. Appropriate labs and imaging are ordered and reviewed. Patient is treated with metoprolol for his hypertension and his tachycardia because he did not take his home medication. Patient states he has been on a 2-week binge where he has been drinking all day every day. He is requesting detox, however he is expecting to be admitted immediately to the floor without waiting for the alcohol that he has currently ingested to clear his system. I explained to the patient that we would need to wait until he is sober which would be in about 12 hours. When he found out that he would not immediately be admitted to the floor, he requested to be discharged. I explained to the patient that we do not do actual detox, that we treat alcohol withdrawal therefore he would need to be closer to sober to see if he showed autonomic signs of withdrawal.  Patient advised that he would like to just call a sober family member to come pick him up. I discussed the case with Dr. Vitaly Llanes and he is agreeable to the patient being discharged. Patient is provided with information for Dr. Anushka Small office in the walk-in clinic. No notes of EC Admission Criteria type on file.       Medications   metoprolol tartrate (LOPRESSOR) tablet 50 mg (50 mg Oral Given 9/19/22 6468)       Please note that the patient was evaluated during a pandemic. All efforts were made for HIPPA compliance as well as provision of appropriate care. Patient was seen independently by myself. The patient's final impression and disposition and plan was determined by myself. Strict return precautions and follow up instructions were discussed with the patient prior to discharge, with which the patient agrees. Physical assessment findings, diagnostic testing(s) if applicable, and vital signs reviewed with patient/patient representative. Questions answered. Medications asdirected, including OTC medications for supportive care. Education provided on medications. Differential diagnosis(s) discussed with patient/patient representative. Home care/self care instructions reviewed withpatient/patient representative. Patient is to follow-up with family care provider in 2-3 days if no improvement. Patient is to go to the emergency department if symptoms worsen. Patient/patient representative isaware of care plan, questions answered, verbalizes understanding and is in agreement. CRITICAL CARE:   None    CONSULTS:  None    PROCEDURES:  None    FINAL IMPRESSION     1. Alcohol abuse    2. Chronic hypertension    3.  Transaminitis          DISPOSITION/PLAN   DISPOSITION Decision To Discharge 09/19/2022 10:43:39 PM      PATIENT REFERREDTO:  Valentina Yost, APRN - [facility]  [address] [facility]  [phone]    Schedule an appointment as soon as possible for a visit in 2 days  For follow up    Wandy Brewer MD  [address] [place] [address]  [phone]    Call in 1 day  For follow up--Walk in for alcohol/drug abuse at 0800 each morning      DISCHARGE MEDICATIONS:  Discharge Medication List as of 9/19/2022 10:43 PM          (Please note that portions of this note were completed with a voice recognition program.  Efforts were made to edit the dictations but occasionally words are mis-transcribed.)         NILAM Davenport - NILAM Marshall - CNP  09/20/22 [address] [place] [address] NILAM Hammer - CNP  09/20/22 7934

## 2022-12-15 ENCOUNTER — NURSE ONLY (OUTPATIENT)
Dept: LAB | Age: 47
End: 2022-12-15

## 2022-12-15 LAB
ALBUMIN SERPL-MCNC: 4.1 G/DL (ref 3.5–5.1)
ALP BLD-CCNC: 96 U/L (ref 38–126)
ALT SERPL-CCNC: 34 U/L (ref 11–66)
ANION GAP SERPL CALCULATED.3IONS-SCNC: 16 MEQ/L (ref 8–16)
AST SERPL-CCNC: 30 U/L (ref 5–40)
AVERAGE GLUCOSE: 114 MG/DL (ref 70–126)
BASOPHILS # BLD: 0.6 %
BASOPHILS ABSOLUTE: 0 THOU/MM3 (ref 0–0.1)
BILIRUB SERPL-MCNC: 0.8 MG/DL (ref 0.3–1.2)
BUN BLDV-MCNC: 10 MG/DL (ref 7–22)
CALCIUM SERPL-MCNC: 9.7 MG/DL (ref 8.5–10.5)
CHLORIDE BLD-SCNC: 104 MEQ/L (ref 98–111)
CO2: 21 MEQ/L (ref 23–33)
CREAT SERPL-MCNC: 0.7 MG/DL (ref 0.4–1.2)
EOSINOPHIL # BLD: 3.1 %
EOSINOPHILS ABSOLUTE: 0.2 THOU/MM3 (ref 0–0.4)
ERYTHROCYTE [DISTWIDTH] IN BLOOD BY AUTOMATED COUNT: 12.5 % (ref 11.5–14.5)
ERYTHROCYTE [DISTWIDTH] IN BLOOD BY AUTOMATED COUNT: 43.2 FL (ref 35–45)
GFR SERPL CREATININE-BSD FRML MDRD: > 60 ML/MIN/1.73M2
GLUCOSE BLD-MCNC: 78 MG/DL (ref 70–108)
HBA1C MFR BLD: 5.8 % (ref 4.4–6.4)
HCT VFR BLD CALC: 45.2 % (ref 42–52)
HEMOGLOBIN: 16 GM/DL (ref 14–18)
IMMATURE GRANS (ABS): 0.01 THOU/MM3 (ref 0–0.07)
IMMATURE GRANULOCYTES: 0.2 %
LYMPHOCYTES # BLD: 24.1 %
LYMPHOCYTES ABSOLUTE: 1.6 THOU/MM3 (ref 1–4.8)
MCH RBC QN AUTO: 33.4 PG (ref 26–33)
MCHC RBC AUTO-ENTMCNC: 35.4 GM/DL (ref 32.2–35.5)
MCV RBC AUTO: 94.4 FL (ref 80–94)
MONOCYTES # BLD: 6.6 %
MONOCYTES ABSOLUTE: 0.4 THOU/MM3 (ref 0.4–1.3)
NUCLEATED RED BLOOD CELLS: 0 /100 WBC
PLATELET # BLD: 170 THOU/MM3 (ref 130–400)
PMV BLD AUTO: 10.5 FL (ref 9.4–12.4)
POTASSIUM SERPL-SCNC: 3.8 MEQ/L (ref 3.5–5.2)
RBC # BLD: 4.79 MILL/MM3 (ref 4.7–6.1)
SEG NEUTROPHILS: 65.4 %
SEGMENTED NEUTROPHILS ABSOLUTE COUNT: 4.3 THOU/MM3 (ref 1.8–7.7)
SODIUM BLD-SCNC: 141 MEQ/L (ref 135–145)
TOTAL PROTEIN: 7.9 G/DL (ref 6.1–8)
WBC # BLD: 6.5 THOU/MM3 (ref 4.8–10.8)

## 2023-01-26 DIAGNOSIS — I10 HYPERTENSION, UNSPECIFIED TYPE: ICD-10-CM

## 2023-01-26 RX ORDER — METOPROLOL TARTRATE 50 MG/1
50 TABLET, FILM COATED ORAL DAILY
Qty: 30 TABLET | Refills: 1 | Status: SHIPPED | OUTPATIENT
Start: 2023-01-26

## 2023-01-31 ENCOUNTER — OFFICE VISIT (OUTPATIENT)
Dept: FAMILY MEDICINE CLINIC | Age: 48
End: 2023-01-31
Payer: MEDICAID

## 2023-01-31 VITALS
HEART RATE: 85 BPM | WEIGHT: 255 LBS | BODY MASS INDEX: 35.7 KG/M2 | RESPIRATION RATE: 17 BRPM | DIASTOLIC BLOOD PRESSURE: 84 MMHG | SYSTOLIC BLOOD PRESSURE: 136 MMHG | HEIGHT: 71 IN | TEMPERATURE: 98.3 F | OXYGEN SATURATION: 98 %

## 2023-01-31 DIAGNOSIS — E11.8 TYPE 2 DIABETES MELLITUS WITH UNSPECIFIED COMPLICATIONS (HCC): ICD-10-CM

## 2023-01-31 DIAGNOSIS — F31.9 BIPOLAR 1 DISORDER (HCC): ICD-10-CM

## 2023-01-31 DIAGNOSIS — I10 HYPERTENSION, UNSPECIFIED TYPE: ICD-10-CM

## 2023-01-31 DIAGNOSIS — Q78.8 OSTEOPOIKILOSIS: ICD-10-CM

## 2023-01-31 DIAGNOSIS — J44.9 CHRONIC OBSTRUCTIVE PULMONARY DISEASE, UNSPECIFIED COPD TYPE (HCC): ICD-10-CM

## 2023-01-31 DIAGNOSIS — K70.30 ALCOHOLIC CIRRHOSIS OF LIVER WITHOUT ASCITES (HCC): ICD-10-CM

## 2023-01-31 PROCEDURE — 3046F HEMOGLOBIN A1C LEVEL >9.0%: CPT | Performed by: NURSE PRACTITIONER

## 2023-01-31 PROCEDURE — 3079F DIAST BP 80-89 MM HG: CPT | Performed by: NURSE PRACTITIONER

## 2023-01-31 PROCEDURE — 99214 OFFICE O/P EST MOD 30 MIN: CPT | Performed by: NURSE PRACTITIONER

## 2023-01-31 PROCEDURE — 3023F SPIROM DOC REV: CPT | Performed by: NURSE PRACTITIONER

## 2023-01-31 PROCEDURE — 2022F DILAT RTA XM EVC RTNOPTHY: CPT | Performed by: NURSE PRACTITIONER

## 2023-01-31 PROCEDURE — G8417 CALC BMI ABV UP PARAM F/U: HCPCS | Performed by: NURSE PRACTITIONER

## 2023-01-31 PROCEDURE — 4004F PT TOBACCO SCREEN RCVD TLK: CPT | Performed by: NURSE PRACTITIONER

## 2023-01-31 PROCEDURE — G8427 DOCREV CUR MEDS BY ELIG CLIN: HCPCS | Performed by: NURSE PRACTITIONER

## 2023-01-31 PROCEDURE — 3075F SYST BP GE 130 - 139MM HG: CPT | Performed by: NURSE PRACTITIONER

## 2023-01-31 PROCEDURE — G8484 FLU IMMUNIZE NO ADMIN: HCPCS | Performed by: NURSE PRACTITIONER

## 2023-01-31 RX ORDER — LISINOPRIL 20 MG/1
20 TABLET ORAL DAILY
Qty: 90 TABLET | Refills: 3 | Status: SHIPPED | OUTPATIENT
Start: 2023-01-31

## 2023-01-31 RX ORDER — METOPROLOL TARTRATE 50 MG/1
50 TABLET, FILM COATED ORAL DAILY
Qty: 90 TABLET | Refills: 3 | Status: SHIPPED | OUTPATIENT
Start: 2023-01-31

## 2023-01-31 ASSESSMENT — PATIENT HEALTH QUESTIONNAIRE - PHQ9
SUM OF ALL RESPONSES TO PHQ QUESTIONS 1-9: 0
SUM OF ALL RESPONSES TO PHQ9 QUESTIONS 1 & 2: 0
SUM OF ALL RESPONSES TO PHQ QUESTIONS 1-9: 0
2. FEELING DOWN, DEPRESSED OR HOPELESS: 0
3. TROUBLE FALLING OR STAYING ASLEEP: 0
7. TROUBLE CONCENTRATING ON THINGS, SUCH AS READING THE NEWSPAPER OR WATCHING TELEVISION: 0
6. FEELING BAD ABOUT YOURSELF - OR THAT YOU ARE A FAILURE OR HAVE LET YOURSELF OR YOUR FAMILY DOWN: 0
5. POOR APPETITE OR OVEREATING: 0
10. IF YOU CHECKED OFF ANY PROBLEMS, HOW DIFFICULT HAVE THESE PROBLEMS MADE IT FOR YOU TO DO YOUR WORK, TAKE CARE OF THINGS AT HOME, OR GET ALONG WITH OTHER PEOPLE: 0
1. LITTLE INTEREST OR PLEASURE IN DOING THINGS: 0
4. FEELING TIRED OR HAVING LITTLE ENERGY: 0
8. MOVING OR SPEAKING SO SLOWLY THAT OTHER PEOPLE COULD HAVE NOTICED. OR THE OPPOSITE, BEING SO FIGETY OR RESTLESS THAT YOU HAVE BEEN MOVING AROUND A LOT MORE THAN USUAL: 0
9. THOUGHTS THAT YOU WOULD BE BETTER OFF DEAD, OR OF HURTING YOURSELF: 0

## 2023-01-31 ASSESSMENT — ENCOUNTER SYMPTOMS
GASTROINTESTINAL NEGATIVE: 1
RESPIRATORY NEGATIVE: 1
EYES NEGATIVE: 1

## 2023-01-31 NOTE — PROGRESS NOTES
Victor Hugo Castaneda is a 56 y.o. male whopresents today for :  Chief Complaint   Patient presents with    Medication Check       HPI:     HPI  Pt here for check up. Pt has been in rehab program since September  .   Pt is getting back to work next week      Patient Active Problem List   Diagnosis    Hyperlipemia    Uncontrolled hypertension    Alcohol withdrawal (HCC)    Alcoholic hepatitis    COPD (chronic obstructive pulmonary disease) ([address]    Passive-dependent personality disorder ([address]    Alcohol abuse    Type 2 diabetes mellitus with unspecified complications (HCC)    Alcoholic liver failure (HCC)    Transaminitis    Hyperbilirubinemia    RUQ pain    Delirious    Alcoholic hepatitis without ascites    Alcoholic steatohepatitis    Hypophosphatemia    Bipolar 1 disorder (HCC)    Alcoholic gastritis without bleeding    Alcohol withdrawal syndrome with complication (HCC)    Alcohol dependence with physiological dependence, continuous (HCC)    Alcoholic cirrhosis ([address]    Alcohol intoxication in alcoholism with blood level over 0.3 with complication ([address]    Acute alcoholic intoxication with complication (HCC)    Acute alcoholic hepatitis    Alcohol ingestion    Alcohol withdrawal delirium (HCC)    Uncomplicated alcohol withdrawal ([address]    Alcohol use disorder, severe, dependence ([address]    Acute pancreatitis    Depression    Osteopoikilosis    Contusion of hand    Atypical chest pain    Alcoholic encephalopathy (HCC)    Alcohol intoxication with delirium ([address]    Morbidly obese (HCC)    ETOH abuse    Hyponatremia    Lack of intravenous access    Alcohol withdrawal syndrome, uncomplicated (HCC)    Elevated LFTs    Hepatic steatosis    Thrombocytopenia (HCC)    Macrocytic anemia    COPD without exacerbation (HCC)    Chest pain    Leukopenia    Metabolic acidosis    Anxiety disorder        Past Medical History:   Diagnosis Date    COPD (chronic obstructive pulmonary disease) ([address]     Depression     Diabetes mellitus ([facility]     ETOH abuse     GERD (gastroesophageal reflux disease)     Hyperlipidemia     Hypertension     Liver disease     Seizures ([facility]     etoh wdl      Past Surgical History:   Procedure Laterality Date    ENDOSCOPY, COLON, DIAGNOSTIC       Family History   Problem Relation Age of Onset    High Blood Pressure Father     Cancer Father         Lung Cancer    Alcohol Abuse Father     High Blood Pressure Brother     Diabetes Mother     Heart Disease Mother         Stent Placement    Arthritis Mother     Depression Mother     High Blood Pressure Mother     High Cholesterol Mother     Alcohol Abuse Paternal Uncle      Social History     Tobacco Use    Smoking status: Every Day     Packs/day: 3.00     Years: 27.00     Pack years: 81.00     Types: E-Cigarettes, Cigarettes    Smokeless tobacco: Former    Tobacco comments:     vape   Substance Use Topics    Alcohol use: Not Currently     Alcohol/week: 140.0 standard drinks     Types: 140 Cans of beer per week     Comment: 30 pack per day for past two weeks and fireball whiskey last drink 1400 8/23/22      Current Outpatient Medications   Medication Sig Dispense Refill    metoprolol tartrate (LOPRESSOR) 50 MG tablet Take 1 tablet by mouth daily 90 tablet 3    lisinopril (PRINIVIL;ZESTRIL) 20 MG tablet Take 1 tablet by mouth daily 90 tablet 3    blood glucose test strips (ASCENSIA AUTODISC VI;ONE TOUCH ULTRA TEST VI) strip Test as needed. Dispense One Touch verio Test Strips. Dx: Type 2 diabetes (250.00) 60 strip 1    glucose monitoring kit (FREESTYLE) monitoring kit Glucometer with test strips and lancets. Check BS once daily  #100 strips and lancets (Patient taking differently: Glucometer with test strips and lancets.   Check BS once daily  #100 strips and lancets) 1 kit 5    folic acid (FOLVITE) 1 MG tablet Take 1 tablet by mouth daily (Patient not taking: Reported on 1/31/2023) 30 tablet 0    sildenafil (VIAGRA) 100 MG tablet Take 1 tablet by mouth daily as needed for Erectile Dysfunction (Patient not taking: Reported on 1/31/2023) 10 tablet 1    miconazole (MICOTIN) 2 % powder Apply topically 2 times daily. (Patient not taking: No sig reported) 45 g 1     No current facility-administered medications for this visit. No Known Allergies  Health Maintenance   Topic Date Due    COVID-19 Vaccine (1) Never done    Hepatitis A vaccine (1 of 2 - Risk 2-dose series) Never done    Pneumococcal 0-64 years Vaccine (1 - PCV) Never done    Hepatitis B vaccine (1 of 3 - Risk 3-dose series) Never done    DTaP/Tdap/Td vaccine (1 - Tdap) Never done    Diabetic Alb to Cr ratio (uACR) test  10/04/2019    Colorectal Cancer Screen  Never done    Diabetic foot exam  06/11/2021    Lipids  06/23/2021    Flu vaccine (1) Never done    Diabetic retinal exam  10/19/2022    Depression Monitoring  02/28/2023    A1C test (Diabetic or Prediabetic)  12/15/2023    GFR test (Diabetes, CKD 3-4, OR last GFR 15-59)  12/15/2023    Hepatitis C screen  Completed    Hib vaccine  Aged Out    Meningococcal (ACWY) vaccine  Aged Out    HIV screen  Discontinued       Subjective:     Review of Systems   Constitutional: Negative. HENT: Negative. Eyes: Negative. Respiratory: Negative. Cardiovascular: Negative. Gastrointestinal: Negative. Musculoskeletal: Negative. Skin: Negative. Neurological: Negative. Objective:     Vitals:    01/31/23 1445 01/31/23 1457   BP: (!) 141/88 136/84   Site: Left Upper Arm    Position: Sitting    Cuff Size: Large Adult    Pulse: 85    Resp: 17    Temp: 98.3 °F (36.8 °C)    TempSrc: Temporal    SpO2: 98%    Weight: 255 lb (115.7 kg)    Height: 5' 11\" (1.803 m)        Physical Exam  Constitutional:       Appearance: He is well-developed. HENT:      Head: Normocephalic.       Right Ear: Tympanic membrane and external ear normal.      Left Ear: Tympanic membrane and external ear normal.      Nose: Nose normal.   Cardiovascular:      Rate and Rhythm: Normal rate and regular rhythm.      Heart sounds: Normal heart sounds. No murmur heard.    No friction rub. No gallop.   Pulmonary:      Effort: Pulmonary effort is normal.      Breath sounds: Normal breath sounds. No wheezing or rales.   Abdominal:      General: Bowel sounds are normal.      Palpations: Abdomen is soft.      Tenderness: There is no abdominal tenderness. There is no guarding.   Musculoskeletal:         General: Normal range of motion.      Cervical back: Normal range of motion and neck supple.   Lymphadenopathy:      Cervical: No cervical adenopathy.   Skin:     General: Skin is warm.   Neurological:      Mental Status: He is alert and oriented to person, place, and time.      Deep Tendon Reflexes: Reflexes are normal and symmetric.         Assessment:      Diagnosis Orders   1. Hypertension, unspecified type  metoprolol tartrate (LOPRESSOR) 50 MG tablet    lisinopril (PRINIVIL;ZESTRIL) 20 MG tablet      2. Alcoholic cirrhosis of liver without ascites (HCC)        3. Bipolar 1 disorder (HCC)        4. Osteopoikilosis        5. Chronic obstructive pulmonary disease, unspecified COPD type (HCC)        6. Type 2 diabetes mellitus with unspecified complications (HCC)            Plan:      Return in about 4 months (around 5/31/2023).     No orders of the defined types were placed in this encounter.    Orders Placed This Encounter   Medications    metoprolol tartrate (LOPRESSOR) 50 MG tablet     Sig: Take 1 tablet by mouth daily     Dispense:  90 tablet     Refill:  3    lisinopril (PRINIVIL;ZESTRIL) 20 MG tablet     Sig: Take 1 tablet by mouth daily     Dispense:  90 tablet     Refill:  3      Cont meds  Fu in 4months    Patient given educational materials - seepatient instructions.  Discussed use, benefit, and side effects of prescribed medications.All patient questions answered.  Pt voiced understanding.  Patient agreed withtreatment plan. Follow up as directed.     Electronically signed by NILAM Busby - SILVINO  on 1/31/2023 at 4:45 PM

## 2023-04-24 ENCOUNTER — HOSPITAL ENCOUNTER (INPATIENT)
Age: 48
LOS: 3 days | Discharge: HOME | End: 2023-04-27
Attending: EMERGENCY MEDICINE | Admitting: INTERNAL MEDICINE
Payer: MEDICAID

## 2023-04-24 ENCOUNTER — APPOINTMENT (OUTPATIENT)
Dept: GENERAL RADIOLOGY | Age: 48
End: 2023-04-24
Payer: MEDICAID

## 2023-04-24 DIAGNOSIS — D69.6 THROMBOCYTOPENIA (HCC): ICD-10-CM

## 2023-04-24 DIAGNOSIS — F10.930 ALCOHOL WITHDRAWAL SYNDROME WITHOUT COMPLICATION (HCC): Primary | ICD-10-CM

## 2023-04-24 DIAGNOSIS — K76.0 HEPATIC STEATOSIS: ICD-10-CM

## 2023-04-24 PROBLEM — F10.939 ALCOHOL WITHDRAWAL, WITH UNSPECIFIED COMPLICATION (HCC): Status: ACTIVE | Noted: 2023-04-24

## 2023-04-24 LAB
ALBUMIN SERPL BCG-MCNC: 4.2 G/DL (ref 3.5–5.1)
ALP SERPL-CCNC: 196 U/L (ref 38–126)
ALT SERPL W/O P-5'-P-CCNC: 86 U/L (ref 11–66)
AMPHETAMINES UR QL SCN: NEGATIVE
ANION GAP SERPL CALC-SCNC: 15 MEQ/L (ref 8–16)
APTT PPP: 26.4 SECONDS (ref 22–38)
AST SERPL-CCNC: 132 U/L (ref 5–40)
AUTO DIFF PNL BLD: ABNORMAL
BARBITURATES UR QL SCN: NEGATIVE
BASOPHILS ABSOLUTE: 0.1 THOU/MM3 (ref 0–0.1)
BASOPHILS NFR BLD AUTO: 0.7 %
BENZODIAZ UR QL SCN: NORMAL
BILIRUB SERPL-MCNC: 1.6 MG/DL (ref 0.3–1.2)
BILIRUB UR QL STRIP.AUTO: NEGATIVE
BUN SERPL-MCNC: 10 MG/DL (ref 7–22)
BZE UR QL SCN: NEGATIVE
CALCIUM SERPL-MCNC: 9 MG/DL (ref 8.5–10.5)
CANNABINOIDS UR QL SCN: NEGATIVE
CHARACTER UR: CLEAR
CHLORIDE SERPL-SCNC: 91 MEQ/L (ref 98–111)
CO2 SERPL-SCNC: 21 MEQ/L (ref 23–33)
COLOR: YELLOW
CREAT SERPL-MCNC: 0.5 MG/DL (ref 0.4–1.2)
DEPRECATED RDW RBC AUTO: 45.1 FL (ref 35–45)
EOSINOPHIL NFR BLD AUTO: 9.2 %
EOSINOPHILS ABSOLUTE: 0.8 THOU/MM3 (ref 0–0.4)
ERYTHROCYTE [DISTWIDTH] IN BLOOD BY AUTOMATED COUNT: 13.2 % (ref 11.5–14.5)
ETHANOL SERPL-MCNC: < 0.01 %
FLUAV RNA RESP QL NAA+PROBE: NOT DETECTED
FLUBV RNA RESP QL NAA+PROBE: NOT DETECTED
GFR SERPL CREATININE-BSD FRML MDRD: > 60 ML/MIN/1.73M2
GLUCOSE SERPL-MCNC: 92 MG/DL (ref 70–108)
GLUCOSE UR QL STRIP.AUTO: NEGATIVE MG/DL
HAV IGM SER QL: NEGATIVE
HBV CORE IGM SERPL QL IA: NEGATIVE
HBV SURFACE AG SERPL QL IA: NEGATIVE
HCT VFR BLD AUTO: 46.1 % (ref 42–52)
HCV IGG SERPL QL IA: NEGATIVE
HGB BLD-MCNC: 16.5 GM/DL (ref 14–18)
HGB UR QL STRIP.AUTO: NEGATIVE
IMM GRANULOCYTES # BLD AUTO: 0.02 THOU/MM3 (ref 0–0.07)
IMM GRANULOCYTES NFR BLD AUTO: 0.2 %
INR PPP: 1.18 (ref 0.85–1.13)
KETONES UR QL STRIP.AUTO: ABNORMAL
LIPASE SERPL-CCNC: 49.3 U/L (ref 5.6–51.3)
LYMPHOCYTES ABSOLUTE: 1.3 THOU/MM3 (ref 1–4.8)
LYMPHOCYTES NFR BLD AUTO: 15.2 %
MCH RBC QN AUTO: 33.5 PG (ref 26–33)
MCHC RBC AUTO-ENTMCNC: 35.8 GM/DL (ref 32.2–35.5)
MCV RBC AUTO: 93.5 FL (ref 80–94)
MONOCYTES ABSOLUTE: 0.6 THOU/MM3 (ref 0.4–1.3)
MONOCYTES NFR BLD AUTO: 6.8 %
NEUTROPHILS NFR BLD AUTO: 67.9 %
NITRITE UR QL STRIP: NEGATIVE
NRBC BLD AUTO-RTO: 0 /100 WBC
OPIATES UR QL SCN: NEGATIVE
OSMOLALITY SERPL CALC.SUM OF ELEC: 253.9 MOSMOL/KG (ref 275–300)
OSMOLALITY UR: 310 MOSMOL/KG (ref 250–750)
OXYCODONE: NEGATIVE
PATHOLOGIST REVIEW: ABNORMAL
PH UR STRIP.AUTO: 5.5 [PH] (ref 5–9)
PHENCYCLIDINE QUANTITATIVE URINE: NEGATIVE
PLATELET # BLD AUTO: 21 THOU/MM3 (ref 130–400)
PLATELET BLD QL SMEAR: ABNORMAL
PMV BLD AUTO: 13.2 FL (ref 9.4–12.4)
POTASSIUM SERPL-SCNC: 4 MEQ/L (ref 3.5–5.2)
PROT SERPL-MCNC: 6.7 G/DL (ref 6.1–8)
PROT UR STRIP.AUTO-MCNC: NEGATIVE MG/DL
RBC # BLD AUTO: 4.93 MILL/MM3 (ref 4.7–6.1)
SARS-COV-2 RNA RESP QL NAA+PROBE: NOT DETECTED
SCAN OF BLOOD SMEAR: NORMAL
SEGMENTED NEUTROPHILS ABSOLUTE COUNT: 5.7 THOU/MM3 (ref 1.8–7.7)
SODIUM SERPL-SCNC: 127 MEQ/L (ref 135–145)
SODIUM SERPL-SCNC: 137 MEQ/L (ref 135–145)
SODIUM SERPL-SCNC: 137 MEQ/L (ref 135–145)
SP GR UR REFRACT.AUTO: 1.01 (ref 1–1.03)
TROPONIN T: < 0.01 NG/ML
UROBILINOGEN, URINE: 0.2 EU/DL (ref 0–1)
WBC # BLD AUTO: 8.4 THOU/MM3 (ref 4.8–10.8)
WBC #/AREA URNS HPF: NEGATIVE /[HPF]

## 2023-04-24 PROCEDURE — 83935 ASSAY OF URINE OSMOLALITY: CPT

## 2023-04-24 PROCEDURE — 2580000003 HC RX 258: Performed by: STUDENT IN AN ORGANIZED HEALTH CARE EDUCATION/TRAINING PROGRAM

## 2023-04-24 PROCEDURE — 93005 ELECTROCARDIOGRAM TRACING: CPT | Performed by: EMERGENCY MEDICINE

## 2023-04-24 PROCEDURE — G0480 DRUG TEST DEF 1-7 CLASSES: HCPCS

## 2023-04-24 PROCEDURE — 80074 ACUTE HEPATITIS PANEL: CPT

## 2023-04-24 PROCEDURE — 36415 COLL VENOUS BLD VENIPUNCTURE: CPT

## 2023-04-24 PROCEDURE — 81003 URINALYSIS AUTO W/O SCOPE: CPT

## 2023-04-24 PROCEDURE — 80305 DRUG TEST PRSMV DIR OPT OBS: CPT

## 2023-04-24 PROCEDURE — 84484 ASSAY OF TROPONIN QUANT: CPT

## 2023-04-24 PROCEDURE — 6360000002 HC RX W HCPCS: Performed by: PHYSICIAN ASSISTANT

## 2023-04-24 PROCEDURE — 83690 ASSAY OF LIPASE: CPT

## 2023-04-24 PROCEDURE — 71045 X-RAY EXAM CHEST 1 VIEW: CPT

## 2023-04-24 PROCEDURE — 80053 COMPREHEN METABOLIC PANEL: CPT

## 2023-04-24 PROCEDURE — 87636 SARSCOV2 & INF A&B AMP PRB: CPT

## 2023-04-24 PROCEDURE — 6360000002 HC RX W HCPCS: Performed by: INTERNAL MEDICINE

## 2023-04-24 PROCEDURE — 82077 ASSAY SPEC XCP UR&BREATH IA: CPT

## 2023-04-24 PROCEDURE — 96374 THER/PROPH/DIAG INJ IV PUSH: CPT

## 2023-04-24 PROCEDURE — 82436 ASSAY OF URINE CHLORIDE: CPT

## 2023-04-24 PROCEDURE — 94640 AIRWAY INHALATION TREATMENT: CPT

## 2023-04-24 PROCEDURE — 84300 ASSAY OF URINE SODIUM: CPT

## 2023-04-24 PROCEDURE — 6370000000 HC RX 637 (ALT 250 FOR IP): Performed by: STUDENT IN AN ORGANIZED HEALTH CARE EDUCATION/TRAINING PROGRAM

## 2023-04-24 PROCEDURE — 6360000002 HC RX W HCPCS: Performed by: STUDENT IN AN ORGANIZED HEALTH CARE EDUCATION/TRAINING PROGRAM

## 2023-04-24 PROCEDURE — 84295 ASSAY OF SERUM SODIUM: CPT

## 2023-04-24 PROCEDURE — 94761 N-INVAS EAR/PLS OXIMETRY MLT: CPT

## 2023-04-24 PROCEDURE — 99285 EMERGENCY DEPT VISIT HI MDM: CPT

## 2023-04-24 PROCEDURE — 85610 PROTHROMBIN TIME: CPT

## 2023-04-24 PROCEDURE — 2580000003 HC RX 258: Performed by: INTERNAL MEDICINE

## 2023-04-24 PROCEDURE — 85730 THROMBOPLASTIN TIME PARTIAL: CPT

## 2023-04-24 PROCEDURE — 1200000003 HC TELEMETRY R&B

## 2023-04-24 PROCEDURE — 85025 COMPLETE CBC W/AUTO DIFF WBC: CPT

## 2023-04-24 RX ORDER — MAGNESIUM SULFATE 1 G/100ML
1000 INJECTION INTRAVENOUS PRN
Status: DISCONTINUED | OUTPATIENT
Start: 2023-04-24 | End: 2023-04-24 | Stop reason: SDUPTHER

## 2023-04-24 RX ORDER — LISINOPRIL 20 MG/1
20 TABLET ORAL DAILY
Status: DISCONTINUED | OUTPATIENT
Start: 2023-04-24 | End: 2023-04-27 | Stop reason: HOSPADM

## 2023-04-24 RX ORDER — LORAZEPAM 2 MG/ML
4 INJECTION INTRAMUSCULAR
Status: DISCONTINUED | OUTPATIENT
Start: 2023-04-24 | End: 2023-04-24

## 2023-04-24 RX ORDER — MULTIVITAMIN WITH IRON
1 TABLET ORAL DAILY
Status: DISCONTINUED | OUTPATIENT
Start: 2023-04-24 | End: 2023-04-27 | Stop reason: HOSPADM

## 2023-04-24 RX ORDER — SODIUM CHLORIDE 9 MG/ML
INJECTION, SOLUTION INTRAVENOUS CONTINUOUS
Status: DISCONTINUED | OUTPATIENT
Start: 2023-04-24 | End: 2023-04-24

## 2023-04-24 RX ORDER — HYDROXYZINE HYDROCHLORIDE 50 MG/ML
50 INJECTION, SOLUTION INTRAMUSCULAR EVERY 6 HOURS PRN
Status: DISCONTINUED | OUTPATIENT
Start: 2023-04-24 | End: 2023-04-27 | Stop reason: HOSPADM

## 2023-04-24 RX ORDER — LORAZEPAM 2 MG/ML
1 INJECTION INTRAMUSCULAR ONCE
Status: COMPLETED | OUTPATIENT
Start: 2023-04-24 | End: 2023-04-24

## 2023-04-24 RX ORDER — LORAZEPAM 1 MG/1
1 TABLET ORAL
Status: DISCONTINUED | OUTPATIENT
Start: 2023-04-24 | End: 2023-04-24

## 2023-04-24 RX ORDER — LANOLIN ALCOHOL/MO/W.PET/CERES
100 CREAM (GRAM) TOPICAL DAILY
Status: DISCONTINUED | OUTPATIENT
Start: 2023-04-24 | End: 2023-04-27 | Stop reason: HOSPADM

## 2023-04-24 RX ORDER — FOLIC ACID 1 MG/1
1 TABLET ORAL DAILY
Status: DISCONTINUED | OUTPATIENT
Start: 2023-04-24 | End: 2023-04-27 | Stop reason: HOSPADM

## 2023-04-24 RX ORDER — ONDANSETRON 2 MG/ML
4 INJECTION INTRAMUSCULAR; INTRAVENOUS EVERY 6 HOURS PRN
Status: DISCONTINUED | OUTPATIENT
Start: 2023-04-24 | End: 2023-04-27 | Stop reason: HOSPADM

## 2023-04-24 RX ORDER — ASPIRIN 81 MG/1
324 TABLET, CHEWABLE ORAL ONCE
Status: DISCONTINUED | OUTPATIENT
Start: 2023-04-24 | End: 2023-04-24

## 2023-04-24 RX ORDER — PHENOBARBITAL 32.4 MG/1
32.4 TABLET ORAL 2 TIMES DAILY
Status: COMPLETED | OUTPATIENT
Start: 2023-04-26 | End: 2023-04-27

## 2023-04-24 RX ORDER — LORAZEPAM 2 MG/ML
2 INJECTION INTRAMUSCULAR
Status: DISCONTINUED | OUTPATIENT
Start: 2023-04-24 | End: 2023-04-24

## 2023-04-24 RX ORDER — SODIUM CHLORIDE 9 MG/ML
INJECTION, SOLUTION INTRAVENOUS PRN
Status: DISCONTINUED | OUTPATIENT
Start: 2023-04-24 | End: 2023-04-27 | Stop reason: HOSPADM

## 2023-04-24 RX ORDER — LORAZEPAM 2 MG/ML
3 INJECTION INTRAMUSCULAR
Status: DISCONTINUED | OUTPATIENT
Start: 2023-04-24 | End: 2023-04-24

## 2023-04-24 RX ORDER — ONDANSETRON 4 MG/1
4 TABLET, ORALLY DISINTEGRATING ORAL EVERY 8 HOURS PRN
Status: DISCONTINUED | OUTPATIENT
Start: 2023-04-24 | End: 2023-04-27 | Stop reason: HOSPADM

## 2023-04-24 RX ORDER — METOPROLOL TARTRATE 50 MG/1
50 TABLET, FILM COATED ORAL DAILY
Status: DISCONTINUED | OUTPATIENT
Start: 2023-04-24 | End: 2023-04-27 | Stop reason: HOSPADM

## 2023-04-24 RX ORDER — PHENOBARBITAL 32.4 MG/1
64.8 TABLET ORAL 2 TIMES DAILY
Status: COMPLETED | OUTPATIENT
Start: 2023-04-25 | End: 2023-04-25

## 2023-04-24 RX ORDER — LORAZEPAM 1 MG/1
4 TABLET ORAL
Status: DISCONTINUED | OUTPATIENT
Start: 2023-04-24 | End: 2023-04-24

## 2023-04-24 RX ORDER — ACETAMINOPHEN 325 MG/1
650 TABLET ORAL EVERY 6 HOURS PRN
Status: DISCONTINUED | OUTPATIENT
Start: 2023-04-24 | End: 2023-04-27 | Stop reason: HOSPADM

## 2023-04-24 RX ORDER — ACETAMINOPHEN 650 MG/1
650 SUPPOSITORY RECTAL EVERY 6 HOURS PRN
Status: DISCONTINUED | OUTPATIENT
Start: 2023-04-24 | End: 2023-04-27 | Stop reason: HOSPADM

## 2023-04-24 RX ORDER — PANTOPRAZOLE SODIUM 40 MG/1
40 TABLET, DELAYED RELEASE ORAL
Status: DISCONTINUED | OUTPATIENT
Start: 2023-04-25 | End: 2023-04-27 | Stop reason: HOSPADM

## 2023-04-24 RX ORDER — MAGNESIUM SULFATE IN WATER 40 MG/ML
2000 INJECTION, SOLUTION INTRAVENOUS PRN
Status: DISCONTINUED | OUTPATIENT
Start: 2023-04-24 | End: 2023-04-27 | Stop reason: HOSPADM

## 2023-04-24 RX ORDER — POTASSIUM CHLORIDE 7.45 MG/ML
10 INJECTION INTRAVENOUS PRN
Status: DISCONTINUED | OUTPATIENT
Start: 2023-04-24 | End: 2023-04-27 | Stop reason: HOSPADM

## 2023-04-24 RX ORDER — PHENOBARBITAL SODIUM 65 MG/ML
130 INJECTION INTRAMUSCULAR
Status: DISCONTINUED | OUTPATIENT
Start: 2023-04-24 | End: 2023-04-27 | Stop reason: HOSPADM

## 2023-04-24 RX ORDER — LORAZEPAM 1 MG/1
3 TABLET ORAL
Status: DISCONTINUED | OUTPATIENT
Start: 2023-04-24 | End: 2023-04-24

## 2023-04-24 RX ORDER — SODIUM CHLORIDE 0.9 % (FLUSH) 0.9 %
5-40 SYRINGE (ML) INJECTION EVERY 12 HOURS SCHEDULED
Status: DISCONTINUED | OUTPATIENT
Start: 2023-04-24 | End: 2023-04-27 | Stop reason: HOSPADM

## 2023-04-24 RX ORDER — LORAZEPAM 2 MG/ML
1 INJECTION INTRAMUSCULAR
Status: DISCONTINUED | OUTPATIENT
Start: 2023-04-24 | End: 2023-04-24

## 2023-04-24 RX ORDER — SODIUM CHLORIDE 0.9 % (FLUSH) 0.9 %
10 SYRINGE (ML) INJECTION PRN
Status: DISCONTINUED | OUTPATIENT
Start: 2023-04-24 | End: 2023-04-27 | Stop reason: HOSPADM

## 2023-04-24 RX ORDER — POLYETHYLENE GLYCOL 3350 17 G/17G
17 POWDER, FOR SOLUTION ORAL DAILY PRN
Status: DISCONTINUED | OUTPATIENT
Start: 2023-04-24 | End: 2023-04-27 | Stop reason: HOSPADM

## 2023-04-24 RX ORDER — PHENOBARBITAL SODIUM 65 MG/ML
260 INJECTION INTRAMUSCULAR
Status: DISCONTINUED | OUTPATIENT
Start: 2023-04-24 | End: 2023-04-27 | Stop reason: HOSPADM

## 2023-04-24 RX ORDER — LORAZEPAM 1 MG/1
2 TABLET ORAL
Status: DISCONTINUED | OUTPATIENT
Start: 2023-04-24 | End: 2023-04-24

## 2023-04-24 RX ADMIN — METOPROLOL TARTRATE 50 MG: 50 TABLET, FILM COATED ORAL at 14:11

## 2023-04-24 RX ADMIN — Medication 1 TABLET: at 14:11

## 2023-04-24 RX ADMIN — LISINOPRIL 20 MG: 20 TABLET ORAL at 14:11

## 2023-04-24 RX ADMIN — Medication 100 MG: at 14:10

## 2023-04-24 RX ADMIN — LORAZEPAM 1 MG: 2 INJECTION INTRAMUSCULAR; INTRAVENOUS at 12:47

## 2023-04-24 RX ADMIN — PHENOBARBITAL SODIUM 300.95 MG: 65 INJECTION INTRAMUSCULAR at 20:08

## 2023-04-24 RX ADMIN — FOLIC ACID 1 MG: 1 TABLET ORAL at 14:11

## 2023-04-24 RX ADMIN — LORAZEPAM 1 MG: 2 INJECTION INTRAMUSCULAR; INTRAVENOUS at 08:21

## 2023-04-24 RX ADMIN — ALBUTEROL SULFATE 2.5 MG: 2.5 SOLUTION RESPIRATORY (INHALATION) at 06:34

## 2023-04-24 RX ADMIN — SODIUM CHLORIDE, PRESERVATIVE FREE 10 ML: 5 INJECTION INTRAVENOUS at 20:08

## 2023-04-24 ASSESSMENT — PATIENT HEALTH QUESTIONNAIRE - PHQ9: SUM OF ALL RESPONSES TO PHQ QUESTIONS 1-9: 12

## 2023-04-24 ASSESSMENT — PAIN - FUNCTIONAL ASSESSMENT: PAIN_FUNCTIONAL_ASSESSMENT: 0-10

## 2023-04-24 ASSESSMENT — PAIN SCALES - GENERAL: PAINLEVEL_OUTOF10: 6

## 2023-04-24 ASSESSMENT — ENCOUNTER SYMPTOMS
RESPIRATORY NEGATIVE: 1
ALLERGIC/IMMUNOLOGIC NEGATIVE: 1
EYES NEGATIVE: 1
GASTROINTESTINAL NEGATIVE: 1

## 2023-04-24 ASSESSMENT — PAIN DESCRIPTION - ORIENTATION: ORIENTATION: RIGHT

## 2023-04-24 ASSESSMENT — PAIN DESCRIPTION - LOCATION: LOCATION: CHEST

## 2023-04-24 NOTE — ED TRIAGE NOTES
Patient presents to ED via EMS with chief complaint of chest pain/tightness. Patient states pain started around 0200 or 0300 this morning and is in his upper abdomen and right chest. Pain rated 6/10. Full dose of baby ASA given by EMS. Patient reports drinking quite a bit of ETOH the past couple of days as well. Patient resting in bed. Respirations easy and unlabored. No distress noted. Call light within reach.

## 2023-04-24 NOTE — H&P
file   Transportation Needs: Not on file   Physical Activity: Not on file   Stress: Not on file   Social Connections: Not on file   Intimate Partner Violence: Not on file   Housing Stability: Not on file     Family History:    Family History   Problem Relation Age of Onset    High Blood Pressure Father     Cancer Father         Lung Cancer    Alcohol Abuse Father     High Blood Pressure Brother     Diabetes Mother     Heart Disease Mother         Stent Placement    Arthritis Mother     Depression Mother     High Blood Pressure Mother     High Cholesterol Mother     Alcohol Abuse Paternal Uncle      REVIEW OF SYSTEMS:  A 14-point ROS was obtained and negative, with the exception of pertinent positives as listed below:    Review of Systems   Constitutional:  Positive for fatigue. HENT: Negative. Eyes: Negative. Respiratory: Negative. Cardiovascular:  Positive for palpitations. Gastrointestinal: Negative. Endocrine: Negative. Genitourinary: Negative. Musculoskeletal: Negative. Skin: Negative. Allergic/Immunologic: Negative. Neurological:  Positive for tremors and weakness. PHYSICAL EXAM:  Vitals:    04/24/23 0722 04/24/23 0728 04/24/23 0821 04/24/23 0922   BP: (!) 182/101 (!) 182/101 (!) 154/105 (!) 157/88   Pulse: (!) 104 (!) 104 (!) 104 94   Resp: 20  15 16   Temp:       TempSrc:       SpO2: 92%  96% 95%   Weight:       Height:         General appearance: Acutely ill-appearing male. HEENT:  Normocephalic / atraumatic. PERRL. EOM intact. Conjunctivae appear normal.  Neck: Supple. No JVD. Respiratory: Normal respiratory effort on RA. CTAB. No wheezes / rales / rhonchi. Cardiovascular: Tachycardia. Normal S1/S2. No murmurs / rubs / gallops. Abdomen: Soft / non-tender / non-distended. BS present. Musculoskeletal: No cyanosis or edema. Skin: Warm / dry. Normal turgor. Neurologic: A/O x 3. Speech normal. Answers questions appropriately.    Psychiatric: Thought content / judgment

## 2023-04-24 NOTE — ED PROVIDER NOTES
ATTENDING NOTE:    I supervised and discussed the history, physical exam and the management of this patient with the PA or NP. I reviewed the PA's or NP's note and agree with the documented findings and plan of care.       Electronically verified by Laurance Anchors, MD Gilford Bowler, MD  04/24/23 [id number]

## 2023-04-24 NOTE — ED PROVIDER NOTES
[address] [id number] EMERGENCY DEPT      EMERGENCY MEDICINE     Pt Name: Nico Balderas  MRN: [id number]  [place] 1975  Date of evaluation: 4/24/2023  Provider: Zahra Justice PA-C    CHIEF COMPLAINT       Chief Complaint   Patient presents with    Chest Pain     HISTORY OF PRESENT ILLNESS   Nico Balderas is a pleasant 53 y.o. male who presents to the emergency department from from home, brought in by EMS for evaluation of chest pain. Patient states around 2 or 3 AM he began having intermittent episodes of chest and abdominal pain. States the episodes last a few minutes and occur a couple times per hour. Denies current symptoms. He describes the chest pain as a tightness that radiates to the bilateral abdomen. Intermittent tingling in the right hand. He also complains of sweats, chills, anxiety, nausea, vomiting, diarrhea. States he had previously quit drinking alcohol, sober for 6 months before relapsing 3 weeks ago. Has been drinking 20 beers per day and cannot remember if he drank yesterday at all or just a smaller amount. States he is trying to quit again. States his symptoms do feel somewhat reminiscent of prior alcohol withdrawal.  Denies any exacerbating or alleviating factors for his symptoms. Denies any associated drug use. Denies any personal or family cardiac history. Denies any cough or inhaler use at baseline.     PASTMEDICAL HISTORY     Past Medical History:   Diagnosis Date    COPD (chronic obstructive pulmonary disease) (HCC)     Depression     Diabetes mellitus (HCC)     ETOH abuse     GERD (gastroesophageal reflux disease)     Hyperlipidemia     Hypertension     Liver disease     Seizures ([facility]     etoh wdl       Patient Active Problem List   Diagnosis Code    Hyperlipemia E78.5    Uncontrolled hypertension I10    Alcohol withdrawal (HCC) F90.400    Alcoholic hepatitis B99.49    COPD (chronic obstructive pulmonary disease) ([facility] J44.9    Passive-dependent personality disorder

## 2023-04-24 NOTE — CONSULTS
Brief Intervention and Referral to Treatment Summary    Patient was provided PHQ-9, AUDIT-C and DAST Screening:      PHQ-9 Score: 12  AUDIT-C Score:  12  DAST Score:  0    Patients substance use is considered     Harmful    Patients depression is considered: Moderate    Brief Education Was Provided    Patient was receptive      Brief Intervention Is Provided (Only for AUDIT-C or DAST)     Patient reports readiness to decrease and/or stop use and a plan was discussed       Recommendations/Referrals for Brief and/or Specialized Treatment Provided to Patient      [facility] and would [facility].    [facility] contact information provided in \"follow-up\" section

## 2023-04-24 NOTE — ED NOTES
Pt resting on cot at this time. VSS. No needs voiced. CIWA scale assessed. Will continue to monitor.       Juan José Unger RN  04/24/23 0250

## 2023-04-25 LAB
ANION GAP SERPL CALC-SCNC: 8 MEQ/L (ref 8–16)
BUN SERPL-MCNC: 12 MG/DL (ref 7–22)
CALCIUM SERPL-MCNC: 8.7 MG/DL (ref 8.5–10.5)
CHLORIDE 24H UR-SRATE: < 20 MEQ/L
CHLORIDE SERPL-SCNC: 107 MEQ/L (ref 98–111)
CO2 SERPL-SCNC: 24 MEQ/L (ref 23–33)
CREAT SERPL-MCNC: 0.6 MG/DL (ref 0.4–1.2)
DEPRECATED RDW RBC AUTO: 47.1 FL (ref 35–45)
ERYTHROCYTE [DISTWIDTH] IN BLOOD BY AUTOMATED COUNT: 13.7 % (ref 11.5–14.5)
GFR SERPL CREATININE-BSD FRML MDRD: > 60 ML/MIN/1.73M2
GLUCOSE SERPL-MCNC: 110 MG/DL (ref 70–108)
HCT VFR BLD AUTO: 45.1 % (ref 42–52)
HGB BLD-MCNC: 15.5 GM/DL (ref 14–18)
MAGNESIUM SERPL-MCNC: 1.9 MG/DL (ref 1.6–2.4)
MCH RBC QN AUTO: 32.4 PG (ref 26–33)
MCHC RBC AUTO-ENTMCNC: 34.4 GM/DL (ref 32.2–35.5)
MCV RBC AUTO: 94.2 FL (ref 80–94)
PLATELET # BLD AUTO: 151 THOU/MM3 (ref 130–400)
PMV BLD AUTO: 9.3 FL (ref 9.4–12.4)
POTASSIUM SERPL-SCNC: 4 MEQ/L (ref 3.5–5.2)
RBC # BLD AUTO: 4.79 MILL/MM3 (ref 4.7–6.1)
SODIUM SERPL-SCNC: 137 MEQ/L (ref 135–145)
SODIUM SERPL-SCNC: 137 MEQ/L (ref 135–145)
SODIUM SERPL-SCNC: 139 MEQ/L (ref 135–145)
SODIUM UR-SCNC: 38 MEQ/L
WBC # BLD AUTO: 5.4 THOU/MM3 (ref 4.8–10.8)

## 2023-04-25 PROCEDURE — 6370000000 HC RX 637 (ALT 250 FOR IP): Performed by: STUDENT IN AN ORGANIZED HEALTH CARE EDUCATION/TRAINING PROGRAM

## 2023-04-25 PROCEDURE — 6360000002 HC RX W HCPCS: Performed by: INTERNAL MEDICINE

## 2023-04-25 PROCEDURE — 2580000003 HC RX 258: Performed by: INTERNAL MEDICINE

## 2023-04-25 PROCEDURE — 2580000003 HC RX 258: Performed by: STUDENT IN AN ORGANIZED HEALTH CARE EDUCATION/TRAINING PROGRAM

## 2023-04-25 PROCEDURE — 1200000003 HC TELEMETRY R&B

## 2023-04-25 PROCEDURE — 83735 ASSAY OF MAGNESIUM: CPT

## 2023-04-25 PROCEDURE — 99232 SBSQ HOSP IP/OBS MODERATE 35: CPT

## 2023-04-25 PROCEDURE — 80048 BASIC METABOLIC PNL TOTAL CA: CPT

## 2023-04-25 PROCEDURE — 6370000000 HC RX 637 (ALT 250 FOR IP)

## 2023-04-25 PROCEDURE — 6370000000 HC RX 637 (ALT 250 FOR IP): Performed by: INTERNAL MEDICINE

## 2023-04-25 PROCEDURE — 36415 COLL VENOUS BLD VENIPUNCTURE: CPT

## 2023-04-25 PROCEDURE — 84295 ASSAY OF SERUM SODIUM: CPT

## 2023-04-25 PROCEDURE — 85027 COMPLETE CBC AUTOMATED: CPT

## 2023-04-25 PROCEDURE — 93010 ELECTROCARDIOGRAM REPORT: CPT | Performed by: INTERNAL MEDICINE

## 2023-04-25 RX ORDER — NICOTINE 21 MG/24HR
1 PATCH, TRANSDERMAL 24 HOURS TRANSDERMAL DAILY
Status: DISCONTINUED | OUTPATIENT
Start: 2023-04-25 | End: 2023-04-27 | Stop reason: HOSPADM

## 2023-04-25 RX ADMIN — FOLIC ACID 1 MG: 1 TABLET ORAL at 08:28

## 2023-04-25 RX ADMIN — Medication 1 TABLET: at 08:28

## 2023-04-25 RX ADMIN — PANTOPRAZOLE SODIUM 40 MG: 40 TABLET, DELAYED RELEASE ORAL at 08:28

## 2023-04-25 RX ADMIN — Medication 100 MG: at 08:28

## 2023-04-25 RX ADMIN — PHENOBARBITAL SODIUM 300.95 MG: 65 INJECTION INTRAMUSCULAR at 02:25

## 2023-04-25 RX ADMIN — PHENOBARBITAL 64.8 MG: 32.4 TABLET ORAL at 21:10

## 2023-04-25 RX ADMIN — SODIUM CHLORIDE, PRESERVATIVE FREE 10 ML: 5 INJECTION INTRAVENOUS at 22:08

## 2023-04-25 RX ADMIN — PHENOBARBITAL 64.8 MG: 32.4 TABLET ORAL at 08:29

## 2023-04-25 NOTE — PLAN OF CARE
Problem: Discharge Planning  Goal: Discharge to home or other facility with appropriate resources  4/25/2023 1805 by Forrest Whaley RN  Outcome: Progressing  Flowsheets (Taken 4/24/2023 2000 by Rg Velasco RN)  Discharge to home or other facility with appropriate resources:   Identify barriers to discharge with patient and caregiver   Arrange for needed discharge resources and transportation as appropriate   Identify discharge learning needs (meds, wound care, etc)  4/25/2023 0801 by Rg Velasco RN  Outcome: Progressing  Flowsheets (Taken 4/24/2023 2000)  Discharge to home or other facility with appropriate resources:   Identify barriers to discharge with patient and caregiver   Arrange for needed discharge resources and transportation as appropriate   Identify discharge learning needs (meds, wound care, etc)     Problem: Safety - Adult  Goal: Free from fall injury  4/25/2023 1805 by Forrest Whaley RN  Outcome: Progressing  Flowsheets (Taken 4/25/2023 1805)  Free From Fall Injury: Instruct family/caregiver on patient safety  4/25/2023 0801 by Rg Velasco RN  Outcome: Progressing    Care plan reviewed with patient. Patient verbalize understanding of the plan of care and contribute to goal setting.

## 2023-04-25 NOTE — PLAN OF CARE
Problem: Discharge Planning  Goal: Discharge to home or other facility with appropriate resources  Outcome: Progressing  Flowsheets (Taken 4/24/2023 2000)  Discharge to home or other facility with appropriate resources:   Identify barriers to discharge with patient and caregiver   Arrange for needed discharge resources and transportation as appropriate   Identify discharge learning needs (meds, wound care, etc)     Problem: Safety - Adult  Goal: Free from fall injury  Outcome: Progressing

## 2023-04-26 LAB
ANION GAP SERPL CALC-SCNC: 12 MEQ/L (ref 8–16)
BUN SERPL-MCNC: 14 MG/DL (ref 7–22)
CA-I BLD ISE-SCNC: 1.04 MMOL/L (ref 1.12–1.32)
CALCIUM SERPL-MCNC: 8.2 MG/DL (ref 8.5–10.5)
CHLORIDE SERPL-SCNC: 107 MEQ/L (ref 98–111)
CO2 SERPL-SCNC: 21 MEQ/L (ref 23–33)
CREAT SERPL-MCNC: 0.6 MG/DL (ref 0.4–1.2)
DEPRECATED RDW RBC AUTO: 49.8 FL (ref 35–45)
ERYTHROCYTE [DISTWIDTH] IN BLOOD BY AUTOMATED COUNT: 13.8 % (ref 11.5–14.5)
GFR SERPL CREATININE-BSD FRML MDRD: > 60 ML/MIN/1.73M2
GLUCOSE SERPL-MCNC: 156 MG/DL (ref 70–108)
HCT VFR BLD AUTO: 42.4 % (ref 42–52)
HGB BLD-MCNC: 14.5 GM/DL (ref 14–18)
MCH RBC QN AUTO: 33.5 PG (ref 26–33)
MCHC RBC AUTO-ENTMCNC: 34.2 GM/DL (ref 32.2–35.5)
MCV RBC AUTO: 97.9 FL (ref 80–94)
PLATELET # BLD AUTO: 105 THOU/MM3 (ref 130–400)
PMV BLD AUTO: 9.9 FL (ref 9.4–12.4)
POTASSIUM SERPL-SCNC: 4 MEQ/L (ref 3.5–5.2)
RBC # BLD AUTO: 4.33 MILL/MM3 (ref 4.7–6.1)
SODIUM SERPL-SCNC: 138 MEQ/L (ref 135–145)
SODIUM SERPL-SCNC: 140 MEQ/L (ref 135–145)
WBC # BLD AUTO: 4.3 THOU/MM3 (ref 4.8–10.8)

## 2023-04-26 PROCEDURE — 6370000000 HC RX 637 (ALT 250 FOR IP): Performed by: INTERNAL MEDICINE

## 2023-04-26 PROCEDURE — 85027 COMPLETE CBC AUTOMATED: CPT

## 2023-04-26 PROCEDURE — 6370000000 HC RX 637 (ALT 250 FOR IP)

## 2023-04-26 PROCEDURE — 6360000002 HC RX W HCPCS

## 2023-04-26 PROCEDURE — 84295 ASSAY OF SERUM SODIUM: CPT

## 2023-04-26 PROCEDURE — 82330 ASSAY OF CALCIUM: CPT

## 2023-04-26 PROCEDURE — 80048 BASIC METABOLIC PNL TOTAL CA: CPT

## 2023-04-26 PROCEDURE — 2580000003 HC RX 258: Performed by: STUDENT IN AN ORGANIZED HEALTH CARE EDUCATION/TRAINING PROGRAM

## 2023-04-26 PROCEDURE — 36415 COLL VENOUS BLD VENIPUNCTURE: CPT

## 2023-04-26 PROCEDURE — 6370000000 HC RX 637 (ALT 250 FOR IP): Performed by: STUDENT IN AN ORGANIZED HEALTH CARE EDUCATION/TRAINING PROGRAM

## 2023-04-26 PROCEDURE — 99232 SBSQ HOSP IP/OBS MODERATE 35: CPT

## 2023-04-26 PROCEDURE — 1200000003 HC TELEMETRY R&B

## 2023-04-26 RX ORDER — CALCIUM GLUCONATE 20 MG/ML
2000 INJECTION, SOLUTION INTRAVENOUS ONCE
Status: COMPLETED | OUTPATIENT
Start: 2023-04-26 | End: 2023-04-26

## 2023-04-26 RX ORDER — GUAIFENESIN 600 MG/1
600 TABLET, EXTENDED RELEASE ORAL 2 TIMES DAILY PRN
Status: DISCONTINUED | OUTPATIENT
Start: 2023-04-26 | End: 2023-04-27 | Stop reason: HOSPADM

## 2023-04-26 RX ADMIN — LISINOPRIL 20 MG: 20 TABLET ORAL at 07:58

## 2023-04-26 RX ADMIN — CALCIUM GLUCONATE 2000 MG: 20 INJECTION, SOLUTION INTRAVENOUS at 17:07

## 2023-04-26 RX ADMIN — Medication 1 TABLET: at 07:57

## 2023-04-26 RX ADMIN — PHENOBARBITAL 32.4 MG: 32.4 TABLET ORAL at 08:01

## 2023-04-26 RX ADMIN — SODIUM CHLORIDE, PRESERVATIVE FREE 10 ML: 5 INJECTION INTRAVENOUS at 21:00

## 2023-04-26 RX ADMIN — Medication 100 MG: at 07:57

## 2023-04-26 RX ADMIN — METOPROLOL TARTRATE 50 MG: 50 TABLET, FILM COATED ORAL at 07:57

## 2023-04-26 RX ADMIN — PANTOPRAZOLE SODIUM 40 MG: 40 TABLET, DELAYED RELEASE ORAL at 06:02

## 2023-04-26 RX ADMIN — SODIUM CHLORIDE, PRESERVATIVE FREE 10 ML: 5 INJECTION INTRAVENOUS at 07:59

## 2023-04-26 RX ADMIN — PHENOBARBITAL 32.4 MG: 32.4 TABLET ORAL at 21:16

## 2023-04-26 RX ADMIN — FOLIC ACID 1 MG: 1 TABLET ORAL at 07:58

## 2023-04-26 ASSESSMENT — PAIN SCALES - GENERAL: PAINLEVEL_OUTOF10: 0

## 2023-04-26 NOTE — PLAN OF CARE
Problem: Discharge Planning  Goal: Discharge to home or other facility with appropriate resources  4/26/2023 1053 by Tana Marcelo RN  Outcome: Progressing  Flowsheets (Taken 4/26/2023 1053)  Discharge to home or other facility with appropriate resources: Identify barriers to discharge with patient and caregiver     Problem: Safety - Adult  Goal: Free from fall injury  4/26/2023 1053 by Tana Marcelo RN  Outcome: Progressing  Flowsheets (Taken 4/26/2023 1053)  Free From Fall Injury: Abbey Mercado family/caregiver on patient safety     Problem: Pain  Goal: Verbalizes/displays adequate comfort level or baseline comfort level  Outcome: Progressing  Flowsheets (Taken 4/26/2023 1053)  Verbalizes/displays adequate comfort level or baseline comfort level:   Encourage patient to monitor pain and request assistance   Assess pain using appropriate pain scale   Implement non-pharmacological measures as appropriate and evaluate response     Problem: Neurosensory - Adult  Goal: Achieves stable or improved neurological status  Outcome: Progressing  Flowsheets (Taken 4/26/2023 1053)  Achieves stable or improved neurological status: Assess for and report changes in neurological status  Note: Continues on phenobarbital    Care plan reviewed with patient and family. Patient and family verbalize understanding of the plan of care and contribute to goal setting.

## 2023-04-27 VITALS
HEIGHT: 71 IN | HEART RATE: 79 BPM | DIASTOLIC BLOOD PRESSURE: 88 MMHG | RESPIRATION RATE: 16 BRPM | OXYGEN SATURATION: 99 % | WEIGHT: 245.37 LBS | BODY MASS INDEX: 34.35 KG/M2 | TEMPERATURE: 97.7 F | SYSTOLIC BLOOD PRESSURE: 122 MMHG

## 2023-04-27 LAB
ANION GAP SERPL CALC-SCNC: 11 MEQ/L (ref 8–16)
BUN SERPL-MCNC: 11 MG/DL (ref 7–22)
CA-I BLD ISE-SCNC: 1.11 MMOL/L (ref 1.12–1.32)
CALCIUM SERPL-MCNC: 8.6 MG/DL (ref 8.5–10.5)
CHLORIDE SERPL-SCNC: 105 MEQ/L (ref 98–111)
CO2 SERPL-SCNC: 22 MEQ/L (ref 23–33)
CREAT SERPL-MCNC: 0.6 MG/DL (ref 0.4–1.2)
DEPRECATED RDW RBC AUTO: 49.4 FL (ref 35–45)
ERYTHROCYTE [DISTWIDTH] IN BLOOD BY AUTOMATED COUNT: 13.7 % (ref 11.5–14.5)
GFR SERPL CREATININE-BSD FRML MDRD: > 60 ML/MIN/1.73M2
GLUCOSE SERPL-MCNC: 142 MG/DL (ref 70–108)
HCT VFR BLD AUTO: 42.3 % (ref 42–52)
HGB BLD-MCNC: 14.5 GM/DL (ref 14–18)
MCH RBC QN AUTO: 33.2 PG (ref 26–33)
MCHC RBC AUTO-ENTMCNC: 34.3 GM/DL (ref 32.2–35.5)
MCV RBC AUTO: 96.8 FL (ref 80–94)
PLATELET # BLD AUTO: 113 THOU/MM3 (ref 130–400)
PMV BLD AUTO: 9.8 FL (ref 9.4–12.4)
POTASSIUM SERPL-SCNC: 3.8 MEQ/L (ref 3.5–5.2)
RBC # BLD AUTO: 4.37 MILL/MM3 (ref 4.7–6.1)
SODIUM SERPL-SCNC: 138 MEQ/L (ref 135–145)
WBC # BLD AUTO: 6.5 THOU/MM3 (ref 4.8–10.8)

## 2023-04-27 PROCEDURE — 2580000003 HC RX 258: Performed by: STUDENT IN AN ORGANIZED HEALTH CARE EDUCATION/TRAINING PROGRAM

## 2023-04-27 PROCEDURE — 82330 ASSAY OF CALCIUM: CPT

## 2023-04-27 PROCEDURE — 6370000000 HC RX 637 (ALT 250 FOR IP): Performed by: STUDENT IN AN ORGANIZED HEALTH CARE EDUCATION/TRAINING PROGRAM

## 2023-04-27 PROCEDURE — 6370000000 HC RX 637 (ALT 250 FOR IP): Performed by: INTERNAL MEDICINE

## 2023-04-27 PROCEDURE — 80048 BASIC METABOLIC PNL TOTAL CA: CPT

## 2023-04-27 PROCEDURE — 6360000002 HC RX W HCPCS

## 2023-04-27 PROCEDURE — 99239 HOSP IP/OBS DSCHRG MGMT >30: CPT | Performed by: PHYSICIAN ASSISTANT

## 2023-04-27 PROCEDURE — 36415 COLL VENOUS BLD VENIPUNCTURE: CPT

## 2023-04-27 PROCEDURE — 6370000000 HC RX 637 (ALT 250 FOR IP)

## 2023-04-27 PROCEDURE — 85027 COMPLETE CBC AUTOMATED: CPT

## 2023-04-27 RX ORDER — LANOLIN ALCOHOL/MO/W.PET/CERES
100 CREAM (GRAM) TOPICAL DAILY
Qty: 30 TABLET | Refills: 3 | Status: SHIPPED | OUTPATIENT
Start: 2023-04-28

## 2023-04-27 RX ORDER — CALCIUM GLUCONATE 20 MG/ML
2000 INJECTION, SOLUTION INTRAVENOUS ONCE
Status: COMPLETED | OUTPATIENT
Start: 2023-04-27 | End: 2023-04-27

## 2023-04-27 RX ORDER — MULTIVITAMIN WITH IRON
1 TABLET ORAL DAILY
Qty: 30 TABLET | Refills: 0 | Status: SHIPPED | OUTPATIENT
Start: 2023-04-28 | End: 2023-05-28

## 2023-04-27 RX ORDER — FOLIC ACID 1 MG/1
1 TABLET ORAL DAILY
Qty: 30 TABLET | Refills: 0 | Status: SHIPPED | OUTPATIENT
Start: 2023-04-27

## 2023-04-27 RX ORDER — NICOTINE 21 MG/24HR
1 PATCH, TRANSDERMAL 24 HOURS TRANSDERMAL DAILY
Qty: 30 PATCH | Refills: 3 | Status: SHIPPED | OUTPATIENT
Start: 2023-04-28

## 2023-04-27 RX ADMIN — PANTOPRAZOLE SODIUM 40 MG: 40 TABLET, DELAYED RELEASE ORAL at 05:35

## 2023-04-27 RX ADMIN — FOLIC ACID 1 MG: 1 TABLET ORAL at 09:29

## 2023-04-27 RX ADMIN — CALCIUM GLUCONATE 2000 MG: 20 INJECTION, SOLUTION INTRAVENOUS at 05:35

## 2023-04-27 RX ADMIN — Medication 100 MG: at 09:30

## 2023-04-27 RX ADMIN — Medication 1 TABLET: at 09:30

## 2023-04-27 RX ADMIN — LISINOPRIL 20 MG: 20 TABLET ORAL at 09:30

## 2023-04-27 RX ADMIN — SODIUM CHLORIDE, PRESERVATIVE FREE 10 ML: 5 INJECTION INTRAVENOUS at 09:30

## 2023-04-27 RX ADMIN — PHENOBARBITAL 32.4 MG: 32.4 TABLET ORAL at 09:30

## 2023-04-27 RX ADMIN — METOPROLOL TARTRATE 50 MG: 50 TABLET, FILM COATED ORAL at 09:30

## 2023-04-27 NOTE — DISCHARGE SUMMARY
Consultations during this hospital stay:-  [] NONE [] Cardiology  [] Nephrology  [] Hemo onco   [x] GI   [] ID  [] Endocrine  [] Pulm    [] Neuro    [] Psych   [] Urology  [] ENT   [] G SURGERY   []Ortho    []CV surg    [] Palliative  [] Hospice [] Pain management   []    []TCU   [] PT/OT  OTHERS:-    Disposition: home  Condition at Discharge: Stable    Time Spent:- 40 minutes    Electronically signed by Segundo Jack PA-C on 4/27/23 at 5:13 PM EDT   Discharging Hospitalist

## 2023-04-27 NOTE — PLAN OF CARE
Problem: Safety - Adult  Goal: Free from fall injury  4/27/2023 1044 by Louis Johnston RN  Outcome: Progressing  Flowsheets (Taken 4/27/2023 1044)  Free From Fall Injury:   Kai Ramirez family/caregiver on patient safety   Based on caregiver fall risk screen, instruct family/caregiver to ask for assistance with transferring infant if caregiver noted to have fall risk factors

## 2023-04-27 NOTE — PROGRESS NOTES
Hospitalist Progress Note    Patient:  Ashley Bartlett    Unit/Bed:8A-02/002-A  YOB: 1975  MRN: [id number]   Acct: [de-identified]   PCP: NILAM Sanders CNP  Code Status: Full Code  Date of Admission: 4/24/2023    Expected Discharge: 4/27-4/28    Assessment/Plan:      Alcohol dependence with withdrawal: On arrival, noted patient drinks about 20 beers a day. Patient relapsed 3 weeks ago after being sober for 6 months. Last drink on 4/23/2023. Reports history of previous DT's requiring ICU admission. Phenobarbital protocol to finish on 4/27. Addiction services. Vitamin/thiamine/folic acid. Liver cirrhosis: His ultrasound 4/9/2021 showed findings of cirrhosis and possible hepatic steatosis. Likely reason for elevated liver enzymes. Patient has never seen GI prior, recommended following up outpatient. Protonix 40 mg initiated. Acute moderate hyponatremia, resolved: Sodium level 127 on arrival serum osmolality 253.9. Likely due to decreased oral intake and beer potomania. Normalized overnight, IV fluids discontinued. Sodium level has stabilized with stop Q6 hr sodium checks. Thrombocytopenia, resolved: Platelets noted to be 21 on arrival.  Normalized today. May have been due to a lab error. Does have a history of, cytopenia in the past, never as low as 21. Continue SCDs as DVT prophylaxis. Essential hypertension: Resume home medications of Lopressor 50 mg and lisinopril 20 mg. Patient was not taking home blood pressure medications. Continue to monitor. NIDDMII: Last hemoglobin A1c 5.8% on 12/15/2022. Not currently on any diabetic medications. Monitor blood sugar with daily BMP. Tobacco dependence: Patient wishes for nicotine patch. Discussed cessation with patient.       Chief Complaint: Alcohol withdrawal    HPI / Hospital Course:   \"53 y.o. male who presented to [address] [place] [address] for evaluation of symptoms associated with alcohol withdrawal.
Hospitalist Progress Note    Patient:  Castine Course    Unit/Bed:8A-02/002-A  YOB: 1975  MRN: [id number]   Acct: [de-identified]   PCP: NILAM Coreas CNP  Code Status: Full Code  Date of Admission: 4/24/2023    Expected Discharge: 4/26-4/27    Assessment/Plan:    Acute moderate hyponatremia, resolved: Sodium level 127 on arrival serum osmolality 253.9. Likely due to decreased oral intake and beer potomania. Normalized overnight, IV fluids discontinued. Corrected to 139, continue to check sodium every 6 hours to ensure it does not continue to rise. If it does increase more, may need to treat. Alcohol dependence with withdrawal: On arrival, noted patient drinks about 20 beers a day. Patient relapsed 3 weeks ago after being sober for 6 months. Last drink on 4/23/2023. Reports history of previous DT's requiring ICU admission. Phenobarbital protocol. Addiction services. Vitamin/thiamine/folic acid. Liver cirrhosis: His ultrasound 4/9/2021 showed findings of cirrhosis and possible hepatic steatosis. Likely reason for elevated liver enzymes. Patient has never seen GI prior, recommended following up outpatient. Protonix 40 mg initiated. Thrombocytopenia, resolved: Platelets noted to be 21 on arrival.  Normalized today. May have been due to a lab error. Does have a history of, cytopenia in the past, never as low as 21. Continue SCDs as DVT prophylaxis. Accelerated hypertension: Resume home medications of Lopressor 50 mg and lisinopril 20 mg. Patient was not taking home blood pressure medications. Continue to monitor. NIDDMII: Last hemoglobin A1c 5.8% on 12/15/2022. Not currently on any diabetic medications. Monitor blood sugar with daily BMP. Tobacco dependence: Patient wishes for nicotine patch. Discussed cessation with patient.       Chief Complaint: Alcohol withdrawal    HPI / Hospital Course:   \"53 y.o. male who presented to [facility]
Utilize [facility] alcohol withdrawal scale (Based on [place] Modified Alcohol Withdrawal Scale). Tabulate score based on classifications including Tremor, Sweating, Hallucination, Orientation, and Agitation. Tremor: 0  Sweatin  Hallucinations: 0  Orientation: 0  Agitation: 0  Total Score: 0  Action perform as described below     Tremor:  No tremor is 0 points. Tremor on movement is 1 point. Tremor at rest is 2 points. Sweating: No Sweat 0 points. Moist is 1 point. Drenching sweats is 2 points. Hallucinations: No present 0 points. Dissuadable is 1 point. Not dissuadable is 2 points. Orientation: Oriented 0 points. Vague/detached 1 point. Disoriented/no contact 2 points. Agitation: Calm 0 points. Anxious 1 point. Panicky 2 points. Check scale every 2 hours. Discontinue scoring with 4 consecutive scorings of 0. Scale 0: No phenobarbital given. Re-assess every 60 minutes as needed. Scale 1-3: Phenobarbital 130 mg IV over 3 minutes. Re-assess every 60 minutes as needed. May administer every 60 minutes to a maximum dose of phenobarbital 1040 mg in 24 hours! Scale 4-8: Phenobarbital 260 mg IV over 5 minutes. Re-assess every 60 minutes as needed. May administer every 60 minutes to a maximum dose of phenobarbital 1040mg in 24 hours! Scale 9-10: Transfer to ICU (if not already in ICU). Administer 10mg/kg phenobarbital IV over 60 minutes. Maximum dose phenobarbital is 1040mg in 24 hours! 1600: Utilize [facility] alcohol withdrawal scale (Based on [place] Modified Alcohol Withdrawal Scale). Tabulate score based on classifications including Tremor, Sweating, Hallucination, Orientation, and Agitation.     Tremor: 0  Sweatin  Hallucinations: 0  Orientation: 0  Agitation: 0  Total Score: 0  Action perform as described below
42.0 - 52.0 %    MCV 96.8 (H) 80.0 - 94.0 fL    MCH 33.2 (H) 26.0 - 33.0 pg    MCHC 34.3 32.2 - 35.5 gm/dl    RDW-CV 13.7 11.5 - 14.5 %    RDW-SD 49.4 (H) 35.0 - 45.0 fL    Platelets 999 (L) 116 - 400 thou/mm3    MPV 9.8 9.4 - 12.4 fL   Calcium, Ionized    Collection Time: 04/27/23 12:34 AM   Result Value Ref Range    Calcium, Ionized 1.11 (L) 1.12 - 1.32 mmol/L   Anion Gap    Collection Time: 04/27/23 12:34 AM   Result Value Ref Range    Anion Gap 11.0 8.0 - 16.0 meq/L   Glomerular Filtration Rate, Estimated    Collection Time: 04/27/23 12:34 AM   Result Value Ref Range    Est, Glom Filt Rate >60 >60 ml/min/1.73m2        Microbiology:    Blood culture #1: No results found for: BC    Blood culture #2:No results found for: BLOODCULT2    Organism:  No results found for: LABGRAM    MRSA culture only:No results found for: [facility]    Urine culture:   Lab Results   Component Value Date/Time    LABURIN No growth-preliminary  No growth 07/09/2014 10:56 AM     No results found for: ORG     Respiratory culture: No results found for: CULTRESP    Aerobic and Anaerobic :  No results found for: LABAERO  No results found for: LABANAE    Urinalysis:      Lab Results   Component Value Date/Time    NITRU NEGATIVE 04/24/2023 07:20 AM    WBCUA NONE SEEN 09/19/2022 07:20 PM    BACTERIA NONE SEEN 09/19/2022 07:20 PM    RBCUA NONE SEEN 09/19/2022 07:20 PM    BLOODU NEGATIVE 04/24/2023 07:20 AM    SPECGRAV 1.008 04/08/2021 07:30 PM    GLUCOSEU NEGATIVE 04/24/2023 07:20 AM       Radiology:-  XR CHEST PORTABLE    Result Date: 4/24/2023  1 view chest x-ray Comparison: CR/SR - XR CHEST PORTABLE - 07/21/2022 03:05 PM EDT Findings: No consolidation or effusion. Heart size is normal. No acute fracture. 1. No acute findings.  This document has been electronically signed by: Bassam Joy MD on 04/24/2023 06:39 AM       Follow-up scheduled after discharge :-    in the next few days with [facility], APRN - CNP  in the next few months with GI

## 2023-04-27 NOTE — CARE COORDINATION
4/25/23, 1:37 PM EDT    DISCHARGE PLANNING EVALUATION    Received Social Work consult For consideration of Rehab. Addiction/KARLIE  consulted.  met with patient, following for needs. Full Social Consult deferred.     Electronically signed by JUAN CARLOS Daigle on 4/25/2023 at 1:37 PM
4/26/23, 3:16 PM EDT    DISCHARGE ON GOING EVALUATION    Rizwana Weathers day: 2  Location: 8A-02/002-A Reason for admit: Thrombocytopenia ([facility] [address] [D69.6]  Alcohol withdrawal, with unspecified complication ([facility] [address] [B97.108]  Alcohol withdrawal syndrome without complication ([facility] [address] [Q94.399]   Barriers to Discharge: Continue with 1800 ml fluid restriction. Pheno tapering protocol, last dose tomorrow am. Na 140 this am.   PCP: Eric Moulton, NILAM - CNP  Readmission Risk Score: 14%  Patient Goals/Plan/Treatment Preferences: Plan to return home alone. Denies needs.
4/27/23, 1:15 PM EDT    Patient goals/plan/ treatment preferences discussed by  and . Patient goals/plan/ treatment preferences reviewed with patient/ family. Patient/ family verbalize understanding of discharge plan and are in agreement with goal/plan/treatment preferences. Understanding was demonstrated using the teach back method. AVS provided by RN at time of discharge, which includes all necessary medical information pertaining to the patients current course of illness, treatment, post-discharge goals of care, and treatment preferences.      Services At/After Discharge: None
Patient expects to discharge to: House  Plan for transportation at discharge: Self    Financial    Payor: Marilee 58 / Plan: Colleen 51 / Product Type: *No Product type* /     Does insurance require precert for SNF: Yes    Potential assistance Purchasing Medications: No  Meds-to-Beds request: Yes      [id number] - [place], [address] [phone] - F [phone]  [address] [facility] [address]  Phone: [phone] Fax: [phone]    [facility] #[id number] - [place], [address] - F [phone]  [address] [facility] [address]  Phone: [phone] Fax: [phone]      Notes:    Factors facilitating achievement of predicted outcomes: Motivated, Cooperative, and Pleasant    Barriers to discharge: Medical complications    Additional Case Management Notes: Admit from ER as inpatient. Initial c/o chest pain. Na 127 on admission, 137 this am. Drug screen + benzodiazepine. ETOH withdrawal. Folic acid. Multivitamin. Phenobarbital with tapering doses. Addiction services consulted. Procedure: 4/24 CXR: no acute findings    The Plan for Transition of Care is related to the following treatment goals of Thrombocytopenia ([facility] [address] [D69.6]  Alcohol withdrawal, with unspecified complication ([facility] [address] [Y46.726]  Alcohol withdrawal syndrome without complication ([facility] [address] [N31.730]    Patient Goals/Plan/Treatment Preferences: From home alone. States he has 2 brothers in the area. He is planning to contact [address] [place] [address] at discharge for further care. Denies any other needs. Transportation/Food Security/Housekeeping Addressed: No issues identified.      John Shahid RN  Case Management Department

## 2023-04-27 NOTE — PLAN OF CARE
Problem: Discharge Planning  Goal: Discharge to home or other facility with appropriate resources  4/26/2023 2347 by Danielle Madison RN  Outcome: Progressing  Flowsheets (Taken 4/26/2023 2347)  Discharge to home or other facility with appropriate resources:   Identify barriers to discharge with patient and caregiver   Arrange for needed discharge resources and transportation as appropriate   Identify discharge learning needs (meds, wound care, etc)   Arrange for interpreters to assist at discharge as needed    Problem: Safety - Adult  Goal: Free from fall injury  4/26/2023 2347 by Danielle Madison RN  Outcome: Progressing  Flowsheets (Taken 4/26/2023 2347)  Free From Fall Injury:   Instruct family/caregiver on patient safety   Based on caregiver fall risk screen, instruct family/caregiver to ask for assistance with transferring infant if caregiver noted to have fall risk factors  Problem: Neurosensory - Adult  Goal: Achieves stable or improved neurological status  4/26/2023 2347 by Danielle Madison RN  Outcome: Progressing  Flowsheets (Taken 4/26/2023 2347)  Achieves stable or improved neurological status:   Assess for and report changes in neurological status   Initiate measures to prevent increased intracranial pressure   Maintain blood pressure and fluid volume within ordered parameters to optimize cerebral perfusion and minimize risk of hemorrhage   Monitor temperature, glucose, and sodium.  Initiate appropriate interventions as ordered    Achieves stable or improved neurological status: Assess for and report changes in neurological status  Note: Continues on phenobarbital

## 2023-04-28 LAB
EKG ATRIAL RATE: 96 BPM
EKG P AXIS: 50 DEGREES
EKG P-R INTERVAL: 112 MS
EKG Q-T INTERVAL: 366 MS
EKG QRS DURATION: 94 MS
EKG QTC CALCULATION (BAZETT): 462 MS
EKG R AXIS: 21 DEGREES
EKG T AXIS: 47 DEGREES
EKG VENTRICULAR RATE: 96 BPM

## 2023-05-01 ENCOUNTER — TELEPHONE (OUTPATIENT)
Dept: FAMILY MEDICINE CLINIC | Age: 48
End: 2023-05-01

## 2023-05-01 NOTE — TELEPHONE ENCOUNTER
Care Transitions Initial Follow Up Call    Outreach made within 2 business days of discharge: Yes    Patient: Araceli Aguero Patient : 1975   MRN: [id number]  Reason for Admission: There are no discharge diagnoses documented for the most recent discharge. Discharge Date: 23       Spoke with: patient     Discharge department/facility: [facility] Interactive Patient Contact:  Was patient able to fill all prescriptions: Yes  Was patient instructed to bring all medications to the follow-up visit: Yes  Is patient taking all medications as directed in the discharge summary?  Yes  Does patient understand their discharge instructions: Yes  Does patient have questions or concerns that need addressed prior to 7-14 day follow up office visit: no    Scheduled appointment with PCP within 7-14 days    Pt does not want hospital follow up apt at this time     Follow Up  Future Appointments   Date Time Provider Amanda Jerome   2023  3:00 PM NILAM Smith - [facility] MAXIMO Sanders

## 2023-05-11 ENCOUNTER — HOSPITAL ENCOUNTER (INPATIENT)
Age: 48
LOS: 4 days | Discharge: NURSING, REHAB OR LONG-TERM CARE | End: 2023-05-15
Attending: STUDENT IN AN ORGANIZED HEALTH CARE EDUCATION/TRAINING PROGRAM | Admitting: STUDENT IN AN ORGANIZED HEALTH CARE EDUCATION/TRAINING PROGRAM
Payer: MEDICAID

## 2023-05-11 DIAGNOSIS — I10 HYPERTENSION, UNSPECIFIED TYPE: ICD-10-CM

## 2023-05-11 DIAGNOSIS — F10.930 ALCOHOL WITHDRAWAL SYNDROME WITHOUT COMPLICATION (HCC): Primary | ICD-10-CM

## 2023-05-11 DIAGNOSIS — R79.89 ELEVATED LFTS: ICD-10-CM

## 2023-05-11 LAB
ALBUMIN SERPL BCG-MCNC: 4.2 G/DL (ref 3.5–5.1)
ALP SERPL-CCNC: 212 U/L (ref 38–126)
ALT SERPL W/O P-5'-P-CCNC: 85 U/L (ref 11–66)
AMPHETAMINES UR QL SCN: NEGATIVE
ANION GAP SERPL CALC-SCNC: 16 MEQ/L (ref 8–16)
AST SERPL-CCNC: 109 U/L (ref 5–40)
BARBITURATES UR QL SCN: NEGATIVE
BASOPHILS ABSOLUTE: 0.1 THOU/MM3 (ref 0–0.1)
BASOPHILS NFR BLD AUTO: 0.7 %
BENZODIAZ UR QL SCN: NEGATIVE
BILIRUB SERPL-MCNC: 1 MG/DL (ref 0.3–1.2)
BUN SERPL-MCNC: 11 MG/DL (ref 7–22)
BZE UR QL SCN: NEGATIVE
CALCIUM SERPL-MCNC: 8.6 MG/DL (ref 8.5–10.5)
CANNABINOIDS UR QL SCN: NEGATIVE
CHLORIDE SERPL-SCNC: 100 MEQ/L (ref 98–111)
CO2 SERPL-SCNC: 20 MEQ/L (ref 23–33)
CREAT SERPL-MCNC: 0.6 MG/DL (ref 0.4–1.2)
DEPRECATED RDW RBC AUTO: 47.6 FL (ref 35–45)
EOSINOPHIL NFR BLD AUTO: 0.1 %
EOSINOPHILS ABSOLUTE: 0 THOU/MM3 (ref 0–0.4)
ERYTHROCYTE [DISTWIDTH] IN BLOOD BY AUTOMATED COUNT: 14.1 % (ref 11.5–14.5)
ETHANOL SERPL-MCNC: 0.07 %
ETHANOL SERPL-MCNC: 0.33 %
FENTANYL: NEGATIVE
GFR SERPL CREATININE-BSD FRML MDRD: > 60 ML/MIN/1.73M2
GLUCOSE SERPL-MCNC: 117 MG/DL (ref 70–108)
HCT VFR BLD AUTO: 48.6 % (ref 42–52)
HGB BLD-MCNC: 17.8 GM/DL (ref 14–18)
IMM GRANULOCYTES # BLD AUTO: 0.03 THOU/MM3 (ref 0–0.07)
IMM GRANULOCYTES NFR BLD AUTO: 0.3 %
INR PPP: 1.01 (ref 0.85–1.13)
LIPASE SERPL-CCNC: 48.7 U/L (ref 5.6–51.3)
LYMPHOCYTES ABSOLUTE: 2.2 THOU/MM3 (ref 1–4.8)
LYMPHOCYTES NFR BLD AUTO: 21.3 %
MAGNESIUM SERPL-MCNC: 2 MG/DL (ref 1.6–2.4)
MCH RBC QN AUTO: 33.7 PG (ref 26–33)
MCHC RBC AUTO-ENTMCNC: 36.6 GM/DL (ref 32.2–35.5)
MCV RBC AUTO: 92 FL (ref 80–94)
MONOCYTES ABSOLUTE: 0.6 THOU/MM3 (ref 0.4–1.3)
MONOCYTES NFR BLD AUTO: 5.4 %
NEUTROPHILS NFR BLD AUTO: 72.2 %
NRBC BLD AUTO-RTO: 0 /100 WBC
OPIATES UR QL SCN: NEGATIVE
OSMOLALITY SERPL CALC.SUM OF ELEC: 272.4 MOSMOL/KG (ref 275–300)
OXYCODONE: NEGATIVE
PCP UR QL SCN: NEGATIVE
PHOSPHATE SERPL-MCNC: 2.2 MG/DL (ref 2.4–4.7)
PLATELET # BLD AUTO: 259 THOU/MM3 (ref 130–400)
PMV BLD AUTO: 8.9 FL (ref 9.4–12.4)
POTASSIUM SERPL-SCNC: 4.1 MEQ/L (ref 3.5–5.2)
PROT SERPL-MCNC: 7.6 G/DL (ref 6.1–8)
RBC # BLD AUTO: 5.28 MILL/MM3 (ref 4.7–6.1)
SEGMENTED NEUTROPHILS ABSOLUTE COUNT: 7.4 THOU/MM3 (ref 1.8–7.7)
SODIUM SERPL-SCNC: 136 MEQ/L (ref 135–145)
WBC # BLD AUTO: 10.3 THOU/MM3 (ref 4.8–10.8)

## 2023-05-11 PROCEDURE — 93005 ELECTROCARDIOGRAM TRACING: CPT

## 2023-05-11 PROCEDURE — 85610 PROTHROMBIN TIME: CPT

## 2023-05-11 PROCEDURE — 6370000000 HC RX 637 (ALT 250 FOR IP): Performed by: STUDENT IN AN ORGANIZED HEALTH CARE EDUCATION/TRAINING PROGRAM

## 2023-05-11 PROCEDURE — 99223 1ST HOSP IP/OBS HIGH 75: CPT | Performed by: STUDENT IN AN ORGANIZED HEALTH CARE EDUCATION/TRAINING PROGRAM

## 2023-05-11 PROCEDURE — 85025 COMPLETE CBC W/AUTO DIFF WBC: CPT

## 2023-05-11 PROCEDURE — 80307 DRUG TEST PRSMV CHEM ANLYZR: CPT

## 2023-05-11 PROCEDURE — 96374 THER/PROPH/DIAG INJ IV PUSH: CPT

## 2023-05-11 PROCEDURE — 1200000003 HC TELEMETRY R&B

## 2023-05-11 PROCEDURE — 6360000002 HC RX W HCPCS: Performed by: STUDENT IN AN ORGANIZED HEALTH CARE EDUCATION/TRAINING PROGRAM

## 2023-05-11 PROCEDURE — 84100 ASSAY OF PHOSPHORUS: CPT

## 2023-05-11 PROCEDURE — 93005 ELECTROCARDIOGRAM TRACING: CPT | Performed by: STUDENT IN AN ORGANIZED HEALTH CARE EDUCATION/TRAINING PROGRAM

## 2023-05-11 PROCEDURE — 82077 ASSAY SPEC XCP UR&BREATH IA: CPT

## 2023-05-11 PROCEDURE — 93010 ELECTROCARDIOGRAM REPORT: CPT | Performed by: INTERNAL MEDICINE

## 2023-05-11 PROCEDURE — 83735 ASSAY OF MAGNESIUM: CPT

## 2023-05-11 PROCEDURE — 2580000003 HC RX 258

## 2023-05-11 PROCEDURE — 2580000003 HC RX 258: Performed by: STUDENT IN AN ORGANIZED HEALTH CARE EDUCATION/TRAINING PROGRAM

## 2023-05-11 PROCEDURE — 2500000003 HC RX 250 WO HCPCS: Performed by: STUDENT IN AN ORGANIZED HEALTH CARE EDUCATION/TRAINING PROGRAM

## 2023-05-11 PROCEDURE — 83690 ASSAY OF LIPASE: CPT

## 2023-05-11 PROCEDURE — 96361 HYDRATE IV INFUSION ADD-ON: CPT

## 2023-05-11 PROCEDURE — 6360000002 HC RX W HCPCS

## 2023-05-11 PROCEDURE — 99285 EMERGENCY DEPT VISIT HI MDM: CPT

## 2023-05-11 PROCEDURE — 36415 COLL VENOUS BLD VENIPUNCTURE: CPT

## 2023-05-11 PROCEDURE — 2500000003 HC RX 250 WO HCPCS

## 2023-05-11 PROCEDURE — 80053 COMPREHEN METABOLIC PANEL: CPT

## 2023-05-11 PROCEDURE — HZ2ZZZZ DETOXIFICATION SERVICES FOR SUBSTANCE ABUSE TREATMENT: ICD-10-PCS | Performed by: PHYSICIAN ASSISTANT

## 2023-05-11 RX ORDER — SODIUM CHLORIDE 9 MG/ML
INJECTION, SOLUTION INTRAVENOUS PRN
Status: DISCONTINUED | OUTPATIENT
Start: 2023-05-11 | End: 2023-05-15 | Stop reason: HOSPADM

## 2023-05-11 RX ORDER — ACETAMINOPHEN 325 MG/1
650 TABLET ORAL EVERY 6 HOURS PRN
Status: DISCONTINUED | OUTPATIENT
Start: 2023-05-11 | End: 2023-05-15 | Stop reason: HOSPADM

## 2023-05-11 RX ORDER — FOLIC ACID 1 MG/1
1 TABLET ORAL DAILY
Status: DISCONTINUED | OUTPATIENT
Start: 2023-05-12 | End: 2023-05-11 | Stop reason: SDUPTHER

## 2023-05-11 RX ORDER — SODIUM CHLORIDE 9 MG/ML
INJECTION, SOLUTION INTRAVENOUS PRN
Status: DISCONTINUED | OUTPATIENT
Start: 2023-05-11 | End: 2023-05-11 | Stop reason: SDUPTHER

## 2023-05-11 RX ORDER — POLYETHYLENE GLYCOL 3350 17 G/17G
17 POWDER, FOR SOLUTION ORAL DAILY PRN
Status: DISCONTINUED | OUTPATIENT
Start: 2023-05-11 | End: 2023-05-15 | Stop reason: HOSPADM

## 2023-05-11 RX ORDER — ONDANSETRON 2 MG/ML
4 INJECTION INTRAMUSCULAR; INTRAVENOUS EVERY 6 HOURS PRN
Status: DISCONTINUED | OUTPATIENT
Start: 2023-05-11 | End: 2023-05-15 | Stop reason: HOSPADM

## 2023-05-11 RX ORDER — MULTIVITAMIN WITH IRON
1 TABLET ORAL DAILY
Status: DISCONTINUED | OUTPATIENT
Start: 2023-05-12 | End: 2023-05-15 | Stop reason: HOSPADM

## 2023-05-11 RX ORDER — METOPROLOL TARTRATE 50 MG/1
50 TABLET, FILM COATED ORAL DAILY
Status: DISCONTINUED | OUTPATIENT
Start: 2023-05-11 | End: 2023-05-15 | Stop reason: HOSPADM

## 2023-05-11 RX ORDER — PHENOBARBITAL SODIUM 65 MG/ML
65 INJECTION INTRAMUSCULAR 2 TIMES DAILY
Status: DISCONTINUED | OUTPATIENT
Start: 2023-05-12 | End: 2023-05-11

## 2023-05-11 RX ORDER — LANOLIN ALCOHOL/MO/W.PET/CERES
100 CREAM (GRAM) TOPICAL DAILY
Status: DISCONTINUED | OUTPATIENT
Start: 2023-05-11 | End: 2023-05-15 | Stop reason: HOSPADM

## 2023-05-11 RX ORDER — POTASSIUM CHLORIDE 7.45 MG/ML
10 INJECTION INTRAVENOUS PRN
Status: DISCONTINUED | OUTPATIENT
Start: 2023-05-11 | End: 2023-05-15 | Stop reason: HOSPADM

## 2023-05-11 RX ORDER — SODIUM CHLORIDE 0.9 % (FLUSH) 0.9 %
5-40 SYRINGE (ML) INJECTION PRN
Status: DISCONTINUED | OUTPATIENT
Start: 2023-05-11 | End: 2023-05-15 | Stop reason: HOSPADM

## 2023-05-11 RX ORDER — SODIUM CHLORIDE 0.9 % (FLUSH) 0.9 %
5-40 SYRINGE (ML) INJECTION EVERY 12 HOURS SCHEDULED
Status: DISCONTINUED | OUTPATIENT
Start: 2023-05-11 | End: 2023-05-11 | Stop reason: SDUPTHER

## 2023-05-11 RX ORDER — PHENOBARBITAL SODIUM 65 MG/ML
130 INJECTION INTRAMUSCULAR
Status: DISCONTINUED | OUTPATIENT
Start: 2023-05-11 | End: 2023-05-11 | Stop reason: SDUPTHER

## 2023-05-11 RX ORDER — MULTIVITAMIN WITH IRON
1 TABLET ORAL DAILY
Status: DISCONTINUED | OUTPATIENT
Start: 2023-05-11 | End: 2023-05-11 | Stop reason: SDUPTHER

## 2023-05-11 RX ORDER — SODIUM CHLORIDE 0.9 % (FLUSH) 0.9 %
5-40 SYRINGE (ML) INJECTION PRN
Status: DISCONTINUED | OUTPATIENT
Start: 2023-05-11 | End: 2023-05-11 | Stop reason: SDUPTHER

## 2023-05-11 RX ORDER — PHENOBARBITAL SODIUM 65 MG/ML
130 INJECTION INTRAMUSCULAR
Status: DISCONTINUED | OUTPATIENT
Start: 2023-05-11 | End: 2023-05-15 | Stop reason: HOSPADM

## 2023-05-11 RX ORDER — ENOXAPARIN SODIUM 100 MG/ML
30 INJECTION SUBCUTANEOUS EVERY 12 HOURS
Status: DISCONTINUED | OUTPATIENT
Start: 2023-05-11 | End: 2023-05-15 | Stop reason: HOSPADM

## 2023-05-11 RX ORDER — 0.9 % SODIUM CHLORIDE 0.9 %
1000 INTRAVENOUS SOLUTION INTRAVENOUS ONCE
Status: COMPLETED | OUTPATIENT
Start: 2023-05-11 | End: 2023-05-11

## 2023-05-11 RX ORDER — SODIUM CHLORIDE 0.9 % (FLUSH) 0.9 %
5-40 SYRINGE (ML) INJECTION EVERY 12 HOURS SCHEDULED
Status: DISCONTINUED | OUTPATIENT
Start: 2023-05-11 | End: 2023-05-15 | Stop reason: HOSPADM

## 2023-05-11 RX ORDER — ONDANSETRON 4 MG/1
4 TABLET, ORALLY DISINTEGRATING ORAL EVERY 8 HOURS PRN
Status: DISCONTINUED | OUTPATIENT
Start: 2023-05-11 | End: 2023-05-15 | Stop reason: HOSPADM

## 2023-05-11 RX ORDER — LISINOPRIL 20 MG/1
20 TABLET ORAL DAILY
Status: DISCONTINUED | OUTPATIENT
Start: 2023-05-11 | End: 2023-05-15 | Stop reason: HOSPADM

## 2023-05-11 RX ORDER — FOLIC ACID 1 MG/1
1 TABLET ORAL DAILY
Status: DISCONTINUED | OUTPATIENT
Start: 2023-05-12 | End: 2023-05-15 | Stop reason: HOSPADM

## 2023-05-11 RX ORDER — ACETAMINOPHEN 650 MG/1
650 SUPPOSITORY RECTAL EVERY 6 HOURS PRN
Status: DISCONTINUED | OUTPATIENT
Start: 2023-05-11 | End: 2023-05-15 | Stop reason: HOSPADM

## 2023-05-11 RX ORDER — PHENOBARBITAL SODIUM 65 MG/ML
260 INJECTION INTRAMUSCULAR
Status: DISCONTINUED | OUTPATIENT
Start: 2023-05-11 | End: 2023-05-15 | Stop reason: HOSPADM

## 2023-05-11 RX ORDER — POTASSIUM CHLORIDE 20 MEQ/1
40 TABLET, EXTENDED RELEASE ORAL PRN
Status: DISCONTINUED | OUTPATIENT
Start: 2023-05-11 | End: 2023-05-15 | Stop reason: HOSPADM

## 2023-05-11 RX ORDER — MAGNESIUM SULFATE IN WATER 40 MG/ML
2000 INJECTION, SOLUTION INTRAVENOUS PRN
Status: DISCONTINUED | OUTPATIENT
Start: 2023-05-11 | End: 2023-05-15 | Stop reason: HOSPADM

## 2023-05-11 RX ADMIN — ONDANSETRON 4 MG: 2 INJECTION INTRAMUSCULAR; INTRAVENOUS at 18:09

## 2023-05-11 RX ADMIN — SODIUM CHLORIDE 1000 ML: 9 INJECTION, SOLUTION INTRAVENOUS at 11:49

## 2023-05-11 RX ADMIN — SODIUM PHOSPHATE, MONOBASIC, MONOHYDRATE AND SODIUM PHOSPHATE, DIBASIC, ANHYDROUS 15 MMOL: 142; 276 INJECTION, SOLUTION INTRAVENOUS at 21:42

## 2023-05-11 RX ADMIN — LISINOPRIL 20 MG: 20 TABLET ORAL at 19:44

## 2023-05-11 RX ADMIN — METOPROLOL TARTRATE 50 MG: 50 TABLET, FILM COATED ORAL at 19:44

## 2023-05-11 RX ADMIN — FOLIC ACID: 5 INJECTION, SOLUTION INTRAMUSCULAR; INTRAVENOUS; SUBCUTANEOUS at 12:56

## 2023-05-11 RX ADMIN — PHENOBARBITAL SODIUM 130 MG: 65 INJECTION INTRAMUSCULAR at 18:45

## 2023-05-11 RX ADMIN — Medication 100 MG: at 18:45

## 2023-05-11 RX ADMIN — ENOXAPARIN SODIUM 30 MG: 100 INJECTION SUBCUTANEOUS at 21:58

## 2023-05-11 RX ADMIN — PHENOBARBITAL SODIUM 260 MG: 65 INJECTION INTRAMUSCULAR at 21:35

## 2023-05-11 ASSESSMENT — PAIN DESCRIPTION - PAIN TYPE: TYPE: ACUTE PAIN

## 2023-05-11 ASSESSMENT — PAIN DESCRIPTION - ORIENTATION
ORIENTATION: RIGHT;LEFT
ORIENTATION: MID

## 2023-05-11 ASSESSMENT — PAIN DESCRIPTION - LOCATION
LOCATION: ABDOMEN
LOCATION: HEAD
LOCATION: ABDOMEN

## 2023-05-11 ASSESSMENT — PAIN SCALES - GENERAL
PAINLEVEL_OUTOF10: 6
PAINLEVEL_OUTOF10: 4
PAINLEVEL_OUTOF10: 4

## 2023-05-11 ASSESSMENT — PAIN - FUNCTIONAL ASSESSMENT
PAIN_FUNCTIONAL_ASSESSMENT: 0-10
PAIN_FUNCTIONAL_ASSESSMENT: 0-10

## 2023-05-11 ASSESSMENT — PAIN DESCRIPTION - DESCRIPTORS: DESCRIPTORS: ACHING

## 2023-05-12 LAB
ALBUMIN SERPL BCG-MCNC: 3.5 G/DL (ref 3.5–5.1)
ALP SERPL-CCNC: 210 U/L (ref 38–126)
ALT SERPL W/O P-5'-P-CCNC: 76 U/L (ref 11–66)
ANION GAP SERPL CALC-SCNC: 12 MEQ/L (ref 8–16)
AST SERPL-CCNC: 102 U/L (ref 5–40)
BASOPHILS ABSOLUTE: 0.1 THOU/MM3 (ref 0–0.1)
BASOPHILS NFR BLD AUTO: 1.2 %
BILIRUB SERPL-MCNC: 1.4 MG/DL (ref 0.3–1.2)
BUN SERPL-MCNC: 8 MG/DL (ref 7–22)
CALCIUM SERPL-MCNC: 8.3 MG/DL (ref 8.5–10.5)
CHLORIDE SERPL-SCNC: 103 MEQ/L (ref 98–111)
CO2 SERPL-SCNC: 21 MEQ/L (ref 23–33)
CREAT SERPL-MCNC: 0.5 MG/DL (ref 0.4–1.2)
DEPRECATED RDW RBC AUTO: 54.6 FL (ref 35–45)
EOSINOPHIL NFR BLD AUTO: 0 %
EOSINOPHILS ABSOLUTE: 0 THOU/MM3 (ref 0–0.4)
ERYTHROCYTE [DISTWIDTH] IN BLOOD BY AUTOMATED COUNT: 14.6 % (ref 11.5–14.5)
GFR SERPL CREATININE-BSD FRML MDRD: > 60 ML/MIN/1.73M2
GLUCOSE SERPL-MCNC: 100 MG/DL (ref 70–108)
HCT VFR BLD AUTO: 46.8 % (ref 42–52)
HGB BLD-MCNC: 15.3 GM/DL (ref 14–18)
IMM GRANULOCYTES # BLD AUTO: 0.03 THOU/MM3 (ref 0–0.07)
IMM GRANULOCYTES NFR BLD AUTO: 0.4 %
LYMPHOCYTES ABSOLUTE: 1.4 THOU/MM3 (ref 1–4.8)
LYMPHOCYTES NFR BLD AUTO: 18.5 %
MCH RBC QN AUTO: 32.6 PG (ref 26–33)
MCHC RBC AUTO-ENTMCNC: 32.7 GM/DL (ref 32.2–35.5)
MCV RBC AUTO: 99.6 FL (ref 80–94)
MONOCYTES ABSOLUTE: 0.7 THOU/MM3 (ref 0.4–1.3)
MONOCYTES NFR BLD AUTO: 9 %
NEUTROPHILS NFR BLD AUTO: 70.9 %
NRBC BLD AUTO-RTO: 0 /100 WBC
PLATELET # BLD AUTO: 156 THOU/MM3 (ref 130–400)
PMV BLD AUTO: 8.8 FL (ref 9.4–12.4)
POTASSIUM SERPL-SCNC: 4.1 MEQ/L (ref 3.5–5.2)
PROT SERPL-MCNC: 6.2 G/DL (ref 6.1–8)
RBC # BLD AUTO: 4.7 MILL/MM3 (ref 4.7–6.1)
SEGMENTED NEUTROPHILS ABSOLUTE COUNT: 5.2 THOU/MM3 (ref 1.8–7.7)
SODIUM SERPL-SCNC: 136 MEQ/L (ref 135–145)
WBC # BLD AUTO: 7.3 THOU/MM3 (ref 4.8–10.8)

## 2023-05-12 PROCEDURE — 6360000002 HC RX W HCPCS: Performed by: STUDENT IN AN ORGANIZED HEALTH CARE EDUCATION/TRAINING PROGRAM

## 2023-05-12 PROCEDURE — 99233 SBSQ HOSP IP/OBS HIGH 50: CPT | Performed by: PHYSICIAN ASSISTANT

## 2023-05-12 PROCEDURE — 94640 AIRWAY INHALATION TREATMENT: CPT

## 2023-05-12 PROCEDURE — 85025 COMPLETE CBC W/AUTO DIFF WBC: CPT

## 2023-05-12 PROCEDURE — 2580000003 HC RX 258: Performed by: PHYSICIAN ASSISTANT

## 2023-05-12 PROCEDURE — 6370000000 HC RX 637 (ALT 250 FOR IP): Performed by: PHYSICIAN ASSISTANT

## 2023-05-12 PROCEDURE — 2580000003 HC RX 258: Performed by: STUDENT IN AN ORGANIZED HEALTH CARE EDUCATION/TRAINING PROGRAM

## 2023-05-12 PROCEDURE — 6370000000 HC RX 637 (ALT 250 FOR IP): Performed by: STUDENT IN AN ORGANIZED HEALTH CARE EDUCATION/TRAINING PROGRAM

## 2023-05-12 PROCEDURE — 36415 COLL VENOUS BLD VENIPUNCTURE: CPT

## 2023-05-12 PROCEDURE — 1200000003 HC TELEMETRY R&B

## 2023-05-12 PROCEDURE — 6360000002 HC RX W HCPCS: Performed by: PHYSICIAN ASSISTANT

## 2023-05-12 PROCEDURE — 80053 COMPREHEN METABOLIC PANEL: CPT

## 2023-05-12 RX ORDER — PHENOBARBITAL 32.4 MG/1
64.8 TABLET ORAL 2 TIMES DAILY
Status: COMPLETED | OUTPATIENT
Start: 2023-05-13 | End: 2023-05-13

## 2023-05-12 RX ORDER — PHENOBARBITAL 32.4 MG/1
32.4 TABLET ORAL 2 TIMES DAILY
Status: DISCONTINUED | OUTPATIENT
Start: 2023-05-13 | End: 2023-05-15 | Stop reason: HOSPADM

## 2023-05-12 RX ORDER — IPRATROPIUM BROMIDE AND ALBUTEROL SULFATE 2.5; .5 MG/3ML; MG/3ML
1 SOLUTION RESPIRATORY (INHALATION) 2 TIMES DAILY
Status: DISCONTINUED | OUTPATIENT
Start: 2023-05-13 | End: 2023-05-13

## 2023-05-12 RX ORDER — GUAIFENESIN 600 MG/1
600 TABLET, EXTENDED RELEASE ORAL 2 TIMES DAILY
Status: DISCONTINUED | OUTPATIENT
Start: 2023-05-12 | End: 2023-05-15 | Stop reason: HOSPADM

## 2023-05-12 RX ORDER — IPRATROPIUM BROMIDE AND ALBUTEROL SULFATE 2.5; .5 MG/3ML; MG/3ML
1 SOLUTION RESPIRATORY (INHALATION) EVERY 6 HOURS
Status: DISCONTINUED | OUTPATIENT
Start: 2023-05-12 | End: 2023-05-12

## 2023-05-12 RX ORDER — ALBUTEROL SULFATE 2.5 MG/3ML
2.5 SOLUTION RESPIRATORY (INHALATION) EVERY 4 HOURS PRN
Status: DISCONTINUED | OUTPATIENT
Start: 2023-05-12 | End: 2023-05-15 | Stop reason: HOSPADM

## 2023-05-12 RX ADMIN — PHENOBARBITAL SODIUM 300.95 MG: 65 INJECTION INTRAMUSCULAR at 20:21

## 2023-05-12 RX ADMIN — IPRATROPIUM BROMIDE AND ALBUTEROL SULFATE 1 AMPULE: .5; 3 SOLUTION RESPIRATORY (INHALATION) at 13:16

## 2023-05-12 RX ADMIN — Medication 100 MG: at 08:26

## 2023-05-12 RX ADMIN — PHENOBARBITAL SODIUM 300.95 MG: 65 INJECTION INTRAMUSCULAR at 18:40

## 2023-05-12 RX ADMIN — ONDANSETRON 4 MG: 2 INJECTION INTRAMUSCULAR; INTRAVENOUS at 08:38

## 2023-05-12 RX ADMIN — PHENOBARBITAL SODIUM 260 MG: 65 INJECTION INTRAMUSCULAR at 00:21

## 2023-05-12 RX ADMIN — Medication 1 TABLET: at 08:26

## 2023-05-12 RX ADMIN — SODIUM CHLORIDE, PRESERVATIVE FREE 10 ML: 5 INJECTION INTRAVENOUS at 20:22

## 2023-05-12 RX ADMIN — SODIUM CHLORIDE, PRESERVATIVE FREE 10 ML: 5 INJECTION INTRAVENOUS at 08:24

## 2023-05-12 RX ADMIN — METOPROLOL TARTRATE 50 MG: 50 TABLET, FILM COATED ORAL at 08:26

## 2023-05-12 RX ADMIN — FOLIC ACID 1 MG: 1 TABLET ORAL at 08:26

## 2023-05-12 RX ADMIN — ENOXAPARIN SODIUM 30 MG: 100 INJECTION SUBCUTANEOUS at 20:18

## 2023-05-12 RX ADMIN — GUAIFENESIN 600 MG: 600 TABLET, EXTENDED RELEASE ORAL at 20:18

## 2023-05-12 RX ADMIN — PHENOBARBITAL SODIUM 130 MG: 65 INJECTION INTRAMUSCULAR at 08:38

## 2023-05-12 RX ADMIN — LISINOPRIL 20 MG: 20 TABLET ORAL at 08:26

## 2023-05-12 RX ADMIN — ONDANSETRON 4 MG: 2 INJECTION INTRAMUSCULAR; INTRAVENOUS at 00:21

## 2023-05-12 RX ADMIN — ENOXAPARIN SODIUM 30 MG: 100 INJECTION SUBCUTANEOUS at 08:38

## 2023-05-12 ASSESSMENT — PAIN SCALES - GENERAL
PAINLEVEL_OUTOF10: 0

## 2023-05-13 LAB
ALBUMIN SERPL BCG-MCNC: 3.3 G/DL (ref 3.5–5.1)
ALP SERPL-CCNC: 161 U/L (ref 38–126)
ALT SERPL W/O P-5'-P-CCNC: 79 U/L (ref 11–66)
ANION GAP SERPL CALC-SCNC: 13 MEQ/L (ref 8–16)
AST SERPL-CCNC: 105 U/L (ref 5–40)
BASOPHILS ABSOLUTE: 0 THOU/MM3 (ref 0–0.1)
BASOPHILS NFR BLD AUTO: 0.8 %
BILIRUB SERPL-MCNC: 2 MG/DL (ref 0.3–1.2)
BUN SERPL-MCNC: 6 MG/DL (ref 7–22)
CALCIUM SERPL-MCNC: 8.4 MG/DL (ref 8.5–10.5)
CHLORIDE SERPL-SCNC: 103 MEQ/L (ref 98–111)
CO2 SERPL-SCNC: 22 MEQ/L (ref 23–33)
CREAT SERPL-MCNC: 0.5 MG/DL (ref 0.4–1.2)
DEPRECATED RDW RBC AUTO: 52.7 FL (ref 35–45)
EKG ATRIAL RATE: 130 BPM
EKG ATRIAL RATE: 133 BPM
EKG P AXIS: 54 DEGREES
EKG P AXIS: 57 DEGREES
EKG P-R INTERVAL: 112 MS
EKG P-R INTERVAL: 114 MS
EKG Q-T INTERVAL: 298 MS
EKG Q-T INTERVAL: 308 MS
EKG QRS DURATION: 86 MS
EKG QRS DURATION: 88 MS
EKG QTC CALCULATION (BAZETT): 443 MS
EKG QTC CALCULATION (BAZETT): 453 MS
EKG R AXIS: 22 DEGREES
EKG R AXIS: 36 DEGREES
EKG T AXIS: 39 DEGREES
EKG T AXIS: 61 DEGREES
EKG VENTRICULAR RATE: 130 BPM
EKG VENTRICULAR RATE: 133 BPM
EOSINOPHIL NFR BLD AUTO: 0 %
EOSINOPHILS ABSOLUTE: 0 THOU/MM3 (ref 0–0.4)
ERYTHROCYTE [DISTWIDTH] IN BLOOD BY AUTOMATED COUNT: 14.7 % (ref 11.5–14.5)
GFR SERPL CREATININE-BSD FRML MDRD: > 60 ML/MIN/1.73M2
GLUCOSE SERPL-MCNC: 107 MG/DL (ref 70–108)
HCT VFR BLD AUTO: 42.8 % (ref 42–52)
HGB BLD-MCNC: 14.3 GM/DL (ref 14–18)
IMM GRANULOCYTES # BLD AUTO: 0.01 THOU/MM3 (ref 0–0.07)
IMM GRANULOCYTES NFR BLD AUTO: 0.3 %
LYMPHOCYTES ABSOLUTE: 1 THOU/MM3 (ref 1–4.8)
LYMPHOCYTES NFR BLD AUTO: 26.8 %
MCH RBC QN AUTO: 32.4 PG (ref 26–33)
MCHC RBC AUTO-ENTMCNC: 33.4 GM/DL (ref 32.2–35.5)
MCV RBC AUTO: 97.1 FL (ref 80–94)
MONOCYTES ABSOLUTE: 0.4 THOU/MM3 (ref 0.4–1.3)
MONOCYTES NFR BLD AUTO: 9.9 %
NEUTROPHILS NFR BLD AUTO: 62.2 %
NRBC BLD AUTO-RTO: 0 /100 WBC
PLATELET # BLD AUTO: 116 THOU/MM3 (ref 130–400)
PMV BLD AUTO: 10.1 FL (ref 9.4–12.4)
POTASSIUM SERPL-SCNC: 3.9 MEQ/L (ref 3.5–5.2)
PROT SERPL-MCNC: 5.7 G/DL (ref 6.1–8)
RBC # BLD AUTO: 4.41 MILL/MM3 (ref 4.7–6.1)
SEGMENTED NEUTROPHILS ABSOLUTE COUNT: 2.4 THOU/MM3 (ref 1.8–7.7)
SODIUM SERPL-SCNC: 138 MEQ/L (ref 135–145)
WBC # BLD AUTO: 3.8 THOU/MM3 (ref 4.8–10.8)

## 2023-05-13 PROCEDURE — 2580000003 HC RX 258: Performed by: STUDENT IN AN ORGANIZED HEALTH CARE EDUCATION/TRAINING PROGRAM

## 2023-05-13 PROCEDURE — 6370000000 HC RX 637 (ALT 250 FOR IP): Performed by: PHYSICIAN ASSISTANT

## 2023-05-13 PROCEDURE — 6370000000 HC RX 637 (ALT 250 FOR IP): Performed by: STUDENT IN AN ORGANIZED HEALTH CARE EDUCATION/TRAINING PROGRAM

## 2023-05-13 PROCEDURE — 93010 ELECTROCARDIOGRAM REPORT: CPT | Performed by: INTERNAL MEDICINE

## 2023-05-13 PROCEDURE — 80053 COMPREHEN METABOLIC PANEL: CPT

## 2023-05-13 PROCEDURE — 6360000002 HC RX W HCPCS: Performed by: STUDENT IN AN ORGANIZED HEALTH CARE EDUCATION/TRAINING PROGRAM

## 2023-05-13 PROCEDURE — 36415 COLL VENOUS BLD VENIPUNCTURE: CPT

## 2023-05-13 PROCEDURE — 1200000003 HC TELEMETRY R&B

## 2023-05-13 PROCEDURE — 85025 COMPLETE CBC W/AUTO DIFF WBC: CPT

## 2023-05-13 PROCEDURE — 6370000000 HC RX 637 (ALT 250 FOR IP): Performed by: NURSE PRACTITIONER

## 2023-05-13 PROCEDURE — 99233 SBSQ HOSP IP/OBS HIGH 50: CPT | Performed by: PHYSICIAN ASSISTANT

## 2023-05-13 PROCEDURE — 94640 AIRWAY INHALATION TREATMENT: CPT

## 2023-05-13 RX ORDER — ALBUTEROL SULFATE 90 UG/1
2 AEROSOL, METERED RESPIRATORY (INHALATION) 2 TIMES DAILY
Status: DISCONTINUED | OUTPATIENT
Start: 2023-05-13 | End: 2023-05-15 | Stop reason: HOSPADM

## 2023-05-13 RX ORDER — ALBUTEROL SULFATE 90 UG/1
2 AEROSOL, METERED RESPIRATORY (INHALATION) EVERY 4 HOURS PRN
Status: DISCONTINUED | OUTPATIENT
Start: 2023-05-13 | End: 2023-05-15 | Stop reason: HOSPADM

## 2023-05-13 RX ORDER — LANOLIN ALCOHOL/MO/W.PET/CERES
3 CREAM (GRAM) TOPICAL NIGHTLY PRN
Status: DISCONTINUED | OUTPATIENT
Start: 2023-05-13 | End: 2023-05-15 | Stop reason: HOSPADM

## 2023-05-13 RX ADMIN — GUAIFENESIN 600 MG: 600 TABLET, EXTENDED RELEASE ORAL at 08:30

## 2023-05-13 RX ADMIN — ENOXAPARIN SODIUM 30 MG: 100 INJECTION SUBCUTANEOUS at 20:46

## 2023-05-13 RX ADMIN — Medication 3 MG: at 23:59

## 2023-05-13 RX ADMIN — PHENOBARBITAL 64.8 MG: 32.4 TABLET ORAL at 00:09

## 2023-05-13 RX ADMIN — METOPROLOL TARTRATE 50 MG: 50 TABLET, FILM COATED ORAL at 08:30

## 2023-05-13 RX ADMIN — IPRATROPIUM BROMIDE 2 PUFF: 17 AEROSOL, METERED RESPIRATORY (INHALATION) at 15:43

## 2023-05-13 RX ADMIN — ALBUTEROL SULFATE 2 PUFF: 90 AEROSOL, METERED RESPIRATORY (INHALATION) at 15:43

## 2023-05-13 RX ADMIN — PHENOBARBITAL 32.4 MG: 32.4 TABLET ORAL at 20:46

## 2023-05-13 RX ADMIN — Medication 1 TABLET: at 08:30

## 2023-05-13 RX ADMIN — ENOXAPARIN SODIUM 30 MG: 100 INJECTION SUBCUTANEOUS at 08:36

## 2023-05-13 RX ADMIN — LISINOPRIL 20 MG: 20 TABLET ORAL at 08:30

## 2023-05-13 RX ADMIN — SODIUM CHLORIDE, PRESERVATIVE FREE 10 ML: 5 INJECTION INTRAVENOUS at 20:39

## 2023-05-13 RX ADMIN — GUAIFENESIN 600 MG: 600 TABLET, EXTENDED RELEASE ORAL at 20:39

## 2023-05-13 RX ADMIN — PHENOBARBITAL 64.8 MG: 32.4 TABLET ORAL at 08:40

## 2023-05-13 RX ADMIN — IPRATROPIUM BROMIDE AND ALBUTEROL SULFATE 1 AMPULE: .5; 3 SOLUTION RESPIRATORY (INHALATION) at 08:34

## 2023-05-13 RX ADMIN — Medication 100 MG: at 08:30

## 2023-05-13 RX ADMIN — SODIUM CHLORIDE, PRESERVATIVE FREE 10 ML: 5 INJECTION INTRAVENOUS at 08:31

## 2023-05-13 RX ADMIN — FOLIC ACID 1 MG: 1 TABLET ORAL at 08:30

## 2023-05-13 ASSESSMENT — PAIN SCALES - GENERAL
PAINLEVEL_OUTOF10: 0

## 2023-05-14 ENCOUNTER — APPOINTMENT (OUTPATIENT)
Dept: ULTRASOUND IMAGING | Age: 48
End: 2023-05-14
Payer: MEDICAID

## 2023-05-14 LAB
ALBUMIN SERPL BCG-MCNC: 3.3 G/DL (ref 3.5–5.1)
ALP SERPL-CCNC: 176 U/L (ref 38–126)
ALT SERPL W/O P-5'-P-CCNC: 83 U/L (ref 11–66)
ANION GAP SERPL CALC-SCNC: 10 MEQ/L (ref 8–16)
AST SERPL-CCNC: 86 U/L (ref 5–40)
BASOPHILS ABSOLUTE: 0 THOU/MM3 (ref 0–0.1)
BASOPHILS NFR BLD AUTO: 1.1 %
BILIRUB SERPL-MCNC: 1.1 MG/DL (ref 0.3–1.2)
BUN SERPL-MCNC: 9 MG/DL (ref 7–22)
CALCIUM SERPL-MCNC: 8.4 MG/DL (ref 8.5–10.5)
CHLORIDE SERPL-SCNC: 104 MEQ/L (ref 98–111)
CO2 SERPL-SCNC: 25 MEQ/L (ref 23–33)
CREAT SERPL-MCNC: 0.6 MG/DL (ref 0.4–1.2)
DEPRECATED RDW RBC AUTO: 60.4 FL (ref 35–45)
EOSINOPHIL NFR BLD AUTO: 5.4 %
EOSINOPHILS ABSOLUTE: 0.2 THOU/MM3 (ref 0–0.4)
ERYTHROCYTE [DISTWIDTH] IN BLOOD BY AUTOMATED COUNT: 15.2 % (ref 11.5–14.5)
GFR SERPL CREATININE-BSD FRML MDRD: > 60 ML/MIN/1.73M2
GLUCOSE SERPL-MCNC: 139 MG/DL (ref 70–108)
HCT VFR BLD AUTO: 41.8 % (ref 42–52)
HCT VFR BLD AUTO: 42.7 % (ref 42–52)
HGB BLD-MCNC: 13.3 GM/DL (ref 14–18)
HGB BLD-MCNC: 13.6 GM/DL (ref 14–18)
IMM GRANULOCYTES # BLD AUTO: 0.02 THOU/MM3 (ref 0–0.07)
IMM GRANULOCYTES NFR BLD AUTO: 0.5 %
LYMPHOCYTES ABSOLUTE: 0.8 THOU/MM3 (ref 1–4.8)
LYMPHOCYTES NFR BLD AUTO: 21.8 %
MCH RBC QN AUTO: 34 PG (ref 26–33)
MCHC RBC AUTO-ENTMCNC: 31.1 GM/DL (ref 32.2–35.5)
MCV RBC AUTO: 109.2 FL (ref 80–94)
MONOCYTES ABSOLUTE: 0.3 THOU/MM3 (ref 0.4–1.3)
MONOCYTES NFR BLD AUTO: 6.8 %
NEUTROPHILS NFR BLD AUTO: 64.4 %
NRBC BLD AUTO-RTO: 1 /100 WBC
PLATELET # BLD AUTO: 88 THOU/MM3 (ref 130–400)
PLATELET BLD QL SMEAR: ABNORMAL
PMV BLD AUTO: 11.4 FL (ref 9.4–12.4)
POTASSIUM SERPL-SCNC: 4 MEQ/L (ref 3.5–5.2)
PROT SERPL-MCNC: 5.5 G/DL (ref 6.1–8)
RBC # BLD AUTO: 3.91 MILL/MM3 (ref 4.7–6.1)
SCAN OF BLOOD SMEAR: NORMAL
SEGMENTED NEUTROPHILS ABSOLUTE COUNT: 2.4 THOU/MM3 (ref 1.8–7.7)
SODIUM SERPL-SCNC: 139 MEQ/L (ref 135–145)
WBC # BLD AUTO: 3.7 THOU/MM3 (ref 4.8–10.8)

## 2023-05-14 PROCEDURE — 36415 COLL VENOUS BLD VENIPUNCTURE: CPT

## 2023-05-14 PROCEDURE — 76705 ECHO EXAM OF ABDOMEN: CPT

## 2023-05-14 PROCEDURE — 6370000000 HC RX 637 (ALT 250 FOR IP): Performed by: PHYSICIAN ASSISTANT

## 2023-05-14 PROCEDURE — 99233 SBSQ HOSP IP/OBS HIGH 50: CPT | Performed by: PHYSICIAN ASSISTANT

## 2023-05-14 PROCEDURE — 6360000002 HC RX W HCPCS: Performed by: STUDENT IN AN ORGANIZED HEALTH CARE EDUCATION/TRAINING PROGRAM

## 2023-05-14 PROCEDURE — 85014 HEMATOCRIT: CPT

## 2023-05-14 PROCEDURE — 85025 COMPLETE CBC W/AUTO DIFF WBC: CPT

## 2023-05-14 PROCEDURE — 85018 HEMOGLOBIN: CPT

## 2023-05-14 PROCEDURE — 6370000000 HC RX 637 (ALT 250 FOR IP): Performed by: NURSE PRACTITIONER

## 2023-05-14 PROCEDURE — 80053 COMPREHEN METABOLIC PANEL: CPT

## 2023-05-14 PROCEDURE — 2580000003 HC RX 258: Performed by: STUDENT IN AN ORGANIZED HEALTH CARE EDUCATION/TRAINING PROGRAM

## 2023-05-14 PROCEDURE — 1200000003 HC TELEMETRY R&B

## 2023-05-14 PROCEDURE — 6370000000 HC RX 637 (ALT 250 FOR IP): Performed by: STUDENT IN AN ORGANIZED HEALTH CARE EDUCATION/TRAINING PROGRAM

## 2023-05-14 RX ORDER — NICOTINE 21 MG/24HR
1 PATCH, TRANSDERMAL 24 HOURS TRANSDERMAL DAILY
Status: DISCONTINUED | OUTPATIENT
Start: 2023-05-14 | End: 2023-05-15 | Stop reason: HOSPADM

## 2023-05-14 RX ADMIN — PHENOBARBITAL 32.4 MG: 32.4 TABLET ORAL at 20:14

## 2023-05-14 RX ADMIN — METOPROLOL TARTRATE 50 MG: 50 TABLET, FILM COATED ORAL at 08:30

## 2023-05-14 RX ADMIN — PHENOBARBITAL 32.4 MG: 32.4 TABLET ORAL at 08:31

## 2023-05-14 RX ADMIN — Medication 3 MG: at 20:14

## 2023-05-14 RX ADMIN — GUAIFENESIN 600 MG: 600 TABLET, EXTENDED RELEASE ORAL at 20:14

## 2023-05-14 RX ADMIN — Medication 100 MG: at 08:30

## 2023-05-14 RX ADMIN — SODIUM CHLORIDE, PRESERVATIVE FREE 10 ML: 5 INJECTION INTRAVENOUS at 20:14

## 2023-05-14 RX ADMIN — ENOXAPARIN SODIUM 30 MG: 100 INJECTION SUBCUTANEOUS at 08:29

## 2023-05-14 RX ADMIN — LISINOPRIL 20 MG: 20 TABLET ORAL at 08:30

## 2023-05-14 RX ADMIN — GUAIFENESIN 600 MG: 600 TABLET, EXTENDED RELEASE ORAL at 08:29

## 2023-05-14 RX ADMIN — Medication 1 TABLET: at 08:30

## 2023-05-14 RX ADMIN — FOLIC ACID 1 MG: 1 TABLET ORAL at 08:30

## 2023-05-14 ASSESSMENT — PAIN SCALES - GENERAL
PAINLEVEL_OUTOF10: 0
PAINLEVEL_OUTOF10: 0

## 2023-05-15 VITALS
TEMPERATURE: 98.3 F | OXYGEN SATURATION: 97 % | HEART RATE: 85 BPM | WEIGHT: 238.1 LBS | RESPIRATION RATE: 18 BRPM | HEIGHT: 71 IN | BODY MASS INDEX: 33.33 KG/M2 | DIASTOLIC BLOOD PRESSURE: 65 MMHG | SYSTOLIC BLOOD PRESSURE: 139 MMHG

## 2023-05-15 PROCEDURE — 94640 AIRWAY INHALATION TREATMENT: CPT

## 2023-05-15 PROCEDURE — 6370000000 HC RX 637 (ALT 250 FOR IP): Performed by: PHYSICIAN ASSISTANT

## 2023-05-15 PROCEDURE — 2580000003 HC RX 258: Performed by: STUDENT IN AN ORGANIZED HEALTH CARE EDUCATION/TRAINING PROGRAM

## 2023-05-15 PROCEDURE — 99239 HOSP IP/OBS DSCHRG MGMT >30: CPT | Performed by: PHYSICIAN ASSISTANT

## 2023-05-15 PROCEDURE — 6370000000 HC RX 637 (ALT 250 FOR IP): Performed by: STUDENT IN AN ORGANIZED HEALTH CARE EDUCATION/TRAINING PROGRAM

## 2023-05-15 RX ORDER — ALBUTEROL SULFATE 90 UG/1
2 AEROSOL, METERED RESPIRATORY (INHALATION) 2 TIMES DAILY
Qty: 18 G | Refills: 3 | Status: SHIPPED | OUTPATIENT
Start: 2023-05-15

## 2023-05-15 RX ADMIN — IPRATROPIUM BROMIDE 2 PUFF: 17 AEROSOL, METERED RESPIRATORY (INHALATION) at 08:21

## 2023-05-15 RX ADMIN — PHENOBARBITAL 32.4 MG: 32.4 TABLET ORAL at 09:25

## 2023-05-15 RX ADMIN — ALBUTEROL SULFATE 2 PUFF: 90 AEROSOL, METERED RESPIRATORY (INHALATION) at 08:21

## 2023-05-15 RX ADMIN — SODIUM CHLORIDE, PRESERVATIVE FREE 10 ML: 5 INJECTION INTRAVENOUS at 09:25

## 2023-05-15 RX ADMIN — FOLIC ACID 1 MG: 1 TABLET ORAL at 09:25

## 2023-05-15 RX ADMIN — Medication 100 MG: at 09:25

## 2023-05-15 RX ADMIN — METOPROLOL TARTRATE 50 MG: 50 TABLET, FILM COATED ORAL at 09:25

## 2023-05-15 RX ADMIN — LISINOPRIL 20 MG: 20 TABLET ORAL at 09:25

## 2023-05-15 RX ADMIN — GUAIFENESIN 600 MG: 600 TABLET, EXTENDED RELEASE ORAL at 09:25

## 2023-05-15 RX ADMIN — Medication 1 TABLET: at 09:25

## 2023-05-15 ASSESSMENT — PAIN SCALES - GENERAL
PAINLEVEL_OUTOF10: 0
PAINLEVEL_OUTOF10: 0

## 2023-05-15 NOTE — CARE COORDINATION
5/15/23, 8:59 AM EDT    Discharge ordered. Jhon Lacey has 30 day rehab facility in [place] arranged by Fay Mancuso. Patient goals/plan/ treatment preferences discussed by  and . Patient goals/plan/ treatment preferences reviewed with patient/ family. Patient/ family verbalize understanding of discharge plan and are in agreement with goal/plan/treatment preferences. Understanding was demonstrated using the teach back method. AVS provided by RN at time of discharge, which includes all necessary medical information pertaining to the patients current course of illness, treatment, post-discharge goals of care, and treatment preferences.      Services At/After Discharge: None

## 2023-05-15 NOTE — PLAN OF CARE
Problem: Discharge Planning  Goal: Discharge to home or other facility with appropriate resources  5/14/2023 2212 by Tommie Marshall RN  Outcome: Progressing  Flowsheets (Taken 5/14/2023 0825 by Yadi Solitario RN)  Discharge to home or other facility with appropriate resources: Identify barriers to discharge with patient and caregiver  5/14/2023 1110 by Yadi Solitario RN  Outcome: Progressing  Flowsheets (Taken 5/14/2023 0825)  Discharge to home or other facility with appropriate resources: Identify barriers to discharge with patient and caregiver     Problem: Pain  Goal: Verbalizes/displays adequate comfort level or baseline comfort level  5/14/2023 2212 by Tommie Marshall RN  Outcome: Progressing  Flowsheets (Taken 5/14/2023 0825 by Yadi Solitario RN)  Verbalizes/displays adequate comfort level or baseline comfort level: Encourage patient to monitor pain and request assistance  5/14/2023 1110 by Yadi Solitario RN  Outcome: Progressing  Flowsheets (Taken 5/14/2023 0825)  Verbalizes/displays adequate comfort level or baseline comfort level: Encourage patient to monitor pain and request assistance     Problem: Chronic Conditions and Co-morbidities  Goal: Patient's chronic conditions and co-morbidity symptoms are monitored and maintained or improved  5/14/2023 2212 by Tommie Marshall RN  Outcome: Progressing  Flowsheets (Taken 5/14/2023 0825 by Yadi Solitario RN)  Care Plan - Patient's Chronic Conditions and Co-Morbidity Symptoms are Monitored and Maintained or Improved: Monitor and assess patient's chronic conditions and comorbid symptoms for stability, deterioration, or improvement  5/14/2023 1110 by Yadi Solitario RN  Outcome: Progressing  Flowsheets (Taken 5/14/2023 0825)  Care Plan - Patient's Chronic Conditions and Co-Morbidity Symptoms are Monitored and Maintained or Improved: Monitor and assess patient's chronic conditions and comorbid symptoms for stability, deterioration, or improvement     Problem:

## 2023-05-15 NOTE — PLAN OF CARE
Problem: Respiratory - Adult  Goal: Clear lung sounds  5/15/2023 0824 by Meghana Soria RCP  Outcome: Progressing

## 2023-05-15 NOTE — PROGRESS NOTES
Pt's discharge complete on nursing end at 1020 am. Pt had to wait for staff from rehab to pick him up.

## 2023-05-15 NOTE — DISCHARGE SUMMARY
Hospitalist Discharge Summary        Patient: Jones Spatz  YOB: 1975  MRN: [id number]   Acct: [de-identified]    Primary Care Physician: NILAM Viera - CNP    Admit date  5/11/2023    Discharge date: No discharge date for patient encounter. Chief Complaint on presentation :-  Alcohol withdrawal    Discharge Assessment and Plan:-   Acute alcohol withdrawal  Hx of alcohol abuse  Presents with initial ethanol level of 0.33  Phenobarbital protocol -  completed  Prior hx of Dts and hallucinations. No hx of seizures per patient report  Seizure precautions  Daily MTV, thiamine, folic acid  Will need addiction services consult. Planning for 30 day rehab after discharge from hospital     5/14: to complete phenobarbital tonight, plan discharge in am.      Acute hepatitis  Likely due to #1  Check INR -> WNL  Trending down slowly   Suspected liver cirrhosis  Noted to have findings consistent with cirrhosis on liver US  Outpatient referral to GI given  Acute macrocytic anemia  Hgb dropped to 13, was this low in the past. Check Vit B/12 folate  Repeat H&H stable at 13.6  Thrombocytopenia  Trending down  Likely related to EtOH use chronically  No gross bleeding  COPD without acute exacerbation  pt denies SOB / dyspnea, however does have wheezing  PRN Duonebs   Hyperbilirubinemia, resolved  Noted to have total bili trending up, now at 2.0. Check US liver. Continue to trend. HTN  Improved, monitor   cont metoprolol and lisinopril. Initial H and P and Hospital course:-  \"Patient is a 42-year-old male with medical history of alcohol abuse who presents to the hospital with attention to detox from alcohol. Patient has planned admission to 30-day inpatient rehab, but must go through alcohol withdrawal prior to this admission. He states that he has been drinking approximately 18,16 ounce Jazmine light on a daily basis. Also mixes in fireball whiskey.   Started to experience withdrawal symptoms

## 2023-05-15 NOTE — PLAN OF CARE
Problem: Discharge Planning  Goal: Discharge to home or other facility with appropriate resources  5/15/2023 1157 by Wood Snowden RN  Outcome: Adequate for Discharge  5/14/2023 2212 by Cassia Luciano RN  Outcome: Progressing  Flowsheets (Taken 5/14/2023 0825 by Wood Snowden RN)  Discharge to home or other facility with appropriate resources: Identify barriers to discharge with patient and caregiver     Problem: Pain  Goal: Verbalizes/displays adequate comfort level or baseline comfort level  5/15/2023 1157 by Wood Snowden RN  Outcome: Adequate for Discharge  5/14/2023 2212 by Cassia Luciano RN  Outcome: Progressing  Flowsheets (Taken 5/14/2023 0825 by Wood Snowden RN)  Verbalizes/displays adequate comfort level or baseline comfort level: Encourage patient to monitor pain and request assistance     Problem: Chronic Conditions and Co-morbidities  Goal: Patient's chronic conditions and co-morbidity symptoms are monitored and maintained or improved  5/15/2023 1157 by Wood Snowden RN  Outcome: Adequate for Discharge  5/14/2023 2212 by Cassia Luciano RN  Outcome: Progressing  Flowsheets (Taken 5/14/2023 0825 by Wood Snowden RN)  Care Plan - Patient's Chronic Conditions and Co-Morbidity Symptoms are Monitored and Maintained or Improved: Monitor and assess patient's chronic conditions and comorbid symptoms for stability, deterioration, or improvement     Problem: Safety - Adult  Goal: Free from fall injury  5/15/2023 1157 by Wood Snowden RN  Outcome: Adequate for Discharge  5/14/2023 2212 by Cassia Luciano RN  Outcome: Progressing  Flowsheets (Taken 5/14/2023 0825 by Wood Snowden RN)  Free From Fall Injury: Instruct family/caregiver on patient safety     Problem: Skin/Tissue Integrity  Goal: Absence of new skin breakdown  Description: 1. Monitor for areas of redness and/or skin breakdown  2. Assess vascular access sites hourly  3. Every 4-6 hours minimum:  Change oxygen saturation probe site  4.

## 2023-05-16 ENCOUNTER — TELEPHONE (OUTPATIENT)
Dept: FAMILY MEDICINE CLINIC | Age: 48
End: 2023-05-16

## 2023-05-16 NOTE — TELEPHONE ENCOUNTER
Care Transitions Initial Follow Up Call    Outreach made within 2 business days of discharge: Yes    Patient: Zoey Newman Patient : 1975   MRN: [id number]  Reason for Admission: There are no discharge diagnoses documented for the most recent discharge. Discharge Date: 5/15/23       Spoke with: patient     Discharge department/facility: [facility]    TCM Interactive Patient Contact:  Was patient able to fill all prescriptions: Yes  Was patient instructed to bring all medications to the follow-up visit: Yes  Is patient taking all medications as directed in the discharge summary?  Yes  Does patient understand their discharge instructions: Yes  Does patient have questions or concerns that need addressed prior to 7-14 day follow up office visit: no    Scheduled appointment with PCP within 7-14 days    Follow Up  Future Appointments   Date Time Provider Amanda Jerome   2023  3:00 PM NILAM Espinal - Amanda SMART - Edson Calvert LPN

## 2023-07-29 ENCOUNTER — HOSPITAL ENCOUNTER (INPATIENT)
Age: 48
LOS: 5 days | Discharge: HOME | End: 2023-08-03
Attending: EMERGENCY MEDICINE | Admitting: STUDENT IN AN ORGANIZED HEALTH CARE EDUCATION/TRAINING PROGRAM
Payer: MEDICAID

## 2023-07-29 DIAGNOSIS — F10.929 ACUTE ALCOHOLIC INTOXICATION WITH COMPLICATION (HCC): Primary | ICD-10-CM

## 2023-07-29 DIAGNOSIS — F10.930 ALCOHOL WITHDRAWAL SYNDROME WITHOUT COMPLICATION (HCC): ICD-10-CM

## 2023-07-29 DIAGNOSIS — R45.851 SUICIDE IDEATION: ICD-10-CM

## 2023-07-29 DIAGNOSIS — K70.10 ALCOHOLIC HEPATITIS WITHOUT ASCITES: ICD-10-CM

## 2023-07-29 PROBLEM — F10.931 ALCOHOL WITHDRAWAL DELIRIUM, ACUTE, HYPERACTIVE (HCC): Status: ACTIVE | Noted: 2023-07-29

## 2023-07-29 LAB
ALBUMIN SERPL BCG-MCNC: 4.2 G/DL (ref 3.5–5.1)
ALBUMIN SERPL BCG-MCNC: 4.2 G/DL (ref 3.5–5.1)
ALP SERPL-CCNC: 221 U/L (ref 38–126)
ALP SERPL-CCNC: 221 U/L (ref 38–126)
ALT SERPL W/O P-5'-P-CCNC: 108 U/L (ref 11–66)
ALT SERPL W/O P-5'-P-CCNC: 108 U/L (ref 11–66)
AMPHETAMINES UR QL SCN: NEGATIVE
AMPHETAMINES UR QL SCN: NEGATIVE
ANION GAP SERPL CALC-SCNC: 14 MEQ/L (ref 8–16)
ANION GAP SERPL CALC-SCNC: 14 MEQ/L (ref 8–16)
ANION GAP SERPL CALC-SCNC: 15 MEQ/L (ref 8–16)
ANION GAP SERPL CALC-SCNC: 15 MEQ/L (ref 8–16)
APAP SERPL-MCNC: < 5 UG/ML (ref 0–20)
APAP SERPL-MCNC: < 5 UG/ML (ref 0–20)
AST SERPL-CCNC: 178 U/L (ref 5–40)
AST SERPL-CCNC: 178 U/L (ref 5–40)
BARBITURATES UR QL SCN: NEGATIVE
BARBITURATES UR QL SCN: NEGATIVE
BASOPHILS ABSOLUTE: 0.1 THOU/MM3 (ref 0–0.1)
BASOPHILS ABSOLUTE: 0.1 THOU/MM3 (ref 0–0.1)
BASOPHILS NFR BLD AUTO: 0.6 %
BASOPHILS NFR BLD AUTO: 0.6 %
BENZODIAZ UR QL SCN: NEGATIVE
BENZODIAZ UR QL SCN: NEGATIVE
BILIRUB SERPL-MCNC: 1.3 MG/DL (ref 0.3–1.2)
BILIRUB SERPL-MCNC: 1.3 MG/DL (ref 0.3–1.2)
BUN SERPL-MCNC: 11 MG/DL (ref 7–22)
BUN SERPL-MCNC: 11 MG/DL (ref 7–22)
BUN SERPL-MCNC: 9 MG/DL (ref 7–22)
BUN SERPL-MCNC: 9 MG/DL (ref 7–22)
BZE UR QL SCN: NEGATIVE
BZE UR QL SCN: NEGATIVE
CALCIUM SERPL-MCNC: 7.8 MG/DL (ref 8.5–10.5)
CALCIUM SERPL-MCNC: 7.8 MG/DL (ref 8.5–10.5)
CALCIUM SERPL-MCNC: 8.7 MG/DL (ref 8.5–10.5)
CALCIUM SERPL-MCNC: 8.7 MG/DL (ref 8.5–10.5)
CANNABINOIDS UR QL SCN: NEGATIVE
CANNABINOIDS UR QL SCN: NEGATIVE
CHLORIDE SERPL-SCNC: 104 MEQ/L (ref 98–111)
CHLORIDE SERPL-SCNC: 104 MEQ/L (ref 98–111)
CHLORIDE SERPL-SCNC: 108 MEQ/L (ref 98–111)
CHLORIDE SERPL-SCNC: 108 MEQ/L (ref 98–111)
CO2 SERPL-SCNC: 21 MEQ/L (ref 23–33)
CO2 SERPL-SCNC: 21 MEQ/L (ref 23–33)
CO2 SERPL-SCNC: 22 MEQ/L (ref 23–33)
CO2 SERPL-SCNC: 22 MEQ/L (ref 23–33)
CREAT SERPL-MCNC: 0.5 MG/DL (ref 0.4–1.2)
DEPRECATED RDW RBC AUTO: 42.5 FL (ref 35–45)
DEPRECATED RDW RBC AUTO: 42.5 FL (ref 35–45)
EOSINOPHIL NFR BLD AUTO: 0 %
EOSINOPHIL NFR BLD AUTO: 0 %
EOSINOPHILS ABSOLUTE: 0 THOU/MM3 (ref 0–0.4)
EOSINOPHILS ABSOLUTE: 0 THOU/MM3 (ref 0–0.4)
ERYTHROCYTE [DISTWIDTH] IN BLOOD BY AUTOMATED COUNT: 12.6 % (ref 11.5–14.5)
ERYTHROCYTE [DISTWIDTH] IN BLOOD BY AUTOMATED COUNT: 12.6 % (ref 11.5–14.5)
ETHANOL SERPL-MCNC: 0.3 %
ETHANOL SERPL-MCNC: 0.3 %
ETHANOL SERPL-MCNC: 0.38 %
ETHANOL SERPL-MCNC: 0.38 %
FENTANYL: NEGATIVE
FENTANYL: NEGATIVE
GFR SERPL CREATININE-BSD FRML MDRD: > 60 ML/MIN/1.73M2
GLUCOSE SERPL-MCNC: 114 MG/DL (ref 70–108)
GLUCOSE SERPL-MCNC: 114 MG/DL (ref 70–108)
GLUCOSE SERPL-MCNC: 94 MG/DL (ref 70–108)
GLUCOSE SERPL-MCNC: 94 MG/DL (ref 70–108)
HCT VFR BLD AUTO: 48.3 % (ref 42–52)
HCT VFR BLD AUTO: 48.3 % (ref 42–52)
HGB BLD-MCNC: 17.2 GM/DL (ref 14–18)
HGB BLD-MCNC: 17.2 GM/DL (ref 14–18)
IMM GRANULOCYTES # BLD AUTO: 0.02 THOU/MM3 (ref 0–0.07)
IMM GRANULOCYTES # BLD AUTO: 0.02 THOU/MM3 (ref 0–0.07)
IMM GRANULOCYTES NFR BLD AUTO: 0.2 %
IMM GRANULOCYTES NFR BLD AUTO: 0.2 %
LYMPHOCYTES ABSOLUTE: 2.1 THOU/MM3 (ref 1–4.8)
LYMPHOCYTES ABSOLUTE: 2.1 THOU/MM3 (ref 1–4.8)
LYMPHOCYTES NFR BLD AUTO: 23.8 %
LYMPHOCYTES NFR BLD AUTO: 23.8 %
MAGNESIUM SERPL-MCNC: 1.9 MG/DL (ref 1.6–2.4)
MAGNESIUM SERPL-MCNC: 1.9 MG/DL (ref 1.6–2.4)
MCH RBC QN AUTO: 32.8 PG (ref 26–33)
MCH RBC QN AUTO: 32.8 PG (ref 26–33)
MCHC RBC AUTO-ENTMCNC: 35.6 GM/DL (ref 32.2–35.5)
MCHC RBC AUTO-ENTMCNC: 35.6 GM/DL (ref 32.2–35.5)
MCV RBC AUTO: 92 FL (ref 80–94)
MCV RBC AUTO: 92 FL (ref 80–94)
MONOCYTES ABSOLUTE: 0.5 THOU/MM3 (ref 0.4–1.3)
MONOCYTES ABSOLUTE: 0.5 THOU/MM3 (ref 0.4–1.3)
MONOCYTES NFR BLD AUTO: 6.1 %
MONOCYTES NFR BLD AUTO: 6.1 %
NEUTROPHILS NFR BLD AUTO: 69.3 %
NEUTROPHILS NFR BLD AUTO: 69.3 %
NRBC BLD AUTO-RTO: 0 /100 WBC
NRBC BLD AUTO-RTO: 0 /100 WBC
OPIATES UR QL SCN: NEGATIVE
OPIATES UR QL SCN: NEGATIVE
OSMOLALITY SERPL CALC.SUM OF ELEC: 280.4 MOSMOL/KG (ref 275–300)
OSMOLALITY SERPL CALC.SUM OF ELEC: 280.4 MOSMOL/KG (ref 275–300)
OXYCODONE, OPI5M: NEGATIVE
OXYCODONE, OPI5M: NEGATIVE
PCP UR QL SCN: NEGATIVE
PCP UR QL SCN: NEGATIVE
PH BLDV: 7.5 [PH] (ref 7.31–7.41)
PH BLDV: 7.5 [PH] (ref 7.31–7.41)
PLATELET # BLD AUTO: 219 THOU/MM3 (ref 130–400)
PLATELET # BLD AUTO: 219 THOU/MM3 (ref 130–400)
PMV BLD AUTO: 9.1 FL (ref 9.4–12.4)
PMV BLD AUTO: 9.1 FL (ref 9.4–12.4)
POTASSIUM SERPL-SCNC: 3.9 MEQ/L (ref 3.5–5.2)
PROT SERPL-MCNC: 7.5 G/DL (ref 6.1–8)
PROT SERPL-MCNC: 7.5 G/DL (ref 6.1–8)
RBC # BLD AUTO: 5.25 MILL/MM3 (ref 4.7–6.1)
RBC # BLD AUTO: 5.25 MILL/MM3 (ref 4.7–6.1)
SALICYLATES SERPL-MCNC: < 0.3 MG/DL (ref 2–10)
SALICYLATES SERPL-MCNC: < 0.3 MG/DL (ref 2–10)
SEGMENTED NEUTROPHILS ABSOLUTE COUNT: 6.1 THOU/MM3 (ref 1.8–7.7)
SEGMENTED NEUTROPHILS ABSOLUTE COUNT: 6.1 THOU/MM3 (ref 1.8–7.7)
SODIUM SERPL-SCNC: 141 MEQ/L (ref 135–145)
SODIUM SERPL-SCNC: 141 MEQ/L (ref 135–145)
SODIUM SERPL-SCNC: 143 MEQ/L (ref 135–145)
SODIUM SERPL-SCNC: 143 MEQ/L (ref 135–145)
TROPONIN, HIGH SENSITIVITY: 8 NG/L (ref 0–12)
TROPONIN, HIGH SENSITIVITY: 8 NG/L (ref 0–12)
TSH SERPL DL<=0.005 MIU/L-ACNC: 0.94 UIU/ML (ref 0.4–4.2)
TSH SERPL DL<=0.005 MIU/L-ACNC: 0.94 UIU/ML (ref 0.4–4.2)
WBC # BLD AUTO: 8.8 THOU/MM3 (ref 4.8–10.8)
WBC # BLD AUTO: 8.8 THOU/MM3 (ref 4.8–10.8)

## 2023-07-29 PROCEDURE — 6360000002 HC RX W HCPCS: Performed by: PHYSICIAN ASSISTANT

## 2023-07-29 PROCEDURE — 82077 ASSAY SPEC XCP UR&BREATH IA: CPT

## 2023-07-29 PROCEDURE — 2580000003 HC RX 258: Performed by: EMERGENCY MEDICINE

## 2023-07-29 PROCEDURE — 96361 HYDRATE IV INFUSION ADD-ON: CPT

## 2023-07-29 PROCEDURE — 85025 COMPLETE CBC W/AUTO DIFF WBC: CPT

## 2023-07-29 PROCEDURE — 2060000000 HC ICU INTERMEDIATE R&B

## 2023-07-29 PROCEDURE — 99223 1ST HOSP IP/OBS HIGH 75: CPT | Performed by: NURSE PRACTITIONER

## 2023-07-29 PROCEDURE — 93005 ELECTROCARDIOGRAM TRACING: CPT | Performed by: EMERGENCY MEDICINE

## 2023-07-29 PROCEDURE — 84484 ASSAY OF TROPONIN QUANT: CPT

## 2023-07-29 PROCEDURE — 99285 EMERGENCY DEPT VISIT HI MDM: CPT

## 2023-07-29 PROCEDURE — 36415 COLL VENOUS BLD VENIPUNCTURE: CPT

## 2023-07-29 PROCEDURE — 6360000002 HC RX W HCPCS: Performed by: NURSE PRACTITIONER

## 2023-07-29 PROCEDURE — 82800 BLOOD PH: CPT

## 2023-07-29 PROCEDURE — 84443 ASSAY THYROID STIM HORMONE: CPT

## 2023-07-29 PROCEDURE — 96376 TX/PRO/DX INJ SAME DRUG ADON: CPT

## 2023-07-29 PROCEDURE — 82550 ASSAY OF CK (CPK): CPT

## 2023-07-29 PROCEDURE — 96375 TX/PRO/DX INJ NEW DRUG ADDON: CPT

## 2023-07-29 PROCEDURE — 83735 ASSAY OF MAGNESIUM: CPT

## 2023-07-29 PROCEDURE — 80179 DRUG ASSAY SALICYLATE: CPT

## 2023-07-29 PROCEDURE — 2500000003 HC RX 250 WO HCPCS: Performed by: EMERGENCY MEDICINE

## 2023-07-29 PROCEDURE — 80143 DRUG ASSAY ACETAMINOPHEN: CPT

## 2023-07-29 PROCEDURE — 6360000002 HC RX W HCPCS: Performed by: EMERGENCY MEDICINE

## 2023-07-29 PROCEDURE — 96374 THER/PROPH/DIAG INJ IV PUSH: CPT

## 2023-07-29 PROCEDURE — 80307 DRUG TEST PRSMV CHEM ANLYZR: CPT

## 2023-07-29 PROCEDURE — 2580000003 HC RX 258: Performed by: NURSE PRACTITIONER

## 2023-07-29 PROCEDURE — 80053 COMPREHEN METABOLIC PANEL: CPT

## 2023-07-29 RX ORDER — ENALAPRILAT 1.25 MG/ML
0.62 INJECTION INTRAVENOUS EVERY 6 HOURS
Status: DISCONTINUED | OUTPATIENT
Start: 2023-07-30 | End: 2023-07-30

## 2023-07-29 RX ORDER — METOPROLOL TARTRATE 50 MG/1
50 TABLET, FILM COATED ORAL 2 TIMES DAILY
Status: DISCONTINUED | OUTPATIENT
Start: 2023-07-29 | End: 2023-07-29

## 2023-07-29 RX ORDER — METOPROLOL SUCCINATE 50 MG/1
50 TABLET, EXTENDED RELEASE ORAL DAILY
Status: DISCONTINUED | OUTPATIENT
Start: 2023-07-30 | End: 2023-08-03 | Stop reason: HOSPADM

## 2023-07-29 RX ORDER — METOPROLOL TARTRATE 50 MG/1
50 TABLET, FILM COATED ORAL DAILY
Status: DISCONTINUED | OUTPATIENT
Start: 2023-07-29 | End: 2023-07-29 | Stop reason: ALTCHOICE

## 2023-07-29 RX ORDER — POLYETHYLENE GLYCOL 3350 17 G/17G
17 POWDER, FOR SOLUTION ORAL DAILY PRN
Status: DISCONTINUED | OUTPATIENT
Start: 2023-07-29 | End: 2023-08-03 | Stop reason: HOSPADM

## 2023-07-29 RX ORDER — ONDANSETRON 2 MG/ML
4 INJECTION INTRAMUSCULAR; INTRAVENOUS EVERY 6 HOURS PRN
Status: DISCONTINUED | OUTPATIENT
Start: 2023-07-29 | End: 2023-08-03 | Stop reason: HOSPADM

## 2023-07-29 RX ORDER — NICOTINE 21 MG/24HR
1 PATCH, TRANSDERMAL 24 HOURS TRANSDERMAL DAILY
Status: DISCONTINUED | OUTPATIENT
Start: 2023-07-30 | End: 2023-08-03 | Stop reason: HOSPADM

## 2023-07-29 RX ORDER — ENOXAPARIN SODIUM 100 MG/ML
40 INJECTION SUBCUTANEOUS DAILY
Status: DISCONTINUED | OUTPATIENT
Start: 2023-07-30 | End: 2023-07-29 | Stop reason: DRUGHIGH

## 2023-07-29 RX ORDER — LORAZEPAM 2 MG/ML
2 INJECTION INTRAMUSCULAR ONCE
Status: COMPLETED | OUTPATIENT
Start: 2023-07-29 | End: 2023-07-29

## 2023-07-29 RX ORDER — MULTIVITAMIN WITH IRON
1 TABLET ORAL DAILY
Status: DISCONTINUED | OUTPATIENT
Start: 2023-07-30 | End: 2023-08-03 | Stop reason: HOSPADM

## 2023-07-29 RX ORDER — PHENOBARBITAL SODIUM 65 MG/ML
130 INJECTION INTRAMUSCULAR
Status: DISCONTINUED | OUTPATIENT
Start: 2023-07-29 | End: 2023-08-03 | Stop reason: HOSPADM

## 2023-07-29 RX ORDER — ACETAMINOPHEN 650 MG/1
650 SUPPOSITORY RECTAL EVERY 6 HOURS PRN
Status: DISCONTINUED | OUTPATIENT
Start: 2023-07-29 | End: 2023-08-03 | Stop reason: HOSPADM

## 2023-07-29 RX ORDER — LORAZEPAM 2 MG/ML
0.5 INJECTION INTRAMUSCULAR ONCE
Status: COMPLETED | OUTPATIENT
Start: 2023-07-29 | End: 2023-07-29

## 2023-07-29 RX ORDER — PHENOBARBITAL SODIUM 65 MG/ML
130 INJECTION INTRAMUSCULAR ONCE
Status: COMPLETED | OUTPATIENT
Start: 2023-07-29 | End: 2023-07-29

## 2023-07-29 RX ORDER — DEXTROSE MONOHYDRATE, SODIUM CHLORIDE, AND POTASSIUM CHLORIDE 50; 1.49; 4.5 G/1000ML; G/1000ML; G/1000ML
INJECTION, SOLUTION INTRAVENOUS ONCE
Status: COMPLETED | OUTPATIENT
Start: 2023-07-29 | End: 2023-07-29

## 2023-07-29 RX ORDER — PHENOBARBITAL SODIUM 65 MG/ML
260 INJECTION INTRAMUSCULAR
Status: DISCONTINUED | OUTPATIENT
Start: 2023-07-29 | End: 2023-08-03 | Stop reason: HOSPADM

## 2023-07-29 RX ORDER — 0.9 % SODIUM CHLORIDE 0.9 %
2000 INTRAVENOUS SOLUTION INTRAVENOUS ONCE
Status: COMPLETED | OUTPATIENT
Start: 2023-07-29 | End: 2023-07-29

## 2023-07-29 RX ORDER — LANOLIN ALCOHOL/MO/W.PET/CERES
100 CREAM (GRAM) TOPICAL DAILY
Status: DISCONTINUED | OUTPATIENT
Start: 2023-08-05 | End: 2023-08-03 | Stop reason: HOSPADM

## 2023-07-29 RX ORDER — ONDANSETRON 2 MG/ML
4 INJECTION INTRAMUSCULAR; INTRAVENOUS EVERY 6 HOURS PRN
Status: DISCONTINUED | OUTPATIENT
Start: 2023-07-29 | End: 2023-07-29 | Stop reason: SDUPTHER

## 2023-07-29 RX ORDER — ACETAMINOPHEN 325 MG/1
650 TABLET ORAL EVERY 6 HOURS PRN
Status: DISCONTINUED | OUTPATIENT
Start: 2023-07-29 | End: 2023-08-03 | Stop reason: HOSPADM

## 2023-07-29 RX ORDER — ONDANSETRON 4 MG/1
4 TABLET, ORALLY DISINTEGRATING ORAL EVERY 8 HOURS PRN
Status: DISCONTINUED | OUTPATIENT
Start: 2023-07-29 | End: 2023-07-29 | Stop reason: SDUPTHER

## 2023-07-29 RX ORDER — SODIUM CHLORIDE, SODIUM LACTATE, POTASSIUM CHLORIDE, CALCIUM CHLORIDE 600; 310; 30; 20 MG/100ML; MG/100ML; MG/100ML; MG/100ML
INJECTION, SOLUTION INTRAVENOUS CONTINUOUS
Status: ACTIVE | OUTPATIENT
Start: 2023-07-29 | End: 2023-07-30

## 2023-07-29 RX ORDER — THIAMINE HYDROCHLORIDE 100 MG/ML
200 INJECTION, SOLUTION INTRAMUSCULAR; INTRAVENOUS ONCE
Status: COMPLETED | OUTPATIENT
Start: 2023-07-29 | End: 2023-07-29

## 2023-07-29 RX ORDER — POLYETHYLENE GLYCOL 3350 17 G
2 POWDER IN PACKET (EA) ORAL
Status: DISCONTINUED | OUTPATIENT
Start: 2023-07-29 | End: 2023-07-29

## 2023-07-29 RX ORDER — ONDANSETRON 4 MG/1
4 TABLET, ORALLY DISINTEGRATING ORAL EVERY 8 HOURS PRN
Status: DISCONTINUED | OUTPATIENT
Start: 2023-07-29 | End: 2023-08-03 | Stop reason: HOSPADM

## 2023-07-29 RX ORDER — ENOXAPARIN SODIUM 100 MG/ML
30 INJECTION SUBCUTANEOUS 2 TIMES DAILY
Status: DISCONTINUED | OUTPATIENT
Start: 2023-07-30 | End: 2023-08-03 | Stop reason: HOSPADM

## 2023-07-29 RX ADMIN — THIAMINE HYDROCHLORIDE 500 MG: 100 INJECTION, SOLUTION INTRAMUSCULAR; INTRAVENOUS at 22:53

## 2023-07-29 RX ADMIN — LORAZEPAM 2 MG: 2 INJECTION INTRAMUSCULAR; INTRAVENOUS at 18:32

## 2023-07-29 RX ADMIN — PHENOBARBITAL SODIUM 130 MG: 65 INJECTION INTRAMUSCULAR at 17:29

## 2023-07-29 RX ADMIN — POTASSIUM CHLORIDE, DEXTROSE MONOHYDRATE AND SODIUM CHLORIDE: 150; 5; 450 INJECTION, SOLUTION INTRAVENOUS at 19:52

## 2023-07-29 RX ADMIN — THIAMINE HYDROCHLORIDE 200 MG: 100 INJECTION, SOLUTION INTRAMUSCULAR; INTRAVENOUS at 17:29

## 2023-07-29 RX ADMIN — SODIUM CHLORIDE 2000 ML: 9 INJECTION, SOLUTION INTRAVENOUS at 17:32

## 2023-07-29 RX ADMIN — PHENOBARBITAL SODIUM 260 MG: 65 INJECTION INTRAMUSCULAR at 23:05

## 2023-07-29 RX ADMIN — LORAZEPAM 2 MG: 2 INJECTION INTRAMUSCULAR; INTRAVENOUS at 17:30

## 2023-07-29 RX ADMIN — LORAZEPAM 0.5 MG: 2 INJECTION INTRAMUSCULAR; INTRAVENOUS at 21:31

## 2023-07-29 ASSESSMENT — SLEEP AND FATIGUE QUESTIONNAIRES: DO YOU HAVE DIFFICULTY SLEEPING: COMMENT

## 2023-07-29 NOTE — H&P
Hospitalist  History and Physical    Patient:  Tequila Khalil  MRN: [id number]    CHIEF COMPLAINT:  agitation, suicidial ideations    History Obtained From:  patient, electronic medical record  PCP: NILAM Lemon CNP    HISTORY OF PRESENT ILLNESS:   Tequila Khalil is a 53year old  male presented to [facility] ER 2023 via self with family with chief complaint of alcohol problem and suicidal thoughts. Patient states that he has been trying to drink himself to death. Patient has a past medical history significant for current everyday smoking, COPD, Essential HPTN, DMT2, GERD, Dyslipidemia, Liver Disease, Seizures, Anxiety, Depression, Delirium tremens as well as hallucinations  Recently [facility] Hospitalization -5/15/2023 with ETOH withdrawal     Reg Hrao stated that he feels guilty for his bad drinking habit, he has been thinking about suicide frequently, he does not have a definite plan though. He reports previous suicide attempt which was unsuccessful. He feels depressed and anxious. He is not currently taking antidepressants. He does not have a psychiatrist.  He called his ex-counselor today expressing his frustration and suicide ideation, then his brother was called who brought him to ED. Last drinking was 20-30 minutes ago before arrival.  Patient states he drinks 30 beers on average daily. While in the ER patient did receive Ativan/Phenobarbital/Thiamine/D5. 45NS/0.9NS  ER provider did complete [facility] at time of admission. Past Medical History:    See HPI    Past Surgical History:    Colonoscopy    Medications Prior to Admission:    Prior to Admission medications    Not on File       Allergies:  Patient has no allergy information on record. Social History:   TOBACCO:   has no history on file for tobacco use. ETOH:   has no history on file for alcohol use. OCCUPATION:  Single    Family History:    Mother alive CAD, HPTN, Depression, DMT2  Father  Cancer    REVIEW OF

## 2023-07-29 NOTE — ED NOTES
Restlessness continues. Pt gripping penis, moving hand to mouth and nose continues. Pt stated he will not stop when asked, stating \"I don't mean to pull it, but sometimes I just feel sexy. \" Writer oriented pt to the hospital setting and reminded pt to stop exposing himself. Pt intermittently attempting to climb out of bed to search for \"my phone card. \"      Esteban Urias, IVETTEN  49/51/24 8370

## 2023-07-29 NOTE — ED NOTES
Writer in sitter position. Pt repeatedly touching his penis. Pt removed external catheter, incontinent episodes. Pt repeatedly touching penis, stating \"I like touching it\", requesting to \"beat off\", and making inappropriate comments to staff. Lights dimmed, TV low, blankets applied, redirected attempted multiple times. RN and  notified. Pt restless, making many requests, swearing, and apologizing intermittently. Writer remains in sitter position.      Georgie Gordon LPN  61/39/88 9157

## 2023-07-29 NOTE — ED NOTES
Patient lying in bed with blankets watching tv but dosing in and out of sleep at this time. Patient respirations are regular and unlabored. Patient appears to be in no current distress. Patient VSS. Call light is within reach. Patient expresses no needs at this time. Sitter at the bedside.        Fátima Tang RN  07/29/23 1800

## 2023-07-29 NOTE — ED NOTES
Restlessness continues. Pt repeatedly touching penis and touching mouth and tongue. Writer asking pt to stop. Orlin DAVALOS notified. Dr. Salomon Blanca at bedside. Following Dr. Salomon Blanca exit, pt swearing increasing.       Sona Dumas LPN  30/96/95 8008

## 2023-07-29 NOTE — ED NOTES
Pt to the ED via self with family. Patient presents with complaints of alcohol problem and suicidal thoughts. Patient states that he has been trying to drink himself to death. Patient is alert and oriented x 4. Respirations are regular and unlabored.        Snow Swift RN  07/29/23 0403

## 2023-07-29 NOTE — PROGRESS NOTES
Chief Complaint: Alcohol Problem    Provisional Diagnosis:   Unspecified Depressive Disorder        Risk, Psychosocial and Contextual Factors:  Alcohol use      Current  Treatment:   Cristina Castillo              Present Suicidal Behavior:       Verbal: Yes                                                    Attempt: Denied     Access to Weapons: Pt denies stating \"I wish\"     Current Suicide Risk: Low, Moderate or High:  High     Past Suicidal Behavior:                 Verbal: Yes    Attempt: Yes     Self-Injurious/Self-Mutilation: MARCOS     Traumatic Event Within Past 2 Weeks:   MARCOS     Current Abuse: MARCOS     Legal: MARCOS     Violence: None noted     Protective Factors:  Support from family      Housing: Lives alone     CPAP/Oxygen/Ambulation Difficulties: see H&P     Basic Vital Signs Normal?: Check with Patients Nurse prior to Calling Psychiatry     Critical Labs?: Check with Patients Nurse prior to Calling Psychiatry     Clinical Summary:       Patient is a 53year old male who presents to the ED voluntarily. Per triage note by Peng Romano RN, \"Pt to the ED via self with family. Patient presents with complaints of alcohol problem and suicidal thoughts. Patient states that he has been trying to drink himself to death\". Pt moved from room 26 to room 24 at this time. Spoke with pt who appears intoxicated and is slurring his words. Pt states he is suicidal and previously tried to [place] an extension cord around his neck\" a few years ago. When asked what his present plan is, pt states \"well wrap an extension cord around my neck\". Pt states the suicidal thoughts come and go and he is \"too chicken shit\" to do anything. Pt states \"fuc--- yeah my life sucks. \" Pt states he was recently released from rehab two weeks ago and relapsed on alcohol. Pt states he normally drinks \"30 beers and multiple fireball shots\" a day. Pt BAL was . 38.  Pt states he called [address] [place] [address] who told him to call his family and have them bring him to

## 2023-07-30 ENCOUNTER — APPOINTMENT (OUTPATIENT)
Dept: ULTRASOUND IMAGING | Age: 48
End: 2023-07-30
Payer: MEDICAID

## 2023-07-30 PROBLEM — Z72.0 NICOTINE ABUSE: Status: ACTIVE | Noted: 2023-07-30

## 2023-07-30 PROBLEM — F10.929 ACUTE ALCOHOLIC INTOXICATION WITH COMPLICATION (HCC): Status: ACTIVE | Noted: 2023-07-30

## 2023-07-30 PROBLEM — R73.02 IMPAIRED GLUCOSE TOLERANCE: Status: ACTIVE | Noted: 2023-07-30

## 2023-07-30 PROBLEM — R74.8 ELEVATED LIVER ENZYMES: Status: ACTIVE | Noted: 2023-07-30

## 2023-07-30 PROBLEM — E66.811 OBESITY (BMI 30.0-34.9): Status: ACTIVE | Noted: 2023-07-30

## 2023-07-30 PROBLEM — R45.851 SUICIDAL IDEATIONS: Status: ACTIVE | Noted: 2023-07-30

## 2023-07-30 PROBLEM — F10.930 ALCOHOL WITHDRAWAL SYNDROME WITHOUT COMPLICATION (HCC): Status: ACTIVE | Noted: 2023-07-30

## 2023-07-30 PROBLEM — E66.9 OBESITY (BMI 30.0-34.9): Status: ACTIVE | Noted: 2023-07-30

## 2023-07-30 PROBLEM — I10 ACCELERATED HYPERTENSION: Status: ACTIVE | Noted: 2023-07-30

## 2023-07-30 LAB
ALBUMIN SERPL BCG-MCNC: 3.6 G/DL (ref 3.5–5.1)
ALBUMIN SERPL BCG-MCNC: 3.6 G/DL (ref 3.5–5.1)
ALP SERPL-CCNC: 162 U/L (ref 38–126)
ALP SERPL-CCNC: 162 U/L (ref 38–126)
ALT SERPL W/O P-5'-P-CCNC: 87 U/L (ref 11–66)
ALT SERPL W/O P-5'-P-CCNC: 87 U/L (ref 11–66)
ANION GAP SERPL CALC-SCNC: 13 MEQ/L (ref 8–16)
AST SERPL-CCNC: 130 U/L (ref 5–40)
AST SERPL-CCNC: 130 U/L (ref 5–40)
BACTERIA: ABNORMAL
BACTERIA: ABNORMAL
BILIRUB SERPL-MCNC: 1.3 MG/DL (ref 0.3–1.2)
BILIRUB SERPL-MCNC: 1.3 MG/DL (ref 0.3–1.2)
BILIRUB UR QL STRIP: NEGATIVE
BILIRUB UR QL STRIP: NEGATIVE
BUN SERPL-MCNC: 10 MG/DL (ref 7–22)
CALCIUM SERPL-MCNC: 7.8 MG/DL (ref 8.5–10.5)
CALCIUM SERPL-MCNC: 7.8 MG/DL (ref 8.5–10.5)
CALCIUM SERPL-MCNC: 8 MG/DL (ref 8.5–10.5)
CALCIUM SERPL-MCNC: 8 MG/DL (ref 8.5–10.5)
CASTS #/AREA URNS LPF: ABNORMAL /LPF
CHARACTER UR: CLEAR
CHARACTER UR: CLEAR
CHARCOAL URNS QL MICRO: ABNORMAL
CHARCOAL URNS QL MICRO: ABNORMAL
CHLORIDE SERPL-SCNC: 106 MEQ/L (ref 98–111)
CK SERPL-CCNC: 1548 U/L (ref 55–170)
CK SERPL-CCNC: 1548 U/L (ref 55–170)
CK SERPL-CCNC: 768 U/L (ref 55–170)
CK SERPL-CCNC: 768 U/L (ref 55–170)
CO2 SERPL-SCNC: 22 MEQ/L (ref 23–33)
CO2 SERPL-SCNC: 22 MEQ/L (ref 23–33)
CO2 SERPL-SCNC: 23 MEQ/L (ref 23–33)
CO2 SERPL-SCNC: 23 MEQ/L (ref 23–33)
COLOR UR: YELLOW
COLOR UR: YELLOW
CREAT SERPL-MCNC: 0.6 MG/DL (ref 0.4–1.2)
CRYSTALS URNS QL MICRO: ABNORMAL
CRYSTALS URNS QL MICRO: ABNORMAL
DEPRECATED MEAN GLUCOSE BLD GHB EST-ACNC: 108 MG/DL (ref 70–126)
DEPRECATED MEAN GLUCOSE BLD GHB EST-ACNC: 108 MG/DL (ref 70–126)
EPITHELIAL CELLS, UA: ABNORMAL /HPF
EPITHELIAL CELLS, UA: ABNORMAL /HPF
ETHANOL SERPL-MCNC: 0.06 %
ETHANOL SERPL-MCNC: 0.06 %
ETHANOL SERPL-MCNC: 0.23 %
ETHANOL SERPL-MCNC: 0.23 %
GFR SERPL CREATININE-BSD FRML MDRD: > 60 ML/MIN/1.73M2
GGT SERPL-CCNC: 500 U/L (ref 8–69)
GGT SERPL-CCNC: 500 U/L (ref 8–69)
GLUCOSE SERPL-MCNC: 106 MG/DL (ref 70–108)
GLUCOSE SERPL-MCNC: 106 MG/DL (ref 70–108)
GLUCOSE SERPL-MCNC: 99 MG/DL (ref 70–108)
GLUCOSE SERPL-MCNC: 99 MG/DL (ref 70–108)
GLUCOSE UR QL STRIP.AUTO: NEGATIVE MG/DL
GLUCOSE UR QL STRIP.AUTO: NEGATIVE MG/DL
HAV IGM SER QL: NEGATIVE
HAV IGM SER QL: NEGATIVE
HBA1C MFR BLD HPLC: 5.6 % (ref 4.4–6.4)
HBA1C MFR BLD HPLC: 5.6 % (ref 4.4–6.4)
HBV CORE IGM SERPL QL IA: NEGATIVE
HBV CORE IGM SERPL QL IA: NEGATIVE
HBV SURFACE AG SERPL QL IA: NEGATIVE
HBV SURFACE AG SERPL QL IA: NEGATIVE
HCV IGG SERPL QL IA: NEGATIVE
HCV IGG SERPL QL IA: NEGATIVE
HGB UR QL STRIP.AUTO: NEGATIVE
INR PPP: 1.13 (ref 0.85–1.13)
INR PPP: 1.13 (ref 0.85–1.13)
KETONES UR QL STRIP.AUTO: NEGATIVE
KETONES UR QL STRIP.AUTO: NEGATIVE
LEUKOCYTE ESTERASE UR QL STRIP.AUTO: NEGATIVE
LEUKOCYTE ESTERASE UR QL STRIP.AUTO: NEGATIVE
NITRITE UR QL STRIP.AUTO: NEGATIVE
NITRITE UR QL STRIP.AUTO: NEGATIVE
PH UR STRIP.AUTO: 5.5 [PH] (ref 5–9)
PH UR STRIP.AUTO: 5.5 [PH] (ref 5–9)
POTASSIUM SERPL-SCNC: 4 MEQ/L (ref 3.5–5.2)
POTASSIUM SERPL-SCNC: 4 MEQ/L (ref 3.5–5.2)
POTASSIUM SERPL-SCNC: 4.2 MEQ/L (ref 3.5–5.2)
POTASSIUM SERPL-SCNC: 4.2 MEQ/L (ref 3.5–5.2)
PROT SERPL-MCNC: 6.5 G/DL (ref 6.1–8)
PROT SERPL-MCNC: 6.5 G/DL (ref 6.1–8)
PROT UR STRIP.AUTO-MCNC: ABNORMAL MG/DL
PROT UR STRIP.AUTO-MCNC: ABNORMAL MG/DL
RBC #/AREA URNS HPF: ABNORMAL /HPF
RBC #/AREA URNS HPF: ABNORMAL /HPF
RENAL EPI CELLS #/AREA URNS HPF: ABNORMAL /[HPF]
RENAL EPI CELLS #/AREA URNS HPF: ABNORMAL /[HPF]
SODIUM SERPL-SCNC: 141 MEQ/L (ref 135–145)
SODIUM SERPL-SCNC: 141 MEQ/L (ref 135–145)
SODIUM SERPL-SCNC: 142 MEQ/L (ref 135–145)
SODIUM SERPL-SCNC: 142 MEQ/L (ref 135–145)
SP GR UR REFRACT.AUTO: 1.02 (ref 1–1.03)
SP GR UR REFRACT.AUTO: 1.02 (ref 1–1.03)
UROBILINOGEN UR QL STRIP.AUTO: 1 EU/DL (ref 0–1)
UROBILINOGEN UR QL STRIP.AUTO: 1 EU/DL (ref 0–1)
WBC #/AREA URNS HPF: ABNORMAL /HPF
WBC #/AREA URNS HPF: ABNORMAL /HPF
YEAST LIKE FUNGI URNS QL MICRO: ABNORMAL
YEAST LIKE FUNGI URNS QL MICRO: ABNORMAL

## 2023-07-30 PROCEDURE — 76705 ECHO EXAM OF ABDOMEN: CPT

## 2023-07-30 PROCEDURE — 6360000002 HC RX W HCPCS: Performed by: NURSE PRACTITIONER

## 2023-07-30 PROCEDURE — 82977 ASSAY OF GGT: CPT

## 2023-07-30 PROCEDURE — 80053 COMPREHEN METABOLIC PANEL: CPT

## 2023-07-30 PROCEDURE — 80074 ACUTE HEPATITIS PANEL: CPT

## 2023-07-30 PROCEDURE — 99232 SBSQ HOSP IP/OBS MODERATE 35: CPT | Performed by: STUDENT IN AN ORGANIZED HEALTH CARE EDUCATION/TRAINING PROGRAM

## 2023-07-30 PROCEDURE — 82077 ASSAY SPEC XCP UR&BREATH IA: CPT

## 2023-07-30 PROCEDURE — 81001 URINALYSIS AUTO W/SCOPE: CPT

## 2023-07-30 PROCEDURE — 2580000003 HC RX 258: Performed by: NURSE PRACTITIONER

## 2023-07-30 PROCEDURE — 85610 PROTHROMBIN TIME: CPT

## 2023-07-30 PROCEDURE — 2060000000 HC ICU INTERMEDIATE R&B

## 2023-07-30 PROCEDURE — 6370000000 HC RX 637 (ALT 250 FOR IP): Performed by: NURSE PRACTITIONER

## 2023-07-30 PROCEDURE — 6370000000 HC RX 637 (ALT 250 FOR IP): Performed by: PHYSICIAN ASSISTANT

## 2023-07-30 PROCEDURE — 83874 ASSAY OF MYOGLOBIN: CPT

## 2023-07-30 PROCEDURE — 82550 ASSAY OF CK (CPK): CPT

## 2023-07-30 PROCEDURE — 36415 COLL VENOUS BLD VENIPUNCTURE: CPT

## 2023-07-30 PROCEDURE — 93010 ELECTROCARDIOGRAM REPORT: CPT | Performed by: INTERNAL MEDICINE

## 2023-07-30 PROCEDURE — 83036 HEMOGLOBIN GLYCOSYLATED A1C: CPT

## 2023-07-30 RX ORDER — LISINOPRIL 20 MG/1
20 TABLET ORAL DAILY
Status: ON HOLD | COMMUNITY
Start: 2023-07-10 | End: 2023-08-03 | Stop reason: HOSPADM

## 2023-07-30 RX ORDER — LANOLIN ALCOHOL/MO/W.PET/CERES
100 CREAM (GRAM) TOPICAL DAILY
COMMUNITY
Start: 2023-07-09 | End: 2023-08-08 | Stop reason: SDUPTHER

## 2023-07-30 RX ORDER — LANOLIN ALCOHOL/MO/W.PET/CERES
0.4 CREAM (GRAM) TOPICAL DAILY
COMMUNITY
Start: 2023-07-28 | End: 2023-08-08

## 2023-07-30 RX ORDER — METOPROLOL SUCCINATE 50 MG/1
50 TABLET, EXTENDED RELEASE ORAL DAILY
COMMUNITY
Start: 2023-07-09

## 2023-07-30 RX ORDER — ACAMPROSATE CALCIUM 333 MG/1
333 TABLET, DELAYED RELEASE ORAL
Status: ON HOLD | COMMUNITY
Start: 2023-07-18 | End: 2023-08-03 | Stop reason: SDUPTHER

## 2023-07-30 RX ORDER — NALTREXONE 380 MG
380 KIT INTRAMUSCULAR
COMMUNITY
Start: 2023-07-20

## 2023-07-30 RX ADMIN — PHENOBARBITAL SODIUM 130 MG: 65 INJECTION INTRAMUSCULAR at 16:40

## 2023-07-30 RX ADMIN — Medication 1 TABLET: at 09:08

## 2023-07-30 RX ADMIN — ENOXAPARIN SODIUM 30 MG: 100 INJECTION SUBCUTANEOUS at 20:30

## 2023-07-30 RX ADMIN — THIAMINE HYDROCHLORIDE 500 MG: 100 INJECTION, SOLUTION INTRAMUSCULAR; INTRAVENOUS at 06:07

## 2023-07-30 RX ADMIN — PHENOBARBITAL SODIUM 130 MG: 65 INJECTION INTRAMUSCULAR at 18:52

## 2023-07-30 RX ADMIN — SODIUM CHLORIDE, POTASSIUM CHLORIDE, SODIUM LACTATE AND CALCIUM CHLORIDE: 600; 310; 30; 20 INJECTION, SOLUTION INTRAVENOUS at 00:05

## 2023-07-30 RX ADMIN — PHENOBARBITAL SODIUM 130 MG: 65 INJECTION INTRAMUSCULAR at 11:17

## 2023-07-30 RX ADMIN — THIAMINE HYDROCHLORIDE 500 MG: 100 INJECTION, SOLUTION INTRAMUSCULAR; INTRAVENOUS at 20:40

## 2023-07-30 RX ADMIN — THIAMINE HYDROCHLORIDE 500 MG: 100 INJECTION, SOLUTION INTRAMUSCULAR; INTRAVENOUS at 14:20

## 2023-07-30 RX ADMIN — ONDANSETRON 4 MG: 2 INJECTION INTRAMUSCULAR; INTRAVENOUS at 18:52

## 2023-07-30 RX ADMIN — ACETAMINOPHEN 650 MG: 325 TABLET ORAL at 18:52

## 2023-07-30 RX ADMIN — ENOXAPARIN SODIUM 30 MG: 100 INJECTION SUBCUTANEOUS at 09:07

## 2023-07-30 RX ADMIN — ONDANSETRON 4 MG: 2 INJECTION INTRAMUSCULAR; INTRAVENOUS at 11:17

## 2023-07-30 RX ADMIN — ACETAMINOPHEN 650 MG: 325 TABLET ORAL at 11:17

## 2023-07-30 ASSESSMENT — PAIN - FUNCTIONAL ASSESSMENT
PAIN_FUNCTIONAL_ASSESSMENT: ACTIVITIES ARE NOT PREVENTED
PAIN_FUNCTIONAL_ASSESSMENT: ACTIVITIES ARE NOT PREVENTED

## 2023-07-30 ASSESSMENT — PAIN SCALES - GENERAL
PAINLEVEL_OUTOF10: 2
PAINLEVEL_OUTOF10: 3
PAINLEVEL_OUTOF10: 3
PAINLEVEL_OUTOF10: 0

## 2023-07-30 ASSESSMENT — PAIN DESCRIPTION - ORIENTATION
ORIENTATION: MID
ORIENTATION: MID

## 2023-07-30 ASSESSMENT — PAIN DESCRIPTION - LOCATION
LOCATION: HEAD
LOCATION: HEAD

## 2023-07-30 ASSESSMENT — PAIN DESCRIPTION - DESCRIPTORS
DESCRIPTORS: ACHING
DESCRIPTORS: ACHING

## 2023-07-30 NOTE — PROGRESS NOTES
Utilize [facility] alcohol withdrawal scale (Based on [place] Modified Alcohol Withdrawal Scale). Tabulate score based on classifications including Tremor, Sweating, Hallucination, Orientation, and Agitation. Tremor: 1  Sweatin  Hallucinations: 0  Orientation: 0  Agitation: 1    Total Score: 3    Action perform as described below     Tremor:  No tremor is 0 points. Tremor on movement is 1 point. Tremor at rest is 2 points. Sweating: No Sweat 0 points. Moist is 1 point. Drenching sweats is 2 points. Hallucinations: No present 0 points. Dissuadable is 1 point. Not dissuadable is 2 points. Orientation: Oriented 0 points. Vague/detached 1 point. Disoriented/no contact 2 points. Agitation: Calm 0 points. Anxious 1 point. Panicky 2 points. Check scale every 2 hours. Discontinue scoring with 4 consecutive scorings of 0. Scale 0: No phenobarbital given. Re-assess every 60 minutes as needed. Scale 1-3: Phenobarbital 130 mg IV over 3 minutes. Re-assess every 60 minutes as needed. May administer every 60 minutes to a maximum dose of phenobarbital 1040 mg in 24 hours! Scale 4-8: Phenobarbital 260 mg IV over 5 minutes. Re-assess every 60 minutes as needed. May administer every 60 minutes to a maximum dose of phenobarbital 1040mg in 24 hours! Scale 9-10: Transfer to ICU (if not already in ICU). Administer 10mg/kg phenobarbital IV over 60 minutes.   Maximum dose phenobarbital is 1040mg in 24 hours!'

## 2023-07-30 NOTE — PROGRESS NOTES
Utilize [facility] alcohol withdrawal scale (Based on [place] Modified Alcohol Withdrawal Scale). Tabulate score based on classifications including Tremor, Sweating, Hallucination, Orientation, and Agitation. Tremor: 1  Sweatin  Hallucinations: 0  Orientation: 0  Agitation: 1  Total Score: 3    Action perform as described below     Tremor:  No tremor is 0 points. Tremor on movement is 1 point. Tremor at rest is 2 points. Sweating: No Sweat 0 points. Moist is 1 point. Drenching sweats is 2 points. Hallucinations: No present 0 points. Dissuadable is 1 point. Not dissuadable is 2 points. Orientation: Oriented 0 points. Vague/detached 1 point. Disoriented/no contact 2 points. Agitation: Calm 0 points. Anxious 1 point. Panicky 2 points. Check scale every 2 hours. Discontinue scoring with 4 consecutive scorings of 0. Scale 0: No phenobarbital given. Re-assess every 60 minutes as needed. Scale 1-3: Phenobarbital 130 mg IV over 3 minutes. Re-assess every 60 minutes as needed. May administer every 60 minutes to a maximum dose of phenobarbital 1040 mg in 24 hours! Scale 4-8: Phenobarbital 260 mg IV over 5 minutes. Re-assess every 60 minutes as needed. May administer every 60 minutes to a maximum dose of phenobarbital 1040mg in 24 hours! Scale 9-10: Transfer to ICU (if not already in ICU). Administer 10mg/kg phenobarbital IV over 60 minutes. Maximum dose phenobarbital is 1040mg in 24 hours!

## 2023-07-30 NOTE — PLAN OF CARE
Problem: Discharge Planning  Goal: Discharge to home or other facility with appropriate resources  Outcome: Progressing     Problem: Safety - Adult  Goal: Free from fall injury  Outcome: Progressing     Problem: Neurosensory - Adult  Goal: Achieves stable or improved neurological status  Outcome: Progressing  Goal: Absence of seizures  Outcome: Progressing  Goal: Remains free of injury related to seizures activity  Outcome: Progressing  Goal: Achieves maximal functionality and self care  Outcome: Progressing     Problem: Respiratory - Adult  Goal: Achieves optimal ventilation and oxygenation  Outcome: Progressing     Problem: Cardiovascular - Adult  Goal: Absence of cardiac dysrhythmias or at baseline  Outcome: Progressing     Problem: Skin/Tissue Integrity - Adult  Goal: Skin integrity remains intact  Outcome: Progressing     Problem: Gastrointestinal - Adult  Goal: Minimal or absence of nausea and vomiting  Outcome: Progressing     Problem: Infection - Adult  Goal: Absence of infection during hospitalization  Outcome: Progressing     Problem: Metabolic/Fluid and Electrolytes - Adult  Goal: Electrolytes maintained within normal limits  Outcome: Progressing  Goal: Hemodynamic stability and optimal renal function maintained  Outcome: Progressing     Problem: Hematologic - Adult  Goal: Maintains hematologic stability  Outcome: Progressing     Problem: Anxiety  Goal: Will report anxiety at manageable levels  Description: INTERVENTIONS:  1. Administer medication as ordered  2. Teach and rehearse alternative coping skills  3. Provide emotional support with 1:1 interaction with staff  Outcome: Progressing  Flowsheets (Taken 7/30/2023 9118)  Will report anxiety at manageable levels: Administer medication as ordered     Problem: Depression/Self Harm  Goal: Effect of psychiatric condition will be minimized and patient will be protected from self harm  Description: INTERVENTIONS:  1.  Assess impact of patient's symptoms on 100

## 2023-07-30 NOTE — FLOWSHEET NOTE
07/30/23 6077   Safe Environment   Safety Measures Other (comment)  (Virtual nurse safety round)     This vrn called primary RN, Coleen to inquire if patient is appropriate for admission interview at this time. Coleen agreeable to this vrn trying at this time, but states if pt does become agitated vrn can withdraw. This vrn will attempt admission documentation with patient when pt is awake.

## 2023-07-30 NOTE — PROGRESS NOTES
This Virtual RN contacted bedside RN Renetta Montgomery to ask permission to enter room to attempt admission requirements. Beside RN stated patient is not able to answer questions at this time and it is okay to attempt on day shift tomorrow.

## 2023-07-30 NOTE — PROGRESS NOTES
Utilize [facility] alcohol withdrawal scale (Based on [place] Modified Alcohol Withdrawal Scale). Tabulate score based on classifications including Tremor, Sweating, Hallucination, Orientation, and Agitation. Tremor: 1  Sweatin  Hallucinations: 0  Orientation: 0  Agitation: 1  Total Score: 3    Action perform as described below     Tremor:  No tremor is 0 points. Tremor on movement is 1 point. Tremor at rest is 2 points. Sweating: No Sweat 0 points. Moist is 1 point. Drenching sweats is 2 points. Hallucinations: No present 0 points. Dissuadable is 1 point. Not dissuadable is 2 points. Orientation: Oriented 0 points. Vague/detached 1 point. Disoriented/no contact 2 points. Agitation: Calm 0 points. Anxious 1 point. Panicky 2 points. Check scale every 2 hours. Discontinue scoring with 4 consecutive scorings of 0. Scale 0: No phenobarbital given. Re-assess every 60 minutes as needed. Scale 1-3: Phenobarbital 130 mg IV over 3 minutes. Re-assess every 60 minutes as needed. May administer every 60 minutes to a maximum dose of phenobarbital 1040 mg in 24 hours! Scale 4-8: Phenobarbital 260 mg IV over 5 minutes. Re-assess every 60 minutes as needed. May administer every 60 minutes to a maximum dose of phenobarbital 1040mg in 24 hours! Scale 9-10: Transfer to ICU (if not already in ICU). Administer 10mg/kg phenobarbital IV over 60 minutes. Maximum dose phenobarbital is 1040mg in 24 hours!

## 2023-07-30 NOTE — PROGRESS NOTES
Utilize [facility] alcohol withdrawal scale (Based on [place] Modified Alcohol Withdrawal Scale). Tabulate score based on classifications including Tremor, Sweating, Hallucination, Orientation, and Agitation. Tremor: 0  Sweatin  Hallucinations: 0  Orientation: 0  Agitation: 0  Total Score: 0  Action perform as described below     Tremor:  No tremor is 0 points. Tremor on movement is 1 point. Tremor at rest is 2 points. Sweating: No Sweat 0 points. Moist is 1 point. Drenching sweats is 2 points. Hallucinations: No present 0 points. Dissuadable is 1 point. Not dissuadable is 2 points. Orientation: Oriented 0 points. Vague/detached 1 point. Disoriented/no contact 2 points. Agitation: Calm 0 points. Anxious 1 point. Panicky 2 points. Check scale every 2 hours. Discontinue scoring with 4 consecutive scorings of 0. Scale 0: No phenobarbital given. Re-assess every 60 minutes as needed. Scale 1-3: Phenobarbital 130 mg IV over 3 minutes. Re-assess every 60 minutes as needed. May administer every 60 minutes to a maximum dose of phenobarbital 1040 mg in 24 hours! Scale 4-8: Phenobarbital 260 mg IV over 5 minutes. Re-assess every 60 minutes as needed. May administer every 60 minutes to a maximum dose of phenobarbital 1040mg in 24 hours! Scale 9-10: Transfer to ICU (if not already in ICU). Administer 10mg/kg phenobarbital IV over 60 minutes. Maximum dose phenobarbital is 1040mg in 24 hours!

## 2023-07-30 NOTE — FLOWSHEET NOTE
07/30/23 1804   Safe Environment   Safety Measures Other (comment)  (Virtual Services)     This virtual nurse called into patients room to complete admission documentation. VRN obtained consent to turn camera on; introduced self, intent and explained virtual services; patient is agreeable to move forward with admission at this time. All admission questions answered without issue. Reviewed Home medications with patient and med list updated. Patient reports taking Metformin but cannot recall dose, this medication is not however on med rec. Patient preferred pharmacy obtained and updated on chart. Patient requests that brother Trevor Harper is listed as contact on chart. Sagrario Coats added to patient contacts at this time. Fall prevention, call light use and safety education reviewed with pt at this time. Pt. Verbalized understanding of education but this should continue to be reinforced. Patient is agreeable to virtual services going forward. Virtual to continue to round and educate as appropriate.

## 2023-07-30 NOTE — PROGRESS NOTES
Pharmacist Review and Automatic Dose Adjustment of Prophylactic Enoxaparin    Reviewed reason(s) for admission/hospital problem list    The reviewing pharmacist has made an adjustment to the ordered enoxaparin dose or converted to UFH per the approved [facility] protocol and table as identified below. Orquidea Kilgore is a 53 y.o. male. Recent Labs     07/29/23  1718   CREATININE 0.5     Estimated Creatinine Clearance: 233 mL/min (based on SCr of 0.5 mg/dL). Recent Labs     07/29/23  1718   HGB 17.2   HCT 48.3        No results for input(s): INR in the last 72 hours.     Height:   Ht Readings from Last 1 Encounters:   07/29/23 5' 11\" (1.803 m)     Weight:  Wt Readings from Last 1 Encounters:   07/29/23 246 lb 14.6 oz (112 kg)         Plan: Based upon the patient's weight and renal function    Ordered: Enoxaparin 40mg SUBQ Daily    Changed/converted to    New Order: Enoxaparin 30mg SUBQ BID    Thank you,  Robbie Payne, PharmD   Pharmacy Resident  7/29/2023 9:35 PM

## 2023-07-30 NOTE — PROGRESS NOTES
Hospitalist Progress Note      Patient:  Ronnell Lindo    Unit/Bed:4K-02/002-A  YOB: 1975  MRN: [id number]   Acct: [de-identified]   PCP: NILAM Dove CNP  Date of Admission: 7/29/2023    Assessment/Plan:    Acute alcohol intoxication with hx of withdrawal seizures and hallucinations. Presented with ethanol level of 0.38  Started on phenobarb push panel  High dose thiamine ordered. No clear signs of Wernicke encephalopathy but will continue for now. Addiction services consulted. Suicidal ideation  EMC order placed in the ED  Suicide precautions and 1:1 sitter  Psychiatry consulted. Acute hepatitis  Likely alcoholic hepatitis  Follow up RUQ u/s. HTN  Continue home medications. Likely will have some hypertension while undergoing alcohol withdrawal.   Mild rhabdomyolysis  CK elevated at >1500. Continue IVF. Nicotine use  On NRT  Obesity  BMI 34    Disposition: pending psych evaluation    Chief Complaint: suicidal ideation    Hospital Course:    Per H&P - \"Griffin Main is a 53year old  male presented to [facility] ER 7/29/2023 via self with family with chief complaint of alcohol problem and suicidal thoughts. Patient states that he has been trying to drink himself to death. Patient has a past medical history significant for current everyday smoking, COPD, Essential HPTN, DMT2, GERD, Dyslipidemia, Liver Disease, Seizures, Anxiety, Depression, Delirium tremens as well as hallucinations  Recently [facility] Hospitalization 5/11-5/15/2023 with ETOH withdrawal      Emily Madsen stated that he feels guilty for his bad drinking habit, he has been thinking about suicide frequently, he does not have a definite plan though. He reports previous suicide attempt which was unsuccessful. He feels depressed and anxious. He is not currently taking antidepressants.   He does not have a psychiatrist.  He called his ex-counselor today

## 2023-07-30 NOTE — ED NOTES
Pt attempting to climb out of bed and pulling blood pressure cuff and cardiac monitoring off. Writer replaced cuff and wires. Pt stated \"I'm going to walk out of here, you fat fucking bitch. \" Pt redirection successful. Restlessness, penis touching, and self exposure continues.       Yolande Hernandez, N  35/86/92 7898

## 2023-07-30 NOTE — CONSULTS
Department of Psychiatry  Consult Service  Attending Consult Note        Reason for Consult:  Suicidal ideation  Requesting Physician:  Ms. Arnulfo Mata CNP    History obtained from:  patient    HISTORY OF PRESENT ILLNESS:       The patient is a 53 y.o. male with significant past medical history of Alcohol Use Disorder who presented intoxicated with alcohol and having thought sof self harm. Found him tired and having hang over symptoms. Reports feeling fine. Denies having any suicidal ideation. He did not remember saying this. He has never been suicidal in life. Denoiies feeling depressed.  Admits having alcohol problem and wants to quit and maintain sobriety    Current Psychiatric Medications:  none    Medications:    Current Facility-Administered Medications: ondansetron (ZOFRAN-ODT) disintegrating tablet 4 mg, 4 mg, Oral, Q8H PRN **OR** ondansetron (ZOFRAN) injection 4 mg, 4 mg, IntraVENous, Q6H PRN  polyethylene glycol (GLYCOLAX) packet 17 g, 17 g, Oral, Daily PRN  acetaminophen (TYLENOL) tablet 650 mg, 650 mg, Oral, Q6H PRN **OR** acetaminophen (TYLENOL) suppository 650 mg, 650 mg, Rectal, Q6H PRN  thiamine (B-1) 500 mg in sodium chloride 0.9 % 100 mL IVPB, 500 mg, IntraVENous, Q8H **FOLLOWED BY** [START ON 7/31/2023] thiamine (B-1) 250 mg in sodium chloride 0.9 % 100 mL IVPB, 250 mg, IntraVENous, Q24H **FOLLOWED BY** [START ON 8/5/2023] thiamine tablet 100 mg, 100 mg, Oral, Daily  multivitamin 1 tablet, 1 tablet, Oral, Daily  PHENobarbital (LUMINAL) injection 130 mg, 130 mg, IntraVENous, Q1H PRN **OR** PHENobarbital (LUMINAL) injection 260 mg, 260 mg, IntraVENous, Q1H PRN **OR** PHENobarbital (LUMINAL) 752.7 mg in sodium chloride 0.9 % 100 mL IVPB, 10 mg/kg (Ideal), IntraVENous, Daily PRN  nicotine (NICODERM CQ) 21 MG/24HR 1 patch, 1 patch, TransDERmal, Daily  enoxaparin Sodium (LOVENOX) injection 30 mg, 30 mg, SubCUTAneous, BID  metoprolol succinate (TOPROL XL) extended release tablet 50 mg, 50 mg, Oral,

## 2023-07-30 NOTE — PROGRESS NOTES
Patient arrived to 3K46 by ED RN, placed on telemetry monitor, VSS stable at this time (see flowsheets). Patient repeatedly removes telemetry cables and asking \"how to play some AC/DC\" on telemetry box. Patient agitated, RN having difficulty getting history on patient or keeping patient focus on the questions given. Patient repeatedly cursing at staff, stating he wants his cigarettes. Patient continually educated and reminded on the importance of his admission and that smoking is not permitted on campus. CIWA charted (see flowsheets). Will continue to monitor.

## 2023-07-30 NOTE — PROGRESS NOTES
7177  Attempted SBIRT per Addiction consult service. Arrived to pt room. Pt with RN and a physician at this time. KARLIE to re-attempt.

## 2023-07-30 NOTE — ED NOTES
ED to inpatient nurses report    Chief Complaint   Patient presents with    Alcohol Problem      Present to ED from home  LOC: alert and orientated to name and place  Vital signs   Vitals:    07/29/23 1814 07/29/23 1922 07/29/23 1943 07/29/23 1953   BP: (!) 141/93 (!) 152/102 (!) 143/85 (!) 131/93   Pulse: (!) 130 (!) 135 (!) 123 (!) 129   Resp:  20  20   Temp:       TempSrc:       SpO2:  98%  99%      Oxygen Baseline room air    Current needs required none   LDAs:   Peripheral IV 07/29/23 Left Forearm (Active)   Site Assessment Clean, dry & intact 07/29/23 1952   Line Status Flushed; Infusing 07/29/23 [address] [place] [address]. Connections checked and tightened 07/29/23 1952   Phlebitis Assessment No symptoms 07/29/23 1952   Infiltration Assessment 0 07/29/23 1952   Dressing Status Clean, dry & intact 07/29/23 1952   Dressing Type Transparent 07/29/23 1952     Mobility: Requires assistance * 1  Pending ED orders: none  Present condition: stable, cardiac monitor      C-SSRS Risk of Suicide: High Risk  Swallow Screening    Preferred Language: Soraya     Electronically signed by Hosea Gan RN on 7/29/2023 at 8:00 PM      Hosea Gan, [address]  07/29/23 2003

## 2023-07-30 NOTE — PROGRESS NOTES
Utilize [facility] alcohol withdrawal scale (Based on [place] Modified Alcohol Withdrawal Scale). Tabulate score based on classifications including Tremor, Sweating, Hallucination, Orientation, and Agitation. Tremor: 0  Sweatin  Hallucinations: 0  Orientation: 0  Agitation: 0  Total Score: 0    Action perform as described below     Tremor:  No tremor is 0 points. Tremor on movement is 1 point. Tremor at rest is 2 points. Sweating: No Sweat 0 points. Moist is 1 point. Drenching sweats is 2 points. Hallucinations: No present 0 points. Dissuadable is 1 point. Not dissuadable is 2 points. Orientation: Oriented 0 points. Vague/detached 1 point. Disoriented/no contact 2 points. Agitation: Calm 0 points. Anxious 1 point. Panicky 2 points. Check scale every 2 hours. Discontinue scoring with 4 consecutive scorings of 0. Scale 0: No phenobarbital given. Re-assess every 60 minutes as needed. Scale 1-3: Phenobarbital 130 mg IV over 3 minutes. Re-assess every 60 minutes as needed. May administer every 60 minutes to a maximum dose of phenobarbital 1040 mg in 24 hours! Scale 4-8: Phenobarbital 260 mg IV over 5 minutes. Re-assess every 60 minutes as needed. May administer every 60 minutes to a maximum dose of phenobarbital 1040mg in 24 hours! Scale 9-10: Transfer to ICU (if not already in ICU). Administer 10mg/kg phenobarbital IV over 60 minutes. Maximum dose phenobarbital is 1040mg in 24 hours!

## 2023-07-31 PROCEDURE — 6370000000 HC RX 637 (ALT 250 FOR IP): Performed by: PHYSICIAN ASSISTANT

## 2023-07-31 PROCEDURE — 6360000002 HC RX W HCPCS: Performed by: NURSE PRACTITIONER

## 2023-07-31 PROCEDURE — 2580000003 HC RX 258: Performed by: NURSE PRACTITIONER

## 2023-07-31 PROCEDURE — 99232 SBSQ HOSP IP/OBS MODERATE 35: CPT | Performed by: STUDENT IN AN ORGANIZED HEALTH CARE EDUCATION/TRAINING PROGRAM

## 2023-07-31 PROCEDURE — 1200000003 HC TELEMETRY R&B

## 2023-07-31 PROCEDURE — 6370000000 HC RX 637 (ALT 250 FOR IP): Performed by: NURSE PRACTITIONER

## 2023-07-31 PROCEDURE — 1200000000 HC SEMI PRIVATE

## 2023-07-31 RX ADMIN — ENOXAPARIN SODIUM 30 MG: 100 INJECTION SUBCUTANEOUS at 08:00

## 2023-07-31 RX ADMIN — Medication 1 TABLET: at 08:00

## 2023-07-31 RX ADMIN — METOPROLOL SUCCINATE 50 MG: 50 TABLET, EXTENDED RELEASE ORAL at 08:00

## 2023-07-31 RX ADMIN — THIAMINE HYDROCHLORIDE 250 MG: 100 INJECTION, SOLUTION INTRAMUSCULAR; INTRAVENOUS at 21:05

## 2023-07-31 RX ADMIN — THIAMINE HYDROCHLORIDE 500 MG: 100 INJECTION, SOLUTION INTRAMUSCULAR; INTRAVENOUS at 14:28

## 2023-07-31 RX ADMIN — THIAMINE HYDROCHLORIDE 500 MG: 100 INJECTION, SOLUTION INTRAMUSCULAR; INTRAVENOUS at 06:59

## 2023-07-31 RX ADMIN — ENOXAPARIN SODIUM 30 MG: 100 INJECTION SUBCUTANEOUS at 21:05

## 2023-07-31 ASSESSMENT — PAIN SCALES - GENERAL
PAINLEVEL_OUTOF10: 0
PAINLEVEL_OUTOF10: 0

## 2023-07-31 NOTE — PROGRESS NOTES
PT arrived from [place]. PT is A/Ox4 and on room air. Vital signs obtained and PT reports no pain at this time. PT states he has no needs at this time. Will continue to monitor for changes.

## 2023-07-31 NOTE — CONSULTS
Department of Psychiatry  Consult Service  Progress Note     Reason for Consult: Suicide ideation    Chart review: resolved per IM note 7/31    Patient states he is feeling ok today, a lot better today than when he came in. Patient thought things were going pretty good, but then drank too much - got a little depressed and ended up here in the hospital. States he drank for a week and a half before coming in. States that he thinks he abstained from alcohol for 75 days previously. Patient states that his plan is to go home tomorrow 8/1, relax and talk with family - 2 brothers and a niece. Has other ones that he does not see. Then 2 clean his apartment and car and to look for work. Patient lives by himself and is not . Patient states he plans to go to [facility] in his town, [facility]. That he wants to stop drinking again for his long-term health, having a happier future, and because sometimes when he starts drinking he wants to die. SUBJECTIVE:      Eating ok, sometimes stays up to late watching TV  Taking meds, tolerating well without SE's. Denies Suicidal ideation/Homicidal ideation.   Denies any auditory or visual hallucinations       OBJECTIVE      Medications  Current Facility-Administered Medications: ondansetron (ZOFRAN-ODT) disintegrating tablet 4 mg, 4 mg, Oral, Q8H PRN **OR** ondansetron (ZOFRAN) injection 4 mg, 4 mg, IntraVENous, Q6H PRN  polyethylene glycol (GLYCOLAX) packet 17 g, 17 g, Oral, Daily PRN  acetaminophen (TYLENOL) tablet 650 mg, 650 mg, Oral, Q6H PRN **OR** acetaminophen (TYLENOL) suppository 650 mg, 650 mg, Rectal, Q6H PRN  thiamine (B-1) 500 mg in sodium chloride 0.9 % 100 mL IVPB, 500 mg, IntraVENous, Q8H **FOLLOWED BY** thiamine (B-1) 250 mg in sodium chloride 0.9 % 100 mL IVPB, 250 mg, IntraVENous, Q24H **FOLLOWED BY** [START ON 8/5/2023] thiamine tablet 100 mg, 100 mg, Oral, Daily  multivitamin 1 tablet, 1 tablet, Oral, Daily  PHENobarbital (LUMINAL) injection 130 mg,

## 2023-07-31 NOTE — PROGRESS NOTES
Pt is a 56y.o. male, propped up in bed finishing lunch, in 4K-02 at the time. Please refer to ACP note for additional context.     07/31/23 1318   Encounter Summary   Encounter Overview/Reason  Advance Care Planning   Service Provided For: Patient   Referral/Consult From: Multi-disciplinary team   Last Encounter  07/31/23   Complexity of Encounter Low   Begin Time 1240   End Time  1250   Total Time Calculated 10 min   Advance Care Planning   Type ACP conversation   Assessment/Intervention/Outcome   Assessment Compromised coping; Impaired resilience;Passive   Intervention Active listening;Prayer (assurance of)/[employer];Explored/Affirmed feelings, thoughts, concerns   Outcome Receptive; Expressed Gratitude;Engaged in conversation

## 2023-07-31 NOTE — PLAN OF CARE
Problem: Discharge Planning  Goal: Discharge to home or other facility with appropriate resources  Outcome: Progressing  Flowsheets (Taken 7/31/2023 0745)  Discharge to home or other facility with appropriate resources:   Identify barriers to discharge with patient and caregiver   Arrange for needed discharge resources and transportation as appropriate   Identify discharge learning needs (meds, wound care, etc)   Refer to discharge planning if patient needs post-hospital services based on physician order or complex needs related to functional status, cognitive ability or social support system     Problem: Safety - Adult  Goal: Free from fall injury  Outcome: Progressing  Flowsheets (Taken 7/31/2023 1040)  Free From Fall Injury: Instruct family/caregiver on patient safety     Problem: Neurosensory - Adult  Goal: Achieves stable or improved neurological status  Outcome: Progressing  Flowsheets (Taken 7/31/2023 0745)  Achieves stable or improved neurological status:   Assess for and report changes in neurological status   Initiate measures to prevent increased intracranial pressure   Maintain blood pressure and fluid volume within ordered parameters to optimize cerebral perfusion and minimize risk of hemorrhage   Monitor temperature, glucose, and sodium. Initiate appropriate interventions as ordered  Goal: Absence of seizures  Outcome: Progressing  Flowsheets (Taken 7/31/2023 0745)  Absence of seizures: Monitor for seizure activity.   If seizure occurs, document type and location of movements and any associated apnea  Goal: Remains free of injury related to seizures activity  Outcome: Progressing  Flowsheets (Taken 7/31/2023 0745)  Remains free of injury related to seizure activity:   Maintain airway, patient safety  and administer oxygen as ordered   Monitor patient for seizure activity, document and report duration and description of seizure to Licensed Independent Practitioner   If seizure occurs, turn patient to

## 2023-07-31 NOTE — PLAN OF CARE
Problem: Anxiety  Goal: Will report anxiety at manageable levels  Description: INTERVENTIONS:  1. Administer medication as ordered  2. Teach and rehearse alternative coping skills  3.  Provide emotional support with 1:1 interaction with staff  7/30/2023 2040 by Ronak Barrow, RN  Flowsheets (Taken 7/30/2023 2040)  Will report anxiety at manageable levels: Administer medication as ordered  7/30/2023 1758 by Kayden Palacios RN  Outcome: Progressing  Flowsheets (Taken 7/30/2023 0781)  Will report anxiety at manageable levels: Administer medication as ordered

## 2023-07-31 NOTE — PROGRESS NOTES
Utilize [facility] alcohol withdrawal scale (Based on [place] Modified Alcohol Withdrawal Scale). Tabulate score based on classifications including Tremor, Sweating, Hallucination, Orientation, and Agitation. Tremor: 1  Sweatin  Hallucinations: 0  Orientation: 0  Agitation: 1      Total Score: 2  Action perform as described below      Tremor:  No tremor is 0 points. Tremor on movement is 1 point. Tremor at rest is 2 points. Sweating: No Sweat 0 points. Moist is 1 point. Drenching sweats is 2 points. Hallucinations: No present 0 points. Dissuadable is 1 point. Not dissuadable is 2 points. Orientation: Oriented 0 points. Vague/detached 1 point. Disoriented/no contact 2 points. Agitation: Calm 0 points. Anxious 1 point. Panicky 2 points. Check scale every 2 hours. Discontinue scoring with 4 consecutive scorings of 0. Scale 0: No phenobarbital given. Re-assess every 30 minutes as needed. Scale 1-3: Phenobarbital 130 mg IV over 3 minutes. Re-assess every 30 minutes as needed. May administer every 30 minutes to a maximum dose of phenobarbital 1040 mg in 24 hours! Scale 4-8: Phenobarbital 260 mg IV over 5 minutes. Re-assess every 30 minutes as needed. May administer every 30 minutes to a maximum dose of phenobarbital 1040mg in 24 hours! Scale 9-10: Transfer to ICU (if not already in ICU). Administer 10mg/kg phenobarbital IV over 30 minutes. Maximum dose phenobarbital is 1040mg in 24 hours!

## 2023-07-31 NOTE — ACP (ADVANCE CARE PLANNING)
Advance Care Planning     Advance Care Planning Inpatient Note  Spiritual Care Department    Today's Date: 7/31/2023  Unit: [facility]    Received request from IDT Member. Upon review of chart and communication with care team, Spiritual Care will defer advance care planning with patient at this time. . Patient was/were present in the room during visit. Goals of ACP Conversation:  Discuss advance care planning documents    Health Care Decision Makers:     No healthcare decision makers have been documented. Click here to complete [address] including selection of the Healthcare Decision Maker Relationship (ie \"Primary\")  Summary:  No Decision Maker named by patient at this time    Advance Care Planning Documents (Patient Wishes):  Healthcare Power of /Advance Directive Appointment of [address]  Living Will/Advance Directive     Assessment:  Pt is a 56y.o. male in 4K-02 at the time.  visited due to pt desire to accept AD paperwork for review and study. Reg Haro is a bit passive, thus our visit was short. Following brief explanation of documents and encouraging study and discussion with those he wishes to include in the process,  prayed for pt to end the time. Interventions:  Provided education on documents for clarity and greater understanding  Encouraged ongoing ACP conversation with future decision makers and loved ones    Care Preferences Communicated:   No    Outcomes/Plan:  Full, blank set of AD's left with patient, for continued study and consideration of whom to have participate in the process/document.     Electronically signed by Alfredito Cordon on 7/31/2023 at 1:23 PM

## 2023-07-31 NOTE — CARE COORDINATION
7/31/23, 10:32 AM EDT      DISCHARGE PLANNING EVALUATION    Dasha Ford  Admitted: 7/29/2023  Hospital Day: 2    Location: -02/002-A Reason for admit: Suicide ideation [Z18.665]  Alcoholic hepatitis without ascites [K70.10]  Alcohol withdrawal delirium, acute, hyperactive ([address]) [C80.048]  Acute alcoholic intoxication with complication ([address]) [C07.724]  Alcohol withdrawal syndrome without complication ([address]) [Z33.679]    No past medical history on file. Barriers to Discharge:   Alcohol Withdrawal/Suicidal Ideation (EMC/d in ER, cleared by Psychiatry)  History: Alcohol Use, Smoker, COPD, DT/s    (+) ETOH 0.38  SR Alcohol Withdrawal Scale    Client assigned to HAILEY, Hand-Off given to HAILEY Curran CM    PCP: NILAM Henao CNP    Readmission Risk Low 0-14, Mod 15-19), High > 20: Readmission Risk Score: 9      Advance Directives:      Code Status: Full Code   Patient's Primary Decision Maker is:  next of kin      Patient Goals/Plan/Treatment Preferences:   plans home alone, needs cab ride to:     [address] [facility], [address]    Current w [address] [facility] ([facility]);  Addiction Services following, has [place] Medicaid; messaged [employer], [employer] as insurance card absent

## 2023-08-01 ENCOUNTER — CLINICAL DOCUMENTATION (OUTPATIENT)
Dept: INTERNAL MEDICINE CLINIC | Age: 48
End: 2023-08-01

## 2023-08-01 PROCEDURE — 1200000000 HC SEMI PRIVATE

## 2023-08-01 PROCEDURE — 6360000002 HC RX W HCPCS: Performed by: NURSE PRACTITIONER

## 2023-08-01 PROCEDURE — 2580000003 HC RX 258: Performed by: NURSE PRACTITIONER

## 2023-08-01 PROCEDURE — 1200000003 HC TELEMETRY R&B

## 2023-08-01 PROCEDURE — 6370000000 HC RX 637 (ALT 250 FOR IP): Performed by: NURSE PRACTITIONER

## 2023-08-01 PROCEDURE — 99232 SBSQ HOSP IP/OBS MODERATE 35: CPT | Performed by: STUDENT IN AN ORGANIZED HEALTH CARE EDUCATION/TRAINING PROGRAM

## 2023-08-01 PROCEDURE — 6370000000 HC RX 637 (ALT 250 FOR IP): Performed by: PHYSICIAN ASSISTANT

## 2023-08-01 RX ADMIN — PHENOBARBITAL SODIUM 130 MG: 65 INJECTION INTRAMUSCULAR at 07:59

## 2023-08-01 RX ADMIN — Medication 1 TABLET: at 08:00

## 2023-08-01 RX ADMIN — ENOXAPARIN SODIUM 30 MG: 100 INJECTION SUBCUTANEOUS at 07:59

## 2023-08-01 RX ADMIN — THIAMINE HYDROCHLORIDE 250 MG: 100 INJECTION, SOLUTION INTRAMUSCULAR; INTRAVENOUS at 21:47

## 2023-08-01 RX ADMIN — METOPROLOL SUCCINATE 50 MG: 50 TABLET, EXTENDED RELEASE ORAL at 08:00

## 2023-08-01 RX ADMIN — ENOXAPARIN SODIUM 30 MG: 100 INJECTION SUBCUTANEOUS at 21:46

## 2023-08-01 ASSESSMENT — PAIN SCALES - GENERAL
PAINLEVEL_OUTOF10: 0
PAINLEVEL_OUTOF10: 0

## 2023-08-01 NOTE — CARE COORDINATION
8/1/23, 1:29 PM EDT    DISCHARGE ON [address] [facility] day: 3  Location: [address] [place] [address]- Reason for admit: Suicide ideation [M65.909]  Alcoholic hepatitis without ascites [K70.10]  Alcohol withdrawal delirium, acute, hyperactive ([address]) [W14.683]  Acute alcoholic intoxication with complication ([address]) [O87.435]  Alcohol withdrawal syndrome without complication ([address]) [X38.760]   Procedure: No  Barriers to Discharge: Withdrawal sx improved but with hallucinations reported this am and was agitated. Phenobarb administered. Psych has signed off. Plan discharge with Naltrexone and to follow up with addictions for continued monitoring. PCP: NILAM Baires CNP  Readmission Risk Score: 9%  Patient Goals/Plan/Treatment Preferences: Plans return home alone.

## 2023-08-01 NOTE — PROGRESS NOTES
Recent Labs     07/29/23  2229 07/30/23  1535   CKTOTAL 1,548* 768*         Microbiology:    Blood culture #1: No results found for: BC    Blood culture #2:No results found for: [facility]    Organism:No results found for: ORG    No results found for: LABGRAM    MRSA culture only:No results found for: [facility]    Urine culture: No results found for: LABURIN    Respiratory culture: No results found for: CULTRESP    Aerobic and Anaerobic :  No results found for: LABAERO  No results found for: LABANAE    Urinalysis:      Lab Results   Component Value Date/Time    NITRU NEGATIVE 07/30/2023 11:00 AM    WBCUA 0-2 07/30/2023 11:00 AM    BACTERIA NONE SEEN 07/30/2023 11:00 AM    RBCUA NONE SEEN 07/30/2023 11:00 AM    BLOODU NEGATIVE 07/30/2023 11:00 AM    SPECGRAV 1.019 07/30/2023 11:00 AM       Radiology:  US LIVER   Final Result      1. Increased echogenicity throughout the liver which could be related to fatty infiltration. 2. Limited evaluation as the pancreas and common bile duct could not be definitively visualized. **This report has been created using voice recognition software. It may contain minor errors which are inherent in voice recognition technology. **      Final report electronically signed by Dr Roxy Walden on 7/30/2023 2:44 PM      [address]   Final Result      1. Increased echogenicity throughout the liver which could be related to fatty infiltration. 2. Limited evaluation as the pancreas and common bile duct could not be definitively visualized. **This report has been created using voice recognition software. It may contain minor errors which are inherent in voice recognition technology. **      Final report electronically signed by Dr Roxy Walden on 7/30/2023 2:44 PM        No results found.     Electronically signed by Quynh Robledo DO on 8/1/2023 at 10:50 AM

## 2023-08-01 NOTE — PROGRESS NOTES
Patient is currently admitted to the hospital for alcohol withdrawal. Patient is agreeable to OP treatment. Patient reported previously being with [facility]. Patient appears to be drinking heavily on medication. Sw staffing with providers and connecting with staff of the unit on 8/2.

## 2023-08-01 NOTE — PLAN OF CARE
Problem: Discharge Planning  Goal: Discharge to home or other facility with appropriate resources  8/1/2023 0306 by Ry Umanzor RN  Outcome: Progressing  Note: Pt will discharge home when appropriate. Problem: Safety - Adult  Goal: Free from fall injury  8/1/2023 0306 by Ry Umanzor RN  Outcome: Progressing  Note: Fall precaution in place. Bed alarm/chair alarm. Bed locked in lowest position. Fall band applied if applicable. Call light and overhead table within reach. Will continue to monitor. Problem: Cardiovascular - Adult  Goal: Absence of cardiac dysrhythmias or at baseline  8/1/2023 0306 by Ry Umanzor RN  Outcome: Progressing  Note: Absence of cardiac dysrhythmias on my shift , will continue to assess. Problem: Skin/Tissue Integrity - Adult  Goal: Skin integrity remains intact  8/1/2023 0306 by Ry Umanzor RN  Outcome: Progressing  Note: Absence of new skin integrity issues on my shift. Will continue to assess. Problem: Gastrointestinal - Adult  Goal: Minimal or absence of nausea and vomiting  8/1/2023 0306 by Ry Umanzor RN  Outcome: Progressing  Note: Absence of nausea and vomiting on my shift        Problem: Infection - Adult  Goal: Absence of infection during hospitalization  8/1/2023 0306 by Ry Umanzor RN  Outcome: Progressing  Note: Pt shows no new signs or symptoms of infection on my shift. Will continue to assess. Problem: Metabolic/Fluid and Electrolytes - Adult  Goal: Electrolytes maintained within normal limits  8/1/2023 0306 by Ry Umanzor RN  Outcome: Progressing  Note: Pt electrolytes within normal limits on my shift. Will continue to monitor. Problem: Pain  Goal: Verbalizes/displays adequate comfort level or baseline comfort level  8/1/2023 0306 by Ry Umanzor RN  Outcome: Progressing  Note: Pt denies pain on my shift. Non pharmaceutical interventions like ice and repositioning offered before pain medications.  Will continue to assess. Pt verbalizes understanding of care plan. Pt contributes to goal settings.

## 2023-08-01 NOTE — PROGRESS NOTES
Utilize [facility] alcohol withdrawal scale (Based on [place] Modified Alcohol Withdrawal Scale). Tabulate score based on classifications including Tremor, Sweating, Hallucination, Orientation, and Agitation. Tremor: 0  Sweatin  Hallucinations: 1  Orientation: 0  Agitation: 1  Total Score: 2  Action perform as described below     Tremor:  No tremor is 0 points. Tremor on movement is 1 point. Tremor at rest is 2 points. Sweating: No Sweat 0 points. Moist is 1 point. Drenching sweats is 2 points. Hallucinations: No present 0 points. Dissuadable is 1 point. Not dissuadable is 2 points. Orientation: Oriented 0 points. Vague/detached 1 point. Disoriented/no contact 2 points. Agitation: Calm 0 points. Anxious 1 point. Panicky 2 points. Check scale every 2 hours. Discontinue scoring with 4 consecutive scorings of 0. Scale 0: No phenobarbital given. Re-assess every 60 minutes as needed. Scale 1-3: Phenobarbital 130 mg IV over 3 minutes. Re-assess every 60 minutes as needed. May administer every 60 minutes to a maximum dose of phenobarbital 1040 mg in 24 hours! Scale 4-8: Phenobarbital 260 mg IV over 5 minutes. Re-assess every 60 minutes as needed. May administer every 60 minutes to a maximum dose of phenobarbital 1040mg in 24 hours! Scale 9-10: Transfer to ICU (if not already in ICU). Administer 10mg/kg phenobarbital IV over 60 minutes. Maximum dose phenobarbital is 1040mg in 24 hours!

## 2023-08-01 NOTE — PROGRESS NOTES
Utilize [facility] alcohol withdrawal scale (Based on [place] Modified Alcohol Withdrawal Scale). Tabulate score based on classifications including Tremor, Sweating, Hallucination, Orientation, and Agitation. Tremor: 0  Sweatin  Hallucinations: 0  Orientation: 0  Agitation: 0  Total Score: 0  Action perform as described below     Tremor:  No tremor is 0 points. Tremor on movement is 1 point. Tremor at rest is 2 points. Sweating: No Sweat 0 points. Moist is 1 point. Drenching sweats is 2 points. Hallucinations: No present 0 points. Dissuadable is 1 point. Not dissuadable is 2 points. Orientation: Oriented 0 points. Vague/detached 1 point. Disoriented/no contact 2 points. Agitation: Calm 0 points. Anxious 1 point. Panicky 2 points. Check scale every 2 hours. Discontinue scoring with 4 consecutive scorings of 0. Scale 0: No phenobarbital given. Re-assess every 60 minutes as needed. Scale 1-3: Phenobarbital 130 mg IV over 3 minutes. Re-assess every 60 minutes as needed. May administer every 60 minutes to a maximum dose of phenobarbital 1040 mg in 24 hours! Scale 4-8: Phenobarbital 260 mg IV over 5 minutes. Re-assess every 60 minutes as needed. May administer every 60 minutes to a maximum dose of phenobarbital 1040mg in 24 hours! Scale 9-10: Transfer to ICU (if not already in ICU). Administer 10mg/kg phenobarbital IV over 60 minutes. Maximum dose phenobarbital is 1040mg in 24 hours!

## 2023-08-02 PROCEDURE — 6370000000 HC RX 637 (ALT 250 FOR IP): Performed by: NURSE PRACTITIONER

## 2023-08-02 PROCEDURE — 94761 N-INVAS EAR/PLS OXIMETRY MLT: CPT

## 2023-08-02 PROCEDURE — 6360000002 HC RX W HCPCS: Performed by: NURSE PRACTITIONER

## 2023-08-02 PROCEDURE — 99232 SBSQ HOSP IP/OBS MODERATE 35: CPT | Performed by: STUDENT IN AN ORGANIZED HEALTH CARE EDUCATION/TRAINING PROGRAM

## 2023-08-02 PROCEDURE — 1200000000 HC SEMI PRIVATE

## 2023-08-02 PROCEDURE — 2580000003 HC RX 258: Performed by: NURSE PRACTITIONER

## 2023-08-02 PROCEDURE — 6370000000 HC RX 637 (ALT 250 FOR IP): Performed by: PHYSICIAN ASSISTANT

## 2023-08-02 RX ADMIN — PHENOBARBITAL SODIUM 752.7 MG: 65 INJECTION INTRAMUSCULAR at 10:27

## 2023-08-02 RX ADMIN — THIAMINE HYDROCHLORIDE 250 MG: 100 INJECTION, SOLUTION INTRAMUSCULAR; INTRAVENOUS at 21:20

## 2023-08-02 RX ADMIN — METOPROLOL SUCCINATE 50 MG: 50 TABLET, EXTENDED RELEASE ORAL at 08:19

## 2023-08-02 RX ADMIN — ENOXAPARIN SODIUM 30 MG: 100 INJECTION SUBCUTANEOUS at 08:19

## 2023-08-02 RX ADMIN — PHENOBARBITAL SODIUM 260 MG: 65 INJECTION INTRAMUSCULAR at 04:23

## 2023-08-02 RX ADMIN — Medication 1 TABLET: at 08:19

## 2023-08-02 ASSESSMENT — PAIN SCALES - GENERAL
PAINLEVEL_OUTOF10: 0
PAINLEVEL_OUTOF10: 0

## 2023-08-02 ASSESSMENT — PATIENT HEALTH QUESTIONNAIRE - PHQ9: SUM OF ALL RESPONSES TO PHQ QUESTIONS 1-9: 7

## 2023-08-02 NOTE — CARE COORDINATION
8/2/23, 2:36 PM EDT    DISCHARGE ON [address] [facility] day: 4  Location: [address] [place] [address]- Reason for admit: Suicide ideation [V97.815]  Alcoholic hepatitis without ascites [K70.10]  Alcohol withdrawal delirium, acute, hyperactive ([address]) [G68.688]  Acute alcoholic intoxication with complication ([address]) [Y70.566]  Alcohol withdrawal syndrome without complication ([address]) [Y81.304]   Procedure: No  Barriers to Discharge: Cont to require some pheno. Restless and with some hallucinations. PCP: NILAM Iglesias CNP  Readmission Risk Score: 6.2%  Patient Goals/Plan/Treatment Preferences: Plans return home alone.

## 2023-08-02 NOTE — PROGRESS NOTES
Utilize [facility] alcohol withdrawal scale (Based on [place] Modified Alcohol Withdrawal Scale). Tabulate score based on classifications including Tremor, Sweating, Hallucination, Orientation, and Agitation. Tremor: 0  Sweatin  Hallucinations: 2  Orientation: 0  Agitation: 1  Total Score: 4  Action perform as described below     Tremor:  No tremor is 0 points. Tremor on movement is 1 point. Tremor at rest is 2 points. Sweating: No Sweat 0 points. Moist is 1 point. Drenching sweats is 2 points. Hallucinations: No present 0 points. Dissuadable is 1 point. Not dissuadable is 2 points. Orientation: Oriented 0 points. Vague/detached 1 point. Disoriented/no contact 2 points. Agitation: Calm 0 points. Anxious 1 point. Panicky 2 points. Check scale every 2 hours. Discontinue scoring with 4 consecutive scorings of 0. Scale 0: No phenobarbital given. Re-assess every 60 minutes as needed. Scale 1-3: Phenobarbital 130 mg IV over 3 minutes. Re-assess every 60 minutes as needed. May administer every 60 minutes to a maximum dose of phenobarbital 1040 mg in 24 hours! Scale 4-8: Phenobarbital 260 mg IV over 5 minutes. Re-assess every 60 minutes as needed. May administer every 60 minutes to a maximum dose of phenobarbital 1040mg in 24 hours! Scale 9-10: Transfer to ICU (if not already in ICU). Administer 10mg/kg phenobarbital IV over 60 minutes. Maximum dose phenobarbital is 1040mg in 24 hours!

## 2023-08-02 NOTE — PROGRESS NOTES
Hospitalist Progress Note      Patient:  [facility]    Unit/Bed:8B-37/037-A  YOB: 1975  MRN: [id number]   Acct: [de-identified]   PCP: NILAM Nava CNP  Date of Admission: 7/29/2023    Assessment/Plan:    Acute alcohol intoxication with hx of withdrawal seizures and hallucinations. Presented with ethanol level of 0.38  Started on phenobarb push panel. Still requiring intermittent doses of phenobarb. Complaining of anxiety/restlessness this AM with persistent hallucinations. Unable to verbalize that he was hallucinating. High dose thiamine ordered. No clear signs of Wernicke encephalopathy but ok to continue. Patient agreeable to naltrexone at discharge and outpatient alcohol use disorder counseling. Previously has been seen by [facility]. Recently received naltrexone Injection 7/21. Continue seizure precautions. Addiction services consulted. Suicidal ideation, resolved. [facility] order placed in the ED  Evaluated by psychiatry. Ok to remove 1:1 sitter and DC home once medically stable. Psychiatry signed off. Acute hepatitis  Likely alcoholic hepatitis  RUQ u/s with fatty infiltration only. HTN  Continue home medications. Likely will have some hypertension while undergoing alcohol withdrawal.   Mild rhabdomyolysis  CK elevated at >1500 on arrival.  Much improved today. Ok to stop checking but will continue IVF. Nicotine use  On NRT  Obesity  BMI 34    Disposition: Plan on DC home with outpatient alcohol use disorder counseling once he is through his withdrawal symptoms. Chief Complaint: suicidal ideation    Hospital Course:    Per H&P - \"Griffin Kulkarni is a 53year old  male presented to [facility] ER 7/29/2023 via self with family with chief complaint of alcohol problem and suicidal thoughts. Patient states that he has been trying to drink himself to death.      Patient has a past medical history significant Dagoberto Wallace    Organism:No results found for: ORG    No results found for: LABGRAM    MRSA culture only:No results found for: [facility]    Urine culture: No results found for: LABURIN    Respiratory culture: No results found for: CULTRESP    Aerobic and Anaerobic :  No results found for: LABAERO  No results found for: LABANAE    Urinalysis:      Lab Results   Component Value Date/Time    NITRU NEGATIVE 07/30/2023 11:00 AM    WBCUA 0-2 07/30/2023 11:00 AM    BACTERIA NONE SEEN 07/30/2023 11:00 AM    RBCUA NONE SEEN 07/30/2023 11:00 AM    BLOODU NEGATIVE 07/30/2023 11:00 AM    SPECGRAV 1.019 07/30/2023 11:00 AM       Radiology:  US LIVER   Final Result      1. Increased echogenicity throughout the liver which could be related to fatty infiltration. 2. Limited evaluation as the pancreas and common bile duct could not be definitively visualized. **This report has been created using voice recognition software. It may contain minor errors which are inherent in voice recognition technology. **      Final report electronically signed by Dr Citlali Weston on 7/30/2023 2:44 PM      [address] [facility]   Final Result      1. Increased echogenicity throughout the liver which could be related to fatty infiltration. 2. Limited evaluation as the pancreas and common bile duct could not be definitively visualized. **This report has been created using voice recognition software. It may contain minor errors which are inherent in voice recognition technology. **      Final report electronically signed by Dr Citlali Weston on 7/30/2023 2:44 PM        No results found.     Electronically signed by Zully Dimas DO on 8/2/2023 at 7:53 AM

## 2023-08-02 NOTE — PROGRESS NOTES
Problem: Discharge Planning  Goal: Discharge to home or other facility with appropriate resources  8/1/2023 0306 by Zbigniew Meeks RN  Outcome: Progressing  Note: Pt will discharge home when appropriate. Problem: Safety - Adult  Goal: Free from fall injury  8/1/2023 0306 by Zbigniew Meeks RN  Outcome: Progressing  Note: Fall precaution in place. Bed alarm/chair alarm. Bed locked in lowest position. Fall band applied if applicable. Call light and overhead table within reach. Will continue to monitor. Problem: Cardiovascular - Adult  Goal: Absence of cardiac dysrhythmias or at baseline  8/1/2023 0306 by Zbigniew Meeks RN  Outcome: Progressing  Note: Absence of cardiac dysrhythmias on my shift , will continue to assess. Problem: Skin/Tissue Integrity - Adult  Goal: Skin integrity remains intact  8/1/2023 0306 by Zbigniew Meeks RN  Outcome: Progressing  Note: Absence of new skin integrity issues on my shift. Will continue to assess. Problem: Gastrointestinal - Adult  Goal: Minimal or absence of nausea and vomiting  8/1/2023 0306 by Zbigniew Meeks RN  Outcome: Progressing  Note: Absence of nausea and vomiting on my shift         Problem: Infection - Adult  Goal: Absence of infection during hospitalization  8/1/2023 0306 by Zbigniew Meeks RN  Outcome: Progressing  Note: Pt shows no new signs or symptoms of infection on my shift. Will continue to assess. Problem: Metabolic/Fluid and Electrolytes - Adult  Goal: Electrolytes maintained within normal limits  8/1/2023 0306 by Zbigniew Meeks RN  Outcome: Progressing  Note: Pt electrolytes within normal limits on my shift. Will continue to monitor. Problem: Pain  Goal: Verbalizes/displays adequate comfort level or baseline comfort level  8/1/2023 0306 by Zbigniew Meeks RN  Outcome: Progressing  Note: Pt denies pain on my shift. Non pharmaceutical interventions like ice and repositioning offered before pain medications.

## 2023-08-03 VITALS
BODY MASS INDEX: 34.57 KG/M2 | DIASTOLIC BLOOD PRESSURE: 78 MMHG | TEMPERATURE: 98.5 F | SYSTOLIC BLOOD PRESSURE: 139 MMHG | HEART RATE: 90 BPM | RESPIRATION RATE: 16 BRPM | SYSTOLIC BLOOD PRESSURE: 139 MMHG | OXYGEN SATURATION: 95 % | RESPIRATION RATE: 16 BRPM | TEMPERATURE: 98.5 F | OXYGEN SATURATION: 95 % | HEIGHT: 71 IN | DIASTOLIC BLOOD PRESSURE: 78 MMHG | BODY MASS INDEX: 34.57 KG/M2 | HEIGHT: 71 IN | WEIGHT: 246.91 LBS | HEART RATE: 90 BPM | WEIGHT: 246.91 LBS

## 2023-08-03 PROCEDURE — 6370000000 HC RX 637 (ALT 250 FOR IP): Performed by: PHYSICIAN ASSISTANT

## 2023-08-03 PROCEDURE — 6370000000 HC RX 637 (ALT 250 FOR IP): Performed by: NURSE PRACTITIONER

## 2023-08-03 PROCEDURE — 99239 HOSP IP/OBS DSCHRG MGMT >30: CPT | Performed by: STUDENT IN AN ORGANIZED HEALTH CARE EDUCATION/TRAINING PROGRAM

## 2023-08-03 RX ORDER — MULTIVITAMIN WITH IRON
1 TABLET ORAL DAILY
Qty: 30 TABLET | Refills: 1 | Status: SHIPPED | OUTPATIENT
Start: 2023-08-04 | End: 2023-08-08 | Stop reason: SDUPTHER

## 2023-08-03 RX ORDER — ACAMPROSATE CALCIUM 333 MG/1
666 TABLET, DELAYED RELEASE ORAL 3 TIMES DAILY
Qty: 90 TABLET | Refills: 3 | Status: SHIPPED | OUTPATIENT
Start: 2023-08-03

## 2023-08-03 RX ADMIN — Medication 1 TABLET: at 09:21

## 2023-08-03 RX ADMIN — METOPROLOL SUCCINATE 50 MG: 50 TABLET, EXTENDED RELEASE ORAL at 09:21

## 2023-08-03 ASSESSMENT — PAIN SCALES - GENERAL: PAINLEVEL_OUTOF10: 0

## 2023-08-03 NOTE — PROGRESS NOTES
Utilize [facility] alcohol withdrawal scale (Based on [place] Modified Alcohol Withdrawal Scale). Tabulate score based on classifications including Tremor, Sweating, Hallucination, Orientation, and Agitation. Tremor: 0  Sweatin  Hallucinations: 0  Orientation: 0  Agitation: 0  Total Score: 0  Action perform as described below     Tremor:  No tremor is 0 points. Tremor on movement is 1 point. Tremor at rest is 2 points. Sweating: No Sweat 0 points. Moist is 1 point. Drenching sweats is 2 points. Hallucinations: No present 0 points. Dissuadable is 1 point. Not dissuadable is 2 points. Orientation: Oriented 0 points. Vague/detached 1 point. Disoriented/no contact 2 points. Agitation: Calm 0 points. Anxious 1 point. Panicky 2 points. Check scale every 2 hours. Discontinue scoring with 4 consecutive scorings of 0. Scale 0: No phenobarbital given. Re-assess every 60 minutes as needed. Scale 1-3: Phenobarbital 130 mg IV over 3 minutes. Re-assess every 60 minutes as needed. May administer every 60 minutes to a maximum dose of phenobarbital 1040 mg in 24 hours! Scale 4-8: Phenobarbital 260 mg IV over 5 minutes. Re-assess every 60 minutes as needed. May administer every 60 minutes to a maximum dose of phenobarbital 1040mg in 24 hours! Scale 9-10: Transfer to ICU (if not already in ICU). Administer 10mg/kg phenobarbital IV over 60 minutes. Maximum dose phenobarbital is 1040mg in 24 hours!

## 2023-08-03 NOTE — PROGRESS NOTES
Utilize [facility] alcohol withdrawal scale (Based on [place] Modified Alcohol Withdrawal Scale). Tabulate score based on classifications including Tremor, Sweating, Hallucination, Orientation, and Agitation. Tremor: 0  Sweatin  Hallucinations: 0  Orientation: 0  Agitation: 0  Total Score: 0  Action perform as described below     Tremor:  No tremor is 0 points. Tremor on movement is 1 point. Tremor at rest is 2 points. Sweating: No Sweat 0 points. Moist is 1 point. Drenching sweats is 2 points. Hallucinations: No present 0 points. Dissuadable is 1 point. Not dissuadable is 2 points. Orientation: Oriented 0 points. Vague/detached 1 point. Disoriented/no contact 2 points. Agitation: Calm 0 points. Anxious 1 point. Panicky 2 points. Check scale every 2 hours. Discontinue scoring with 4 consecutive scorings of 0. Scale 0: No phenobarbital given. Re-assess every 60 minutes as needed. Scale 1-3: Phenobarbital 130 mg IV over 3 minutes. Re-assess every 60 minutes as needed. May administer every 60 minutes to a maximum dose of phenobarbital 1040 mg in 24 hours! Scale 4-8: Phenobarbital 260 mg IV over 5 minutes. Re-assess every 60 minutes as needed. May administer every 60 minutes to a maximum dose of phenobarbital 1040mg in 24 hours! Scale 9-10: Transfer to ICU (if not already in ICU). Administer 10mg/kg phenobarbital IV over 60 minutes. Maximum dose phenobarbital is 1040mg in 24 hours!

## 2023-08-03 NOTE — PROGRESS NOTES
Problem: Discharge Planning  Goal: Discharge to home or other facility with appropriate resources  8/1/2023 0306 by Alana Patel RN  Outcome: Progressing  Note: Pt will discharge home when appropriate. Problem: Safety - Adult  Goal: Free from fall injury  8/1/2023 0306 by Alana Patel RN  Outcome: Progressing  Note: Fall precaution in place. Bed alarm/chair alarm. Bed locked in lowest position. Fall band applied if applicable. Call light and overhead table within reach. Will continue to monitor. Problem: Cardiovascular - Adult  Goal: Absence of cardiac dysrhythmias or at baseline  8/1/2023 0306 by Alana Patel RN  Outcome: Progressing  Note: Absence of cardiac dysrhythmias on my shift , will continue to assess. Problem: Skin/Tissue Integrity - Adult  Goal: Skin integrity remains intact  8/1/2023 0306 by Alana Patel RN  Outcome: Progressing  Note: Absence of new skin integrity issues on my shift. Will continue to assess. Problem: Gastrointestinal - Adult  Goal: Minimal or absence of nausea and vomiting  8/1/2023 0306 by Alana Patel RN  Outcome: Progressing  Note: Absence of nausea and vomiting on my shift         Problem: Infection - Adult  Goal: Absence of infection during hospitalization  8/1/2023 0306 by Alana Patel RN  Outcome: Progressing  Note: Pt shows no new signs or symptoms of infection on my shift. Will continue to assess. Problem: Metabolic/Fluid and Electrolytes - Adult  Goal: Electrolytes maintained within normal limits  8/1/2023 0306 by Alana Patel RN  Outcome: Progressing  Note: Pt electrolytes within normal limits on my shift. Will continue to monitor. Problem: Pain  Goal: Verbalizes/displays adequate comfort level or baseline comfort level  8/1/2023 0306 by Alana Patel RN  Outcome: Progressing  Note: Pt denies pain on my shift.  Non pharmaceutical interventions like ice and repositioning offered before

## 2023-08-03 NOTE — DISCHARGE SUMMARY
Hospitalist Discharge Summary        Patient: Monroe Yang  YOB: 1975  MRN: [id number]   Acct: [de-identified]    Primary Care Physician: NILAM Iglesias - CNP    Admit date  7/29/2023    Discharge date:  8/3/2023 2:44 PM    Chief Complaint on presentation :-    Alcohol intoxication with intent to withdrawal, SI    Discharge Assessment and Plan:-   Acute alcohol intoxication with hx of withdrawal including seizures and hallucinations  Initial ethanol level 0.38  Treated with phenobarbital push panel. Has not required phenobarb in >24 hours prior to discharge  Treated with thiamine, MTV, an folic acid  Discharged on naltrexone and amcamprosate. Recently received naltrexone on 7/21. Follows with [facility]. Suicidal ideation  Evaluated by psychiatry and cleared for discharge home. Alcoholic hepatitis  Improved with alcohol cessation  Mild rhabdomyolysis  Due to alcohol withdrawal.     Initial H and P and Hospital course:-  Patient is a 25-year-old medical history of severe alcohol abuse with multiple hospitalizations for alcohol withdrawal recent completed alcohol rehab, went home and became very intoxicated presented to the hospital for continued suicidal ideation as well as alcohol withdrawal.  Initial ethanol level was 0.38. He received phenobarbital and Ativan in the ED and was admitted to the hospital for high risk of withdrawal symptoms given his history of seizures and hallucinations. Patient was treated with phenobarbital throughout his hospitalization as well as high-dose thiamine, thiamine, and folic acid. Prior to discharge, he was without phenobarb for greater than 24 hours. He recently received naltrexone injection on 7/21. I refilled his acamprosate to continue alcohol withdrawal.    He was seen and evaluated by psychiatry and cleared of suicide ideation. Safe to discharge home per psychiatry recommendation.   I discussed at length the risks of ongoing

## 2023-08-03 NOTE — PLAN OF CARE
Problem: Discharge Planning  Goal: Discharge to home or other facility with appropriate resources  Outcome: Adequate for Discharge  Flowsheets (Taken 8/3/2023 0905 by Harry Adhikari RN)  Discharge to home or other facility with appropriate resources: Identify barriers to discharge with patient and caregiver     Problem: Safety - Adult  Goal: Free from fall injury  Outcome: Adequate for Discharge     Problem: Neurosensory - Adult  Goal: Achieves stable or improved neurological status  Outcome: Adequate for Discharge  Flowsheets (Taken 8/3/2023 0905 by Harry Adhikari RN)  Achieves stable or improved neurological status: Assess for and report changes in neurological status  Goal: Absence of seizures  Outcome: Adequate for Discharge  Flowsheets (Taken 8/3/2023 0905 by Harry Adhikari RN)  Absence of seizures: Monitor for seizure activity.   If seizure occurs, document type and location of movements and any associated apnea  Goal: Remains free of injury related to seizures activity  Outcome: Adequate for Discharge  Flowsheets (Taken 8/3/2023 0905 by Harry Adhikari RN)  Remains free of injury related to seizure activity: Maintain airway, patient safety  and administer oxygen as ordered  Goal: Achieves maximal functionality and self care  Outcome: Adequate for Discharge  Flowsheets (Taken 8/3/2023 0905 by Harry Adhikari RN)  Achieves maximal functionality and self care: Monitor swallowing and airway patency with patient fatigue and changes in neurological status     Problem: Respiratory - Adult  Goal: Achieves optimal ventilation and oxygenation  Outcome: Adequate for Discharge  Flowsheets (Taken 8/3/2023 0905 by Harry Adhikari RN)  Achieves optimal ventilation and oxygenation: Assess for changes in respiratory status     Problem: Cardiovascular - Adult  Goal: Absence of cardiac dysrhythmias or at baseline  Outcome: Adequate for Discharge  Flowsheets (Taken 8/3/2023 0905 by Harry Adhikari RN)  Absence

## 2023-08-04 LAB
EKG ATRIAL RATE: 133 BPM
EKG ATRIAL RATE: 133 BPM
EKG P AXIS: 64 DEGREES
EKG P AXIS: 64 DEGREES
EKG P-R INTERVAL: 120 MS
EKG P-R INTERVAL: 120 MS
EKG Q-T INTERVAL: 294 MS
EKG Q-T INTERVAL: 294 MS
EKG QRS DURATION: 88 MS
EKG QRS DURATION: 88 MS
EKG QTC CALCULATION (BAZETT): 437 MS
EKG QTC CALCULATION (BAZETT): 437 MS
EKG R AXIS: 29 DEGREES
EKG R AXIS: 29 DEGREES
EKG T AXIS: 52 DEGREES
EKG T AXIS: 52 DEGREES
EKG VENTRICULAR RATE: 133 BPM
EKG VENTRICULAR RATE: 133 BPM

## 2023-08-07 SDOH — ECONOMIC STABILITY: FOOD INSECURITY: WITHIN THE PAST 12 MONTHS, THE FOOD YOU BOUGHT JUST DIDN'T LAST AND YOU DIDN'T HAVE MONEY TO GET MORE.: NEVER TRUE

## 2023-08-07 SDOH — ECONOMIC STABILITY: INCOME INSECURITY: HOW HARD IS IT FOR YOU TO PAY FOR THE VERY BASICS LIKE FOOD, HOUSING, MEDICAL CARE, AND HEATING?: PATIENT DECLINED

## 2023-08-07 SDOH — ECONOMIC STABILITY: HOUSING INSECURITY
IN THE LAST 12 MONTHS, WAS THERE A TIME WHEN YOU DID NOT HAVE A STEADY PLACE TO SLEEP OR SLEPT IN A SHELTER (INCLUDING NOW)?: PATIENT REFUSED

## 2023-08-07 SDOH — ECONOMIC STABILITY: TRANSPORTATION INSECURITY
IN THE PAST 12 MONTHS, HAS LACK OF TRANSPORTATION KEPT YOU FROM MEETINGS, WORK, OR FROM GETTING THINGS NEEDED FOR DAILY LIVING?: NO

## 2023-08-07 SDOH — ECONOMIC STABILITY: FOOD INSECURITY: WITHIN THE PAST 12 MONTHS, YOU WORRIED THAT YOUR FOOD WOULD RUN OUT BEFORE YOU GOT MONEY TO BUY MORE.: NEVER TRUE

## 2023-08-08 ENCOUNTER — OFFICE VISIT (OUTPATIENT)
Dept: FAMILY MEDICINE CLINIC | Age: 48
End: 2023-08-08
Payer: MEDICAID

## 2023-08-08 ENCOUNTER — TELEPHONE (OUTPATIENT)
Dept: FAMILY MEDICINE CLINIC | Age: 48
End: 2023-08-08

## 2023-08-08 ENCOUNTER — NURSE ONLY (OUTPATIENT)
Dept: LAB | Age: 48
End: 2023-08-08

## 2023-08-08 VITALS
DIASTOLIC BLOOD PRESSURE: 86 MMHG | HEART RATE: 89 BPM | BODY MASS INDEX: 35 KG/M2 | WEIGHT: 250 LBS | OXYGEN SATURATION: 98 % | HEIGHT: 71 IN | SYSTOLIC BLOOD PRESSURE: 130 MMHG | TEMPERATURE: 97.6 F | RESPIRATION RATE: 16 BRPM

## 2023-08-08 DIAGNOSIS — M62.82 NON-TRAUMATIC RHABDOMYOLYSIS: ICD-10-CM

## 2023-08-08 DIAGNOSIS — K70.30 ALCOHOLIC CIRRHOSIS OF LIVER WITHOUT ASCITES (HCC): Primary | ICD-10-CM

## 2023-08-08 DIAGNOSIS — E11.8 TYPE 2 DIABETES MELLITUS WITH UNSPECIFIED COMPLICATIONS (HCC): ICD-10-CM

## 2023-08-08 DIAGNOSIS — J44.9 CHRONIC OBSTRUCTIVE PULMONARY DISEASE, UNSPECIFIED COPD TYPE (HCC): ICD-10-CM

## 2023-08-08 DIAGNOSIS — Z09 HOSPITAL DISCHARGE FOLLOW-UP: ICD-10-CM

## 2023-08-08 DIAGNOSIS — I10 HYPERTENSION, UNSPECIFIED TYPE: ICD-10-CM

## 2023-08-08 DIAGNOSIS — Z12.11 SCREEN FOR COLON CANCER: ICD-10-CM

## 2023-08-08 DIAGNOSIS — F31.9 BIPOLAR 1 DISORDER (HCC): ICD-10-CM

## 2023-08-08 LAB
CHOLEST SERPL-MCNC: 166 MG/DL (ref 100–199)
CK SERPL-CCNC: 146 U/L (ref 55–170)
DEPRECATED MEAN GLUCOSE BLD GHB EST-ACNC: 93 MG/DL (ref 70–126)
HBA1C MFR BLD HPLC: 5.1 % (ref 4.4–6.4)
HDLC SERPL-MCNC: 46 MG/DL
LDLC SERPL CALC-MCNC: 94 MG/DL
TRIGL SERPL-MCNC: 130 MG/DL (ref 0–199)

## 2023-08-08 PROCEDURE — 99215 OFFICE O/P EST HI 40 MIN: CPT | Performed by: NURSE PRACTITIONER

## 2023-08-08 PROCEDURE — 1111F DSCHRG MED/CURRENT MED MERGE: CPT | Performed by: NURSE PRACTITIONER

## 2023-08-08 RX ORDER — FOLIC ACID 1 MG/1
1 TABLET ORAL DAILY
Qty: 30 TABLET | Refills: 0 | Status: SHIPPED | OUTPATIENT
Start: 2023-08-08

## 2023-08-08 RX ORDER — LISINOPRIL 20 MG/1
20 TABLET ORAL DAILY
Qty: 90 TABLET | Refills: 3 | Status: SHIPPED | OUTPATIENT
Start: 2023-08-08

## 2023-08-08 RX ORDER — LANOLIN ALCOHOL/MO/W.PET/CERES
100 CREAM (GRAM) TOPICAL DAILY
Qty: 30 TABLET | Refills: 3 | Status: CANCELLED | OUTPATIENT
Start: 2023-08-08

## 2023-08-08 RX ORDER — MULTIVITAMIN WITH IRON
1 TABLET ORAL DAILY
Qty: 30 TABLET | Refills: 1 | Status: SHIPPED | OUTPATIENT
Start: 2023-08-08

## 2023-08-08 RX ORDER — METOPROLOL TARTRATE 50 MG/1
50 TABLET, FILM COATED ORAL DAILY
Qty: 90 TABLET | Refills: 3 | Status: SHIPPED | OUTPATIENT
Start: 2023-08-08

## 2023-08-08 RX ORDER — ACAMPROSATE CALCIUM 333 MG/1
666 TABLET, DELAYED RELEASE ORAL 3 TIMES DAILY
Qty: 90 TABLET | Refills: 3 | Status: CANCELLED | OUTPATIENT
Start: 2023-08-08

## 2023-08-08 RX ORDER — LANOLIN ALCOHOL/MO/W.PET/CERES
0.4 CREAM (GRAM) TOPICAL DAILY
Qty: 30 TABLET | Status: CANCELLED | OUTPATIENT
Start: 2023-08-08

## 2023-08-08 RX ORDER — NALTREXONE 380 MG
380 KIT INTRAMUSCULAR
Qty: 1.2 EACH | Status: CANCELLED | OUTPATIENT
Start: 2023-08-08

## 2023-08-08 RX ORDER — METOPROLOL SUCCINATE 50 MG/1
50 TABLET, EXTENDED RELEASE ORAL DAILY
Qty: 30 TABLET | Status: CANCELLED | OUTPATIENT
Start: 2023-08-08

## 2023-08-08 RX ORDER — LANOLIN ALCOHOL/MO/W.PET/CERES
100 CREAM (GRAM) TOPICAL DAILY
Qty: 30 TABLET | Refills: 5 | Status: SHIPPED | OUTPATIENT
Start: 2023-08-08

## 2023-08-08 SDOH — ECONOMIC STABILITY: HOUSING INSECURITY
IN THE LAST 12 MONTHS, WAS THERE A TIME WHEN YOU DID NOT HAVE A STEADY PLACE TO SLEEP OR SLEPT IN A SHELTER (INCLUDING NOW)?: NO

## 2023-08-08 SDOH — ECONOMIC STABILITY: INCOME INSECURITY: HOW HARD IS IT FOR YOU TO PAY FOR THE VERY BASICS LIKE FOOD, HOUSING, MEDICAL CARE, AND HEATING?: NOT VERY HARD

## 2023-08-08 SDOH — ECONOMIC STABILITY: FOOD INSECURITY: WITHIN THE PAST 12 MONTHS, THE FOOD YOU BOUGHT JUST DIDN'T LAST AND YOU DIDN'T HAVE MONEY TO GET MORE.: NEVER TRUE

## 2023-08-08 SDOH — ECONOMIC STABILITY: FOOD INSECURITY: WITHIN THE PAST 12 MONTHS, YOU WORRIED THAT YOUR FOOD WOULD RUN OUT BEFORE YOU GOT MONEY TO BUY MORE.: NEVER TRUE

## 2023-08-08 ASSESSMENT — ENCOUNTER SYMPTOMS
EYES NEGATIVE: 1
GASTROINTESTINAL NEGATIVE: 1
RESPIRATORY NEGATIVE: 1

## 2023-08-08 NOTE — PROGRESS NOTES
Post-Discharge Transitional Care Follow Up      Estelita Cerda   YOB: 1975    Date of Office Visit:  8/8/2023  Date of Hospital Admission: 7/29/23  Date of Hospital Discharge: 8/3/23  Readmission Risk Score (high >=14%. Medium >=10%):Readmission Risk Score: 5.8      Care management risk score Rising risk (score 2-5) and Complex Care (Scores >=6): No Risk Score On File     Non face to face  following discharge, date last encounter closed (first attempt may have been earlier): *No documented post hospital discharge outreach found in the last 14 days     Call initiated 2 business days of discharge: *No response recorded in the last 14 days     Alcoholic cirrhosis of liver without ascites ([address])  Hypertension, unspecified type  -     lisinopril (PRINIVIL;ZESTRIL) 20 MG tablet; Take 1 tablet by mouth daily, Disp-90 tablet, R-3Normal  -     metoprolol tartrate (LOPRESSOR) 50 MG tablet; Take 1 tablet by mouth daily, Disp-90 tablet, R-3Normal  Bipolar 1 disorder (HCC)  Chronic obstructive pulmonary disease, unspecified COPD type [facility])      Medical Decision Making: high complexity  No follow-ups on file. On this date 8/8/2023 I have spent 15 minutes reviewing previous notes, test results and face to face with the patient discussing the diagnosis and importance of compliance with the treatment plan as well as documenting on the day of the visit. Subjective:   HPI  T had been sober. But started drinking again and within 3 days he lost control. Trigger was moving back to [place]. Inpatient course: Discharge summary reviewed- see chart. Interval history/Current status: sober now.   Is on vivitrol and campral.  [facility]      Patient Active Problem List   Diagnosis    Hyperlipemia    Uncontrolled hypertension    Alcohol withdrawal ([address])    Alcoholic hepatitis    COPD (chronic obstructive pulmonary disease) ([address])    Passive-dependent personality disorder ([address])

## 2023-08-08 NOTE — TELEPHONE ENCOUNTER
Care Transitions Initial Follow Up Call    Outreach made within 2 business days of discharge: Yes    Patient: Isabel Parker Patient : 1975   MRN: [id number]  Reason for Admission: There are no discharge diagnoses documented for the most recent discharge. Discharge Date: 8/3/23       Spoke with: antoine    Discharge department/facility: [facility]    TCM Interactive Patient Contact:  Was patient able to fill all prescriptions: Yes  Was patient instructed to bring all medications to the follow-up visit: Yes  Is patient taking all medications as directed in the discharge summary?  Yes  Does patient understand their discharge instructions: Yes  Does patient have questions or concerns that need addressed prior to 7-14 day follow up office visit: no    Scheduled appointment with PCP within 7-14 days    Follow Up  Future Appointments   Date Time Provider [address] [place] [address]   2023  9:00 AM [address] [place], [place] [address]NILAM CNP [facility]   8/15/2023  2:30 PM [address] [place] [address], APRN - CNP [facility] LPN

## 2023-09-10 ENCOUNTER — APPOINTMENT (OUTPATIENT)
Dept: GENERAL RADIOLOGY | Age: 48
DRG: 139 | End: 2023-09-10
Payer: MEDICAID

## 2023-09-10 ENCOUNTER — HOSPITAL ENCOUNTER (INPATIENT)
Age: 48
LOS: 4 days | Discharge: HOME | DRG: 139 | End: 2023-09-14
Attending: FAMILY MEDICINE
Payer: MEDICAID

## 2023-09-10 DIAGNOSIS — R06.00 DYSPNEA, UNSPECIFIED TYPE: ICD-10-CM

## 2023-09-10 DIAGNOSIS — R45.851 SUICIDAL IDEATION: ICD-10-CM

## 2023-09-10 DIAGNOSIS — F10.929 ACUTE ALCOHOLIC INTOXICATION WITH COMPLICATION (HCC): Primary | ICD-10-CM

## 2023-09-10 LAB
ALBUMIN SERPL BCP-MCNC: 3 GM/DL (ref 3.4–5)
ALP SERPL-CCNC: 198 U/L (ref 46–116)
ALT SERPL W P-5'-P-CCNC: 88 U/L (ref 14–63)
AMORPH SED URNS QL MICRO: ABNORMAL
AMPHETAMINE+METHAMPHETAMIE: NEGATIVE
ANALYZED BY:: NORMAL
ANION GAP SERPL CALC-SCNC: 13 MEQ/L (ref 8–16)
ANION GAP SERPL CALC-SCNC: 14 MEQ/L (ref 8–16)
AST SERPL W P-5'-P-CCNC: 100 U/L (ref 15–37)
BACTERIA URNS QL MICRO: ABNORMAL
BARBITURATES: NEGATIVE
BASOPHILS # BLD: 0.7 % (ref 0–3)
BASOPHILS ABSOLUTE: 0.1 THOU/MM3 (ref 0–0.1)
BENZODIAZEPINES: NEGATIVE
BILIRUB SERPL-MCNC: 2.3 MG/DL (ref 0.2–1)
BILIRUB UR QL CFM: NEGATIVE
BILIRUB UR QL STRIP.AUTO: ABNORMAL
BUN SERPL-MCNC: 13 MG/DL (ref 7–22)
BUN SERPL-MCNC: 15 MG/DL (ref 7–18)
CALCIUM SERPL-MCNC: 8.1 MG/DL (ref 8.5–10.1)
CALCIUM SERPL-MCNC: 8.2 MG/DL (ref 8.5–10.5)
CANNABINOIDS: NEGATIVE
CASTS #/AREA URNS LPF: ABNORMAL /LPF
CHARACTER UR: CLEAR
CHLAMYDIA TRACHOMATIS BY RT-PCR: NOT DETECTED
CHLORIDE SERPL-SCNC: 100 MEQ/L (ref 98–107)
CHLORIDE SERPL-SCNC: 103 MEQ/L (ref 98–111)
CO2 SERPL-SCNC: 21 MEQ/L (ref 21–32)
CO2 SERPL-SCNC: 22 MEQ/L (ref 23–33)
COCAINE METABOLITE: NEGATIVE
COLOR UR AUTO: ABNORMAL
CREAT SERPL-MCNC: 0.6 MG/DL (ref 0.4–1.2)
CREAT SERPL-MCNC: 0.9 MG/DL (ref 0.6–1.3)
CREAT UR-MCNC: 162.1 MG/DL
CRYSTALS VITF MICRO: ABNORMAL
CT/NG SOURCE: NORMAL
DATE OF COLLECTION: NORMAL
DRAWN BY: NORMAL
EKG ATRIAL RATE: 109 BPM
EKG P AXIS: 50 DEGREES
EKG P-R INTERVAL: 126 MS
EKG Q-T INTERVAL: 352 MS
EKG QRS DURATION: 96 MS
EKG QTC CALCULATION (BAZETT): 474 MS
EKG R AXIS: 25 DEGREES
EKG T AXIS: 55 DEGREES
EKG VENTRICULAR RATE: 109 BPM
EOSINOPHILS ABSOLUTE: 0 THOU/MM3 (ref 0–0.5)
EOSINOPHILS RELATIVE PERCENT: 0 % (ref 0–4)
EPI CELLS #/AREA URNS HPF: ABNORMAL /HPF
ETHANOL SERPL-MCNC: 0.28 %
ETHANOL SERPL-MCNC: 0.43 % (GM/DL)
GFR SERPL CREATININE-BSD FRML MDRD: > 60 ML/MIN/1.73M2
GFR SERPL CREATININE-BSD FRML MDRD: > 60 ML/MIN/1.73M2
GLUCOSE BLD STRIP.AUTO-MCNC: 232 MG/DL (ref 70–108)
GLUCOSE SERPL-MCNC: 191 MG/DL (ref 74–106)
GLUCOSE SERPL-MCNC: 91 MG/DL (ref 70–108)
GLUCOSE UR QL STRIP.AUTO: NEGATIVE MG/DL
HCT VFR BLD CALC: 45 % (ref 42–52)
HEMOGLOBIN: 15.9 GM/DL (ref 14–18)
HGB UR STRIP.AUTO-MCNC: NEGATIVE MG/L
IMMATURE GRANS (ABS): 0.01 THOU/MM3 (ref 0–0.07)
IMMATURE GRANULOCYTES: 0 %
INR PPP: 0.97 (ref 0.85–1.13)
KETONES UR QL STRIP.AUTO: NEGATIVE
LEUKOCYTE ESTERASE UR QL STRIP.AUTO: NEGATIVE
LYMPHOCYTES # BLD AUTO: 18.4 % (ref 15–47)
LYMPHOCYTES ABSOLUTE: 1.5 THOU/MM3 (ref 1–4.8)
Lab: 1400
Lab: NORMAL
MAGNESIUM SERPL-MCNC: 1.9 MG/DL (ref 1.8–2.4)
MCH RBC QN AUTO: 33.3 PG (ref 26–32)
MCHC RBC AUTO-ENTMCNC: 35.3 GM/DL (ref 31–35)
MCV RBC AUTO: 94.3 FL (ref 80–94)
MONOCYTES: 0.6 THOU/MM3 (ref 0.3–1.3)
MONOCYTES: 7.5 % (ref 0–12)
MUCOUS THREADS URNS QL MICRO: ABNORMAL
NEISSERIA GONORRHOEAE BY RT-PCR: NOT DETECTED
NITRITE UR QL STRIP.AUTO: NEGATIVE
NT PRO BNP: 104 PG/ML (ref 0–450)
OPIATES: NEGATIVE
OXYCODONE: NEGATIVE
PDW BLD-RTO: 13.9 % (ref 11.5–14.9)
PH UR STRIP.AUTO: 5.5 [PH] (ref 5–9)
PHENCYCLIDINE: NEGATIVE
PHOSPHATE SERPL-MCNC: 2.6 MG/DL (ref 2.4–4.7)
PLATELET # BLD AUTO: 147 THOU/MM3 (ref 130–400)
PMV BLD AUTO: 9.4 FL (ref 9.4–12.4)
POTASSIUM SERPL-SCNC: 3.2 MEQ/L (ref 3.5–5.1)
POTASSIUM SERPL-SCNC: 4.1 MEQ/L (ref 3.5–5.2)
PROCALCITONIN SERPL IA-MCNC: 0.48 NG/ML (ref 0.01–0.09)
PROT SERPL-MCNC: 6.7 GM/DL (ref 6.4–8.2)
PROT UR STRIP.AUTO-MCNC: 30 MG/DL
RBC # BLD: 4.77 MILL/MM3 (ref 4.5–6.1)
RBC #/AREA URNS HPF: ABNORMAL /HPF
REFLEX TO URINE C & S: ABNORMAL
SARS-COV-2 RDRP RESP QL NAA+PROBE: NOT DETECTED
SEG NEUTROPHILS: 73.3 % (ref 43–75)
SEGMENTED NEUTROPHILS ABSOLUTE COUNT: 6 THOU/MM3 (ref 1.8–7.7)
SODIUM SERPL-SCNC: 135 MEQ/L (ref 136–145)
SODIUM SERPL-SCNC: 138 MEQ/L (ref 135–145)
SODIUM UR-SCNC: 129 MEQ/L
SP GR UR STRIP.AUTO: 1.01 (ref 1–1.03)
TROPONIN, HIGH SENSITIVITY: 10.1 PG/ML (ref 0–76.1)
UROBILINOGEN UR STRIP-ACNC: 4 EU/DL (ref 0–1)
WBC # BLD: 8.2 THOU/MM3 (ref 4.8–10.8)
WBC # UR STRIP.AUTO: ABNORMAL /HPF

## 2023-09-10 PROCEDURE — 6370000000 HC RX 637 (ALT 250 FOR IP)

## 2023-09-10 PROCEDURE — 6370000000 HC RX 637 (ALT 250 FOR IP): Performed by: FAMILY MEDICINE

## 2023-09-10 PROCEDURE — 93005 ELECTROCARDIOGRAM TRACING: CPT | Performed by: FAMILY MEDICINE

## 2023-09-10 PROCEDURE — 87899 AGENT NOS ASSAY W/OPTIC: CPT

## 2023-09-10 PROCEDURE — 80053 COMPREHEN METABOLIC PANEL: CPT

## 2023-09-10 PROCEDURE — 82077 ASSAY SPEC XCP UR&BREATH IA: CPT

## 2023-09-10 PROCEDURE — 96360 HYDRATION IV INFUSION INIT: CPT

## 2023-09-10 PROCEDURE — 87491 CHLMYD TRACH DNA AMP PROBE: CPT

## 2023-09-10 PROCEDURE — 1200000003 HC TELEMETRY R&B

## 2023-09-10 PROCEDURE — 81001 URINALYSIS AUTO W/SCOPE: CPT

## 2023-09-10 PROCEDURE — 83880 ASSAY OF NATRIURETIC PEPTIDE: CPT

## 2023-09-10 PROCEDURE — 84484 ASSAY OF TROPONIN QUANT: CPT

## 2023-09-10 PROCEDURE — 2580000003 HC RX 258

## 2023-09-10 PROCEDURE — 83735 ASSAY OF MAGNESIUM: CPT

## 2023-09-10 PROCEDURE — 84100 ASSAY OF PHOSPHORUS: CPT

## 2023-09-10 PROCEDURE — 71046 X-RAY EXAM CHEST 2 VIEWS: CPT

## 2023-09-10 PROCEDURE — 2580000003 HC RX 258: Performed by: FAMILY MEDICINE

## 2023-09-10 PROCEDURE — 6360000002 HC RX W HCPCS

## 2023-09-10 PROCEDURE — 87591 N.GONORRHOEAE DNA AMP PROB: CPT

## 2023-09-10 PROCEDURE — 87040 BLOOD CULTURE FOR BACTERIA: CPT

## 2023-09-10 PROCEDURE — 85610 PROTHROMBIN TIME: CPT

## 2023-09-10 PROCEDURE — 36415 COLL VENOUS BLD VENIPUNCTURE: CPT

## 2023-09-10 PROCEDURE — 84145 PROCALCITONIN (PCT): CPT

## 2023-09-10 PROCEDURE — 84300 ASSAY OF URINE SODIUM: CPT

## 2023-09-10 PROCEDURE — 87635 SARS-COV-2 COVID-19 AMP PRB: CPT

## 2023-09-10 PROCEDURE — 99285 EMERGENCY DEPT VISIT HI MDM: CPT

## 2023-09-10 PROCEDURE — 87086 URINE CULTURE/COLONY COUNT: CPT

## 2023-09-10 PROCEDURE — 85025 COMPLETE CBC W/AUTO DIFF WBC: CPT

## 2023-09-10 PROCEDURE — 82948 REAGENT STRIP/BLOOD GLUCOSE: CPT

## 2023-09-10 PROCEDURE — 6360000002 HC RX W HCPCS: Performed by: FAMILY MEDICINE

## 2023-09-10 PROCEDURE — 93010 ELECTROCARDIOGRAM REPORT: CPT | Performed by: INTERNAL MEDICINE

## 2023-09-10 PROCEDURE — 2500000003 HC RX 250 WO HCPCS

## 2023-09-10 PROCEDURE — 82570 ASSAY OF URINE CREATININE: CPT

## 2023-09-10 PROCEDURE — 80305 DRUG TEST PRSMV DIR OPT OBS: CPT

## 2023-09-10 RX ORDER — IPRATROPIUM BROMIDE AND ALBUTEROL SULFATE 2.5; .5 MG/3ML; MG/3ML
1 SOLUTION RESPIRATORY (INHALATION) ONCE
Status: COMPLETED | OUTPATIENT
Start: 2023-09-10 | End: 2023-09-10

## 2023-09-10 RX ORDER — PHENOBARBITAL SODIUM 65 MG/ML
260 INJECTION INTRAMUSCULAR
Status: DISCONTINUED | OUTPATIENT
Start: 2023-09-10 | End: 2023-09-14 | Stop reason: HOSPADM

## 2023-09-10 RX ORDER — POTASSIUM CHLORIDE 750 MG/1
40 TABLET, FILM COATED, EXTENDED RELEASE ORAL ONCE
Status: COMPLETED | OUTPATIENT
Start: 2023-09-10 | End: 2023-09-10

## 2023-09-10 RX ORDER — SODIUM CHLORIDE 0.9 % (FLUSH) 0.9 %
5-40 SYRINGE (ML) INJECTION PRN
Status: DISCONTINUED | OUTPATIENT
Start: 2023-09-10 | End: 2023-09-14 | Stop reason: HOSPADM

## 2023-09-10 RX ORDER — SODIUM CHLORIDE 0.9 % (FLUSH) 0.9 %
5-40 SYRINGE (ML) INJECTION EVERY 12 HOURS SCHEDULED
Status: DISCONTINUED | OUTPATIENT
Start: 2023-09-10 | End: 2023-09-14 | Stop reason: HOSPADM

## 2023-09-10 RX ORDER — MULTIVITAMIN WITH IRON
1 TABLET ORAL DAILY
Status: DISCONTINUED | OUTPATIENT
Start: 2023-09-10 | End: 2023-09-14 | Stop reason: HOSPADM

## 2023-09-10 RX ORDER — POTASSIUM CHLORIDE 7.45 MG/ML
10 INJECTION INTRAVENOUS
Status: DISCONTINUED | OUTPATIENT
Start: 2023-09-10 | End: 2023-09-10

## 2023-09-10 RX ORDER — POLYETHYLENE GLYCOL 3350 17 G
2 POWDER IN PACKET (EA) ORAL
Status: DISCONTINUED | OUTPATIENT
Start: 2023-09-10 | End: 2023-09-14 | Stop reason: HOSPADM

## 2023-09-10 RX ORDER — THIAMINE HYDROCHLORIDE 100 MG/ML
100 INJECTION, SOLUTION INTRAMUSCULAR; INTRAVENOUS DAILY
Status: DISCONTINUED | OUTPATIENT
Start: 2023-09-11 | End: 2023-09-14 | Stop reason: HOSPADM

## 2023-09-10 RX ORDER — 0.9 % SODIUM CHLORIDE 0.9 %
1000 INTRAVENOUS SOLUTION INTRAVENOUS ONCE
Status: COMPLETED | OUTPATIENT
Start: 2023-09-10 | End: 2023-09-10

## 2023-09-10 RX ORDER — FOLIC ACID 1 MG/1
1 TABLET ORAL DAILY
Status: DISCONTINUED | OUTPATIENT
Start: 2023-09-10 | End: 2023-09-14 | Stop reason: HOSPADM

## 2023-09-10 RX ORDER — SODIUM CHLORIDE 9 MG/ML
INJECTION, SOLUTION INTRAVENOUS PRN
Status: DISCONTINUED | OUTPATIENT
Start: 2023-09-10 | End: 2023-09-14 | Stop reason: HOSPADM

## 2023-09-10 RX ORDER — LISINOPRIL 20 MG/1
20 TABLET ORAL DAILY
Status: DISCONTINUED | OUTPATIENT
Start: 2023-09-11 | End: 2023-09-14 | Stop reason: HOSPADM

## 2023-09-10 RX ORDER — PHENOBARBITAL SODIUM 65 MG/ML
130 INJECTION INTRAMUSCULAR
Status: DISCONTINUED | OUTPATIENT
Start: 2023-09-10 | End: 2023-09-14 | Stop reason: HOSPADM

## 2023-09-10 RX ORDER — THIAMINE HYDROCHLORIDE 100 MG/ML
100 INJECTION, SOLUTION INTRAMUSCULAR; INTRAVENOUS ONCE
Status: COMPLETED | OUTPATIENT
Start: 2023-09-10 | End: 2023-09-10

## 2023-09-10 RX ORDER — ALBUTEROL SULFATE 90 UG/1
2 AEROSOL, METERED RESPIRATORY (INHALATION) EVERY 4 HOURS PRN
Status: DISCONTINUED | OUTPATIENT
Start: 2023-09-10 | End: 2023-09-14 | Stop reason: HOSPADM

## 2023-09-10 RX ORDER — DEXTROSE MONOHYDRATE 100 MG/ML
INJECTION, SOLUTION INTRAVENOUS CONTINUOUS PRN
Status: DISCONTINUED | OUTPATIENT
Start: 2023-09-10 | End: 2023-09-14 | Stop reason: HOSPADM

## 2023-09-10 RX ORDER — PANTOPRAZOLE SODIUM 40 MG/1
40 TABLET, DELAYED RELEASE ORAL
Status: DISCONTINUED | OUTPATIENT
Start: 2023-09-11 | End: 2023-09-14 | Stop reason: HOSPADM

## 2023-09-10 RX ADMIN — IPRATROPIUM BROMIDE AND ALBUTEROL SULFATE 1 DOSE: .5; 3 SOLUTION RESPIRATORY (INHALATION) at 13:51

## 2023-09-10 RX ADMIN — Medication 1 TABLET: at 20:12

## 2023-09-10 RX ADMIN — PHENOBARBITAL SODIUM 130 MG: 65 INJECTION INTRAMUSCULAR at 23:07

## 2023-09-10 RX ADMIN — DOXYCYCLINE 100 MG: 100 INJECTION, POWDER, LYOPHILIZED, FOR SOLUTION INTRAVENOUS at 20:30

## 2023-09-10 RX ADMIN — PHENOBARBITAL SODIUM 130 MG: 65 INJECTION INTRAMUSCULAR at 20:13

## 2023-09-10 RX ADMIN — SODIUM CHLORIDE 1000 ML: 9 INJECTION, SOLUTION INTRAVENOUS at 14:03

## 2023-09-10 RX ADMIN — POTASSIUM CHLORIDE 40 MEQ: 750 TABLET, EXTENDED RELEASE ORAL at 15:22

## 2023-09-10 RX ADMIN — METOPROLOL TARTRATE 25 MG: 25 TABLET, FILM COATED ORAL at 20:12

## 2023-09-10 RX ADMIN — SODIUM CHLORIDE 1000 ML: 9 INJECTION, SOLUTION INTRAVENOUS at 15:45

## 2023-09-10 RX ADMIN — THIAMINE HYDROCHLORIDE 100 MG: 100 INJECTION, SOLUTION INTRAMUSCULAR; INTRAVENOUS at 14:03

## 2023-09-10 RX ADMIN — FOLIC ACID 1 MG: 1 TABLET ORAL at 20:12

## 2023-09-10 ASSESSMENT — ENCOUNTER SYMPTOMS
NAUSEA: 0
ABDOMINAL PAIN: 0
COUGH: 1
SHORTNESS OF BREATH: 1
VOMITING: 0

## 2023-09-10 ASSESSMENT — PAIN - FUNCTIONAL ASSESSMENT
PAIN_FUNCTIONAL_ASSESSMENT: 0-10
PAIN_FUNCTIONAL_ASSESSMENT: NONE - DENIES PAIN

## 2023-09-10 ASSESSMENT — LIFESTYLE VARIABLES
HOW MANY STANDARD DRINKS CONTAINING ALCOHOL DO YOU HAVE ON A TYPICAL DAY: 10 OR MORE
HOW OFTEN DO YOU HAVE A DRINK CONTAINING ALCOHOL: 4 OR MORE TIMES A WEEK

## 2023-09-10 ASSESSMENT — PAIN SCALES - GENERAL
PAINLEVEL_OUTOF10: 4
PAINLEVEL_OUTOF10: 0

## 2023-09-10 ASSESSMENT — PAIN DESCRIPTION - LOCATION: LOCATION: GENERALIZED

## 2023-09-10 NOTE — H&P
History & Physical        Patient:  Hood Rivera  YOB: 1975    MRN: [id number]     Acct: [de-identified]    PCP: NILAM Baires - CNP    Date of Admission: 9/10/2023    Date of Service: Pt seen/examined on 09/10/23  and Admitted to Inpatient with expected LOS greater than two midnights due to medical therapy. Chief Complaint:  SOB    Assessment and plan. Acute respiratory failure with hypoxia. Likely due to community-acquired pneumonia, from aspiration since patient drank heavily and does not remember how long he slept. In setting of COPD. -Pneumonia panel, strep pneumo antigen, blood cultures 1 and 2.  -Started doxycycline IV and consider please switch to p.o. tomorrow morning. We will optimize antibiotic based on culture results.  -We will continue oxygen supplementation, inpatient stay oxygen saturation goal is 92-94%  High risk for alcohol withdrawal.  Severe alcohol abuse disorder. Alcohol level in urgent care [facility] 0.43. Currently no signs of acute withdrawal.  Last drink patient was this morning. Mild transaminasemia likely related to alcohol use disorder.  -We will recheck alcohol level, if levels normalize, will start phenobarbital prophylactic dose. -Aspiration/fall, she is seizure precautions. Vit B1 100 mg  -Addiction medicine consulted  Mild hypokalemia. Likely due to poor oral intake and vomiting and nausea. Replaced 40 mEq p.o.  in ED and we will add 40 meq p.o. replacement protocol and  Suspected DM vs impaired glucose intolerance. We will check hemoglobin A1c. Hypoglycemic treatments, POC glucose AC plus at bedtime. History of COPD. Currently, does not fit does not qualify for gold criteria for COPD exacerbation. t albuterol as needed inhaler every 4. We will start [place]. Primary hypertension.   Resume home medications for hypertension    History Of Present Illness:    53 y.o. male who presented to [facility] from [facility]

## 2023-09-10 NOTE — ED NOTES
ED to inpatient nurses report    Chief Complaint   Patient presents with    Alcohol Intoxication    Shortness of Breath      Present to ED from home  LOC: alert and orientated to name, place, date  Vital signs   Vitals:    09/10/23 1404 09/10/23 1459 09/10/23 1515 09/10/23 1607   BP: (!) 105/57 127/77 (!) 126/92 127/72   Pulse: 98 (!) 114 (!) 108 (!) 112   Resp: 28 18 18 18   Temp:       TempSrc:       SpO2: 91% 94% 96% 92%   Weight:       Height:          Oxygen Baseline room air    Current needs required 2lpm Bipap/Cpap No  LDAs:   Peripheral IV 09/10/23 Posterior;Right Wrist (Active)   Site Assessment Clean, dry & intact 09/10/23 1530   Line Status Infusing 09/10/23 1530   Phlebitis Assessment No symptoms 09/10/23 1530   Infiltration Assessment 0 09/10/23 1530   Dressing Status Clean, dry & intact 09/10/23 1530   Dressing Type Transparent 09/10/23 1530     Mobility: Requires assistance * 1  Pending ED orders: none  Present condition: stable    C-SSRS Risk of Suicide: No Risk  Swallow Screening    Preferred Language: Soraya     Electronically signed by Brandon France RN on 9/10/2023 at [address] [address], RN  09/10/23 6850

## 2023-09-10 NOTE — ED NOTES
Aerosol complete. Respirations regular w/ mild use of accessory muscles noted. Lung sounds I & E wheeze t/o.       Tanner Sykes RN  09/10/23 0197

## 2023-09-10 NOTE — ED NOTES
Pt alert and oriented X's 3. Respirations regular and easy. Pt transported to [facility] in stable condition per [facility] for admission.        Angella Bryan RN  09/10/23 0289

## 2023-09-10 NOTE — ED NOTES
Pt brought in by family for SOB and alcohol intoxication. Pt is a known alcoholic, who claims that he relapsed again approximately 2 weeks ago. Admits to drinking a 30 pack of shlomo light beer and fireball on a daily basis. Today admits that he woke up at 0300 w/ tremors and drank 6 - 16 ounce beers. He states that he drank 2 bottles of fireball (375ml each) 10 minutes before coming to the ED. family voices that the pt voiced self harming thoughts to them, pt denies presently. voices that he was drunk and may have said that to his family, but does not want to harm himself at this time. Pt is alert and oriented and cooperative at this time. Respirations are rapid at 28 breaths per minute, states that he feel SOB when he drinks alcohol since he left the rehab facility. Lungs sound I & E wheeze t/o.       Adrian Amezquita RN  09/10/23 9497

## 2023-09-10 NOTE — ED PROVIDER NOTES
[facility]  eMERGENCY dEPARTMENT eNCOUnter          CHIEF COMPLAINT       Chief Complaint   Patient presents with    Alcohol Intoxication    Shortness of Breath       Nurses Notes reviewed and I agree except as noted in the HPI. HISTORY OF PRESENT ILLNESS    Giacomo Sweet is a 53 y.o. male who presents with complaints of shortness of breath. Duration of symptoms 1 day. Patient has a longstanding history of alcohol use disorder. He admits to drinking 30 beers today along with a couple fireball's. He admits to being currently intoxicated. He is accompanied by his sister-in-law and brother. Denies chest pain. REVIEW OF SYSTEMS     Review of Systems   Constitutional:  Negative for chills and fever. Respiratory:  Positive for cough and shortness of breath. Cardiovascular:  Negative for chest pain and palpitations. Gastrointestinal:  Negative for abdominal pain, nausea and vomiting. Endocrine: Negative for cold intolerance and heat intolerance. Musculoskeletal:  Negative for arthralgias and myalgias. Psychiatric/Behavioral:  Positive for behavioral problems. Negative for suicidal ideas. The patient is nervous/anxious. All other systems reviewed and are negative. PAST MEDICAL HISTORY    has a past medical history of COPD (chronic obstructive pulmonary disease) ([address]), Depression, Diabetes mellitus ([address]), ETOH abuse, GERD (gastroesophageal reflux disease), Hyperlipidemia, Hypertension, Liver disease, and Seizures ([address] SURGICAL HISTORY      has a past surgical history that includes Endoscopy, colon, diagnostic. CURRENT MEDICATIONS       Previous Medications    ACAMPROSATE (CAMPRAL) 333 MG TABLET    Take 2 tablets by mouth 3 times daily    ALBUTEROL SULFATE HFA (PROVENTIL;VENTOLIN;PROAIR) 108 (90 BASE) MCG/ACT INHALER    Inhale 2 puffs into the lungs in the morning and 2 puffs in the evening.     FOLIC ACID (FOLVITE) 1 MG TABLET    Take 1 tablet by mouth

## 2023-09-11 LAB
ANION GAP SERPL CALC-SCNC: 13 MEQ/L (ref 8–16)
BASOPHILS ABSOLUTE: 0 THOU/MM3 (ref 0–0.1)
BASOPHILS NFR BLD AUTO: 0.7 %
BUN SERPL-MCNC: 13 MG/DL (ref 7–22)
CALCIUM SERPL-MCNC: 8.1 MG/DL (ref 8.5–10.5)
CHLORIDE SERPL-SCNC: 102 MEQ/L (ref 98–111)
CO2 SERPL-SCNC: 22 MEQ/L (ref 23–33)
CREAT SERPL-MCNC: 0.4 MG/DL (ref 0.4–1.2)
DEPRECATED RDW RBC AUTO: 50.7 FL (ref 35–45)
EOSINOPHIL NFR BLD AUTO: 0 %
EOSINOPHILS ABSOLUTE: 0 THOU/MM3 (ref 0–0.4)
ERYTHROCYTE [DISTWIDTH] IN BLOOD BY AUTOMATED COUNT: 14.3 % (ref 11.5–14.5)
GFR SERPL CREATININE-BSD FRML MDRD: > 60 ML/MIN/1.73M2
GLUCOSE BLD STRIP.AUTO-MCNC: 107 MG/DL (ref 70–108)
GLUCOSE BLD STRIP.AUTO-MCNC: 146 MG/DL (ref 70–108)
GLUCOSE SERPL-MCNC: 80 MG/DL (ref 70–108)
HCT VFR BLD AUTO: 44.9 % (ref 42–52)
HGB BLD-MCNC: 15.6 GM/DL (ref 14–18)
IMM GRANULOCYTES # BLD AUTO: 0.01 THOU/MM3 (ref 0–0.07)
IMM GRANULOCYTES NFR BLD AUTO: 0.2 %
LYMPHOCYTES ABSOLUTE: 1.3 THOU/MM3 (ref 1–4.8)
LYMPHOCYTES NFR BLD AUTO: 22.4 %
MCH RBC QN AUTO: 33.6 PG (ref 26–33)
MCHC RBC AUTO-ENTMCNC: 34.7 GM/DL (ref 32.2–35.5)
MCV RBC AUTO: 96.8 FL (ref 80–94)
MONOCYTES ABSOLUTE: 0.5 THOU/MM3 (ref 0.4–1.3)
MONOCYTES NFR BLD AUTO: 8.9 %
NEUTROPHILS NFR BLD AUTO: 67.8 %
NRBC BLD AUTO-RTO: 0 /100 WBC
PLATELET # BLD AUTO: 118 THOU/MM3 (ref 130–400)
PMV BLD AUTO: 9.5 FL (ref 9.4–12.4)
POTASSIUM SERPL-SCNC: 4.5 MEQ/L (ref 3.5–5.2)
RBC # BLD AUTO: 4.64 MILL/MM3 (ref 4.7–6.1)
SEGMENTED NEUTROPHILS ABSOLUTE COUNT: 4 THOU/MM3 (ref 1.8–7.7)
SODIUM SERPL-SCNC: 137 MEQ/L (ref 135–145)
STREP PNEUMO AG, UR: NEGATIVE
WBC # BLD AUTO: 5.9 THOU/MM3 (ref 4.8–10.8)

## 2023-09-11 PROCEDURE — 85025 COMPLETE CBC W/AUTO DIFF WBC: CPT

## 2023-09-11 PROCEDURE — 80048 BASIC METABOLIC PNL TOTAL CA: CPT

## 2023-09-11 PROCEDURE — 36415 COLL VENOUS BLD VENIPUNCTURE: CPT

## 2023-09-11 PROCEDURE — 94640 AIRWAY INHALATION TREATMENT: CPT

## 2023-09-11 PROCEDURE — 82948 REAGENT STRIP/BLOOD GLUCOSE: CPT

## 2023-09-11 PROCEDURE — 6370000000 HC RX 637 (ALT 250 FOR IP)

## 2023-09-11 PROCEDURE — 1200000003 HC TELEMETRY R&B

## 2023-09-11 PROCEDURE — 2700000000 HC OXYGEN THERAPY PER DAY

## 2023-09-11 PROCEDURE — 2580000003 HC RX 258

## 2023-09-11 PROCEDURE — 2500000003 HC RX 250 WO HCPCS

## 2023-09-11 PROCEDURE — 6360000002 HC RX W HCPCS

## 2023-09-11 RX ADMIN — DOXYCYCLINE 100 MG: 100 INJECTION, POWDER, LYOPHILIZED, FOR SOLUTION INTRAVENOUS at 05:41

## 2023-09-11 RX ADMIN — ALBUTEROL SULFATE 2 PUFF: 90 AEROSOL, METERED RESPIRATORY (INHALATION) at 08:17

## 2023-09-11 RX ADMIN — DOXYCYCLINE 100 MG: 100 INJECTION, POWDER, LYOPHILIZED, FOR SOLUTION INTRAVENOUS at 20:48

## 2023-09-11 RX ADMIN — FOLIC ACID 1 MG: 1 TABLET ORAL at 08:09

## 2023-09-11 RX ADMIN — Medication 1 TABLET: at 08:09

## 2023-09-11 RX ADMIN — LISINOPRIL 20 MG: 20 TABLET ORAL at 08:09

## 2023-09-11 RX ADMIN — PHENOBARBITAL SODIUM 130 MG: 65 INJECTION INTRAMUSCULAR at 05:34

## 2023-09-11 RX ADMIN — PHENOBARBITAL SODIUM 130 MG: 65 INJECTION INTRAMUSCULAR at 21:56

## 2023-09-11 RX ADMIN — PHENOBARBITAL SODIUM 130 MG: 65 INJECTION INTRAMUSCULAR at 15:19

## 2023-09-11 RX ADMIN — THIAMINE HYDROCHLORIDE 100 MG: 100 INJECTION, SOLUTION INTRAMUSCULAR; INTRAVENOUS at 15:13

## 2023-09-11 RX ADMIN — METOPROLOL TARTRATE 25 MG: 25 TABLET, FILM COATED ORAL at 20:43

## 2023-09-11 RX ADMIN — PANTOPRAZOLE SODIUM 40 MG: 40 TABLET, DELAYED RELEASE ORAL at 05:27

## 2023-09-11 RX ADMIN — METOPROLOL TARTRATE 25 MG: 25 TABLET, FILM COATED ORAL at 08:09

## 2023-09-11 RX ADMIN — SODIUM CHLORIDE, PRESERVATIVE FREE 10 ML: 5 INJECTION INTRAVENOUS at 20:49

## 2023-09-11 RX ADMIN — PHENOBARBITAL SODIUM 130 MG: 65 INJECTION INTRAMUSCULAR at 08:09

## 2023-09-11 RX ADMIN — SODIUM CHLORIDE, PRESERVATIVE FREE 10 ML: 5 INJECTION INTRAVENOUS at 08:10

## 2023-09-11 RX ADMIN — PHENOBARBITAL SODIUM 260 MG: 65 INJECTION INTRAMUSCULAR at 11:46

## 2023-09-11 NOTE — PLAN OF CARE
Problem: Discharge Planning  Goal: Discharge to home or other facility with appropriate resources  Outcome: Progressing  Flowsheets (Taken 9/11/2023 1710)  Discharge to home or other facility with appropriate resources:   Identify barriers to discharge with patient and caregiver   Arrange for needed discharge resources and transportation as appropriate     Problem: Neurosensory - Adult  Goal: Achieves stable or improved neurological status  Outcome: Progressing  Flowsheets (Taken 9/11/2023 1710)  Achieves stable or improved neurological status:   Assess for and report changes in neurological status   Monitor temperature, glucose, and sodium. Initiate appropriate interventions as ordered  Goal: Absence of seizures  Outcome: Progressing  Flowsheets (Taken 9/11/2023 1710)  Absence of seizures: Monitor for seizure activity.   If seizure occurs, document type and location of movements and any associated apnea  Goal: Remains free of injury related to seizures activity  Outcome: Progressing  Flowsheets (Taken 9/11/2023 1710)  Remains free of injury related to seizure activity: Maintain airway, patient safety  and administer oxygen as ordered     Problem: Respiratory - Adult  Goal: Achieves optimal ventilation and oxygenation  Outcome: Progressing  Flowsheets (Taken 9/11/2023 1710)  Achieves optimal ventilation and oxygenation:   Assess for changes in respiratory status   Assess for changes in mentation and behavior   Position to facilitate oxygenation and minimize respiratory effort   Oxygen supplementation based on oxygen saturation or arterial blood gases     Problem: Cardiovascular - Adult  Goal: Absence of cardiac dysrhythmias or at baseline  Outcome: Progressing  Flowsheets (Taken 9/11/2023 1710)  Absence of cardiac dysrhythmias or at baseline: Monitor cardiac rate and rhythm     Problem: Skin/Tissue Integrity - Adult  Goal: Skin integrity remains intact  Outcome: Progressing  Flowsheets (Taken 9/11/2023 1710)  Skin

## 2023-09-11 NOTE — PLAN OF CARE
Problem: Discharge Planning  Goal: Discharge to home or other facility with appropriate resources  Outcome: Progressing  Flowsheets (Taken 9/10/2023 2246)  Discharge to home or other facility with appropriate resources: Identify barriers to discharge with patient and caregiver     Problem: Neurosensory - Adult  Goal: Achieves stable or improved neurological status  Outcome: Progressing  Flowsheets (Taken 9/10/2023 2246)  Achieves stable or improved neurological status: Assess for and report changes in neurological status  Goal: Absence of seizures  Outcome: Progressing  Flowsheets (Taken 9/10/2023 2246)  Absence of seizures: Monitor for seizure activity.   If seizure occurs, document type and location of movements and any associated apnea  Goal: Remains free of injury related to seizures activity  Outcome: Progressing  Flowsheets (Taken 9/10/2023 2246)  Remains free of injury related to seizure activity: Maintain airway, patient safety  and administer oxygen as ordered     Problem: Respiratory - Adult  Goal: Achieves optimal ventilation and oxygenation  Outcome: Progressing  Flowsheets (Taken 9/10/2023 2246)  Achieves optimal ventilation and oxygenation: Assess for changes in respiratory status     Problem: Cardiovascular - Adult  Goal: Absence of cardiac dysrhythmias or at baseline  Outcome: Progressing  Flowsheets (Taken 9/10/2023 2246)  Absence of cardiac dysrhythmias or at baseline: Monitor cardiac rate and rhythm     Problem: Skin/Tissue Integrity - Adult  Goal: Skin integrity remains intact  Outcome: Progressing  Flowsheets (Taken 9/10/2023 2246)  Skin Integrity Remains Intact: Monitor for areas of redness and/or skin breakdown     Problem: Infection - Adult  Goal: Absence of infection during hospitalization  Outcome: Progressing  Flowsheets (Taken 9/10/2023 2246)  Absence of infection during hospitalization: Assess and monitor for signs and symptoms of infection     Problem: Metabolic/Fluid and Electrolytes

## 2023-09-11 NOTE — CONSULTS
Pt declined to complete sbirt per AOD consult however was receptive to resources. AOD packet provided.

## 2023-09-11 NOTE — CARE COORDINATION
Case Management Assessment  Initial Evaluation    Date/Time of Evaluation: 9/11/2023 12:05 PM  Assessment Completed by: Norman Storey RN    If patient is discharged prior to next notation, then this note serves as note for discharge by case management. Patient Name: Lopez Welch                   YOB: 1975  Diagnosis: Suicidal ideation [R45.851]  Alcohol abuse [G60.17]  Acute alcoholic intoxication with complication ([address]) [N67.255]  Dyspnea, unspecified type [R06.00]                   Date / Time: 9/10/2023  1:14 PM  Location: [address] [place] [address]     Patient Admission Status: Inpatient   Readmission Risk Low 0-14, Mod 15-19), High > 20: Readmission Risk Score: 5.8    Current PCP: NILAM Mccurdy CNP  PCP verified by CM? Yes    Chart Reviewed: Yes      History Provided by: Patient  Patient Orientation: Alert and Oriented    Patient Cognition: Alert    Hospitalization in the last 30 days (Readmission):  No    If yes, Readmission Assessment in  Navigator will be completed. Advance Directives:      Code Status: Prior   Patient's Primary Decision Maker is: Patient Declined (Legal Next of Kin Remains as Decision Maker)      Discharge Planning:    Patient lives with: Alone Type of Home: Apartment  Primary Care Giver: Self  Patient Support Systems include: Family Members   Current Financial resources: Medicaid  Current community resources: None  Current services prior to admission: None            Current DME:              Type of Home Care services:  None    ADLS  Prior functional level: Independent in ADLs/IADLs  Current functional level: Independent in ADLs/IADLs    Family can provide assistance at DC: Yes  Would you like Case Management to discuss the discharge plan with any other family members/significant others, and if so, who?  No  Plans to Return to Present Housing: Yes  Other Identified Issues/Barriers to RETURNING to current housing: none  Potential Assistance needed at discharge:

## 2023-09-11 NOTE — CARE COORDINATION
9/11/23, 10:46 AM EDT    DISCHARGE PLANNING EVALUATION       SW consult deferred at this time due to addiction services consult and will see Patient and provide resources if needed. No other needs at this time.

## 2023-09-12 LAB
ANION GAP SERPL CALC-SCNC: 10 MEQ/L (ref 8–16)
ANISOCYTOSIS BLD QL SMEAR: PRESENT
BACTERIA UR CULT: ABNORMAL
BASOPHILS ABSOLUTE: 0 THOU/MM3 (ref 0–0.1)
BASOPHILS NFR BLD AUTO: 0.4 %
BUN SERPL-MCNC: 14 MG/DL (ref 7–22)
CALCIUM SERPL-MCNC: 8.2 MG/DL (ref 8.5–10.5)
CHLORIDE SERPL-SCNC: 99 MEQ/L (ref 98–111)
CO2 SERPL-SCNC: 24 MEQ/L (ref 23–33)
CREAT SERPL-MCNC: 0.5 MG/DL (ref 0.4–1.2)
DEPRECATED RDW RBC AUTO: 50.2 FL (ref 35–45)
EOSINOPHIL NFR BLD AUTO: 0 %
EOSINOPHILS ABSOLUTE: 0 THOU/MM3 (ref 0–0.4)
ERYTHROCYTE [DISTWIDTH] IN BLOOD BY AUTOMATED COUNT: 13.7 % (ref 11.5–14.5)
GFR SERPL CREATININE-BSD FRML MDRD: > 60 ML/MIN/1.73M2
GLUCOSE BLD STRIP.AUTO-MCNC: 113 MG/DL (ref 70–108)
GLUCOSE SERPL-MCNC: 120 MG/DL (ref 70–108)
HCT VFR BLD AUTO: 42 % (ref 42–52)
HGB BLD-MCNC: 14.3 GM/DL (ref 14–18)
IMM GRANULOCYTES # BLD AUTO: 0.01 THOU/MM3 (ref 0–0.07)
IMM GRANULOCYTES NFR BLD AUTO: 0.2 %
LYMPHOCYTES ABSOLUTE: 1 THOU/MM3 (ref 1–4.8)
LYMPHOCYTES NFR BLD AUTO: 21.5 %
MCH RBC QN AUTO: 34 PG (ref 26–33)
MCHC RBC AUTO-ENTMCNC: 34 GM/DL (ref 32.2–35.5)
MCV RBC AUTO: 99.8 FL (ref 80–94)
MONOCYTES ABSOLUTE: 0.5 THOU/MM3 (ref 0.4–1.3)
MONOCYTES NFR BLD AUTO: 10.5 %
NEUTROPHILS NFR BLD AUTO: 67.4 %
NRBC BLD AUTO-RTO: 0 /100 WBC
ORGANISM: ABNORMAL
PLATELET # BLD AUTO: 85 THOU/MM3 (ref 130–400)
PLATELET BLD QL SMEAR: ABNORMAL
PMV BLD AUTO: 9.9 FL (ref 9.4–12.4)
POIKILOCYTES: ABNORMAL
POTASSIUM SERPL-SCNC: 3.6 MEQ/L (ref 3.5–5.2)
RBC # BLD AUTO: 4.21 MILL/MM3 (ref 4.7–6.1)
SCAN OF BLOOD SMEAR: NORMAL
SEGMENTED NEUTROPHILS ABSOLUTE COUNT: 3.1 THOU/MM3 (ref 1.8–7.7)
SODIUM SERPL-SCNC: 133 MEQ/L (ref 135–145)
STOMATOCYTES: ABNORMAL
WBC # BLD AUTO: 4.6 THOU/MM3 (ref 4.8–10.8)

## 2023-09-12 PROCEDURE — 36415 COLL VENOUS BLD VENIPUNCTURE: CPT

## 2023-09-12 PROCEDURE — 80048 BASIC METABOLIC PNL TOTAL CA: CPT

## 2023-09-12 PROCEDURE — 99232 SBSQ HOSP IP/OBS MODERATE 35: CPT | Performed by: INTERNAL MEDICINE

## 2023-09-12 PROCEDURE — 82948 REAGENT STRIP/BLOOD GLUCOSE: CPT

## 2023-09-12 PROCEDURE — 2500000003 HC RX 250 WO HCPCS

## 2023-09-12 PROCEDURE — 6370000000 HC RX 637 (ALT 250 FOR IP)

## 2023-09-12 PROCEDURE — 2580000003 HC RX 258

## 2023-09-12 PROCEDURE — 85025 COMPLETE CBC W/AUTO DIFF WBC: CPT

## 2023-09-12 PROCEDURE — 6360000002 HC RX W HCPCS

## 2023-09-12 PROCEDURE — 1200000003 HC TELEMETRY R&B

## 2023-09-12 RX ADMIN — METOPROLOL TARTRATE 25 MG: 25 TABLET, FILM COATED ORAL at 20:13

## 2023-09-12 RX ADMIN — SODIUM CHLORIDE, PRESERVATIVE FREE 10 ML: 5 INJECTION INTRAVENOUS at 20:13

## 2023-09-12 RX ADMIN — Medication 1 TABLET: at 08:46

## 2023-09-12 RX ADMIN — FOLIC ACID 1 MG: 1 TABLET ORAL at 08:45

## 2023-09-12 RX ADMIN — PHENOBARBITAL SODIUM 130 MG: 65 INJECTION INTRAMUSCULAR at 03:48

## 2023-09-12 RX ADMIN — DOXYCYCLINE 100 MG: 100 INJECTION, POWDER, LYOPHILIZED, FOR SOLUTION INTRAVENOUS at 18:51

## 2023-09-12 RX ADMIN — PANTOPRAZOLE SODIUM 40 MG: 40 TABLET, DELAYED RELEASE ORAL at 05:39

## 2023-09-12 RX ADMIN — THIAMINE HYDROCHLORIDE 100 MG: 100 INJECTION, SOLUTION INTRAMUSCULAR; INTRAVENOUS at 17:28

## 2023-09-12 RX ADMIN — LISINOPRIL 20 MG: 20 TABLET ORAL at 08:44

## 2023-09-12 RX ADMIN — METOPROLOL TARTRATE 25 MG: 25 TABLET, FILM COATED ORAL at 08:44

## 2023-09-12 RX ADMIN — DOXYCYCLINE 100 MG: 100 INJECTION, POWDER, LYOPHILIZED, FOR SOLUTION INTRAVENOUS at 08:52

## 2023-09-12 RX ADMIN — PHENOBARBITAL SODIUM 130 MG: 65 INJECTION INTRAMUSCULAR at 22:48

## 2023-09-12 NOTE — FLOWSHEET NOTE
VN completed safety rounds. Camera accessed for safety purposes. Pt alert in bed. No s/s of any distress. Call bell and personal items within reach. No needs at this time.

## 2023-09-12 NOTE — CARE COORDINATION
9/12/23, 2:18 PM EDT    DISCHARGE ON GOING [facility] day: 2  Location: -22/022-A Reason for admit: Suicidal ideation [R45.851]  Alcohol abuse [E53.79]  Acute alcoholic intoxication with complication ([address]) [M89.219]  Dyspnea, unspecified type [R06.00]   Barriers to Discharge: Attempted to wean oxygen, dropped to 88%, 1L reapplied. Remains on phenobarb protocol. RN reports he is now having hematuria. PCP: NILAM Pappas CNP  Readmission Risk Score: 19.7%  Patient Goals/Plan/Treatment Preferences: Home alone. Addiction services has provided resources.

## 2023-09-13 LAB
ANION GAP SERPL CALC-SCNC: 9 MEQ/L (ref 8–16)
BASOPHILS ABSOLUTE: 0 THOU/MM3 (ref 0–0.1)
BASOPHILS NFR BLD AUTO: 0.5 %
BUN SERPL-MCNC: 12 MG/DL (ref 7–22)
CALCIUM SERPL-MCNC: 8.2 MG/DL (ref 8.5–10.5)
CHLORIDE SERPL-SCNC: 103 MEQ/L (ref 98–111)
CO2 SERPL-SCNC: 25 MEQ/L (ref 23–33)
CREAT SERPL-MCNC: 0.6 MG/DL (ref 0.4–1.2)
DEPRECATED RDW RBC AUTO: 50.4 FL (ref 35–45)
EOSINOPHIL NFR BLD AUTO: 0 %
EOSINOPHILS ABSOLUTE: 0 THOU/MM3 (ref 0–0.4)
ERYTHROCYTE [DISTWIDTH] IN BLOOD BY AUTOMATED COUNT: 13.6 % (ref 11.5–14.5)
GFR SERPL CREATININE-BSD FRML MDRD: > 60 ML/MIN/1.73M2
GLUCOSE BLD STRIP.AUTO-MCNC: 116 MG/DL (ref 70–108)
GLUCOSE BLD STRIP.AUTO-MCNC: 116 MG/DL (ref 70–108)
GLUCOSE BLD STRIP.AUTO-MCNC: 99 MG/DL (ref 70–108)
GLUCOSE SERPL-MCNC: 102 MG/DL (ref 70–108)
HCT VFR BLD AUTO: 39.9 % (ref 42–52)
HGB BLD-MCNC: 13.4 GM/DL (ref 14–18)
IMM GRANULOCYTES # BLD AUTO: 0.01 THOU/MM3 (ref 0–0.07)
IMM GRANULOCYTES NFR BLD AUTO: 0.2 %
LYMPHOCYTES ABSOLUTE: 1 THOU/MM3 (ref 1–4.8)
LYMPHOCYTES NFR BLD AUTO: 21.8 %
MCH RBC QN AUTO: 33.6 PG (ref 26–33)
MCHC RBC AUTO-ENTMCNC: 33.6 GM/DL (ref 32.2–35.5)
MCV RBC AUTO: 100 FL (ref 80–94)
MONOCYTES ABSOLUTE: 0.5 THOU/MM3 (ref 0.4–1.3)
MONOCYTES NFR BLD AUTO: 10.3 %
NEUTROPHILS NFR BLD AUTO: 67.2 %
NRBC BLD AUTO-RTO: 0 /100 WBC
PLATELET # BLD AUTO: 77 THOU/MM3 (ref 130–400)
PMV BLD AUTO: 10.5 FL (ref 9.4–12.4)
POTASSIUM SERPL-SCNC: 3.9 MEQ/L (ref 3.5–5.2)
RBC # BLD AUTO: 3.99 MILL/MM3 (ref 4.7–6.1)
SEGMENTED NEUTROPHILS ABSOLUTE COUNT: 3 THOU/MM3 (ref 1.8–7.7)
SODIUM SERPL-SCNC: 137 MEQ/L (ref 135–145)
WBC # BLD AUTO: 4.4 THOU/MM3 (ref 4.8–10.8)

## 2023-09-13 PROCEDURE — 36415 COLL VENOUS BLD VENIPUNCTURE: CPT

## 2023-09-13 PROCEDURE — 6370000000 HC RX 637 (ALT 250 FOR IP)

## 2023-09-13 PROCEDURE — 99232 SBSQ HOSP IP/OBS MODERATE 35: CPT | Performed by: INTERNAL MEDICINE

## 2023-09-13 PROCEDURE — 82948 REAGENT STRIP/BLOOD GLUCOSE: CPT

## 2023-09-13 PROCEDURE — 2580000003 HC RX 258

## 2023-09-13 PROCEDURE — 1200000003 HC TELEMETRY R&B

## 2023-09-13 PROCEDURE — 80048 BASIC METABOLIC PNL TOTAL CA: CPT

## 2023-09-13 PROCEDURE — 6360000002 HC RX W HCPCS

## 2023-09-13 PROCEDURE — 85025 COMPLETE CBC W/AUTO DIFF WBC: CPT

## 2023-09-13 PROCEDURE — 2500000003 HC RX 250 WO HCPCS

## 2023-09-13 RX ORDER — GUAIFENESIN 600 MG/1
600 TABLET, EXTENDED RELEASE ORAL 2 TIMES DAILY PRN
Status: DISCONTINUED | OUTPATIENT
Start: 2023-09-13 | End: 2023-09-14 | Stop reason: HOSPADM

## 2023-09-13 RX ADMIN — THIAMINE HYDROCHLORIDE 100 MG: 100 INJECTION, SOLUTION INTRAMUSCULAR; INTRAVENOUS at 13:54

## 2023-09-13 RX ADMIN — FOLIC ACID 1 MG: 1 TABLET ORAL at 09:21

## 2023-09-13 RX ADMIN — LISINOPRIL 20 MG: 20 TABLET ORAL at 09:21

## 2023-09-13 RX ADMIN — DOXYCYCLINE 100 MG: 100 INJECTION, POWDER, LYOPHILIZED, FOR SOLUTION INTRAVENOUS at 18:54

## 2023-09-13 RX ADMIN — SODIUM CHLORIDE, PRESERVATIVE FREE 10 ML: 5 INJECTION INTRAVENOUS at 09:21

## 2023-09-13 RX ADMIN — METOPROLOL TARTRATE 25 MG: 25 TABLET, FILM COATED ORAL at 21:54

## 2023-09-13 RX ADMIN — DOXYCYCLINE 100 MG: 100 INJECTION, POWDER, LYOPHILIZED, FOR SOLUTION INTRAVENOUS at 06:21

## 2023-09-13 RX ADMIN — METOPROLOL TARTRATE 25 MG: 25 TABLET, FILM COATED ORAL at 09:20

## 2023-09-13 RX ADMIN — PHENOBARBITAL SODIUM 130 MG: 65 INJECTION INTRAMUSCULAR at 05:46

## 2023-09-13 RX ADMIN — GUAIFENESIN 600 MG: 600 TABLET, EXTENDED RELEASE ORAL at 17:13

## 2023-09-13 RX ADMIN — Medication 1 TABLET: at 09:21

## 2023-09-13 RX ADMIN — PANTOPRAZOLE SODIUM 40 MG: 40 TABLET, DELAYED RELEASE ORAL at 06:18

## 2023-09-13 NOTE — PLAN OF CARE
Problem: Discharge Planning  Goal: Discharge to home or other facility with appropriate resources  Outcome: Progressing     Problem: Neurosensory - Adult  Goal: Achieves stable or improved neurological status  Outcome: Progressing  Flowsheets (Taken 9/13/2023 0148)  Achieves stable or improved neurological status: Assess for and report changes in neurological status  Note: Patient alert and oriented. Expresses anxiety, Phenobarbital PRN given. Monitored worsening of withdrawal symptoms.      Problem: Chronic Conditions and Co-morbidities  Goal: Patient's chronic conditions and co-morbidity symptoms are monitored and maintained or improved  Outcome: Progressing  Flowsheets (Taken 9/13/2023 0148)  Care Plan - Patient's Chronic Conditions and Co-Morbidity Symptoms are Monitored and Maintained or Improved:   Monitor and assess patient's chronic conditions and comorbid symptoms for stability, deterioration, or improvement   Collaborate with multidisciplinary team to address chronic and comorbid conditions and prevent exacerbation or deterioration   Update acute care plan with appropriate goals if chronic or comorbid symptoms are exacerbated and prevent overall improvement and discharge     Problem: Safety - Adult  Goal: Free from fall injury  Outcome: Progressing  Flowsheets (Taken 9/13/2023 0148)  Free From Fall Injury:   Instruct family/caregiver on patient safety   Based on caregiver fall risk screen, instruct family/caregiver to ask for assistance with transferring infant if caregiver noted to have fall risk factors

## 2023-09-13 NOTE — CARE COORDINATION
9/13/23, 8:13 AM EDT    DISCHARGE ON GOING EVALUATION    Jaden Tee day: 3  Location: -22/022-A Reason for admit: Suicidal ideation [R45.851]  Alcohol abuse [T71.65]  Acute alcoholic intoxication with complication ([address]) [T30.680]  Dyspnea, unspecified type [R06.00]   Procedure:   9/10 CXR Normal chest. No acute findings. Barriers to Discharge: Telemetry, diabetes management, seizure precautions,Addiction Service consult complete. Vibramycin IV, folic acid, Lisinopril, Lopressor, MVI, Protonix, Phenobarbital protocol for ETOH withdrawal.  PCP: NILAM Cortez CNP  Readmission Risk Score: 19.8%  Patient Goals/Plan/Treatment Preferences: Home alone. Addiction services has provided resources. Will follow.

## 2023-09-13 NOTE — FLOWSHEET NOTE
VN completed safety rounds. Camera accessed for safety purposes. Pt alert in bed. No s/s of any distress. Call bell and personal items within reach. No needs at this time. Pt is refusing VN safety rounds ONLY at this time.

## 2023-09-14 VITALS
TEMPERATURE: 98.1 F | WEIGHT: 243.17 LBS | SYSTOLIC BLOOD PRESSURE: 127 MMHG | BODY MASS INDEX: 34.04 KG/M2 | HEART RATE: 98 BPM | RESPIRATION RATE: 16 BRPM | OXYGEN SATURATION: 95 % | HEIGHT: 71 IN | DIASTOLIC BLOOD PRESSURE: 72 MMHG

## 2023-09-14 LAB
ALBUMIN SERPL BCG-MCNC: 3.2 G/DL (ref 3.5–5.1)
ALP SERPL-CCNC: 162 U/L (ref 38–126)
ALT SERPL W/O P-5'-P-CCNC: 76 U/L (ref 11–66)
ANION GAP SERPL CALC-SCNC: 11 MEQ/L (ref 8–16)
AST SERPL-CCNC: 85 U/L (ref 5–40)
BASOPHILS ABSOLUTE: 0 THOU/MM3 (ref 0–0.1)
BASOPHILS NFR BLD AUTO: 0.8 %
BILIRUB SERPL-MCNC: 1.7 MG/DL (ref 0.3–1.2)
BUN SERPL-MCNC: 13 MG/DL (ref 7–22)
CALCIUM SERPL-MCNC: 8.5 MG/DL (ref 8.5–10.5)
CHLORIDE SERPL-SCNC: 103 MEQ/L (ref 98–111)
CO2 SERPL-SCNC: 23 MEQ/L (ref 23–33)
CREAT SERPL-MCNC: 0.5 MG/DL (ref 0.4–1.2)
DEPRECATED RDW RBC AUTO: 51.4 FL (ref 35–45)
EOSINOPHIL NFR BLD AUTO: 1 %
EOSINOPHILS ABSOLUTE: 0 THOU/MM3 (ref 0–0.4)
ERYTHROCYTE [DISTWIDTH] IN BLOOD BY AUTOMATED COUNT: 13.8 % (ref 11.5–14.5)
GFR SERPL CREATININE-BSD FRML MDRD: > 60 ML/MIN/1.73M2
GLUCOSE BLD STRIP.AUTO-MCNC: 118 MG/DL (ref 70–108)
GLUCOSE BLD STRIP.AUTO-MCNC: 88 MG/DL (ref 70–108)
GLUCOSE SERPL-MCNC: 86 MG/DL (ref 70–108)
HCT VFR BLD AUTO: 40 % (ref 42–52)
HGB BLD-MCNC: 13.3 GM/DL (ref 14–18)
IMM GRANULOCYTES # BLD AUTO: 0.02 THOU/MM3 (ref 0–0.07)
IMM GRANULOCYTES NFR BLD AUTO: 0.4 %
LYMPHOCYTES ABSOLUTE: 1 THOU/MM3 (ref 1–4.8)
LYMPHOCYTES NFR BLD AUTO: 20.9 %
MCH RBC QN AUTO: 33.5 PG (ref 26–33)
MCHC RBC AUTO-ENTMCNC: 33.3 GM/DL (ref 32.2–35.5)
MCV RBC AUTO: 100.8 FL (ref 80–94)
MONOCYTES ABSOLUTE: 0.5 THOU/MM3 (ref 0.4–1.3)
MONOCYTES NFR BLD AUTO: 9.2 %
NEUTROPHILS NFR BLD AUTO: 67.7 %
NRBC BLD AUTO-RTO: 0 /100 WBC
PLATELET # BLD AUTO: 77 THOU/MM3 (ref 130–400)
PMV BLD AUTO: 10.3 FL (ref 9.4–12.4)
POTASSIUM SERPL-SCNC: 3.7 MEQ/L (ref 3.5–5.2)
PROT SERPL-MCNC: 5.8 G/DL (ref 6.1–8)
RBC # BLD AUTO: 3.97 MILL/MM3 (ref 4.7–6.1)
SEGMENTED NEUTROPHILS ABSOLUTE COUNT: 3.3 THOU/MM3 (ref 1.8–7.7)
SODIUM SERPL-SCNC: 137 MEQ/L (ref 135–145)
WBC # BLD AUTO: 4.9 THOU/MM3 (ref 4.8–10.8)

## 2023-09-14 PROCEDURE — 82948 REAGENT STRIP/BLOOD GLUCOSE: CPT

## 2023-09-14 PROCEDURE — 80053 COMPREHEN METABOLIC PANEL: CPT

## 2023-09-14 PROCEDURE — 2500000003 HC RX 250 WO HCPCS

## 2023-09-14 PROCEDURE — 2580000003 HC RX 258

## 2023-09-14 PROCEDURE — 6370000000 HC RX 637 (ALT 250 FOR IP)

## 2023-09-14 PROCEDURE — 85025 COMPLETE CBC W/AUTO DIFF WBC: CPT

## 2023-09-14 PROCEDURE — 36415 COLL VENOUS BLD VENIPUNCTURE: CPT

## 2023-09-14 RX ORDER — DOXYCYCLINE HYCLATE 100 MG
100 TABLET ORAL 2 TIMES DAILY
Qty: 2 TABLET | Refills: 0 | Status: CANCELLED | OUTPATIENT
Start: 2023-09-14 | End: 2023-09-15

## 2023-09-14 RX ADMIN — DOXYCYCLINE 100 MG: 100 INJECTION, POWDER, LYOPHILIZED, FOR SOLUTION INTRAVENOUS at 06:08

## 2023-09-14 RX ADMIN — SODIUM CHLORIDE, PRESERVATIVE FREE 10 ML: 5 INJECTION INTRAVENOUS at 08:06

## 2023-09-14 RX ADMIN — METOPROLOL TARTRATE 25 MG: 25 TABLET, FILM COATED ORAL at 08:07

## 2023-09-14 RX ADMIN — LISINOPRIL 20 MG: 20 TABLET ORAL at 08:07

## 2023-09-14 RX ADMIN — PANTOPRAZOLE SODIUM 40 MG: 40 TABLET, DELAYED RELEASE ORAL at 06:08

## 2023-09-14 RX ADMIN — Medication 1 TABLET: at 08:07

## 2023-09-14 RX ADMIN — FOLIC ACID 1 MG: 1 TABLET ORAL at 08:07

## 2023-09-14 NOTE — CARE COORDINATION
9/14/23, 8:32 AM EDT    DISCHARGE ON GOING EVALUATION    Steven Rashid day: 4  Location: [facility]22/022A Reason for admit: Suicidal ideation [R45.851]  Alcohol abuse [D82.37]  Acute alcoholic intoxication with complication ([address]) [N04.664]  Dyspnea, unspecified type [R06.00]   Barriers to Discharge: AST 85, bili 1.7. Weaned to RA, 95%. IV doxycycline, phenobarb protocol. PCP: NILAM Mccurdy CNP  Readmission Risk Score: 20.1%  Patient Goals/Plan/Treatment Preferences: Home independently. Resources provided by Rohit Camargo. 9/14/23, 9:41 AM EDT    Patient goals/plan/ treatment preferences discussed by  and . Patient goals/plan/ treatment preferences reviewed with patient/ family. Patient/ family verbalize understanding of discharge plan and are in agreement with goal/plan/treatment preferences. Understanding was demonstrated using the teach back method. AVS provided by RN at time of discharge, which includes all necessary medical information pertaining to the patients current course of illness, treatment, post-discharge goals of care, and treatment preferences.      Services At/After Discharge: None

## 2023-09-15 LAB
BACTERIA BLD AEROBE CULT: NORMAL
BACTERIA BLD AEROBE CULT: NORMAL

## 2023-09-18 ENCOUNTER — OFFICE VISIT (OUTPATIENT)
Dept: FAMILY MEDICINE CLINIC | Age: 48
End: 2023-09-18
Payer: MEDICAID

## 2023-09-18 VITALS
HEART RATE: 70 BPM | SYSTOLIC BLOOD PRESSURE: 132 MMHG | DIASTOLIC BLOOD PRESSURE: 86 MMHG | HEIGHT: 71 IN | TEMPERATURE: 97.8 F | RESPIRATION RATE: 20 BRPM | BODY MASS INDEX: 34.16 KG/M2 | OXYGEN SATURATION: 97 % | WEIGHT: 244 LBS

## 2023-09-18 DIAGNOSIS — N52.9 ERECTILE DYSFUNCTION, UNSPECIFIED ERECTILE DYSFUNCTION TYPE: ICD-10-CM

## 2023-09-18 DIAGNOSIS — I10 HYPERTENSION, UNSPECIFIED TYPE: ICD-10-CM

## 2023-09-18 DIAGNOSIS — F31.9 BIPOLAR 1 DISORDER (HCC): ICD-10-CM

## 2023-09-18 DIAGNOSIS — E11.8 TYPE 2 DIABETES MELLITUS WITH UNSPECIFIED COMPLICATIONS (HCC): ICD-10-CM

## 2023-09-18 DIAGNOSIS — K70.30 ALCOHOLIC CIRRHOSIS OF LIVER WITHOUT ASCITES (HCC): ICD-10-CM

## 2023-09-18 DIAGNOSIS — Z09 HOSPITAL DISCHARGE FOLLOW-UP: Primary | ICD-10-CM

## 2023-09-18 PROCEDURE — 99215 OFFICE O/P EST HI 40 MIN: CPT | Performed by: NURSE PRACTITIONER

## 2023-09-18 PROCEDURE — 1111F DSCHRG MED/CURRENT MED MERGE: CPT | Performed by: NURSE PRACTITIONER

## 2023-09-18 RX ORDER — SILDENAFIL 100 MG/1
100 TABLET, FILM COATED ORAL DAILY PRN
Qty: 10 TABLET | Refills: 1 | Status: SHIPPED | OUTPATIENT
Start: 2023-09-18

## 2023-09-18 ASSESSMENT — ENCOUNTER SYMPTOMS
GASTROINTESTINAL NEGATIVE: 1
RESPIRATORY NEGATIVE: 1
EYES NEGATIVE: 1

## 2023-09-18 NOTE — PROGRESS NOTES
Post-Discharge Transitional Care Follow Up      Bell Cerda   YOB: 1975    Date of Office Visit:  9/18/2023  Date of Hospital Admission: 9/10/23  Date of Hospital Discharge: 9/14/23  Readmission Risk Score (high >=14%. Medium >=10%):Readmission Risk Score: 20      Care management risk score Rising risk (score 2-5) and Complex Care (Scores >=6): No Risk Score On File     Non face to face  following discharge, date last encounter closed (first attempt may have been earlier): *No documented post hospital discharge outreach found in the last 14 days     Call initiated 2 business days of discharge: *No response recorded in the last 14 days     Hospital discharge follow-up  -     OR DISCHARGE MEDS RECONCILED W/ CURRENT OUTPATIENT MED LIST  Alcoholic cirrhosis of liver without ascites ([address] [facility])  Hypertension, unspecified type  Bipolar 1 disorder ([address])  Type 2 diabetes mellitus with unspecified complications [facility])      Medical Decision Making: high complexity  No follow-ups on file. On this date 9/18/2023 I have spent 40 minutes reviewing previous notes, test results and face to face with the patient discussing the diagnosis and importance of compliance with the treatment plan as well as documenting on the day of the visit. Subjective:   HPI  Pt who is a known alcoholic. Was sober. And had started a job. Then was laid off and that set him down and started dinking heavily. Passed out and likely aspirated    Inpatient course: Discharge summary reviewed- see chart. Interval history/Current status: feeling better. Starting with pathways on Thursday.       Patient Active Problem List   Diagnosis    Hyperlipemia    Uncontrolled hypertension    Alcohol withdrawal (HCC)    Alcoholic hepatitis    COPD (chronic obstructive pulmonary disease) ([address])    Passive-dependent personality disorder ([address])    Alcohol abuse    Type 2 diabetes mellitus with unspecified complications (HCC)    Alcoholic liver failure

## 2023-09-28 RX ORDER — FOLIC ACID 1 MG/1
1000 TABLET ORAL DAILY
Qty: 90 TABLET | Refills: 1 | Status: SHIPPED | OUTPATIENT
Start: 2023-09-28

## 2023-10-22 ENCOUNTER — HOSPITAL ENCOUNTER (INPATIENT)
Age: 48
LOS: 2 days | Discharge: HOME | DRG: 897 | End: 2023-10-25
Attending: EMERGENCY MEDICINE | Admitting: HOSPITALIST
Payer: MEDICAID

## 2023-10-22 DIAGNOSIS — F10.930 ALCOHOL WITHDRAWAL SYNDROME WITHOUT COMPLICATION (HCC): Primary | ICD-10-CM

## 2023-10-22 LAB
ALBUMIN SERPL BCG-MCNC: 4.2 G/DL (ref 3.5–5.1)
ALP SERPL-CCNC: 151 U/L (ref 38–126)
ALT SERPL W/O P-5'-P-CCNC: 106 U/L (ref 11–66)
AMPHETAMINES UR QL SCN: NEGATIVE
ANION GAP SERPL CALC-SCNC: 20 MEQ/L (ref 8–16)
AST SERPL-CCNC: 199 U/L (ref 5–40)
BACTERIA URNS QL MICRO: ABNORMAL /HPF
BARBITURATES UR QL SCN: NEGATIVE
BASOPHILS ABSOLUTE: 0 THOU/MM3 (ref 0–0.1)
BASOPHILS NFR BLD AUTO: 0.4 %
BENZODIAZ UR QL SCN: NEGATIVE
BILIRUB SERPL-MCNC: 2.7 MG/DL (ref 0.3–1.2)
BILIRUB UR QL STRIP.AUTO: NEGATIVE
BUN SERPL-MCNC: 13 MG/DL (ref 7–22)
BZE UR QL SCN: NEGATIVE
CALCIUM SERPL-MCNC: 8.7 MG/DL (ref 8.5–10.5)
CANNABINOIDS UR QL SCN: NEGATIVE
CASTS #/AREA URNS LPF: ABNORMAL /LPF
CASTS 2: ABNORMAL /LPF
CHARACTER UR: CLEAR
CHLORIDE SERPL-SCNC: 96 MEQ/L (ref 98–111)
CO2 SERPL-SCNC: 18 MEQ/L (ref 23–33)
COLOR: YELLOW
CREAT SERPL-MCNC: 0.4 MG/DL (ref 0.4–1.2)
CRYSTALS URNS MICRO: ABNORMAL
DEPRECATED RDW RBC AUTO: 45 FL (ref 35–45)
EOSINOPHIL NFR BLD AUTO: 0.1 %
EOSINOPHILS ABSOLUTE: 0 THOU/MM3 (ref 0–0.4)
EPITHELIAL CELLS, UA: ABNORMAL /HPF
ERYTHROCYTE [DISTWIDTH] IN BLOOD BY AUTOMATED COUNT: 13.2 % (ref 11.5–14.5)
ETHANOL SERPL-MCNC: 0.32 %
FENTANYL: NEGATIVE
GFR SERPL CREATININE-BSD FRML MDRD: > 60 ML/MIN/1.73M2
GLUCOSE BLD STRIP.AUTO-MCNC: 116 MG/DL (ref 70–108)
GLUCOSE SERPL-MCNC: 79 MG/DL (ref 70–108)
GLUCOSE UR QL STRIP.AUTO: NEGATIVE MG/DL
HCT VFR BLD AUTO: 46.6 % (ref 42–52)
HGB BLD-MCNC: 16.6 GM/DL (ref 14–18)
HGB UR QL STRIP.AUTO: ABNORMAL
IMM GRANULOCYTES # BLD AUTO: 0.01 THOU/MM3 (ref 0–0.07)
IMM GRANULOCYTES NFR BLD AUTO: 0.1 %
INR PPP: 1.01 (ref 0.85–1.13)
KETONES UR QL STRIP.AUTO: NEGATIVE
LYMPHOCYTES ABSOLUTE: 1.7 THOU/MM3 (ref 1–4.8)
LYMPHOCYTES NFR BLD AUTO: 24.5 %
MAGNESIUM SERPL-MCNC: 1.8 MG/DL (ref 1.6–2.4)
MCH RBC QN AUTO: 32.9 PG (ref 26–33)
MCHC RBC AUTO-ENTMCNC: 35.6 GM/DL (ref 32.2–35.5)
MCV RBC AUTO: 92.5 FL (ref 80–94)
MISCELLANEOUS 2: ABNORMAL
MONOCYTES ABSOLUTE: 0.4 THOU/MM3 (ref 0.4–1.3)
MONOCYTES NFR BLD AUTO: 5.3 %
NEUTROPHILS NFR BLD AUTO: 69.6 %
NITRITE UR QL STRIP: NEGATIVE
NRBC BLD AUTO-RTO: 0 /100 WBC
OPIATES UR QL SCN: NEGATIVE
OSMOLALITY SERPL CALC.SUM OF ELEC: 267.3 MOSMOL/KG (ref 275–300)
OXYCODONE: NEGATIVE
PCP UR QL SCN: NEGATIVE
PH UR STRIP.AUTO: 5.5 [PH] (ref 5–9)
PHOSPHATE SERPL-MCNC: 2.6 MG/DL (ref 2.4–4.7)
PLATELET # BLD AUTO: 127 THOU/MM3 (ref 130–400)
PMV BLD AUTO: 9.4 FL (ref 9.4–12.4)
POTASSIUM SERPL-SCNC: 4.9 MEQ/L (ref 3.5–5.2)
PROT SERPL-MCNC: 7.6 G/DL (ref 6.1–8)
PROT UR STRIP.AUTO-MCNC: 30 MG/DL
RBC # BLD AUTO: 5.04 MILL/MM3 (ref 4.7–6.1)
RBC URINE: ABNORMAL /HPF
RENAL EPI CELLS #/AREA URNS HPF: ABNORMAL /[HPF]
SCAN OF BLOOD SMEAR: NORMAL
SEGMENTED NEUTROPHILS ABSOLUTE COUNT: 4.9 THOU/MM3 (ref 1.8–7.7)
SODIUM SERPL-SCNC: 134 MEQ/L (ref 135–145)
SP GR UR REFRACT.AUTO: 1.01 (ref 1–1.03)
UROBILINOGEN, URINE: 1 EU/DL (ref 0–1)
WBC # BLD AUTO: 7.1 THOU/MM3 (ref 4.8–10.8)
WBC #/AREA URNS HPF: ABNORMAL /HPF
WBC #/AREA URNS HPF: NEGATIVE /[HPF]
YEAST LIKE FUNGI URNS QL MICRO: ABNORMAL

## 2023-10-22 PROCEDURE — 96375 TX/PRO/DX INJ NEW DRUG ADDON: CPT

## 2023-10-22 PROCEDURE — 96361 HYDRATE IV INFUSION ADD-ON: CPT

## 2023-10-22 PROCEDURE — 99285 EMERGENCY DEPT VISIT HI MDM: CPT

## 2023-10-22 PROCEDURE — 2580000003 HC RX 258: Performed by: HOSPITALIST

## 2023-10-22 PROCEDURE — 82948 REAGENT STRIP/BLOOD GLUCOSE: CPT

## 2023-10-22 PROCEDURE — G0378 HOSPITAL OBSERVATION PER HR: HCPCS

## 2023-10-22 PROCEDURE — 96374 THER/PROPH/DIAG INJ IV PUSH: CPT

## 2023-10-22 PROCEDURE — 80307 DRUG TEST PRSMV CHEM ANLYZR: CPT

## 2023-10-22 PROCEDURE — 6360000002 HC RX W HCPCS: Performed by: HOSPITALIST

## 2023-10-22 PROCEDURE — 85610 PROTHROMBIN TIME: CPT

## 2023-10-22 PROCEDURE — 93005 ELECTROCARDIOGRAM TRACING: CPT | Performed by: EMERGENCY MEDICINE

## 2023-10-22 PROCEDURE — 6360000002 HC RX W HCPCS: Performed by: EMERGENCY MEDICINE

## 2023-10-22 PROCEDURE — 2580000003 HC RX 258: Performed by: EMERGENCY MEDICINE

## 2023-10-22 PROCEDURE — 99223 1ST HOSP IP/OBS HIGH 75: CPT | Performed by: HOSPITALIST

## 2023-10-22 PROCEDURE — 93010 ELECTROCARDIOGRAM REPORT: CPT | Performed by: INTERNAL MEDICINE

## 2023-10-22 PROCEDURE — 85025 COMPLETE CBC W/AUTO DIFF WBC: CPT

## 2023-10-22 PROCEDURE — 6370000000 HC RX 637 (ALT 250 FOR IP): Performed by: HOSPITALIST

## 2023-10-22 PROCEDURE — 80053 COMPREHEN METABOLIC PANEL: CPT

## 2023-10-22 PROCEDURE — 81001 URINALYSIS AUTO W/SCOPE: CPT

## 2023-10-22 PROCEDURE — 36415 COLL VENOUS BLD VENIPUNCTURE: CPT

## 2023-10-22 PROCEDURE — 83735 ASSAY OF MAGNESIUM: CPT

## 2023-10-22 PROCEDURE — 84100 ASSAY OF PHOSPHORUS: CPT

## 2023-10-22 PROCEDURE — 82077 ASSAY SPEC XCP UR&BREATH IA: CPT

## 2023-10-22 RX ORDER — PHENOBARBITAL SODIUM 65 MG/ML
130 INJECTION INTRAMUSCULAR
Status: DISCONTINUED | OUTPATIENT
Start: 2023-10-22 | End: 2023-10-25 | Stop reason: HOSPADM

## 2023-10-22 RX ORDER — DEXTROSE MONOHYDRATE 100 MG/ML
INJECTION, SOLUTION INTRAVENOUS CONTINUOUS PRN
Status: DISCONTINUED | OUTPATIENT
Start: 2023-10-22 | End: 2023-10-25 | Stop reason: HOSPADM

## 2023-10-22 RX ORDER — THIAMINE HYDROCHLORIDE 100 MG/ML
100 INJECTION, SOLUTION INTRAMUSCULAR; INTRAVENOUS DAILY
Status: DISCONTINUED | OUTPATIENT
Start: 2023-10-22 | End: 2023-10-22

## 2023-10-22 RX ORDER — ONDANSETRON 2 MG/ML
4 INJECTION INTRAMUSCULAR; INTRAVENOUS EVERY 6 HOURS PRN
Status: DISCONTINUED | OUTPATIENT
Start: 2023-10-22 | End: 2023-10-25 | Stop reason: HOSPADM

## 2023-10-22 RX ORDER — SODIUM CHLORIDE 9 MG/ML
INJECTION, SOLUTION INTRAVENOUS PRN
Status: DISCONTINUED | OUTPATIENT
Start: 2023-10-22 | End: 2023-10-25 | Stop reason: HOSPADM

## 2023-10-22 RX ORDER — SODIUM CHLORIDE 0.9 % (FLUSH) 0.9 %
5-40 SYRINGE (ML) INJECTION EVERY 12 HOURS SCHEDULED
Status: DISCONTINUED | OUTPATIENT
Start: 2023-10-22 | End: 2023-10-25 | Stop reason: HOSPADM

## 2023-10-22 RX ORDER — LISINOPRIL 20 MG/1
20 TABLET ORAL DAILY
Status: DISCONTINUED | OUTPATIENT
Start: 2023-10-22 | End: 2023-10-25 | Stop reason: HOSPADM

## 2023-10-22 RX ORDER — FOLIC ACID 1 MG/1
1000 TABLET ORAL DAILY
Status: DISCONTINUED | OUTPATIENT
Start: 2023-10-22 | End: 2023-10-25 | Stop reason: HOSPADM

## 2023-10-22 RX ORDER — ACETAMINOPHEN 650 MG/1
650 SUPPOSITORY RECTAL EVERY 6 HOURS PRN
Status: DISCONTINUED | OUTPATIENT
Start: 2023-10-22 | End: 2023-10-25 | Stop reason: HOSPADM

## 2023-10-22 RX ORDER — LANOLIN ALCOHOL/MO/W.PET/CERES
100 CREAM (GRAM) TOPICAL DAILY
Status: DISCONTINUED | OUTPATIENT
Start: 2023-10-22 | End: 2023-10-25 | Stop reason: HOSPADM

## 2023-10-22 RX ORDER — ONDANSETRON 4 MG/1
4 TABLET, ORALLY DISINTEGRATING ORAL EVERY 8 HOURS PRN
Status: DISCONTINUED | OUTPATIENT
Start: 2023-10-22 | End: 2023-10-25 | Stop reason: HOSPADM

## 2023-10-22 RX ORDER — 0.9 % SODIUM CHLORIDE 0.9 %
1000 INTRAVENOUS SOLUTION INTRAVENOUS ONCE
Status: COMPLETED | OUTPATIENT
Start: 2023-10-22 | End: 2023-10-22

## 2023-10-22 RX ORDER — INSULIN LISPRO 100 [IU]/ML
0-4 INJECTION, SOLUTION INTRAVENOUS; SUBCUTANEOUS NIGHTLY
Status: DISCONTINUED | OUTPATIENT
Start: 2023-10-22 | End: 2023-10-25 | Stop reason: HOSPADM

## 2023-10-22 RX ORDER — DIAZEPAM 5 MG/ML
5 INJECTION, SOLUTION INTRAMUSCULAR; INTRAVENOUS ONCE
Status: COMPLETED | OUTPATIENT
Start: 2023-10-22 | End: 2023-10-22

## 2023-10-22 RX ORDER — ACETAMINOPHEN 325 MG/1
650 TABLET ORAL EVERY 6 HOURS PRN
Status: DISCONTINUED | OUTPATIENT
Start: 2023-10-22 | End: 2023-10-25 | Stop reason: HOSPADM

## 2023-10-22 RX ORDER — SODIUM CHLORIDE 0.9 % (FLUSH) 0.9 %
5-40 SYRINGE (ML) INJECTION PRN
Status: DISCONTINUED | OUTPATIENT
Start: 2023-10-22 | End: 2023-10-25 | Stop reason: HOSPADM

## 2023-10-22 RX ORDER — MULTIVITAMIN WITH IRON
1 TABLET ORAL DAILY
Status: DISCONTINUED | OUTPATIENT
Start: 2023-10-22 | End: 2023-10-22

## 2023-10-22 RX ORDER — POLYETHYLENE GLYCOL 3350 17 G/17G
17 POWDER, FOR SOLUTION ORAL DAILY PRN
Status: DISCONTINUED | OUTPATIENT
Start: 2023-10-22 | End: 2023-10-25 | Stop reason: HOSPADM

## 2023-10-22 RX ORDER — PHENOBARBITAL SODIUM 65 MG/ML
260 INJECTION INTRAMUSCULAR
Status: DISCONTINUED | OUTPATIENT
Start: 2023-10-22 | End: 2023-10-25 | Stop reason: HOSPADM

## 2023-10-22 RX ORDER — METOPROLOL TARTRATE 50 MG/1
50 TABLET, FILM COATED ORAL DAILY
Status: DISCONTINUED | OUTPATIENT
Start: 2023-10-22 | End: 2023-10-25 | Stop reason: HOSPADM

## 2023-10-22 RX ORDER — ENOXAPARIN SODIUM 100 MG/ML
30 INJECTION SUBCUTANEOUS 2 TIMES DAILY
Status: DISCONTINUED | OUTPATIENT
Start: 2023-10-22 | End: 2023-10-25 | Stop reason: HOSPADM

## 2023-10-22 RX ORDER — MULTIVITAMIN WITH IRON
1 TABLET ORAL DAILY
Status: DISCONTINUED | OUTPATIENT
Start: 2023-10-22 | End: 2023-10-25 | Stop reason: HOSPADM

## 2023-10-22 RX ORDER — INSULIN LISPRO 100 [IU]/ML
0-4 INJECTION, SOLUTION INTRAVENOUS; SUBCUTANEOUS
Status: DISCONTINUED | OUTPATIENT
Start: 2023-10-23 | End: 2023-10-25 | Stop reason: HOSPADM

## 2023-10-22 RX ADMIN — ENOXAPARIN SODIUM 30 MG: 100 INJECTION SUBCUTANEOUS at 21:14

## 2023-10-22 RX ADMIN — SODIUM CHLORIDE, PRESERVATIVE FREE 10 ML: 5 INJECTION INTRAVENOUS at 21:07

## 2023-10-22 RX ADMIN — FOLIC ACID 1000 MCG: 1 TABLET ORAL at 21:12

## 2023-10-22 RX ADMIN — DIAZEPAM 5 MG: 5 INJECTION, SOLUTION INTRAMUSCULAR; INTRAVENOUS at 15:58

## 2023-10-22 RX ADMIN — PHENOBARBITAL SODIUM 130 MG: 65 INJECTION INTRAMUSCULAR at 23:30

## 2023-10-22 RX ADMIN — PHENOBARBITAL SODIUM 130 MG: 65 INJECTION INTRAMUSCULAR at 20:59

## 2023-10-22 RX ADMIN — Medication 1 TABLET: at 21:12

## 2023-10-22 RX ADMIN — THIAMINE HYDROCHLORIDE 100 MG: 100 INJECTION, SOLUTION INTRAMUSCULAR; INTRAVENOUS at 15:56

## 2023-10-22 RX ADMIN — Medication 100 MG: at 21:12

## 2023-10-22 RX ADMIN — SODIUM CHLORIDE 1000 ML: 9 INJECTION, SOLUTION INTRAVENOUS at 16:00

## 2023-10-22 RX ADMIN — LISINOPRIL 20 MG: 20 TABLET ORAL at 21:12

## 2023-10-22 RX ADMIN — METOPROLOL TARTRATE 50 MG: 50 TABLET, FILM COATED ORAL at 21:12

## 2023-10-22 ASSESSMENT — PAIN - FUNCTIONAL ASSESSMENT
PAIN_FUNCTIONAL_ASSESSMENT: ACTIVITIES ARE NOT PREVENTED
PAIN_FUNCTIONAL_ASSESSMENT: NONE - DENIES PAIN
PAIN_FUNCTIONAL_ASSESSMENT: 0-10

## 2023-10-22 ASSESSMENT — PAIN DESCRIPTION - LOCATION
LOCATION: HEAD
LOCATION: HEAD

## 2023-10-22 ASSESSMENT — PAIN DESCRIPTION - DESCRIPTORS: DESCRIPTORS: ACHING

## 2023-10-22 ASSESSMENT — PAIN SCALES - GENERAL: PAINLEVEL_OUTOF10: 8

## 2023-10-22 ASSESSMENT — PAIN DESCRIPTION - ORIENTATION: ORIENTATION: MID

## 2023-10-22 ASSESSMENT — PAIN DESCRIPTION - FREQUENCY: FREQUENCY: CONTINUOUS

## 2023-10-22 ASSESSMENT — PAIN DESCRIPTION - PAIN TYPE: TYPE: ACUTE PAIN

## 2023-10-22 ASSESSMENT — PAIN DESCRIPTION - ONSET: ONSET: ON-GOING

## 2023-10-22 NOTE — ED NOTES
Spoke with Manju Patel (pts brother) on the phone at this time and updated him on plan of care and admission process at this time.      Chris Vail RN  10/22/23 [id number]

## 2023-10-22 NOTE — ED TRIAGE NOTES
Patient presents to ED from home with brother who reports patient has been intoxicated since yesterday. Patient reports he drank a 6-pack of beer yesterday and a few beers today (24oz cans). Patient is A/O x4 and appears intoxicated. Patient ambulates to room 31 without difficulty at this time. Patient states he has a headache at this time and state he is wanting to rehab.

## 2023-10-22 NOTE — ED PROVIDER NOTES
Medications administered this visit:   Medications   folic acid (FOLVITE) tablet 1,000 mcg (1,000 mcg Oral Given 10/22/23 2112)   lisinopril (PRINIVIL;ZESTRIL) tablet 20 mg (20 mg Oral Given 10/22/23 2112)   metoprolol tartrate (LOPRESSOR) tablet 50 mg (50 mg Oral Given 10/22/23 2112)   multivitamin 1 tablet (1 tablet Oral Given 10/22/23 2112)   thiamine tablet 100 mg (100 mg Oral Given 10/22/23 2112)   sodium chloride flush 0.9 % injection 5-40 mL (10 mLs IntraVENous Given 10/22/23 2107)   sodium chloride flush 0.9 % injection 5-40 mL (has no administration in time range)   0.9 % sodium chloride infusion (has no administration in time range)   enoxaparin Sodium (LOVENOX) injection 30 mg (30 mg SubCUTAneous Given 10/22/23 2114)   ondansetron (ZOFRAN-ODT) disintegrating tablet 4 mg (has no administration in time range)     Or   ondansetron (ZOFRAN) injection 4 mg (has no administration in time range)   polyethylene glycol (GLYCOLAX) packet 17 g (has no administration in time range)   acetaminophen (TYLENOL) tablet 650 mg (has no administration in time range)     Or   acetaminophen (TYLENOL) suppository 650 mg (has no administration in time range)   PHENobarbital (LUMINAL) injection 130 mg (130 mg IntraVENous Given 10/22/23 2059)     Or   PHENobarbital (LUMINAL) injection 260 mg ( IntraVENous See Alternative 10/22/23 2059)     Or   PHENobarbital (LUMINAL) 752.7 mg in sodium chloride 0.9 % 100 mL IVPB ( IntraVENous See Alternative 10/22/23 2059)   insulin lispro (HUMALOG) injection vial 0-4 Units (has no administration in time range)   insulin lispro (HUMALOG) injection vial 0-4 Units ( SubCUTAneous Not Given 10/22/23 2028)   glucose chewable tablet 16 g (has no administration in time range)   dextrose bolus 10% 125 mL (has no administration in time range)     Or   dextrose bolus 10% 250 mL (has no administration in time range)   glucagon (rDNA) injection 1 mg (has no administration in time range)   dextrose 10 %

## 2023-10-22 NOTE — ED NOTES
Patient medicated per [facility] at this time and updated on plan of care. Patient verbalizes understanding and denies any further needs at this time.       Jazmine Leon RN  10/22/23 5866

## 2023-10-22 NOTE — ED NOTES
Pt transported to [facility] on cart in stable condition. Floor contacted before transport and spoke with Heike.      Symone Everett  10/22/23 5453

## 2023-10-23 LAB
ALBUMIN SERPL BCG-MCNC: 3.5 G/DL (ref 3.5–5.1)
ALP SERPL-CCNC: 150 U/L (ref 38–126)
ALT SERPL W/O P-5'-P-CCNC: 97 U/L (ref 11–66)
AMPHETAMINES UR QL SCN: NEGATIVE
ANION GAP SERPL CALC-SCNC: 15 MEQ/L (ref 8–16)
AST SERPL-CCNC: 177 U/L (ref 5–40)
BARBITURATES UR QL SCN: POSITIVE
BASOPHILS ABSOLUTE: 0 THOU/MM3 (ref 0–0.1)
BASOPHILS NFR BLD AUTO: 0.6 %
BENZODIAZ UR QL SCN: NEGATIVE
BILIRUB CONJ SERPL-MCNC: 1.5 MG/DL (ref 0–0.3)
BILIRUB SERPL-MCNC: 3.2 MG/DL (ref 0.3–1.2)
BUN SERPL-MCNC: 10 MG/DL (ref 7–22)
BZE UR QL SCN: NEGATIVE
CALCIUM SERPL-MCNC: 8.3 MG/DL (ref 8.5–10.5)
CANNABINOIDS UR QL SCN: NEGATIVE
CHLORIDE SERPL-SCNC: 103 MEQ/L (ref 98–111)
CO2 SERPL-SCNC: 19 MEQ/L (ref 23–33)
CREAT SERPL-MCNC: 0.5 MG/DL (ref 0.4–1.2)
DEPRECATED RDW RBC AUTO: 49 FL (ref 35–45)
EOSINOPHIL NFR BLD AUTO: 0 %
EOSINOPHILS ABSOLUTE: 0 THOU/MM3 (ref 0–0.4)
ERYTHROCYTE [DISTWIDTH] IN BLOOD BY AUTOMATED COUNT: 13.4 % (ref 11.5–14.5)
ETHANOL SERPL-MCNC: < 0.01 %
FENTANYL: NEGATIVE
GFR SERPL CREATININE-BSD FRML MDRD: > 60 ML/MIN/1.73M2
GLUCOSE BLD STRIP.AUTO-MCNC: 90 MG/DL (ref 70–108)
GLUCOSE BLD STRIP.AUTO-MCNC: 91 MG/DL (ref 70–108)
GLUCOSE BLD STRIP.AUTO-MCNC: 93 MG/DL (ref 70–108)
GLUCOSE BLD STRIP.AUTO-MCNC: 98 MG/DL (ref 70–108)
GLUCOSE SERPL-MCNC: 73 MG/DL (ref 70–108)
HCT VFR BLD AUTO: 45.2 % (ref 42–52)
HGB BLD-MCNC: 15.4 GM/DL (ref 14–18)
IMM GRANULOCYTES # BLD AUTO: 0.02 THOU/MM3 (ref 0–0.07)
IMM GRANULOCYTES NFR BLD AUTO: 0.2 %
LYMPHOCYTES ABSOLUTE: 1.3 THOU/MM3 (ref 1–4.8)
LYMPHOCYTES NFR BLD AUTO: 16.2 %
MCH RBC QN AUTO: 33.2 PG (ref 26–33)
MCHC RBC AUTO-ENTMCNC: 34.1 GM/DL (ref 32.2–35.5)
MCV RBC AUTO: 97.4 FL (ref 80–94)
MONOCYTES ABSOLUTE: 0.8 THOU/MM3 (ref 0.4–1.3)
MONOCYTES NFR BLD AUTO: 9.8 %
NEUTROPHILS NFR BLD AUTO: 73.2 %
NRBC BLD AUTO-RTO: 0 /100 WBC
OPIATES UR QL SCN: NEGATIVE
OSMOLALITY SERPL CALC.SUM OF ELEC: 271.4 MOSMOL/KG (ref 275–300)
OXYCODONE: NEGATIVE
PCP UR QL SCN: NEGATIVE
PLATELET # BLD AUTO: 161 THOU/MM3 (ref 130–400)
PMV BLD AUTO: 9.5 FL (ref 9.4–12.4)
POTASSIUM SERPL-SCNC: 4.3 MEQ/L (ref 3.5–5.2)
PROT SERPL-MCNC: 6.3 G/DL (ref 6.1–8)
RBC # BLD AUTO: 4.64 MILL/MM3 (ref 4.7–6.1)
REASON FOR REJECTION: NORMAL
REJECTED TEST: NORMAL
SEGMENTED NEUTROPHILS ABSOLUTE COUNT: 6 THOU/MM3 (ref 1.8–7.7)
SODIUM SERPL-SCNC: 137 MEQ/L (ref 135–145)
WBC # BLD AUTO: 8.2 THOU/MM3 (ref 4.8–10.8)

## 2023-10-23 PROCEDURE — 6360000002 HC RX W HCPCS: Performed by: HOSPITALIST

## 2023-10-23 PROCEDURE — 82948 REAGENT STRIP/BLOOD GLUCOSE: CPT

## 2023-10-23 PROCEDURE — 82077 ASSAY SPEC XCP UR&BREATH IA: CPT

## 2023-10-23 PROCEDURE — G0378 HOSPITAL OBSERVATION PER HR: HCPCS

## 2023-10-23 PROCEDURE — 80307 DRUG TEST PRSMV CHEM ANLYZR: CPT

## 2023-10-23 PROCEDURE — 85025 COMPLETE CBC W/AUTO DIFF WBC: CPT

## 2023-10-23 PROCEDURE — 80074 ACUTE HEPATITIS PANEL: CPT

## 2023-10-23 PROCEDURE — 82248 BILIRUBIN DIRECT: CPT

## 2023-10-23 PROCEDURE — 99223 1ST HOSP IP/OBS HIGH 75: CPT | Performed by: HOSPITALIST

## 2023-10-23 PROCEDURE — 36415 COLL VENOUS BLD VENIPUNCTURE: CPT

## 2023-10-23 PROCEDURE — 80053 COMPREHEN METABOLIC PANEL: CPT

## 2023-10-23 PROCEDURE — 6370000000 HC RX 637 (ALT 250 FOR IP): Performed by: HOSPITALIST

## 2023-10-23 PROCEDURE — 2580000003 HC RX 258: Performed by: HOSPITALIST

## 2023-10-23 PROCEDURE — 87389 HIV-1 AG W/HIV-1&-2 AB AG IA: CPT

## 2023-10-23 PROCEDURE — 1200000000 HC SEMI PRIVATE

## 2023-10-23 RX ADMIN — FOLIC ACID 1000 MCG: 1 TABLET ORAL at 09:28

## 2023-10-23 RX ADMIN — Medication 100 MG: at 09:28

## 2023-10-23 RX ADMIN — SODIUM CHLORIDE, PRESERVATIVE FREE 10 ML: 5 INJECTION INTRAVENOUS at 09:29

## 2023-10-23 RX ADMIN — Medication 1 TABLET: at 09:28

## 2023-10-23 RX ADMIN — METOPROLOL TARTRATE 50 MG: 50 TABLET, FILM COATED ORAL at 09:28

## 2023-10-23 RX ADMIN — LISINOPRIL 20 MG: 20 TABLET ORAL at 09:28

## 2023-10-23 RX ADMIN — PHENOBARBITAL SODIUM 260 MG: 65 INJECTION INTRAMUSCULAR at 03:56

## 2023-10-23 RX ADMIN — PHENOBARBITAL SODIUM 130 MG: 65 INJECTION INTRAMUSCULAR at 01:19

## 2023-10-23 RX ADMIN — ENOXAPARIN SODIUM 30 MG: 100 INJECTION SUBCUTANEOUS at 21:00

## 2023-10-23 RX ADMIN — SODIUM CHLORIDE, PRESERVATIVE FREE 10 ML: 5 INJECTION INTRAVENOUS at 19:31

## 2023-10-23 RX ADMIN — PHENOBARBITAL SODIUM 260 MG: 65 INJECTION INTRAMUSCULAR at 09:29

## 2023-10-23 ASSESSMENT — PAIN DESCRIPTION - PAIN TYPE: TYPE: ACUTE PAIN

## 2023-10-23 ASSESSMENT — PAIN DESCRIPTION - DESCRIPTORS
DESCRIPTORS: ACHING
DESCRIPTORS: ACHING

## 2023-10-23 ASSESSMENT — PAIN DESCRIPTION - LOCATION
LOCATION: ABDOMEN;HEAD
LOCATION: HEAD
LOCATION: HEAD;ABDOMEN
LOCATION: HEAD;ABDOMEN

## 2023-10-23 ASSESSMENT — PAIN DESCRIPTION - ORIENTATION
ORIENTATION: MID
ORIENTATION: MID

## 2023-10-23 ASSESSMENT — PAIN SCALES - GENERAL
PAINLEVEL_OUTOF10: 8
PAINLEVEL_OUTOF10: 6
PAINLEVEL_OUTOF10: 8

## 2023-10-23 ASSESSMENT — PAIN DESCRIPTION - ONSET: ONSET: ON-GOING

## 2023-10-23 ASSESSMENT — PAIN DESCRIPTION - FREQUENCY: FREQUENCY: CONTINUOUS

## 2023-10-23 ASSESSMENT — PAIN - FUNCTIONAL ASSESSMENT: PAIN_FUNCTIONAL_ASSESSMENT: ACTIVITIES ARE NOT PREVENTED

## 2023-10-23 NOTE — PLAN OF CARE
Administer fluid and/or volume expanders as ordered     Problem: Musculoskeletal - Adult  Goal: Return mobility to safest level of function  Outcome: Progressing  Flowsheets (Taken 10/23/2023 1337)  Return Mobility to Safest Level of Function:   Assess patient stability and activity tolerance for standing, transferring and ambulating with or without assistive devices   Ensure adequate protection for wounds/incisions during mobilization   Instruct patient/family in ordered activity level     Problem: Gastrointestinal - Adult  Goal: Minimal or absence of nausea and vomiting  Outcome: Progressing  Flowsheets (Taken 10/23/2023 1337)  Minimal or absence of nausea and vomiting:   Administer IV fluids as ordered to ensure adequate hydration   Administer ordered antiemetic medications as needed   Provide nonpharmacologic comfort measures as appropriate   Advance diet as tolerated, if ordered   Nutrition consult to assist patient with adequate nutrition and appropriate food choices     Problem: Metabolic/Fluid and Electrolytes - Adult  Goal: Electrolytes maintained within normal limits  Outcome: Progressing  Flowsheets (Taken 10/23/2023 1337)  Electrolytes maintained within normal limits:   Monitor labs and assess patient for signs and symptoms of electrolyte imbalances   Administer electrolyte replacement as ordered   Monitor response to electrolyte replacements, including repeat lab results as appropriate   Instruct patient on fluid and nutrition restrictions as appropriate     Problem: Chronic Conditions and Co-morbidities  Goal: Patient's chronic conditions and co-morbidity symptoms are monitored and maintained or improved  Outcome: Progressing  Flowsheets (Taken 10/23/2023 1337)  Care Plan - Patient's Chronic Conditions and Co-Morbidity Symptoms are Monitored and Maintained or Improved:   Monitor and assess patient's chronic conditions and comorbid symptoms for stability, deterioration, or improvement   Collaborate with

## 2023-10-24 LAB
ALBUMIN SERPL BCG-MCNC: 3.5 G/DL (ref 3.5–5.1)
ALP SERPL-CCNC: 144 U/L (ref 38–126)
ALT SERPL W/O P-5'-P-CCNC: 96 U/L (ref 11–66)
ANION GAP SERPL CALC-SCNC: 13 MEQ/L (ref 8–16)
AST SERPL-CCNC: 171 U/L (ref 5–40)
BILIRUB CONJ SERPL-MCNC: 1.8 MG/DL (ref 0–0.3)
BILIRUB SERPL-MCNC: 3.7 MG/DL (ref 0.3–1.2)
BUN SERPL-MCNC: 12 MG/DL (ref 7–22)
CALCIUM SERPL-MCNC: 8.3 MG/DL (ref 8.5–10.5)
CHLORIDE SERPL-SCNC: 99 MEQ/L (ref 98–111)
CO2 SERPL-SCNC: 23 MEQ/L (ref 23–33)
CREAT SERPL-MCNC: 0.4 MG/DL (ref 0.4–1.2)
GFR SERPL CREATININE-BSD FRML MDRD: > 60 ML/MIN/1.73M2
GLUCOSE BLD STRIP.AUTO-MCNC: 102 MG/DL (ref 70–108)
GLUCOSE BLD STRIP.AUTO-MCNC: 123 MG/DL (ref 70–108)
GLUCOSE BLD STRIP.AUTO-MCNC: 126 MG/DL (ref 70–108)
GLUCOSE BLD STRIP.AUTO-MCNC: 89 MG/DL (ref 70–108)
GLUCOSE SERPL-MCNC: 85 MG/DL (ref 70–108)
HAV IGM SER QL: NEGATIVE
HBV CORE IGM SERPL QL IA: NEGATIVE
HBV SURFACE AG SERPL QL IA: NEGATIVE
HCV IGG SERPL QL IA: NEGATIVE
HIV 1+2 AB+HIV1 P24 AG SERPL QL IA: NONREACTIVE
POTASSIUM SERPL-SCNC: 3.7 MEQ/L (ref 3.5–5.2)
PROT SERPL-MCNC: 6.1 G/DL (ref 6.1–8)
SODIUM SERPL-SCNC: 135 MEQ/L (ref 135–145)

## 2023-10-24 PROCEDURE — 36415 COLL VENOUS BLD VENIPUNCTURE: CPT

## 2023-10-24 PROCEDURE — 6360000002 HC RX W HCPCS: Performed by: HOSPITALIST

## 2023-10-24 PROCEDURE — 82248 BILIRUBIN DIRECT: CPT

## 2023-10-24 PROCEDURE — 6370000000 HC RX 637 (ALT 250 FOR IP): Performed by: HOSPITALIST

## 2023-10-24 PROCEDURE — 1200000000 HC SEMI PRIVATE

## 2023-10-24 PROCEDURE — 82948 REAGENT STRIP/BLOOD GLUCOSE: CPT

## 2023-10-24 PROCEDURE — 99233 SBSQ HOSP IP/OBS HIGH 50: CPT | Performed by: HOSPITALIST

## 2023-10-24 PROCEDURE — 80053 COMPREHEN METABOLIC PANEL: CPT

## 2023-10-24 RX ADMIN — ENOXAPARIN SODIUM 30 MG: 100 INJECTION SUBCUTANEOUS at 21:07

## 2023-10-24 RX ADMIN — LISINOPRIL 20 MG: 20 TABLET ORAL at 08:15

## 2023-10-24 RX ADMIN — METOPROLOL TARTRATE 50 MG: 50 TABLET, FILM COATED ORAL at 08:16

## 2023-10-24 RX ADMIN — ENOXAPARIN SODIUM 30 MG: 100 INJECTION SUBCUTANEOUS at 08:13

## 2023-10-24 RX ADMIN — Medication 100 MG: at 08:17

## 2023-10-24 RX ADMIN — Medication 1 TABLET: at 08:16

## 2023-10-24 RX ADMIN — FOLIC ACID 1000 MCG: 1 TABLET ORAL at 08:14

## 2023-10-25 VITALS
OXYGEN SATURATION: 92 % | DIASTOLIC BLOOD PRESSURE: 69 MMHG | SYSTOLIC BLOOD PRESSURE: 115 MMHG | BODY MASS INDEX: 33.91 KG/M2 | RESPIRATION RATE: 16 BRPM | HEIGHT: 71 IN | HEART RATE: 77 BPM | WEIGHT: 242.2 LBS | TEMPERATURE: 98.4 F

## 2023-10-25 LAB
ALBUMIN SERPL BCG-MCNC: 3.3 G/DL (ref 3.5–5.1)
ALP SERPL-CCNC: 171 U/L (ref 38–126)
ALT SERPL W/O P-5'-P-CCNC: 108 U/L (ref 11–66)
ANION GAP SERPL CALC-SCNC: 10 MEQ/L (ref 8–16)
AST SERPL-CCNC: 145 U/L (ref 5–40)
BILIRUB CONJ SERPL-MCNC: 0.9 MG/DL (ref 0–0.3)
BILIRUB SERPL-MCNC: 2.1 MG/DL (ref 0.3–1.2)
BUN SERPL-MCNC: 10 MG/DL (ref 7–22)
CALCIUM SERPL-MCNC: 8.5 MG/DL (ref 8.5–10.5)
CHLORIDE SERPL-SCNC: 99 MEQ/L (ref 98–111)
CO2 SERPL-SCNC: 24 MEQ/L (ref 23–33)
CREAT SERPL-MCNC: 0.5 MG/DL (ref 0.4–1.2)
GFR SERPL CREATININE-BSD FRML MDRD: > 60 ML/MIN/1.73M2
GLUCOSE BLD STRIP.AUTO-MCNC: 104 MG/DL (ref 70–108)
GLUCOSE BLD STRIP.AUTO-MCNC: 113 MG/DL (ref 70–108)
GLUCOSE SERPL-MCNC: 106 MG/DL (ref 70–108)
POTASSIUM SERPL-SCNC: 3.5 MEQ/L (ref 3.5–5.2)
PROT SERPL-MCNC: 6.1 G/DL (ref 6.1–8)
SODIUM SERPL-SCNC: 133 MEQ/L (ref 135–145)

## 2023-10-25 PROCEDURE — 80053 COMPREHEN METABOLIC PANEL: CPT

## 2023-10-25 PROCEDURE — 6370000000 HC RX 637 (ALT 250 FOR IP): Performed by: HOSPITALIST

## 2023-10-25 PROCEDURE — 36415 COLL VENOUS BLD VENIPUNCTURE: CPT

## 2023-10-25 PROCEDURE — 82248 BILIRUBIN DIRECT: CPT

## 2023-10-25 PROCEDURE — 82948 REAGENT STRIP/BLOOD GLUCOSE: CPT

## 2023-10-25 PROCEDURE — 6360000002 HC RX W HCPCS: Performed by: HOSPITALIST

## 2023-10-25 RX ADMIN — ENOXAPARIN SODIUM 30 MG: 100 INJECTION SUBCUTANEOUS at 08:03

## 2023-10-25 RX ADMIN — LISINOPRIL 20 MG: 20 TABLET ORAL at 08:01

## 2023-10-25 RX ADMIN — FOLIC ACID 1000 MCG: 1 TABLET ORAL at 08:01

## 2023-10-25 RX ADMIN — METOPROLOL TARTRATE 50 MG: 50 TABLET, FILM COATED ORAL at 08:01

## 2023-10-25 RX ADMIN — Medication 100 MG: at 08:01

## 2023-10-25 RX ADMIN — Medication 1 TABLET: at 08:01

## 2023-10-25 ASSESSMENT — PATIENT HEALTH QUESTIONNAIRE - PHQ9
2. FEELING DOWN, DEPRESSED OR HOPELESS: 1
SUM OF ALL RESPONSES TO PHQ QUESTIONS 1-9: 1
SUM OF ALL RESPONSES TO PHQ9 QUESTIONS 1 & 2: 1
SUM OF ALL RESPONSES TO PHQ QUESTIONS 1-9: 1
1. LITTLE INTEREST OR PLEASURE IN DOING THINGS: 0

## 2023-10-25 ASSESSMENT — LIFESTYLE VARIABLES
HOW MANY STANDARD DRINKS CONTAINING ALCOHOL DO YOU HAVE ON A TYPICAL DAY: 5 OR 6
HOW OFTEN DO YOU HAVE A DRINK CONTAINING ALCOHOL: MONTHLY OR LESS

## 2023-10-25 ASSESSMENT — PAIN SCALES - GENERAL: PAINLEVEL_OUTOF10: 0

## 2023-10-25 NOTE — CARE COORDINATION
10/25/23, 1:46 PM EDT    Patient goals/plan/ treatment preferences discussed by  and . Patient goals/plan/ treatment preferences reviewed with patient/ family. Patient/ family verbalize understanding of discharge plan and are in agreement with goal/plan/treatment preferences. Understanding was demonstrated using the teach back method. AVS provided by RN at time of discharge, which includes all necessary medical information pertaining to the patients current course of illness, treatment, post-discharge goals of care, and treatment preferences.      Services At/After Discharge: None
transportation at discharge: [address]: /     Does insurance require precert for SNF: No    Potential assistance Purchasing Medications:    Meds-to-Beds request: Yes      [id number] - Carlos MATOS [phone] - F [phone]  [address]  Phone: [phone] Fax: [phone] #330[address] - Kaden Medici - [address] - F [phone]  [facility] [address]  Phone: [phone] Fax: [phone]      Notes:    Factors facilitating achievement of predicted outcomes: Family support, Motivated, Cooperative, Pleasant, Sense of humor, and Good insight into deficits    Barriers to discharge: detox protocol on phenobarbital.     Additional Case Management Notes: Patient presents to detox from alcohol. ETOH on admission 0.32. Addiction Services Consult, Lovenox, DM management, prn Tylenol and Zofran, detox protocol with Phenobarbital, daily BMP and LFT's, regular diet, fall and seizure precautions, telemetry, up with assistance. Procedure: N/A    The Plan for Transition of Care is related to the following treatment goals of Alcohol withdrawal syndrome without complication [facility]) [Q95.448]    Patient Goals/Plan/Treatment Preferences: Vianey Dietrich resides in an apartment alone. He is able to afford his medications and does not have a need for services or DME at discharge. He states his health insurance . He may need transportation to home at discharge. He is hopeful to be discharged before 10/26/2023 as he has court that day. Transportation/Food Security/Housekeeping Addressed: No issues identified.      Zheng Grubbs RN  Case Management Department

## 2023-10-26 LAB
EKG ATRIAL RATE: 118 BPM
EKG P AXIS: 54 DEGREES
EKG P-R INTERVAL: 112 MS
EKG Q-T INTERVAL: 326 MS
EKG QRS DURATION: 88 MS
EKG QTC CALCULATION (BAZETT): 456 MS
EKG R AXIS: 50 DEGREES
EKG T AXIS: 60 DEGREES
EKG VENTRICULAR RATE: 118 BPM

## 2024-05-16 ENCOUNTER — OFFICE VISIT (OUTPATIENT)
Dept: FAMILY MEDICINE CLINIC | Age: 49
End: 2024-05-16
Payer: MEDICAID

## 2024-05-16 ENCOUNTER — NURSE ONLY (OUTPATIENT)
Dept: LAB | Age: 49
End: 2024-05-16

## 2024-05-16 VITALS
RESPIRATION RATE: 20 BRPM | SYSTOLIC BLOOD PRESSURE: 132 MMHG | HEIGHT: 71 IN | WEIGHT: 260 LBS | DIASTOLIC BLOOD PRESSURE: 88 MMHG | HEART RATE: 80 BPM | BODY MASS INDEX: 36.4 KG/M2 | TEMPERATURE: 97.8 F

## 2024-05-16 DIAGNOSIS — D69.2 PURPURA (HCC): ICD-10-CM

## 2024-05-16 DIAGNOSIS — J44.9 CHRONIC OBSTRUCTIVE PULMONARY DISEASE, UNSPECIFIED COPD TYPE (HCC): ICD-10-CM

## 2024-05-16 DIAGNOSIS — F31.9 BIPOLAR 1 DISORDER (HCC): ICD-10-CM

## 2024-05-16 DIAGNOSIS — E11.8 TYPE 2 DIABETES MELLITUS WITH UNSPECIFIED COMPLICATIONS (HCC): ICD-10-CM

## 2024-05-16 DIAGNOSIS — K70.40 ALCOHOLIC LIVER FAILURE (HCC): ICD-10-CM

## 2024-05-16 DIAGNOSIS — D69.2 PURPURA (HCC): Primary | ICD-10-CM

## 2024-05-16 DIAGNOSIS — Q78.8 OSTEOPOIKILOSIS: ICD-10-CM

## 2024-05-16 LAB
ALBUMIN SERPL BCG-MCNC: 3.1 G/DL (ref 3.5–5.1)
ALP SERPL-CCNC: 187 U/L (ref 38–126)
ALT SERPL W/O P-5'-P-CCNC: 38 U/L (ref 11–66)
ANION GAP SERPL CALC-SCNC: 11 MEQ/L (ref 8–16)
AST SERPL-CCNC: 40 U/L (ref 5–40)
BASOPHILS ABSOLUTE: 0 THOU/MM3 (ref 0–0.1)
BASOPHILS NFR BLD AUTO: 0.5 %
BILIRUB SERPL-MCNC: 1.6 MG/DL (ref 0.3–1.2)
BUN SERPL-MCNC: 10 MG/DL (ref 7–22)
CALCIUM SERPL-MCNC: 9 MG/DL (ref 8.5–10.5)
CHLORIDE SERPL-SCNC: 94 MEQ/L (ref 98–111)
CHOLEST SERPL-MCNC: 107 MG/DL (ref 100–199)
CO2 SERPL-SCNC: 22 MEQ/L (ref 23–33)
CREAT SERPL-MCNC: 0.8 MG/DL (ref 0.4–1.2)
DEPRECATED RDW RBC AUTO: 45.2 FL (ref 35–45)
EOSINOPHIL NFR BLD AUTO: 0 %
EOSINOPHILS ABSOLUTE: 0 THOU/MM3 (ref 0–0.4)
ERYTHROCYTE [DISTWIDTH] IN BLOOD BY AUTOMATED COUNT: 13.3 % (ref 11.5–14.5)
GFR SERPL CREATININE-BSD FRML MDRD: > 90 ML/MIN/1.73M2
GLUCOSE SERPL-MCNC: 389 MG/DL (ref 70–108)
HCT VFR BLD AUTO: 43.5 % (ref 42–52)
HDLC SERPL-MCNC: 10 MG/DL
HGB BLD-MCNC: 15.2 GM/DL (ref 14–18)
IMM GRANULOCYTES # BLD AUTO: 0.05 THOU/MM3 (ref 0–0.07)
IMM GRANULOCYTES NFR BLD AUTO: 0.5 %
LDLC SERPL CALC-MCNC: 53 MG/DL
LYMPHOCYTES ABSOLUTE: 0.9 THOU/MM3 (ref 1–4.8)
LYMPHOCYTES NFR BLD AUTO: 9.8 %
MCH RBC QN AUTO: 32.5 PG (ref 26–33)
MCHC RBC AUTO-ENTMCNC: 34.9 GM/DL (ref 32.2–35.5)
MCV RBC AUTO: 92.9 FL (ref 80–94)
MONOCYTES ABSOLUTE: 0.6 THOU/MM3 (ref 0.4–1.3)
MONOCYTES NFR BLD AUTO: 6 %
NEUTROPHILS ABSOLUTE: 7.7 THOU/MM3 (ref 1.8–7.7)
NEUTROPHILS NFR BLD AUTO: 83.2 %
NRBC BLD AUTO-RTO: 0 /100 WBC
PLATELET # BLD AUTO: 143 THOU/MM3 (ref 130–400)
PMV BLD AUTO: 11.8 FL (ref 9.4–12.4)
POTASSIUM SERPL-SCNC: 3.6 MEQ/L (ref 3.5–5.2)
PROT SERPL-MCNC: 7.7 G/DL (ref 6.1–8)
RBC # BLD AUTO: 4.68 MILL/MM3 (ref 4.7–6.1)
SODIUM SERPL-SCNC: 127 MEQ/L (ref 135–145)
TRIGL SERPL-MCNC: 221 MG/DL (ref 0–199)
WBC # BLD AUTO: 9.2 THOU/MM3 (ref 4.8–10.8)

## 2024-05-16 PROCEDURE — 2022F DILAT RTA XM EVC RTNOPTHY: CPT | Performed by: NURSE PRACTITIONER

## 2024-05-16 PROCEDURE — 3079F DIAST BP 80-89 MM HG: CPT | Performed by: NURSE PRACTITIONER

## 2024-05-16 PROCEDURE — 3023F SPIROM DOC REV: CPT | Performed by: NURSE PRACTITIONER

## 2024-05-16 PROCEDURE — 4004F PT TOBACCO SCREEN RCVD TLK: CPT | Performed by: NURSE PRACTITIONER

## 2024-05-16 PROCEDURE — 3046F HEMOGLOBIN A1C LEVEL >9.0%: CPT | Performed by: NURSE PRACTITIONER

## 2024-05-16 PROCEDURE — 99213 OFFICE O/P EST LOW 20 MIN: CPT | Performed by: NURSE PRACTITIONER

## 2024-05-16 PROCEDURE — G8417 CALC BMI ABV UP PARAM F/U: HCPCS | Performed by: NURSE PRACTITIONER

## 2024-05-16 PROCEDURE — 3075F SYST BP GE 130 - 139MM HG: CPT | Performed by: NURSE PRACTITIONER

## 2024-05-16 PROCEDURE — G8427 DOCREV CUR MEDS BY ELIG CLIN: HCPCS | Performed by: NURSE PRACTITIONER

## 2024-05-16 ASSESSMENT — PATIENT HEALTH QUESTIONNAIRE - PHQ9
9. THOUGHTS THAT YOU WOULD BE BETTER OFF DEAD, OR OF HURTING YOURSELF: NOT AT ALL
SUM OF ALL RESPONSES TO PHQ QUESTIONS 1-9: 6
2. FEELING DOWN, DEPRESSED OR HOPELESS: SEVERAL DAYS
SUM OF ALL RESPONSES TO PHQ QUESTIONS 1-9: 6
5. POOR APPETITE OR OVEREATING: SEVERAL DAYS
8. MOVING OR SPEAKING SO SLOWLY THAT OTHER PEOPLE COULD HAVE NOTICED. OR THE OPPOSITE, BEING SO FIGETY OR RESTLESS THAT YOU HAVE BEEN MOVING AROUND A LOT MORE THAN USUAL: NOT AT ALL
SUM OF ALL RESPONSES TO PHQ9 QUESTIONS 1 & 2: 1
3. TROUBLE FALLING OR STAYING ASLEEP: SEVERAL DAYS
1. LITTLE INTEREST OR PLEASURE IN DOING THINGS: NOT AT ALL
10. IF YOU CHECKED OFF ANY PROBLEMS, HOW DIFFICULT HAVE THESE PROBLEMS MADE IT FOR YOU TO DO YOUR WORK, TAKE CARE OF THINGS AT HOME, OR GET ALONG WITH OTHER PEOPLE: SOMEWHAT DIFFICULT
4. FEELING TIRED OR HAVING LITTLE ENERGY: MORE THAN HALF THE DAYS
6. FEELING BAD ABOUT YOURSELF - OR THAT YOU ARE A FAILURE OR HAVE LET YOURSELF OR YOUR FAMILY DOWN: SEVERAL DAYS
7. TROUBLE CONCENTRATING ON THINGS, SUCH AS READING THE NEWSPAPER OR WATCHING TELEVISION: NOT AT ALL

## 2024-05-16 ASSESSMENT — ENCOUNTER SYMPTOMS
RESPIRATORY NEGATIVE: 1
GASTROINTESTINAL NEGATIVE: 1
EYES NEGATIVE: 1

## 2024-05-16 NOTE — PROGRESS NOTES
Griffin Cerda is a 49 y.o. male whopresents today for :  Chief Complaint   Patient presents with    Abdominal Pain     Vitals:    05/16/24 1323   BP: 132/88   Pulse: 80   Resp: 20   Temp: 97.8 °F (36.6 °C)       HPI:     HPI  Patient here with a rash.  Patient reports over the last week he has a rash on the palms of his hands on his feet it extends up into his forearm he also knows that notices it around his trunk.  Is fairly asymptomatic.  It is papular reddish to brown in nature  Patient Active Problem List   Diagnosis    Hyperlipemia    Uncontrolled hypertension    Alcohol withdrawal (HCC)    Alcoholic hepatitis    COPD (chronic obstructive pulmonary disease) (HCC)    Passive-dependent personality disorder (HCC)    Alcohol abuse    Type 2 diabetes mellitus with unspecified complications (HCC)    Alcoholic liver failure (HCC)    Transaminitis    Hyperbilirubinemia    RUQ pain    Delirious    Alcoholic hepatitis without ascites    Alcoholic steatohepatitis    Hypophosphatemia    Bipolar 1 disorder (HCC)    Alcoholic gastritis without bleeding    Alcohol withdrawal syndrome with complication (HCC)    Alcohol dependence with physiological dependence, continuous (HCC)    Alcoholic cirrhosis (HCC)    Alcohol intoxication in alcoholism with blood level over 0.3 with complication (HCC)    Acute alcoholic intoxication with complication (HCC)    Acute alcoholic hepatitis    Alcohol ingestion    Alcohol withdrawal delirium (HCC)    Uncomplicated alcohol withdrawal (HCC)    Alcohol use disorder, severe, dependence (HCC)    Acute pancreatitis    Depression    Osteopoikilosis    Contusion of hand    Atypical chest pain    Alcoholic encephalopathy (HCC)    Alcohol intoxication with delirium (HCC)    Morbidly obese (HCC)    ETOH abuse    Hyponatremia    Lack of intravenous access    Alcohol withdrawal syndrome, uncomplicated (HCC)    Elevated LFTs    Hepatic steatosis    Thrombocytopenia (HCC)    Macrocytic anemia    COPD

## 2024-05-17 LAB
DEPRECATED MEAN GLUCOSE BLD GHB EST-ACNC: 177 MG/DL (ref 70–126)
HBA1C MFR BLD HPLC: 7.9 % (ref 4.4–6.4)
HIV 1+2 AB+HIV1 P24 AG SERPL QL IA: NORMAL
RPR SER QL: REACTIVE

## 2024-05-20 ENCOUNTER — NURSE ONLY (OUTPATIENT)
Dept: LAB | Age: 49
End: 2024-05-20

## 2024-05-20 ENCOUNTER — TELEPHONE (OUTPATIENT)
Dept: FAMILY MEDICINE CLINIC | Age: 49
End: 2024-05-20

## 2024-05-20 DIAGNOSIS — D69.2 PURPURA (HCC): Primary | ICD-10-CM

## 2024-05-20 DIAGNOSIS — D69.2 PURPURA (HCC): ICD-10-CM

## 2024-05-20 DIAGNOSIS — E11.9 TYPE 2 DIABETES MELLITUS WITHOUT COMPLICATION, WITHOUT LONG-TERM CURRENT USE OF INSULIN (HCC): ICD-10-CM

## 2024-05-20 LAB
T PALLIDUM IGG SER QL IF: REACTIVE
VDRL SER QL: NON REACTIVE

## 2024-05-20 RX ORDER — BLOOD-GLUCOSE METER
KIT MISCELLANEOUS
Qty: 1 KIT | Refills: 0 | Status: SHIPPED | OUTPATIENT
Start: 2024-05-20

## 2024-05-20 NOTE — TELEPHONE ENCOUNTER
Please call pt.  With his labs.  I ordered 2 different screening test for syphilis.  One was positive.  One was negative.  So we need to get a second test that should let us know if false positive or false negative.  Come in to lab to have drawn.  Also his sugar was quite high.  Recommend to restart metformin.  Schedule a follow up diabetes visit in 3 months.  Pending the 2nd lab result may need treated with a shot of pcn

## 2024-05-20 NOTE — TELEPHONE ENCOUNTER
Pt notified. He is also requesting another BS meter, test strips, and lancets. He stated he has not been testing his sugar and the one he had broke.

## 2024-05-21 ENCOUNTER — NURSE ONLY (OUTPATIENT)
Dept: FAMILY MEDICINE CLINIC | Age: 49
End: 2024-05-21

## 2024-05-21 DIAGNOSIS — A64 STD (MALE): Primary | ICD-10-CM

## 2024-05-21 NOTE — PROGRESS NOTES
Administrations This Visit       penicillin G benzathine (BICILLIN L-A) [id number] UNIT/2ML 1.2 Million Units       Admin Date  05/21/2024  13:34 Action  Given Dose  1.2 Million Units Route  IntraMUSCular Site  Ventrogluteal Left  Administered By  Aubrie Fernandez LPN    Ordering Provider: Edward Denton APRN - CNP    NDC: 29633-398-13    Lot#: [id number]    : [employer] [place]    Patient Supplied?: No               Admin Date  05/21/2024  13:35 Action  Given Dose  1.2 Million Units Route  IntraMUSCular Site  Ventrogluteal Right Administered By  Aubrie Fernandez LPN    Ordering Provider: Edward Denton APRN - CNP    NDC: 59373-609-46    Lot#: [id number]    : [employer] [place]    Patient Supplied?: No                    Patient instructed to remain in clinic for 20 minutes after injection and was advised to report any adverse reaction to me immediately.    
counseling

## 2024-05-24 LAB — T PALLIDUM IGG SER QL IF: REACTIVE

## 2024-06-04 NOTE — CARE COORDINATION
[facility]  Office: [phone]  Bhanu Jordan DO, Stanley Buckley DO, Gene Mosqueda DO, Joshua Buck DO, Brittney Tian MD, Evelyne Flowers MD, Omayra Cuevas MD, Rohini Gamboa MD,  Ashvin Dumont MD, Libia Hart MD, Alia Dominguez MD,  Bill Millan DO, Torey Carter MD, Richard Elias MD, Rich Jordan DO, Gavi Arceo MD,  Rashawn Perez DO, Delores Olivier MD, Kenzie Sanders MD, Delmy Kendrick MD, Cole Wilkerson MD,  Dylon Garcia MD, Kevin Aguilera MD, Mark Jones MD, Jodi Urrutia MD, Obie Mccallum MD, John Watters MD, Bonilla Banuelos DO, Abiodun Valderrama DO, Franko Sagastume MD,  Jose Ceballos MD, Shirley Waterhouse, CNP,  Stephanie Dumont CNP, Dannie Bliss, CNP,  Elaine Price, BONNIE, Luly Hannah, CNP, Yadira Russell, CNP, Unique Mcadams CNP, Leda Pinzon CNP, Ekta Santoro, PA-C, Lynn Lugo PA-C, Hortencia Martinez, CNP, Jennifer Alejo, CNP, Katia Coker CNP, Nell Baker CNP, Linda Macdonald CNP, Christine Lockwood, CNS, Bernie Mitchell, CNP, Christa Campos CNP, Tracy Schwab, CNP         [facility]   IN-PATIENT SERVICE   [facility]    Discharge Summary     Patient ID: Angie Barboza  :  1953   MRN: [id number]     ACCOUNT:  [id number]   Patient's PCP: No primary care provider on file.  Admit Date: 6/3/2024   Discharge Date: 2024     Length of Stay: 0  Code Status:  Full Code  Admitting Physician: Gene Mosqueda DO  Discharge Physician: Nell Baker, APRN - NP     Active Discharge Diagnoses:     Hospital Problem Lists:  Principal Problem:    Abnormal EKG  Active Problems:    Breast pain, left    Hyperlipidemia    Obesity (BMI 30-39.9)    Hypertension  Resolved Problems:    * No resolved hospital problems. *      Admission Condition:  stable     Discharged Condition: good    Hospital Stay:     Hospital Course:  Angie Barboza is a 70 y.o. female who was admitted for the management of  Abnormal EKG , presented to ER with 1.5 weeks  Plans home w mother when medically cleared (refused HH, day 3/8 IV Thiamine; monitor); client has OP/IP detox center contact information; collaborated w Luciana Lewis, [address]  Electronically signed by Kathy Gonzales RN on 4/15/2021 at 3:01 PM

## 2024-06-07 NOTE — PROGRESS NOTES
Utilize [facility] alcohol withdrawal scale (Based on [place] Modified Alcohol Withdrawal Scale). Tabulate score based on classifications including Tremor, Sweating, Hallucination, Orientation, and Agitation. Tremor: 0  Sweatin  Hallucinations: 0  Orientation: 0  Agitation: 0  Total Score: 0  Action perform as described below     Tremor:  No tremor is 0 points. Tremor on movement is 1 point. Tremor at rest is 2 points. Sweating: No Sweat 0 points. Moist is 1 point. Drenching sweats is 2 points. Hallucinations: No present 0 points. Dissuadable is 1 point. Not dissuadable is 2 points. Orientation: Oriented 0 points. Vague/detached 1 point. Disoriented/no contact 2 points. Agitation: Calm 0 points. Anxious 1 point. Panicky 2 points. Check scale every 2 hours. Discontinue scoring with 4 consecutive scorings of 0. Scale 0: No phenobarbital given. Re-assess every 60 minutes as needed. Scale 1-3: Phenobarbital 130 mg IV over 3 minutes. Re-assess every 60 minutes as needed. May administer every 60 minutes to a maximum dose of phenobarbital 1040 mg in 24 hours! Scale 4-8: Phenobarbital 260 mg IV over 5 minutes. Re-assess every 60 minutes as needed. May administer every 60 minutes to a maximum dose of phenobarbital 1040mg in 24 hours! Scale 9-10: Transfer to ICU (if not already in ICU). Administer 10mg/kg phenobarbital IV over 60 minutes. Maximum dose phenobarbital is 1040mg in 24 hours! Orientation to room

## 2024-11-18 ENCOUNTER — TELEPHONE (OUTPATIENT)
Dept: FAMILY MEDICINE CLINIC | Age: 49
End: 2024-11-18

## 2024-11-18 RX ORDER — CEPHALEXIN 500 MG/1
500 CAPSULE ORAL 3 TIMES DAILY
Qty: 30 CAPSULE | Refills: 0 | Status: SHIPPED | OUTPATIENT
Start: 2024-11-18 | End: 2024-11-28

## 2025-03-17 DIAGNOSIS — N52.9 ERECTILE DYSFUNCTION, UNSPECIFIED ERECTILE DYSFUNCTION TYPE: ICD-10-CM

## 2025-03-17 DIAGNOSIS — I10 HYPERTENSION, UNSPECIFIED TYPE: ICD-10-CM

## 2025-03-17 RX ORDER — METOPROLOL TARTRATE 50 MG
50 TABLET ORAL DAILY
Qty: 90 TABLET | Refills: 0 | Status: SHIPPED | OUTPATIENT
Start: 2025-03-17

## 2025-03-17 RX ORDER — SILDENAFIL 100 MG/1
100 TABLET, FILM COATED ORAL DAILY PRN
Qty: 10 TABLET | Refills: 0 | Status: SHIPPED | OUTPATIENT
Start: 2025-03-17

## 2025-03-17 RX ORDER — LISINOPRIL 20 MG/1
20 TABLET ORAL DAILY
Qty: 90 TABLET | Refills: 0 | Status: SHIPPED | OUTPATIENT
Start: 2025-03-17

## 2025-03-17 NOTE — TELEPHONE ENCOUNTER
Last visit- 5/16/2024  Next visit- Visit date not found    Requested Prescriptions     Pending Prescriptions Disp Refills    metoprolol tartrate (LOPRESSOR) 50 MG tablet 90 tablet 3     Sig: Take 1 tablet by mouth daily    lisinopril (PRINIVIL;ZESTRIL) 20 MG tablet 90 tablet 3     Sig: Take 1 tablet by mouth daily    sildenafil (VIAGRA) 100 MG tablet 10 tablet 1     Sig: Take 1 tablet by mouth daily as needed for Erectile Dysfunction         Pt is supposed to be going to court tomorrow and may be facing prison time for 30 days. He wanted an appt today to renew his meds, but there weren't any available. He needs refills in case he ends up being sentenced. Meds that he requested are pended for signature.

## 2025-04-14 ENCOUNTER — LAB (OUTPATIENT)
Dept: LAB | Age: 50
End: 2025-04-14

## 2025-04-14 ENCOUNTER — OFFICE VISIT (OUTPATIENT)
Dept: FAMILY MEDICINE CLINIC | Age: 50
End: 2025-04-14
Payer: MEDICAID

## 2025-04-14 VITALS
DIASTOLIC BLOOD PRESSURE: 78 MMHG | SYSTOLIC BLOOD PRESSURE: 156 MMHG | OXYGEN SATURATION: 98 % | RESPIRATION RATE: 19 BRPM | WEIGHT: 210.5 LBS | BODY MASS INDEX: 29.47 KG/M2 | HEIGHT: 71 IN | TEMPERATURE: 98.3 F | HEART RATE: 90 BPM

## 2025-04-14 DIAGNOSIS — J44.9 CHRONIC OBSTRUCTIVE PULMONARY DISEASE, UNSPECIFIED COPD TYPE (HCC): ICD-10-CM

## 2025-04-14 DIAGNOSIS — K70.40 ALCOHOLIC LIVER FAILURE (HCC): ICD-10-CM

## 2025-04-14 DIAGNOSIS — Z00.00 ROUTINE PHYSICAL EXAMINATION: Primary | ICD-10-CM

## 2025-04-14 DIAGNOSIS — Z12.5 SCREENING FOR PROSTATE CANCER: ICD-10-CM

## 2025-04-14 DIAGNOSIS — F31.9 BIPOLAR 1 DISORDER (HCC): ICD-10-CM

## 2025-04-14 DIAGNOSIS — I10 HYPERTENSION, UNSPECIFIED TYPE: ICD-10-CM

## 2025-04-14 DIAGNOSIS — E11.8 TYPE 2 DIABETES MELLITUS WITH UNSPECIFIED COMPLICATIONS: ICD-10-CM

## 2025-04-14 DIAGNOSIS — Q78.8 OSTEOPOIKILOSIS: ICD-10-CM

## 2025-04-14 DIAGNOSIS — N52.9 ERECTILE DYSFUNCTION, UNSPECIFIED ERECTILE DYSFUNCTION TYPE: ICD-10-CM

## 2025-04-14 DIAGNOSIS — R41.840 ATTENTION OR CONCENTRATION DEFICIT: ICD-10-CM

## 2025-04-14 LAB
ALBUMIN SERPL BCG-MCNC: 4.1 G/DL (ref 3.4–4.9)
ALP SERPL-CCNC: 112 U/L (ref 40–129)
ALT SERPL W/O P-5'-P-CCNC: 38 U/L (ref 10–50)
ANION GAP SERPL CALC-SCNC: 10 MEQ/L (ref 8–16)
AST SERPL-CCNC: 43 U/L (ref 10–50)
BILIRUB SERPL-MCNC: 0.9 MG/DL (ref 0.3–1.2)
BUN SERPL-MCNC: 9 MG/DL (ref 8–23)
CALCIUM SERPL-MCNC: 9.4 MG/DL (ref 8.6–10)
CHLORIDE SERPL-SCNC: 107 MEQ/L (ref 98–111)
CHOLEST SERPL-MCNC: 171 MG/DL (ref 100–199)
CO2 SERPL-SCNC: 25 MEQ/L (ref 22–29)
CREAT SERPL-MCNC: 0.8 MG/DL (ref 0.7–1.2)
CREAT UR-MCNC: 77.1 MG/DL
DEPRECATED MEAN GLUCOSE BLD GHB EST-ACNC: 81 MG/DL (ref 70–126)
GFR SERPL CREATININE-BSD FRML MDRD: > 90 ML/MIN/1.73M2
GLUCOSE SERPL-MCNC: 85 MG/DL (ref 74–109)
HBA1C MFR BLD HPLC: 4.7 % (ref 4–6)
HDLC SERPL-MCNC: 75 MG/DL
LDLC SERPL CALC-MCNC: 82 MG/DL
MICROALBUMIN UR-MCNC: 9.18 MG/DL
MICROALBUMIN/CREAT RATIO PNL UR: 119 MG/G (ref 0–30)
POTASSIUM SERPL-SCNC: 3.8 MEQ/L (ref 3.5–5.2)
PROT SERPL-MCNC: 7.1 G/DL (ref 6.4–8.3)
PSA SERPL-MCNC: 0.48 NG/ML (ref 0–1)
SODIUM SERPL-SCNC: 142 MEQ/L (ref 135–145)
TRIGL SERPL-MCNC: 71 MG/DL (ref 0–199)

## 2025-04-14 PROCEDURE — 3078F DIAST BP <80 MM HG: CPT | Performed by: NURSE PRACTITIONER

## 2025-04-14 PROCEDURE — 99396 PREV VISIT EST AGE 40-64: CPT | Performed by: NURSE PRACTITIONER

## 2025-04-14 PROCEDURE — 3077F SYST BP >= 140 MM HG: CPT | Performed by: NURSE PRACTITIONER

## 2025-04-14 RX ORDER — LISINOPRIL 10 MG/1
10 TABLET ORAL DAILY
Qty: 30 TABLET | Refills: 5 | Status: SHIPPED | OUTPATIENT
Start: 2025-04-14

## 2025-04-14 RX ORDER — ATOMOXETINE 40 MG/1
CAPSULE ORAL
Qty: 60 CAPSULE | Refills: 2 | Status: SHIPPED | OUTPATIENT
Start: 2025-04-14

## 2025-04-14 RX ORDER — SILDENAFIL 100 MG/1
100 TABLET, FILM COATED ORAL DAILY PRN
Qty: 10 TABLET | Refills: 1 | Status: SHIPPED | OUTPATIENT
Start: 2025-04-14

## 2025-04-14 SDOH — ECONOMIC STABILITY: FOOD INSECURITY: WITHIN THE PAST 12 MONTHS, THE FOOD YOU BOUGHT JUST DIDN'T LAST AND YOU DIDN'T HAVE MONEY TO GET MORE.: NEVER TRUE

## 2025-04-14 SDOH — ECONOMIC STABILITY: FOOD INSECURITY: WITHIN THE PAST 12 MONTHS, YOU WORRIED THAT YOUR FOOD WOULD RUN OUT BEFORE YOU GOT MONEY TO BUY MORE.: NEVER TRUE

## 2025-04-14 ASSESSMENT — PATIENT HEALTH QUESTIONNAIRE - PHQ9
8. MOVING OR SPEAKING SO SLOWLY THAT OTHER PEOPLE COULD HAVE NOTICED. OR THE OPPOSITE, BEING SO FIGETY OR RESTLESS THAT YOU HAVE BEEN MOVING AROUND A LOT MORE THAN USUAL: NOT AT ALL
2. FEELING DOWN, DEPRESSED OR HOPELESS: NOT AT ALL
5. POOR APPETITE OR OVEREATING: NOT AT ALL
9. THOUGHTS THAT YOU WOULD BE BETTER OFF DEAD, OR OF HURTING YOURSELF: NOT AT ALL
SUM OF ALL RESPONSES TO PHQ QUESTIONS 1-9: 3
6. FEELING BAD ABOUT YOURSELF - OR THAT YOU ARE A FAILURE OR HAVE LET YOURSELF OR YOUR FAMILY DOWN: NOT AT ALL
10. IF YOU CHECKED OFF ANY PROBLEMS, HOW DIFFICULT HAVE THESE PROBLEMS MADE IT FOR YOU TO DO YOUR WORK, TAKE CARE OF THINGS AT HOME, OR GET ALONG WITH OTHER PEOPLE: SOMEWHAT DIFFICULT
SUM OF ALL RESPONSES TO PHQ QUESTIONS 1-9: 3
7. TROUBLE CONCENTRATING ON THINGS, SUCH AS READING THE NEWSPAPER OR WATCHING TELEVISION: MORE THAN HALF THE DAYS
4. FEELING TIRED OR HAVING LITTLE ENERGY: NOT AT ALL
3. TROUBLE FALLING OR STAYING ASLEEP: SEVERAL DAYS
SUM OF ALL RESPONSES TO PHQ QUESTIONS 1-9: 3
1. LITTLE INTEREST OR PLEASURE IN DOING THINGS: NOT AT ALL
SUM OF ALL RESPONSES TO PHQ QUESTIONS 1-9: 3

## 2025-04-14 NOTE — PROGRESS NOTES
Chief Complaint:   Griffin Cerda is a 50 y.o. male who presents for complete physical examination    History of Present Illness:    Chief Complaint   Patient presents with    Annual Exam     Pt here for Wellness.   Has not been taking his BP medications since losing weight -- weight loss of 50lbs.   Wanting blood work today.    Wanting to discuss possible ADHD due to his mind racing since he stopped drinking. Feels jittery, hyper, and cannot focus.      Health Maintenance   Topic Date Due    Hepatitis A vaccine (1 of 2 - Risk 2-dose series) Never done    Hepatitis B vaccine (1 of 3 - 19+ 3-dose series) Never done    DTaP/Tdap/Td vaccine (1 - Tdap) Never done    Diabetic Alb to Cr ratio (uACR) test  08/28/2018    Colorectal Cancer Screen  Never done    Diabetic foot exam  06/11/2021    Diabetic retinal exam  10/19/2022    COVID-19 Vaccine (1 - 2024-25 season) Never done    A1C test (Diabetic or Prediabetic)  05/16/2025    Lipids  05/16/2025    Depression Monitoring  05/16/2025    GFR test (Diabetes, CKD 3-4, OR last GFR 15-59)  05/16/2025    Flu vaccine (Season Ended) 08/01/2025    Hepatitis C screen  Completed    Hib vaccine  Aged Out    Polio vaccine  Aged Out    Meningococcal (ACWY) vaccine  Aged Out    Meningococcal B vaccine  Aged Out    HIV screen  Discontinued     Pt here for annual exam  pt has been sober for a year.  Trying to eat better.  Has a fiance.  Working every day    Does have concerns about add.  Reports since quit drinking very hard to stay focused and gets distracted     Patient Active Problem List   Diagnosis    Hyperlipemia    Uncontrolled hypertension    Alcohol withdrawal (HCC)    Alcoholic hepatitis    COPD (chronic obstructive pulmonary disease) (HCC)    Passive-dependent personality disorder (HCC)    Alcohol abuse    Type 2 diabetes mellitus with unspecified complications    Alcoholic liver failure (HCC)    Transaminitis    Hyperbilirubinemia    RUQ pain    Delirious    Alcoholic

## 2025-04-15 ENCOUNTER — RESULTS FOLLOW-UP (OUTPATIENT)
Dept: FAMILY MEDICINE CLINIC | Age: 50
End: 2025-04-15

## 2025-04-15 LAB
BASOPHILS ABSOLUTE: 0 THOU/MM3 (ref 0–0.1)
BASOPHILS NFR BLD AUTO: 0.8 %
DEPRECATED RDW RBC AUTO: 46.7 FL (ref 35–45)
EOSINOPHIL NFR BLD AUTO: 3.1 %
EOSINOPHILS ABSOLUTE: 0.2 THOU/MM3 (ref 0–0.4)
ERYTHROCYTE [DISTWIDTH] IN BLOOD BY AUTOMATED COUNT: 13.4 % (ref 11.5–14.5)
HCT VFR BLD AUTO: 46.2 % (ref 42–52)
HGB BLD-MCNC: 16.2 GM/DL (ref 14–18)
IMM GRANULOCYTES # BLD AUTO: 0.01 THOU/MM3 (ref 0–0.07)
IMM GRANULOCYTES NFR BLD AUTO: 0.2 %
LYMPHOCYTES ABSOLUTE: 1.1 THOU/MM3 (ref 1–4.8)
LYMPHOCYTES NFR BLD AUTO: 18.7 %
MCH RBC QN AUTO: 33.2 PG (ref 26–33)
MCHC RBC AUTO-ENTMCNC: 35.1 GM/DL (ref 32.2–35.5)
MCV RBC AUTO: 94.7 FL (ref 80–94)
MONOCYTES ABSOLUTE: 0.4 THOU/MM3 (ref 0.4–1.3)
MONOCYTES NFR BLD AUTO: 7 %
NEUTROPHILS ABSOLUTE: 4.3 THOU/MM3 (ref 1.8–7.7)
NEUTROPHILS NFR BLD AUTO: 70.2 %
NRBC BLD AUTO-RTO: 0 /100 WBC
PLATELET # BLD AUTO: 135 THOU/MM3 (ref 130–400)
PMV BLD AUTO: 10.7 FL (ref 9.4–12.4)
RBC # BLD AUTO: 4.88 MILL/MM3 (ref 4.7–6.1)
WBC # BLD AUTO: 6.1 THOU/MM3 (ref 4.8–10.8)

## 2025-04-15 NOTE — TELEPHONE ENCOUNTER
Please call pt.  Labs ok except for his urine test showing protein.  This is likely due to his past elevated sugars.  The blood pressure medication we restarted can help his kidneys heal

## 2025-05-14 ENCOUNTER — OFFICE VISIT (OUTPATIENT)
Dept: FAMILY MEDICINE CLINIC | Age: 50
End: 2025-05-14
Payer: MEDICAID

## 2025-05-14 VITALS
DIASTOLIC BLOOD PRESSURE: 62 MMHG | SYSTOLIC BLOOD PRESSURE: 134 MMHG | TEMPERATURE: 97 F | OXYGEN SATURATION: 98 % | WEIGHT: 208.6 LBS | RESPIRATION RATE: 17 BRPM | HEART RATE: 76 BPM | BODY MASS INDEX: 29.11 KG/M2

## 2025-05-14 DIAGNOSIS — I10 HYPERTENSION, UNSPECIFIED TYPE: ICD-10-CM

## 2025-05-14 DIAGNOSIS — R41.840 ATTENTION OR CONCENTRATION DEFICIT: Primary | ICD-10-CM

## 2025-05-14 DIAGNOSIS — E11.9 TYPE 2 DIABETES MELLITUS WITHOUT COMPLICATION, WITHOUT LONG-TERM CURRENT USE OF INSULIN (HCC): ICD-10-CM

## 2025-05-14 PROCEDURE — 99213 OFFICE O/P EST LOW 20 MIN: CPT | Performed by: NURSE PRACTITIONER

## 2025-05-14 PROCEDURE — 3075F SYST BP GE 130 - 139MM HG: CPT | Performed by: NURSE PRACTITIONER

## 2025-05-14 PROCEDURE — G8417 CALC BMI ABV UP PARAM F/U: HCPCS | Performed by: NURSE PRACTITIONER

## 2025-05-14 PROCEDURE — 2022F DILAT RTA XM EVC RTNOPTHY: CPT | Performed by: NURSE PRACTITIONER

## 2025-05-14 PROCEDURE — 4004F PT TOBACCO SCREEN RCVD TLK: CPT | Performed by: NURSE PRACTITIONER

## 2025-05-14 PROCEDURE — 3044F HG A1C LEVEL LT 7.0%: CPT | Performed by: NURSE PRACTITIONER

## 2025-05-14 PROCEDURE — 3078F DIAST BP <80 MM HG: CPT | Performed by: NURSE PRACTITIONER

## 2025-05-14 PROCEDURE — G8427 DOCREV CUR MEDS BY ELIG CLIN: HCPCS | Performed by: NURSE PRACTITIONER

## 2025-05-14 RX ORDER — BUPROPION HYDROCHLORIDE 150 MG/1
150 TABLET ORAL EVERY MORNING
Qty: 30 TABLET | Refills: 5 | Status: SHIPPED | OUTPATIENT
Start: 2025-05-14

## 2025-05-14 ASSESSMENT — ENCOUNTER SYMPTOMS
EYES NEGATIVE: 1
RESPIRATORY NEGATIVE: 1
GASTROINTESTINAL NEGATIVE: 1

## 2025-05-14 NOTE — PROGRESS NOTES
Griffin Cerda is a 50 y.o. male who presents today for :  Chief Complaint   Patient presents with    1 Month Follow-Up     Vitals:    05/14/25 1311   BP: 134/62   Pulse: 76   Resp: 17   Temp: 97 °F (36.1 °C)   SpO2: 98%       HPI:     History of Present Illness  The patient presents for evaluation of focus issues and smoking cessation.    He discontinued the use of his prescribed medication, Strattera, due to adverse effects, including hallucinations and vomiting. He is interested in exploring alternative treatment options.    He expresses a desire to reduce his tobacco consumption and reports occasional anxiety.    He is trying to lose weight by watching his diet and not drinking alcohol. He is planning to join a gym if he can afford it.    SOCIAL HISTORY  He admits to smoking cigarettes and is trying to cut back. He does not drink alcohol.         Patient Active Problem List   Diagnosis    Hyperlipemia    Uncontrolled hypertension    Alcohol withdrawal (HCC)    Alcoholic hepatitis (HCC)    COPD (chronic obstructive pulmonary disease) (HCC)    Passive-dependent personality disorder (HCC)    Alcohol abuse    Type 2 diabetes mellitus with unspecified complications (HCC)    Alcoholic liver failure (HCC)    Transaminitis    Hyperbilirubinemia    RUQ pain    Delirious    Alcoholic hepatitis without ascites (HCC)    Alcoholic steatohepatitis (HCC)    Hypophosphatemia    Bipolar 1 disorder (HCC)    Alcoholic gastritis without bleeding    Alcohol withdrawal syndrome with complication (HCC)    Alcohol dependence with physiological dependence, continuous (HCC)    Alcoholic cirrhosis (HCC)    Alcohol intoxication in alcoholism with blood level over 0.3 with complication (HCC)    Acute alcoholic intoxication with complication    Acute alcoholic hepatitis (HCC)    Alcohol ingestion    Alcohol withdrawal delirium (HCC)    Uncomplicated alcohol withdrawal (HCC)    Alcohol use disorder, severe, dependence (HCC)    Acute

## 2025-06-08 NOTE — PROGRESS NOTES
[facility]   Nephrology progress note  5/10/2021, 9:13 AM      Pt Name:    Victorino Luo  MRN:     [id number]     YOB: 1975  Admit Date:    5/6/2021  6:27 PM  Primary Care Physician:  NILAM Gaona CNP   Room number  4A-04/004-A    Chief Complaint: Nephrology following for hyponatremia    Subjective:  Patient seen and examined  No chest pain or shortness of breath  Feels okay    Objective:  24HR INTAKE/OUTPUT:      Intake/Output Summary (Last 24 hours) at 5/10/2021 0913  Last data filed at 5/10/2021 0601  Gross per 24 hour   Intake 1825 ml   Output 1850 ml   Net -25 ml     I/O last 3 completed shifts: In: 3390 [P.O.:1825]  Out: 2350 [Urine:2350]  No intake/output data recorded. Admission weight: 245 lb (111.1 kg)  Wt Readings from Last 3 Encounters:   05/08/21 250 lb 6.4 oz (113.6 kg)   04/21/21 246 lb 9.6 oz (111.9 kg)   04/16/21 245 lb 14.4 oz (111.5 kg)     Body mass index is 34.92 kg/m².     Physical examination  VITALS:     Vitals:    05/09/21 2019 05/09/21 2346 05/10/21 0352 05/10/21 0815   BP: 128/66 130/83 108/68 127/70   Pulse: 95 96 94 92   Resp:  18 18 16   Temp: 98.9 °F (37.2 °C) 99 °F (37.2 °C) 98.5 °F (36.9 °C) 98.3 °F (36.8 °C)   TempSrc:  Oral  Oral   SpO2: 96% 96% 97% 99%   Weight:       Height:         General Appearance: alert and cooperative with exam, appears comfortable, no distress  Mouth/Throat: Oral mucosa moist  Neck: No JVD  Lungs: Air entry B/L, no rales, no use of accessory muscles  Heart:  S1, S2 heard  GI: soft, non-tender, no guarding  Extremities: no LE edema      Lab Data  CBC:   Recent Labs     05/08/21  0327   WBC 3.6*   HGB 12.8*   HCT 35.8*        BMP:  Recent Labs     05/08/21  0327 05/08/21  0327 05/08/21 2231 05/09/21  0413 05/10/21  0402   *   < > 130* 135 135   K 3.0*  --   --  3.7 3.5   CL 92*  --   --  102 103   CO2 23  --   --  22* 22*   BUN 8  --   --  9 7   CREATININE 0.7  --   --  0.6 0.7   GLUCOSE 91  -- --  122* 107   CALCIUM 8.3*  --   --  8.6 8.6   MG 1.9  --   --   --  1.8   PHOS 2.7  --   --  3.1 3.5    < > = values in this interval not displayed. Hepatic:   Recent Labs     05/08/21  0327 05/09/21  0413   LABALBU 3.4* 3.2*   * 161*   ALT 85* 110*   BILITOT 4.9* 1.9*   ALKPHOS 163* 253*         Meds:  Infusion:    sodium chloride       Meds:    folic acid  1 mg Oral Daily    lisinopril  20 mg Oral Daily    metoprolol tartrate  50 mg Oral BID    QUEtiapine  50 mg Oral Nightly    pantoprazole  40 mg Oral Daily    sodium chloride flush  10 mL Intravenous 2 times per day    enoxaparin  40 mg Subcutaneous Daily    thiamine  100 mg Oral Daily    multivitamin  1 tablet Oral Daily     Meds prn: potassium chloride **OR** potassium alternative oral replacement **OR** potassium chloride, sodium chloride flush, sodium chloride, [Held by provider] acetaminophen **OR** [Held by provider] acetaminophen, ipratropium       Impression and Plan:  1. Hyponatremia secondary to beer potomania. Overall improved  We will continue with current management  Discussed with patient  Advised alcohol cessation  Sodium levels overall stable at this time    2. Severe alcohol abuse  3. Borderline hypokalemia. Overall improved. Advised patient to increase solute/nutrition intake. 4.  Alcoholic hepatitis  5. History of hypertension. No thiazides recommended for this patient  6.   Chronic tobacco abuse    D/W patient     Melchor Birmingham MD  [facility]